# Patient Record
Sex: FEMALE | Race: WHITE | NOT HISPANIC OR LATINO | Employment: OTHER | URBAN - NONMETROPOLITAN AREA
[De-identification: names, ages, dates, MRNs, and addresses within clinical notes are randomized per-mention and may not be internally consistent; named-entity substitution may affect disease eponyms.]

---

## 2017-12-25 ENCOUNTER — APPOINTMENT (OUTPATIENT)
Dept: GENERAL RADIOLOGY | Facility: HOSPITAL | Age: 42
End: 2017-12-25

## 2017-12-25 ENCOUNTER — HOSPITAL ENCOUNTER (EMERGENCY)
Facility: HOSPITAL | Age: 42
Discharge: HOME | End: 2017-12-25
Attending: FAMILY MEDICINE | Admitting: FAMILY MEDICINE

## 2017-12-25 VITALS
DIASTOLIC BLOOD PRESSURE: 93 MMHG | RESPIRATION RATE: 18 BRPM | SYSTOLIC BLOOD PRESSURE: 142 MMHG | WEIGHT: 265 LBS | HEART RATE: 82 BPM | BODY MASS INDEX: 42.59 KG/M2 | HEIGHT: 66 IN | TEMPERATURE: 98 F | OXYGEN SATURATION: 98 %

## 2017-12-25 DIAGNOSIS — S93.401A SPRAIN OF RIGHT ANKLE, UNSPECIFIED LIGAMENT, INITIAL ENCOUNTER: Primary | ICD-10-CM

## 2017-12-25 PROCEDURE — 99283 EMERGENCY DEPT VISIT LOW MDM: CPT

## 2017-12-25 PROCEDURE — 73610 X-RAY EXAM OF ANKLE: CPT

## 2018-04-08 ENCOUNTER — APPOINTMENT (OUTPATIENT)
Dept: GENERAL RADIOLOGY | Facility: HOSPITAL | Age: 43
End: 2018-04-08

## 2018-04-08 ENCOUNTER — HOSPITAL ENCOUNTER (EMERGENCY)
Facility: HOSPITAL | Age: 43
Discharge: HOME | End: 2018-04-08
Attending: FAMILY MEDICINE | Admitting: FAMILY MEDICINE

## 2018-04-08 VITALS
WEIGHT: 265 LBS | SYSTOLIC BLOOD PRESSURE: 127 MMHG | OXYGEN SATURATION: 95 % | BODY MASS INDEX: 42.59 KG/M2 | TEMPERATURE: 97.6 F | HEART RATE: 82 BPM | HEIGHT: 66 IN | DIASTOLIC BLOOD PRESSURE: 90 MMHG | RESPIRATION RATE: 18 BRPM

## 2018-04-08 DIAGNOSIS — R00.2 PALPITATION: Primary | ICD-10-CM

## 2018-04-08 LAB
ALBUMIN SERPL-MCNC: 4.1 G/DL (ref 3.4–4.8)
ALBUMIN/GLOB SERPL: 1.3 G/DL (ref 1.1–1.8)
ALP SERPL-CCNC: 62 U/L (ref 38–126)
ALT SERPL W P-5'-P-CCNC: 141 U/L (ref 9–52)
ANION GAP SERPL CALCULATED.3IONS-SCNC: 13 MMOL/L (ref 5–15)
AST SERPL-CCNC: 86 U/L (ref 14–36)
BASOPHILS # BLD AUTO: 0.01 10*3/MM3 (ref 0–0.2)
BASOPHILS NFR BLD AUTO: 0.1 % (ref 0–2)
BILIRUB SERPL-MCNC: 0.5 MG/DL (ref 0.2–1.3)
BUN BLD-MCNC: 9 MG/DL (ref 7–21)
BUN/CREAT SERPL: 15.3 (ref 7–25)
CALCIUM SPEC-SCNC: 9.5 MG/DL (ref 8.4–10.2)
CHLORIDE SERPL-SCNC: 100 MMOL/L (ref 95–110)
CO2 SERPL-SCNC: 26 MMOL/L (ref 22–31)
CREAT BLD-MCNC: 0.59 MG/DL (ref 0.5–1)
DEPRECATED RDW RBC AUTO: 38.1 FL (ref 36.4–46.3)
EOSINOPHIL # BLD AUTO: 0.12 10*3/MM3 (ref 0–0.7)
EOSINOPHIL NFR BLD AUTO: 1.7 % (ref 0–7)
ERYTHROCYTE [DISTWIDTH] IN BLOOD BY AUTOMATED COUNT: 12.4 % (ref 11.5–14.5)
GFR SERPL CREATININE-BSD FRML MDRD: 111 ML/MIN/1.73 (ref 60–135)
GLOBULIN UR ELPH-MCNC: 3.1 GM/DL (ref 2.3–3.5)
GLUCOSE BLD-MCNC: 152 MG/DL (ref 60–100)
HCT VFR BLD AUTO: 38.9 % (ref 35–45)
HGB BLD-MCNC: 14.2 G/DL (ref 12–15.5)
HOLD SPECIMEN: NORMAL
HOLD SPECIMEN: NORMAL
IMM GRANULOCYTES # BLD: 0.03 10*3/MM3 (ref 0–0.02)
IMM GRANULOCYTES NFR BLD: 0.4 % (ref 0–0.5)
INR PPP: 1.05 (ref 0.8–1.2)
LYMPHOCYTES # BLD AUTO: 2.46 10*3/MM3 (ref 0.6–4.2)
LYMPHOCYTES NFR BLD AUTO: 35.5 % (ref 10–50)
MCH RBC QN AUTO: 30.7 PG (ref 26.5–34)
MCHC RBC AUTO-ENTMCNC: 36.5 G/DL (ref 31.4–36)
MCV RBC AUTO: 84.2 FL (ref 80–98)
MONOCYTES # BLD AUTO: 0.8 10*3/MM3 (ref 0–0.9)
MONOCYTES NFR BLD AUTO: 11.6 % (ref 0–12)
NEUTROPHILS # BLD AUTO: 3.5 10*3/MM3 (ref 2–8.6)
NEUTROPHILS NFR BLD AUTO: 50.7 % (ref 37–80)
NT-PROBNP SERPL-MCNC: 12.4 PG/ML (ref 0–450)
PLATELET # BLD AUTO: 179 10*3/MM3 (ref 150–450)
PMV BLD AUTO: 10 FL (ref 8–12)
POTASSIUM BLD-SCNC: 3.6 MMOL/L (ref 3.5–5.1)
PROT SERPL-MCNC: 7.2 G/DL (ref 6.3–8.6)
PROTHROMBIN TIME: 13.5 SECONDS (ref 11.1–15.3)
RBC # BLD AUTO: 4.62 10*6/MM3 (ref 3.77–5.16)
SODIUM BLD-SCNC: 139 MMOL/L (ref 137–145)
TROPONIN I SERPL-MCNC: <0.012 NG/ML
WBC NRBC COR # BLD: 6.92 10*3/MM3 (ref 3.2–9.8)
WHOLE BLOOD HOLD SPECIMEN: NORMAL
WHOLE BLOOD HOLD SPECIMEN: NORMAL

## 2018-04-08 PROCEDURE — 93005 ELECTROCARDIOGRAM TRACING: CPT | Performed by: FAMILY MEDICINE

## 2018-04-08 PROCEDURE — 93010 ELECTROCARDIOGRAM REPORT: CPT | Performed by: INTERNAL MEDICINE

## 2018-04-08 PROCEDURE — 83880 ASSAY OF NATRIURETIC PEPTIDE: CPT | Performed by: FAMILY MEDICINE

## 2018-04-08 PROCEDURE — 71045 X-RAY EXAM CHEST 1 VIEW: CPT

## 2018-04-08 PROCEDURE — 85025 COMPLETE CBC W/AUTO DIFF WBC: CPT | Performed by: FAMILY MEDICINE

## 2018-04-08 PROCEDURE — 80053 COMPREHEN METABOLIC PANEL: CPT | Performed by: FAMILY MEDICINE

## 2018-04-08 PROCEDURE — 85610 PROTHROMBIN TIME: CPT | Performed by: FAMILY MEDICINE

## 2018-04-08 PROCEDURE — 84484 ASSAY OF TROPONIN QUANT: CPT | Performed by: FAMILY MEDICINE

## 2018-04-08 PROCEDURE — 99283 EMERGENCY DEPT VISIT LOW MDM: CPT

## 2018-04-08 RX ORDER — METHYLPREDNISOLONE 4 MG/1
4 TABLET ORAL DAILY
COMMUNITY
End: 2018-05-29

## 2018-04-08 RX ORDER — MORPHINE SULFATE 15 MG/1
15 TABLET ORAL EVERY 8 HOURS PRN
COMMUNITY
End: 2018-05-29

## 2018-04-08 RX ORDER — TIZANIDINE 4 MG/1
4 TABLET ORAL EVERY 6 HOURS PRN
COMMUNITY
Start: 2017-09-21 | End: 2022-05-13

## 2018-04-08 RX ORDER — OMEPRAZOLE 40 MG/1
40 CAPSULE, DELAYED RELEASE ORAL DAILY
COMMUNITY
Start: 2017-06-22 | End: 2018-05-29

## 2018-04-08 RX ORDER — AMITRIPTYLINE HYDROCHLORIDE 50 MG/1
50 TABLET, FILM COATED ORAL NIGHTLY
COMMUNITY
Start: 2017-06-23 | End: 2018-06-24

## 2018-04-08 RX ORDER — SODIUM CHLORIDE 0.9 % (FLUSH) 0.9 %
10 SYRINGE (ML) INJECTION AS NEEDED
Status: DISCONTINUED | OUTPATIENT
Start: 2018-04-08 | End: 2018-04-08 | Stop reason: HOSPADM

## 2018-04-08 RX ORDER — OXYCODONE AND ACETAMINOPHEN 7.5; 325 MG/1; MG/1
1 TABLET ORAL EVERY 8 HOURS PRN
COMMUNITY
End: 2018-05-29

## 2018-04-08 RX ORDER — MELOXICAM 15 MG/1
15 TABLET ORAL DAILY
COMMUNITY
Start: 2017-03-24 | End: 2021-10-04

## 2018-04-08 RX ORDER — CETIRIZINE HYDROCHLORIDE 10 MG/1
10 TABLET ORAL DAILY
COMMUNITY
Start: 2018-04-02 | End: 2019-04-03

## 2018-04-08 RX ORDER — GABAPENTIN 800 MG/1
800 TABLET ORAL 3 TIMES DAILY
COMMUNITY
End: 2018-08-07 | Stop reason: ALTCHOICE

## 2018-04-08 RX ORDER — OLANZAPINE 10 MG/1
20 TABLET ORAL NIGHTLY
COMMUNITY
Start: 2017-12-26

## 2018-04-08 NOTE — ED PROVIDER NOTES
Subjective     History provided by:  Patient   used: No    Palpitations   Palpitations quality:  Irregular  Onset quality:  Gradual  Duration:  1 day  Timing:  Intermittent  Progression:  Resolved  Chronicity:  New  Context: not anxiety and not caffeine    Relieved by:  Nothing  Worsened by:  Nothing  Associated symptoms: no chest pain, no malaise/fatigue and no shortness of breath    Risk factors: diabetes mellitus        Review of Systems   Constitutional: Negative for malaise/fatigue.   Respiratory: Negative for shortness of breath.    Cardiovascular: Positive for palpitations. Negative for chest pain.   All other systems reviewed and are negative.      Past Medical History:   Diagnosis Date   • Acute atopic conjunctivitis    • Acute bronchitis    • Allergic rhinitis due to pollen    • Breast lump    • Chest pain    • Chronic low back pain    • Contact dermatitis due to drugs and medicine    • Cyst of ovary    • Infestation by Sarcoptes scabiei annalisa hominis    • Low back pain    • Mastodynia    • Nausea and vomiting    • On long term drug therapy    • Pain in wrist    • Rash    • Skin disorder     breast-possible superficial cellulitis   • Spinal stenosis    • Tinea pedis    • Tobacco dependence syndrome    • Upper respiratory infection    • Vitamin D deficiency    • Wheezing        Allergies   Allergen Reactions   • Bactroban [Mupirocin Calcium]    • Neomycin-Bacitracin-Polymyxin [Bacitracin-Neomycin-Polymyxin] Rash   • Other Rash     All antibiotic creams       Past Surgical History:   Procedure Laterality Date   • BREAST SURGERY      excision breast lesion   • CHOLECYSTECTOMY     • INJECTION OF MEDICATION      Kenalog (4)       Family History   Problem Relation Age of Onset   • Coronary artery disease Other        Social History     Social History   • Marital status:      Social History Main Topics   • Smoking status: Former Smoker   • Drug use: Unknown     Other Topics Concern   •  "Not on file     /81 (BP Location: Right arm, Patient Position: Sitting)   Pulse 85   Temp 97.6 °F (36.4 °C) (Oral)   Resp 16   Ht 167.6 cm (66\")   Wt 120 kg (265 lb)   SpO2 96%   BMI 42.77 kg/m²       Objective   Physical Exam   Constitutional: She is oriented to person, place, and time. She appears well-developed and well-nourished.   HENT:   Head: Normocephalic.   Right Ear: External ear normal.   Left Ear: External ear normal.   Nose: Nose normal.   Mouth/Throat: Oropharynx is clear and moist.   Neck: Normal range of motion. Neck supple.   Cardiovascular: Normal rate, regular rhythm, normal heart sounds and intact distal pulses.    Pulmonary/Chest: Effort normal and breath sounds normal.   Abdominal: Soft. Bowel sounds are normal.   Neurological: She is alert and oriented to person, place, and time. She has normal reflexes.   Skin: Skin is warm. Capillary refill takes less than 2 seconds.   Psychiatric: She has a normal mood and affect. Her behavior is normal. Judgment and thought content normal.   Nursing note and vitals reviewed.      Procedures         ED Course  ED Course      Labs Reviewed   COMPREHENSIVE METABOLIC PANEL - Abnormal; Notable for the following:        Result Value    Glucose 152 (*)     ALT (SGPT) 141 (*)     AST (SGOT) 86 (*)     All other components within normal limits   CBC WITH AUTO DIFFERENTIAL - Abnormal; Notable for the following:     MCHC 36.5 (*)     Immature Grans, Absolute 0.03 (*)     All other components within normal limits   TROPONIN (IN-HOUSE) - Normal   BNP (IN-HOUSE) - Normal   PROTIME-INR - Normal    Narrative:     Therapeutic range for most indications is 2.0-3.0 INR,  or 2.5-3.5 for mechanical heart valves.   RAINBOW DRAW    Narrative:     The following orders were created for panel order [place] Draw.  Procedure                               Abnormality         Status                     ---------                               -----------         ------      "                Light Blue Top[[id number]]                                   In process                 Green Top (Gel)[[id number]]                                  In process                 Lavender Top[[id number]]                                     In process                 Gold Top - SST[[id number]]                                   In process                   Please view results for these tests on the individual orders.   TROPONIN (IN-HOUSE)   CBC AND DIFFERENTIAL    Narrative:     The following orders were created for panel order CBC & Differential.  Procedure                               Abnormality         Status                     ---------                               -----------         ------                     CBC Auto Differential[[id number]]        Abnormal            Final result                 Please view results for these tests on the individual orders.   LIGHT BLUE TOP   GREEN TOP   LAVENDER TOP   GOLD TOP - SST       XR Chest 1 View    (Results Pending)     Chest XR was normal.          [facility]    Final diagnoses:   Palpitation            Willian Simmons MD  04/08/18 0124       Willian Simmons MD  04/08/18 0125

## 2018-05-12 ENCOUNTER — HOSPITAL ENCOUNTER (EMERGENCY)
Facility: HOSPITAL | Age: 43
Discharge: HOME | End: 2018-05-12
Attending: EMERGENCY MEDICINE | Admitting: EMERGENCY MEDICINE

## 2018-05-12 VITALS
RESPIRATION RATE: 18 BRPM | SYSTOLIC BLOOD PRESSURE: 131 MMHG | WEIGHT: 269.4 LBS | TEMPERATURE: 98.3 F | DIASTOLIC BLOOD PRESSURE: 63 MMHG | OXYGEN SATURATION: 98 % | HEIGHT: 66 IN | HEART RATE: 68 BPM | BODY MASS INDEX: 43.3 KG/M2

## 2018-05-12 DIAGNOSIS — R74.8 ELEVATED LIVER ENZYMES: ICD-10-CM

## 2018-05-12 DIAGNOSIS — E11.65 TYPE 2 DIABETES MELLITUS WITH HYPERGLYCEMIA, WITHOUT LONG-TERM CURRENT USE OF INSULIN (HCC): Primary | ICD-10-CM

## 2018-05-12 LAB
ALBUMIN SERPL-MCNC: 4.2 G/DL (ref 3.4–4.8)
ALBUMIN/GLOB SERPL: 1.4 G/DL (ref 1.1–1.8)
ALP SERPL-CCNC: 58 U/L (ref 38–126)
ALT SERPL W P-5'-P-CCNC: 125 U/L (ref 9–52)
ANION GAP SERPL CALCULATED.3IONS-SCNC: 12 MMOL/L (ref 5–15)
AST SERPL-CCNC: 102 U/L (ref 14–36)
BACTERIA UR QL AUTO: ABNORMAL /HPF
BASOPHILS # BLD AUTO: 0.02 10*3/MM3 (ref 0–0.2)
BASOPHILS NFR BLD AUTO: 0.5 % (ref 0–2)
BILIRUB SERPL-MCNC: 0.3 MG/DL (ref 0.2–1.3)
BILIRUB UR QL STRIP: NEGATIVE
BUN BLD-MCNC: 12 MG/DL (ref 7–21)
BUN/CREAT SERPL: 22.2 (ref 7–25)
CALCIUM SPEC-SCNC: 9.3 MG/DL (ref 8.4–10.2)
CHLORIDE SERPL-SCNC: 100 MMOL/L (ref 95–110)
CLARITY UR: ABNORMAL
CO2 SERPL-SCNC: 25 MMOL/L (ref 22–31)
COLOR UR: YELLOW
CREAT BLD-MCNC: 0.54 MG/DL (ref 0.5–1)
DEPRECATED RDW RBC AUTO: 38.3 FL (ref 36.4–46.3)
EOSINOPHIL # BLD AUTO: 0.12 10*3/MM3 (ref 0–0.7)
EOSINOPHIL NFR BLD AUTO: 2.9 % (ref 0–7)
ERYTHROCYTE [DISTWIDTH] IN BLOOD BY AUTOMATED COUNT: 12.4 % (ref 11.5–14.5)
GFR SERPL CREATININE-BSD FRML MDRD: 123 ML/MIN/1.73 (ref 58–135)
GLOBULIN UR ELPH-MCNC: 3 GM/DL (ref 2.3–3.5)
GLUCOSE BLD-MCNC: 210 MG/DL (ref 60–100)
GLUCOSE BLDC GLUCOMTR-MCNC: 260 MG/DL (ref 70–130)
GLUCOSE UR STRIP-MCNC: ABNORMAL MG/DL
HCT VFR BLD AUTO: 39.3 % (ref 35–45)
HGB BLD-MCNC: 14 G/DL (ref 12–15.5)
HGB UR QL STRIP.AUTO: NEGATIVE
HOLD SPECIMEN: NORMAL
HYALINE CASTS UR QL AUTO: ABNORMAL /LPF
IMM GRANULOCYTES # BLD: 0.02 10*3/MM3 (ref 0–0.02)
IMM GRANULOCYTES NFR BLD: 0.5 % (ref 0–0.5)
KETONES UR QL STRIP: NEGATIVE
LEUKOCYTE ESTERASE UR QL STRIP.AUTO: ABNORMAL
LYMPHOCYTES # BLD AUTO: 2 10*3/MM3 (ref 0.6–4.2)
LYMPHOCYTES NFR BLD AUTO: 48.1 % (ref 10–50)
MCH RBC QN AUTO: 30.4 PG (ref 26.5–34)
MCHC RBC AUTO-ENTMCNC: 35.6 G/DL (ref 31.4–36)
MCV RBC AUTO: 85.2 FL (ref 80–98)
MONOCYTES # BLD AUTO: 0.47 10*3/MM3 (ref 0–0.9)
MONOCYTES NFR BLD AUTO: 11.3 % (ref 0–12)
NEUTROPHILS # BLD AUTO: 1.53 10*3/MM3 (ref 2–8.6)
NEUTROPHILS NFR BLD AUTO: 36.7 % (ref 37–80)
NITRITE UR QL STRIP: NEGATIVE
PH UR STRIP.AUTO: 6 [PH] (ref 5–9)
PLATELET # BLD AUTO: 161 10*3/MM3 (ref 150–450)
PMV BLD AUTO: 10.1 FL (ref 8–12)
POTASSIUM BLD-SCNC: 4.1 MMOL/L (ref 3.5–5.1)
PROT SERPL-MCNC: 7.2 G/DL (ref 6.3–8.6)
PROT UR QL STRIP: NEGATIVE
RBC # BLD AUTO: 4.61 10*6/MM3 (ref 3.77–5.16)
RBC # UR: ABNORMAL /HPF
REF LAB TEST METHOD: ABNORMAL
SODIUM BLD-SCNC: 137 MMOL/L (ref 137–145)
SP GR UR STRIP: 1.01 (ref 1–1.03)
SQUAMOUS #/AREA URNS HPF: ABNORMAL /HPF
UROBILINOGEN UR QL STRIP: ABNORMAL
WBC NRBC COR # BLD: 4.16 10*3/MM3 (ref 3.2–9.8)
WBC UR QL AUTO: ABNORMAL /HPF
WHOLE BLOOD HOLD SPECIMEN: NORMAL

## 2018-05-12 PROCEDURE — 80053 COMPREHEN METABOLIC PANEL: CPT | Performed by: PHYSICIAN ASSISTANT

## 2018-05-12 PROCEDURE — 87086 URINE CULTURE/COLONY COUNT: CPT | Performed by: EMERGENCY MEDICINE

## 2018-05-12 PROCEDURE — 99283 EMERGENCY DEPT VISIT LOW MDM: CPT

## 2018-05-12 PROCEDURE — 81001 URINALYSIS AUTO W/SCOPE: CPT | Performed by: EMERGENCY MEDICINE

## 2018-05-12 PROCEDURE — 96360 HYDRATION IV INFUSION INIT: CPT

## 2018-05-12 PROCEDURE — 82962 GLUCOSE BLOOD TEST: CPT

## 2018-05-12 PROCEDURE — 85025 COMPLETE CBC W/AUTO DIFF WBC: CPT | Performed by: PHYSICIAN ASSISTANT

## 2018-05-12 RX ORDER — SODIUM CHLORIDE 0.9 % (FLUSH) 0.9 %
10 SYRINGE (ML) INJECTION AS NEEDED
Status: DISCONTINUED | OUTPATIENT
Start: 2018-05-12 | End: 2018-05-12 | Stop reason: HOSPADM

## 2018-05-12 RX ORDER — SODIUM CHLORIDE 9 MG/ML
125 INJECTION, SOLUTION INTRAVENOUS CONTINUOUS
Status: DISCONTINUED | OUTPATIENT
Start: 2018-05-12 | End: 2018-05-12 | Stop reason: HOSPADM

## 2018-05-12 RX ORDER — OXYCODONE AND ACETAMINOPHEN 10; 325 MG/1; MG/1
1 TABLET ORAL EVERY 6 HOURS
COMMUNITY
End: 2021-10-08 | Stop reason: SDUPTHER

## 2018-05-12 RX ADMIN — SODIUM CHLORIDE 125 ML/HR: 9 INJECTION, SOLUTION INTRAVENOUS at 12:19

## 2018-05-12 NOTE — ED TRIAGE NOTES
Patient is on Metformin and Glyburide. She is usually controlled on Metformin but she had a spike in blood sugars so PCP started her on Glyburide and it is still not down.

## 2018-05-13 LAB — BACTERIA SPEC AEROBE CULT: NORMAL

## 2018-05-29 ENCOUNTER — DOCUMENTATION (OUTPATIENT)
Dept: CARDIOLOGY | Facility: CLINIC | Age: 43
End: 2018-05-29

## 2018-05-29 ENCOUNTER — OFFICE VISIT (OUTPATIENT)
Dept: CARDIOLOGY | Facility: CLINIC | Age: 43
End: 2018-05-29

## 2018-05-29 VITALS
SYSTOLIC BLOOD PRESSURE: 130 MMHG | BODY MASS INDEX: 42.59 KG/M2 | HEART RATE: 88 BPM | DIASTOLIC BLOOD PRESSURE: 80 MMHG | HEIGHT: 66 IN | WEIGHT: 265 LBS

## 2018-05-29 DIAGNOSIS — E11.9 TYPE 2 DIABETES MELLITUS WITHOUT COMPLICATION, WITHOUT LONG-TERM CURRENT USE OF INSULIN (HCC): Primary | ICD-10-CM

## 2018-05-29 DIAGNOSIS — R00.0 TACHYCARDIA: ICD-10-CM

## 2018-05-29 DIAGNOSIS — R07.2 PRECORDIAL PAIN: ICD-10-CM

## 2018-05-29 DIAGNOSIS — R00.2 PALPITATION: ICD-10-CM

## 2018-05-29 PROCEDURE — 99204 OFFICE O/P NEW MOD 45 MIN: CPT | Performed by: INTERNAL MEDICINE

## 2018-05-29 NOTE — PROGRESS NOTES
Corrina Serrano  42 y.o. female    05/29/2018  1. Type 2 diabetes mellitus without complication, without long-term current use of insulin    2. Palpitation    3. Tachycardia    4. Precordial pain        History of Present Illness    42 years old patient with the BMI 42 and past medical history of diabetes, chronic back pain, peripheral neuropathy, history of anxiety.  Recently evaluated in ER for palpitation sudden onset and termination going on for last 3-4 month with associated symptom of fluttering shortness breath and chest pain.  The patient have chest pain independent of that described as pressure-like and sharply feeling over the left precordium with radiation sometimes the left shoulder.  EKG sinus rhythm with evidence of left ventricular hypertrophy and no acute ST-T wave changes.  Serum multiple anxiety medications and also on Percocet for management of chronic back pain with history of peripheral neuropathy.  No syncope or near syncopal episode reported.  No orthopnea PND fever cough or chills reported.  Her physical activity limited secondary significant back pain        SUBJECTIVE    Allergies   Allergen Reactions   • Bactroban [Mupirocin Calcium]    • Neomycin-Bacitracin-Polymyxin [Bacitracin-Neomycin-Polymyxin] Rash   • Other Rash     All antibiotic creams         Past Medical History:   Diagnosis Date   • Acute atopic conjunctivitis    • Acute bronchitis    • Allergic rhinitis due to pollen    • Breast lump    • Chest pain    • Chronic low back pain    • Contact dermatitis due to drugs and medicine    • Cyst of ovary    • Infestation by Sarcoptes scabiei annalisa hominis    • Low back pain    • Mastodynia    • Nausea and vomiting    • On long term drug therapy    • Pain in wrist    • Rash    • Skin disorder     breast-possible superficial cellulitis   • Spinal stenosis    • Tinea pedis    • Tobacco dependence syndrome    • Upper respiratory infection    • Vitamin D deficiency    • Wheezing   "        Past Surgical History:   Procedure Laterality Date   • BREAST SURGERY      excision breast lesion   • CHOLECYSTECTOMY     • INJECTION OF MEDICATION      Kenalog (4)         Family History   Problem Relation Age of Onset   • Coronary artery disease Other          Social History     Social History   • Marital status:      Spouse name: N/A   • Number of children: N/A   • Years of education: N/A     Occupational History   • Not on file.     Social History Main Topics   • Smoking status: Former Smoker   • Smokeless tobacco: Not on file   • Alcohol use Not on file   • Drug use: No   • Sexual activity: Defer     Other Topics Concern   • Not on file     Social History Narrative   • No narrative on file         Current Outpatient Prescriptions   Medication Sig Dispense Refill   • amitriptyline (ELAVIL) 50 MG tablet Take 50 mg by mouth Every Night.     • cetirizine (zyrTEC) 10 MG tablet Take 10 mg by mouth Daily.     • gabapentin (NEURONTIN) 800 MG tablet Take 800 mg by mouth 3 (Three) Times a Day.     • GLYBURIDE PO Take 3 mg by mouth 2 (Two) Times a Day.     • meloxicam (MOBIC) 15 MG tablet Take 15 mg by mouth Daily.     • metFORMIN (GLUCOPHAGE) 500 MG tablet Take 500 mg by mouth 2 (Two) Times a Day With Meals.     • OLANZapine (zyPREXA) 5 MG tablet Take 5 mg by mouth Every Night.     • omeprazole (priLOSEC) 40 MG capsule Take 40 mg by mouth Daily.     • oxyCODONE-acetaminophen (PERCOCET)  MG per tablet Take 1 tablet by mouth Every 6 (Six) Hours As Needed for Moderate Pain .     • tiZANidine (ZANAFLEX) 4 MG tablet Take 4 mg by mouth Every 6 (Six) Hours.       No current facility-administered medications for this visit.          OBJECTIVE    /80   Pulse 88   Ht 167.6 cm (65.98\")   Wt 120 kg (265 lb)   BMI 42.79 kg/m²         Review of Systems     Constitutional:  Denies recent weight loss, weight gain, fever or chills, no change in exercise tolerance     HENT:  Denies any hearing loss, " epistaxis, hoarseness, or difficulty speaking.     Eyes: No blurry     Respiratory:  Denies dyspnea with exertion,no cough, wheezing, or hemoptysis.     Cardiovascular: H&P    Gastrointestinal:  Denies change in bowel habits, dyspepsia, ulcer disease, hematochezia, or melena.     Endocrine: Negative for cold intolerance, heat intolerance, polydipsia, polyphagia and polyuria. Denies any history of weight change, heat/cold intolerance, polydipsia, polyuria     Genitourinary: Negative.      Musculoskeletal: Chronic back pain    Skin:  Denies any change in hair or nails, rashes, or skin lesions.     Allergic/Immunologic: Negative.  Negative for environmental allergies, food allergies and immunocompromised state.     Neurological:  Denies any history of recurrent headaches, strokes, TIA, or seizure disorder.     Hematological: Denies any food allergies, seasonal allergies, bleeding disorders, or lymphadenopathy.     Psychiatric/Behavioral: Denies any history of depression, substance abuse, or change in cognitive function.         Physical Exam     Constitutional: Cooperative, alert and oriented, well-developed, well-nourished, in no acute distress.     HENT:   Head: Normocephalic, normal hair patterns, no masses or tenderness.  Ears, Nose, and Throat: No gross abnormalities. No pallor or cyanosis. Dentition good.   Eyes: EOMS intact, PERRL, conjunctivae and lids unremarkable. Fundoscopic exam and visual fields not performed.   Neck: No palpable masses or adenopathy, no thyromegaly, no JVD, carotid pulses are full and equal bilaterally and without  Bruits.     Cardiovascular: Regular rhythm, S1 and S2 normal, no S3 or S4. Apical impulse not displaced. No murmurs, gallops, or rubs detected.     Pulmonary/Chest: Chest: normal symmetry, no tenderness to palpation, normal respiratory excursion, no intercostal retraction, no use of accessory muscles.            Pulmonary: Normal breath sounds. No rales or ronchi.    Abdominal:  Abdomen soft, bowel sounds normoactive, no masses, no hepatosplenomegaly, non-tender, no bruits.     Musculoskeletal: No deformities, clubbing, cyanosis, erythema, or edema observed. There are no spinal abnormalities noted. Normal muscle strength and tone. Pulses full and equal in all extremities, no bruits auscultated.     Neurological: No gross motor or sensory deficits noted, affect appropriate, oriented to time, person, place.     Skin: Warm and dry to the touch, no apparent skin lesions or masses noted.     Psychiatric: She has a normal mood and affect. Her behavior is normal. Judgment and thought content normal.         Procedures      Lab Results   Component Value Date    WBC 4.16 05/12/2018    HGB 14.0 05/12/2018    HCT 39.3 05/12/2018    MCV 85.2 05/12/2018     05/12/2018     Lab Results   Component Value Date    GLUCOSE 210 (H) 05/12/2018    BUN 12 05/12/2018    CREATININE 0.54 05/12/2018    EGFRIFNONA 123 05/12/2018    BCR 22.2 05/12/2018    CO2 25.0 05/12/2018    CALCIUM 9.3 05/12/2018    ALBUMIN 4.20 05/12/2018    LABIL2 1.4 05/12/2018     (H) 05/12/2018     (H) 05/12/2018     No results found for: CHOL  Lab Results   Component Value Date    TRIG 144 03/11/2015     Lab Results   Component Value Date    HDL 50 (L) 03/11/2015     No components found for: LDLCALC  Lab Results   Component Value Date     03/11/2015     No results found for: HDLLDLRATIO  No components found for: CHOLHDL  Lab Results   Component Value Date    HGBA1C 5.2 03/11/2015     No results found for: TSH, L4IWZTZ, S6WTSKP, THYROIDAB        ASSESSMENT AND PLAN  #1 palpitation #2 tachycardia with undefined mechanism #3 chest pain with the risk factor of diabetes #4 chronic back pain and history of anxiety #5   peripheral neuropathy    Clinically, no sign of cardiac decompensation based the clinical history physical finding.  Given the risk factor of for diabetes, exogenous obesity, history of smoking and chest  pain even PAC typical we will further risk stratify with a Lexiscan Cardiolite given the significant history of peripheral neuropathy and chronic back pain.  I will arrange event monitor for 21 day and echocardiographic study.  Risk factor lifestyle modification discussed.  Dash diet explained.  At present, patient being managed with glyburide and metformin for diabetes  Proton inhibitor with history of gastritis, Percocet for chronic back pain and peripheral neuropathy along with gabapentin.  We will see her back in 6 month R depends on patient clinical condition and our outcome of cardiac evaluations.    Corrina was seen today for chest pain and palpitations.    Diagnoses and all orders for this visit:    Type 2 diabetes mellitus without complication, without long-term current use of insulin    Palpitation  -     Cardiac Event Monitor; Future  -     Stress Test With Myocardial Perfusion One Day; Future    Tachycardia  -     Cardiac Event Monitor; Future  -     Stress Test With Myocardial Perfusion One Day; Future  -     Adult Transthoracic Echo Complete W/ Cont if Necessary Per Protocol; Future    Precordial pain  -     Cardiac Event Monitor; Future  -     Stress Test With Myocardial Perfusion One Day; Future  -     Adult Transthoracic Echo Complete W/ Cont if Necessary Per Protocol; Future        Yue Howard MD  5/29/2018  10:08 AM

## 2018-05-31 ENCOUNTER — DOCUMENTATION (OUTPATIENT)
Dept: CARDIOLOGY | Facility: CLINIC | Age: 43
End: 2018-05-31

## 2018-06-11 ENCOUNTER — APPOINTMENT (OUTPATIENT)
Dept: CARDIOLOGY | Facility: HOSPITAL | Age: 43
End: 2018-06-11
Attending: INTERNAL MEDICINE

## 2018-06-11 ENCOUNTER — HOSPITAL ENCOUNTER (OUTPATIENT)
Dept: NUCLEAR MEDICINE | Facility: HOSPITAL | Age: 43
Discharge: HOME | End: 2018-06-11
Attending: INTERNAL MEDICINE

## 2018-06-11 ENCOUNTER — APPOINTMENT (OUTPATIENT)
Dept: NUCLEAR MEDICINE | Facility: HOSPITAL | Age: 43
End: 2018-06-11
Attending: INTERNAL MEDICINE

## 2018-06-11 DIAGNOSIS — R07.2 PRECORDIAL PAIN: ICD-10-CM

## 2018-06-11 DIAGNOSIS — R00.2 PALPITATION: ICD-10-CM

## 2018-06-11 DIAGNOSIS — R00.0 TACHYCARDIA: ICD-10-CM

## 2018-06-11 PROCEDURE — A9500 TC99M SESTAMIBI: HCPCS | Performed by: INTERNAL MEDICINE

## 2018-06-11 PROCEDURE — 0 TECHNETIUM SESTAMIBI: Performed by: INTERNAL MEDICINE

## 2018-06-11 RX ADMIN — TECHNETIUM TC 99M SESTAMIBI 1 DOSE: 1 INJECTION INTRAVENOUS at 08:20

## 2018-06-12 ENCOUNTER — HOSPITAL ENCOUNTER (OUTPATIENT)
Dept: NUCLEAR MEDICINE | Facility: HOSPITAL | Age: 43
Discharge: HOME | End: 2018-06-12
Attending: INTERNAL MEDICINE

## 2018-06-12 ENCOUNTER — HOSPITAL ENCOUNTER (OUTPATIENT)
Dept: CARDIOLOGY | Facility: HOSPITAL | Age: 43
Discharge: HOME | End: 2018-06-12
Attending: INTERNAL MEDICINE

## 2018-06-12 LAB
BH CV STRESS BP STAGE 1: NORMAL
BH CV STRESS COMMENTS STAGE 1: NORMAL
BH CV STRESS DOSE REGADENOSON STAGE 1: 0.4
BH CV STRESS DURATION MIN STAGE 1: 0
BH CV STRESS DURATION SEC STAGE 1: 10
BH CV STRESS HR STAGE 1: 82
BH CV STRESS PROTOCOL 1: NORMAL
BH CV STRESS RECOVERY BP: NORMAL MMHG
BH CV STRESS RECOVERY HR: 88 BPM
BH CV STRESS STAGE 1: 1
LV EF NUC BP: 71 %
MAXIMAL PREDICTED HEART RATE: 175 BPM
PERCENT MAX PREDICTED HR: 44 %
STRESS BASELINE BP: NORMAL MMHG
STRESS BASELINE HR: 77 BPM
STRESS PERCENT HR: 52 %
STRESS POST ESTIMATED WORKLOAD: 1 METS
STRESS POST PEAK BP: NORMAL MMHG
STRESS POST PEAK HR: 77 BPM
STRESS TARGET HR: 149 BPM

## 2018-06-12 PROCEDURE — 93016 CV STRESS TEST SUPVJ ONLY: CPT | Performed by: INTERNAL MEDICINE

## 2018-06-12 PROCEDURE — 93018 CV STRESS TEST I&R ONLY: CPT | Performed by: INTERNAL MEDICINE

## 2018-06-12 PROCEDURE — 25010000002 REGADENOSON 0.4 MG/5ML SOLUTION: Performed by: INTERNAL MEDICINE

## 2018-06-12 PROCEDURE — 78452 HT MUSCLE IMAGE SPECT MULT: CPT | Performed by: INTERNAL MEDICINE

## 2018-06-12 PROCEDURE — 78452 HT MUSCLE IMAGE SPECT MULT: CPT

## 2018-06-12 PROCEDURE — 0 TECHNETIUM SESTAMIBI: Performed by: INTERNAL MEDICINE

## 2018-06-12 PROCEDURE — A9500 TC99M SESTAMIBI: HCPCS | Performed by: INTERNAL MEDICINE

## 2018-06-12 PROCEDURE — 93017 CV STRESS TEST TRACING ONLY: CPT

## 2018-06-12 RX ORDER — 0.9 % SODIUM CHLORIDE 0.9 %
10 VIAL (ML) INJECTION AS NEEDED
Status: DISCONTINUED | OUTPATIENT
Start: 2018-06-12 | End: 2018-06-13 | Stop reason: HOSPADM

## 2018-06-12 RX ADMIN — SODIUM CHLORIDE, PRESERVATIVE FREE 10 ML: 5 INJECTION INTRAVENOUS at 08:20

## 2018-06-12 RX ADMIN — TECHNETIUM TC 99M SESTAMIBI 1 DOSE: 1 INJECTION INTRAVENOUS at 08:20

## 2018-06-12 RX ADMIN — REGADENOSON 0.4 MG: 0.08 INJECTION, SOLUTION INTRAVENOUS at 08:19

## 2018-06-20 ENCOUNTER — DOCUMENTATION (OUTPATIENT)
Dept: CARDIOLOGY | Facility: CLINIC | Age: 43
End: 2018-06-20

## 2018-07-02 ENCOUNTER — PREP FOR SURGERY (OUTPATIENT)
Dept: OTHER | Facility: HOSPITAL | Age: 43
End: 2018-07-02

## 2018-07-02 DIAGNOSIS — I20.8 ANGINA EFFORT: Primary | ICD-10-CM

## 2018-07-02 RX ORDER — SODIUM CHLORIDE 0.9 % (FLUSH) 0.9 %
1-10 SYRINGE (ML) INJECTION AS NEEDED
Status: CANCELLED | OUTPATIENT
Start: 2018-07-03

## 2018-07-02 RX ORDER — SODIUM CHLORIDE 9 MG/ML
80 INJECTION, SOLUTION INTRAVENOUS CONTINUOUS
Status: CANCELLED | OUTPATIENT
Start: 2018-07-03

## 2018-07-03 ENCOUNTER — HOSPITAL ENCOUNTER (OUTPATIENT)
Facility: HOSPITAL | Age: 43
Setting detail: HOSPITAL OUTPATIENT SURGERY
Discharge: HOME | End: 2018-07-03
Attending: INTERNAL MEDICINE | Admitting: INTERNAL MEDICINE

## 2018-07-03 VITALS
DIASTOLIC BLOOD PRESSURE: 63 MMHG | HEIGHT: 66 IN | WEIGHT: 265.65 LBS | TEMPERATURE: 99 F | OXYGEN SATURATION: 96 % | BODY MASS INDEX: 42.69 KG/M2 | RESPIRATION RATE: 20 BRPM | SYSTOLIC BLOOD PRESSURE: 131 MMHG | HEART RATE: 80 BPM

## 2018-07-03 DIAGNOSIS — I20.8 ANGINA EFFORT: ICD-10-CM

## 2018-07-03 LAB
ALBUMIN SERPL-MCNC: 4.2 G/DL (ref 3.4–4.8)
ALBUMIN/GLOB SERPL: 1.5 G/DL (ref 1.1–1.8)
ALP SERPL-CCNC: 68 U/L (ref 38–126)
ALT SERPL W P-5'-P-CCNC: 142 U/L (ref 9–52)
ANION GAP SERPL CALCULATED.3IONS-SCNC: 9 MMOL/L (ref 5–15)
AST SERPL-CCNC: 123 U/L (ref 14–36)
B-HCG UR QL: NEGATIVE
BILIRUB SERPL-MCNC: 0.3 MG/DL (ref 0.2–1.3)
BUN BLD-MCNC: 12 MG/DL (ref 7–21)
BUN/CREAT SERPL: 22.6 (ref 7–25)
CALCIUM SPEC-SCNC: 9 MG/DL (ref 8.4–10.2)
CHLORIDE SERPL-SCNC: 103 MMOL/L (ref 95–110)
CO2 SERPL-SCNC: 25 MMOL/L (ref 22–31)
CREAT BLD-MCNC: 0.53 MG/DL (ref 0.5–1)
DEPRECATED RDW RBC AUTO: 40.2 FL (ref 36.4–46.3)
ERYTHROCYTE [DISTWIDTH] IN BLOOD BY AUTOMATED COUNT: 12.6 % (ref 11.5–14.5)
GFR SERPL CREATININE-BSD FRML MDRD: 125 ML/MIN/1.73 (ref 58–135)
GLOBULIN UR ELPH-MCNC: 2.8 GM/DL (ref 2.3–3.5)
GLUCOSE BLD-MCNC: 173 MG/DL (ref 60–100)
HCT VFR BLD AUTO: 39.3 % (ref 35–45)
HGB BLD-MCNC: 13.7 G/DL (ref 12–15.5)
INR PPP: 1.07 (ref 0.8–1.2)
MCH RBC QN AUTO: 30.2 PG (ref 26.5–34)
MCHC RBC AUTO-ENTMCNC: 34.9 G/DL (ref 31.4–36)
MCV RBC AUTO: 86.8 FL (ref 80–98)
PLATELET # BLD AUTO: 189 10*3/MM3 (ref 150–450)
PMV BLD AUTO: 10 FL (ref 8–12)
POTASSIUM BLD-SCNC: 4 MMOL/L (ref 3.5–5.1)
PROT SERPL-MCNC: 7 G/DL (ref 6.3–8.6)
PROTHROMBIN TIME: 13.7 SECONDS (ref 11.1–15.3)
RBC # BLD AUTO: 4.53 10*6/MM3 (ref 3.77–5.16)
SODIUM BLD-SCNC: 137 MMOL/L (ref 137–145)
WBC NRBC COR # BLD: 3.83 10*3/MM3 (ref 3.2–9.8)

## 2018-07-03 PROCEDURE — 80053 COMPREHEN METABOLIC PANEL: CPT | Performed by: INTERNAL MEDICINE

## 2018-07-03 PROCEDURE — 85610 PROTHROMBIN TIME: CPT | Performed by: INTERNAL MEDICINE

## 2018-07-03 PROCEDURE — 85027 COMPLETE CBC AUTOMATED: CPT | Performed by: INTERNAL MEDICINE

## 2018-07-03 PROCEDURE — 93458 L HRT ARTERY/VENTRICLE ANGIO: CPT | Performed by: INTERNAL MEDICINE

## 2018-07-03 PROCEDURE — 25010000002 MIDAZOLAM PER 1 MG: Performed by: INTERNAL MEDICINE

## 2018-07-03 PROCEDURE — C1760 CLOSURE DEV, VASC: HCPCS | Performed by: INTERNAL MEDICINE

## 2018-07-03 PROCEDURE — 0 IOPAMIDOL PER 1 ML: Performed by: INTERNAL MEDICINE

## 2018-07-03 PROCEDURE — C1769 GUIDE WIRE: HCPCS | Performed by: INTERNAL MEDICINE

## 2018-07-03 PROCEDURE — 81025 URINE PREGNANCY TEST: CPT | Performed by: INTERNAL MEDICINE

## 2018-07-03 PROCEDURE — C1894 INTRO/SHEATH, NON-LASER: HCPCS | Performed by: INTERNAL MEDICINE

## 2018-07-03 PROCEDURE — 25010000002 FENTANYL CITRATE (PF) 100 MCG/2ML SOLUTION: Performed by: INTERNAL MEDICINE

## 2018-07-03 RX ORDER — SODIUM CHLORIDE 9 MG/ML
80 INJECTION, SOLUTION INTRAVENOUS CONTINUOUS
Status: DISCONTINUED | OUTPATIENT
Start: 2018-07-03 | End: 2018-07-03

## 2018-07-03 RX ORDER — ENALAPRILAT 2.5 MG/2ML
1.25 INJECTION INTRAVENOUS ONCE
Status: COMPLETED | OUTPATIENT
Start: 2018-07-03 | End: 2018-07-03

## 2018-07-03 RX ORDER — SODIUM CHLORIDE 0.9 % (FLUSH) 0.9 %
1-10 SYRINGE (ML) INJECTION AS NEEDED
Status: DISCONTINUED | OUTPATIENT
Start: 2018-07-03 | End: 2018-07-03 | Stop reason: HOSPADM

## 2018-07-03 RX ORDER — HEPARIN SODIUM 1000 [USP'U]/ML
INJECTION, SOLUTION INTRAVENOUS; SUBCUTANEOUS AS NEEDED
Status: DISCONTINUED | OUTPATIENT
Start: 2018-07-03 | End: 2018-07-03 | Stop reason: HOSPADM

## 2018-07-03 RX ORDER — LIDOCAINE HYDROCHLORIDE 20 MG/ML
INJECTION, SOLUTION INFILTRATION; PERINEURAL AS NEEDED
Status: DISCONTINUED | OUTPATIENT
Start: 2018-07-03 | End: 2018-07-03 | Stop reason: HOSPADM

## 2018-07-03 RX ORDER — FENTANYL CITRATE 50 UG/ML
INJECTION, SOLUTION INTRAMUSCULAR; INTRAVENOUS AS NEEDED
Status: DISCONTINUED | OUTPATIENT
Start: 2018-07-03 | End: 2018-07-03 | Stop reason: HOSPADM

## 2018-07-03 RX ORDER — SODIUM CHLORIDE 9 MG/ML
100 INJECTION, SOLUTION INTRAVENOUS CONTINUOUS
Status: DISCONTINUED | OUTPATIENT
Start: 2018-07-03 | End: 2018-07-03 | Stop reason: HOSPADM

## 2018-07-03 RX ORDER — MIDAZOLAM HYDROCHLORIDE 1 MG/ML
INJECTION INTRAMUSCULAR; INTRAVENOUS AS NEEDED
Status: DISCONTINUED | OUTPATIENT
Start: 2018-07-03 | End: 2018-07-03 | Stop reason: HOSPADM

## 2018-07-03 RX ADMIN — ENALAPRILAT 1.25 MG: 1.25 INJECTION INTRAVENOUS at 08:43

## 2018-07-03 RX ADMIN — SODIUM CHLORIDE 80 ML/HR: 9 INJECTION, SOLUTION INTRAVENOUS at 06:25

## 2018-07-03 RX ADMIN — CEFAZOLIN 1 G: 1 INJECTION, POWDER, FOR SOLUTION INTRAMUSCULAR; INTRAVENOUS; PARENTERAL at 08:43

## 2018-07-03 NOTE — H&P
[facility] Cardiology  HISTORY AND PHYSICAL  Corrina Serrano  43 y.o. female    Referring physician Dr. Howard    Chief complaint -abnormal stress test      History of present Illness- 43-year-old lady with history of diabetes, hypertension and obesity was having shortness of breath and chest tightness had a abnormal EKG subsequent stress test showed apical ischemia and is being scheduled for elective cardiac catheterization.              Allergies   Allergen Reactions   • Bactroban [Mupirocin Calcium]    • Neomycin-Bacitracin-Polymyxin [Bacitracin-Neomycin-Polymyxin] Rash   • Other Rash     All antibiotic creams         Past Medical History:   Diagnosis Date   • Acute atopic conjunctivitis    • Acute bronchitis    • Allergic rhinitis due to pollen    • Arrhythmia     wearing heart monitor    • Breast lump    • Chest pain    • Chronic low back pain    • Contact dermatitis due to drugs and medicine    • Cyst of ovary    • Infestation by Sarcoptes scabiei annalisa hominis    • Low back pain    • Mastodynia    • Nausea and vomiting    • On long term drug therapy    • Pain in wrist    • Rash    • Skin disorder     breast-possible superficial cellulitis   • Spinal stenosis    • Tinea pedis    • Tobacco dependence syndrome    • Upper respiratory infection    • Vitamin D deficiency    • Wheezing          Past Surgical History:   Procedure Laterality Date   • BREAST SURGERY      excision breast lesion   • CHOLECYSTECTOMY     • INJECTION OF MEDICATION      Kenalog (4)         Family History   Problem Relation Age of Onset   • Coronary artery disease Other          Social History     Social History   • Marital status:      Spouse name: N/A   • Number of children: N/A   • Years of education: N/A     Occupational History   • Not on file.     Social History Main Topics   • Smoking status: Former Smoker   • Smokeless tobacco: Not on file   • Alcohol use Not on file   • Drug use: No   • Sexual  "activity: Defer     Other Topics Concern   • Not on file     Social History Narrative   • No narrative on file         Current Facility-Administered Medications   Medication Dose Route Frequency Provider Last Rate Last Dose   • sodium chloride 0.9 % flush 1-10 mL  1-10 mL Intravenous PRN Arun Cadet MD       • sodium chloride 0.9 % infusion  80 mL/hr Intravenous Continuous Arun Cadet MD 80 mL/hr at 07/03/18 0625 80 mL/hr at 07/03/18 0625         Review of Systems     Constitution: Denies any fatigue, fever or chills    HENT: Denies any headache, hearing impairment    Eyes: Denies any blurring of vision, or photophobia     Cardivascular - As per history of present illness     Respiratory system- shortness of breath     Endocrine:   history of hyperlipidemia, diabetes       Musculoskeletal:  No history of arthritis with musculoskeletal problems    Gastrointestinal: No nausea, vomiting, or melena    Genitourinary: No dysuria or hematuria    Neurological:   No history of seizure disorder, stroke, memory problems    Psychiatric/Behavioral:        No history of depression, bipolar disorder or schizophrenia     Hematological- no history of easy bruising            OBJECTIVE    /67 (BP Location: Left arm, Patient Position: Lying)   Pulse 95   Temp 98.1 °F (36.7 °C) (Temporal Artery )   Resp 18   Ht 167.6 cm (66\")   Wt 121 kg (265 lb 10.5 oz)   LMP 06/21/2018   SpO2 95%   BMI 42.88 kg/m²       Physical Exam     Constitutional: is oriented to person, place, and time.     Skin-warm and dry    Well developed and nourished in no acute distress      Head: Normocephalic and atraumatic.     Eyes: Pupils are equal, round, and reactive to light.     Neck: Neck supple. No bruit in the carotids, no elevation of JVD    Cardiovascular: [place] in the fifth intercostal space, regular rate, and  Rhythm,  S1 greater than S2, no S3 or S4, no gallop       Pulmonary/Chest:   Air  Entry is equal on both sides  No " wheezing or crackles,      Abdominal: Soft.  No hepatosplenomegaly, bowel sounds are present    Musculoskeletal: No kyphoscoliosis    Neurological: is alert and oriented to person, place, and time.    cranial nerve are intact .   No motor or sensory deficit    Extremities-no edema, no radial femoral delay      Psychiatric: He has a normal mood and affect.                  His behavior is normal.        Lab Results (last 24 hours)     Procedure Component Value Units Date/Time    CBC (No Diff) [[id number]]  (Normal) Collected:  07/03/18 0615    Specimen:  Blood Updated:  07/03/18 0639     WBC 3.83 10*3/mm3      RBC 4.53 10*6/mm3      Hemoglobin 13.7 g/dL      Hematocrit 39.3 %      MCV 86.8 fL      MCH 30.2 pg      MCHC 34.9 g/dL      RDW 12.6 %      RDW-SD 40.2 fl      MPV 10.0 fL      Platelets 189 10*3/mm3     Comprehensive Metabolic Panel [[id number]]  (Abnormal) Collected:  07/03/18 0615    Specimen:  Blood Updated:  07/03/18 0651     Glucose 173 (H) mg/dL      BUN 12 mg/dL      Creatinine 0.53 mg/dL      Sodium 137 mmol/L      Potassium 4.0 mmol/L      Chloride 103 mmol/L      CO2 25.0 mmol/L      Calcium 9.0 mg/dL      Total Protein 7.0 g/dL      Albumin 4.20 g/dL      ALT (SGPT) 142 (H) U/L      AST (SGOT) 123 (H) U/L      Alkaline Phosphatase 68 U/L      Total Bilirubin 0.3 mg/dL      eGFR Non African Amer 125 mL/min/1.73      Globulin 2.8 gm/dL      A/G Ratio 1.5 g/dL      BUN/Creatinine Ratio 22.6     Anion Gap 9.0 mmol/L     Protime-INR [[id number]]  (Normal) Collected:  07/03/18 0615    Specimen:  Blood Updated:  07/03/18 0711     Protime 13.7 Seconds      INR 1.07    Narrative:       Therapeutic range for most indications is 2.0-3.0 INR,  or 2.5-3.5 for mechanical heart valves.    Pregnancy, Urine - Urine, Clean Catch [[id number]]  (Normal) Collected:  07/03/18 0626    Specimen:  Urine from Urine, Clean Catch Updated:  07/03/18 0653     HCG, Urine QL Negative                 A/P    Abnormal stress test-with  apical ischemia schedule for cardiac catheterization extreme risk of bleeding, stroke, heart attack and even the patient consented, patient is ASA class II, Mallamaptti level II.  Patient consented for conscious sedation.    Diabetes will continue her home medications      Arun Cadet MD  7/3/2018  7:47 AM    EMR Dragon/Transcription disclaimer:   Some of this note may be an electronic transcription/translation of spoken language to printed text. The electronic translation of spoken language may permit erroneous, or at times, nonsensical words or phrases to be inadvertently transcribed; Although I have reviewed the note for such errors, some may still exist.

## 2018-08-07 ENCOUNTER — OFFICE VISIT (OUTPATIENT)
Dept: CARDIOLOGY | Facility: CLINIC | Age: 43
End: 2018-08-07

## 2018-08-07 VITALS
WEIGHT: 266.7 LBS | DIASTOLIC BLOOD PRESSURE: 60 MMHG | HEIGHT: 66 IN | HEART RATE: 72 BPM | BODY MASS INDEX: 42.86 KG/M2 | SYSTOLIC BLOOD PRESSURE: 90 MMHG

## 2018-08-07 DIAGNOSIS — E11.9 TYPE 2 DIABETES MELLITUS WITHOUT COMPLICATION, WITHOUT LONG-TERM CURRENT USE OF INSULIN (HCC): ICD-10-CM

## 2018-08-07 DIAGNOSIS — R07.2 PRECORDIAL PAIN: ICD-10-CM

## 2018-08-07 DIAGNOSIS — I10 ESSENTIAL HYPERTENSION: ICD-10-CM

## 2018-08-07 DIAGNOSIS — R00.2 PALPITATION: Primary | ICD-10-CM

## 2018-08-07 DIAGNOSIS — R00.0 TACHYCARDIA: ICD-10-CM

## 2018-08-07 PROCEDURE — 99213 OFFICE O/P EST LOW 20 MIN: CPT | Performed by: INTERNAL MEDICINE

## 2018-08-07 RX ORDER — BLOOD-GLUCOSE METER
1 KIT MISCELLANEOUS 4 TIMES DAILY
COMMUNITY
Start: 2018-07-23

## 2018-08-07 RX ORDER — LANCETS 28 GAUGE
1 EACH MISCELLANEOUS 4 TIMES DAILY
COMMUNITY
Start: 2018-07-25

## 2018-08-07 RX ORDER — LOSARTAN POTASSIUM 25 MG/1
25 TABLET ORAL DAILY
Qty: 30 TABLET | Refills: 4 | Status: SHIPPED | OUTPATIENT
Start: 2018-08-07 | End: 2019-01-15

## 2018-08-07 RX ORDER — AMITRIPTYLINE HYDROCHLORIDE 50 MG/1
50 TABLET, FILM COATED ORAL NIGHTLY
COMMUNITY
Start: 2018-05-30 | End: 2019-05-31

## 2018-08-07 RX ORDER — BROMPHENIRAMINE MALEATE, PSEUDOEPHEDRINE HYDROCHLORIDE, AND DEXTROMETHORPHAN HYDROBROMIDE 2; 30; 10 MG/5ML; MG/5ML; MG/5ML
10 SYRUP ORAL 3 TIMES DAILY
COMMUNITY
Start: 2018-08-05 | End: 2019-01-15

## 2018-08-07 RX ORDER — ALBUTEROL SULFATE 90 UG/1
2 AEROSOL, METERED RESPIRATORY (INHALATION) EVERY 4 HOURS
COMMUNITY
Start: 2018-08-05

## 2018-08-07 RX ORDER — LOSARTAN POTASSIUM 50 MG/1
1 TABLET ORAL DAILY
COMMUNITY
Start: 2018-07-30 | End: 2018-08-07

## 2018-08-07 RX ORDER — DOXYCYCLINE 100 MG/1
100 CAPSULE ORAL 2 TIMES DAILY
COMMUNITY
Start: 2018-08-05 | End: 2019-01-15

## 2018-08-07 NOTE — PROGRESS NOTES
Corrina Serrano  43 y.o. female    08/07/2018  1. Palpitation    2. Tachycardia    3. Type 2 diabetes mellitus without complication, without long-term current use of insulin (CMS/[facility])    4. Precordial pain     5.      hypertension    History of Present Illness  I advised Corrina of the risks of continuing to use tobacco, and I provided her with tobacco cessation educationan    42 years old patient with the BMI 42 and past medical history of diabetes, chronic back pain, peripheral neuropathy, history of anxiety.   evaluated in ER for palpitation sudden onset and termination going on for last 3-4 month with associated symptom of fluttering shortness breath and chest pain.  The patient have chest pain independent of that described as pressure-like and sharply feeling over the left precordium with radiation sometimes the left shoulder.  EKG sinus rhythm with evidence of left ventricular hypertrophy and no acute ST-T wave changes.  Serum multiple anxiety medications and also on Percocet for management of chronic back pain with history of peripheral neuropathy.  No syncope or near syncopal episode reported.  No orthopnea PND fever cough or chills reported.  Her physical activity limited secondary significant back pain and underwent cardiac workup with stress test reported abnormality leading to cardiac catheterization.  No CAD noted.  Blood pressure noted in the higher side started on losartan 50 mg daily her blood pressure is 90 was the patient decreased losartan 25 mg and take if the systolic blood pressures more than 120 monitor no significant bradycardia tach arrhythmia noted.  Echocardiographic study good heart function.  MONITOR: 7/10/18  predominant rhythm is sinus with heart rate range from 70 to 110 bpm.  No significant bradycardia or tachyarrhythmia noted.  No significant pause noted.  No isolated premature ventricular or supraventricular ectopic beat noted    Conclusion:CATH 7/3/18     Normal coronary  arteries     Normal LV function     Plan: Look for noncardiac cause of chest pain    ECHO 6/29/18  · Left ventricular wall thickness is consistent with borderline concentric hypertrophy.  · Mild tricuspid valve regurgitation is present.  · Left ventricular systolic function is normal. Estimated EF = 60%.  · Left atrial cavity size is mildly dilated.    2015  Total Cholesterol 0 - 199 mg/dl 181    Comment: CHOL DESIRED: < 200 MG/DL   Triglycerides 20 - 199 mg/dl 144    Comment: TRIG DESIRED: < 200 MG/DL   HDL Cholesterol 60 - 200 mg/dl 50     Comment: HDL AVERAGE RISK: 35 - 60 MG/DL   LDL Cholesterol  0 - 129 mg/dl 102        SUBJECTIVE    Allergies   Allergen Reactions   • Bactroban [Mupirocin Calcium]    • Neomycin-Bacitracin-Polymyxin [Bacitracin-Neomycin-Polymyxin] Rash   • Other Rash     All antibiotic creams         Past Medical History:   Diagnosis Date   • Acute atopic conjunctivitis    • Acute bronchitis    • Allergic rhinitis due to pollen    • Arrhythmia     wearing heart monitor    • Breast lump    • Chest pain    • Chronic low back pain    • Contact dermatitis due to drugs and medicine    • Cyst of ovary    • Infestation by Sarcoptes scabiei annalisa hominis    • Low back pain    • Mastodynia    • Nausea and vomiting    • On long term drug therapy    • Pain in wrist    • Rash    • Skin disorder     breast-possible superficial cellulitis   • Spinal stenosis    • Tinea pedis    • Tobacco dependence syndrome    • Upper respiratory infection    • Vitamin D deficiency    • Wheezing          Past Surgical History:   Procedure Laterality Date   • BREAST SURGERY      excision breast lesion   • CARDIAC CATHETERIZATION N/A 7/3/2018    Procedure: Coronary angiography;  Surgeon: Arnu Cadet MD;  Location: [facility] INVASIVE LOC[facility];  Service: Cardiovascular   • CHOLECYSTECTOMY     • INJECTION OF MEDICATION      Kenalog (4)         Family History   Problem Relation Age of Onset   • Coronary artery disease  Other          Social History     Social History   • Marital status:      Spouse name: N/A   • Number of children: N/A   • Years of education: N/A     Occupational History   • Not on file.     Social History Main Topics   • Smoking status: Current Every Day Smoker     Types: Electronic Cigarette   • Smokeless tobacco: Never Used   • Alcohol use No   • Drug use: No   • Sexual activity: Defer     Other Topics Concern   • Not on file     Social History Narrative   • No narrative on file         Current Outpatient Prescriptions   Medication Sig Dispense Refill   • amitriptyline (ELAVIL) 50 MG tablet Take 50 mg by mouth Every Night.     • brompheniramine-pseudoephedrine-DM 30-2-10 MG/5ML syrup Take 10 mL by mouth 3 (Three) Times a Day.     • cetirizine (zyrTEC) 10 MG tablet Take 10 mg by mouth Daily.     • doxycycline (MONODOX) 100 MG capsule Take 100 mg by mouth 2 (Two) Times a Day.     • FREESTYLE LITE test strip 1 stick by Percutaneous route 4 (Four) Times a Day.     • GLYBURIDE PO Take 6 mg by mouth 2 (Two) Times a Day.     • Lancets (FREESTYLE) lancets 1 each by Other route 4 (Four) Times a Day.     • losartan (COZAAR) 50 MG tablet Take 1 tablet by mouth Daily.     • LYRICA 150 MG capsule Take 150 mg by mouth 3 (Three) Times a Day.     • meloxicam (MOBIC) 15 MG tablet Take 15 mg by mouth Daily.     • metFORMIN (GLUCOPHAGE) 1000 MG tablet Take 1,000 mg by mouth 2 (Two) Times a Day.     • OLANZapine (zyPREXA) 5 MG tablet Take 5 mg by mouth Every Night.     • omeprazole (priLOSEC) 40 MG capsule Take 40 mg by mouth Daily.     • oxyCODONE-acetaminophen (PERCOCET)  MG per tablet Take 1 tablet by mouth Every 6 (Six) Hours.     • tiZANidine (ZANAFLEX) 4 MG tablet Take 4 mg by mouth Every 6 (Six) Hours As Needed.     • VENTOLIN  (90 Base) MCG/ACT inhaler Inhale 2 puffs Every 4 (Four) Hours.       No current facility-administered medications for this visit.          OBJECTIVE    BP 90/60   Pulse 72   Ht  "167.6 cm (66\")   Wt 121 kg (266 lb 11.2 oz)   LMP 08/06/2018 (Exact Date)   BMI 43.05 kg/m²         Review of Systems     Constitutional:  Denies recent weight loss, weight gain, fever or chills, no change in exercise tolerance     HENT:  Denies any hearing loss, epistaxis, hoarseness, or difficulty speaking.     Eyes: No blurring    Respiratory:  Denies dyspnea with exertion,no cough, wheezing, or hemoptysis.     Cardiovascular: Negative for palpations, chest pain, orthopnea, PND, peripheral edema, syncope, or claudication.     Gastrointestinal:  The H&P     Endocrine: Negative for cold intolerance, heat intolerance, polydipsia, polyphagia and polyuria. Denies any history of weight change, heat/cold intolerance, polydipsia, polyuria     Genitourinary: Negative.      Musculoskeletal: Denies any history of arthritic symptoms or back problems     Skin:  Denies any change in hair or nails, rashes, or skin lesions.     Allergic/Immunologic: Negative.  Negative for environmental allergies, food allergies and immunocompromised state.     Neurological:  Denies any history of recurrent headaches, strokes, TIA, or seizure disorder.     Hematological: Denies any food allergies, seasonal allergies, bleeding disorders, or lymphadenopathy.     Psychiatric/Behavioral: Denies any history of depression, substance abuse, or change in cognitive function.         Physical Exam     Constitutional: Cooperative, alert and oriented, well-developed, well-nourished, in no acute distress.     HENT:   Head: Normocephalic, normal hair patterns, no masses or tenderness.  Ears, Nose, and Throat: No gross abnormalities. No pallor or cyanosis. Dentition good.   Eyes: EOMS intact, PERRL, conjunctivae and lids unremarkable. Fundoscopic exam and visual fields not performed.   Neck: No palpable masses or adenopathy, no thyromegaly, no JVD, carotid pulses are full and equal bilaterally and without  Bruits.     Cardiovascular: Regular rhythm, S1 and " S2 normal, no S3 or S4. Apical impulse not displaced. No murmurs, gallops, or rubs detected.     Pulmonary/Chest: Chest: normal symmetry, no tenderness to palpation, normal respiratory excursion, no intercostal retraction, no use of accessory muscles.            Pulmonary: Normal breath sounds. No rales or ronchi.    Abdominal: Abdomen soft, bowel sounds normoactive, no masses, no hepatosplenomegaly, non-tender, no bruits.     Musculoskeletal: No deformities, clubbing, cyanosis, erythema, or edema observed. There are no spinal abnormalities noted. Normal muscle strength and tone. Pulses full and equal in all extremities, no bruits auscultated.     Neurological: No gross motor or sensory deficits noted, affect appropriate, oriented to time, person, place.     Skin: Warm and dry to the touch, no apparent skin lesions or masses noted.     Psychiatric: She has a normal mood and affect. Her behavior is normal. Judgment and thought content normal.         Procedures      Lab Results   Component Value Date    WBC 3.83 07/03/2018    HGB 13.7 07/03/2018    HCT 39.3 07/03/2018    MCV 86.8 07/03/2018     07/03/2018     Lab Results   Component Value Date    GLUCOSE 173 (H) 07/03/2018    BUN 12 07/03/2018    CREATININE 0.53 07/03/2018    EGFRIFNONA 125 07/03/2018    BCR 22.6 07/03/2018    CO2 25.0 07/03/2018    CALCIUM 9.0 07/03/2018    ALBUMIN 4.20 07/03/2018     (H) 07/03/2018     (H) 07/03/2018     No results found for: CHOL  Lab Results   Component Value Date    TRIG 144 03/11/2015     Lab Results   Component Value Date    HDL 50 (L) 03/11/2015     No components found for: LDLCALC  Lab Results   Component Value Date     03/11/2015     No results found for: HDLLDLRATIO  No components found for: CHOLHDL  Lab Results   Component Value Date    HGBA1C 5.2 03/11/2015     No results found for: TSH, T2WFAQG, G1EEQWM, THYROIDAB        ASSESSMENT AND PLAN  #1 palpitation #2 tachycardia with undefined  mechanism #3 chest pain with the risk factor of diabetes #4 chronic back pain and history of anxiety #5   peripheral neuropathy     Clinically, no sign of cardiac decompensation based the clinical history physical finding.   patient had abnormal stress test didn't her cardiac catheterization.  No CAD noted.  Blood pressure was the higher side started losartan 50 mg daily blood pressures 90.  decrease her losartan from 50 to 25 mg and told the patient take when  systolic blood pressure is more than 120.  Event monitor discussed the patient.  No significant bradycardia or tach arrhythmia noted.  Risk factor lifestyle modification discussed.  Continue metformin for management of diabetes Lyrica for management of hypertension and will see her back in 6-8 month R depends on patient clinical condition.  Given the history of diabetes or hypertension and BMI of 43 discussed with the patient regarding eating healthy food, low carbohydrate, low-fat and urban brissa    Corrina was seen today for follow-up.    Diagnoses and all orders for this visit:    Palpitation    Tachycardia    Type 2 diabetes mellitus without complication, without long-term current use of insulin (CMS/[facility])    Precordial pain        Yue Howard MD  8/7/2018  10:01 AM

## 2018-10-25 ENCOUNTER — TRANSCRIBE ORDERS (OUTPATIENT)
Dept: PODIATRY | Facility: CLINIC | Age: 43
End: 2018-10-25

## 2018-10-25 DIAGNOSIS — M79.673 HEEL PAIN, UNSPECIFIED LATERALITY: Primary | ICD-10-CM

## 2018-11-12 ENCOUNTER — OFFICE VISIT (OUTPATIENT)
Dept: PODIATRY | Facility: CLINIC | Age: 43
End: 2018-11-12

## 2018-11-12 VITALS
SYSTOLIC BLOOD PRESSURE: 140 MMHG | HEIGHT: 66 IN | WEIGHT: 276 LBS | BODY MASS INDEX: 44.36 KG/M2 | HEART RATE: 92 BPM | DIASTOLIC BLOOD PRESSURE: 78 MMHG | OXYGEN SATURATION: 98 %

## 2018-11-12 DIAGNOSIS — M79.672 BILATERAL FOOT PAIN: Primary | ICD-10-CM

## 2018-11-12 DIAGNOSIS — M79.671 BILATERAL FOOT PAIN: Primary | ICD-10-CM

## 2018-11-12 DIAGNOSIS — M72.2 PLANTAR FASCIITIS: ICD-10-CM

## 2018-11-12 DIAGNOSIS — E11.9 TYPE 2 DIABETES MELLITUS WITHOUT COMPLICATION, WITHOUT LONG-TERM CURRENT USE OF INSULIN (HCC): ICD-10-CM

## 2018-11-12 PROCEDURE — 99204 OFFICE O/P NEW MOD 45 MIN: CPT | Performed by: PODIATRIST

## 2018-11-12 NOTE — PROGRESS NOTES
Corrina Serrano  1975  43 y.o. female   PCP- Dr. Jean-Baptiste  BS-146 per patient  Pain-5/10  Patient presents today for bilateral foot pain    11/12/2018    Chief Complaint   Patient presents with   • Left Foot - Pain   • Right Foot - Pain       History of Present Illness    Corrina Serrano is a 43 y.o.female who presents to clinic with chief complaint of bilateral foot pain.  Pain is located to the bottom of her heels.  She rates the pain as a 5 out of 10 and describes it as sharp.  Pain is worse with first step in the morning.  Pain has been present for approximately 3 months.  She denies any injuries or trauma.  She has done nothing to treat the pain.  Patient also admits to being a diabetic.  She states that her most recent blood sugar was 146 mg/dL.  She relates to occasional numbness in her feet.  She is currently taking Lyrica.  She is ambulating in diabetic shoes.  She has no other pedal complaints.      Past Medical History:   Diagnosis Date   • Acute atopic conjunctivitis    • Acute bronchitis    • Allergic rhinitis due to pollen    • Arrhythmia     wearing heart monitor    • Breast cyst    • Breast lump    • Chest pain    • Chronic low back pain    • Contact dermatitis due to drugs and medicine    • Cyst of ovary    • Diabetes mellitus (CMS/HCC)    • Infestation by Sarcoptes scabiei annalisa hominis    • Low back pain    • Mastodynia    • Nausea and vomiting    • On long term drug therapy    • Pain in wrist    • Rash    • Skin disorder     breast-possible superficial cellulitis   • Spinal stenosis    • Tinea pedis    • Tobacco dependence syndrome    • Upper respiratory infection    • Vitamin D deficiency    • Wheezing          Past Surgical History:   Procedure Laterality Date   • BREAST BIOPSY     • BREAST CYST ASPIRATION     • BREAST SURGERY      excision breast lesion   • CHOLECYSTECTOMY     • INJECTION OF MEDICATION      Kenalog (4)         Family History   Problem Relation Age of Onset    • Coronary artery disease Other    • Breast cancer Mother    • Breast cancer Maternal Aunt        Allergies   Allergen Reactions   • Bactroban [Mupirocin Calcium]    • Neomycin-Bacitracin-Polymyxin [Bacitracin-Neomycin-Polymyxin] Rash   • Other Rash     All antibiotic creams       Social History     Socioeconomic History   • Marital status:      Spouse name: Not on file   • Number of children: Not on file   • Years of education: Not on file   • Highest education level: Not on file   Social Needs   • Financial resource strain: Not on file   • Food insecurity - worry: Not on file   • Food insecurity - inability: Not on file   • Transportation needs - medical: Not on file   • Transportation needs - non-medical: Not on file   Occupational History   • Not on file   Tobacco Use   • Smoking status: Current Every Day Smoker     Types: Electronic Cigarette   • Smokeless tobacco: Never Used   Substance and Sexual Activity   • Alcohol use: No   • Drug use: No   • Sexual activity: Defer   Other Topics Concern   • Not on file   Social History Narrative   • Not on file         Current Outpatient Medications   Medication Sig Dispense Refill   • amitriptyline (ELAVIL) 50 MG tablet Take 50 mg by mouth Every Night.     • cetirizine (zyrTEC) 10 MG tablet Take 10 mg by mouth Daily.     • FREESTYLE LITE test strip 1 stick by Percutaneous route 4 (Four) Times a Day.     • GLYBURIDE PO Take 6 mg by mouth 2 (Two) Times a Day.     • Lancets (FREESTYLE) lancets 1 each by Other route 4 (Four) Times a Day.     • losartan (COZAAR) 25 MG tablet Take 1 tablet by mouth Daily. 30 tablet 4   • LYRICA 150 MG capsule Take 150 mg by mouth 3 (Three) Times a Day.     • meloxicam (MOBIC) 15 MG tablet Take 15 mg by mouth Daily.     • metFORMIN (GLUCOPHAGE) 1000 MG tablet Take 1,000 mg by mouth 2 (Two) Times a Day.     • OLANZapine (zyPREXA) 5 MG tablet Take 5 mg by mouth Every Night.     • omeprazole (priLOSEC) 40 MG capsule Take 40 mg by mouth  "Daily.     • oxyCODONE-acetaminophen (PERCOCET)  MG per tablet Take 1 tablet by mouth Every 6 (Six) Hours.     • tiZANidine (ZANAFLEX) 4 MG tablet Take 4 mg by mouth Every 6 (Six) Hours As Needed.     • VENTOLIN  (90 Base) MCG/ACT inhaler Inhale 2 puffs Every 4 (Four) Hours.     • brompheniramine-pseudoephedrine-DM 30-2-10 MG/5ML syrup Take 10 mL by mouth 3 (Three) Times a Day.     • doxycycline (MONODOX) 100 MG capsule Take 100 mg by mouth 2 (Two) Times a Day.       No current facility-administered medications for this visit.        Review of Systems   Constitutional: Positive for activity change.   HENT: Negative.    Eyes: Positive for visual disturbance.   Respiratory: Positive for cough, shortness of breath and wheezing.    Cardiovascular: Positive for leg swelling.   Gastrointestinal: Negative.    Endocrine: Positive for cold intolerance.   Musculoskeletal: Positive for arthralgias and back pain.        Foot and joint pain   Skin: Negative.    Allergic/Immunologic: Negative.    Neurological: Positive for numbness. Negative for dizziness.   Hematological: Negative.    Psychiatric/Behavioral: Negative.          OBJECTIVE    /78   Pulse 92   Ht 167.6 cm (66\")   Wt 125 kg (276 lb)   SpO2 98%   BMI 44.55 kg/m²       Physical Exam   Constitutional: She is oriented to person, place, and time. She appears well-developed and well-nourished. No distress.   HENT:   Head: Normocephalic and atraumatic.   Nose: Nose normal.   Eyes: Conjunctivae and EOM are normal. Pupils are equal, round, and reactive to light.   Pulmonary/Chest: Effort normal. No respiratory distress. She has no wheezes.    Corrina had a diabetic foot exam performed today.  Neurological: She is alert and oriented to person, place, and time. She displays normal reflexes.   Skin: Skin is warm and dry. Capillary refill takes less than 2 seconds.   Psychiatric: She has a normal mood and affect. Her behavior is normal.   Vitals " reviewed.      Gait: Normal    Assistive Device: None    Lower Extremity    Cardiovascular:    DP/PT pulses palpable bilateral  CFT brisk  to all digits  Skin temp is warm to warm from proximal tibia to distal digits bilateral  Pedal hair growth diminished.   No erythema or edema noted     Musculoskeletal:  Muscle strength is 5/5 for all muscle groups tested   ROM of the 1st MTP is full without pain or crepitus bilateral  ROM of the MTJ is full without pain or crepitus  bilateral  ROM of the STJ is full without pain or crepitus bilateral   ROM of the ankle joint is full without pain or crepitus  bilateral  Pain on palpation to the medial tubercle of the calcaneus.L>R. negative lateral squeeze test.    Dermatological:   Nails 1-5 bilateral are within normal limits for length    Skin is warm, dry and intact bilateral  Webspaces 1-4 bilateral are clean, dry and intact.   No subcutaneous nodules or masses noted    No open wounds noted     Neurological:   Protective sensation intact   Sensation intact to light touch        Procedures        ASSESSMENT AND PLAN    Corrina was seen today for pain and pain.    Diagnoses and all orders for this visit:    Bilateral foot pain  -     XR Foot 3+ View Bilateral    Plantar fasciitis    Type 2 diabetes mellitus without complication, without long-term current use of insulin (CMS/[facility])      - Comprehensive foot and ankle exam performed  - X-rays taken and reviewed  - continue Mobic  - Patient advised to stretch, ice and to make appropriate shoe gear changes to include wearing athletic type shoes with supportive insoles. Patient was given written instructions on how to correctly perform the stretching of the Achilles tendon/calf stretches, and the heel spur/plantar fasciitis regimen. Limit bare foot walking.    - Rx for custom orthotics  - A diabetic foot screening exam was performed and the patient was educated on the foot complications related to diabetes,  preventative foot care and  what signs and symptoms to watch for.  Instructed to contact our office if any foot problems develop before next visit.    - Patient is in agreement with the current treatment plan and all questions were answered.  - RTC in 4-6 weeks    I spent 45 minutes face-to-face this patient discussing her pedal complaints and treatment options.          This document has been electronically signed by Librado Colby DPM on November 12, 2018 3:01 PM     11/12/2018  3:01 PM

## 2018-11-14 ENCOUNTER — TRANSCRIBE ORDERS (OUTPATIENT)
Dept: PODIATRY | Facility: CLINIC | Age: 43
End: 2018-11-14

## 2018-11-14 DIAGNOSIS — M72.2 PLANTAR FASCIITIS: Primary | ICD-10-CM

## 2018-11-28 ENCOUNTER — HOSPITAL ENCOUNTER (OUTPATIENT)
Dept: PHYSICAL THERAPY | Facility: HOSPITAL | Age: 43
Setting detail: THERAPIES SERIES
Discharge: HOME | End: 2018-11-28

## 2018-11-28 DIAGNOSIS — M72.2 PLANTAR FASCIITIS: Primary | ICD-10-CM

## 2018-11-28 PROCEDURE — 97161 PT EVAL LOW COMPLEX 20 MIN: CPT | Performed by: PHYSICAL THERAPIST

## 2018-11-28 NOTE — THERAPY EVALUATION
Outpatient Physical Therapy Ortho Initial Evaluation  [facility]     Patient Name: Corrina Serrano  : 1975  MRN: [id number]  Today's Date: 2018      Visit Date: 2018    Patient Active Problem List   Diagnosis   • Chest pain   • Palpitation   • Tachycardia   • Type 2 diabetes mellitus without complication, without long-term current use of insulin (CMS/HCC)   • Angina effort (CMS/HCC)   • Essential hypertension        Past Medical History:   Diagnosis Date   • Acute atopic conjunctivitis    • Acute bronchitis    • Allergic rhinitis due to pollen    • Arrhythmia     wearing heart monitor    • Breast cyst    • Breast lump    • Chest pain    • Chronic low back pain    • Contact dermatitis due to drugs and medicine    • Cyst of ovary    • Diabetes mellitus (CMS/HCC)    • Infestation by Sarcoptes scabiei annalisa hominis    • Low back pain    • Mastodynia    • Nausea and vomiting    • On long term drug therapy    • Pain in wrist    • Rash    • Skin disorder     breast-possible superficial cellulitis   • Spinal stenosis    • Tinea pedis    • Tobacco dependence syndrome    • Upper respiratory infection    • Vitamin D deficiency    • Wheezing         Past Surgical History:   Procedure Laterality Date   • BREAST BIOPSY     • BREAST CYST ASPIRATION     • BREAST SURGERY      excision breast lesion   • CHOLECYSTECTOMY     • INJECTION OF MEDICATION      Kenalog (4)     Medications (Admitted on 2018)    amitriptyline (ELAVIL) 50 MG tablet    brompheniramine-pseudoephedrine-DM 30-2-10 MG/5ML syrup    cetirizine (zyrTEC) 10 MG tablet    doxycycline (MONODOX) 100 MG capsule    FREESTYLE LITE test strip    GLYBURIDE PO    Lancets (FREESTYLE) lancets    losartan (COZAAR) 25 MG tablet    LYRICA 150 MG capsule    meloxicam (MOBIC) 15 MG tablet    metFORMIN (GLUCOPHAGE) 1000 MG tablet    OLANZapine (zyPREXA) 5 MG tablet    omeprazole (priLOSEC) 40 MG capsule    oxyCODONE-acetaminophen (PERCOCET)   MG per tablet    tiZANidine (ZANAFLEX) 4 MG tablet    VENTOLIN  (90 Base) MCG/ACT inhaler     ALLERGIES: Bactroban, neomycin-bacitracin-Polymyxin, all antibiotic creams    Visit Dx:     ICD-10-CM ICD-9-CM   1. Plantar fasciitis M72.2 728.71       Patient History     Row Name 11/28/18 1400             History    Chief Complaint  Pain  -DD      Type of Pain  Foot pain  -DD      Date Current Problem(s) Began  -- about 6 month  -DD         Pain     Pain Location  Foot  -DD      Pain at Present  5  -DD      Pain Frequency  Constant/continuous  -DD      Pain Comments  sharp  -DD        User Key  (r) = Recorded By, (t) = Taken By, (c) = Cosigned By    Initials Name Provider Type    DD Thelma Hunter, PT DPT Physical Therapist          PT Ortho     Row Name 11/28/18 1400       Posture/Observations    Posture/Observations Comments  callousing edge of calcaneal fat pads abdulaziz.  -DD       Special Tests/Palpation    Special Tests/Palpation  -- TTP longitudinal arch, and medial calcaneal tubercle  -DD       General ROM    GENERAL ROM COMMENTS  WNL  -DD       MMT (Manual Muscle Testing)    General MMT Comments  5/5  -DD       Sensation    Sensation WNL?  WNL  -DD    Light Touch  No apparent deficits  -DD       Ankle Foot Orthosis Management    Type (Ankle/Foot Orthosis)  bilateral  -DD    Fabrication Comment (Ankle Foot Orthosis)  Pro shocker wide with neutral post deep heel cup  -DD    Functional Design (Ankle Foot Orthosis)  dynamic orthosis  -DD    Therapeutic Indications (Ankle Foot Orthosis)  stabilization and support;rest/inflammation reduction  -DD    Wearing Schedule (Ankle Foot Orthosis)  wear with activity/work  -DD    Orthosis Training (Ankle Foot Orthosis)  patient  -DD    Sensory Assessment (Ankle Foot Orthosis)  intact  -DD    Skin Assessment (Ankle Foot Orthosis)  intact  -DD      User Key  (r) = Recorded By, (t) = Taken By, (c) = Cosigned By    Initials Name Provider Type    DD Thelma Hunter,  PT DPT Physical Therapist                            PT OP Goals     Row Name 11/28/18 1400          PT Short Term Goals    STG Date to Achieve  12/19/18  -DD     STG 1  Patient will be independent in care and wear schedule of orthotic  -DD     STG 2  Patient will understand indications for follow-up and adjustments as needed  -DD     STG 3  Patient have a comfortable fit  -DD     STG 4  Patient and fitted within 3 week timeframe  -DD        Time Calculation    PT Goal Re-Cert Due Date  12/19/18  -DD       User Key  (r) = Recorded By, (t) = Taken By, (c) = Cosigned By    Initials Name Provider Type    Thelma Moncada, PT DPT Physical Therapist          PT Assessment/Plan     Row Name 11/28/18 1426          PT Assessment    Assessment Comments  Pt has biomechanical stress with falling arches and would benefit from custom arch support and motion control of rear foot for improved gait and reduced pain.  -DD     Please refer to paper survey for additional self-reported information  No  -DD     Rehab Potential  Good  -DD     Patient/caregiver participated in establishment of treatment plan and goals  Yes  -DD        PT Plan    PT Frequency  1x/week  -DD     Predicted Duration of Therapy Intervention (Therapy Eval)  fitt orthotic  -DD     Planned CPT's?  PT EVAL LOW COMPLEXITY: 73301;PT ORTHOTIC MGMT/TRAIN EA 15 MIN: 54754  -DD     Physical Therapy Interventions (Optional Details)  orthotic fitting/training  -DD     PT Plan Comments  Pt to be fitted in about 3 weeks.  -DD       User Key  (r) = Recorded By, (t) = Taken By, (c) = Cosigned By    Initials Name Provider Type    Thelma Moncada PT DPT Physical Therapist          Time Calculation:     Therapy Suggested Charges     Code   Minutes Charges    None             Start Time: 1343  Stop Time: 1422  Time Calculation (min): 39 min  Total Timed Code Minutes- PT: 0 minute(s)     Therapy Charges for Today     Code Description Service Date Service  Provider Modifiers Qty    55445905939 HC PT EVAL LOW COMPLEXITY 2 11/28/2018 Thelma Hunter, PT DPT GP 1    53962604343 HC PT-CUSTOM ORTHOTICS-LEVEL 2 11/28/2018 Thelma Hunter, PT DPT  1          Thelma Hunter, PT DPT  11/28/2018

## 2018-12-17 ENCOUNTER — OFFICE VISIT (OUTPATIENT)
Dept: PODIATRY | Facility: CLINIC | Age: 43
End: 2018-12-17

## 2018-12-17 VITALS — WEIGHT: 276 LBS | OXYGEN SATURATION: 98 % | HEIGHT: 66 IN | HEART RATE: 94 BPM | BODY MASS INDEX: 44.36 KG/M2

## 2018-12-17 DIAGNOSIS — M79.671 BILATERAL FOOT PAIN: Primary | ICD-10-CM

## 2018-12-17 DIAGNOSIS — M72.2 PLANTAR FASCIITIS: ICD-10-CM

## 2018-12-17 DIAGNOSIS — M79.672 BILATERAL FOOT PAIN: Primary | ICD-10-CM

## 2018-12-17 PROCEDURE — 99213 OFFICE O/P EST LOW 20 MIN: CPT | Performed by: PODIATRIST

## 2018-12-17 NOTE — PROGRESS NOTES
Corrina Prado Zach  1975  43 y.o. female   MELVIN Jean-Baptiste - 10/09/2018  BS - 160 per pt    Patient presents today for recheck of her bilateral foot pain. L>R    12/17/2018     Chief Complaint   Patient presents with   • Left Foot - Follow-up, Pain   • Right Foot - Follow-up, Pain       History of Present Illness    Patient presents to clinic today for follow-up of her foot pain.  She continues to have heel pain.  She states the left is worse in the right.  She rates her pain as an 8 out of 10 on the left.  She has stopped stretching and has not received her custom orthotics. She continues to take mobic. She has no other pedal complaints.      Past Medical History:   Diagnosis Date   • Acute atopic conjunctivitis    • Acute bronchitis    • Allergic rhinitis due to pollen    • Arrhythmia     wearing heart monitor    • Bilateral foot pain    • Breast cyst    • Breast lump    • Chest pain    • Chronic low back pain    • Contact dermatitis due to drugs and medicine    • Cyst of ovary    • Diabetes mellitus (CMS/HCC)    • Infestation by Sarcoptes scabiei annalisa hominis    • Low back pain    • Mastodynia    • Nausea and vomiting    • On long term drug therapy    • Pain in wrist    • Plantar fasciitis    • Rash    • Skin disorder     breast-possible superficial cellulitis   • Spinal stenosis    • Tinea pedis    • Tobacco dependence syndrome    • Upper respiratory infection    • Vitamin D deficiency    • Wheezing          Past Surgical History:   Procedure Laterality Date   • BREAST BIOPSY     • BREAST CYST ASPIRATION     • BREAST SURGERY      excision breast lesion   • CHOLECYSTECTOMY     • INJECTION OF MEDICATION      Kenalog (4)         Family History   Problem Relation Age of Onset   • Coronary artery disease Other    • Breast cancer Mother    • Breast cancer Maternal Aunt        Allergies   Allergen Reactions   • Bactroban [Mupirocin Calcium]    • Neomycin-Bacitracin-Polymyxin [Bacitracin-Neomycin-Polymyxin] Rash    • Other Rash     All antibiotic creams       Social History     Socioeconomic History   • Marital status:      Spouse name: Not on file   • Number of children: Not on file   • Years of education: Not on file   • Highest education level: Not on file   Social Needs   • Financial resource strain: Not on file   • Food insecurity - worry: Not on file   • Food insecurity - inability: Not on file   • Transportation needs - medical: Not on file   • Transportation needs - non-medical: Not on file   Occupational History   • Not on file   Tobacco Use   • Smoking status: Current Every Day Smoker     Types: Electronic Cigarette   • Smokeless tobacco: Never Used   Substance and Sexual Activity   • Alcohol use: No   • Drug use: No   • Sexual activity: Defer   Other Topics Concern   • Not on file   Social History Narrative   • Not on file         Current Outpatient Medications   Medication Sig Dispense Refill   • amitriptyline (ELAVIL) 50 MG tablet Take 50 mg by mouth Every Night.     • brompheniramine-pseudoephedrine-DM 30-2-10 MG/5ML syrup Take 10 mL by mouth 3 (Three) Times a Day.     • cetirizine (zyrTEC) 10 MG tablet Take 10 mg by mouth Daily.     • doxycycline (MONODOX) 100 MG capsule Take 100 mg by mouth 2 (Two) Times a Day.     • FREESTYLE LITE test strip 1 stick by Percutaneous route 4 (Four) Times a Day.     • GLYBURIDE PO Take 6 mg by mouth 2 (Two) Times a Day.     • Lancets (FREESTYLE) lancets 1 each by Other route 4 (Four) Times a Day.     • losartan (COZAAR) 25 MG tablet Take 1 tablet by mouth Daily. 30 tablet 4   • LYRICA 150 MG capsule Take 150 mg by mouth 3 (Three) Times a Day.     • meloxicam (MOBIC) 15 MG tablet Take 15 mg by mouth Daily.     • metFORMIN (GLUCOPHAGE) 1000 MG tablet Take 1,000 mg by mouth 2 (Two) Times a Day.     • OLANZapine (zyPREXA) 5 MG tablet Take 5 mg by mouth Every Night.     • omeprazole (priLOSEC) 40 MG capsule Take 40 mg by mouth Daily.     • oxyCODONE-acetaminophen (PERCOCET)  " MG per tablet Take 1 tablet by mouth Every 6 (Six) Hours.     • tiZANidine (ZANAFLEX) 4 MG tablet Take 4 mg by mouth Every 6 (Six) Hours As Needed.     • VENTOLIN  (90 Base) MCG/ACT inhaler Inhale 2 puffs Every 4 (Four) Hours.       No current facility-administered medications for this visit.        Review of Systems   Constitutional: Negative.    HENT: Negative.    Respiratory: Negative.    Gastrointestinal: Negative.    Musculoskeletal:        Foot and joint pain   Psychiatric/Behavioral: Negative.          OBJECTIVE    Pulse 94   Ht 167.6 cm (66\")   Wt 125 kg (276 lb)   SpO2 98%   BMI 44.55 kg/m²       Physical Exam   Constitutional: She is oriented to person, place, and time. She appears well-developed and well-nourished. No distress.   HENT:   Head: Normocephalic and atraumatic.   Nose: Nose normal.   Pulmonary/Chest: Effort normal. No respiratory distress. She has no wheezes.   Neurological: She is alert and oriented to person, place, and time. She displays normal reflexes.   Psychiatric: She has a normal mood and affect. Her behavior is normal.   Vitals reviewed.      Gait: Normal    Assistive Device: None    Lower Extremity    Cardiovascular:    DP/PT pulses palpable bilateral  CFT brisk  to all digits  Skin temp is warm to warm from proximal tibia to distal digits bilateral  Pedal hair growth diminished.   No erythema or edema noted     Musculoskeletal:  Muscle strength is 5/5 for all muscle groups tested   ROM of the 1st MTP is full without pain or crepitus bilateral  ROM of the MTJ is full without pain or crepitus  bilateral  ROM of the STJ is full without pain or crepitus bilateral   ROM of the ankle joint is full without pain or crepitus  bilateral  Pain on palpation to the medial tubercle of the calcaneus.L>R. negative lateral squeeze test.    Dermatological:   Nails 1-5 bilateral are within normal limits for length    Skin is warm, dry and intact bilateral  Webspaces 1-4 bilateral " are clean, dry and intact.   No subcutaneous nodules or masses noted    No open wounds noted     Neurological:   Protective sensation intact   Sensation intact to light touch        Procedures        ASSESSMENT AND PLAN    Corrina was seen today for follow-up, pain, follow-up and pain.    Diagnoses and all orders for this visit:    Bilateral foot pain    Plantar fasciitis      - Patient with continued heel pain  - Resume stretching and icing  - Continue anti-inflammatories  - Limit barefoot walking  - Educated patient on break in period for new orthotics.  - Patient is in agreement with the current treatment plan and all questions were answered.  - RTC in 4-6 weeks            This document has been electronically signed by Librado Colby DPM on December 17, 2018 1:33 PM     12/17/2018  1:33 PM

## 2019-01-09 RX ORDER — LOSARTAN POTASSIUM 50 MG/1
TABLET ORAL
Qty: 30 TABLET | Refills: 6 | Status: SHIPPED | OUTPATIENT
Start: 2019-01-09 | End: 2019-02-05

## 2019-02-05 ENCOUNTER — OFFICE VISIT (OUTPATIENT)
Dept: ORTHOPEDIC SURGERY | Facility: CLINIC | Age: 44
End: 2019-02-05

## 2019-02-05 VITALS — HEIGHT: 66 IN | BODY MASS INDEX: 45.29 KG/M2 | WEIGHT: 281.8 LBS

## 2019-02-05 DIAGNOSIS — M25.461 EFFUSION, RIGHT KNEE: Primary | ICD-10-CM

## 2019-02-05 DIAGNOSIS — M25.561 ACUTE PAIN OF RIGHT KNEE: ICD-10-CM

## 2019-02-05 DIAGNOSIS — M25.561 RIGHT KNEE PAIN, UNSPECIFIED CHRONICITY: Primary | ICD-10-CM

## 2019-02-05 PROCEDURE — 99214 OFFICE O/P EST MOD 30 MIN: CPT | Performed by: NURSE PRACTITIONER

## 2019-02-05 NOTE — PROGRESS NOTES
Corrina Serrano is a 43 y.o. female   Primary provider:  Jose Jean-Baptiste MD       Chief Complaint   Patient presents with   • Right Knee - Knee Pain       HISTORY OF PRESENT ILLNESS: patient twisted right knee on 1/31/2019 while getting out of recliner was seen at urgent care on 2/1/2019 had xrays and placed in a brace.   Standing films done today.     Knee Pain    The incident occurred 5 to 7 days ago. The incident occurred at the gym. The injury mechanism was a twisting injury. The pain is present in the right knee. The quality of the pain is described as aching and burning. The pain is at a severity of 6/10. The pain has been intermittent since onset. Associated symptoms comments: Swelling. . She reports no foreign bodies present. Exacerbated by: standing, sitting, driving, walking.  She has tried rest and immobilization for the symptoms.        CONCURRENT MEDICAL HISTORY:    Past Medical History:   Diagnosis Date   • Acute atopic conjunctivitis    • Acute bronchitis    • Allergic rhinitis due to pollen    • Arrhythmia     wearing heart monitor    • Bilateral foot pain    • Breast cyst    • Breast lump    • Chest pain    • Chronic low back pain    • Contact dermatitis due to drugs and medicine    • Cyst of ovary    • Diabetes mellitus (CMS/[facility])    • Infestation by Sarcoptes scabiei annalisa hominis    • Low back pain    • Mastodynia    • Nausea and vomiting    • On long term drug therapy    • Pain in wrist    • Plantar fasciitis    • Rash    • Skin disorder     breast-possible superficial cellulitis   • Spinal stenosis    • Tinea pedis    • Tobacco dependence syndrome    • Upper respiratory infection    • Vitamin D deficiency    • Wheezing        Allergies   Allergen Reactions   • Bactroban [Mupirocin Calcium]    • Neomycin-Bacitracin-Polymyxin [Bacitracin-Neomycin-Polymyxin] Rash   • Other Rash     All antibiotic creams         Current Outpatient Medications:   •  amitriptyline (ELAVIL) 50 MG tablet,  Take 50 mg by mouth Every Night., Disp: , Rfl:   •  cetirizine (zyrTEC) 10 MG tablet, Take 10 mg by mouth Daily., Disp: , Rfl:   •  FREESTYLE LITE test strip, 1 stick by Percutaneous route 4 (Four) Times a Day., Disp: , Rfl:   •  GLYBURIDE PO, Take 6 mg by mouth 2 (Two) Times a Day., Disp: , Rfl:   •  Lancets (FREESTYLE) lancets, 1 each by Other route 4 (Four) Times a Day., Disp: , Rfl:   •  LYRICA 150 MG capsule, Take 150 mg by mouth 3 (Three) Times a Day., Disp: , Rfl:   •  meloxicam (MOBIC) 15 MG tablet, Take 15 mg by mouth Daily., Disp: , Rfl:   •  metFORMIN (GLUCOPHAGE) 1000 MG tablet, Take 1,000 mg by mouth 2 (Two) Times a Day., Disp: , Rfl:   •  OLANZapine (zyPREXA) 5 MG tablet, Take 5 mg by mouth Every Night., Disp: , Rfl:   •  omeprazole (priLOSEC) 40 MG capsule, Take 40 mg by mouth Daily., Disp: , Rfl:   •  oxyCODONE-acetaminophen (PERCOCET)  MG per tablet, Take 1 tablet by mouth Every 6 (Six) Hours., Disp: , Rfl:   •  tiZANidine (ZANAFLEX) 4 MG tablet, Take 4 mg by mouth Every 6 (Six) Hours As Needed., Disp: , Rfl:   •  VENTOLIN  (90 Base) MCG/ACT inhaler, Inhale 2 puffs Every 4 (Four) Hours., Disp: , Rfl:     Past Surgical History:   Procedure Laterality Date   • BREAST BIOPSY     • BREAST CYST ASPIRATION     • BREAST SURGERY      excision breast lesion   • CARDIAC CATHETERIZATION N/A 7/3/2018    Procedure: Coronary angiography;  Surgeon: Arun Cadet MD;  Location: [facility] INVASIVE LOCATION;  Service: Cardiovascular   • CHOLECYSTECTOMY     • INJECTION OF MEDICATION      Kenalog (4)       Family History   Problem Relation Age of Onset   • Coronary artery disease Other    • Breast cancer Mother    • Breast cancer Maternal Aunt        Social History     Socioeconomic History   • Marital status:      Spouse name: Not on file   • Number of children: Not on file   • Years of education: Not on file   • Highest education level: Not on file   Social Needs   • Financial resource  "strain: Not on file   • Food insecurity - worry: Not on file   • Food insecurity - inability: Not on file   • Transportation needs - medical: Not on file   • Transportation needs - non-medical: Not on file   Occupational History   • Not on file   Tobacco Use   • Smoking status: Current Every Day Smoker     Types: Electronic Cigarette   • Smokeless tobacco: Never Used   Substance and Sexual Activity   • Alcohol use: No   • Drug use: No   • Sexual activity: Defer   Other Topics Concern   • Not on file   Social History Narrative   • Not on file        Review of Systems    PHYSICAL EXAMINATION:       Ht 167.6 cm (66\")   Wt 128 kg (281 lb 12.8 oz)   LMP 01/20/2019 (Approximate)   BMI 45.48 kg/m²     Physical Exam   Constitutional: She is oriented to person, place, and time. Vital signs are normal. She appears well-developed and well-nourished. She is cooperative.   HENT:   Head: Normocephalic and atraumatic.   Neck: Trachea normal and phonation normal.   Pulmonary/Chest: Effort normal. No respiratory distress.   Abdominal: Soft. Normal appearance. She exhibits no distension.   Musculoskeletal:        Right knee: She exhibits effusion.   Neurological: She is alert and oriented to person, place, and time. GCS eye subscore is 4. GCS verbal subscore is 5. GCS motor subscore is 6.   Skin: Skin is warm, dry and intact. Capillary refill takes less than 2 seconds.   Psychiatric: She has a normal mood and affect. Her speech is normal and behavior is normal. Judgment and thought content normal. Cognition and memory are normal.   Vitals reviewed.      GAIT:     []  Normal  [x]  Antalgic    Assistive device: []  None  []  Walker     []  Crutches  [x]  Cane     []  Wheelchair  []  Stretcher    Right Knee Exam     Tenderness   The patient is experiencing tenderness in the medial joint line, lateral joint line and pes anserinus.    Range of Motion   Right knee extension: -3.   Flexion: abnormal Right knee flexion: 80.     Tests "   Verónica:  Medial - positive     Other   Erythema: absent  Scars: absent  Sensation: normal  Pulse: present  Swelling: mild  Effusion: effusion present      Left Knee Exam   Left knee exam is normal.    Muscle Strength   The patient has normal left knee strength.                  Xr Knee 1 Or 2 View Right    Result Date: 2/1/2019  Narrative: PROCEDURE: XR KNEE 1 OR 2 VW RIGHT Clinical History: right knee pain and swelling Indication: Same as above. Comparison:  9/29/2010 . Technique:  Three Views of the right knee were done. Findings: There is no evidence of acute  fractures or dislocations involving the bones of the right knee joint. There is no evidence of any significant suprapatellar joint effusion. There is no evidence of any periosteal reactions. The femorotibial and patellofemoral joint spaces are well-maintained. There is no chondrocalcinosis of the menisci or any osteochondral defects. The soft tissues are radiographically unremarkable. There is no visualization of any radiopaque foreign bodies in the evaluated soft tissues.     Impression: Impression:  Radiographically unremarkable right knee joint  Place of interpretation: [id number]. Electronically signed by:  Jose R Akbar MD  2/1/2019 1:44 PM [facility] Workstation: [id number]    Xr Knee Bilateral Ap Standing    Result Date: 2/5/2019  Narrative: Standing ap of both knees reveal mild medial compartmental degenerative changes noted bilaterally without any evidence of fracture or other acute bony abnormality noted.    There are no comparison views on file. 02/05/19 at 10:09 AM by LOGAN Aceves           ASSESSMENT:    Diagnoses and all orders for this visit:    Effusion, right knee  -     Ambulatory Referral to Physical Therapy Evaluate and treat  -     MRI Knee Right Without Contrast; Future    Acute pain of right knee  -     Ambulatory Referral to Physical Therapy Evaluate and treat  -     MRI Knee Right Without Contrast;  Future          PLAN  Medial joint line pain, pain with pivoting/squatting, limited ROM, effusion noted on exam    Recommend MRI of the right knee to r/o meniscus tear.   No Follow-up on file.    Jose Chaudhry, APRN

## 2019-02-06 ENCOUNTER — OFFICE VISIT (OUTPATIENT)
Dept: PODIATRY | Facility: CLINIC | Age: 44
End: 2019-02-06

## 2019-02-06 VITALS — HEIGHT: 66 IN | WEIGHT: 281 LBS | HEART RATE: 96 BPM | BODY MASS INDEX: 45.16 KG/M2 | OXYGEN SATURATION: 97 %

## 2019-02-06 DIAGNOSIS — M72.2 PLANTAR FASCIITIS: Primary | ICD-10-CM

## 2019-02-06 PROCEDURE — 99213 OFFICE O/P EST LOW 20 MIN: CPT | Performed by: PODIATRIST

## 2019-02-06 NOTE — PROGRESS NOTES
Corrina Prado Zahc  1975  43 y.o. female   MELVIN Jean-Baptiste - 1/8/19  BS - 269 per pt    Patient presents today for recheck after obtaining orthotics. She states the pain has improved.     Chief Complaint   Patient presents with   • Left Foot - Follow-up   • Right Foot - Follow-up       History of Present Illness    Patient presents to clinic today for follow-up of her bilateral foot pain.  She has obtained her custom orthotics.  She states they have greatly improved her pain level.  She states they're comfortable.  She continues to stretch and ice.  She denies any new pedal complaints.      Past Medical History:   Diagnosis Date   • Acute atopic conjunctivitis    • Acute bronchitis    • Allergic rhinitis due to pollen    • Arrhythmia     wearing heart monitor    • Bilateral foot pain    • Breast cyst    • Breast lump    • Chest pain    • Chronic low back pain    • Contact dermatitis due to drugs and medicine    • Cyst of ovary    • Diabetes mellitus (CMS/HCC)    • Infestation by Sarcoptes scabiei annalisa hominis    • Low back pain    • Mastodynia    • Nausea and vomiting    • On long term drug therapy    • Pain in wrist    • Plantar fasciitis    • Rash    • Skin disorder     breast-possible superficial cellulitis   • Spinal stenosis    • Tinea pedis    • Tobacco dependence syndrome    • Upper respiratory infection    • Vitamin D deficiency    • Wheezing          Past Surgical History:   Procedure Laterality Date   • BREAST BIOPSY     • BREAST CYST ASPIRATION     • BREAST SURGERY      excision breast lesion   • CARDIAC CATHETERIZATION N/A 7/3/2018    Procedure: Coronary angiography;  Surgeon: Arun Cadet MD;  Location: [facility] INVASIVE LOCATION;  Service: Cardiovascular   • CHOLECYSTECTOMY     • INJECTION OF MEDICATION      Kenalog (4)         Family History   Problem Relation Age of Onset   • Coronary artery disease Other    • Breast cancer Mother    • Breast cancer Maternal Aunt        Allergies    Allergen Reactions   • Bactroban [Mupirocin Calcium]    • Neomycin-Bacitracin-Polymyxin [Bacitracin-Neomycin-Polymyxin] Rash   • Other Rash     All antibiotic creams       Social History     Socioeconomic History   • Marital status:      Spouse name: Not on file   • Number of children: Not on file   • Years of education: Not on file   • Highest education level: Not on file   Social Needs   • Financial resource strain: Not on file   • Food insecurity - worry: Not on file   • Food insecurity - inability: Not on file   • Transportation needs - medical: Not on file   • Transportation needs - non-medical: Not on file   Occupational History   • Not on file   Tobacco Use   • Smoking status: Current Every Day Smoker     Types: Electronic Cigarette   • Smokeless tobacco: Never Used   Substance and Sexual Activity   • Alcohol use: No   • Drug use: No   • Sexual activity: Defer   Other Topics Concern   • Not on file   Social History Narrative   • Not on file         Current Outpatient Medications   Medication Sig Dispense Refill   • amitriptyline (ELAVIL) 50 MG tablet Take 50 mg by mouth Every Night.     • cetirizine (zyrTEC) 10 MG tablet Take 10 mg by mouth Daily.     • FREESTYLE LITE test strip 1 stick by Percutaneous route 4 (Four) Times a Day.     • GLYBURIDE PO Take 6 mg by mouth 2 (Two) Times a Day.     • Lancets (FREESTYLE) lancets 1 each by Other route 4 (Four) Times a Day.     • LYRICA 150 MG capsule Take 150 mg by mouth 3 (Three) Times a Day.     • meloxicam (MOBIC) 15 MG tablet Take 15 mg by mouth Daily.     • metFORMIN (GLUCOPHAGE) 1000 MG tablet Take 1,000 mg by mouth 2 (Two) Times a Day.     • OLANZapine (zyPREXA) 5 MG tablet Take 5 mg by mouth Every Night.     • omeprazole (priLOSEC) 40 MG capsule Take 40 mg by mouth Daily.     • oxyCODONE-acetaminophen (PERCOCET)  MG per tablet Take 1 tablet by mouth Every 6 (Six) Hours.     • tiZANidine (ZANAFLEX) 4 MG tablet Take 4 mg by mouth Every 6 (Six)  "Hours As Needed.     • VENTOLIN  (90 Base) MCG/ACT inhaler Inhale 2 puffs Every 4 (Four) Hours.       No current facility-administered medications for this visit.        Review of Systems   Constitutional: Negative.    HENT: Negative.    Respiratory: Negative.    Gastrointestinal: Negative.    Musculoskeletal:        Foot and joint pain   Skin: Negative.    Psychiatric/Behavioral: Negative.          OBJECTIVE    Pulse 96   Ht 167.6 cm (65.98\")   Wt 127 kg (281 lb)   LMP 01/20/2019 (Approximate)   SpO2 97%   BMI 45.38 kg/m²       Physical Exam   Constitutional: She is oriented to person, place, and time. She appears well-developed and well-nourished. No distress.   HENT:   Head: Normocephalic and atraumatic.   Nose: Nose normal.   Pulmonary/Chest: Effort normal. No respiratory distress. She has no wheezes.   Neurological: She is alert and oriented to person, place, and time. She displays normal reflexes.   Psychiatric: She has a normal mood and affect. Her behavior is normal.   Vitals reviewed.      Gait: Normal    Assistive Device: None    Lower Extremity    Cardiovascular:    DP/PT pulses palpable bilateral  CFT brisk  to all digits  Skin temp is warm to warm from proximal tibia to distal digits bilateral  Pedal hair growth diminished.   No erythema or edema noted     Musculoskeletal:  Muscle strength is 5/5 for all muscle groups tested   ROM of the 1st MTP is full without pain or crepitus bilateral  ROM of the MTJ is full without pain or crepitus  bilateral  ROM of the STJ is full without pain or crepitus bilateral   ROM of the ankle joint is full without pain or crepitus  bilateral  Improved pain on palpation to the medial tubercle of the calcaneus.L>R. negative lateral squeeze test.    Dermatological:   Nails 1-5 bilateral are within normal limits for length    Skin is warm, dry and intact bilateral  Webspaces 1-4 bilateral are clean, dry and intact.     Neurological:   Protective sensation intact "   Sensation intact to light touch        Procedures        ASSESSMENT AND PLAN    Corrina was seen today for follow-up and follow-up.    Diagnoses and all orders for this visit:    Plantar fasciitis      - Patients pain has improved.   - continue stretching, icing and custom orthotics. Limit barefoot walking  - Patient is in agreement with the current treatment plan and all questions were answered.  - RTC as needed             This document has been electronically signed by Librado Colby DPM on February 7, 2019 1:48 PM     2/7/2019  1:48 PM

## 2019-02-12 DIAGNOSIS — M25.461 EFFUSION, RIGHT KNEE: ICD-10-CM

## 2019-02-12 DIAGNOSIS — M25.561 ACUTE PAIN OF RIGHT KNEE: ICD-10-CM

## 2019-02-19 ENCOUNTER — OFFICE VISIT (OUTPATIENT)
Dept: ORTHOPEDIC SURGERY | Facility: CLINIC | Age: 44
End: 2019-02-19

## 2019-02-19 VITALS — BODY MASS INDEX: 45.48 KG/M2 | WEIGHT: 283 LBS | HEIGHT: 66 IN

## 2019-02-19 DIAGNOSIS — E66.01 MORBID OBESITY WITH BMI OF 45.0-49.9, ADULT (HCC): ICD-10-CM

## 2019-02-19 DIAGNOSIS — S83.242A ACUTE MEDIAL MENISCUS TEAR OF LEFT KNEE, INITIAL ENCOUNTER: Primary | ICD-10-CM

## 2019-02-19 DIAGNOSIS — M25.461 EFFUSION, RIGHT KNEE: ICD-10-CM

## 2019-02-19 PROCEDURE — 99214 OFFICE O/P EST MOD 30 MIN: CPT | Performed by: NURSE PRACTITIONER

## 2019-02-19 NOTE — PROGRESS NOTES
"Corrina Serrano is a 43 y.o. female returns for     Chief Complaint   Patient presents with   • Right Knee - Follow-up, Pain   • Results       HISTORY OF PRESENT ILLNESS:   43-year-old  female who is currently unemployed is in the office today for follow-up evaluation of her right knee pain.  She has continued her anti-inflammatories and initiated physical therapy program as directed and is following up today for MRI results.  She reports that her medial joint line pain continues without improvement     CONCURRENT MEDICAL HISTORY:    The following portions of the patient's history were reviewed and updated as appropriate: allergies, current medications, past family history, past medical history, past social history, past surgical history and problem list.     ROS  No fevers or chills.  No chest pain or shortness of air.  No GI or  disturbances.    PHYSICAL EXAMINATION:       Ht 167.4 cm (65.9\")   Wt 128 kg (283 lb)   LMP 01/20/2019 (Approximate)   BMI 45.82 kg/m²     Physical Exam   Constitutional: She is oriented to person, place, and time. Vital signs are normal. She appears well-developed and well-nourished. She is cooperative.   HENT:   Head: Normocephalic and atraumatic.   Neck: Trachea normal and phonation normal.   Pulmonary/Chest: Effort normal. No respiratory distress.   Abdominal: Soft. Normal appearance. She exhibits no distension.   Musculoskeletal:        Right knee: She exhibits effusion.   Neurological: She is alert and oriented to person, place, and time. GCS eye subscore is 4. GCS verbal subscore is 5. GCS motor subscore is 6.   Skin: Skin is warm, dry and intact. Capillary refill takes less than 2 seconds.   Psychiatric: She has a normal mood and affect. Her speech is normal and behavior is normal. Judgment and thought content normal. Cognition and memory are normal.   Vitals reviewed.      GAIT:     []  Normal  []  Antalgic    Assistive device: []  None  []  Walker     []  " Crutches  [x]  Cane     []  Wheelchair  []  Stretcher    Right Knee Exam     Tenderness   The patient is experiencing tenderness in the medial joint line, lateral joint line and pes anserinus.    Range of Motion   Right knee extension: -3.   Flexion: abnormal Right knee flexion: 80.     Tests   Verónica:  Medial - positive     Other   Erythema: absent  Scars: absent  Sensation: normal  Pulse: present  Swelling: mild  Effusion: effusion present      Left Knee Exam   Left knee exam is normal.    Muscle Strength   The patient has normal left knee strength.              Xr Knee 1 Or 2 View Right    Result Date: 2/1/2019  Narrative: PROCEDURE: XR KNEE 1 OR 2 VW RIGHT Clinical History: right knee pain and swelling Indication: Same as above. Comparison:  9/29/2010 . Technique:  Three Views of the right knee were done. Findings: There is no evidence of acute  fractures or dislocations involving the bones of the right knee joint. There is no evidence of any significant suprapatellar joint effusion. There is no evidence of any periosteal reactions. The femorotibial and patellofemoral joint spaces are well-maintained. There is no chondrocalcinosis of the menisci or any osteochondral defects. The soft tissues are radiographically unremarkable. There is no visualization of any radiopaque foreign bodies in the evaluated soft tissues.     Impression: Impression:  Radiographically unremarkable right knee joint  Place of interpretation: [id number]. Electronically signed by:  Jose R Akbar MD  2/1/2019 1:44 PM [facility] Workstation: [id number]    Xr Knee Bilateral Ap Standing    Result Date: 2/5/2019  Narrative: Standing ap of both knees reveal mild medial compartmental degenerative changes noted bilaterally without any evidence of fracture or other acute bony abnormality noted.    There are no comparison views on file. 02/05/19 at 10:09 AM by LOGAN Aceves     MRI knee right without contrast2/12/2019  [facility]  Result Impression       1.  Short peripheral tear of the annulus in the posteromedial aspect of the medial meniscus; this does not extend to an articular surface and may be an incidental finding.    2.  Otherwise negative.    [id number]   Result Narrative   Indication:  Pain anterior-posterior right knee.    MRI, Right Knee without Gadolinium:  No marrow edema or sign of bone injury.  The cruciate and collateral ligaments are intact.  The lateral meniscus and the cartilage in the lateral compartment have a normal appearance.    In the posteromedial aspect of the medial meniscus, there is a small horizontally oriented peripheral tear of the annulus; this does not extend to an articular surface.  The cartilage in the medial compartment is in good condition.    The patella is normally seated and has a satisfactory covering of cartilage.    The tendons have a normal appearance.  No muscle injury.   Other Result Information   Hunter, Rad Results In - 02/12/2019 10:45 AM CST  Indication:  Pain anterior-posterior right knee.    MRI, Right Knee without Gadolinium:  No marrow edema or sign of bone injury.  The cruciate and collateral ligaments are intact.  The lateral meniscus and the cartilage in the lateral compartment have a normal appearance.    In the posteromedial aspect of the medial meniscus, there is a small horizontally oriented peripheral tear of the annulus; this does not extend to an articular surface.  The cartilage in the medial compartment is in good condition.    The patella is normally seated and has a satisfactory covering of cartilage.    The tendons have a normal appearance.  No muscle injury.    IMPRESSION:       1.  Short peripheral tear of the annulus in the posteromedial aspect of the medial meniscus; this does not extend to an articular surface and may be an incidental finding.    2.  Otherwise negative.    [id number]             ASSESSMENT:    Diagnoses and all orders for this  visit:    Acute medial meniscus tear of left knee, initial encounter  -     Ambulatory Referral to Physical Therapy Evaluate and treat    Effusion, right knee  -     Ambulatory Referral to Physical Therapy Evaluate and treat    Morbid obesity with BMI of 45.0-49.9, adult (CMS/[facility])  -     Ambulatory Referral to Physical Therapy Evaluate and treat          PLAN  I reviewed the MRI results with the patient and discussed treatment options for meniscal tears in detail.  Will recommend continued physical therapy, anti-inflammatories, aggressive weight reduction and follow-up with Dr. Adame in 4-6 weeks for recheck.  She was instructed to make sure she carries her MRI disc with her during follow-up.  No Follow-up on file.    Jose Chaudhry, APRN

## 2019-04-04 ENCOUNTER — OFFICE VISIT (OUTPATIENT)
Dept: ORTHOPEDIC SURGERY | Facility: CLINIC | Age: 44
End: 2019-04-04

## 2019-04-04 VITALS — OXYGEN SATURATION: 95 % | WEIGHT: 272 LBS | BODY MASS INDEX: 43.71 KG/M2 | HEIGHT: 66 IN

## 2019-04-04 DIAGNOSIS — M17.11 PRIMARY OSTEOARTHRITIS OF RIGHT KNEE: ICD-10-CM

## 2019-04-04 DIAGNOSIS — E66.01 MORBID OBESITY WITH BMI OF 40.0-44.9, ADULT (HCC): ICD-10-CM

## 2019-04-04 DIAGNOSIS — M25.461 EFFUSION, RIGHT KNEE: Primary | ICD-10-CM

## 2019-04-04 DIAGNOSIS — M25.561 ACUTE PAIN OF RIGHT KNEE: ICD-10-CM

## 2019-04-04 PROCEDURE — 20610 DRAIN/INJ JOINT/BURSA W/O US: CPT | Performed by: ORTHOPAEDIC SURGERY

## 2019-04-04 PROCEDURE — 99213 OFFICE O/P EST LOW 20 MIN: CPT | Performed by: ORTHOPAEDIC SURGERY

## 2019-04-04 RX ORDER — TRIAMCINOLONE ACETONIDE 40 MG/ML
80 INJECTION, SUSPENSION INTRA-ARTICULAR; INTRAMUSCULAR
Status: COMPLETED | OUTPATIENT
Start: 2019-04-04 | End: 2019-04-04

## 2019-04-04 RX ORDER — BUPIVACAINE HYDROCHLORIDE 5 MG/ML
3 INJECTION, SOLUTION EPIDURAL; INTRACAUDAL
Status: COMPLETED | OUTPATIENT
Start: 2019-04-04 | End: 2019-04-04

## 2019-04-04 RX ADMIN — BUPIVACAINE HYDROCHLORIDE 3 ML: 5 INJECTION, SOLUTION EPIDURAL; INTRACAUDAL at 11:05

## 2019-04-04 RX ADMIN — TRIAMCINOLONE ACETONIDE 80 MG: 40 INJECTION, SUSPENSION INTRA-ARTICULAR; INTRAMUSCULAR at 11:05

## 2019-04-04 NOTE — PROGRESS NOTES
Corrina Serrano is a 43 y.o. female   Primary provider:  Jose Jean-Baptiste MD       Chief Complaint   Patient presents with   • Right Knee - Pain   • Establish Care       HISTORY OF PRESENT ILLNESS:Pt. Is here for right knee pain.  MRI done in [place].  Report is on line.  Pt. Forgot to bring CD  Approx. 3 months ago right knee popped.  Re-popped approx. 2 months ago.  Knee feels weird when she is on it.  Pain shoots up and down her leg.  A few weeks ago pt. States right knee felt warm.  Therapy told her to keep eye on it.  Pt. Has gone to therapy for 4 weeks now.    3 days/wk for 4 wks. Per pt.    Ms. Serrano returns for evaluation of right knee pain.    Patient is now 43 years old.  She gives a 4-month history of pain in the right knee.  He denies any previous problems with the knee.  She had what sounds like a minor twisting injury and had a popping sensation.  Symptoms have persisted despite treatment.  Treatment has included physical therapy activity restriction and analgesics.  She complains of a pressure sensation diffusely about the knee.  The pain is primarily over the anterior aspect of the knee with occasional radiation into the thigh and leg.  She also has had occasional popliteal pain.  She has had occasional popping.  She has had occasional feelings of giving of the of the knee when it feels like her knee will hyperextend.  Physical therapy has included formal rehabilitation as as well as limited home exercise consisting primarily of range of motion exercises and leg lifts.    Current medications include Elavil Zyprexa and Metformin among others.  She tells me that she takes Percocet 7.5 mg 3 times a day although her medication list reports 10 mg Percocet. She also has a history of diabetes and chronic back pain and is followed by a pain clinic.She is single and unemployed.    Mr. Courtney has asked that I see her for evaluation and treatment.      History of Present Illness      CONCURRENT MEDICAL HISTORY:    Past Medical History:   Diagnosis Date   • Acute atopic conjunctivitis    • Acute bronchitis    • Allergic rhinitis due to pollen    • Arrhythmia     wearing heart monitor    • Bilateral foot pain    • Breast cyst    • Breast lump    • Chest pain    • Chronic low back pain    • Contact dermatitis due to drugs and medicine    • Cyst of ovary    • Diabetes mellitus (CMS/HCC)    • Infestation by Sarcoptes scabiei annalisa hominis    • Low back pain    • Mastodynia    • Nausea and vomiting    • On long term drug therapy    • Pain in wrist    • Plantar fasciitis    • Rash    • Skin disorder     breast-possible superficial cellulitis   • Spinal stenosis    • Tinea pedis    • Tobacco dependence syndrome    • Upper respiratory infection    • Vitamin D deficiency    • Wheezing        Allergies   Allergen Reactions   • Bactroban [Mupirocin Calcium]    • Neomycin-Bacitracin-Polymyxin [Bacitracin-Neomycin-Polymyxin] Rash   • Other Rash     All antibiotic creams         Current Outpatient Medications:   •  amitriptyline (ELAVIL) 50 MG tablet, Take 50 mg by mouth Every Night., Disp: , Rfl:   •  FREESTYLE LITE test strip, 1 stick by Percutaneous route 4 (Four) Times a Day., Disp: , Rfl:   •  GLYBURIDE PO, Take 6 mg by mouth 2 (Two) Times a Day., Disp: , Rfl:   •  Lancets (FREESTYLE) lancets, 1 each by Other route 4 (Four) Times a Day., Disp: , Rfl:   •  LYRICA 150 MG capsule, Take 150 mg by mouth 3 (Three) Times a Day., Disp: , Rfl:   •  meloxicam (MOBIC) 15 MG tablet, Take 15 mg by mouth Daily., Disp: , Rfl:   •  metFORMIN (GLUCOPHAGE) 1000 MG tablet, Take 1,000 mg by mouth 2 (Two) Times a Day., Disp: , Rfl:   •  OLANZapine (zyPREXA) 5 MG tablet, Take 5 mg by mouth Every Night., Disp: , Rfl:   •  omeprazole (priLOSEC) 40 MG capsule, Take 40 mg by mouth Daily., Disp: , Rfl:   •  oxyCODONE-acetaminophen (PERCOCET)  MG per tablet, Take 1 tablet by mouth Every 6 (Six) Hours., Disp: , Rfl:   •   "tiZANidine (ZANAFLEX) 4 MG tablet, Take 4 mg by mouth Every 6 (Six) Hours As Needed., Disp: , Rfl:   •  VENTOLIN  (90 Base) MCG/ACT inhaler, Inhale 2 puffs Every 4 (Four) Hours., Disp: , Rfl:     Past Surgical History:   Procedure Laterality Date   • BREAST BIOPSY     • BREAST CYST ASPIRATION     • BREAST SURGERY      excision breast lesion   • CARDIAC CATHETERIZATION N/A 7/3/2018    Procedure: Coronary angiography;  Surgeon: Arun Cadet MD;  Location: [facility] INVASIVE LOCATION;  Service: Cardiovascular   • CARDIAC CATHETERIZATION     • CHOLECYSTECTOMY     • INJECTION OF MEDICATION      Kenalog (4)       Family History   Problem Relation Age of Onset   • Coronary artery disease Other    • Breast cancer Mother    • Breast cancer Maternal Aunt    • Diabetes Father        Social History     Socioeconomic History   • Marital status:      Spouse name: Not on file   • Number of children: Not on file   • Years of education: Not on file   • Highest education level: Not on file   Tobacco Use   • Smoking status: Current Every Day Smoker     Types: Electronic Cigarette   • Smokeless tobacco: Never Used   Substance and Sexual Activity   • Alcohol use: No   • Drug use: No   • Sexual activity: Defer        Review of Systems   Cardiovascular: Positive for palpitations.   Musculoskeletal: Positive for joint swelling.        Joint pain   Neurological:        L4-L5 sciatic nerve.  Pt. Goes to pain clinic for this.   All other systems reviewed and are negative.      PHYSICAL EXAMINATION:       Ht 167.4 cm (65.9\")   Wt 123 kg (272 lb)   SpO2 95%   BMI 44.04 kg/m²      Physical Exam she is alert pleasant and in no apparent distress at rest.  She responds appropriately to questions and commands.  Mood and affect appear normal.    GAIT:     []  Normal  [x]  Antalgic    Assistive device: []  None  []  Walker     []  Crutches  [x]  Cane     []  Wheelchair  []  Stretcher    Ortho Exam she walks with a slightly " antalgic gait favoring the right.  There is a tattoo over the anterior aspect of the proximal to mid thigh as well as a tattoo at the ankle.  Dorsalis pedis pulses strong.  Sensory exam is intact to soft touch.  There is mild tenderness diffusely about the knee with no palpable abnormality.  She has a questionable 1+ effusion.  There is no patellofemoral crepitus.  She is quite apprehensive about motion of the knee and will allow motion from about  degrees.  There is no instability on varus and valgus stress.  Because of her pain and and obesity I was unable to perform a meaningful Lachman exam.  Verónica testing produces crepitus and mild diffuse pain.  There is no regional adenopathy.    Radiographs of the right knee done in February of this year were reviewed.  There is mild tricompartmental degenerative change most prominent in the medial compartment.  There is no significant joint space narrowing.  Weightbearing views show questionable slight narrowing of the medial compartment bilaterally.  Exam of the knee done in 2010 shows mild spurring of the medial femoral condyle.    MRI scan done previously is not available for me to review.  Patient said she left the disc at home despite her previous instructions.  The radiology report of this MRI scan dated 12 February of this year is that small horizontally oriented peripheral tear of the annulus which does not extend to the articular surface.  The remainder of the study was felt to be normal.            No results found.        ASSESSMENT: 1 right knee pain probably secondary to early osteoarthritis.  2 morbid obesity.    The abnormality reported from the MRI scan does not correlate well with her symptoms.  At this point I think is unlikely surgical treatment would be of much benefit.    The natural history of the disorder was discussed and treatment options reviewed.  Discussed activity restriction.  Potential benefits of weight reduction were discussed.   Potential benefits of injection therapy were reviewed.  She and her boyfriend were agreeable with this.    The right knee was prepped.  Skin was infiltrated with 1% Xylocaine with epinephrine.  The knee was then injected with a mixture of Marcaine Kenalog and Xylocaine with epinephrine.  There were no complications.    Return here as needed.  Diagnoses and all orders for this visit:    Effusion, right knee  -     Large Joint Arthrocentesis: R knee    Acute pain of right knee  -     Large Joint Arthrocentesis: R knee    Morbid obesity with BMI of 40.0-44.9, adult (CMS/[facility])    Primary osteoarthritis of right knee      Large Joint Arthrocentesis: R knee  Date/Time: 4/4/2019 11:05 AM  Consent given by: patient  Site marked: site marked  Timeout: Immediately prior to procedure a time out was called to verify the correct patient, procedure, equipment, support staff and site/side marked as required   Supporting Documentation  Indications: pain   Procedure Details  Location: knee - R knee  Preparation: Patient was prepped and draped in the usual sterile fashion  Needle size: 22 G  Medications administered: 80 mg triamcinolone acetonide 40 MG/ML; 3 mL bupivacaine (PF) 0.5 % (Lidocaine 1% with EPI,  NDC #5648-4895-69,  Lot # -[id number])  Patient tolerance: patient tolerated the procedure well with no immediate complications            PLAN    Return if symptoms worsen or fail to improve.    Mat Adame MD

## 2019-05-24 ENCOUNTER — OFFICE VISIT (OUTPATIENT)
Dept: CARDIOLOGY | Facility: CLINIC | Age: 44
End: 2019-05-24

## 2019-05-24 VITALS
SYSTOLIC BLOOD PRESSURE: 129 MMHG | BODY MASS INDEX: 42.59 KG/M2 | HEART RATE: 70 BPM | HEIGHT: 66 IN | WEIGHT: 265 LBS | DIASTOLIC BLOOD PRESSURE: 85 MMHG

## 2019-05-24 DIAGNOSIS — E11.9 TYPE 2 DIABETES MELLITUS WITHOUT COMPLICATION, WITHOUT LONG-TERM CURRENT USE OF INSULIN (HCC): ICD-10-CM

## 2019-05-24 DIAGNOSIS — R00.2 PALPITATION: Primary | ICD-10-CM

## 2019-05-24 DIAGNOSIS — R00.0 TACHYCARDIA: ICD-10-CM

## 2019-05-24 DIAGNOSIS — I10 ESSENTIAL HYPERTENSION: ICD-10-CM

## 2019-05-24 PROCEDURE — 99213 OFFICE O/P EST LOW 20 MIN: CPT | Performed by: INTERNAL MEDICINE

## 2019-05-24 RX ORDER — LOSARTAN POTASSIUM 25 MG/1
25 TABLET ORAL DAILY
Qty: 30 TABLET | Refills: 4 | Status: SHIPPED | OUTPATIENT
Start: 2019-05-24 | End: 2019-10-10 | Stop reason: SDUPTHER

## 2019-05-24 NOTE — PROGRESS NOTES
Corrina Serrano  43 y.o. female    05/24/2019  1. Palpitation    2. Tachycardia    3. Type 2 diabetes mellitus without complication, without long-term current use of insulin (CMS/[facility])    4. Essential hypertension        History of Present Illness:    Patient's Body mass index is 42.79 kg/m². BMI is above normal parameters. Recommendations include: exercise counseling, nutrition counseling and referral to primary care     I advised Corrina of the risks of continuing to use tobacco, and I provided her with tobacco cessation education    During this visit, I spent 3 minutes counseling the patient regarding tobacco cessation.  .       40 years old patient with the BMI 42.8 and past medical history of diabetes, chronic back pain, peripheral neuropathy, history of anxiety.  Previously evaluated in ER for palpitation sudden onset and termination going on for last 3-4 month with associated symptom of fluttering shortness breath and chest pain.  History of chest pain  described as pressure-like and sharply feeling over the left precordium with radiation sometimes the left shoulder..  The area is tender upon palpation with element of reproducibility no further stratification needed.  No syncope or near syncopal episode reported.  No orthopnea PND fever cough or chills reported.  Her physical activity limited secondary significant back pain and underwent cardiac workup with stress test reported abnormality leading to cardiac catheterization.  No CAD previously,  losartan 25 mg and take if the systolic blood pressures more than 120 monitor no significant bradycardia tach arrhythmia noted.  Echocardiographic study good heart function.  MONITOR: 7/10/18  predominant rhythm is sinus with heart rate range from 70 to 110 bpm.  No significant bradycardia or tachyarrhythmia noted.  No significant pause noted.  No isolated premature ventricular or supraventricular ectopic beat noted     Conclusion:CATH 7/3/18     Normal coronary  arteries     Normal LV function     Plan: Look for noncardiac cause of chest pain     ECHO 6/29/18  · Left ventricular wall thickness is consistent with borderline concentric hypertrophy.  · Mild tricuspid valve regurgitation is present.  · Left ventricular systolic function is normal. Estimated EF = 60%.  · Left atrial cavity size is mildly dilated.     2015  Total Cholesterol 0 - 199 mg/dl 181    Comment: CHOL DESIRED: < 200 MG/DL   Triglycerides 20 - 199 mg/dl 144    Comment: TRIG DESIRED: < 200 MG/DL   HDL Cholesterol 60 - 200 mg/dl 50     Comment: HDL AVERAGE RISK: 35 - 60 MG/DL   LDL Cholesterol                SUBJECTIVE:    Allergies   Allergen Reactions   • Bactroban [Mupirocin Calcium]    • Neomycin-Bacitracin-Polymyxin [Bacitracin-Neomycin-Polymyxin] Rash   • Other Rash     All antibiotic creams         Past Medical History:   Diagnosis Date   • Acute atopic conjunctivitis    • Acute bronchitis    • Allergic rhinitis due to pollen    • Arrhythmia     wearing heart monitor    • Bilateral foot pain    • Breast cyst    • Breast lump    • Chest pain    • Chronic low back pain    • Contact dermatitis due to drugs and medicine    • Cyst of ovary    • Diabetes mellitus (CMS/HCC)    • Infestation by Sarcoptes scabiei annalisa hominis    • Low back pain    • Mastodynia    • Nausea and vomiting    • On long term drug therapy    • Pain in wrist    • Plantar fasciitis    • Rash    • Skin disorder     breast-possible superficial cellulitis   • Spinal stenosis    • Tinea pedis    • Tobacco dependence syndrome    • Upper respiratory infection    • Vitamin D deficiency    • Wheezing          Past Surgical History:   Procedure Laterality Date   • BREAST BIOPSY     • BREAST CYST ASPIRATION     • BREAST SURGERY      excision breast lesion   • CARDIAC CATHETERIZATION N/A 7/3/2018    Procedure: Coronary angiography;  Surgeon: Arun Cadet MD;  Location: [facility] INVASIVE LOCATION;  Service: Cardiovascular   • CARDIAC  CATHETERIZATION     • CHOLECYSTECTOMY     • INJECTION OF MEDICATION      Kenalog (4)         Family History   Problem Relation Age of Onset   • Coronary artery disease Other    • Breast cancer Mother    • Breast cancer Maternal Aunt    • Diabetes Father          Social History     Socioeconomic History   • Marital status:      Spouse name: Not on file   • Number of children: Not on file   • Years of education: Not on file   • Highest education level: Not on file   Tobacco Use   • Smoking status: Current Every Day Smoker     Types: Electronic Cigarette   • Smokeless tobacco: Never Used   Substance and Sexual Activity   • Alcohol use: No   • Drug use: No   • Sexual activity: Defer         Current Outpatient Medications   Medication Sig Dispense Refill   • ALOGLIPTIN-METFORMIN HCL PO Take 12.5 mg by mouth Daily.     • amitriptyline (ELAVIL) 50 MG tablet Take 50 mg by mouth Every Night.     • FREESTYLE LITE test strip 1 stick by Percutaneous route 4 (Four) Times a Day.     • GLYBURIDE PO Take 6 mg by mouth 2 (Two) Times a Day.     • Lancets (FREESTYLE) lancets 1 each by Other route 4 (Four) Times a Day.     • LYRICA 150 MG capsule Take 150 mg by mouth 3 (Three) Times a Day.     • meloxicam (MOBIC) 15 MG tablet Take 15 mg by mouth Daily.     • metFORMIN (GLUCOPHAGE) 1000 MG tablet Take 1,000 mg by mouth 2 (Two) Times a Day.     • OLANZapine (zyPREXA) 5 MG tablet Take 5 mg by mouth Every Night.     • omeprazole (priLOSEC) 40 MG capsule Take 40 mg by mouth Daily.     • oxyCODONE-acetaminophen (PERCOCET)  MG per tablet Take 1 tablet by mouth Every 6 (Six) Hours.     • tiZANidine (ZANAFLEX) 4 MG tablet Take 4 mg by mouth Every 6 (Six) Hours As Needed.     • triamcinolone (KENALOG) 0.5 % ointment Apply  topically to the appropriate area as directed 3 (Three) Times a Day. 15 g 4   • VENTOLIN  (90 Base) MCG/ACT inhaler Inhale 2 puffs Every 4 (Four) Hours.       No current facility-administered medications  "for this visit.            Review of Systems:     Constitutional:  Denies recent weight loss, weight gain, fever or chills, no change in exercise tolerance.     HENT:  Denies any hearing loss, epistaxis, hoarseness, or difficulty speaking.     Eyes: No blurred    Respiratory:  Denies dyspnea with exertion,no cough, wheezing, or hemoptysis.     Cardiovascular: H&P    Gastrointestinal:  Denies change in bowel habits, dyspepsia, ulcer disease, hematochezia, or melena.     Endocrine: Negative for cold intolerance, heat intolerance, polydipsia, polyphagia and polyuria. Denies any history of weight change, polydipsia, polyuria.     Genitourinary: Negative.      Musculoskeletal: Denies any history of arthritic symptoms or back problems.     Skin:  Denies any change in hair or nails, rashes, or skin lesions.     Allergic/Immunologic: Negative.  Negative for environmental allergies, food allergies and immunocompromised state.     Neurological:  Denies any history of recurrent headaches, strokes, TIA, or seizure disorder.     Hematological: Denies any food allergies, seasonal allergies, bleeding disorders, or lymphadenopathy.     Psychiatric/Behavioral: Denies any history of depression, substance abuse, or change in cognitive function.       OBJECTIVE:    /85   Pulse 70   Ht 167.6 cm (65.98\")   Wt 120 kg (265 lb)   BMI 42.79 kg/m²       Physical Exam:     Constitutional: Cooperative, alert and oriented, well-developed, well-nourished, in no acute distress.     HENT:   Head: Normocephalic, normal hair patterns, no masses or tenderness.  Ears, Nose, and Throat: No gross abnormalities. No pallor or cyanosis. Dentition good.   Eyes: EOMS intact, PERRL, conjunctivae and lids unremarkable. Fundoscopic exam and visual fields not performed.   Neck: No palpable masses or adenopathy, no thyromegaly, no JVD, carotid pulses are full and equal bilaterally and without  Bruits.     Cardiovascular: Regular rhythm, S1 and S2 normal, " no S3 or S4. Apical impulse not displaced. No murmurs, gallops, or rubs detected.     Pulmonary/Chest: Chest: normal symmetry, no tenderness to palpation, normal respiratory excursion, no intercostal retraction, no use of accessory muscles. Pulmonary: Normal breath sounds. No rales or rhonchi.    Abdominal: Abdomen soft, bowel sounds normoactive, no masses, no hepatosplenomegaly, non-tender, no bruits.     Musculoskeletal: No deformities, clubbing, cyanosis, erythema, or edema observed. There are no spinal abnormalities noted. Normal muscle strength and tone. Pulses full and equal in all extremities, no bruits auscultated.     Neurological: No gross motor or sensory deficits noted, affect appropriate, oriented to time, person, place.     Skin: Warm and dry to the touch, no apparent skin lesions or masses noted.     Psychiatric: She has a normal mood and affect. Her behavior is normal. Judgment and thought content normal.         Procedures      Lab Results   Component Value Date    WBC 3.83 07/03/2018    HGB 13.7 07/03/2018    HCT 39.3 07/03/2018    MCV 86.8 07/03/2018     07/03/2018     Lab Results   Component Value Date    GLUCOSE 173 (H) 07/03/2018    BUN 12 07/03/2018    CREATININE 0.53 07/03/2018    EGFRIFNONA 125 07/03/2018    BCR 22.6 07/03/2018    CO2 25.0 07/03/2018    CALCIUM 9.0 07/03/2018    ALBUMIN 4.20 07/03/2018     (H) 07/03/2018     (H) 07/03/2018     No results found for: CHOL  Lab Results   Component Value Date    TRIG 144 03/11/2015     Lab Results   Component Value Date    HDL 50 (L) 03/11/2015     No components found for: LDLCALC  Lab Results   Component Value Date     03/11/2015     No results found for: HDLLDLRATIO  No components found for: CHOLHDL  Lab Results   Component Value Date    HGBA1C 5.2 03/11/2015     No results found for: TSH, Y0EZTCN, Q9XTODK, THYROIDAB        ASSESSMENT AND PLAN:  #1 palpitation #2 tachycardia with undefined mechanism #3 chest pain  with the risk factor of diabetes #4 chronic back pain and history of anxiety #5   peripheral neuropathy    43 years old patient evaluated previously with a stress test leading to cardiac catheterization.  Cath no coronary artery disease was noted.  Patient continue having off-and-on chest pain sharp in quality no further risk stratification needed.  She denied palpitation fluttering.  She is pleased with her clinical outcome.  Patient is on losartan 25 mg for management of hypertension, diabetes being treated with oral hypoglycemic agent.  Given the BMI of 42 and history of hypertension and diabetes significant for low carbohydrate, low-fat, DASH diet and graded exercise discussed with the patient's.    Corrina was seen today for follow-up and palpitations.    Diagnoses and all orders for this visit:    Palpitation    Tachycardia    Type 2 diabetes mellitus without complication, without long-term current use of insulin (CMS/[facility])    Essential hypertension        Yue Howard MD  5/24/2019  9:42 AM

## 2019-07-09 ENCOUNTER — OFFICE VISIT (OUTPATIENT)
Dept: PODIATRY | Facility: CLINIC | Age: 44
End: 2019-07-09

## 2019-07-09 VITALS — BODY MASS INDEX: 44.65 KG/M2 | HEART RATE: 91 BPM | OXYGEN SATURATION: 96 % | WEIGHT: 268 LBS | HEIGHT: 65 IN

## 2019-07-09 DIAGNOSIS — M79.674 CHRONIC TOE PAIN, BILATERAL: ICD-10-CM

## 2019-07-09 DIAGNOSIS — B35.1 ONYCHOMYCOSIS: Primary | ICD-10-CM

## 2019-07-09 DIAGNOSIS — E11.9 TYPE 2 DIABETES MELLITUS WITHOUT COMPLICATION, WITHOUT LONG-TERM CURRENT USE OF INSULIN (HCC): ICD-10-CM

## 2019-07-09 DIAGNOSIS — G89.29 CHRONIC TOE PAIN, BILATERAL: ICD-10-CM

## 2019-07-09 DIAGNOSIS — M79.675 CHRONIC TOE PAIN, BILATERAL: ICD-10-CM

## 2019-07-09 PROCEDURE — 11721 DEBRIDE NAIL 6 OR MORE: CPT | Performed by: PODIATRIST

## 2019-07-09 NOTE — PROGRESS NOTES
Corrina Prado Zach  1975  44 y.o. female   MELVIN Jean-Baptiste - 7/2/2019  BS - 98 per pt    Patient presents today with a request of diabetic foot care.    7/9/2019    Chief Complaint   Patient presents with   • Left Foot - diabetic foot care   • Right Foot - diabetic foot care       History of Present Illness    Patient presents to clinic today with chief complaint of painful long toenails.  Patient describes the pain as achy.  She has hard time trimming them herself due to the thickness.  She denies any other pedal complaints    Past Medical History:   Diagnosis Date   • Acute atopic conjunctivitis    • Acute bronchitis    • Allergic rhinitis due to pollen    • Arrhythmia     wearing heart monitor    • Bilateral foot pain    • Breast cyst    • Breast lump    • Chest pain    • Chronic low back pain    • Contact dermatitis due to drugs and medicine    • Cyst of ovary    • Diabetes mellitus (CMS/HCC)    • Infestation by Sarcoptes scabiei annalisa hominis    • Low back pain    • Mastodynia    • Nausea and vomiting    • On long term drug therapy    • Pain in wrist    • Plantar fasciitis    • Rash    • Skin disorder     breast-possible superficial cellulitis   • Spinal stenosis    • Tinea pedis    • Tobacco dependence syndrome    • Upper respiratory infection    • Vitamin D deficiency    • Wheezing          Past Surgical History:   Procedure Laterality Date   • BREAST BIOPSY     • BREAST CYST ASPIRATION     • BREAST SURGERY      excision breast lesion   • CARDIAC CATHETERIZATION N/A 7/3/2018    Procedure: Coronary angiography;  Surgeon: Arun Cadet MD;  Location: [facility] INVASIVE LOCATION;  Service: Cardiovascular   • CARDIAC CATHETERIZATION     • CHOLECYSTECTOMY     • INJECTION OF MEDICATION      Kenalog (4)         Family History   Problem Relation Age of Onset   • Coronary artery disease Other    • Breast cancer Mother    • Breast cancer Maternal Aunt    • Diabetes Father        Allergies   Allergen  Reactions   • Bactroban [Mupirocin Calcium]    • Neomycin-Bacitracin-Polymyxin [Bacitracin-Neomycin-Polymyxin] Rash   • Other Rash     All antibiotic creams       Social History     Socioeconomic History   • Marital status:      Spouse name: Not on file   • Number of children: Not on file   • Years of education: Not on file   • Highest education level: Not on file   Tobacco Use   • Smoking status: Current Every Day Smoker     Types: Electronic Cigarette   • Smokeless tobacco: Never Used   Substance and Sexual Activity   • Alcohol use: No   • Drug use: No   • Sexual activity: Defer         Current Outpatient Medications   Medication Sig Dispense Refill   • cephalexin (KEFLEX) 500 MG capsule Take one capsule by mouth 3 times a day until gone. 30 capsule 0   • Dulaglutide (TRULICITY) 1.5 MG/0.5ML solution pen-injector Inject  under the skin into the appropriate area as directed.     • fluconazole (DIFLUCAN) 200 MG tablet Take one at the first sign of infection and may repeat in 3 days as needed. 4 tablet 0   • FREESTYLE LITE test strip 1 stick by Percutaneous route 4 (Four) Times a Day.     • GLYBURIDE PO Take 6 mg by mouth 2 (Two) Times a Day.     • Lancets (FREESTYLE) lancets 1 each by Other route 4 (Four) Times a Day.     • losartan (COZAAR) 25 MG tablet Take 1 tablet by mouth Daily. 30 tablet 4   • LYRICA 150 MG capsule Take 150 mg by mouth 3 (Three) Times a Day.     • meloxicam (MOBIC) 15 MG tablet Take 15 mg by mouth Daily.     • metFORMIN (GLUCOPHAGE) 1000 MG tablet Take 1,000 mg by mouth 2 (Two) Times a Day.     • OLANZapine (zyPREXA) 5 MG tablet Take 5 mg by mouth Every Night.     • omeprazole (priLOSEC) 40 MG capsule Take 40 mg by mouth Daily.     • oxyCODONE-acetaminophen (PERCOCET)  MG per tablet Take 1 tablet by mouth Every 6 (Six) Hours.     • tiZANidine (ZANAFLEX) 4 MG tablet Take 4 mg by mouth Every 6 (Six) Hours As Needed.     • triamcinolone (KENALOG) 0.5 % ointment Apply  topically to  "the appropriate area as directed 3 (Three) Times a Day. 15 g 4   • VENTOLIN  (90 Base) MCG/ACT inhaler Inhale 2 puffs Every 4 (Four) Hours.       No current facility-administered medications for this visit.        Review of Systems   Constitutional: Negative.    HENT: Negative.    Respiratory: Negative.    Cardiovascular: Negative.    Gastrointestinal: Negative.    Musculoskeletal:        Toenail discomfort   Skin: Negative.    Psychiatric/Behavioral: Negative.          OBJECTIVE    Pulse 91   Ht 165.1 cm (65\")   Wt 122 kg (268 lb)   SpO2 96%   BMI 44.60 kg/m²       Physical Exam   Constitutional: She is oriented to person, place, and time. She appears well-developed and well-nourished. No distress.   HENT:   Head: Normocephalic and atraumatic.   Eyes: EOM are normal. Pupils are equal, round, and reactive to light.   Pulmonary/Chest: Effort normal. No respiratory distress. She has no wheezes.   Neurological: She is alert and oriented to person, place, and time.   Psychiatric: She has a normal mood and affect. Her behavior is normal.   Vitals reviewed.      Gait: Normal    Assistive Device: None    Lower Extremity    Cardiovascular:    DP/PT pulses palpable bilateral  CFT brisk  to all digits  Skin temp is warm to warm from proximal tibia to distal digits bilateral  Pedal hair growth diminished.   No erythema or edema noted     Musculoskeletal:  Muscle strength is 5/5 for all muscle groups tested   ROM of the 1st MTP is full without pain or crepitus bilateral  ROM of the ankle joint is full without pain or crepitus  bilateral      Dermatological:   Nails 1-5 bilateral are thickened, discolored, elongated with subungual debris.  Pain on palpation to the nail plates.  Skin is warm, dry and intact bilateral  Webspaces 1-4 bilateral are clean, dry and intact.     Neurological:   Protective sensation intact   Sensation intact to light touch        Procedures        ASSESSMENT AND PLAN    Corrina was seen today " for diabetic foot care and diabetic foot care.    Diagnoses and all orders for this visit:    Onychomycosis    Chronic toe pain, bilateral    Type 2 diabetes mellitus without complication, without long-term current use of insulin (CMS/[facility])      - Nails 1-5 bilateral were debrided in length and thickness with nail nipper and electric  to decrease fungal load and risk of infection.   -All her questions were answered  -Return to clinic as needed            This document has been electronically signed by Librado Colby DPM on July 9, 2019 6:17 PM     7/9/2019  6:17 PM

## 2019-10-10 RX ORDER — LOSARTAN POTASSIUM 25 MG/1
25 TABLET ORAL DAILY
Qty: 30 TABLET | Refills: 4 | Status: SHIPPED | OUTPATIENT
Start: 2019-10-10 | End: 2020-03-24 | Stop reason: SDUPTHER

## 2019-12-06 PROBLEM — S93.401A SPRAIN OF RIGHT ANKLE: Status: ACTIVE | Noted: 2018-01-02

## 2019-12-06 PROBLEM — E78.49 OTHER HYPERLIPIDEMIA: Status: ACTIVE | Noted: 2017-01-24

## 2020-03-24 RX ORDER — LOSARTAN POTASSIUM 25 MG/1
25 TABLET ORAL DAILY
Qty: 30 TABLET | Refills: 4 | Status: SHIPPED | OUTPATIENT
Start: 2020-03-24 | End: 2022-05-13 | Stop reason: ALTCHOICE

## 2020-05-26 ENCOUNTER — OFFICE VISIT (OUTPATIENT)
Dept: CARDIOLOGY | Facility: CLINIC | Age: 45
End: 2020-05-26

## 2020-05-26 VITALS
HEART RATE: 77 BPM | WEIGHT: 270 LBS | SYSTOLIC BLOOD PRESSURE: 97 MMHG | BODY MASS INDEX: 44.98 KG/M2 | DIASTOLIC BLOOD PRESSURE: 61 MMHG | HEIGHT: 65 IN

## 2020-05-26 DIAGNOSIS — E11.9 TYPE 2 DIABETES MELLITUS WITHOUT COMPLICATION, WITHOUT LONG-TERM CURRENT USE OF INSULIN (HCC): ICD-10-CM

## 2020-05-26 DIAGNOSIS — I10 ESSENTIAL HYPERTENSION: ICD-10-CM

## 2020-05-26 DIAGNOSIS — R00.2 PALPITATION: ICD-10-CM

## 2020-05-26 DIAGNOSIS — R00.0 TACHYCARDIA: Primary | ICD-10-CM

## 2020-05-26 DIAGNOSIS — E78.49 OTHER HYPERLIPIDEMIA: ICD-10-CM

## 2020-05-26 PROCEDURE — 99442 PR PHYS/QHP TELEPHONE EVALUATION 11-20 MIN: CPT | Performed by: INTERNAL MEDICINE

## 2020-05-26 RX ORDER — CETIRIZINE HYDROCHLORIDE 10 MG/1
10 TABLET ORAL DAILY
COMMUNITY

## 2020-05-26 RX ORDER — ATORVASTATIN CALCIUM 20 MG/1
20 TABLET, FILM COATED ORAL NIGHTLY
COMMUNITY
End: 2022-05-13

## 2020-05-26 RX ORDER — AMITRIPTYLINE HYDROCHLORIDE 100 MG/1
100 TABLET, FILM COATED ORAL NIGHTLY
COMMUNITY
End: 2021-01-22

## 2020-05-26 NOTE — PROGRESS NOTES
Corrina Serrano  44 y.o. female    05/24/2019  1. Tachycardia    2. Palpitation    3. Essential hypertension    4. Other hyperlipidemia    5. Type 2 diabetes mellitus without complication, without long-term current use of insulin (CMS/[facility])        History of Present Illness:  This visit has been rescheduled as a phone visit to comply with patient safety concerns in accordance with CDC recommendations. Total time of discussion was 15 minutes.    You have chosen to receive care through a telephone visit. Do you consent to use a telephone visit for your medical care today? Yes    Patient's Body mass index is 44.93 kg/m². BMI is above normal parameters. Recommendations include: exercise counseling, nutrition counseling and referral to primary care     I advised Corrina of the risks of continuing to use tobacco, and I provided her with tobacco cessation education    During this visit, I spent 3 minutes counseling the patient regarding tobacco cessation.  .       44 years old patient presented for telephone office visit and from clinical description/evaluations over the phone she is doing remarkably well from cardiac point of view.  No palpitations reported patient has a history of chronic back pain no significant change in previous normal cardiac catheterization.  Patient also has a history of palpitation no significant arrhythmia noted on monitor and no further records.  Past medical history of diabetes, chronic back pain, peripheral neuropathy, history of anxiety.    No syncope or near syncopal episode reported.  No orthopnea PND fever cough or chills reported.  Her physical activity limited secondary significant back pain and underwent cardiac workup with stress test reported abnormality leading to cardiac catheterization.  No CAD previously,  losartan 25 mg and take if the systolic blood pressures more than 120 monitor no significant bradycardia tach arrhythmia noted.  Echoc good heart function.    MONITOR:  7/10/18  predominant rhythm is sinus with heart rate range from 70 to 110 bpm.  No significant bradycardia or tachyarrhythmia noted.  No significant pause noted.  No isolated premature ventricular or supraventricular ectopic beat noted     Conclusion:CATH 7/3/18     Normal coronary arteries     Normal LV function     Plan: Look for noncardiac cause of chest pain     ECHO 6/29/18  · Left ventricular wall thickness is consistent with borderline concentric hypertrophy.  · Mild tricuspid valve regurgitation is present.  · Left ventricular systolic function is normal. Estimated EF = 60%.  · Left atrial cavity size is mildly dilated.     2015  Total Cholesterol 0 - 199 mg/dl 181    Comment: CHOL DESIRED: < 200 MG/DL   Triglycerides 20 - 199 mg/dl 144    Comment: TRIG DESIRED: < 200 MG/DL   HDL Cholesterol 60 - 200 mg/dl 50     Comment: HDL AVERAGE RISK: 35 - 60 MG/DL   LDL Cholesterol                SUBJECTIVE:    Allergies   Allergen Reactions   • Bactroban [Mupirocin Calcium]    • Neomycin-Bacitracin-Polymyxin [Bacitracin-Neomycin-Polymyxin] Rash   • Other Rash     All antibiotic creams         Past Medical History:   Diagnosis Date   • Acute atopic conjunctivitis    • Acute bronchitis    • Allergic rhinitis due to pollen    • Arrhythmia     wearing heart monitor    • Bilateral foot pain    • Breast cyst    • Breast lump    • Chest pain    • Chronic low back pain    • Contact dermatitis due to drugs and medicine    • Cyst of ovary    • Diabetes mellitus (CMS/HCC)    • Infestation by Sarcoptes scabiei annalisa hominis    • Low back pain    • Mastodynia    • Nausea and vomiting    • On long term drug therapy    • Pain in wrist    • Plantar fasciitis    • Rash    • Skin disorder     breast-possible superficial cellulitis   • Spinal stenosis    • Tinea pedis    • Tobacco dependence syndrome    • Upper respiratory infection    • Vitamin D deficiency    • Wheezing          Past Surgical History:   Procedure Laterality Date   •  BREAST BIOPSY     • BREAST CYST ASPIRATION     • BREAST SURGERY      excision breast lesion   • CARDIAC CATHETERIZATION N/A 7/3/2018    Procedure: Coronary angiography;  Surgeon: Arun Cadet MD;  Location: [facility] INVASIVE LOCATION;  Service: Cardiovascular   • CARDIAC CATHETERIZATION     • CHOLECYSTECTOMY     • INJECTION OF MEDICATION      Kenalog (4)         Family History   Problem Relation Age of Onset   • Coronary artery disease Other    • Breast cancer Mother    • Breast cancer Maternal Aunt    • Diabetes Father          Social History     Socioeconomic History   • Marital status:      Spouse name: Not on file   • Number of children: Not on file   • Years of education: Not on file   • Highest education level: Not on file   Tobacco Use   • Smoking status: Current Every Day Smoker     Types: Electronic Cigarette   • Smokeless tobacco: Never Used   Substance and Sexual Activity   • Alcohol use: No   • Drug use: No   • Sexual activity: Defer         Current Outpatient Medications   Medication Sig Dispense Refill   • Dulaglutide (TRULICITY) 1.5 MG/0.5ML solution pen-injector Inject  under the skin into the appropriate area as directed.     • FREESTYLE LITE test strip 1 stick by Percutaneous route 4 (Four) Times a Day.     • GLYBURIDE PO Take 6 mg by mouth 2 (Two) Times a Day.     • Lancets (FREESTYLE) lancets 1 each by Other route 4 (Four) Times a Day.     • losartan (COZAAR) 25 MG tablet Take 1 tablet by mouth Daily. 30 tablet 4   • LYRICA 150 MG capsule Take 150 mg by mouth 3 (Three) Times a Day.     • meloxicam (MOBIC) 15 MG tablet Take 15 mg by mouth Daily.     • metFORMIN (GLUCOPHAGE) 1000 MG tablet Take 1,000 mg by mouth 2 (Two) Times a Day.     • OLANZapine (zyPREXA) 5 MG tablet Take 5 mg by mouth Every Night.     • omeprazole (priLOSEC) 40 MG capsule Take 40 mg by mouth Daily.     • oxyCODONE-acetaminophen (PERCOCET)  MG per tablet Take 1 tablet by mouth Every 6 (Six) Hours.     •  "tiZANidine (ZANAFLEX) 4 MG tablet Take 4 mg by mouth Every 6 (Six) Hours As Needed.     • triamcinolone (KENALOG) 0.5 % ointment Apply  topically to the appropriate area as directed 3 (Three) Times a Day. 15 g 4   • VENTOLIN  (90 Base) MCG/ACT inhaler Inhale 2 puffs Every 4 (Four) Hours.       No current facility-administered medications for this visit.            Review of Systems:     Constitutional:  Denies recent weight loss, weight gain, fever or chills, no change in exercise tolerance.     HENT:  Denies any hearing loss, epistaxis, hoarseness, or difficulty speaking.     Eyes: No blurred    Respiratory:  Denies dyspnea with exertion,no cough, wheezing, or hemoptysis.     Cardiovascular: H&P    Gastrointestinal:  Denies change in bowel habits, dyspepsia, ulcer disease, hematochezia, or melena.     Endocrine: Negative for cold intolerance, heat intolerance, polydipsia, polyphagia and polyuria. Denies any history of weight change, polydipsia, polyuria.     Genitourinary: Negative.      Musculoskeletal: Chronic pain syndrome history of chronic back pain    Skin:  Denies any change in hair or nails, rashes, or skin lesions.     Allergic/Immunologic: Negative.  Negative for environmental allergies, food allergies and immunocompromised state.     Neurological:  Denies any history of recurrent headaches, strokes, TIA, or seizure disorder.     Hematological: Denies any food allergies, seasonal allergies, bleeding disorders, or lymphadenopathy.     Psychiatric/Behavioral: Denies any history of depression, substance abuse, or change in cognitive function.       OBJECTIVE:    Ht 165.1 cm (65\")   Wt 122 kg (270 lb)   BMI 44.93 kg/m²       Physical Exam:     No physical exam due to telephone office visit        Procedures      Lab Results   Component Value Date    WBC 3.83 07/03/2018    HGB 13.7 07/03/2018    HCT 39.3 07/03/2018    MCV 86.8 07/03/2018     07/03/2018     Lab Results   Component Value Date    " GLUCOSE 173 (H) 07/03/2018    BUN 12 07/03/2018    CREATININE 0.53 07/03/2018    EGFRIFNONA 125 07/03/2018    BCR 22.6 07/03/2018    CO2 25.0 07/03/2018    CALCIUM 9.0 07/03/2018    ALBUMIN 4.20 07/03/2018     (H) 07/03/2018     (H) 07/03/2018     No results found for: CHOL  Lab Results   Component Value Date    TRIG 144 03/11/2015     Lab Results   Component Value Date    HDL 50 (L) 03/11/2015     No components found for: LDLCALC  Lab Results   Component Value Date     03/11/2015     No results found for: HDLLDLRATIO  No components found for: CHOLHDL  Lab Results   Component Value Date    HGBA1C 5.2 03/11/2015     No results found for: TSH, Y7RBPZC, K9SJTYQ, THYROIDAB        ASSESSMENT AND PLAN:  #1 palpitation #2 tachycardia with undefined mechanism #3 chest pain with the risk factor of diabetes #4 chronic back pain and history of anxiety #5   peripheral neuropathy    44 years old patient presented for routine follow-up for telephone office visit.  And from clinical evaluation of the phone no symptoms just of cardiac decompensation is noted.  Appreciated.  She is doing well from cardiac point of view no further palpitation fluttering reported she does have history of chronic back pain described as sharp shooting in quality with previous normal cardiac catheterization .  She is pleased with her clinical outcome.  Patient is on losartan 25 mg for management of hypertension, diabetes being treated with oral hypoglycemic agent.  Given the BMI of 42 and history of hypertension and diabetes significant for low carbohydrate, low-fat, DASH diet and graded exercise discussed with the patient's.    Corrina was seen today for palpitations.    Diagnoses and all orders for this visit:    Tachycardia    Palpitation    Essential hypertension    Other hyperlipidemia    Type 2 diabetes mellitus without complication, without long-term current use of insulin (CMS/[facility])        Yue Howard MD  5/26/2020  10:42

## 2020-08-27 ENCOUNTER — OFFICE VISIT (OUTPATIENT)
Dept: ORTHOPEDIC SURGERY | Facility: CLINIC | Age: 45
End: 2020-08-27

## 2020-08-27 VITALS
TEMPERATURE: 98.6 F | WEIGHT: 269 LBS | OXYGEN SATURATION: 97 % | HEIGHT: 65 IN | RESPIRATION RATE: 18 BRPM | DIASTOLIC BLOOD PRESSURE: 73 MMHG | BODY MASS INDEX: 44.82 KG/M2 | HEART RATE: 94 BPM | SYSTOLIC BLOOD PRESSURE: 119 MMHG

## 2020-08-27 DIAGNOSIS — M25.561 ACUTE PAIN OF RIGHT KNEE: Primary | ICD-10-CM

## 2020-08-27 PROCEDURE — 99214 OFFICE O/P EST MOD 30 MIN: CPT | Performed by: ORTHOPAEDIC SURGERY

## 2020-08-27 NOTE — PROGRESS NOTES
Corrina Jensen is a 45 y.o. female   Primary provider:  Jose Jean-Baptiste MD       Chief Complaint   Patient presents with   • Right Knee - Pain, Initial Evaluation     [facility]  HISTORY OF PRESENT ILLNESS:right knee pain.  Date of injury 08/22/2020.  Pain scale today 6/10    Ms. Jensen returns for evaluation of right knee pain.    Ms. Jensen had a good response to the injection of her right knee over a year ago.  5 days ago she had a minor twisting injury to the right knee and has had worsening of her pain.  The exact mechanism of injury is uncertain.  [facility] and [facility].  Pain is diffuse in nature worse with standing and walking.    Past medical history is unchanged from previous notes.    Pain   This is a new problem. The current episode started in the past 7 days (08/22/2020). The problem occurs constantly (moderate). The problem has been unchanged. Associated symptoms include numbness. Associated symptoms comments: Stabbing,aching,burning,clicking/popping/snapping right knee. The symptoms are aggravated by walking and standing (sitting). She has tried rest for the symptoms. The treatment provided no relief.        CONCURRENT MEDICAL HISTORY:    Past Medical History:   Diagnosis Date   • Acute atopic conjunctivitis    • Acute bronchitis    • Allergic rhinitis due to pollen    • Arrhythmia     wearing heart monitor    • Bilateral foot pain    • Breast cyst    • Breast lump    • Chest pain    • Chronic low back pain    • Contact dermatitis due to drugs and medicine    • Cyst of ovary    • Diabetes mellitus (CMS/HCC)    • Infestation by Sarcoptes scabiei annalisa hominis    • Low back pain    • Mastodynia    • Nausea and vomiting    • On long term drug therapy    • Pain in wrist    • Plantar fasciitis    • Rash    • Skin disorder     breast-possible superficial cellulitis   • Spinal stenosis    • Tinea pedis    • Tobacco dependence syndrome    • Upper respiratory  infection    • Vitamin D deficiency    • Wheezing        Allergies   Allergen Reactions   • Bactroban [Mupirocin Calcium]    • Neomycin-Bacitracin-Polymyxin [Bacitracin-Neomycin-Polymyxin] Rash   • Other Rash     All antibiotic creams         Current Outpatient Medications:   •  amitriptyline (ELAVIL) 100 MG tablet, Take 100 mg by mouth Every Night., Disp: , Rfl:   •  atorvastatin (LIPITOR) 20 MG tablet, Take 20 mg by mouth Daily., Disp: , Rfl:   •  cetirizine (zyrTEC) 10 MG tablet, Take 10 mg by mouth Daily., Disp: , Rfl:   •  Dulaglutide (TRULICITY) 1.5 MG/0.5ML solution pen-injector, Inject  under the skin into the appropriate area as directed., Disp: , Rfl:   •  FREESTYLE LITE test strip, 1 stick by Percutaneous route 4 (Four) Times a Day., Disp: , Rfl:   •  GLYBURIDE PO, Take 6 mg by mouth 2 (Two) Times a Day., Disp: , Rfl:   •  Lancets (FREESTYLE) lancets, 1 each by Other route 4 (Four) Times a Day., Disp: , Rfl:   •  losartan (COZAAR) 25 MG tablet, Take 1 tablet by mouth Daily., Disp: 30 tablet, Rfl: 4  •  LYRICA 150 MG capsule, Take 150 mg by mouth 3 (Three) Times a Day., Disp: , Rfl:   •  meloxicam (MOBIC) 15 MG tablet, Take 15 mg by mouth Daily., Disp: , Rfl:   •  metFORMIN (GLUCOPHAGE) 1000 MG tablet, Take 1,000 mg by mouth 2 (Two) Times a Day., Disp: , Rfl:   •  OLANZapine (zyPREXA) 5 MG tablet, Take 5 mg by mouth Every Night., Disp: , Rfl:   •  omeprazole (priLOSEC) 40 MG capsule, Take 40 mg by mouth Daily., Disp: , Rfl:   •  oxyCODONE-acetaminophen (PERCOCET)  MG per tablet, Take 1 tablet by mouth Every 6 (Six) Hours., Disp: , Rfl:   •  tiZANidine (ZANAFLEX) 4 MG tablet, Take 4 mg by mouth Every 6 (Six) Hours As Needed., Disp: , Rfl:   •  triamcinolone (KENALOG) 0.5 % ointment, Apply  topically to the appropriate area as directed 3 (Three) Times a Day., Disp: 15 g, Rfl: 4  •  VENTOLIN  (90 Base) MCG/ACT inhaler, Inhale 2 puffs Every 4 (Four) Hours., Disp: , Rfl:     Past Surgical History:  "  Procedure Laterality Date   • BREAST BIOPSY     • BREAST CYST ASPIRATION     • BREAST SURGERY      excision breast lesion   • CARDIAC CATHETERIZATION N/A 7/3/2018    Procedure: Coronary angiography;  Surgeon: Arun Cadet MD;  Location: [facility] INVASIVE LOCATION;  Service: Cardiovascular   • CARDIAC CATHETERIZATION     • CHOLECYSTECTOMY     • INJECTION OF MEDICATION      Kenalog (4)       Family History   Problem Relation Age of Onset   • Coronary artery disease Other    • Breast cancer Mother    • Breast cancer Maternal Aunt    • Diabetes Father        Social History     Socioeconomic History   • Marital status:      Spouse name: Not on file   • Number of children: Not on file   • Years of education: Not on file   • Highest education level: Not on file   Tobacco Use   • Smoking status: Current Every Day Smoker     Types: Electronic Cigarette   • Smokeless tobacco: Never Used   Substance and Sexual Activity   • Alcohol use: No   • Drug use: No   • Sexual activity: Defer        Review of Systems   Musculoskeletal:        Right knee pain   Neurological: Positive for numbness.   Psychiatric/Behavioral: The patient is nervous/anxious.      Review of systems is positive as noted above.  PHYSICAL EXAMINATION:       Vitals:    08/27/20 1327 08/27/20 1329 08/27/20 1330   BP:   119/73   BP Location:   Right arm   Patient Position:   Sitting   Cuff Size:   Adult   Pulse:  94    Resp:  18    Temp:  98.6 °F (37 °C)    TempSrc:  Temporal    SpO2:  97%    Weight: 122 kg (269 lb)     Height: 165.1 cm (65\")     PainSc:   6     PainLoc: Knee         Physical Exam she is alert pleasant and in no apparent distress at rest.    GAIT:     []  Normal  [x]  Antalgic    Assistive device: [x]  None  []  Walker     []  Crutches  []  Cane     []  Wheelchair  []  Stretcher    Ortho Exam she walks with a slightly antalgic gait favoring the right.  There is a trace effusion of the right knee.  She is somewhat apprehensive " about exam of the knee but will allow motion from about 5 to 100 degrees.  There is no instability on varus and valgus stress.  There is no ecchymosis or swelling about the knee.    Radiographs of the knee done recently were reviewed and show minimal narrowing of the medial joint space.      [facility]   Right knee  Impression:  1. Degenerative changes throughout the knee joint  2.  No acute fractures           ASSESSMENT: Right knee pain probably secondary to a flare of osteoarthritis.    Treatment options were reviewed.  I think the prognosis is good for spontaneous resolution of her symptoms.  She was instructed in symptomatic care.    She may advance activities as tolerated.    Return here as needed.    Diagnoses and all orders for this visit:    Acute pain of right knee          PLAN    Return if symptoms worsen or fail to improve.    Mat Adame MD

## 2020-11-17 ENCOUNTER — OFFICE VISIT (OUTPATIENT)
Dept: ORTHOPEDIC SURGERY | Facility: CLINIC | Age: 45
End: 2020-11-17

## 2020-11-17 VITALS
WEIGHT: 271 LBS | SYSTOLIC BLOOD PRESSURE: 124 MMHG | BODY MASS INDEX: 45.15 KG/M2 | TEMPERATURE: 97.8 F | HEART RATE: 79 BPM | HEIGHT: 65 IN | OXYGEN SATURATION: 98 % | DIASTOLIC BLOOD PRESSURE: 69 MMHG

## 2020-11-17 DIAGNOSIS — M25.562 LEFT KNEE PAIN, UNSPECIFIED CHRONICITY: Primary | ICD-10-CM

## 2020-11-17 PROCEDURE — 99214 OFFICE O/P EST MOD 30 MIN: CPT | Performed by: ORTHOPAEDIC SURGERY

## 2020-11-17 PROCEDURE — 20610 DRAIN/INJ JOINT/BURSA W/O US: CPT | Performed by: ORTHOPAEDIC SURGERY

## 2020-11-17 RX ORDER — BUPIVACAINE HYDROCHLORIDE 5 MG/ML
3 INJECTION, SOLUTION PERINEURAL
Status: COMPLETED | OUTPATIENT
Start: 2020-11-17 | End: 2020-11-17

## 2020-11-17 RX ORDER — LIDOCAINE HYDROCHLORIDE AND EPINEPHRINE 10; 10 MG/ML; UG/ML
2 INJECTION, SOLUTION INFILTRATION; PERINEURAL
Status: COMPLETED | OUTPATIENT
Start: 2020-11-17 | End: 2020-11-17

## 2020-11-17 RX ORDER — TRIAMCINOLONE ACETONIDE 40 MG/ML
80 INJECTION, SUSPENSION INTRA-ARTICULAR; INTRAMUSCULAR
Status: COMPLETED | OUTPATIENT
Start: 2020-11-17 | End: 2020-11-17

## 2020-11-17 RX ADMIN — BUPIVACAINE HYDROCHLORIDE 3 ML: 5 INJECTION, SOLUTION PERINEURAL at 14:26

## 2020-11-17 RX ADMIN — TRIAMCINOLONE ACETONIDE 80 MG: 40 INJECTION, SUSPENSION INTRA-ARTICULAR; INTRAMUSCULAR at 14:26

## 2020-11-17 RX ADMIN — LIDOCAINE HYDROCHLORIDE AND EPINEPHRINE 2 ML: 10; 10 INJECTION, SOLUTION INFILTRATION; PERINEURAL at 14:26

## 2020-11-17 NOTE — PROGRESS NOTES
Corrina Jensen is a 45 y.o. female   Primary provider:  Jose Jean-Baptiste MD       Chief Complaint   Patient presents with   • Left Knee - Pain   • Establish Care       HISTORY OF PRESENT ILLNESS: Patient being seen for left knee pain. Reports no known injury. X-rays done at .     This is the first office visit for evaluation of left knee pain.    Ms. Jensen is now 45 years old.  She said she was walking on level ground last week when her left kneecap went out of place.  She said she put it back in place and has had pain in the knee since then.  Her pain is fairly diffuse.  She has had swelling and popping.  Treatment has included activity restriction and ice.    Past medical history is unchanged from previous notes.  She takes 40 mg of Percocet daily for back pain.    Dr. Jean-Baptiste has asked that I see her for evaluation and treatment.      Pain  This is a new problem. The current episode started 1 to 4 weeks ago. The problem occurs constantly. Associated symptoms include joint swelling. Associated symptoms comments: Stabbing, aching, burning, clicking, swelling. The symptoms are aggravated by standing and walking (sitting).        CONCURRENT MEDICAL HISTORY:    Past Medical History:   Diagnosis Date   • Acute atopic conjunctivitis    • Acute bronchitis    • Allergic rhinitis due to pollen    • Arrhythmia     wearing heart monitor    • Bilateral foot pain    • Breast cyst    • Breast lump    • Chest pain    • Chronic low back pain    • Contact dermatitis due to drugs and medicine    • Cyst of ovary    • Diabetes mellitus (CMS/HCC)    • Infestation by Sarcoptes scabiei annalisa hominis    • Low back pain    • Mastodynia    • Nausea and vomiting    • On long term drug therapy    • Pain in wrist    • Plantar fasciitis    • Rash    • Skin disorder     breast-possible superficial cellulitis   • Spinal stenosis    • Tinea pedis    • Tobacco dependence syndrome    • Upper respiratory infection    • Vitamin D  deficiency    • Wheezing        Allergies   Allergen Reactions   • Bactroban [Mupirocin Calcium]    • Neomycin-Bacitracin-Polymyxin [Bacitracin-Neomycin-Polymyxin] Rash   • Other Rash     All antibiotic creams         Current Outpatient Medications:   •  amitriptyline (ELAVIL) 100 MG tablet, Take 100 mg by mouth Every Night., Disp: , Rfl:   •  atorvastatin (LIPITOR) 20 MG tablet, Take 20 mg by mouth Daily., Disp: , Rfl:   •  cetirizine (zyrTEC) 10 MG tablet, Take 10 mg by mouth Daily., Disp: , Rfl:   •  Dulaglutide (TRULICITY) 1.5 MG/0.5ML solution pen-injector, Inject  under the skin into the appropriate area as directed., Disp: , Rfl:   •  Elastic Bandages & Supports (Knee Brace/Hinged Bars 3XL) misc, 1 Device Daily., Disp: 1 each, Rfl: 0  •  FREESTYLE LITE test strip, 1 stick by Percutaneous route 4 (Four) Times a Day., Disp: , Rfl:   •  GLYBURIDE PO, Take 6 mg by mouth 2 (Two) Times a Day., Disp: , Rfl:   •  Lancets (FREESTYLE) lancets, 1 each by Other route 4 (Four) Times a Day., Disp: , Rfl:   •  losartan (COZAAR) 25 MG tablet, Take 1 tablet by mouth Daily., Disp: 30 tablet, Rfl: 4  •  LYRICA 150 MG capsule, Take 150 mg by mouth 3 (Three) Times a Day., Disp: , Rfl:   •  meloxicam (MOBIC) 15 MG tablet, Take 15 mg by mouth Daily., Disp: , Rfl:   •  metFORMIN (GLUCOPHAGE) 1000 MG tablet, Take 1,000 mg by mouth 2 (Two) Times a Day., Disp: , Rfl:   •  OLANZapine (zyPREXA) 5 MG tablet, Take 5 mg by mouth Every Night., Disp: , Rfl:   •  omeprazole (priLOSEC) 40 MG capsule, Take 40 mg by mouth Daily., Disp: , Rfl:   •  oxyCODONE-acetaminophen (PERCOCET)  MG per tablet, Take 1 tablet by mouth Every 6 (Six) Hours., Disp: , Rfl:   •  tiZANidine (ZANAFLEX) 4 MG tablet, Take 4 mg by mouth Every 6 (Six) Hours As Needed., Disp: , Rfl:   •  triamcinolone (KENALOG) 0.5 % ointment, Apply  topically to the appropriate area as directed 3 (Three) Times a Day., Disp: 15 g, Rfl: 4  •  VENTOLIN  (90 Base) MCG/ACT  "inhaler, Inhale 2 puffs Every 4 (Four) Hours., Disp: , Rfl:   No current facility-administered medications for this visit.     Past Surgical History:   Procedure Laterality Date   • BREAST BIOPSY     • BREAST CYST ASPIRATION     • BREAST SURGERY      excision breast lesion   • CARDIAC CATHETERIZATION N/A 7/3/2018    Procedure: Coronary angiography;  Surgeon: Arun Cadet MD;  Location: [facility] INVASIVE LOCATION;  Service: Cardiovascular   • CARDIAC CATHETERIZATION     • CHOLECYSTECTOMY     • INJECTION OF MEDICATION      Kenalog (4)       Family History   Problem Relation Age of Onset   • Coronary artery disease Other    • Breast cancer Mother    • Breast cancer Maternal Aunt    • Diabetes Father        Social History     Socioeconomic History   • Marital status:      Spouse name: Not on file   • Number of children: Not on file   • Years of education: Not on file   • Highest education level: Not on file   Tobacco Use   • Smoking status: Current Every Day Smoker     Types: Electronic Cigarette   • Smokeless tobacco: Never Used   Substance and Sexual Activity   • Alcohol use: No   • Drug use: No   • Sexual activity: Defer        Review of Systems   Constitutional: Negative.    HENT: Negative.    Eyes: Negative.    Respiratory: Negative.    Cardiovascular: Negative.    Gastrointestinal: Negative.    Endocrine: Positive for polydipsia.   Genitourinary: Negative.    Musculoskeletal: Positive for joint swelling.        Stiffness   Skin: Negative.    Allergic/Immunologic: Negative.    Neurological: Negative.    Hematological: Negative.    Psychiatric/Behavioral: The patient is nervous/anxious.    Review of systems is positive as noted above.  PHYSICAL EXAMINATION:       Vitals:    11/17/20 1355   BP: 124/69   BP Location: Right arm   Patient Position: Sitting   Pulse: 79   Temp: 97.8 °F (36.6 °C)   SpO2: 98%   Weight: 123 kg (271 lb)   Height: 165.1 cm (65\")   PainSc: 10-Worst pain ever       Physical " Exam she is alert and in apparent mild discomfort at rest.    GAIT:     []  Normal  [x]  Antalgic    Assistive device: []  None  []  Walker     []  Crutches  []  Cane     [x]  Wheelchair  []  Stretcher    Ortho Exam she walks with a moderately antalgic gait favoring the left.  She is quite apprehensive about exam of the limb.  There is no warmth erythema about the knee.  The thigh and calf are soft.  There is no patellofemoral crepitus.  In the supine position she will allow motion of the knee from about 15 to 60 degrees.  However in the sitting position she allows flexion of the knee to about 80 degrees with little apparent pain.  She has no significant effusion although this is difficult to evaluate due to her pain and apprehension.  Dorsalis pedis pulse is strong.  There is a tattoo over the lateral aspect of the leg.  The sensory exam is intact to soft touch.  Sciatic tension signs are absent.    MRI scan of the knee done in September of this year is reported as showing patellar chondromalacia.    Radiographs of the left knee done earlier today show mild spurring of the medial femoral condyle with no significant joint space narrowing.        Xr Knee 3 View Left    Result Date: 11/17/2020  Narrative: EXAM DESCRIPTION:  XR KNEE 3 VW LEFT CLINICAL HISTORY: 45 years  Female  knee pain and swelling, M25.562 Pain in left knee M25.462 Effusion, left knee COMPARISON: 2/5//2019 TECHNIQUE: Three views-AP, oblique, and lateral radiographs of the left knee FINDINGS: There are mild osteoarthritic changes involving the medial compartment of the femorotibial joint. No erosive changes are seen. There is no evidence of a joint effusion. There is no evidence of an acute fracture. The alignment of the left knee is satisfactory.     Impression: 1. Mild osteoarthritic changes involving the medial compartment of the femorotibial joint. 2. No acute bony abnormalities. Electronically signed by:  Neida Wheeler MD  11/17/2020 12:24 PM  [facility] Workstation: [id number]              ASSESSMENT: Left knee pain.  Her pain and apprehension precludes a very vigorous exam.  However her symptoms appear to be consistent with patellar chondromalacia.    She was reassured I see no surgical indications at this point.  She was instructed in symptomatic care.    Potential benefits of injection therapy were discussed.  She wished to proceed with this.    The left knee was prepped.  Skin was infiltrated with 1% Xylocaine with epinephrine.  The knee was then injected with a mixture of Marcaine and Kenalog and Xylocaine with epinephrine.  There were no complications.  After the injection she reported improvement in pain with weightbearing.    Return here in 1 month if her symptoms have not improved.    Diagnoses and all orders for this visit:    Left knee pain, unspecified chronicity  -     Large Joint Arthrocentesis: L knee  -     lidocaine-EPINEPHrine (XYLOCAINE W/EPI) 1 %-1:834128 injection 2 mL  -     bupivacaine (MARCAINE) 0.5 % injection 3 mL  -     triamcinolone acetonide (KENALOG-40) injection 80 mg          PLAN    Large Joint Arthrocentesis: L knee  Date/Time: 11/17/2020 2:26 PM  Consent given by: patient  Site marked: site marked  Timeout: Immediately prior to procedure a time out was called to verify the correct patient, procedure, equipment, support staff and site/side marked as required   Supporting Documentation  Indications: pain   Procedure Details  Location: knee - L knee  Preparation: Patient was prepped and draped in the usual sterile fashion  Needle size: 22 G  Approach: anteromedial  Medications administered: 2 mL lidocaine-EPINEPHrine 1 %-1:410092; 3 mL bupivacaine 0.5 %; 80 mg triamcinolone acetonide 40 MG/ML  Patient tolerance: patient tolerated the procedure well with no immediate complications          Patient's Body mass index is 45.1 kg/m². BMI is above normal parameters. Recommendations include: exercise counseling, nutrition counseling and  referral to primary care.      No follow-ups on file.    Mat Adame MD

## 2020-12-22 ENCOUNTER — OFFICE VISIT (OUTPATIENT)
Dept: ORTHOPEDIC SURGERY | Facility: CLINIC | Age: 45
End: 2020-12-22

## 2020-12-22 VITALS — HEIGHT: 65 IN | HEART RATE: 83 BPM | OXYGEN SATURATION: 98 % | WEIGHT: 274 LBS | BODY MASS INDEX: 45.65 KG/M2

## 2020-12-22 DIAGNOSIS — M25.461 EFFUSION, RIGHT KNEE: Primary | ICD-10-CM

## 2020-12-22 DIAGNOSIS — M25.562 LEFT KNEE PAIN, UNSPECIFIED CHRONICITY: ICD-10-CM

## 2020-12-22 DIAGNOSIS — M22.2X2 PATELLOFEMORAL PAIN SYNDROME OF LEFT KNEE: ICD-10-CM

## 2020-12-22 PROCEDURE — 99213 OFFICE O/P EST LOW 20 MIN: CPT | Performed by: ORTHOPAEDIC SURGERY

## 2020-12-22 NOTE — PROGRESS NOTES
"Corrina Jensen is a 45 y.o. female returns for     Chief Complaint   Patient presents with   • Left Knee - Follow-up, Pain       HISTORY OF PRESENT ILLNESS: Ms. Jensen had a good response initially to her knee injection.  However she fell again about 2 weeks ago with worsening of her pain.       CONCURRENT MEDICAL HISTORY:    The following portions of the patient's history were reviewed and updated as appropriate: allergies, current medications, past family history, past medical history, past social history, past surgical history and problem list.         PHYSICAL EXAMINATION:       Pulse 83   Ht 165.1 cm (65\")   Wt 124 kg (274 lb)   SpO2 98%   BMI 45.60 kg/m²     Physical Exam   she is in no apparent distress at rest.      GAIT:     []  Normal  [x]  Antalgic    Assistive device: []  None  []  Walker     []  Crutches  [x]  Cane     []  Wheelchair  []  Stretcher    Ortho Exam she walks with a slightly antalgic gait favoring the left using a single cane.  There is mild patellofemoral crepitus and a 1+ effusion of the knee but no warmth erythema.    The MRI scan I mentioned in my previous dictation was performed on the right knee and not the left knee which is currently symptomatic.      No results found.          ASSESSMENT: Left knee pain probably secondary to chondromalacia of the patella.    She was instructed in use of a soft support for the knee.  Potential benefits of rehabilitation were reviewed.  She was given a physical therapy referral and also encouraged in her home exercise.    Return here in 6 weeks if her symptoms have not improved.  If her symptoms continue advanced imaging of the knee may be necessary.    Diagnoses and all orders for this visit:    Effusion, right knee    Patellofemoral pain syndrome of left knee  -     Ambulatory Referral to Physical Therapy Evaluate and treat    Left knee pain, unspecified chronicity  -     Ambulatory Referral to Physical Therapy Evaluate and " treat          PLAN        Patient's Body mass index is 45.6 kg/m². BMI is above normal parameters. Recommendations include: referral to primary care.  Return in about 6 weeks (around 2/2/2021).    Mat Adame MD

## 2021-01-15 ENCOUNTER — TRANSCRIBE ORDERS (OUTPATIENT)
Dept: GENERAL RADIOLOGY | Facility: HOSPITAL | Age: 46
End: 2021-01-15

## 2021-01-15 ENCOUNTER — CONSULT (OUTPATIENT)
Dept: SURGERY | Facility: CLINIC | Age: 46
End: 2021-01-15

## 2021-01-15 VITALS
HEART RATE: 87 BPM | DIASTOLIC BLOOD PRESSURE: 72 MMHG | HEIGHT: 65 IN | SYSTOLIC BLOOD PRESSURE: 114 MMHG | TEMPERATURE: 98.7 F | BODY MASS INDEX: 46.82 KG/M2 | WEIGHT: 281 LBS

## 2021-01-15 DIAGNOSIS — R92.8 ABNORMAL MAMMOGRAM OF LEFT BREAST: Primary | ICD-10-CM

## 2021-01-15 DIAGNOSIS — Z01.818 PRE-OP TESTING: Primary | ICD-10-CM

## 2021-01-15 PROCEDURE — 99204 OFFICE O/P NEW MOD 45 MIN: CPT | Performed by: SURGERY

## 2021-01-15 RX ORDER — PEN NEEDLE, DIABETIC 31 GX5/16"
NEEDLE, DISPOSABLE MISCELLANEOUS
COMMUNITY
Start: 2020-12-08

## 2021-01-15 RX ORDER — GLYBURIDE 6 MG/1
6 TABLET ORAL
COMMUNITY
Start: 2020-12-03 | End: 2021-01-19

## 2021-01-15 RX ORDER — LIDOCAINE HYDROCHLORIDE AND EPINEPHRINE 10; 10 MG/ML; UG/ML
20 INJECTION, SOLUTION INFILTRATION; PERINEURAL ONCE
Status: CANCELLED | OUTPATIENT
Start: 2021-01-15 | End: 2021-01-15

## 2021-01-15 RX ORDER — AMITRIPTYLINE HYDROCHLORIDE 50 MG/1
50 TABLET, FILM COATED ORAL NIGHTLY
COMMUNITY
Start: 2020-12-02 | End: 2022-05-13

## 2021-01-15 NOTE — PROGRESS NOTES
CHIEF COMPLAINT:    Abnormal imaging of left breast    HISTORY OF PRESENT ILLNESS:    Corrina Jensen is a 45 y.o. female who is referred today for abnormal imaging of the left breast.  This was initially noted on screening mammography which prompted diagnostic imaging including mammogram and ultrasound.  Ultrasound showed an area of architectural distortion which appeared to correspond to her mammographic abnormality.  She has never had abnormal imaging previously.  She did have a benign lesion removed from her breast about 20 years ago.    She is accompanied by her mother today.  They both have significant concerned about this as there is a strong family history of breast cancer including in her mother, her aunt, and an uncle.    She notes no changes in her breast including no palpable masses, no skin changes, no nipple discharge.    Menarche was at age 11, she continues to have irregular periods currently.  She is  with 1 stillbirth.  She has never been on hormones.    Past Medical History:   Diagnosis Date   • Acute atopic conjunctivitis    • Acute bronchitis    • Allergic rhinitis due to pollen    • Arrhythmia     wearing heart monitor    • Bilateral foot pain    • Breast cyst    • Breast lump    • Chest pain    • Chronic low back pain    • Contact dermatitis due to drugs and medicine    • Cyst of ovary    • Diabetes mellitus (CMS/HCC)    • Infestation by Sarcoptes scabiei annalisa hominis    • Low back pain    • Mastodynia    • Nausea and vomiting    • On long term drug therapy    • Pain in wrist    • Plantar fasciitis    • Rash    • Skin disorder     breast-possible superficial cellulitis   • Spinal stenosis    • Tinea pedis    • Tobacco dependence syndrome    • Upper respiratory infection    • Vitamin D deficiency    • Wheezing        Past Surgical History:   Procedure Laterality Date   • BREAST BIOPSY     • BREAST CYST ASPIRATION     • BREAST SURGERY      excision breast lesion   • CARDIAC  CATHETERIZATION N/A 7/3/2018    Procedure: Coronary angiography;  Surgeon: Arun Cadet MD;  Location: [facility] CATH INVASIVE LOCATION;  Service: Cardiovascular   • CARDIAC CATHETERIZATION     • CHOLECYSTECTOMY     • INJECTION OF MEDICATION      Kenalog (4)       Prior to Admission medications    Medication Sig Start Date End Date Taking? Authorizing Provider   amitriptyline (ELAVIL) 100 MG tablet Take 100 mg by mouth Every Night.   Yes Caitie Clark MD   atorvastatin (LIPITOR) 20 MG tablet Take 20 mg by mouth Daily.   Yes Caitie Clark MD   B-D ULTRAFINE III SHORT PEN 31G X 8 MM misc  12/8/20  Yes Caitie Clark MD   cetirizine (zyrTEC) 10 MG tablet Take 10 mg by mouth Daily.   Yes Caitie Clark MD   Dulaglutide (TRULICITY) 1.5 MG/0.5ML solution pen-injector Inject  under the skin into the appropriate area as directed.   Yes Emergency, Nurse Epic, RN   Elastic Bandages & Supports (Knee Brace/Hinged Bars 3XL) misc 1 Device Daily. 11/17/20  Yes Nery Toscano APRN   FREESTYLE LITE test strip 1 stick by Percutaneous route 4 (Four) Times a Day. 7/23/18  Yes Caitie Clark MD   glyburide micronized (GLYNASE) 6 MG tablet  12/3/20  Yes Caitie Clark MD   GLYBURIDE PO Take 6 mg by mouth 2 (Two) Times a Day.   Yes Caitie Clark MD   Lancets (FREESTYLE) lancets 1 each by Other route 4 (Four) Times a Day. 7/25/18  Yes Caitie Clark MD   losartan (COZAAR) 25 MG tablet Take 1 tablet by mouth Daily. 3/24/20  Yes Yue Howard MD   LYRICA 150 MG capsule Take 150 mg by mouth 3 (Three) Times a Day. 7/12/18  Yes Caitie Clark MD   meloxicam (MOBIC) 15 MG tablet Take 15 mg by mouth Daily. 3/24/17  Yes Caitie Clark MD   metFORMIN (GLUCOPHAGE) 1000 MG tablet Take 1,000 mg by mouth 2 (Two) Times a Day. 7/25/18  Yes Caitie Clark MD   OLANZapine (zyPREXA) 5 MG tablet Take 5 mg by mouth Every Night. 12/26/17  Yes Caitie Clark MD    omeprazole (priLOSEC) 40 MG capsule Take 40 mg by mouth Daily. 5/25/15  Yes ProviderCaitie MD   oxyCODONE-acetaminophen (PERCOCET)  MG per tablet Take 1 tablet by mouth Every 6 (Six) Hours.   Yes Caitie Clark MD   tiZANidine (ZANAFLEX) 4 MG tablet Take 4 mg by mouth Every 6 (Six) Hours As Needed. 9/21/17  Yes Caitie Clark MD   triamcinolone (KENALOG) 0.5 % ointment Apply  topically to the appropriate area as directed 3 (Three) Times a Day. 5/14/19  Yes MartínezMelba roberts APRN   VENTOLIN  (90 Base) MCG/ACT inhaler Inhale 2 puffs Every 4 (Four) Hours. 8/5/18  Yes Caitie Clark MD   amitriptyline (ELAVIL) 50 MG tablet  12/2/20   ProviderCaitie MD       Allergies   Allergen Reactions   • Bactroban [Mupirocin Calcium]    • Neomycin-Bacitracin-Polymyxin [Bacitracin-Neomycin-Polymyxin] Rash   • Other Rash     All antibiotic creams       Family History   Problem Relation Age of Onset   • Coronary artery disease Other    • Breast cancer Mother    • Breast cancer Maternal Aunt    • Diabetes Father        Social History     Socioeconomic History   • Marital status:      Spouse name: Not on file   • Number of children: Not on file   • Years of education: Not on file   • Highest education level: Not on file   Tobacco Use   • Smoking status: Current Every Day Smoker     Types: Electronic Cigarette   • Smokeless tobacco: Never Used   Substance and Sexual Activity   • Alcohol use: No   • Drug use: No   • Sexual activity: Defer       Review of Systems   Constitutional: Negative for appetite change, chills, fever and unexpected weight change.   HENT: Negative for hearing loss, nosebleeds and trouble swallowing.    Eyes: Negative for visual disturbance.   Respiratory: Negative for apnea, cough, choking, chest tightness, shortness of breath, wheezing and stridor.    Cardiovascular: Negative for chest pain, palpitations and leg swelling.   Gastrointestinal: Positive for  "nausea. Negative for abdominal distention, abdominal pain, blood in stool, constipation, diarrhea and vomiting.   Endocrine: Negative for cold intolerance, heat intolerance, polydipsia, polyphagia and polyuria.   Genitourinary: Negative for difficulty urinating, dysuria, frequency, hematuria and urgency.   Musculoskeletal: Positive for arthralgias, back pain and myalgias. Negative for neck pain.   Skin: Negative for color change, pallor and rash.   Allergic/Immunologic: Negative for immunocompromised state.   Neurological: Negative for dizziness, seizures, syncope, light-headedness, numbness and headaches.   Hematological: Negative for adenopathy.   Psychiatric/Behavioral: Negative for suicidal ideas. The patient is not nervous/anxious.        Objective     /72   Pulse 87   Temp 98.7 °F (37.1 °C) (Oral)   Ht 165.1 cm (65\")   Wt 127 kg (281 lb)   BMI 46.76 kg/m²     Physical Exam  Constitutional:       General: She is not in acute distress.     Appearance: Normal appearance. She is obese. She is not ill-appearing, toxic-appearing or diaphoretic.   HENT:      Head: Normocephalic and atraumatic.   Eyes:      General: No scleral icterus.        Right eye: No discharge.         Left eye: No discharge.      Extraocular Movements: Extraocular movements intact.      Conjunctiva/sclera: Conjunctivae normal.   Cardiovascular:      Rate and Rhythm: Normal rate.   Pulmonary:      Effort: Pulmonary effort is normal. No respiratory distress.   Chest:      Breasts:         Right: Normal.         Left: Normal.   Lymphadenopathy:      Upper Body:      Right upper body: No supraclavicular or axillary adenopathy.      Left upper body: No supraclavicular or axillary adenopathy.   Skin:     General: Skin is warm.   Neurological:      General: No focal deficit present.      Mental Status: She is alert and oriented to person, place, and time.   Psychiatric:         Mood and Affect: Mood normal.         Behavior: Behavior " normal.         Thought Content: Thought content normal.         Judgment: Judgment normal.         DIAGNOSTIC DATA:    Mammogram and ultrasound images were reviewed and discussed with patient and her mother.  There does appear to be an area of architectural distortion seen best on mammogram.  Biopsy was recommended by the radiologist.    ASSESSMENT:    Abnormal imaging of left breast    PLAN:    In my initial discussions with the patient and her mother they are very concerned given the family history of breast cancer.  There willing to undergo biopsy.  I initially discussed with them ultrasound biopsy however after they left the office upon further review of the imaging and reports it appears the patient will need a stereotactic biopsy.  She will be called to reschedule for this.          This document has been electronically signed by Mat Garay MD on January 15, 2021 09:39 CST

## 2021-01-15 NOTE — H&P (VIEW-ONLY)
CHIEF COMPLAINT:    Abnormal imaging of left breast    HISTORY OF PRESENT ILLNESS:    Corrina Jensen is a 45 y.o. female who is referred today for abnormal imaging of the left breast.  This was initially noted on screening mammography which prompted diagnostic imaging including mammogram and ultrasound.  Ultrasound showed an area of architectural distortion which appeared to correspond to her mammographic abnormality.  She has never had abnormal imaging previously.  She did have a benign lesion removed from her breast about 20 years ago.    She is accompanied by her mother today.  They both have significant concerned about this as there is a strong family history of breast cancer including in her mother, her aunt, and an uncle.    She notes no changes in her breast including no palpable masses, no skin changes, no nipple discharge.    Menarche was at age 11, she continues to have irregular periods currently.  She is  with 1 stillbirth.  She has never been on hormones.    Past Medical History:   Diagnosis Date   • Acute atopic conjunctivitis    • Acute bronchitis    • Allergic rhinitis due to pollen    • Arrhythmia     wearing heart monitor    • Bilateral foot pain    • Breast cyst    • Breast lump    • Chest pain    • Chronic low back pain    • Contact dermatitis due to drugs and medicine    • Cyst of ovary    • Diabetes mellitus (CMS/HCC)    • Infestation by Sarcoptes scabiei annalisa hominis    • Low back pain    • Mastodynia    • Nausea and vomiting    • On long term drug therapy    • Pain in wrist    • Plantar fasciitis    • Rash    • Skin disorder     breast-possible superficial cellulitis   • Spinal stenosis    • Tinea pedis    • Tobacco dependence syndrome    • Upper respiratory infection    • Vitamin D deficiency    • Wheezing        Past Surgical History:   Procedure Laterality Date   • BREAST BIOPSY     • BREAST CYST ASPIRATION     • BREAST SURGERY      excision breast lesion   • CARDIAC  CATHETERIZATION N/A 7/3/2018    Procedure: Coronary angiography;  Surgeon: Arun Cadet MD;  Location: [facility] CATH INVASIVE LOCATION;  Service: Cardiovascular   • CARDIAC CATHETERIZATION     • CHOLECYSTECTOMY     • INJECTION OF MEDICATION      Kenalog (4)       Prior to Admission medications    Medication Sig Start Date End Date Taking? Authorizing Provider   amitriptyline (ELAVIL) 100 MG tablet Take 100 mg by mouth Every Night.   Yes Caitie Clark MD   atorvastatin (LIPITOR) 20 MG tablet Take 20 mg by mouth Daily.   Yes Caitie Clark MD   B-D ULTRAFINE III SHORT PEN 31G X 8 MM misc  12/8/20  Yes Caitie Clark MD   cetirizine (zyrTEC) 10 MG tablet Take 10 mg by mouth Daily.   Yes Caitie Clark MD   Dulaglutide (TRULICITY) 1.5 MG/0.5ML solution pen-injector Inject  under the skin into the appropriate area as directed.   Yes Emergency, Nurse Epic, RN   Elastic Bandages & Supports (Knee Brace/Hinged Bars 3XL) misc 1 Device Daily. 11/17/20  Yes Nery Toscano APRN   FREESTYLE LITE test strip 1 stick by Percutaneous route 4 (Four) Times a Day. 7/23/18  Yes Caitie Clark MD   glyburide micronized (GLYNASE) 6 MG tablet  12/3/20  Yes Caitie Clark MD   GLYBURIDE PO Take 6 mg by mouth 2 (Two) Times a Day.   Yes Caitie Clark MD   Lancets (FREESTYLE) lancets 1 each by Other route 4 (Four) Times a Day. 7/25/18  Yes Caitie Clark MD   losartan (COZAAR) 25 MG tablet Take 1 tablet by mouth Daily. 3/24/20  Yes Yue Howard MD   LYRICA 150 MG capsule Take 150 mg by mouth 3 (Three) Times a Day. 7/12/18  Yes Caitie Clark MD   meloxicam (MOBIC) 15 MG tablet Take 15 mg by mouth Daily. 3/24/17  Yes Caitie Clark MD   metFORMIN (GLUCOPHAGE) 1000 MG tablet Take 1,000 mg by mouth 2 (Two) Times a Day. 7/25/18  Yes Caitie Clark MD   OLANZapine (zyPREXA) 5 MG tablet Take 5 mg by mouth Every Night. 12/26/17  Yes Caitie Clark MD      omeprazole (priLOSEC) 40 MG capsule Take 40 mg by mouth Daily. 5/25/15  Yes ProviderCaitie MD   oxyCODONE-acetaminophen (PERCOCET)  MG per tablet Take 1 tablet by mouth Every 6 (Six) Hours.   Yes Caitie Clark MD   tiZANidine (ZANAFLEX) 4 MG tablet Take 4 mg by mouth Every 6 (Six) Hours As Needed. 9/21/17  Yes Caitie Clark MD   triamcinolone (KENALOG) 0.5 % ointment Apply  topically to the appropriate area as directed 3 (Three) Times a Day. 5/14/19  Yes MartínezMelba roberts APRN   VENTOLIN  (90 Base) MCG/ACT inhaler Inhale 2 puffs Every 4 (Four) Hours. 8/5/18  Yes Caitie Clark MD   amitriptyline (ELAVIL) 50 MG tablet  12/2/20   ProviderCaitie MD       Allergies   Allergen Reactions   • Bactroban [Mupirocin Calcium]    • Neomycin-Bacitracin-Polymyxin [Bacitracin-Neomycin-Polymyxin] Rash   • Other Rash     All antibiotic creams       Family History   Problem Relation Age of Onset   • Coronary artery disease Other    • Breast cancer Mother    • Breast cancer Maternal Aunt    • Diabetes Father        Social History     Socioeconomic History   • Marital status:      Spouse name: Not on file   • Number of children: Not on file   • Years of education: Not on file   • Highest education level: Not on file   Tobacco Use   • Smoking status: Current Every Day Smoker     Types: Electronic Cigarette   • Smokeless tobacco: Never Used   Substance and Sexual Activity   • Alcohol use: No   • Drug use: No   • Sexual activity: Defer       Review of Systems   Constitutional: Negative for appetite change, chills, fever and unexpected weight change.   HENT: Negative for hearing loss, nosebleeds and trouble swallowing.    Eyes: Negative for visual disturbance.   Respiratory: Negative for apnea, cough, choking, chest tightness, shortness of breath, wheezing and stridor.    Cardiovascular: Negative for chest pain, palpitations and leg swelling.   Gastrointestinal: Positive for  "nausea. Negative for abdominal distention, abdominal pain, blood in stool, constipation, diarrhea and vomiting.   Endocrine: Negative for cold intolerance, heat intolerance, polydipsia, polyphagia and polyuria.   Genitourinary: Negative for difficulty urinating, dysuria, frequency, hematuria and urgency.   Musculoskeletal: Positive for arthralgias, back pain and myalgias. Negative for neck pain.   Skin: Negative for color change, pallor and rash.   Allergic/Immunologic: Negative for immunocompromised state.   Neurological: Negative for dizziness, seizures, syncope, light-headedness, numbness and headaches.   Hematological: Negative for adenopathy.   Psychiatric/Behavioral: Negative for suicidal ideas. The patient is not nervous/anxious.        Objective     /72   Pulse 87   Temp 98.7 °F (37.1 °C) (Oral)   Ht 165.1 cm (65\")   Wt 127 kg (281 lb)   BMI 46.76 kg/m²     Physical Exam  Constitutional:       General: She is not in acute distress.     Appearance: Normal appearance. She is obese. She is not ill-appearing, toxic-appearing or diaphoretic.   HENT:      Head: Normocephalic and atraumatic.   Eyes:      General: No scleral icterus.        Right eye: No discharge.         Left eye: No discharge.      Extraocular Movements: Extraocular movements intact.      Conjunctiva/sclera: Conjunctivae normal.   Cardiovascular:      Rate and Rhythm: Normal rate.   Pulmonary:      Effort: Pulmonary effort is normal. No respiratory distress.   Chest:      Breasts:         Right: Normal.         Left: Normal.   Lymphadenopathy:      Upper Body:      Right upper body: No supraclavicular or axillary adenopathy.      Left upper body: No supraclavicular or axillary adenopathy.   Skin:     General: Skin is warm.   Neurological:      General: No focal deficit present.      Mental Status: She is alert and oriented to person, place, and time.   Psychiatric:         Mood and Affect: Mood normal.         Behavior: Behavior " normal.         Thought Content: Thought content normal.         Judgment: Judgment normal.         DIAGNOSTIC DATA:    Mammogram and ultrasound images were reviewed and discussed with patient and her mother.  There does appear to be an area of architectural distortion seen best on mammogram.  Biopsy was recommended by the radiologist.    ASSESSMENT:    Abnormal imaging of left breast    PLAN:    In my initial discussions with the patient and her mother they are very concerned given the family history of breast cancer.  There willing to undergo biopsy.  I initially discussed with them ultrasound biopsy however after they left the office upon further review of the imaging and reports it appears the patient will need a stereotactic biopsy.  She will be called to reschedule for this.          This document has been electronically signed by Mat Garay MD on January 15, 2021 09:39 CST

## 2021-01-16 ENCOUNTER — LAB (OUTPATIENT)
Dept: LAB | Facility: HOSPITAL | Age: 46
End: 2021-01-16

## 2021-01-16 DIAGNOSIS — Z01.818 PRE-OP TESTING: ICD-10-CM

## 2021-01-16 PROCEDURE — C9803 HOPD COVID-19 SPEC COLLECT: HCPCS

## 2021-01-16 PROCEDURE — U0004 COV-19 TEST NON-CDC HGH THRU: HCPCS

## 2021-01-17 LAB — SARS-COV-2 ORF1AB RESP QL NAA+PROBE: NOT DETECTED

## 2021-01-19 ENCOUNTER — APPOINTMENT (OUTPATIENT)
Dept: ULTRASOUND IMAGING | Facility: HOSPITAL | Age: 46
End: 2021-01-19

## 2021-01-19 ENCOUNTER — HOSPITAL ENCOUNTER (OUTPATIENT)
Dept: MAMMOGRAPHY | Facility: HOSPITAL | Age: 46
Discharge: HOME | End: 2021-01-19

## 2021-01-19 VITALS
SYSTOLIC BLOOD PRESSURE: 118 MMHG | TEMPERATURE: 97 F | OXYGEN SATURATION: 94 % | RESPIRATION RATE: 18 BRPM | HEIGHT: 66 IN | DIASTOLIC BLOOD PRESSURE: 72 MMHG | WEIGHT: 277.56 LBS | BODY MASS INDEX: 44.61 KG/M2 | HEART RATE: 84 BPM

## 2021-01-19 DIAGNOSIS — R92.8 ABNORMAL MAMMOGRAM OF LEFT BREAST: ICD-10-CM

## 2021-01-19 NOTE — INTERVAL H&P NOTE
H&P updated. The patient was examined and the following changes are noted:  risks and benefits of stereotactic left breast biopsy with clip placement have been discussed            This document has been electronically signed by Mat Garay MD on January 19, 2021 11:55 CST

## 2021-01-21 ENCOUNTER — PREP FOR SURGERY (OUTPATIENT)
Dept: OTHER | Facility: HOSPITAL | Age: 46
End: 2021-01-21

## 2021-01-21 DIAGNOSIS — R92.8 ABNORMAL MAMMOGRAM OF LEFT BREAST: Primary | ICD-10-CM

## 2021-01-21 RX ORDER — SODIUM CHLORIDE 9 MG/ML
100 INJECTION, SOLUTION INTRAVENOUS CONTINUOUS
Status: CANCELLED | OUTPATIENT
Start: 2021-01-27

## 2021-01-21 RX ORDER — SODIUM CHLORIDE 0.9 % (FLUSH) 0.9 %
10 SYRINGE (ML) INJECTION AS NEEDED
Status: CANCELLED | OUTPATIENT
Start: 2021-01-27

## 2021-01-21 RX ORDER — BUPIVACAINE HCL/0.9 % NACL/PF 0.1 %
2 PLASTIC BAG, INJECTION (ML) EPIDURAL ONCE
Status: CANCELLED | OUTPATIENT
Start: 2021-01-27 | End: 2021-01-21

## 2021-01-21 RX ORDER — SODIUM CHLORIDE 0.9 % (FLUSH) 0.9 %
3 SYRINGE (ML) INJECTION EVERY 12 HOURS SCHEDULED
Status: CANCELLED | OUTPATIENT
Start: 2021-01-27

## 2021-01-22 ENCOUNTER — APPOINTMENT (OUTPATIENT)
Dept: PREADMISSION TESTING | Facility: HOSPITAL | Age: 46
End: 2021-01-22

## 2021-01-22 VITALS
HEIGHT: 66 IN | RESPIRATION RATE: 16 BRPM | WEIGHT: 282 LBS | BODY MASS INDEX: 45.32 KG/M2 | HEART RATE: 94 BPM | OXYGEN SATURATION: 96 %

## 2021-01-22 LAB
ANION GAP SERPL CALCULATED.3IONS-SCNC: 9 MMOL/L (ref 5–15)
BUN SERPL-MCNC: 6 MG/DL (ref 6–20)
BUN/CREAT SERPL: 6.9 (ref 7–25)
CALCIUM SPEC-SCNC: 9.4 MG/DL (ref 8.6–10.5)
CHLORIDE SERPL-SCNC: 102 MMOL/L (ref 98–107)
CO2 SERPL-SCNC: 28 MMOL/L (ref 22–29)
CREAT SERPL-MCNC: 0.87 MG/DL (ref 0.57–1)
GFR SERPL CREATININE-BSD FRML MDRD: 70 ML/MIN/1.73
GLUCOSE SERPL-MCNC: 111 MG/DL (ref 65–99)
POTASSIUM SERPL-SCNC: 4 MMOL/L (ref 3.5–5.2)
SODIUM SERPL-SCNC: 139 MMOL/L (ref 136–145)

## 2021-01-22 PROCEDURE — 93005 ELECTROCARDIOGRAM TRACING: CPT

## 2021-01-22 PROCEDURE — 36415 COLL VENOUS BLD VENIPUNCTURE: CPT

## 2021-01-22 PROCEDURE — 80048 BASIC METABOLIC PNL TOTAL CA: CPT

## 2021-01-22 PROCEDURE — 93010 ELECTROCARDIOGRAM REPORT: CPT | Performed by: INTERNAL MEDICINE

## 2021-01-22 RX ORDER — TRIAMCINOLONE ACETONIDE 5 MG/G
CREAM TOPICAL 3 TIMES DAILY PRN
COMMUNITY
End: 2021-10-04

## 2021-01-22 RX ORDER — INSULIN GLARGINE 100 [IU]/ML
10 INJECTION, SOLUTION SUBCUTANEOUS NIGHTLY
COMMUNITY
End: 2021-10-04

## 2021-01-22 NOTE — DISCHARGE INSTRUCTIONS
[facility]  Pre-op Information and Guidelines    You will be called after 2 p.m. the day before your surgery (Friday for Monday surgery) and notified of your time for arrival and approximate surgery time.  If you have not received a call by 4P.M., please contact Same Day Surgery at [phone] of if outside [facility] call [phone].    Please Follow these Important Safety Guidelines:    • The morning of your procedure, take only the medications listed below with   A sip of water:_____________________________________________       ______________________________________________    • DO NOT eat or drink anything after 12:00 midnight the night before surgery  Specific instructions concerning drinking clear liquids will be discussed during  the pre-surgery instruction call the day before your surgery.    • If you take a blood thinner (ex. Plavix, Coumadin, aspirin), ask your doctor when to stop it before surgery  STOP DATE: _________________    • Only 2 visitors are allowed in patient rooms at a time  Your visitors will be asked to wait in the lobby until the admission process is complete with the exception of a parent with a child and patients in need of special assistance.    • YOU CANNOT DRIVE YOURSELF HOME  You must be accompanied by someone who will be responsible for driving you home after surgery and for your care at home.    • DO NOT chew gum, use breath mints, hard candy, or smoke the day of surgery  • DO NOT drink alcohol for at least 24 hours before your surgery  • DO NOT wear any jewelry and remove all body piercing before coming to the hospital  • DO NOT wear make-up to the hospital  • If you are having surgery on an extremity (arm/leg/foot) remove nail polish/artificial nails on the surgical side  • Clothing, glasses, contacts, dentures, and hairpieces must be removed before surgery  • Bathe the night before or the morning of your surgery and do not use powders/lotions on  skin.

## 2021-01-23 LAB
QT INTERVAL: 348 MS
QTC INTERVAL: 437 MS

## 2021-01-24 ENCOUNTER — LAB (OUTPATIENT)
Dept: LAB | Facility: HOSPITAL | Age: 46
End: 2021-01-24

## 2021-01-24 DIAGNOSIS — Z01.818 PREOP TESTING: Primary | ICD-10-CM

## 2021-01-24 PROCEDURE — U0004 COV-19 TEST NON-CDC HGH THRU: HCPCS

## 2021-01-24 PROCEDURE — C9803 HOPD COVID-19 SPEC COLLECT: HCPCS

## 2021-01-25 LAB — SARS-COV-2 ORF1AB RESP QL NAA+PROBE: NOT DETECTED

## 2021-01-27 ENCOUNTER — HOSPITAL ENCOUNTER (OUTPATIENT)
Facility: HOSPITAL | Age: 46
Setting detail: HOSPITAL OUTPATIENT SURGERY
Discharge: HOME | End: 2021-01-27
Attending: SURGERY | Admitting: SURGERY

## 2021-01-27 ENCOUNTER — ANESTHESIA EVENT (OUTPATIENT)
Dept: PERIOP | Facility: HOSPITAL | Age: 46
End: 2021-01-27

## 2021-01-27 ENCOUNTER — ANESTHESIA (OUTPATIENT)
Dept: PERIOP | Facility: HOSPITAL | Age: 46
End: 2021-01-27

## 2021-01-27 VITALS
SYSTOLIC BLOOD PRESSURE: 114 MMHG | HEIGHT: 66 IN | BODY MASS INDEX: 44.93 KG/M2 | DIASTOLIC BLOOD PRESSURE: 60 MMHG | HEART RATE: 89 BPM | WEIGHT: 279.54 LBS | OXYGEN SATURATION: 96 % | RESPIRATION RATE: 18 BRPM | TEMPERATURE: 97.4 F

## 2021-01-27 DIAGNOSIS — R92.8 ABNORMAL MAMMOGRAM: ICD-10-CM

## 2021-01-27 DIAGNOSIS — R92.8 ABNORMAL MAMMOGRAM OF LEFT BREAST: ICD-10-CM

## 2021-01-27 LAB
B-HCG UR QL: NEGATIVE
GLUCOSE BLDC GLUCOMTR-MCNC: 129 MG/DL (ref 70–130)
GLUCOSE BLDC GLUCOMTR-MCNC: 67 MG/DL (ref 70–130)
GLUCOSE BLDC GLUCOMTR-MCNC: 96 MG/DL (ref 70–130)

## 2021-01-27 PROCEDURE — 19301 PARTIAL MASTECTOMY: CPT | Performed by: SURGERY

## 2021-01-27 PROCEDURE — 25010000002 MIDAZOLAM PER 1 MG: Performed by: NURSE ANESTHETIST, CERTIFIED REGISTERED

## 2021-01-27 PROCEDURE — 25010000003 LIDOCAINE 1 % SOLUTION: Performed by: NURSE ANESTHETIST, CERTIFIED REGISTERED

## 2021-01-27 PROCEDURE — 19301 PARTIAL MASTECTOMY: CPT | Performed by: SPECIALIST/TECHNOLOGIST, OTHER

## 2021-01-27 PROCEDURE — 25010000002 FENTANYL CITRATE (PF) 100 MCG/2ML SOLUTION: Performed by: NURSE ANESTHETIST, CERTIFIED REGISTERED

## 2021-01-27 PROCEDURE — 25010000002 CEFAZOLIN PER 500 MG: Performed by: SURGERY

## 2021-01-27 PROCEDURE — 81025 URINE PREGNANCY TEST: CPT | Performed by: ANESTHESIOLOGY

## 2021-01-27 PROCEDURE — 82962 GLUCOSE BLOOD TEST: CPT

## 2021-01-27 PROCEDURE — 25010000002 ONDANSETRON PER 1 MG: Performed by: NURSE ANESTHETIST, CERTIFIED REGISTERED

## 2021-01-27 PROCEDURE — 25010000002 PROPOFOL 10 MG/ML EMULSION: Performed by: NURSE ANESTHETIST, CERTIFIED REGISTERED

## 2021-01-27 DEVICE — LIGACLIP MCA MULTIPLE CLIP APPLIERS, 20 SMALL CLIPS
Type: IMPLANTABLE DEVICE | Site: BREAST | Status: FUNCTIONAL
Brand: LIGACLIP

## 2021-01-27 RX ORDER — ONDANSETRON 2 MG/ML
INJECTION INTRAMUSCULAR; INTRAVENOUS AS NEEDED
Status: DISCONTINUED | OUTPATIENT
Start: 2021-01-27 | End: 2021-01-27 | Stop reason: SURG

## 2021-01-27 RX ORDER — SODIUM CHLORIDE 0.9 % (FLUSH) 0.9 %
3 SYRINGE (ML) INJECTION EVERY 12 HOURS SCHEDULED
Status: DISCONTINUED | OUTPATIENT
Start: 2021-01-27 | End: 2021-01-27 | Stop reason: HOSPADM

## 2021-01-27 RX ORDER — MIDAZOLAM HYDROCHLORIDE 1 MG/ML
INJECTION INTRAMUSCULAR; INTRAVENOUS AS NEEDED
Status: DISCONTINUED | OUTPATIENT
Start: 2021-01-27 | End: 2021-01-27 | Stop reason: SURG

## 2021-01-27 RX ORDER — FENTANYL CITRATE 50 UG/ML
INJECTION, SOLUTION INTRAMUSCULAR; INTRAVENOUS AS NEEDED
Status: DISCONTINUED | OUTPATIENT
Start: 2021-01-27 | End: 2021-01-27 | Stop reason: SURG

## 2021-01-27 RX ORDER — BUPIVACAINE HCL/0.9 % NACL/PF 0.1 %
2 PLASTIC BAG, INJECTION (ML) EPIDURAL ONCE
Status: COMPLETED | OUTPATIENT
Start: 2021-01-27 | End: 2021-01-27

## 2021-01-27 RX ORDER — DEXTROSE MONOHYDRATE 25 G/50ML
25 INJECTION, SOLUTION INTRAVENOUS
Status: DISCONTINUED | OUTPATIENT
Start: 2021-01-27 | End: 2021-01-27 | Stop reason: HOSPADM

## 2021-01-27 RX ORDER — SODIUM CHLORIDE 9 MG/ML
100 INJECTION, SOLUTION INTRAVENOUS CONTINUOUS
Status: DISCONTINUED | OUTPATIENT
Start: 2021-01-27 | End: 2021-01-27 | Stop reason: HOSPADM

## 2021-01-27 RX ORDER — PROPOFOL 10 MG/ML
VIAL (ML) INTRAVENOUS AS NEEDED
Status: DISCONTINUED | OUTPATIENT
Start: 2021-01-27 | End: 2021-01-27 | Stop reason: SURG

## 2021-01-27 RX ORDER — LIDOCAINE HYDROCHLORIDE 10 MG/ML
INJECTION, SOLUTION INFILTRATION; PERINEURAL AS NEEDED
Status: DISCONTINUED | OUTPATIENT
Start: 2021-01-27 | End: 2021-01-27 | Stop reason: SURG

## 2021-01-27 RX ORDER — ONDANSETRON 2 MG/ML
4 INJECTION INTRAMUSCULAR; INTRAVENOUS ONCE AS NEEDED
Status: CANCELLED | OUTPATIENT
Start: 2021-01-27

## 2021-01-27 RX ORDER — SODIUM CHLORIDE 0.9 % (FLUSH) 0.9 %
10 SYRINGE (ML) INJECTION AS NEEDED
Status: DISCONTINUED | OUTPATIENT
Start: 2021-01-27 | End: 2021-01-27 | Stop reason: HOSPADM

## 2021-01-27 RX ADMIN — FENTANYL CITRATE 25 MCG: 50 INJECTION, SOLUTION INTRAMUSCULAR; INTRAVENOUS at 09:05

## 2021-01-27 RX ADMIN — LIDOCAINE HYDROCHLORIDE 50 MG: 10 INJECTION, SOLUTION INFILTRATION; PERINEURAL at 08:49

## 2021-01-27 RX ADMIN — ONDANSETRON HYDROCHLORIDE 4 MG: 2 INJECTION INTRAMUSCULAR; INTRAVENOUS at 08:59

## 2021-01-27 RX ADMIN — MIDAZOLAM HYDROCHLORIDE 2 MG: 2 INJECTION, SOLUTION INTRAMUSCULAR; INTRAVENOUS at 08:44

## 2021-01-27 RX ADMIN — SODIUM CHLORIDE 100 ML/HR: 900 INJECTION, SOLUTION INTRAVENOUS at 08:09

## 2021-01-27 RX ADMIN — PROPOFOL 200 MG: 10 INJECTION, EMULSION INTRAVENOUS at 08:49

## 2021-01-27 RX ADMIN — Medication 2 G: at 08:53

## 2021-01-27 NOTE — ANESTHESIA POSTPROCEDURE EVALUATION
Patient: Corrina Jensen    Procedure Summary     Date: 01/27/21 Room / Location: [facility] OR 03 /  [facility] OR    Anesthesia Start: 0844 Anesthesia Stop: 1006    Procedure: LEFT BREAST LUMPECTOMY WITH NEEDLE LOCALIZATION Needle localization to be done in the [facility] by radiology first   @07:00 a.m. (Left Breast) Diagnosis:       Abnormal mammogram of left breast      (Abnormal mammogram of left breast [R92.8])    Surgeon: Mat Garay MD Provider: Roz Hackett DO    Anesthesia Type: general ASA Status: 3          Anesthesia Type: general    Vitals  No vitals data found for the desired time range.          Post Anesthesia Care and Evaluation    Patient location during evaluation: PACU  Level of consciousness: sleepy but conscious and responsive to painful stimuli  Pain score: 0  Pain management: adequate  Airway patency: patent  Anesthetic complications: No anesthetic complications  PONV Status: none  Cardiovascular status: acceptable and hemodynamically stable  Respiratory status: acceptable, spontaneous ventilation and nasal cannula  Hydration status: acceptable

## 2021-01-27 NOTE — ANESTHESIA PREPROCEDURE EVALUATION
Anesthesia Evaluation     Patient summary reviewed and Nursing notes reviewed   NPO Solid Status: > 8 hours  NPO Liquid Status: > 8 hours           Airway   Mallampati: II  TM distance: >3 FB  Neck ROM: full  Small opening and Possible difficult intubation  Dental    (+) edentulous    Pulmonary     breath sounds clear to auscultation  (+) a smoker Current, asthma,sleep apnea, decreased breath sounds,   (-) COPD, recent URI, rhonchi, wheezes, no home oxygen  Cardiovascular   Exercise tolerance: poor (<4 METS)    ECG reviewed  Rhythm: regular  Rate: normal    (+) hypertension well controlled less than 2 medications, valvular problems/murmurs TI, hyperlipidemia,   (-) pacemaker, past MI, CAD, dysrhythmias, angina, murmur, cardiac stents, CABG, DVT      Neuro/Psych  (+) psychiatric history Depression,     (-) seizures, TIA, CVA  GI/Hepatic/Renal/Endo    (+) obesity, morbid obesity, GERD well controlled,  diabetes mellitus type 2 well controlled,     Musculoskeletal     (+) back pain,   Abdominal   (+) obese,    Substance History      OB/GYN negative ob/gyn ROS   (-)  Pregnant        Other   arthritis,      ROS/Med Hx Other: BMI 45    TTE 6/29/18:  · Left ventricular wall thickness is consistent with borderline concentric hypertrophy.  · Mild tricuspid valve regurgitation is present.  · Left ventricular systolic function is normal. Estimated EF = 60%.  · Left atrial cavity size is mildly dilated.    Left Heart Cath 7/3/2018:  Normal coronary arteries  Normal LV function  Plan: Look for noncardiac cause of chest pain    BHcG negative    Pt says GERD is controlled on prilosec. Takes every morning. Very rare for her to get heartburn per pt    Pt smoked e-cigs    Hx of asthma- uses inhaler 1-2x a month. Never been hospitalized for asthma.     Last NecH8Y=5.2    Hx of VINOD- noncompliant with CPAP      Phys Exam Other: Small opening- but eduntulous                Anesthesia Plan    ASA 3     general     intravenous induction      Anesthetic plan, all risks, benefits, and alternatives have been provided, discussed and informed consent has been obtained with: patient.    Plan discussed with CRNA.

## 2021-01-27 NOTE — ANESTHESIA PROCEDURE NOTES
Airway  Urgency: elective    Date/Time: 1/27/2021 8:51 AM    General Information and Staff    Patient location during procedure: OR  CRNA: Ermias Keita CRNA    Indications and Patient Condition  Indications for airway management: airway protection    Preoxygenated: yes  Mask difficulty assessment: 0 - not attempted    Final Airway Details  Final airway type: supraglottic airway      Successful airway: I-gel  Size 4    Number of attempts at approach: 1  Assessment: lips, teeth, and gum same as pre-op and atraumatic intubation

## 2021-02-01 LAB
LAB AP CASE REPORT: NORMAL
PATH REPORT.FINAL DX SPEC: NORMAL

## 2021-02-04 ENCOUNTER — NURSE NAVIGATOR (OUTPATIENT)
Dept: ONCOLOGY | Facility: CLINIC | Age: 46
End: 2021-02-04

## 2021-02-04 ENCOUNTER — OFFICE VISIT (OUTPATIENT)
Dept: SURGERY | Facility: CLINIC | Age: 46
End: 2021-02-04

## 2021-02-04 VITALS
BODY MASS INDEX: 45.55 KG/M2 | HEIGHT: 66 IN | HEART RATE: 89 BPM | SYSTOLIC BLOOD PRESSURE: 140 MMHG | WEIGHT: 283.4 LBS | TEMPERATURE: 97.9 F | DIASTOLIC BLOOD PRESSURE: 82 MMHG

## 2021-02-04 DIAGNOSIS — Z09 FOLLOW UP: Primary | ICD-10-CM

## 2021-02-04 DIAGNOSIS — Z17.0 MALIGNANT NEOPLASM OF LEFT BREAST IN FEMALE, ESTROGEN RECEPTOR POSITIVE, UNSPECIFIED SITE OF BREAST (HCC): ICD-10-CM

## 2021-02-04 DIAGNOSIS — C50.912 MALIGNANT NEOPLASM OF LEFT BREAST IN FEMALE, ESTROGEN RECEPTOR POSITIVE, UNSPECIFIED SITE OF BREAST (HCC): ICD-10-CM

## 2021-02-04 PROCEDURE — 99024 POSTOP FOLLOW-UP VISIT: CPT | Performed by: SURGERY

## 2021-02-04 NOTE — PROGRESS NOTES
CHIEF COMPLAINT:    Chief Complaint   Patient presents with   • Post-op Follow-up     P.O. Left breast lumpectomy with NL 1-27-21       HISTORY OF PRESENT ILLNESS:    Corrina Jensen is a 45 y.o. female who underwent wire-guided breast excision for a prior abnormal mammogram.  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  Margins were not able to be adequately assessed due to the nature of the specimen.  The finding of cancer was discussed with the patient and her mother today.    EXAM:  Vitals:    02/04/21 1006   BP: 140/82   Pulse: 89   Temp: 97.9 °F (36.6 °C)         Healing left breast incision with skin irritation from adhesive glue    ASSESSMENT:    Status post wire-guided left breast excisional biopsy showing cancer    PLAN:    I discussed the pathology with the patient and her mother today.  We discussed options for management moving forward including lumpectomy with radiation and sentinel node biopsy versus mastectomy with sentinel node biopsy.  She will discuss this further with her family and return next week to discuss planning for surgery.          This document has been electronically signed by Mat Garay MD on February 4, 2021 13:21 CST

## 2021-02-04 NOTE — PROGRESS NOTES
ONCOLOGY PATIENT NAVIGATION    DIAGNOSIS: Breast Cancer  REFERRAL SOURCE: Dr. Garay/Luma Garcia, RN, OCN  ENCOUNTER TYPE: In-Person    NOTE: Spoke to pt. today concerning newly diagnosed breast cancer. Pt informed of diagnosis by Dr. Garay with plan of care discussed to include surgical options of Mastectomy w/SLN biopsy vs Lumpectomy w/SLN biopsy followed by Radiation Therapy. Referral received for patient navigation support in cancer care in [facility]’s absence.     EDUCATION PROVIDED: Informed pt of my role as Oncology Patient Navigator in cancer care and my support to pt. and family. Pt. was given breast cancer education handbook which provides a written resource for support and education surrounding diagnosis and treatment planning. Spent approximately 30 mins in face to face contact with pt to discuss diagnosis, surgical options, radiation therapy, treatment planning and patient’s questions surrounding plan of care. Discussion included significant family history of breast cancer in mother, aunt and uncle; pt. expressed interest in genetic counseling/testing if eligible and covered by insurance.  Pt validated family support to include mother who was present at visit today, spouse and children. No barriers to care plan or logistical concerns were expressed by patient.   Educated pt on team navigation approach offered at [facility] to include Oncology Patient Navigator currently in place, oncology social worker, financial navigator, and dietician. Explained to patient that Luma Tow would be serving as her primary Navigator and that I would be happy to assist her in any way in [facility].    NEEDS ASSESSMENT:     Barriers to care:  1. Information Needs/Questions/Understanding  2. Coordinating Appts/Tests/Procedures on Patient Behalf  3. Difficulty Coping Related to Diagnosis    Action Plan to Address Barriers:  1. Educate patient about Diagnosis and Treatment Planning  2. Assist Patient  with Formulating Questions to Ask Healthcare Team  3. Provided Written Education and Information  4. Provided Verbal Education  5. Schedule Appts/Tests/Procedures on Patient Behalf with Healthcare Team  6. Provide Emotional Support as needed  7. Navigator Available by Phone for Supportive Care Needs  8. Serve as Liaison between Patient and Healthcare Team    NAVIGATION OUTCOMES ACHIEVED:  1. Improved Patient Satisfaction  2. Improved Healthcare Efficiency  3. Reduced Care Fragmentation  4. Improved Timeliness to Care  5. Distress Triage    PLAN OF CARE: Provided pt. with Luma Jose’s contact information as well as my own and encouraged pt to call Luma or myself with concerns or questions. Encouraged pt to utilize breast cancer handbook for education, assistance in treatment decisions and documentation as needed. Pt. will return for follow-up appt. with Dr. Garay on 2/11/21 at which time pt.’s surgical decision will be discussed and scheduled. Ms Garcia will be reaching out to pt. for supportive care and education needs. Pt stated understanding of all discussed and denied further questions.

## 2021-02-05 ENCOUNTER — TELEPHONE (OUTPATIENT)
Dept: SURGERY | Facility: CLINIC | Age: 46
End: 2021-02-05

## 2021-02-05 NOTE — TELEPHONE ENCOUNTER
LAST NIGHT SHE NOTICED WHITE BLISTERS AROUND THE BREAST WOUND AND YELLOW DRAINING BLISTERS IN THE CENTER OF THE WOUND.      PLEASE ADVISE.       PT PH  [phone]

## 2021-02-09 ENCOUNTER — TELEPHONE (OUTPATIENT)
Dept: ORTHOPEDIC SURGERY | Facility: CLINIC | Age: 46
End: 2021-02-09

## 2021-02-09 NOTE — TELEPHONE ENCOUNTER
DR.YELTON VALDEZ   Pt CALLED TO LET YOU KNOW SHE HAS NOT HAD BUT TWO THERAPY SESSIONS SHE HAS RECENTLY FOUND  A LUMP IN HER BREAST WHICH HAS BEEN REMOVED THEY ARE CONTINUEING TO DO TESTING TO SEE WHAT HER NEXT STEP IS WHICH HAS PUT HER KNEE ON HOLD FOR THE TIME BEING   THE Pt STATES ONCE SHE HAS RECOVERED SHE WILL CALL BACK AND CONTINUE CARE FOR HER KNEE

## 2021-02-12 ENCOUNTER — OFFICE VISIT (OUTPATIENT)
Dept: SURGERY | Facility: CLINIC | Age: 46
End: 2021-02-12

## 2021-02-12 VITALS
SYSTOLIC BLOOD PRESSURE: 130 MMHG | TEMPERATURE: 97.5 F | HEART RATE: 88 BPM | WEIGHT: 281.2 LBS | BODY MASS INDEX: 45.19 KG/M2 | DIASTOLIC BLOOD PRESSURE: 84 MMHG | HEIGHT: 66 IN

## 2021-02-12 DIAGNOSIS — Z17.0 MALIGNANT NEOPLASM OF LEFT BREAST IN FEMALE, ESTROGEN RECEPTOR POSITIVE, UNSPECIFIED SITE OF BREAST (HCC): Primary | ICD-10-CM

## 2021-02-12 DIAGNOSIS — C50.912 MALIGNANT NEOPLASM OF LEFT BREAST IN FEMALE, ESTROGEN RECEPTOR POSITIVE, UNSPECIFIED SITE OF BREAST (HCC): Primary | ICD-10-CM

## 2021-02-12 DIAGNOSIS — C50.912 MALIGNANT NEOPLASM OF LEFT FEMALE BREAST, UNSPECIFIED ESTROGEN RECEPTOR STATUS, UNSPECIFIED SITE OF BREAST (HCC): Primary | ICD-10-CM

## 2021-02-12 PROCEDURE — 99024 POSTOP FOLLOW-UP VISIT: CPT | Performed by: SURGERY

## 2021-02-12 RX ORDER — SODIUM CHLORIDE 9 MG/ML
100 INJECTION, SOLUTION INTRAVENOUS CONTINUOUS
Status: CANCELLED | OUTPATIENT
Start: 2021-02-22

## 2021-02-12 RX ORDER — SODIUM CHLORIDE 0.9 % (FLUSH) 0.9 %
3 SYRINGE (ML) INJECTION EVERY 12 HOURS SCHEDULED
Status: CANCELLED | OUTPATIENT
Start: 2021-02-22

## 2021-02-12 RX ORDER — SODIUM CHLORIDE 0.9 % (FLUSH) 0.9 %
10 SYRINGE (ML) INJECTION AS NEEDED
Status: CANCELLED | OUTPATIENT
Start: 2021-02-22

## 2021-02-12 RX ORDER — CEFAZOLIN SODIUM IN 0.9 % NACL 3 G/100 ML
3 INTRAVENOUS SOLUTION, PIGGYBACK (ML) INTRAVENOUS ONCE
Status: CANCELLED | OUTPATIENT
Start: 2021-02-22 | End: 2021-02-12

## 2021-02-12 NOTE — PROGRESS NOTES
CHIEF COMPLAINT:    Chief Complaint   Patient presents with   • Follow-up     Jose- Discuss Surgery       HISTORY OF PRESENT ILLNESS:    Corrina Jensen is a 45 y.o. female who underwent prior excisional breast biopsy showing evidence of malignancy as well as DCIS.  This was previously discussed with her.  She is now discussed this with her family and would like to discuss planning surgery.  She has no complaints today.    EXAM:  Vitals:    02/12/21 0929   BP: 130/84   Pulse: 88   Temp: 97.5 °F (36.4 °C)         No acute distress    ASSESSMENT:    Left breast cancer    PLAN:    The patient would like to pursue lumpectomy with sentinel node biopsy with postoperative radiation.  I believe this is reasonable for her.  The risks and benefits of left breast lumpectomy with sentinel node biopsy have been discussed and she is scheduled for surgery on 2/22/2021.          This document has been electronically signed by Mat Garay MD on February 12, 2021 11:19 CST

## 2021-02-19 ENCOUNTER — PRE-ADMISSION TESTING (OUTPATIENT)
Dept: PREADMISSION TESTING | Facility: HOSPITAL | Age: 46
End: 2021-02-19

## 2021-02-19 ENCOUNTER — LAB (OUTPATIENT)
Dept: LAB | Facility: HOSPITAL | Age: 46
End: 2021-02-19

## 2021-02-19 VITALS
HEIGHT: 67 IN | RESPIRATION RATE: 18 BRPM | HEART RATE: 92 BPM | SYSTOLIC BLOOD PRESSURE: 138 MMHG | WEIGHT: 288 LBS | BODY MASS INDEX: 45.2 KG/M2 | DIASTOLIC BLOOD PRESSURE: 82 MMHG | OXYGEN SATURATION: 98 %

## 2021-02-19 DIAGNOSIS — Z01.818 PREOP TESTING: Primary | ICD-10-CM

## 2021-02-19 LAB — SARS-COV-2 ORF1AB RESP QL NAA+PROBE: NOT DETECTED

## 2021-02-19 PROCEDURE — U0004 COV-19 TEST NON-CDC HGH THRU: HCPCS

## 2021-02-19 PROCEDURE — C9803 HOPD COVID-19 SPEC COLLECT: HCPCS

## 2021-02-19 NOTE — DISCHARGE INSTRUCTIONS
[facility]  Pre-op Information and Guidelines    You will be called after 2 p.m. the day before your surgery (Friday for Monday surgery) and notified of your time for arrival and approximate surgery time.  If you have not received a call by 4P.M., please contact Same Day Surgery at [phone] of if outside [facility] call [phone].    Please Follow these Important Safety Guidelines:    • The morning of your procedure, take only the medications listed below with   A sip of water:_____________________________________________       ______________________________________________    • DO NOT eat or drink anything after 12:00 midnight the night before surgery  Specific instructions concerning drinking clear liquids will be discussed during  the pre-surgery instruction call the day before your surgery.    • If you take a blood thinner (ex. Plavix, Coumadin, aspirin), ask your doctor when to stop it before surgery  STOP DATE: _________________    • Only 2 visitors are allowed in patient rooms at a time  Your visitors will be asked to wait in the lobby until the admission process is complete with the exception of a parent with a child and patients in need of special assistance.    • YOU CANNOT DRIVE YOURSELF HOME  You must be accompanied by someone who will be responsible for driving you home after surgery and for your care at home.    • DO NOT chew gum, use breath mints, hard candy, or smoke the day of surgery  • DO NOT drink alcohol for at least 24 hours before your surgery  • DO NOT wear any jewelry and remove all body piercing before coming to the hospital  • DO NOT wear make-up to the hospital  • If you are having surgery on an extremity (arm/leg/foot) remove nail polish/artificial nails on the surgical side  • Clothing, glasses, contacts, dentures, and hairpieces must be removed before surgery  • Bathe the night before or the morning of your surgery and do not use powders/lotions on  skin.

## 2021-02-19 NOTE — PAT
Patient states she cannot use chlorhexidine scrub related to it breaking out her skin. Informed her to shower with her regular soap before surgery if she is unable to use chlorhexidine scrub.

## 2021-02-21 ENCOUNTER — ANESTHESIA EVENT (OUTPATIENT)
Dept: PERIOP | Facility: HOSPITAL | Age: 46
End: 2021-02-21

## 2021-02-22 ENCOUNTER — ANESTHESIA (OUTPATIENT)
Dept: PERIOP | Facility: HOSPITAL | Age: 46
End: 2021-02-22

## 2021-02-22 ENCOUNTER — HOSPITAL ENCOUNTER (OUTPATIENT)
Dept: NUCLEAR MEDICINE | Facility: HOSPITAL | Age: 46
Discharge: HOME | End: 2021-02-22

## 2021-02-22 ENCOUNTER — HOSPITAL ENCOUNTER (OUTPATIENT)
Facility: HOSPITAL | Age: 46
Setting detail: HOSPITAL OUTPATIENT SURGERY
Discharge: HOME | End: 2021-02-22
Attending: SURGERY | Admitting: SURGERY

## 2021-02-22 VITALS
DIASTOLIC BLOOD PRESSURE: 77 MMHG | TEMPERATURE: 97 F | HEIGHT: 66 IN | OXYGEN SATURATION: 97 % | SYSTOLIC BLOOD PRESSURE: 139 MMHG | WEIGHT: 283.29 LBS | RESPIRATION RATE: 18 BRPM | BODY MASS INDEX: 45.53 KG/M2 | HEART RATE: 105 BPM

## 2021-02-22 DIAGNOSIS — C50.912 MALIGNANT NEOPLASM OF LEFT BREAST IN FEMALE, ESTROGEN RECEPTOR POSITIVE, UNSPECIFIED SITE OF BREAST (HCC): ICD-10-CM

## 2021-02-22 DIAGNOSIS — C50.912 MALIGNANT NEOPLASM OF LEFT FEMALE BREAST, UNSPECIFIED ESTROGEN RECEPTOR STATUS, UNSPECIFIED SITE OF BREAST (HCC): ICD-10-CM

## 2021-02-22 DIAGNOSIS — Z17.0 MALIGNANT NEOPLASM OF LEFT BREAST IN FEMALE, ESTROGEN RECEPTOR POSITIVE, UNSPECIFIED SITE OF BREAST (HCC): ICD-10-CM

## 2021-02-22 LAB
B-HCG UR QL: NEGATIVE
GLUCOSE BLDC GLUCOMTR-MCNC: 148 MG/DL (ref 70–130)
GLUCOSE BLDC GLUCOMTR-MCNC: 152 MG/DL (ref 70–130)

## 2021-02-22 PROCEDURE — 81025 URINE PREGNANCY TEST: CPT | Performed by: ANESTHESIOLOGY

## 2021-02-22 PROCEDURE — 0 TECHNETIUM FILTERED SULFUR COLLOID: Performed by: SURGERY

## 2021-02-22 PROCEDURE — A9541 TC99M SULFUR COLLOID: HCPCS | Performed by: SURGERY

## 2021-02-22 PROCEDURE — 25010000002 ONDANSETRON PER 1 MG: Performed by: NURSE ANESTHETIST, CERTIFIED REGISTERED

## 2021-02-22 PROCEDURE — 25010000002 DEXAMETHASONE PER 1 MG: Performed by: NURSE ANESTHETIST, CERTIFIED REGISTERED

## 2021-02-22 PROCEDURE — 19301 PARTIAL MASTECTOMY: CPT | Performed by: SURGERY

## 2021-02-22 PROCEDURE — S0260 H&P FOR SURGERY: HCPCS | Performed by: SURGERY

## 2021-02-22 PROCEDURE — 19301 PARTIAL MASTECTOMY: CPT | Performed by: SPECIALIST/TECHNOLOGIST, OTHER

## 2021-02-22 PROCEDURE — 38525 BIOPSY/REMOVAL LYMPH NODES: CPT | Performed by: SURGERY

## 2021-02-22 PROCEDURE — 25010000002 PROPOFOL 10 MG/ML EMULSION: Performed by: NURSE ANESTHETIST, CERTIFIED REGISTERED

## 2021-02-22 PROCEDURE — 38900 IO MAP OF SENT LYMPH NODE: CPT | Performed by: SURGERY

## 2021-02-22 PROCEDURE — 38525 BIOPSY/REMOVAL LYMPH NODES: CPT | Performed by: SPECIALIST/TECHNOLOGIST, OTHER

## 2021-02-22 PROCEDURE — 38792 RA TRACER ID OF SENTINL NODE: CPT

## 2021-02-22 PROCEDURE — 11441 EXC FACE-MM B9+MARG 0.6-1 CM: CPT | Performed by: SURGERY

## 2021-02-22 PROCEDURE — 82962 GLUCOSE BLOOD TEST: CPT

## 2021-02-22 PROCEDURE — 25010000002 HYDROMORPHONE PER 4 MG: Performed by: NURSE ANESTHETIST, CERTIFIED REGISTERED

## 2021-02-22 PROCEDURE — 25010000002 MIDAZOLAM PER 1 MG: Performed by: NURSE ANESTHETIST, CERTIFIED REGISTERED

## 2021-02-22 PROCEDURE — 25010000002 FENTANYL CITRATE (PF) 100 MCG/2ML SOLUTION: Performed by: NURSE ANESTHETIST, CERTIFIED REGISTERED

## 2021-02-22 PROCEDURE — 94640 AIRWAY INHALATION TREATMENT: CPT

## 2021-02-22 DEVICE — LIGACLIP MCA MULTIPLE CLIP APPLIERS, 20 SMALL CLIPS
Type: IMPLANTABLE DEVICE | Site: BREAST | Status: FUNCTIONAL
Brand: LIGACLIP

## 2021-02-22 RX ORDER — SODIUM CHLORIDE 0.9 % (FLUSH) 0.9 %
3 SYRINGE (ML) INJECTION EVERY 12 HOURS SCHEDULED
Status: DISCONTINUED | OUTPATIENT
Start: 2021-02-22 | End: 2021-02-22 | Stop reason: HOSPADM

## 2021-02-22 RX ORDER — ONDANSETRON 2 MG/ML
4 INJECTION INTRAMUSCULAR; INTRAVENOUS ONCE AS NEEDED
Status: DISCONTINUED | OUTPATIENT
Start: 2021-02-22 | End: 2021-02-22 | Stop reason: SDUPTHER

## 2021-02-22 RX ORDER — ONDANSETRON 2 MG/ML
INJECTION INTRAMUSCULAR; INTRAVENOUS AS NEEDED
Status: DISCONTINUED | OUTPATIENT
Start: 2021-02-22 | End: 2021-02-22 | Stop reason: SURG

## 2021-02-22 RX ORDER — DEXAMETHASONE SODIUM PHOSPHATE 4 MG/ML
INJECTION, SOLUTION INTRA-ARTICULAR; INTRALESIONAL; INTRAMUSCULAR; INTRAVENOUS; SOFT TISSUE AS NEEDED
Status: DISCONTINUED | OUTPATIENT
Start: 2021-02-22 | End: 2021-02-22 | Stop reason: SURG

## 2021-02-22 RX ORDER — ONDANSETRON 2 MG/ML
4 INJECTION INTRAMUSCULAR; INTRAVENOUS ONCE AS NEEDED
Status: DISCONTINUED | OUTPATIENT
Start: 2021-02-22 | End: 2021-02-22 | Stop reason: HOSPADM

## 2021-02-22 RX ORDER — ALBUTEROL SULFATE 2.5 MG/3ML
2.5 SOLUTION RESPIRATORY (INHALATION) ONCE AS NEEDED
Status: COMPLETED | OUTPATIENT
Start: 2021-02-22 | End: 2021-02-22

## 2021-02-22 RX ORDER — PROPOFOL 10 MG/ML
VIAL (ML) INTRAVENOUS AS NEEDED
Status: DISCONTINUED | OUTPATIENT
Start: 2021-02-22 | End: 2021-02-22 | Stop reason: SURG

## 2021-02-22 RX ORDER — SODIUM CHLORIDE 9 MG/ML
100 INJECTION, SOLUTION INTRAVENOUS CONTINUOUS
Status: DISCONTINUED | OUTPATIENT
Start: 2021-02-22 | End: 2021-02-22 | Stop reason: HOSPADM

## 2021-02-22 RX ORDER — MEPERIDINE HYDROCHLORIDE 25 MG/ML
12.5 INJECTION INTRAMUSCULAR; INTRAVENOUS; SUBCUTANEOUS
Status: DISCONTINUED | OUTPATIENT
Start: 2021-02-22 | End: 2021-02-22 | Stop reason: HOSPADM

## 2021-02-22 RX ORDER — FENTANYL CITRATE 50 UG/ML
INJECTION, SOLUTION INTRAMUSCULAR; INTRAVENOUS AS NEEDED
Status: DISCONTINUED | OUTPATIENT
Start: 2021-02-22 | End: 2021-02-22 | Stop reason: SURG

## 2021-02-22 RX ORDER — MIDAZOLAM HYDROCHLORIDE 1 MG/ML
INJECTION INTRAMUSCULAR; INTRAVENOUS AS NEEDED
Status: DISCONTINUED | OUTPATIENT
Start: 2021-02-22 | End: 2021-02-22 | Stop reason: SURG

## 2021-02-22 RX ORDER — ROCURONIUM BROMIDE 10 MG/ML
INJECTION, SOLUTION INTRAVENOUS AS NEEDED
Status: DISCONTINUED | OUTPATIENT
Start: 2021-02-22 | End: 2021-02-22 | Stop reason: SURG

## 2021-02-22 RX ORDER — LIDOCAINE HYDROCHLORIDE 20 MG/ML
INJECTION, SOLUTION INFILTRATION; PERINEURAL AS NEEDED
Status: DISCONTINUED | OUTPATIENT
Start: 2021-02-22 | End: 2021-02-22 | Stop reason: SURG

## 2021-02-22 RX ORDER — BUPIVACAINE HYDROCHLORIDE AND EPINEPHRINE 5; 5 MG/ML; UG/ML
INJECTION, SOLUTION EPIDURAL; INTRACAUDAL; PERINEURAL AS NEEDED
Status: DISCONTINUED | OUTPATIENT
Start: 2021-02-22 | End: 2021-02-22 | Stop reason: HOSPADM

## 2021-02-22 RX ORDER — SODIUM CHLORIDE 0.9 % (FLUSH) 0.9 %
10 SYRINGE (ML) INJECTION AS NEEDED
Status: DISCONTINUED | OUTPATIENT
Start: 2021-02-22 | End: 2021-02-22 | Stop reason: HOSPADM

## 2021-02-22 RX ORDER — CEFAZOLIN SODIUM IN 0.9 % NACL 3 G/100 ML
3 INTRAVENOUS SOLUTION, PIGGYBACK (ML) INTRAVENOUS ONCE
Status: COMPLETED | OUTPATIENT
Start: 2021-02-22 | End: 2021-02-22

## 2021-02-22 RX ORDER — HYDROMORPHONE HCL 110MG/55ML
PATIENT CONTROLLED ANALGESIA SYRINGE INTRAVENOUS AS NEEDED
Status: DISCONTINUED | OUTPATIENT
Start: 2021-02-22 | End: 2021-02-22 | Stop reason: SURG

## 2021-02-22 RX ORDER — OXYCODONE HYDROCHLORIDE 5 MG/1
5 TABLET ORAL EVERY 4 HOURS PRN
Qty: 24 TABLET | Refills: 0 | Status: SHIPPED | OUTPATIENT
Start: 2021-02-22 | End: 2021-08-18

## 2021-02-22 RX ADMIN — MIDAZOLAM HYDROCHLORIDE 2 MG: 2 INJECTION, SOLUTION INTRAMUSCULAR; INTRAVENOUS at 08:29

## 2021-02-22 RX ADMIN — CEFAZOLIN 3 G: 1 INJECTION, POWDER, FOR SOLUTION INTRAMUSCULAR; INTRAVENOUS; PARENTERAL at 08:40

## 2021-02-22 RX ADMIN — FENTANYL CITRATE 100 MCG: 50 INJECTION INTRAMUSCULAR; INTRAVENOUS at 08:36

## 2021-02-22 RX ADMIN — LIDOCAINE HYDROCHLORIDE 60 MG: 20 INJECTION, SOLUTION INFILTRATION; PERINEURAL at 08:36

## 2021-02-22 RX ADMIN — ALBUTEROL SULFATE 2.5 MG: 2.5 SOLUTION RESPIRATORY (INHALATION) at 11:56

## 2021-02-22 RX ADMIN — ROCURONIUM BROMIDE 5 MG: 50 INJECTION INTRAVENOUS at 08:36

## 2021-02-22 RX ADMIN — HYDROMORPHONE HYDROCHLORIDE 0.5 MG: 2 INJECTION, SOLUTION INTRAMUSCULAR; INTRAVENOUS; SUBCUTANEOUS at 09:34

## 2021-02-22 RX ADMIN — DEXAMETHASONE SODIUM PHOSPHATE 4 MG: 4 INJECTION, SOLUTION INTRAMUSCULAR; INTRAVENOUS at 08:53

## 2021-02-22 RX ADMIN — PROPOFOL 80 MG: 10 INJECTION, EMULSION INTRAVENOUS at 09:02

## 2021-02-22 RX ADMIN — TECHNETIUM TC 99M SULFUR COLLOID 1 DOSE: KIT at 07:15

## 2021-02-22 RX ADMIN — SODIUM CHLORIDE 100 ML/HR: 9 INJECTION, SOLUTION INTRAVENOUS at 07:03

## 2021-02-22 RX ADMIN — ONDANSETRON 4 MG: 2 INJECTION INTRAMUSCULAR; INTRAVENOUS at 10:14

## 2021-02-22 RX ADMIN — HYDROMORPHONE HYDROCHLORIDE 0.5 MG: 2 INJECTION, SOLUTION INTRAMUSCULAR; INTRAVENOUS; SUBCUTANEOUS at 09:28

## 2021-02-22 RX ADMIN — PROPOFOL 120 MG: 10 INJECTION, EMULSION INTRAVENOUS at 08:36

## 2021-02-22 NOTE — ANESTHESIA PROCEDURE NOTES
Airway  Urgency: elective    Date/Time: 2/22/2021 8:38 AM  Airway not difficult    General Information and Staff    Patient location during procedure: OR  CRNA: Devi Martinez CRNA    Indications and Patient Condition  Indications for airway management: airway protection    Preoxygenated: yes  Mask difficulty assessment: 0 - not attempted    Final Airway Details  Final airway type: endotracheal airway      Successful airway: ETT    Successful intubation technique: direct laryngoscopy  Blade: Sanaz  Blade size: 3  ETT size (mm): 7.0  Cormack-Lehane Classification: grade I - full view of glottis  Placement verified by: chest auscultation and capnometry   Measured from: gums  ETT/EBT to gums (cm): 21  Number of attempts at approach: 1  Assessment: lips, teeth, and gum same as pre-op and atraumatic intubation

## 2021-02-22 NOTE — ANESTHESIA PREPROCEDURE EVALUATION
Anesthesia Evaluation     Patient summary reviewed and Nursing notes reviewed   no history of anesthetic complications:  NPO Solid Status: > 8 hours  NPO Liquid Status: > 2 hours           Airway   Mallampati: II  TM distance: >3 FB  Neck ROM: full  Small opening and Possible difficult intubation  Dental    (+) edentulous    Pulmonary     breath sounds clear to auscultation  (+) a smoker (E cig) Current Smoked day of surgery, asthma,sleep apnea, decreased breath sounds,   (-) COPD, recent URI, rhonchi, wheezes, no home oxygen    ROS comment: snores  Cardiovascular   Exercise tolerance: poor (<4 METS)    ECG reviewed  Rhythm: regular  Rate: normal    (+) hypertension well controlled less than 2 medications, valvular problems/murmurs TI, hyperlipidemia,   (-) pacemaker, past MI, CAD, dysrhythmias, angina, murmur, cardiac stents, CABG, DVT    ROS comment: Normal sinus rhythm  Minimal voltage criteria for LVH, may be normal variant  Borderline ECG  When compared with ECG of 08-APR-2018  00:06:58  No significant change was found  [facility]    Neuro/Psych  (+) numbness (diabetic neuropathy in feet), psychiatric history Depression,     (-) seizures, TIA, CVA, headaches  GI/Hepatic/Renal/Endo    (+) obesity, morbid obesity, GERD well controlled,  diabetes mellitus (glu 148) type 2 well controlled using insulin,   (-) hepatitis, liver disease, no renal disease    Musculoskeletal     (+) back pain, chronic pain,   Abdominal   (+) obese,    Substance History   (-) alcohol use, drug use     OB/GYN negative ob/gyn ROS   (-)  Pregnant        Other   arthritis,    history of cancer (breast)    ROS/Med Hx Other: BMI 45    TTE 6/29/18:  · Left ventricular wall thickness is consistent with borderline concentric hypertrophy.  · Mild tricuspid valve regurgitation is present.  · Left ventricular systolic function is normal. Estimated EF = 60%.  · Left atrial cavity size is mildly dilated.    Left Heart Cath  7/3/2018:  Normal coronary arteries  Normal LV function  Plan: Look for noncardiac cause of chest pain    BHcG negative    Pt says GERD is controlled on prilosec. Takes every morning. Very rare for her to get heartburn per pt    Pt smoked e-cigs    Hx of asthma- uses inhaler 1-2x a month. Never been hospitalized for asthma.     Last BbzH2N=9.2    Hx of VINOD- noncompliant with CPAP      Phys Exam Other: Small opening- but eduntulous                  Anesthesia Plan    ASA 3     general     intravenous induction     Anesthetic plan, all risks, benefits, and alternatives have been provided, discussed and informed consent has been obtained with: patient.    Plan discussed with CRNA.

## 2021-03-01 ENCOUNTER — OFFICE VISIT (OUTPATIENT)
Dept: SURGERY | Facility: CLINIC | Age: 46
End: 2021-03-01

## 2021-03-01 VITALS
DIASTOLIC BLOOD PRESSURE: 84 MMHG | SYSTOLIC BLOOD PRESSURE: 102 MMHG | HEART RATE: 88 BPM | BODY MASS INDEX: 45.8 KG/M2 | TEMPERATURE: 98.4 F | HEIGHT: 66 IN | WEIGHT: 285 LBS

## 2021-03-01 DIAGNOSIS — Z09 FOLLOW UP: Primary | ICD-10-CM

## 2021-03-01 PROCEDURE — 99024 POSTOP FOLLOW-UP VISIT: CPT | Performed by: SURGERY

## 2021-03-01 RX ORDER — GLYBURIDE 6 MG/1
TABLET ORAL
COMMUNITY
Start: 2021-02-28 | End: 2022-05-13

## 2021-03-01 NOTE — PROGRESS NOTES
CHIEF COMPLAINT:    Chief Complaint   Patient presents with   • Post-op     Left breast lumpectomy with sentinel node biopsy and removal of cyst left cheek on 2/22/21.       HISTORY OF PRESENT ILLNESS:    Corrina Jensen is a 45 y.o. female who underwent recent reexcision of left breast excisional biopsy site as well as sentinel node biopsy for left breast cancer.  She also had a small cyst removed from her left cheek.  She returns today for follow-up.  Her pathology is still pending.  She notes only mild incisional pain.    EXAM:  Vitals:    03/01/21 0959   BP: 102/84   Pulse: 88   Temp: 98.4 °F (36.9 °C)         Healing incisions on left breast and axilla, left cheek site well-healed, sutures removed.    ASSESSMENT:    Left breast cancer status post left breast lumpectomy and sentinel node biopsy    PLAN:    She will return later in the week at which time we should have her pathology back so that we can discuss this.          This document has been electronically signed by Mat Garay MD on March 1, 2021 12:46 CST

## 2021-03-03 LAB
LAB AP CASE REPORT: NORMAL
PATH REPORT.FINAL DX SPEC: NORMAL

## 2021-03-04 ENCOUNTER — OFFICE VISIT (OUTPATIENT)
Dept: SURGERY | Facility: CLINIC | Age: 46
End: 2021-03-04

## 2021-03-04 ENCOUNTER — NURSE NAVIGATOR (OUTPATIENT)
Dept: ONCOLOGY | Facility: CLINIC | Age: 46
End: 2021-03-04

## 2021-03-04 VITALS
BODY MASS INDEX: 45.83 KG/M2 | HEART RATE: 72 BPM | WEIGHT: 285.2 LBS | SYSTOLIC BLOOD PRESSURE: 128 MMHG | TEMPERATURE: 97.9 F | HEIGHT: 66 IN | DIASTOLIC BLOOD PRESSURE: 82 MMHG

## 2021-03-04 DIAGNOSIS — Z17.0 MALIGNANT NEOPLASM OF LEFT BREAST IN FEMALE, ESTROGEN RECEPTOR POSITIVE, UNSPECIFIED SITE OF BREAST (HCC): ICD-10-CM

## 2021-03-04 DIAGNOSIS — C50.912 MALIGNANT NEOPLASM OF LEFT BREAST IN FEMALE, ESTROGEN RECEPTOR POSITIVE, UNSPECIFIED SITE OF BREAST (HCC): ICD-10-CM

## 2021-03-04 DIAGNOSIS — Z09 FOLLOW UP: Primary | ICD-10-CM

## 2021-03-04 PROCEDURE — 99024 POSTOP FOLLOW-UP VISIT: CPT | Performed by: SURGERY

## 2021-03-04 NOTE — PROGRESS NOTES
CHIEF COMPLAINT:    Chief Complaint   Patient presents with   • Follow-up     Jose- Path results       HISTORY OF PRESENT ILLNESS:    Corrina Jensen is a 45 y.o. female who underwent recent left breast lumpectomy with axillary lymph node biopsy.  At her last visit her pathology had not yet returned.  Her pathology now has returned.  That showed no residual cancer in the breast tissue that was removed.  3 benign lymph nodes from the axilla.  These benign results were shared with the patient and her mother today.    EXAM:  Vitals:    03/04/21 0928   BP: 128/82   Pulse: 72   Temp: 97.9 °F (36.6 °C)         Healing incisions on left breast and left axilla with bruising    ASSESSMENT:    Status post left breast lumpectomy and sentinel node biopsy for cancer    PLAN:    We will send oncology for further care.  See me back in 1 month.          This document has been electronically signed by Mat Garay MD on March 4, 2021 09:41 CST

## 2021-03-09 ENCOUNTER — CONSULT (OUTPATIENT)
Dept: ONCOLOGY | Facility: CLINIC | Age: 46
End: 2021-03-09

## 2021-03-09 ENCOUNTER — LAB (OUTPATIENT)
Dept: ONCOLOGY | Facility: HOSPITAL | Age: 46
End: 2021-03-09

## 2021-03-09 VITALS
HEART RATE: 80 BPM | SYSTOLIC BLOOD PRESSURE: 122 MMHG | HEIGHT: 66 IN | WEIGHT: 282.9 LBS | DIASTOLIC BLOOD PRESSURE: 53 MMHG | BODY MASS INDEX: 45.46 KG/M2 | RESPIRATION RATE: 18 BRPM | TEMPERATURE: 96.6 F

## 2021-03-09 DIAGNOSIS — C50.919 MALIGNANT NEOPLASM OF FEMALE BREAST, UNSPECIFIED ESTROGEN RECEPTOR STATUS, UNSPECIFIED LATERALITY, UNSPECIFIED SITE OF BREAST (HCC): Primary | ICD-10-CM

## 2021-03-09 PROCEDURE — 99204 OFFICE O/P NEW MOD 45 MIN: CPT | Performed by: INTERNAL MEDICINE

## 2021-03-09 PROCEDURE — G0463 HOSPITAL OUTPT CLINIC VISIT: HCPCS | Performed by: INTERNAL MEDICINE

## 2021-03-09 NOTE — PROGRESS NOTES
REASON FOR CONSULTATION: Breast cancer  Provide an opinion on any further workup or treatment                             REQUESTING PHYSICIAN:  Mat Garay MD    RECORDS OBTAINED:  Records of the patients history including those obtained from the referring provider were reviewed and summarized in detail.      History of Present Illness     This is a pleasant 45-year-old female who was seen in consultation at the request of Dr. Garay for evaluation of newly diagnosed breast cancer.  Patient unfortunately is a poor historian most of the history was obtained by reviewing her medical records.  Patient has past medical history of obesity, hypertension, hyperlipidemia, type 2 diabetes mellitus, bipolar disease, osteoarthritis, gastroesophageal reflux disease.  She had a screening mammogram performed on January 7, 2021.This showed left breast upper outer quadrant asymmetric density with architectural distortion which is 9 cm from nipple.  Subsequently patient had diagnostic mammogram left performed on January 13, 2021 which showed BI-RADS 4 abnormality left upper outer quadrant suspicious for malignancy.  Patient subsequently underwent left breast lumpectomy and sentinel lymph node biopsy.  This showed invasive ductal carcinoma tumor measuring 4 x 3 mm with extension to the margins.  Tumor was 95% estrogen receptor positive, 95% progesterone receptor positive and HER-2 immunohistochemistry negative.  Patient subsequently underwent reexcision and sentinel lymph node biopsy which came back negative for any malignancy.  She has recovered well from surgery and is here today to discuss further management of her left breast cancer.      Past Medical History:   Diagnosis Date   • Acute atopic conjunctivitis    • Acute bronchitis    • Allergic rhinitis due to pollen    • Arrhythmia     wearing heart monitor    • Arthritis    • Asthma    • Bilateral foot pain    • Breast cyst    • Breast lump    • Chest pain     • Chronic low back pain    • Contact dermatitis due to drugs and medicine    • Cyst of ovary    • Diabetes mellitus (CMS/HCC)    • GERD (gastroesophageal reflux disease)    • Hyperlipidemia    • Hypertension    • Infestation by Sarcoptes scabiei annalisa hominis    • Low back pain    • Mastodynia    • Nausea and vomiting    • On long term drug therapy    • Pain in wrist    • Plantar fasciitis    • Rash    • Skin disorder     breast-possible superficial cellulitis   • Spinal stenosis    • Tinea pedis    • Tobacco dependence syndrome    • Upper respiratory infection    • Vitamin D deficiency    • Wheezing         Past Surgical History:   Procedure Laterality Date   • BREAST BIOPSY     • BREAST CYST ASPIRATION     • BREAST LUMPECTOMY Left 1/27/2021    Procedure: LEFT BREAST LUMPECTOMY WITH NEEDLE LOCALIZATION Needle localization to be done [facility] by radiology first   @07:00 a.m.;  Surgeon: Mat Garay MD;  Location: [facility] OR;  Service: General;  Laterality: Left;   • BREAST LUMPECTOMY WITH SENTINEL NODE BIOPSY AND AXILLARY NODE DISSECTION Left 2/22/2021    Procedure: LEFT BREAST LUMPECTOMY WITH SENTINEL NODE BIOPSY. REMOVAL OF CYST FROM LEFT CHEEK                              (INJECTION @07: 00 A.M);  Surgeon: Mat Garay MD;  Location: [facility] OR;  Service: General;  Laterality: Left;   • BREAST SURGERY      excision breast lesion   • CARDIAC CATHETERIZATION N/A 7/3/2018    Procedure: Coronary angiography;  Surgeon: Arun Cadet MD;  Location: [facility] CATH INVASIVE LOCATION;  Service: Cardiovascular   • CARDIAC CATHETERIZATION     • CHOLECYSTECTOMY     • INJECTION OF MEDICATION      Kenalog (4)        Current Outpatient Medications on File Prior to Visit   Medication Sig Dispense Refill   • amitriptyline (ELAVIL) 50 MG tablet Take 50 mg by mouth Every Night.     • atorvastatin (LIPITOR) 20 MG tablet Take 20 mg by mouth Every Night.     • B-D ULTRAFINE III SHORT PEN 31G X 8  MM misc      • cetirizine (zyrTEC) 10 MG tablet Take 10 mg by mouth Daily.     • Dulaglutide (TRULICITY) 1.5 MG/0.5ML solution pen-injector Inject 1.5 mg under the skin into the appropriate area as directed 1 (One) Time Per Week.     • FREESTYLE LITE test strip 1 stick by Percutaneous route 4 (Four) Times a Day.     • glyburide micronized (GLYNASE) 6 MG tablet      • GLYBURIDE PO Take 6 mg by mouth Daily.     • Insulin Glargine (BASAGLAR KWIKPEN) 100 UNIT/ML injection pen Inject 10 Units under the skin into the appropriate area as directed Every Night.     • Lancets (FREESTYLE) lancets 1 each by Other route 4 (Four) Times a Day.     • losartan (COZAAR) 25 MG tablet Take 1 tablet by mouth Daily. 30 tablet 4   • LYRICA 150 MG capsule Take 150 mg by mouth 3 (Three) Times a Day.     • meloxicam (MOBIC) 15 MG tablet Take 15 mg by mouth Daily.     • metFORMIN (GLUCOPHAGE) 1000 MG tablet Take 1,000 mg by mouth 2 (Two) Times a Day.     • OLANZapine (zyPREXA) 10 MG tablet Take 10 mg by mouth Every Night.     • omeprazole (priLOSEC) 40 MG capsule Take 40 mg by mouth Daily.     • oxyCODONE (ROXICODONE) 5 MG immediate release tablet Take 1 tablet by mouth Every 4 (Four) Hours As Needed for Severe Pain . Can be taken in addition to your regular pain medication if needed. 24 tablet 0   • oxyCODONE-acetaminophen (PERCOCET)  MG per tablet Take 1 tablet by mouth Every 6 (Six) Hours.     • tiZANidine (ZANAFLEX) 4 MG tablet Take 4 mg by mouth Every 6 (Six) Hours As Needed.     • triamcinolone (KENALOG) 0.5 % cream Apply  topically to the appropriate area as directed 3 (Three) Times a Day As Needed.     • VENTOLIN  (90 Base) MCG/ACT inhaler Inhale 2 puffs Every 4 (Four) Hours.       No current facility-administered medications on file prior to visit.        ALLERGIES:    Allergies   Allergen Reactions   • Strawberry Cough   • Bactroban [Mupirocin Calcium] Rash   • Neomycin-Bacitracin-Polymyxin  [Bacitracin-Neomycin-Polymyxin] Rash   • Other Rash     All antibiotic creams        Social History     Socioeconomic History   • Marital status:      Spouse name: Not on file   • Number of children: Not on file   • Years of education: Not on file   • Highest education level: Not on file   Tobacco Use   • Smoking status: Current Every Day Smoker     Types: Electronic Cigarette   • Smokeless tobacco: Never Used   Vaping Use   • Vaping Use: Every day   • Substances: Nicotine   • Devices: Refillable tank   Substance and Sexual Activity   • Alcohol use: No   • Drug use: No   • Sexual activity: Defer        Family History   Problem Relation Age of Onset   • Coronary artery disease Other    • Breast cancer Mother    • Breast cancer Maternal Aunt    • Diabetes Father             Objective     Vitals:    03/09/21 0923   BP: 122/53   Pulse: 80   Resp: 18   Temp: 96.6 °F (35.9 °C)         Physical Exam      GENERAL: Alert, awake, oriented.  Well dressed.  Not in apparent distress. Vitals as above.   HEAD: Normocephalic, atraumatic.   EYES: PERRL, EOMI. vision is grossly intact.  NECK: Supple, no adenopathy or thyromegaly.   THROAT: Normal oral cavity and pharynx. No inflammation, swelling, exudate, or lesions.  CARDIAC: Normal S1 and S2. No S3, S4 or murmurs. Rhythm is regular.  Extremities are warm and well perfused.   LUNGS: Clear to auscultation and percussion without rales, rhonchi, wheezing or diminished breath sounds.  ABDOMEN: Positive bowel sounds. Soft, nondistended, nontender. No guarding or rebound. No masses.  BACK:  No bony tenderness.   EXTREMITIES: No significant deformity or joint abnormality.  Peripheral pulses intact. No varicosities.  SKIN: No rash or bruising.  NEUROLOGICAL: Grossly non-focal exam. No focal weakness. Gait: Normal.   PSYCH: Mood and affect normal. No hallucination or suicidal thoughts.   LYMPHATIC: No cervical, supraclavicular or axillary adenopathy.       RECENT LABS:Independently  reviewed and summarized  Hematology WBC   Date Value Ref Range Status   07/03/2018 3.83 3.20 - 9.80 10*3/mm3 Final     RBC   Date Value Ref Range Status   07/03/2018 4.53 3.77 - 5.16 10*6/mm3 Final     Hemoglobin   Date Value Ref Range Status   07/03/2018 13.7 12.0 - 15.5 g/dL Final     Hematocrit   Date Value Ref Range Status   07/03/2018 39.3 35.0 - 45.0 % Final     Platelets   Date Value Ref Range Status   07/03/2018 189 150 - 450 10*3/mm3 Final        Imaging (independently reviewed and summarized):     Bilateral screening mammogram from January 7, 2021 reviewed.  This showed left breast upper outer quadrant asymmetric density with architectural distortion which is 9 cm from nipple.  Subsequently patient had diagnostic mammogram left performed on January 13, 2021 which showed BI-RADS 4 abnormality left upper outer quadrant suspicious for malignancy.    Pathology (Result reviewed):       Left breast lumpectomy (1/27/21)  Invasive ductal carcinoma  Tumor extends to inked margin  Tumor size 4 x 3 mm  ER positive, CA positive, HER2 negative.   Pathological stage: pT1a, PN X    Left breast lumpectomy (2/22/21)  Focal atypical ductal hyperplasia  Margins are negative  3 sentinel lymph nodes negative for malignancy.    I have reviewed old records and summarized them in HPI as well as assessment and plan section of this note.     Corrina Jensen reports a pain score of 5.  Given her pain assessment as noted, treatment options were discussed and the following options were decided upon as a follow-up plan to address the patient's pain: referral to Primary Care for assistance in pain treatment guidance.    Patient screened positive for depression based on a PHQ-9 score of 3 on 3/9/2021. Follow-up recommendations include: Elevated PHQ score reflective of acute illness, not depression and Suicide Risk Assessment performed.    Diagnosis:   (1) Left breast cancer   Stage IA (pT1a, pN0(sn),cM0)   ER 95% CA 95% HER IHC  negative     (2) Family history of breast cancer     Both are new diagnosis problems for me    Assessment/Plan     This is a very pleasant 45-year-old female who was seen in consultation at the request of Dr. Garay for evaluation of  Stage I ER positive, NE positive, HER-2 negative breast cancer.  She underwent lumpectomy and is here today to discuss her adjuvant therapy options.     Chemotherapy treatment decision-making for women with ER-positive, HER2-negative breast cancers is more complicated, owing to the variation in prognosis among women with ER-positive, HER2-negative tumors, the effectiveness of adjuvant endocrine therapy at reducing recurrence, and the variable sensitivity of ER-positive tumors to chemotherapy treatments. For such patients, the decision to administer chemotherapy is based on an assessment of the composite risk of recurrence and likely benefit of treatment based upon patient age, tumor lymph node status, size, grade, lymphovascular invasion, or the results of a gene expression profile such as the Recurrence Score (RS). Among the gene expression profiles, the 21-gene RS is the most well-validated, providing a prognostic signature for outcome with endocrine therapy alone. In addition, the RS is the only assay shown to predict the benefit from chemotherapy for women with ER-positive breast cancer and either limited or no isaac involvement. Patients with ER-positive cancers that are node negative derive substantial benefit from chemotherapy if the 21-gene RS is high (typically, >24). By contrast, if the score is low (<18), there is no marginal benefit to adding chemotherapy to endocrine treatment. For this particular patient, I recommend no adjuvant chemotherapy given small size of tumor (<5mm)     Several meta-analyses have demonstrated that endocrine therapy consistently improves survival outcomes for women with non-metastatic, hormone receptor-positive breast cancer and has a generally  favorable toxicity profile. Therefore, there is wide consensus that these patients should receive adjuvant endocrine therapy.Given her post menopausal status, I would recommend arimidex (Provided dexa scan does not show severe osteoporosis).     Duration of adjuvant hormonal therapy is a matter of debate. Although women with hormone receptor-positive breast cancer are treated for a minimum of five years with adjuvant endocrine therapy, some data suggest that longer durations of endocrine therapy improve DFS, if not OS. Extended adjuvant endocrine therapy beyond five years is an option for all patients with a prior history of invasive, ER-positive breast cancer, but some patients may be more likely to derive benefit than others. I personally tend to recommend longer duration of hormonal therapy to only those patients who have large primary tumor and/or those with lymph node involvement. For this particular patient at this moment, I would recommend 5 years of adjuvant therapy.     Given patient has multiple family members with history of breast cancer I recommended referral to genetic counseling to consider genetic testing.    Recommendations:   - Recommend adjuvant Rt. Referral to radiation oncology arranged.   - Recommend DEXA scan to evaluate for BMD.   - Recommend referral to genetic counseling given family history of breast cancer.     We will plan to see her back after completion of RT to discuss adjuvant hormonal therapy.

## 2021-03-10 NOTE — PROGRESS NOTES
ONCOLOGY PATIENT NAVIGATION     DIAGNOSIS: Breast Cancer  REFERRAL SOURCE: Dr. Garay  ENCOUNTER TYPE: In-Person     NOTE: Spoke to pt. today concerning newly diagnosed breast cancer in post op. Pt rtc today for evaluation by Jarrod and care coordination. Referral to medical and radiation oncology. Will assist with referrals and provide support to pt.     EDUCATION PROVIDED: Reinforced my role as Oncology Nurse Navigator in cancer care and my support to pt. and family. Pt was given breast cancer education handbook which provides a written resource for support and education surrounding diagnosis and treatment planning. Spent approximately 30 mins in face to face contact with pt to discuss diagnosis, surgical options, radiation therapy, treatment planning and patient’s questions surrounding ongoing plan of care. Discussion included significant family history of breast cancer in mother, aunt and uncle; pt. expressed interest in genetic counseling/testing if eligible and covered by insurance.  Pt validated family support to include mother who was present at visit today, spouse and children. No barriers to care plan or logistical concerns were expressed by patient. Educated pt on team navigation approach offered at [facility] to include Oncology Patient Navigator currently in place, oncology social worker, financial navigator, and dietician.      NEEDS ASSESSMENT:     Barriers to care:  1. Information Needs/Questions/Understanding  2. Coordinating Appts/Tests/Procedures on Patient Behalf     Action Plan to Address Barriers:  1. Educate patient about Diagnosis and Treatment Planning  2. Assist Patient with Formulating Questions to Ask Healthcare Team  3. Provided Written Education and Information  4. Provided Verbal Education  5. Schedule Appts/Tests/Procedures on Patient Behalf with Healthcare Team  6. Provide Emotional Support as needed  7. Navigator Available by Phone for Supportive Care Needs  8. Serve as  Liaison between Patient and Healthcare Team     NAVIGATION OUTCOMES ACHIEVED:  1. Improved Patient Satisfaction  2. Improved Healthcare Efficiency  3. Reduced Care Fragmentation  4. Improved Timeliness to Care     PLAN OF CARE: Informed pt a team member will call pt with appt info for upcoming oncology consults. Encouraged pt to utilize breast cancer handbook for education, assistance in treatment decisions and documentation as needed. Will f/u with pt throughout cancer course for support, education, and care coordination. Pt stated understanding of all discussed and denied further questions.

## 2021-03-11 ENCOUNTER — TELEPHONE (OUTPATIENT)
Dept: ONCOLOGY | Facility: CLINIC | Age: 46
End: 2021-03-11

## 2021-03-11 NOTE — TELEPHONE ENCOUNTER
Oncology SW contacted pt by phone at her home.  LCSW was unable to meet face to face with pt on the day of her initial medical oncology consult.  Pt. Completed distress screening and scored a #6 on scale and marking indicators of emotional stress.  SW offered feedback as to role and support of Oncology SW.  45 year old female from [facility] recently diagnosed with breast cancer.  She states overall she is coping well, she shared significant history of cancer in the family and loss due to cancer. Most specifically  Her mother and her aunt with breast cancer.  Pt is planned to meet radiation oncology on Monday and plans made for SW to meet her face to face that day. She sated no immediate needs and voiced understanding and willingness to call should need arise.

## 2021-03-15 ENCOUNTER — HOSPITAL ENCOUNTER (OUTPATIENT)
Dept: RADIATION ONCOLOGY | Facility: HOSPITAL | Age: 46
Setting detail: RADIATION/ONCOLOGY SERIES
End: 2021-03-15

## 2021-03-15 ENCOUNTER — CONSULT (OUTPATIENT)
Dept: RADIATION ONCOLOGY | Facility: HOSPITAL | Age: 46
End: 2021-03-15

## 2021-03-15 ENCOUNTER — DOCUMENTATION (OUTPATIENT)
Dept: ONCOLOGY | Facility: CLINIC | Age: 46
End: 2021-03-15

## 2021-03-15 VITALS — DIASTOLIC BLOOD PRESSURE: 57 MMHG | SYSTOLIC BLOOD PRESSURE: 116 MMHG | HEART RATE: 88 BPM | RESPIRATION RATE: 18 BRPM

## 2021-03-15 DIAGNOSIS — Z13.88 RADIATION EXPOSURE SCREEN: ICD-10-CM

## 2021-03-15 DIAGNOSIS — Z17.0 MALIGNANT NEOPLASM OF UPPER-OUTER QUADRANT OF LEFT BREAST IN FEMALE, ESTROGEN RECEPTOR POSITIVE (HCC): Primary | ICD-10-CM

## 2021-03-15 DIAGNOSIS — C50.412 MALIGNANT NEOPLASM OF UPPER-OUTER QUADRANT OF LEFT BREAST IN FEMALE, ESTROGEN RECEPTOR POSITIVE (HCC): Primary | ICD-10-CM

## 2021-03-15 PROCEDURE — G0463 HOSPITAL OUTPT CLINIC VISIT: HCPCS | Performed by: RADIOLOGY

## 2021-03-15 PROCEDURE — 99203 OFFICE O/P NEW LOW 30 MIN: CPT | Performed by: RADIOLOGY

## 2021-03-15 NOTE — PATIENT INSTRUCTIONS
Review patient education folder information:    • [facility] Radiation Therapy for Cancer booklet  • Radiation Simulation   • Radiation Skin Care

## 2021-03-15 NOTE — PROGRESS NOTES
New Patient Visit      Patient: Corrina Jensen   YOB: 1975   Medical Record Number: [id number]   Date of Visit: March 15, 2021   Primary Diagnosis: LEFT BREAST, WD-IDC, 0.4 X 0.3CM, 1.5MM FROM DCIS UNOREINTED MARGIN,  + UNORIWENTED INV MARGIN, -CLSI/PNI, ER/MO+, H2N-, RE-EXCISION - RESIDUAL, 0/2SLN+, SPEC SIZE 10.9 X 9.2 X 5CM, NO CHEMO RECOMMENDED, 46DDD, RECEPTOR THERAPY TO FOLLOW   STAGE: [id number]  ICD-10: C50.412, Z17.0                                         History of Present Illness: This is a pleasant 45-year-old female who was seen in consultation at the request of Dr. MCKNIGHT for evaluation of newly diagnosed breast cancer.      Patient unfortunately is a poor historian most of the history was obtained by reviewing her medical records.      SCREENING BL MAMMOGRAM performed on 2021 showed left breast upper outer quadrant asymmetric density with architectural distortion which is 9 cm from nipple.      LEFT DX MAMMO performed on 2021 which showed BI-RADS 4 abnormality left upper outer quadrant suspicious for malignancy.      LEFT Breast Lumpectomy BX Oon 2021noted IDC tumor measuring 4 x 3 mm with extension to the margins, ER/MO+ & H2N -.       REEXCISION and SLN BX SHOWED - RE-EXCISION AND 0/2 SLN+    NO CHEMO BUT RECEPTOR THERAPY TO FOLLOW.    Breast profile notes menarche at age 11 with first pregnancy at age 13, G3, , no breast-feeding, birth control pill usage x3 months, LMP 3/1/2021,-HRT, family history of paternal aunt 10 and maternal aunt mother and maternal uncle with breast cancer.  Denies prostate ovarian or colon carcinoma.  Family denies [employer] descent.     This is a new problem to me, and one that is potentially life-threatening.  (Old medical records were requested, reviewed, and summarized in the HPI)    Review of Systems       All other systems reviewed and are AS NOTED BELOW. PT GOES TO PAIN MGT D/T LBP. ON CHRONIC PAIN MEDS D/T TO  SAME.    Past Medical History:   Diagnosis Date   • Acute atopic conjunctivitis    • Acute bronchitis    • Allergic rhinitis due to pollen    • Arrhythmia     wearing heart monitor    • Arthritis    • Asthma    • Bilateral foot pain    • Breast cancer (CMS/HCC)    • Breast cyst    • Breast lump    • Chest pain    • Chronic low back pain    • Contact dermatitis due to drugs and medicine    • Cyst of ovary    • Diabetes mellitus (CMS/HCC)    • GERD (gastroesophageal reflux disease)    • Hyperlipidemia    • Hypertension    • Infestation by Sarcoptes scabiei annalisa hominis    • Low back pain    • Mastodynia    • Nausea and vomiting    • On long term drug therapy    • Pain in wrist    • Plantar fasciitis    • Rash    • Skin disorder     breast-possible superficial cellulitis   • Spinal stenosis    • Tinea pedis    • Tobacco dependence syndrome    • Upper respiratory infection    • Vitamin D deficiency    • Wheezing         Past Surgical History:   Procedure Laterality Date   • BREAST BIOPSY     • BREAST CYST ASPIRATION     • BREAST LUMPECTOMY Left 1/27/2021    Procedure: LEFT BREAST LUMPECTOMY WITH NEEDLE LOCALIZATION Needle localization to be done [facility] by radiology first   @07:00 a.m.;  Surgeon: Mat Garay MD;  Location: [facility];  Service: General;  Laterality: Left;   • BREAST LUMPECTOMY WITH SENTINEL NODE BIOPSY AND AXILLARY NODE DISSECTION Left 2/22/2021    Procedure: LEFT BREAST LUMPECTOMY WITH SENTINEL NODE BIOPSY. REMOVAL OF CYST FROM LEFT CHEEK                              (INJECTION @07: 00 A.M);  Surgeon: Mat Garay MD;  Location: [facility] OR;  Service: General;  Laterality: Left;   • BREAST SURGERY      excision breast lesion   • CARDIAC CATHETERIZATION N/A 7/3/2018    Procedure: Coronary angiography;  Surgeon: Arun Cadet MD;  Location: [facility] CATH INVASIVE LOCATION;  Service: Cardiovascular   • CARDIAC CATHETERIZATION     • CHOLECYSTECTOMY     • INJECTION OF  MEDICATION      Kenalog (4)      Family History   Problem Relation Age of Onset   • Coronary artery disease Other    • Breast cancer Mother    • Breast cancer Maternal Aunt    • Diabetes Father         Social History     Socioeconomic History   • Marital status:      Spouse name: Not on file   • Number of children: Not on file   • Years of education: Not on file   • Highest education level: Not on file   Tobacco Use   • Smoking status: Current Every Day Smoker     Types: Electronic Cigarette   • Smokeless tobacco: Never Used   Vaping Use   • Vaping Use: Every day   • Substances: Nicotine   • Devices: RefColtoble tank   Substance and Sexual Activity   • Alcohol use: No   • Drug use: No   • Sexual activity: Defer     Patient is smoker since age 11.  She has been vaping for the last 10 years.  She denies ethanol product use or illegal drug use.    Allergies:  Strawberry, Bactroban [mupirocin calcium], Neomycin-bacitracin-polymyxin [bacitracin-neomycin-polymyxin], and Other   Prior to Admission medications    Medication Sig Start Date End Date Taking? Authorizing Provider   amitriptyline (ELAVIL) 50 MG tablet Take 50 mg by mouth Every Night. 12/2/20   Caitie Clark MD   atorvastatin (LIPITOR) 20 MG tablet Take 20 mg by mouth Every Night.    Caitie Clark MD   B-MELVIN ULTRAFINE III SHORT PEN 31G X 8 MM misc  12/8/20   Caitie Clark MD   cetirizine (zyrTEC) 10 MG tablet Take 10 mg by mouth Daily.    Caitie Clark MD   Dulaglutide (TRULICITY) 1.5 MG/0.5ML solution pen-injector Inject 1.5 mg under the skin into the appropriate area as directed 1 (One) Time Per Week.    Emergency, Nurse Epic, RN   FREESTYLE LITE test strip 1 stick by Percutaneous route 4 (Four) Times a Day. 7/23/18   Caitie Clark MD   glyburide micronized (GLYNASE) 6 MG tablet  2/28/21   Caitie Clark MD   GLYBURIDE PO Take 6 mg by mouth Daily.    Caitie Clark MD   Insulin Glargine (BASAGLAR  KWIKPEN) 100 UNIT/ML injection pen Inject 10 Units under the skin into the appropriate area as directed Every Night.    Caitie Clark MD   Lancets (FREESTYLE) lancets 1 each by Other route 4 (Four) Times a Day. 7/25/18   Caitie Clark MD   losartan (COZAAR) 25 MG tablet Take 1 tablet by mouth Daily. 3/24/20   Yue Howard MD   LYRICA 150 MG capsule Take 150 mg by mouth 3 (Three) Times a Day. 7/12/18   Caitie Clark MD   meloxicam (MOBIC) 15 MG tablet Take 15 mg by mouth Daily. 3/24/17   Caitie Clark MD   metFORMIN (GLUCOPHAGE) 1000 MG tablet Take 1,000 mg by mouth 2 (Two) Times a Day. 7/25/18   Caitie Clark MD   OLANZapine (zyPREXA) 10 MG tablet Take 10 mg by mouth Every Night. 12/26/17   Caitie Clark MD   omeprazole (priLOSEC) 40 MG capsule Take 40 mg by mouth Daily. 5/25/15   Caitie Clark MD   oxyCODONE (ROXICODONE) 5 MG immediate release tablet Take 1 tablet by mouth Every 4 (Four) Hours As Needed for Severe Pain . Can be taken in addition to your regular pain medication if needed. 2/22/21   Mat Garay MD   oxyCODONE-acetaminophen (PERCOCET)  MG per tablet Take 1 tablet by mouth Every 6 (Six) Hours.    Caitie Clark MD   tiZANidine (ZANAFLEX) 4 MG tablet Take 4 mg by mouth Every 6 (Six) Hours As Needed. 9/21/17   Caitie Clark MD   triamcinolone (KENALOG) 0.5 % cream Apply  topically to the appropriate area as directed 3 (Three) Times a Day As Needed.    Caitie Clark MD   VENTOLIN  (90 Base) MCG/ACT inhaler Inhale 2 puffs Every 4 (Four) Hours. 8/5/18   Caitie Clark MD      Pain:(on a scale of 0-10)    There were no vitals filed for this visit.     Corrina Jensen reports a pain score of .  Given her pain assessment as noted, treatment options were discussed and the following options were decided upon as a follow-up plan to address the patient's pain: continuation of current treatment  plan for pain.       Quality of Life:   KPS: 70 - Difficulty Walking, Needs Assistance   ECOG: (2) Ambulatory and capable of self care, unable to carry out work activity, up and about > 50% or waking hours      Physical Examination:  Vitals:    Vitals:    03/15/21 1110   BP: 116/57   Pulse: 88   Resp: 18       Height:    Weight:   There is no height or weight on file to calculate BMI.    Physical Exam  Constitutional: The patient is a well-developed, well-nourished OBESE 45yoc female in no acute distress.  Alert and oriented ×3. PT DOES NOT READ.  ON PHONE DURING CONSULT. HE IS WC BOUND AND DID NOT WANT TO GO OUT IN THE RAIN  HEENT: Atraumatic. Normocephalic. No abnormalities noted.  Lymphatics: No cervical, supraclavicular, or axillary, or inguinal lymphadenopathy is palpated.  CV: Regular rate and rhythm.  No murmurs, rubs, or gallops are appreciated.  Respiratory: Lungs clear to auscultation.  Breath sounds equal bilaterally.  Breasts: Surgical scars in the LEFT breast(Lateral left breast 6 cm) and LEFT axilla (3CM) are well healed, and there are no suspicious lesions or nodules palpated EXCEPT SURG HEMATOMA IN OP SITE. The RIGHT breast is within normal limits, with no suspicious lesions or nodules palpated.  GI: Abdomen soft, nontender, nondistended, with no hepatosplenomegaly or masses palpated.  Extremities: No clubbing, cyanosis, or edema.    Radiographs : XR Knee 3 View Left    Result Date: 11/17/2020  1. Mild osteoarthritic changes involving the medial compartment of the femorotibial joint. 2. No acute bony abnormalities. Electronically signed by:  Neida Wheeler MD  11/17/2020 12:24 PM CST Workstation: [id number]    Mammo Diagnostic Digital Tomosynthesis Left With CAD    Result Date: 1/13/2021  Left breast upper outer quadrant asymmetric density with associated architectural distortion which would be suspicious for possible malignancy. Recommend biopsy to further evaluate. BI-RADS category 4:  Suspicious abnormality Recommendation: Stereotactic/surgical  biopsy. Electronically signed by:  Van Sotomayor MD  1/13/2021 3:35 PM CST Workstation: [id number]    Mammo Breast Specimen    Result Date: 1/27/2021  Please see above findings. Electronically signed by:  Van Sotomayor MD  1/27/2021 9:48 AM CST Workstation: [id number]    Mammo Breast Placement Device Initial Without Biopsy    Result Date: 1/27/2021  Successful left breast hookwire placement in region of asymmetric density and architectural distortion. Electronically signed by:  Van Sotomayor MD  1/27/2021 8:38 AM CST Workstation: [id number]    US Breast Left Limited    Result Date: 1/13/2021  Left breast upper outer quadrant asymmetric density with associated architectural distortion which would be suspicious for possible malignancy. Recommend biopsy to further evaluate. BI-RADS category 4: Suspicious abnormality Recommendation: Stereotactic/surgical  biopsy. Electronically signed by:  Van Sotomayor MD  1/13/2021 3:35 PM CST Workstation: [id number]    Mammo Screening Digital Tomosynthesis Bilateral With CAD    Result Date: 1/8/2021  1.Left breast upper outer quadrant asymmetric density with associated architectural distortion which is 9 cm from the nipple. Recommend spot compression imaging, true lateral projection left breast and possibly ultrasound to further evaluate.  2.Left breast upper outer quadrant calcifications which should be further characterized with spot mag imaging and true lateral projection left breast. BI-RADS category 0: Additional imaging required. Recommendation: Call back for additional views Electronically signed by:  Van Sotomayor MD  1/8/2021 10:36 AM CST Workstation: [id number]    Pathology:   Lab Results   Component Value Date    CASEREPORT  02/22/2021     Surgical Pathology Report                         Case: [id number]                                  Authorizing Provider:  Mat Garay,   Collected:            02/22/2021 09:40 AM                                 MD                                                                           Ordering Location:     [facility]             Received:            02/22/2021 10:10 AM                                 [facility]                                                              Pathologist:           Lexi Lobato DO                                                        Specimens:   1) - Breast, Left, LEFT BREAST LUMPECTOMY                                                           2) - [place] Lymph Node, LEFT AXILLA #1                                                            3) - [place] Lymph Node, LEFT AXILLA #2                                                            4) - Cheek, Left, cyst of left cheek                                                       CASEREPORT  01/27/2021     Surgical Pathology Report                         Case: [id number]                                  Authorizing Provider:  Mat Garay,   Collected:           01/27/2021 09:30 AM                                 MD                                                                           Ordering Location:     [facility]             Received:            01/27/2021 10:07 AM                                 [facility]                                                              Pathologist:           Jw Guzman MD                                                             Specimen:    Breast, Left, left breast lesion                                                             Labs:   Lab Results   Component Value Date    GLUCOSE 111 (H) 01/22/2021    BUN 6 01/22/2021    CREATININE 0.87 01/22/2021    EGFRIFNONA 70 01/22/2021    BCR 6.9 (L) 01/22/2021    K 4.0 01/22/2021    CO2 28.0 01/22/2021    CALCIUM 9.4 01/22/2021    ALBUMIN 4.20 07/03/2018     (H) 07/03/2018     (H) 07/03/2018      WBC   Date Value Ref Range Status   07/03/2018 3.83  3.20 - 9.80 10*3/mm3 Final     Hemoglobin   Date Value Ref Range Status   07/03/2018 13.7 12.0 - 15.5 g/dL Final     Hematocrit   Date Value Ref Range Status   07/03/2018 39.3 35.0 - 45.0 % Final     Platelets   Date Value Ref Range Status   07/03/2018 189 150 - 450 10*3/mm3 Final     ASSESSMENT: STAGE: [id number]    ICD-10: C50.412, Z17.0 LEFT BREAST, WD-IDC    0.4 X 0.3CM     1.5MM FROM DCIS UNOREINTED MARGIN,  + UNORIWENTED INV MARGIN     -CLSI/PNI     ER/[place]+, H2N-    RE-EXCISION on 2/22/2021 noted - RESIDUAL, 0/2SLN+     SPEC SIZE 10.9 X 9.2 X 5CM      NO CHEMO RECOMMENDED    46DDD    RECEPTOR THERAPY TO FOLLOW       PLAN:   Discussed options with patient and .    All patient and  questions were answered and side effects discussed.    Recommend adjuvant XRT to a total dose of 5040 cGy in 28 fractions.    Due to being well differentiated and negative surgical margins would not recommend boost.    Urine pregnancy test prior to CT simulation    CT simulation on 3/22/2021@ 0830HRS.      Electronically signed by LINDSAY Cerna DO/DOMINGUEZ/[place] 3/15/2021  11:12 CDT
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
## 2021-03-17 ENCOUNTER — TELEPHONE (OUTPATIENT)
Dept: NUTRITION | Facility: HOSPITAL | Age: 46
End: 2021-03-17

## 2021-03-18 PROCEDURE — 77263 THER RADIOLOGY TX PLNG CPLX: CPT | Performed by: RADIOLOGY

## 2021-03-22 ENCOUNTER — APPOINTMENT (OUTPATIENT)
Dept: RADIATION ONCOLOGY | Facility: HOSPITAL | Age: 46
End: 2021-03-22

## 2021-03-22 ENCOUNTER — LAB (OUTPATIENT)
Dept: ONCOLOGY | Facility: HOSPITAL | Age: 46
End: 2021-03-22

## 2021-03-22 DIAGNOSIS — Z17.0 MALIGNANT NEOPLASM OF UPPER-OUTER QUADRANT OF LEFT BREAST IN FEMALE, ESTROGEN RECEPTOR POSITIVE (HCC): ICD-10-CM

## 2021-03-22 DIAGNOSIS — C50.412 MALIGNANT NEOPLASM OF UPPER-OUTER QUADRANT OF LEFT BREAST IN FEMALE, ESTROGEN RECEPTOR POSITIVE (HCC): ICD-10-CM

## 2021-03-22 DIAGNOSIS — Z13.88 RADIATION EXPOSURE SCREEN: ICD-10-CM

## 2021-03-22 LAB — HCG SERPL QL: NEGATIVE

## 2021-03-22 PROCEDURE — 77334 RADIATION TREATMENT AID(S): CPT | Performed by: RADIOLOGY

## 2021-03-22 PROCEDURE — 77290 THER RAD SIMULAJ FIELD CPLX: CPT | Performed by: RADIOLOGY

## 2021-03-22 PROCEDURE — 84703 CHORIONIC GONADOTROPIN ASSAY: CPT

## 2021-03-22 PROCEDURE — 36415 COLL VENOUS BLD VENIPUNCTURE: CPT | Performed by: NURSE PRACTITIONER

## 2021-03-25 PROCEDURE — 77334 RADIATION TREATMENT AID(S): CPT | Performed by: RADIOLOGY

## 2021-03-25 PROCEDURE — 77295 3-D RADIOTHERAPY PLAN: CPT | Performed by: RADIOLOGY

## 2021-03-25 PROCEDURE — 77300 RADIATION THERAPY DOSE PLAN: CPT | Performed by: RADIOLOGY

## 2021-04-01 ENCOUNTER — OFFICE VISIT (OUTPATIENT)
Dept: SURGERY | Facility: CLINIC | Age: 46
End: 2021-04-01

## 2021-04-01 ENCOUNTER — HOSPITAL ENCOUNTER (OUTPATIENT)
Dept: RADIATION ONCOLOGY | Facility: HOSPITAL | Age: 46
Setting detail: RADIATION/ONCOLOGY SERIES
End: 2021-04-01

## 2021-04-01 VITALS
HEIGHT: 66 IN | WEIGHT: 284.2 LBS | HEART RATE: 85 BPM | SYSTOLIC BLOOD PRESSURE: 122 MMHG | TEMPERATURE: 97.2 F | BODY MASS INDEX: 45.67 KG/M2 | DIASTOLIC BLOOD PRESSURE: 74 MMHG

## 2021-04-01 DIAGNOSIS — Z09 FOLLOW UP: Primary | ICD-10-CM

## 2021-04-01 PROCEDURE — 77280 THER RAD SIMULAJ FIELD SMPL: CPT | Performed by: RADIOLOGY

## 2021-04-01 PROCEDURE — 77427 RADIATION TX MANAGEMENT X5: CPT | Performed by: RADIOLOGY

## 2021-04-01 PROCEDURE — G6002 STEREOSCOPIC X-RAY GUIDANCE: HCPCS | Performed by: RADIOLOGY

## 2021-04-01 PROCEDURE — 77412 RADIATION TX DELIVERY LVL 3: CPT | Performed by: RADIOLOGY

## 2021-04-01 PROCEDURE — 99024 POSTOP FOLLOW-UP VISIT: CPT | Performed by: SURGERY

## 2021-04-01 RX ORDER — DULAGLUTIDE 1.5 MG/.5ML
INJECTION, SOLUTION SUBCUTANEOUS WEEKLY
COMMUNITY
End: 2021-10-04

## 2021-04-01 NOTE — PROGRESS NOTES
CHIEF COMPLAINT:    Chief Complaint   Patient presents with   • Follow-up     Left breast lumpectomy with [place] node biopsy, removal of cyst left cheek 2-22-21       HISTORY OF PRESENT ILLNESS:    Corrina Jensen is a 45 y.o. female who underwent prior left breast lumpectomy with sentinel node biopsy for breast cancer.  Her sentinel nodes were negative.  She is going to start radiation therapy today.  She is planning to take Arimidex for hormonal therapy.  Overall she is doing well.    EXAM:  Vitals:    04/01/21 0913   BP: 122/74   Pulse: 85   Temp: 97.2 °F (36.2 °C)         Well-healed lumpectomy site    ASSESSMENT:    Status post left breast lumpectomy with sentinel node biopsy    PLAN:    Overall doing well.  Follow-up with us in 6 months.          This document has been electronically signed by Mat Garay MD on April 1, 2021 09:33 CDT

## 2021-04-02 ENCOUNTER — OFFICE VISIT (OUTPATIENT)
Dept: ONCOLOGY | Facility: CLINIC | Age: 46
End: 2021-04-02

## 2021-04-02 ENCOUNTER — DOCUMENTATION (OUTPATIENT)
Dept: GENETICS | Facility: HOSPITAL | Age: 46
End: 2021-04-02

## 2021-04-02 ENCOUNTER — APPOINTMENT (OUTPATIENT)
Dept: ONCOLOGY | Facility: HOSPITAL | Age: 46
End: 2021-04-02

## 2021-04-02 ENCOUNTER — HOSPITAL ENCOUNTER (OUTPATIENT)
Dept: RADIATION ONCOLOGY | Facility: HOSPITAL | Age: 46
Discharge: HOME | End: 2021-04-02

## 2021-04-02 VITALS — HEIGHT: 66 IN | BODY MASS INDEX: 45.89 KG/M2

## 2021-04-02 DIAGNOSIS — Z17.0 MALIGNANT NEOPLASM OF UPPER-OUTER QUADRANT OF LEFT BREAST IN FEMALE, ESTROGEN RECEPTOR POSITIVE (HCC): Primary | ICD-10-CM

## 2021-04-02 DIAGNOSIS — C50.412 MALIGNANT NEOPLASM OF UPPER-OUTER QUADRANT OF LEFT BREAST IN FEMALE, ESTROGEN RECEPTOR POSITIVE (HCC): Primary | ICD-10-CM

## 2021-04-02 PROCEDURE — G6002 STEREOSCOPIC X-RAY GUIDANCE: HCPCS | Performed by: RADIOLOGY

## 2021-04-02 PROCEDURE — 77412 RADIATION TX DELIVERY LVL 3: CPT | Performed by: RADIOLOGY

## 2021-04-02 PROCEDURE — 77417 THER RADIOLOGY PORT IMAGE(S): CPT | Performed by: RADIOLOGY

## 2021-04-02 NOTE — PROGRESS NOTES
Corrina Jensen, a 45-year-old female, was provided genetic counseling via telemedicine in [place] due to a personal history of breast cancer.  Ms. Jensen was recently diagnosed with an ER/AR positive breast cancer of the left breast at age 45. She had a lumpectomy and has recently started radiation treatment. Ms. Jensen retains her uterus and ovaries. She was interested in discussing her risk for a hereditary cancer syndrome.  Ms. Jensen was interested in pursuing a multi-gene panel to evaluate her risk of cancer, therefore the CancerNext panel was ordered through [employer] which analyzes BRCA1/2 and 34 additional genes associated with an increased cancer risk. Results are expected within 2-3 weeks.     PERTINENT FAMILY HISTORY: (See attached pedigree)   Mother:  Breast cancer, 40s  Mat. Uncle:  Male breast cancer, 46  Mat. Aunt:  Breast cancer  Mat. Grandmother: Urinary tract cancer, 70s  Pat. Aunt:  Breast cancer    We do not have medical records regarding any of these diagnoses.      RISK ASSESSMENT:  Ms. Jensen’s personal and family history of cancer led to concern for a hereditary cancer syndrome. We discussed BRCA1/2 testing as well as the option of pursuing a panel that would test for other genes known to impact cancer risk in addition to BRCA1/2.   Ms. Jensen clearly meets NCCN guidelines criteria for BRCA1/2 testing based on her personal history of breast cancer diagnosed before age 50 and family history of breast cancer in at least 3 close relatives, including a male breast cancer. These risk assessments are based on the family history information provided at the time of the appointment, and could change in the future should new information be obtained.    GENETIC COUNSELING: (30 minutes) We reviewed the family history information in detail. Cases of cancer follow three general patterns: sporadic, familial, and hereditary.  While most cancer is sporadic, some cases appear to occur in family clusters.   These cases are said to be familial and account for 10-20% of cancer cases.  Familial cases may be due to a combination of shared genes and environmental factors among family members.  In even fewer families, the cancer is said to be inherited, and the genes responsible for the cancer are known.      Family histories typical of hereditary cancer syndromes usually include multiple first- and second-degree relatives diagnosed with cancer types that define a syndrome.  These cases tend to be diagnosed at younger-than-expected ages and can be bilateral or multifocal.  The cancer in these families follows an autosomal dominant inheritance pattern, which indicates the likely presence of a mutation in a cancer susceptibility gene.  Children and siblings of an individual believed to carry this mutation have a 50% chance of inheriting that mutation, thereby inheriting the increased risk to develop cancer.  These mutations can be passed down from the maternal or the paternal lineage.    Hereditary breast cancer accounts for 5-10% of all cases of breast cancer.  A significant proportion of hereditary breast and ovarian cancer can be attributed to mutations in the BRCA1 and BRCA2 genes.  Mutations in these genes confer an increased risk for breast cancer, ovarian cancer, male breast cancer, prostate cancer, and pancreatic cancer. Women with a BRCA1 or BRCA2 mutation who have already been diagnosed with breast cancer have a 40-60% lifetime risk of a second breast cancer. Women with a BRCA1 or BRCA2 mutation have up to a 44% risk of ovarian cancer.      There are other genes that are known to be associated with an increased risk for cancer.  Some of these genes have well defined cancer risks and established management guidelines.  Other genes that can be tested for have been more recently described, and there may be less data regarding the risks and therefore may not have established management guidelines. We discussed these  limitations at length.  Based on Ms. Jensen’s desire to get as much information as possible regarding her personal risks and potential risks for her family, she opted to pursue testing through a panel that would look at several other genes known to increase the risk for cancer.    GENETIC TESTING:  The risks, benefits and limitations of genetic testing and implications for clinical management following testing were reviewed.  DNA test results can influence decisions regarding screening, prevention and surgical management.  Genetic testing can have significant psychological implications for both individuals and families.  Also discussed was the possibility of employment and insurance discrimination based on genetic test results and the laws in place to prevent this (GRAY).    We discussed panel testing, which would involve testing for BRCA1/2 as well as 34 additional genes that are associated with increased cancer risk. The benefits and limitations of genetic testing were discussed and Ms. Jensen decided to pursue testing via the panel. The implications of a positive or negative test result were discussed. We discussed the possibility that, in some cases, genetic test results may be informative or may be ambiguous due to the identification of a genetic variant. These variants may or may not be associated with an increased cancer risk.  With multigene panel testing, it is not uncommon for a variant of uncertain significance (VUS) to be identified.  If a VUS is identified, testing family members is typically not recommended and screening recommendations are made based on the family history.  The laboratories that perform genetic testing work to reclassify the VUS and send out an amended report if and when a VUS is reclassified.  The majority of variant findings are ultimately reclassified to a negative result.  Given her personal and family history, a negative test result would not eliminate all cancer risk to her  relatives, although the risk would not be as high as it would with positive genetic testing.      PLAN: Genetic testing via the CancerNext panel through [employer] was ordered. Results are expected in 2-3 weeks. We will contact Ms. Jensen with her results once they are received.      Hedy Godfrey MS, [facility], [facility]  Licensed Certified Genetic Counselor

## 2021-04-02 NOTE — PROGRESS NOTES
4/2/2021      Indication: patient here for genetic counseling via telemedicine by Hedy Godfrey MS,[facility] [place] history of [place] cancer, Genetic counselor reviewed family history and obtained three generation pedigree during visit. See visit documentation for complete details.    Plan: genetic testing was sent for BRCA1/2 via [facility] [employer]; results expected in three weeks. Genetic counselor will notify patient of results.

## 2021-04-05 ENCOUNTER — HOSPITAL ENCOUNTER (OUTPATIENT)
Dept: RADIATION ONCOLOGY | Facility: HOSPITAL | Age: 46
Discharge: HOME | End: 2021-04-05

## 2021-04-05 PROCEDURE — G6002 STEREOSCOPIC X-RAY GUIDANCE: HCPCS | Performed by: RADIOLOGY

## 2021-04-05 PROCEDURE — 77412 RADIATION TX DELIVERY LVL 3: CPT | Performed by: RADIOLOGY

## 2021-04-06 ENCOUNTER — HOSPITAL ENCOUNTER (OUTPATIENT)
Dept: RADIATION ONCOLOGY | Facility: HOSPITAL | Age: 46
Discharge: HOME | End: 2021-04-06

## 2021-04-06 PROCEDURE — G6002 STEREOSCOPIC X-RAY GUIDANCE: HCPCS | Performed by: RADIOLOGY

## 2021-04-06 PROCEDURE — 77412 RADIATION TX DELIVERY LVL 3: CPT | Performed by: RADIOLOGY

## 2021-04-07 ENCOUNTER — RADIATION ONCOLOGY WEEKLY ASSESSMENT (OUTPATIENT)
Dept: RADIATION ONCOLOGY | Facility: HOSPITAL | Age: 46
End: 2021-04-07

## 2021-04-07 ENCOUNTER — HOSPITAL ENCOUNTER (OUTPATIENT)
Dept: RADIATION ONCOLOGY | Facility: HOSPITAL | Age: 46
Discharge: HOME | End: 2021-04-07

## 2021-04-07 VITALS
TEMPERATURE: 97.4 F | BODY MASS INDEX: 45.33 KG/M2 | DIASTOLIC BLOOD PRESSURE: 54 MMHG | WEIGHT: 280.7 LBS | SYSTOLIC BLOOD PRESSURE: 190 MMHG | RESPIRATION RATE: 18 BRPM | HEART RATE: 85 BPM

## 2021-04-07 DIAGNOSIS — C50.412 MALIGNANT NEOPLASM OF UPPER-OUTER QUADRANT OF LEFT BREAST IN FEMALE, ESTROGEN RECEPTOR POSITIVE (HCC): Primary | ICD-10-CM

## 2021-04-07 DIAGNOSIS — Z17.0 MALIGNANT NEOPLASM OF UPPER-OUTER QUADRANT OF LEFT BREAST IN FEMALE, ESTROGEN RECEPTOR POSITIVE (HCC): Primary | ICD-10-CM

## 2021-04-07 PROCEDURE — 77412 RADIATION TX DELIVERY LVL 3: CPT | Performed by: RADIOLOGY

## 2021-04-07 PROCEDURE — 77336 RADIATION PHYSICS CONSULT: CPT | Performed by: RADIOLOGY

## 2021-04-07 PROCEDURE — G6002 STEREOSCOPIC X-RAY GUIDANCE: HCPCS | Performed by: RADIOLOGY

## 2021-04-07 NOTE — PROGRESS NOTES
On Treatment Visit       Patient: Corrina Jensen   YOB: 1975   Medical Record Number: [id number]     Date of Visit  April 7, 2021   Primary Diagnosis: Cancer Staging  Malignant neoplasm of left breast in female, estrogen receptor positive (CMS/HCC)  Staging form: Breast, AJCC 8th Edition  - Pathologic stage from 3/9/2021: Stage IA (pT1a, pN0(sn), cM0, G1, ER+, VT+, HER2-) - Signed by Katarina Patel MD. Invasive ductal carcinoma.     was seen today for an on treatment visit.  She has received 900 cGy of a 5040 cGy regimen to the breast.  Patient is tolerating radiotherapy well.  She has no side effects to report from treatment.    Today on exam, vital signs are as below.  Patient was alerted to her elevated blood pressure.  Patient states that she is on antihypertensive medication.  Physical examination (limited to area of interest) does not reveal any evidence of radiation erythema in the treated breast.  Surgical scars remain well-healed.         Vitals:    04/07/21 1345   BP: (!) 190/54   Pulse: 85   Resp: 18   Temp: 97.4 °F (36.3 °C)           Plan: We plan to continue radiation therapy as prescribed.        Electronically signed by Con Carreon MD 4/7/2021  13:54 CDT

## 2021-04-08 ENCOUNTER — HOSPITAL ENCOUNTER (OUTPATIENT)
Dept: RADIATION ONCOLOGY | Facility: HOSPITAL | Age: 46
Discharge: HOME | End: 2021-04-08

## 2021-04-08 PROCEDURE — G6002 STEREOSCOPIC X-RAY GUIDANCE: HCPCS | Performed by: RADIOLOGY

## 2021-04-08 PROCEDURE — 77412 RADIATION TX DELIVERY LVL 3: CPT | Performed by: RADIOLOGY

## 2021-04-08 PROCEDURE — 77427 RADIATION TX MANAGEMENT X5: CPT | Performed by: RADIOLOGY

## 2021-04-09 ENCOUNTER — DOCUMENTATION (OUTPATIENT)
Dept: NUTRITION | Facility: HOSPITAL | Age: 46
End: 2021-04-09

## 2021-04-09 ENCOUNTER — HOSPITAL ENCOUNTER (OUTPATIENT)
Dept: RADIATION ONCOLOGY | Facility: HOSPITAL | Age: 46
Discharge: HOME | End: 2021-04-09

## 2021-04-09 PROCEDURE — 77412 RADIATION TX DELIVERY LVL 3: CPT | Performed by: RADIOLOGY

## 2021-04-09 PROCEDURE — 77417 THER RADIOLOGY PORT IMAGE(S): CPT | Performed by: RADIOLOGY

## 2021-04-09 PROCEDURE — G6002 STEREOSCOPIC X-RAY GUIDANCE: HCPCS | Performed by: RADIOLOGY

## 2021-04-09 NOTE — PROGRESS NOTES
"Adult Outpatient Nutrition  Assessment    Patient Name:  Corrina Jensen  YOB: 1975  MRN: [id number]    Assessment Date: Entry from 4/8/21    Comments: Face-to-face introduction from past phone conversation. Wt 280.9 lb.   Pt states glucose \"over 300\" last week--never that high before. Pt states  consumed the wrong food--too much sugar. Discussed importance of controlling glucose to prevent long term complications to heart/kidneys/circulation. Urged pt to continue 3 meals daily. Requested that pt decrease sugar intake. Have not obtained education material with pictures to help explain consis carb diet (pt cannot read). Will do so before pt finishes rad. Goal is glucose/weight control.                        Electronically signed by:  Kristina Finch RD  04/09/21 10:20 CDT   "

## 2021-04-12 ENCOUNTER — HOSPITAL ENCOUNTER (OUTPATIENT)
Dept: RADIATION ONCOLOGY | Facility: HOSPITAL | Age: 46
Discharge: HOME | End: 2021-04-12

## 2021-04-12 PROCEDURE — G6002 STEREOSCOPIC X-RAY GUIDANCE: HCPCS | Performed by: RADIOLOGY

## 2021-04-12 PROCEDURE — 77412 RADIATION TX DELIVERY LVL 3: CPT | Performed by: RADIOLOGY

## 2021-04-13 ENCOUNTER — HOSPITAL ENCOUNTER (OUTPATIENT)
Dept: RADIATION ONCOLOGY | Facility: HOSPITAL | Age: 46
Discharge: HOME | End: 2021-04-13

## 2021-04-13 PROCEDURE — 77412 RADIATION TX DELIVERY LVL 3: CPT | Performed by: RADIOLOGY

## 2021-04-13 PROCEDURE — G6002 STEREOSCOPIC X-RAY GUIDANCE: HCPCS | Performed by: RADIOLOGY

## 2021-04-14 ENCOUNTER — HOSPITAL ENCOUNTER (OUTPATIENT)
Dept: RADIATION ONCOLOGY | Facility: HOSPITAL | Age: 46
Discharge: HOME | End: 2021-04-14

## 2021-04-14 ENCOUNTER — RADIATION ONCOLOGY WEEKLY ASSESSMENT (OUTPATIENT)
Dept: RADIATION ONCOLOGY | Facility: HOSPITAL | Age: 46
End: 2021-04-14

## 2021-04-14 ENCOUNTER — DOCUMENTATION (OUTPATIENT)
Dept: GENETICS | Facility: HOSPITAL | Age: 46
End: 2021-04-14

## 2021-04-14 VITALS
RESPIRATION RATE: 18 BRPM | TEMPERATURE: 97.7 F | SYSTOLIC BLOOD PRESSURE: 147 MMHG | HEART RATE: 80 BPM | DIASTOLIC BLOOD PRESSURE: 71 MMHG

## 2021-04-14 DIAGNOSIS — C50.412 MALIGNANT NEOPLASM OF UPPER-OUTER QUADRANT OF LEFT BREAST IN FEMALE, ESTROGEN RECEPTOR POSITIVE (HCC): Primary | ICD-10-CM

## 2021-04-14 DIAGNOSIS — Z17.0 MALIGNANT NEOPLASM OF UPPER-OUTER QUADRANT OF LEFT BREAST IN FEMALE, ESTROGEN RECEPTOR POSITIVE (HCC): Primary | ICD-10-CM

## 2021-04-14 PROCEDURE — 77412 RADIATION TX DELIVERY LVL 3: CPT | Performed by: RADIOLOGY

## 2021-04-14 PROCEDURE — G6002 STEREOSCOPIC X-RAY GUIDANCE: HCPCS | Performed by: RADIOLOGY

## 2021-04-14 PROCEDURE — 77336 RADIATION PHYSICS CONSULT: CPT | Performed by: RADIOLOGY

## 2021-04-14 NOTE — PROGRESS NOTES
On Treatment Visit       Patient: Corrina Jensen   YOB: 1975   Medical Record Number: [id number]     Date of Visit  April 14, 2021   Primary Diagnosis: Cancer Staging  Malignant neoplasm of left breast in female, estrogen receptor positive (CMS/HCC)  Staging form: Breast, AJCC 8th Edition  - Pathologic stage from 3/9/2021: Stage IA (pT1a, pN0(sn), cM0, G1, ER+, NH+, HER2-) - Signed by Katarina Patel MD on 3/9/2021  Ductal carcinoma of the left breast.     was seen today for an on treatment visit.  To date the patient has received 1800 cGy of a 5040 cGy breast regimen.  The patient is tolerating therapy well.  She has no treatment related side effects.     Today on exam, vital signs are as below.  No radiation erythema is noted over the treated breast.  Surgical scars remain well-healed.  No left arm swelling is noted.  No left axillary or periclavicular adenopathy is detected.           Vitals:    04/14/21 1338   BP: 147/71   Pulse: 80   Resp: 18   Temp: 97.7 °F (36.5 °C)           Plan: We plan to continue radiation therapy as prescribed.  The patient has 18 treatment fractions pending.        Electronically signed by Con Carreon MD 4/14/2021  13:48 CDT

## 2021-04-14 NOTE — PROGRESS NOTES
Corrina Jensen, a 45-year-old female, was provided genetic counseling via telemedicine in [place] due to a personal history of breast cancer.  Ms. Jensen was recently diagnosed with an ER/IN positive breast cancer of the left breast at age 45. She had a lumpectomy and has recently started radiation treatment. Ms. Jensen retains her uterus and ovaries. She was interested in discussing her risk for a hereditary cancer syndrome.  Ms. Jensen was interested in pursuing a multi-gene panel to evaluate her risk of cancer, therefore the CancerNext panel was ordered through [facility] [facility] which analyzes BRCA1/2 and 34 additional genes associated with an increased cancer risk. The genes on this panel include APC, BINDU, AXIN2, BARD1, BMPR1A, BRCA1, BRCA2, BRIP1, CDH1, CDK4, CDKN2A, CHEK2, DICER1, EPCAM, GREM1, HOXB13, MLH1, MSH2, MSH3, MSH6, MUTYH, NBN, NF1, NTHL1, PALB2, PMS2, POLD1, POLE, PTEN, RAD51C, RAD51D, RECQL, SMAD4, SMARCA4, STK11, and TP53. Genetic testing was negative for pathogenic mutations in BRCA1/2 and 34 additional genes on the CancerNext panel.  These normal results were discussed with Ms. Jensen by telephone on 4/14/2021.       PERTINENT FAMILY HISTORY: (See attached pedigree)   Mother:  Breast cancer, 40s  Mat. Uncle:  Male breast cancer, 46 (reported to have had negative genetic testing)  Mat. Aunt:  Breast cancer  Mat. Grandmother: Urinary tract cancer, 70s  Pat. Aunt:  Breast cancer    We do not have medical records regarding any of these diagnoses.      RISK ASSESSMENT:  Ms. Jensen’s personal and family history of cancer led to concern for a hereditary cancer syndrome. We discussed BRCA1/2 testing as well as the option of pursuing a panel that would test for other genes known to impact cancer risk in addition to BRCA1/2.   Ms. Jensen clearly meets NCCN guidelines criteria for BRCA1/2 testing based on her personal history of breast cancer diagnosed before age 50 and family history of breast cancer in at least  3 close relatives, including a male breast cancer. These risk assessments are based on the family history information provided at the time of the appointment, and could change in the future should new information be obtained.    GENETIC COUNSELING: We reviewed the family history information in detail. Cases of cancer follow three general patterns: sporadic, familial, and hereditary.  While most cancer is sporadic, some cases appear to occur in family clusters.  These cases are said to be familial and account for 10-20% of cancer cases.  Familial cases may be due to a combination of shared genes and environmental factors among family members.  In even fewer families, the cancer is said to be inherited, and the genes responsible for the cancer are known.      Family histories typical of hereditary cancer syndromes usually include multiple first- and second-degree relatives diagnosed with cancer types that define a syndrome.  These cases tend to be diagnosed at younger-than-expected ages and can be bilateral or multifocal.  The cancer in these families follows an autosomal dominant inheritance pattern, which indicates the likely presence of a mutation in a cancer susceptibility gene.  Children and siblings of an individual believed to carry this mutation have a 50% chance of inheriting that mutation, thereby inheriting the increased risk to develop cancer.  These mutations can be passed down from the maternal or the paternal lineage.    Hereditary breast cancer accounts for 5-10% of all cases of breast cancer.  A significant proportion of hereditary breast and ovarian cancer can be attributed to mutations in the BRCA1 and BRCA2 genes.  Mutations in these genes confer an increased risk for breast cancer, ovarian cancer, male breast cancer, prostate cancer, and pancreatic cancer. Women with a BRCA1 or BRCA2 mutation who have already been diagnosed with breast cancer have a 40-60% lifetime risk of a second breast cancer.  Women with a BRCA1 or BRCA2 mutation have up to a 44% risk of ovarian cancer.      There are other genes that are known to be associated with an increased risk for cancer.  Some of these genes have well defined cancer risks and established management guidelines.  Other genes that can be tested for have been more recently described, and there may be less data regarding the risks and therefore may not have established management guidelines. We discussed these limitations at length.  Based on Ms. Jensen’s desire to get as much information as possible regarding her personal risks and potential risks for her family, she opted to pursue testing through a panel that would look at several other genes known to increase the risk for cancer.    GENETIC TESTING:  The risks, benefits and limitations of genetic testing and implications for clinical management following testing were reviewed.  DNA test results can influence decisions regarding screening, prevention and surgical management.  Genetic testing can have significant psychological implications for both individuals and families.  Also discussed was the possibility of employment and insurance discrimination based on genetic test results and the laws in place to prevent this (GRAY).    We discussed panel testing, which would involve testing for BRCA1/2 as well as 34 additional genes that are associated with increased cancer risk. The benefits and limitations of genetic testing were discussed and Ms. Jensen decided to pursue testing via the panel. The implications of a positive or negative test result were discussed. We discussed the possibility that, in some cases, genetic test results may be informative or may be ambiguous due to the identification of a genetic variant. These variants may or may not be associated with an increased cancer risk.  With multigene panel testing, it is not uncommon for a variant of uncertain significance (VUS) to be identified.  If a VUS is  identified, testing family members is typically not recommended and screening recommendations are made based on the family history.  The laboratories that perform genetic testing work to reclassify the VUS and send out an amended report if and when a VUS is reclassified.  The majority of variant findings are ultimately reclassified to a negative result.  Given her personal and family history, a negative test result would not eliminate all cancer risk to her relatives, although the risk would not be as high as it would with positive genetic testing.      TEST RESULTS:  Genetic testing was negative for known pathogenic mutations by sequencing, rearrangement testing, and RNA analysis for the genes on the CancerNext panel (see attached results). This negative result greatly lowers but does not eliminate the risk of a hereditary cancer syndrome for Ms. Jensen. This assessment is based on the information provided at time of consultation.    CANCER SCREENING: Ms. Jensen’s surveillance and management should be determined by her oncology team. Despite the negative genetic test results, Ms. Jensen’s female relatives may have a somewhat increased lifetime risk for breast cancer based on family history. Female relatives could have a risk assessment performed using a family history-based model, such as the Tyrer-Cuzick model, to determine their individual risks.    Given the increased risk, options available to individuals with a high lifetime risk for breast cancer were briefly discussed.   Surveillance for individuals with a high lifetime risk of breast cancer (>20%, versus the average risk of 12%), based on NCCN guidelines, would consist of semi-annual clinical breast exams and monthly self-breast exams starting by age 18 and annual mammography starting 10 years younger than the earliest diagnosis in a close relative, or starting by age 40, whichever is earliest.  According to an American Cancer Society expert panel, annual  breast MRI should be offered to women whose lifetime risk of breast cancer is 20-25 percent or more, also starting by age 40 or earlier if indicated by family history.      PLAN: Genetic counseling remains available to Ms. Jensen and her family. She is welcome to contact us with any questions or concerns at [phone].      Hedy Godfrey MS, [facility] [place]  Licensed Certified Genetic Counselor       Cc: Corrina Patel MD

## 2021-04-15 ENCOUNTER — HOSPITAL ENCOUNTER (OUTPATIENT)
Dept: RADIATION ONCOLOGY | Facility: HOSPITAL | Age: 46
Discharge: HOME | End: 2021-04-15

## 2021-04-15 PROCEDURE — 77412 RADIATION TX DELIVERY LVL 3: CPT | Performed by: RADIOLOGY

## 2021-04-15 PROCEDURE — G6002 STEREOSCOPIC X-RAY GUIDANCE: HCPCS | Performed by: RADIOLOGY

## 2021-04-15 PROCEDURE — 77427 RADIATION TX MANAGEMENT X5: CPT | Performed by: RADIOLOGY

## 2021-04-16 ENCOUNTER — HOSPITAL ENCOUNTER (OUTPATIENT)
Dept: RADIATION ONCOLOGY | Facility: HOSPITAL | Age: 46
Discharge: HOME | End: 2021-04-16

## 2021-04-16 ENCOUNTER — DOCUMENTATION (OUTPATIENT)
Dept: NUTRITION | Facility: HOSPITAL | Age: 46
End: 2021-04-16

## 2021-04-16 PROCEDURE — G6002 STEREOSCOPIC X-RAY GUIDANCE: HCPCS | Performed by: RADIOLOGY

## 2021-04-16 PROCEDURE — 77412 RADIATION TX DELIVERY LVL 3: CPT | Performed by: RADIOLOGY

## 2021-04-16 PROCEDURE — 77417 THER RADIOLOGY PORT IMAGE(S): CPT | Performed by: RADIOLOGY

## 2021-04-16 NOTE — PROGRESS NOTES
Adult Outpatient Nutrition  Assessment    Patient Name:  Corrina Jensen  YOB: 1975  MRN: [id number]    Assessment Date:  4/16/2021    Comments: Follow-up visit to check nutrition. Wt 280.9 lb--up/down (overall trend up). Rad to chest due to breast cancer. Diabetic. States blood sugar in low 100's at home lately. Discussed consis carb diet urging 3 carb choices per meal. Written suggestions with pictures used as guide (provided for use at home). Discussed importance of taking meal time insulin with meal containing carbohydrate. Reinforced importance of monitoring glucose at home, and informing PCP of results as needed.                          Electronically signed by:  Kristina Finch RD  04/16/21 07:57 CDT

## 2021-04-19 ENCOUNTER — HOSPITAL ENCOUNTER (OUTPATIENT)
Dept: RADIATION ONCOLOGY | Facility: HOSPITAL | Age: 46
Discharge: HOME | End: 2021-04-19

## 2021-04-19 PROCEDURE — G6002 STEREOSCOPIC X-RAY GUIDANCE: HCPCS | Performed by: RADIOLOGY

## 2021-04-19 PROCEDURE — 77412 RADIATION TX DELIVERY LVL 3: CPT | Performed by: RADIOLOGY

## 2021-04-20 ENCOUNTER — HOSPITAL ENCOUNTER (OUTPATIENT)
Dept: RADIATION ONCOLOGY | Facility: HOSPITAL | Age: 46
Discharge: HOME | End: 2021-04-20

## 2021-04-20 PROCEDURE — G6002 STEREOSCOPIC X-RAY GUIDANCE: HCPCS | Performed by: RADIOLOGY

## 2021-04-20 PROCEDURE — 77412 RADIATION TX DELIVERY LVL 3: CPT | Performed by: RADIOLOGY

## 2021-04-21 ENCOUNTER — RADIATION ONCOLOGY WEEKLY ASSESSMENT (OUTPATIENT)
Dept: RADIATION ONCOLOGY | Facility: HOSPITAL | Age: 46
End: 2021-04-21

## 2021-04-21 VITALS
SYSTOLIC BLOOD PRESSURE: 128 MMHG | HEART RATE: 76 BPM | BODY MASS INDEX: 45.75 KG/M2 | TEMPERATURE: 97.2 F | WEIGHT: 283.3 LBS | RESPIRATION RATE: 18 BRPM | DIASTOLIC BLOOD PRESSURE: 72 MMHG

## 2021-04-21 DIAGNOSIS — Z17.0 MALIGNANT NEOPLASM OF UPPER-OUTER QUADRANT OF LEFT BREAST IN FEMALE, ESTROGEN RECEPTOR POSITIVE (HCC): Primary | ICD-10-CM

## 2021-04-21 DIAGNOSIS — C50.412 MALIGNANT NEOPLASM OF UPPER-OUTER QUADRANT OF LEFT BREAST IN FEMALE, ESTROGEN RECEPTOR POSITIVE (HCC): Primary | ICD-10-CM

## 2021-04-21 PROCEDURE — 77412 RADIATION TX DELIVERY LVL 3: CPT | Performed by: RADIOLOGY

## 2021-04-21 PROCEDURE — FACE2FACE: Performed by: RADIOLOGY

## 2021-04-21 PROCEDURE — G6002 STEREOSCOPIC X-RAY GUIDANCE: HCPCS | Performed by: RADIOLOGY

## 2021-04-21 PROCEDURE — 77336 RADIATION PHYSICS CONSULT: CPT | Performed by: RADIOLOGY

## 2021-04-21 NOTE — PROGRESS NOTES
On Treatment Visit       Patient: Corrina Jensen   YOB: 1975   Medical Record Number: [id number]     Date of Visit  April 21, 2021   Primary Diagnosis: Cancer Staging  Malignant neoplasm of left breast in female, estrogen receptor positive (CMS/HCC)  Staging form: Breast, AJCC 8th Edition  - Pathologic stage from 3/9/2021: Stage IA (pT1a, pN0(sn), cM0, G1, ER+, LA+, HER2-) - Signed by Katarina Patel MD on 3/9/2021     was seen today for an on treatment visit.  The patient is receiving postoperative left breast radiotherapy.  To date she has received 2700 cGy of a 5040 cGy regimen.  The patient is tolerating radiotherapy well.  She has no treatment related side effects to report.  Patient states that her appetite and energy levels are good.    Today on exam, vital signs are as below.  Skin over the treated breast is unremarkable with minimal, if any, erythema.  Surgical scars remain healed.  Skin in the inframammary fold and axillary regions is intact.  No arm swelling.        Vitals:    04/21/21 1331   BP: 128/72   Pulse: 76   Resp: 18   Temp: 97.2 °F (36.2 °C)     Plan: We plan to continue radiation therapy as prescribed.  The patient has 13 treatment fractions pending.        Electronically signed by Con Carreon MD 4/21/2021  13:43 CDT

## 2021-04-22 ENCOUNTER — HOSPITAL ENCOUNTER (OUTPATIENT)
Dept: RADIATION ONCOLOGY | Facility: HOSPITAL | Age: 46
Discharge: HOME | End: 2021-04-22

## 2021-04-22 PROCEDURE — 77427 RADIATION TX MANAGEMENT X5: CPT | Performed by: RADIOLOGY

## 2021-04-22 PROCEDURE — 77412 RADIATION TX DELIVERY LVL 3: CPT | Performed by: RADIOLOGY

## 2021-04-22 PROCEDURE — G6002 STEREOSCOPIC X-RAY GUIDANCE: HCPCS | Performed by: RADIOLOGY

## 2021-04-23 ENCOUNTER — HOSPITAL ENCOUNTER (OUTPATIENT)
Dept: RADIATION ONCOLOGY | Facility: HOSPITAL | Age: 46
Discharge: HOME | End: 2021-04-23

## 2021-04-23 PROCEDURE — G6002 STEREOSCOPIC X-RAY GUIDANCE: HCPCS | Performed by: RADIOLOGY

## 2021-04-23 PROCEDURE — 77417 THER RADIOLOGY PORT IMAGE(S): CPT | Performed by: RADIOLOGY

## 2021-04-23 PROCEDURE — 77412 RADIATION TX DELIVERY LVL 3: CPT | Performed by: RADIOLOGY

## 2021-04-26 ENCOUNTER — HOSPITAL ENCOUNTER (OUTPATIENT)
Dept: RADIATION ONCOLOGY | Facility: HOSPITAL | Age: 46
Discharge: HOME | End: 2021-04-26

## 2021-04-26 PROCEDURE — 77412 RADIATION TX DELIVERY LVL 3: CPT | Performed by: RADIOLOGY

## 2021-04-26 PROCEDURE — G6002 STEREOSCOPIC X-RAY GUIDANCE: HCPCS | Performed by: RADIOLOGY

## 2021-04-27 ENCOUNTER — HOSPITAL ENCOUNTER (OUTPATIENT)
Dept: RADIATION ONCOLOGY | Facility: HOSPITAL | Age: 46
Discharge: HOME | End: 2021-04-27

## 2021-04-27 PROCEDURE — 77412 RADIATION TX DELIVERY LVL 3: CPT | Performed by: RADIOLOGY

## 2021-04-27 PROCEDURE — G6002 STEREOSCOPIC X-RAY GUIDANCE: HCPCS | Performed by: RADIOLOGY

## 2021-04-28 ENCOUNTER — HOSPITAL ENCOUNTER (OUTPATIENT)
Dept: RADIATION ONCOLOGY | Facility: HOSPITAL | Age: 46
Discharge: HOME | End: 2021-04-28

## 2021-04-28 ENCOUNTER — RADIATION ONCOLOGY WEEKLY ASSESSMENT (OUTPATIENT)
Dept: RADIATION ONCOLOGY | Facility: HOSPITAL | Age: 46
End: 2021-04-28

## 2021-04-28 VITALS
HEART RATE: 92 BPM | TEMPERATURE: 97.2 F | BODY MASS INDEX: 45.84 KG/M2 | DIASTOLIC BLOOD PRESSURE: 72 MMHG | SYSTOLIC BLOOD PRESSURE: 125 MMHG | WEIGHT: 283.9 LBS | RESPIRATION RATE: 18 BRPM

## 2021-04-28 DIAGNOSIS — C50.412 MALIGNANT NEOPLASM OF UPPER-OUTER QUADRANT OF LEFT BREAST IN FEMALE, ESTROGEN RECEPTOR POSITIVE (HCC): Primary | ICD-10-CM

## 2021-04-28 DIAGNOSIS — Z17.0 MALIGNANT NEOPLASM OF UPPER-OUTER QUADRANT OF LEFT BREAST IN FEMALE, ESTROGEN RECEPTOR POSITIVE (HCC): Primary | ICD-10-CM

## 2021-04-28 PROCEDURE — 77336 RADIATION PHYSICS CONSULT: CPT | Performed by: RADIOLOGY

## 2021-04-28 PROCEDURE — FACE2FACE: Performed by: RADIOLOGY

## 2021-04-28 PROCEDURE — G6002 STEREOSCOPIC X-RAY GUIDANCE: HCPCS | Performed by: RADIOLOGY

## 2021-04-28 PROCEDURE — 77412 RADIATION TX DELIVERY LVL 3: CPT | Performed by: RADIOLOGY

## 2021-04-28 NOTE — PROGRESS NOTES
On Treatment Visit       Patient: Corrina Jensen   YOB: 1975   Medical Record Number: [id number]     Date of Visit  April 28, 2021   Primary Diagnosis: Cancer Staging  Malignant neoplasm of left breast in female, estrogen receptor positive (CMS/HCC)  Staging form: Breast, AJCC 8th Edition  - Pathologic stage from 3/9/2021: Stage IA (pT1a, pN0(sn), cM0, G1, ER+, AK+, HER2-) - Signed by Katarina Patel MD on 3/9/2021    Infiltrating ductal carcinoma     was seen today for an on treatment visit.  To date the patient has received 3600 cGy of a 5040 cGy regimen to the left breast.  The patient is tolerating radiotherapy well.  She does report some tenderness in the inframammary fold region which she attributes to wearing her brassiere.  Patient states that she has stopped wearing procedures.  Patient denies breast pain and pulmonary symptoms.    Today on exam, mild erythema is noted over the left breast.  There is a small area of skin breakdown without bleeding or signs of infection in the inframammary fold region.  Patient has developed a pimple-like lesion on the medial aspect of the right breast.  No palpable cervical or periclavicular adenopathy.      Vital signs are within normal limits.    Plan: We plan to continue radiation therapy as prescribed.  Patient has 8 treatment fractions pending.  She has a pending appointment with the medical oncology service to review hormonal therapy options.        Electronically signed by Con Carreon MD 4/28/2021  13:43 CDT

## 2021-04-29 ENCOUNTER — HOSPITAL ENCOUNTER (OUTPATIENT)
Dept: RADIATION ONCOLOGY | Facility: HOSPITAL | Age: 46
Discharge: HOME | End: 2021-04-29

## 2021-04-29 PROCEDURE — 77412 RADIATION TX DELIVERY LVL 3: CPT | Performed by: RADIOLOGY

## 2021-04-29 PROCEDURE — G6002 STEREOSCOPIC X-RAY GUIDANCE: HCPCS | Performed by: RADIOLOGY

## 2021-04-29 PROCEDURE — 77427 RADIATION TX MANAGEMENT X5: CPT | Performed by: RADIOLOGY

## 2021-04-30 ENCOUNTER — HOSPITAL ENCOUNTER (OUTPATIENT)
Dept: RADIATION ONCOLOGY | Facility: HOSPITAL | Age: 46
Discharge: HOME | End: 2021-04-30

## 2021-04-30 PROCEDURE — G6002 STEREOSCOPIC X-RAY GUIDANCE: HCPCS | Performed by: RADIOLOGY

## 2021-04-30 PROCEDURE — 77412 RADIATION TX DELIVERY LVL 3: CPT | Performed by: RADIOLOGY

## 2021-04-30 PROCEDURE — 77417 THER RADIOLOGY PORT IMAGE(S): CPT | Performed by: RADIOLOGY

## 2021-05-03 ENCOUNTER — HOSPITAL ENCOUNTER (OUTPATIENT)
Dept: RADIATION ONCOLOGY | Facility: HOSPITAL | Age: 46
Setting detail: RADIATION/ONCOLOGY SERIES
End: 2021-05-03

## 2021-05-03 ENCOUNTER — HOSPITAL ENCOUNTER (OUTPATIENT)
Dept: RADIATION ONCOLOGY | Facility: HOSPITAL | Age: 46
Discharge: HOME | End: 2021-05-03

## 2021-05-03 PROCEDURE — G6002 STEREOSCOPIC X-RAY GUIDANCE: HCPCS | Performed by: RADIOLOGY

## 2021-05-03 PROCEDURE — 77412 RADIATION TX DELIVERY LVL 3: CPT | Performed by: RADIOLOGY

## 2021-05-04 ENCOUNTER — EDUCATION (OUTPATIENT)
Dept: RADIATION ONCOLOGY | Facility: HOSPITAL | Age: 46
End: 2021-05-04

## 2021-05-04 ENCOUNTER — HOSPITAL ENCOUNTER (OUTPATIENT)
Dept: RADIATION ONCOLOGY | Facility: HOSPITAL | Age: 46
Discharge: HOME | End: 2021-05-04

## 2021-05-04 PROCEDURE — G6002 STEREOSCOPIC X-RAY GUIDANCE: HCPCS | Performed by: RADIOLOGY

## 2021-05-04 PROCEDURE — 77412 RADIATION TX DELIVERY LVL 3: CPT | Performed by: RADIOLOGY

## 2021-05-05 ENCOUNTER — RADIATION ONCOLOGY WEEKLY ASSESSMENT (OUTPATIENT)
Dept: RADIATION ONCOLOGY | Facility: HOSPITAL | Age: 46
End: 2021-05-05

## 2021-05-05 ENCOUNTER — HOSPITAL ENCOUNTER (OUTPATIENT)
Dept: RADIATION ONCOLOGY | Facility: HOSPITAL | Age: 46
Discharge: HOME | End: 2021-05-05

## 2021-05-05 VITALS
DIASTOLIC BLOOD PRESSURE: 75 MMHG | HEART RATE: 88 BPM | WEIGHT: 280.1 LBS | BODY MASS INDEX: 45.23 KG/M2 | TEMPERATURE: 97.1 F | SYSTOLIC BLOOD PRESSURE: 136 MMHG

## 2021-05-05 DIAGNOSIS — C50.412 MALIGNANT NEOPLASM OF UPPER-OUTER QUADRANT OF LEFT BREAST IN FEMALE, ESTROGEN RECEPTOR POSITIVE (HCC): Primary | ICD-10-CM

## 2021-05-05 DIAGNOSIS — Z17.0 MALIGNANT NEOPLASM OF UPPER-OUTER QUADRANT OF LEFT BREAST IN FEMALE, ESTROGEN RECEPTOR POSITIVE (HCC): Primary | ICD-10-CM

## 2021-05-05 PROCEDURE — 77412 RADIATION TX DELIVERY LVL 3: CPT | Performed by: RADIOLOGY

## 2021-05-05 PROCEDURE — G6002 STEREOSCOPIC X-RAY GUIDANCE: HCPCS | Performed by: RADIOLOGY

## 2021-05-05 PROCEDURE — FACE2FACE: Performed by: RADIOLOGY

## 2021-05-05 PROCEDURE — 77336 RADIATION PHYSICS CONSULT: CPT | Performed by: RADIOLOGY

## 2021-05-05 NOTE — PROGRESS NOTES
On Treatment Visit       Patient: Corrina Jensen   YOB: 1975   Medical Record Number: [id number]     Date of Visit  May 5, 2021   Primary Diagnosis: Cancer Staging  Malignant neoplasm of left breast in female, estrogen receptor positive (CMS/HCC)  Staging form: Breast, AJCC 8th Edition  - Pathologic stage from 3/9/2021: Stage IA (pT1a, pN0(sn), cM0, G1, ER+, SC+, HER2-) - Signed by Katarina Patel MD on 3/9/2021  Ductal carcinoma of left breast, status post breast conservation surgery     was seen today for an on treatment visit.  To date she has received 4500 cGy of a 5040 cGy regimen.  Radiotherapy has been well-tolerated.  The patient has the expected acute side effects of fatigue and mild skin discomfort in the inframammary and axillary fold regions.  Patient denies arm swelling, chest wall discomfort, and pulmonary symptoms.    Today on exam, vital signs are as below.  Mild erythema is noted over the left breast.  Some superficial skin breakdown without evidence of moist desquamation, bleeding or active infection is noted in the inframammary fold region.  No axillary or periclavicular lymph nodes detected.        Vitals:    05/05/21 1338   BP: 136/75   Pulse: 88   Temp: 97.1 °F (36.2 °C)       Plan: We plan to continue radiation therapy as prescribed.  The patient has 3 treatment fractions pending.  She received detailed skin care instructions from the radiation oncology nurse.      Electronically signed by Con Carreon MD 5/5/2021  13:43 CDT

## 2021-05-06 ENCOUNTER — HOSPITAL ENCOUNTER (OUTPATIENT)
Dept: RADIATION ONCOLOGY | Facility: HOSPITAL | Age: 46
Discharge: HOME | End: 2021-05-06

## 2021-05-06 PROCEDURE — 77412 RADIATION TX DELIVERY LVL 3: CPT | Performed by: RADIOLOGY

## 2021-05-06 PROCEDURE — G6002 STEREOSCOPIC X-RAY GUIDANCE: HCPCS | Performed by: RADIOLOGY

## 2021-05-06 PROCEDURE — 77427 RADIATION TX MANAGEMENT X5: CPT | Performed by: RADIOLOGY

## 2021-05-07 ENCOUNTER — HOSPITAL ENCOUNTER (OUTPATIENT)
Dept: RADIATION ONCOLOGY | Facility: HOSPITAL | Age: 46
Discharge: HOME | End: 2021-05-07

## 2021-05-07 PROCEDURE — 77417 THER RADIOLOGY PORT IMAGE(S): CPT | Performed by: RADIOLOGY

## 2021-05-07 PROCEDURE — 77412 RADIATION TX DELIVERY LVL 3: CPT | Performed by: RADIOLOGY

## 2021-05-07 PROCEDURE — G6002 STEREOSCOPIC X-RAY GUIDANCE: HCPCS | Performed by: RADIOLOGY

## 2021-05-10 ENCOUNTER — HOSPITAL ENCOUNTER (OUTPATIENT)
Dept: RADIATION ONCOLOGY | Facility: HOSPITAL | Age: 46
Discharge: HOME | End: 2021-05-10

## 2021-05-10 ENCOUNTER — OFFICE VISIT (OUTPATIENT)
Dept: RADIATION ONCOLOGY | Facility: HOSPITAL | Age: 46
End: 2021-05-10

## 2021-05-10 VITALS
WEIGHT: 278.7 LBS | RESPIRATION RATE: 18 BRPM | BODY MASS INDEX: 45.01 KG/M2 | DIASTOLIC BLOOD PRESSURE: 57 MMHG | TEMPERATURE: 97.7 F | SYSTOLIC BLOOD PRESSURE: 114 MMHG | HEART RATE: 79 BPM

## 2021-05-10 DIAGNOSIS — C50.412 MALIGNANT NEOPLASM OF UPPER-OUTER QUADRANT OF LEFT BREAST IN FEMALE, ESTROGEN RECEPTOR POSITIVE (HCC): Primary | ICD-10-CM

## 2021-05-10 DIAGNOSIS — Z17.0 MALIGNANT NEOPLASM OF UPPER-OUTER QUADRANT OF LEFT BREAST IN FEMALE, ESTROGEN RECEPTOR POSITIVE (HCC): Primary | ICD-10-CM

## 2021-05-10 PROCEDURE — 77412 RADIATION TX DELIVERY LVL 3: CPT | Performed by: RADIOLOGY

## 2021-05-10 PROCEDURE — G6002 STEREOSCOPIC X-RAY GUIDANCE: HCPCS | Performed by: RADIOLOGY

## 2021-05-10 PROCEDURE — 77336 RADIATION PHYSICS CONSULT: CPT | Performed by: RADIOLOGY

## 2021-05-10 PROCEDURE — 99024 POSTOP FOLLOW-UP VISIT: CPT | Performed by: RADIOLOGY

## 2021-05-10 NOTE — PROGRESS NOTES
Radiation Completion Note       Patient: Corrina Jensen   YOB: 1975   Medical Record Number: [id number]   Date of Completion: 05/10/2021      Diagnosis: Cancer Staging  Malignant neoplasm of left breast in female, estrogen receptor positive (CMS/HCC)  Staging form: Breast, AJCC 8th Edition  - Pathologic stage from 3/9/2021: Stage IA (pT1a, pN0(sn), cM0, G1, ER+, TN+, HER2-) - Signed by Katarina Patel MD.  Ductal carcinoma of left breast    History Summary:  Mrs.Susan Jensen is a 45-year-old white female who was noted to have anomalies in the upper outer quadrant of the left breast on recent mammograms.  The patient underwent breast conservation surgery on 01/27/2021.  Examination of resected tissue revealed infiltrating ductal carcinoma measuring 4 x 3 mm.  Tumor extended to the margins of resection.  The patient underwent reexcision of the left breast tumor bed and left sentinel sentinel lymph node biopsies on 02/22/2021.  No residual malignancy was noted within the breast specimen.  Sampled sentinel lymph nodes were negative for metastases.  The patient was evaluated by .  Postoperative breast radiotherapy was recommended.  The patient will review endocrine therapy options with the medical oncology service after completion of radiotherapy.    Treatment Course:   Mrs. Jensen received 5040 cGy to the left breast.  Treatments were given via a tangential beam arrangement with a combination of 6/18 MV x-rays.  Radiotherapy was administered from 04/01/2021 through 05/10/2021.  Tumor bed boost was not deemed necessary due to the well-differentiated nature of the malignancy and negative surgical margins.  Treatment fields were shaped by the use of the multileaf collimator device which also reduced the radiation dose to the underlying  lung and cardiac tissues encompassed in the treated region.    Treatment Tolerance:  Our course of postoperative left breast radiotherapy which was  administered in 28 treatment fractions over 39 elapsed days was well-tolerated.  Mrs. Jensen had the expected acute side effects of fatigue and brisk skin erythema.  Areas of skin breakdown with moist desquamation developed in the axillary and inframammary fold regions during the final weeks of radiotherapy. This cutaneous reaction was treated with Silvadene ointment.     Follow-up Plan:   Mrs. Jensen received detailed skin care instructions from the radiation oncology nurse.  The patient was instructed to continue use of Silvadene ointment until skin healing is complete.  The patient will be seen in follow-up by our service for a skin check in 1 month.  Mrs. Jensen has follow-up appointments with Dalton Guy and [facility] on 05/17/2021 and on 09/30/2021, respectively.  I anticipate that the patient will receive endocrine therapy (probably Arimidex).  I explained to Mrs. Jensen that her post treatment progress will be assessed by clinical breast examinations and serial imaging studies.  The patient was instructed to perform breast and regional lymph node self evaluations on a regular basis.  She should seek immediate medical attention with Dr. Garay should she note any anomalies.  The patient was encouraged to contact me at any time should she have any radiotherapy related problems, questions or special concerns.    Thank you again for allowing our participation in Mrs. Jensen' care.        Electronically signed by Con Carreon MD 5/10/2021 13:53 CDT

## 2021-05-17 ENCOUNTER — LAB (OUTPATIENT)
Dept: ONCOLOGY | Facility: HOSPITAL | Age: 46
End: 2021-05-17

## 2021-05-17 ENCOUNTER — OFFICE VISIT (OUTPATIENT)
Dept: ONCOLOGY | Facility: CLINIC | Age: 46
End: 2021-05-17

## 2021-05-17 VITALS
TEMPERATURE: 97.5 F | WEIGHT: 281.6 LBS | RESPIRATION RATE: 20 BRPM | SYSTOLIC BLOOD PRESSURE: 105 MMHG | BODY MASS INDEX: 45.26 KG/M2 | HEIGHT: 66 IN | HEART RATE: 80 BPM | DIASTOLIC BLOOD PRESSURE: 61 MMHG

## 2021-05-17 DIAGNOSIS — R79.89 ELEVATED LFTS: ICD-10-CM

## 2021-05-17 DIAGNOSIS — D70.9 NEUTROPENIA, UNSPECIFIED TYPE (HCC): ICD-10-CM

## 2021-05-17 DIAGNOSIS — C50.412 MALIGNANT NEOPLASM OF UPPER-OUTER QUADRANT OF LEFT BREAST IN FEMALE, ESTROGEN RECEPTOR POSITIVE (HCC): ICD-10-CM

## 2021-05-17 DIAGNOSIS — Z17.0 MALIGNANT NEOPLASM OF UPPER-OUTER QUADRANT OF LEFT BREAST IN FEMALE, ESTROGEN RECEPTOR POSITIVE (HCC): ICD-10-CM

## 2021-05-17 DIAGNOSIS — D64.9 ANEMIA, UNSPECIFIED TYPE: ICD-10-CM

## 2021-05-17 DIAGNOSIS — Z17.0 MALIGNANT NEOPLASM OF UPPER-OUTER QUADRANT OF LEFT BREAST IN FEMALE, ESTROGEN RECEPTOR POSITIVE (HCC): Primary | ICD-10-CM

## 2021-05-17 DIAGNOSIS — C50.412 MALIGNANT NEOPLASM OF UPPER-OUTER QUADRANT OF LEFT BREAST IN FEMALE, ESTROGEN RECEPTOR POSITIVE (HCC): Primary | ICD-10-CM

## 2021-05-17 DIAGNOSIS — D69.6 THROMBOCYTOPENIA (HCC): ICD-10-CM

## 2021-05-17 LAB
ALBUMIN SERPL-MCNC: 4.2 G/DL (ref 3.5–5.2)
ALBUMIN/GLOB SERPL: 1.6 G/DL
ALP SERPL-CCNC: 114 U/L (ref 39–117)
ALT SERPL W P-5'-P-CCNC: 40 U/L (ref 1–33)
ANION GAP SERPL CALCULATED.3IONS-SCNC: 5 MMOL/L (ref 5–15)
AST SERPL-CCNC: 67 U/L (ref 1–32)
BASOPHILS # BLD AUTO: 0.02 10*3/MM3 (ref 0–0.2)
BASOPHILS NFR BLD AUTO: 0.7 % (ref 0–1.5)
BILIRUB SERPL-MCNC: 0.4 MG/DL (ref 0–1.2)
BUN SERPL-MCNC: 7 MG/DL (ref 6–20)
BUN/CREAT SERPL: 8.6 (ref 7–25)
CALCIUM SPEC-SCNC: 8.8 MG/DL (ref 8.6–10.5)
CHLORIDE SERPL-SCNC: 99 MMOL/L (ref 98–107)
CO2 SERPL-SCNC: 26 MMOL/L (ref 22–29)
CREAT SERPL-MCNC: 0.81 MG/DL (ref 0.57–1)
DEPRECATED RDW RBC AUTO: 43.1 FL (ref 37–54)
EOSINOPHIL # BLD AUTO: 0.07 10*3/MM3 (ref 0–0.4)
EOSINOPHIL NFR BLD AUTO: 2.3 % (ref 0.3–6.2)
ERYTHROCYTE [DISTWIDTH] IN BLOOD BY AUTOMATED COUNT: 14.8 % (ref 12.3–15.4)
ESTRADIOL SERPL HS-MCNC: 43.4 PG/ML
FERRITIN SERPL-MCNC: 20.63 NG/ML (ref 13–150)
FOLATE SERPL-MCNC: 6.47 NG/ML (ref 4.78–24.2)
FSH SERPL-ACNC: 8.31 MIU/ML
GFR SERPL CREATININE-BSD FRML MDRD: 76 ML/MIN/1.73
GLOBULIN UR ELPH-MCNC: 2.6 GM/DL
GLUCOSE SERPL-MCNC: 299 MG/DL (ref 65–99)
HCT VFR BLD AUTO: 32.6 % (ref 34–46.6)
HGB BLD-MCNC: 10.6 G/DL (ref 12–15.9)
HOLD SPECIMEN: NORMAL
IMM GRANULOCYTES # BLD AUTO: 0.01 10*3/MM3 (ref 0–0.05)
IMM GRANULOCYTES NFR BLD AUTO: 0.3 % (ref 0–0.5)
IRON 24H UR-MRATE: 43 MCG/DL (ref 37–145)
IRON SATN MFR SERPL: 12 % (ref 20–50)
LH SERPL-ACNC: 11.6 MIU/ML
LYMPHOCYTES # BLD AUTO: 1.02 10*3/MM3 (ref 0.7–3.1)
LYMPHOCYTES NFR BLD AUTO: 33.3 % (ref 19.6–45.3)
MCH RBC QN AUTO: 26.4 PG (ref 26.6–33)
MCHC RBC AUTO-ENTMCNC: 32.5 G/DL (ref 31.5–35.7)
MCV RBC AUTO: 81.3 FL (ref 79–97)
MONOCYTES # BLD AUTO: 0.42 10*3/MM3 (ref 0.1–0.9)
MONOCYTES NFR BLD AUTO: 13.7 % (ref 5–12)
NEUTROPHILS NFR BLD AUTO: 1.52 10*3/MM3 (ref 1.7–7)
NEUTROPHILS NFR BLD AUTO: 49.7 % (ref 42.7–76)
NRBC BLD AUTO-RTO: 0 /100 WBC (ref 0–0.2)
PLATELET # BLD AUTO: 122 10*3/MM3 (ref 140–450)
PMV BLD AUTO: 10.2 FL (ref 6–12)
POTASSIUM SERPL-SCNC: 4 MMOL/L (ref 3.5–5.2)
PROT SERPL-MCNC: 6.8 G/DL (ref 6–8.5)
RBC # BLD AUTO: 4.01 10*6/MM3 (ref 3.77–5.28)
SODIUM SERPL-SCNC: 130 MMOL/L (ref 136–145)
TIBC SERPL-MCNC: 368 MCG/DL (ref 298–536)
TRANSFERRIN SERPL-MCNC: 247 MG/DL (ref 200–360)
VIT B12 BLD-MCNC: 340 PG/ML (ref 211–946)
WBC # BLD AUTO: 3.06 10*3/MM3 (ref 3.4–10.8)

## 2021-05-17 PROCEDURE — 99214 OFFICE O/P EST MOD 30 MIN: CPT | Performed by: INTERNAL MEDICINE

## 2021-05-17 PROCEDURE — 82728 ASSAY OF FERRITIN: CPT | Performed by: INTERNAL MEDICINE

## 2021-05-17 PROCEDURE — G0463 HOSPITAL OUTPT CLINIC VISIT: HCPCS | Performed by: INTERNAL MEDICINE

## 2021-05-17 PROCEDURE — 83540 ASSAY OF IRON: CPT | Performed by: INTERNAL MEDICINE

## 2021-05-17 PROCEDURE — 84466 ASSAY OF TRANSFERRIN: CPT | Performed by: INTERNAL MEDICINE

## 2021-05-17 PROCEDURE — 82670 ASSAY OF TOTAL ESTRADIOL: CPT | Performed by: INTERNAL MEDICINE

## 2021-05-17 PROCEDURE — 80053 COMPREHEN METABOLIC PANEL: CPT | Performed by: INTERNAL MEDICINE

## 2021-05-17 PROCEDURE — 1125F AMNT PAIN NOTED PAIN PRSNT: CPT | Performed by: INTERNAL MEDICINE

## 2021-05-17 PROCEDURE — 83001 ASSAY OF GONADOTROPIN (FSH): CPT | Performed by: INTERNAL MEDICINE

## 2021-05-17 PROCEDURE — 85025 COMPLETE CBC W/AUTO DIFF WBC: CPT | Performed by: INTERNAL MEDICINE

## 2021-05-17 PROCEDURE — 82746 ASSAY OF FOLIC ACID SERUM: CPT | Performed by: INTERNAL MEDICINE

## 2021-05-17 PROCEDURE — 83002 ASSAY OF GONADOTROPIN (LH): CPT | Performed by: INTERNAL MEDICINE

## 2021-05-17 PROCEDURE — 82607 VITAMIN B-12: CPT | Performed by: INTERNAL MEDICINE

## 2021-05-17 NOTE — PROGRESS NOTES
DATE OF VISIT: 5/18/2021      REASON FOR VISIT: Invasive ductal carcinoma of left breast, pancytopenia, elevated liver function test      HISTORY OF PRESENT ILLNESS:   45-year-old female with medical problem consisting of obesity, diabetes mellitus, fatty liver with elevated liver function test, chronic back pain, neuropathy affecting bilateral lower extremity, hypertension was initially seen in consultation on March 9, 2021 by Dr. Brooks Patel for evaluation of invasive ductal carcinoma of left breast.  Due to small size of tumor chemotherapy was not recommended and patient underwent adjuvant radiation therapy to left breast that was completed on May 10, 2021.  Patient was seen here at clinic in absence of Dr. Brooks Patel to to discuss endocrine therapy option.  Patient complains of chronic back pain.  States her last menstrual bleeding was 2 months ago.  States her menstrual cycle has been progressively getting less frequent over the last 1 year.  Denies any new lymph node enlargement.  Admits to history of knowledge of fatty liver with elevated liver function test in the past as well.  Denies any major weight loss.          Past Medical History, Past Surgical History, Social History, Family History have been reviewed and are without significant changes except as mentioned.    Review of Systems   Constitutional: Positive for fatigue.   Respiratory: Positive for shortness of breath.    Musculoskeletal: Positive for back pain.   Skin:        Positive for skin changes on the left breast due to recent radiation treatment   Neurological: Positive for numbness (Diabetic neuropathy affecting bilateral lower extremity).   Hematological: Negative for adenopathy.      A comprehensive 14 point review of systems was performed and was negative except as mentioned.    Medications:  The current medication list was reviewed in the EMR    ALLERGIES:    Allergies   Allergen Reactions   • Latex Itching and Other (See  "Comments)     Redness of skin   • Strawberry Cough   • Wound Dressing Adhesive Other (See Comments)     Steri Strips cause blistering of skin   • Bactroban [Mupirocin Calcium] Rash   • Neomycin-Bacitracin-Polymyxin [Bacitracin-Neomycin-Polymyxin] Rash   • Other Rash     All antibiotic creams       Objective      Vitals:    05/17/21 1040   BP: 105/61   Pulse: 80   Resp: 20   Temp: 97.5 °F (36.4 °C)   TempSrc: Temporal   Weight: 128 kg (281 lb 9.6 oz)   Height: 167.6 cm (65.98\")   PainSc:   7   PainLoc: Back     Current Status 5/17/2021   ECOG score 0       Physical Exam  Constitutional:       Appearance: She is obese.   Cardiovascular:      Rate and Rhythm: Normal rate and regular rhythm.   Pulmonary:      Breath sounds: Normal breath sounds.   Musculoskeletal:      Comments: Decreased range of motion   Neurological:      Mental Status: She is alert and oriented to person, place, and time.           RECENT LABS:  Glucose   Date Value Ref Range Status   05/17/2021 299 (H) 65 - 99 mg/dL Final     Sodium   Date Value Ref Range Status   05/17/2021 130 (L) 136 - 145 mmol/L Final     Potassium   Date Value Ref Range Status   05/17/2021 4.0 3.5 - 5.2 mmol/L Final     CO2   Date Value Ref Range Status   05/17/2021 26.0 22.0 - 29.0 mmol/L Final     Chloride   Date Value Ref Range Status   05/17/2021 99 98 - 107 mmol/L Final     Anion Gap   Date Value Ref Range Status   05/17/2021 5.0 5.0 - 15.0 mmol/L Final     Creatinine   Date Value Ref Range Status   05/17/2021 0.81 0.57 - 1.00 mg/dL Final     BUN   Date Value Ref Range Status   05/17/2021 7 6 - 20 mg/dL Final     BUN/Creatinine Ratio   Date Value Ref Range Status   05/17/2021 8.6 7.0 - 25.0 Final     Calcium   Date Value Ref Range Status   05/17/2021 8.8 8.6 - 10.5 mg/dL Final     eGFR Non  Amer   Date Value Ref Range Status   05/17/2021 76 >60 mL/min/1.73 Final     Alkaline Phosphatase   Date Value Ref Range Status   05/17/2021 114 39 - 117 U/L Final     Total " Protein   Date Value Ref Range Status   05/17/2021 6.8 6.0 - 8.5 g/dL Final     ALT (SGPT)   Date Value Ref Range Status   05/17/2021 40 (H) 1 - 33 U/L Final     AST (SGOT)   Date Value Ref Range Status   05/17/2021 67 (H) 1 - 32 U/L Final     Total Bilirubin   Date Value Ref Range Status   05/17/2021 0.4 0.0 - 1.2 mg/dL Final     Albumin   Date Value Ref Range Status   05/17/2021 4.20 3.50 - 5.20 g/dL Final     Globulin   Date Value Ref Range Status   05/17/2021 2.6 gm/dL Final     Lab Results   Component Value Date    WBC 3.06 (L) 05/17/2021    HGB 10.6 (L) 05/17/2021    HCT 32.6 (L) 05/17/2021    MCV 81.3 05/17/2021     (L) 05/17/2021     Lab Results   Component Value Date    NEUTROABS 1.52 (L) 05/17/2021    IRON 43 05/17/2021    TIBC 368 05/17/2021    LABIRON 12 (L) 05/17/2021    FERRITIN 20.63 05/17/2021    VDVTATGL33 340 05/17/2021    FOLATE 6.47 05/17/2021     No results found for: , LABCA2, AFPTM, HCGQUANT, , CHROMGRNA, [id number], CEA, REFLABREPO      PATHOLOGY:  * Cannot find OR log *         RADIOLOGY DATA :  No radiology results for the last 7 days        Assessment/Plan     1.  Invasive ductal carcinoma of left breast, [id number], ER positive, FL positive, HER-2/adriana negative  -Patient was initially seen in consultation on March 9, 2021 by Dr. Brooks Patel and recommended adjuvant radiation therapy without any chemotherapy due to small size of tumor.  -Patient completed radiation treatment on May 10, 2021  -As per consultation note recommendation was to start Arimidex if patient has normal bone health.  -After obtaining history from patient, patient states she was having menstrual bleeding until 2 months ago that makes her perimenopausal rather than postmenopausal according to the history.  -Option of endocrine therapy consisting of tamoxifen versus Lupron plus Arimidex were discussed with patient, her niece and her  over the phone.  -Since patient already has existing fatty liver  with elevated liver function test, tamoxifen may not be a good option for her.  -CMP done earlier today again shows elevated AST and ALT.  -Recommend getting an ultrasound of abdomen to look at liver and spleen size and architecture due to her chronic fatty liver to make sure patient is not progressing to cirrhosis.  -We will do blood work today consisting of FSH, LH and estradiol to evaluate her menstrual status.  -We will also get baseline bone density testing next week.  -We will ask patient to return to clinic in 2 weeks with above-mentioned work-up for further recommendation.  -Patient will be provided information to read about endocrine therapy with Arimidex and tamoxifen today along with Lupron.    2.  Anemia, thrombocytopenia, neutropenia:  -Could be secondary to recent radiation to the chest wall and breast area plus or minus nutritional deficiency plus or minus splenomegaly from fatty liver  -Anemia work-up done today shows component of iron deficiency with iron saturation of 12% and ferritin of 20.  We will start patient on ferrous sulfate 1 tablet daily with stool softener.  B12 and folate level is also borderline, will start patient on B12 and folic acid p.o. daily.  Prescription for ferrous sulfate, Colace, B12 and folic acid has been sent to her pharmacy today.  -We will also get an ultrasound of abdomen to look at liver and spleen size and architecture.    3.  Diabetes mellitus with neuropathy    4.  Fatty liver with elevated liver function test                         PHQ-9 Total Score: 2   -Patient is not homicidal or suicidal.  No acute intervention required.    Corrina Jensen reports a pain score of 7.  Given her pain assessment as noted, treatment options were discussed and the following options were decided upon as a follow-up plan to address the patient's pain: continuation of current treatment plan for pain.         Kamaljit Lacey MD  5/18/2021  07:05 CDT        Part of this note may be  an electronic transcription/translation of spoken language to printed text using the Dragon Dictation System.          CC:

## 2021-05-17 NOTE — PATIENT INSTRUCTIONS
Anastrozole tablets  What is this medicine?  ANASTROZOLE (an AS troe zole) is used to treat breast cancer in women who have gone through menopause. Some types of breast cancer depend on estrogen to grow, and this medicine can stop tumor growth by blocking estrogen production.  This medicine may be used for other purposes; ask your health care provider or pharmacist if you have questions.  COMMON BRAND NAME(S): Arimidex  What should I tell my health care provider before I take this medicine?  They need to know if you have any of these conditions:  · bone problems  · heart disease  · high cholesterol  · an unusual or allergic reaction to anastrozole, other medicines, foods, dyes, or preservatives  · pregnant or trying to get pregnant  · breast-feeding  How should I use this medicine?  Take this medicine by mouth with a glass of water. Follow the directions on the prescription label. You can take it with or without food. If it upsets your stomach, take it with food. Take your medicine at regular intervals. Do not take it more often than directed. Do not stop taking except on your doctor's advice.  Talk to your pediatrician regarding the use of this medicine in children. Special care may be needed.  Overdosage: If you think you have taken too much of this medicine contact a poison control center or emergency room at once.  NOTE: This medicine is only for you. Do not share this medicine with others.  What if I miss a dose?  If you miss a dose, take it as soon as you can. If it is almost time for your next dose, take only that dose. Do not take double or extra doses.  What may interact with this medicine?  This medicine may interact with the following medications:  · female hormones, like estrogens or progestins and birth control pills, patches, rings, or injections  · tamoxifen  This list may not describe all possible interactions. Give your health care provider a list of all the medicines, herbs, non-prescription drugs,  or dietary supplements you use. Also tell them if you smoke, drink alcohol, or use illegal drugs. Some items may interact with your medicine.  What should I watch for while using this medicine?  Visit your doctor or health care professional for regular checks on your progress. Let your doctor or health care professional know about any unusual vaginal bleeding.  Do not become pregnant while taking this medicine or for at least 3 weeks after stopping it. Women should inform their doctor if they wish to become pregnant or think they might be pregnant. There is a potential for serious side effects to an unborn child. Talk to your health care professional or pharmacist for more information. Do not breast-feed an infant while taking this medicine or for 2 weeks after stopping it.  This medicine may interfere with the ability to have a child. Talk with your doctor or health care professional if you are concerned about your fertility.  Using this medicine for a long time may increase your risk of low bone mass. Talk to your doctor about bone health.  You should make sure that you get enough calcium and vitamin D while you are taking this medicine. Discuss the foods you eat and the vitamins you take with your health care professional.  What side effects may I notice from receiving this medicine?  Side effects that you should report to your doctor or health care professional as soon as possible:  · allergic reactions like skin rash, itching or hives, swelling of the face, lips, or tongue  · signs and symptoms of a blood clot such as breathing problems; changes in vision; chest pain; sudden headache; pain, swelling, warmth in the leg; trouble speaking; sudden numbness or weakness of the face, arm, or leg  · signs and symptoms of infection like fever or chills; cough; sore throat; pain or trouble passing urine  Side effects that usually do not require medical attention (report to your doctor or health care professional if they  continue or are bothersome):  · bone pain  · dizziness  · hair loss  · headache  · hot flashes  · joint pain  · muscle pain  · signs of decreased red blood cells - unusually weak or tired, feeling faint or lightheaded, falls  · vaginal discharge, itching, or odor in women  This list may not describe all possible side effects. Call your doctor for medical advice about side effects. You may report side effects to FDA at 5-728-XPV-1181.  Where should I keep my medicine?  Keep out of the reach of children.  Store at room temperature between 20 and 25 degrees C (68 and 77 degrees F). Throw away any unused medicine after the expiration date.  NOTE: This sheet is a summary. It may not cover all possible information. If you have questions about this medicine, talk to your doctor, pharmacist, or health care provider.  © 2021 [employer] (2018-12-31 14:56:51)  Tamoxifen oral tablet  What is this medicine?  TAMOXIFEN (ta MOX i fen) blocks the effects of estrogen. It is commonly used to treat breast cancer. It is also used to decrease the chance of breast cancer coming back in women who have received treatment for the disease. It may also help prevent breast cancer in women who have a high risk of developing breast cancer.  This medicine may be used for other purposes; ask your health care provider or pharmacist if you have questions.  COMMON BRAND NAME(S): Nolvadex  What should I tell my health care provider before I take this medicine?  They need to know if you have any of these conditions:  · blood clots  · blood disease  · cataracts or impaired eyesight  · endometriosis  · high calcium levels  · high cholesterol  · irregular menstrual cycles  · liver disease  · stroke  · uterine fibroids  · an unusual reaction to tamoxifen, other medicines, foods, dyes, or preservatives  · pregnant or trying to get pregnant  · breast-feeding  How should I use this medicine?  Take this medicine by mouth with a glass of water.  Follow the directions on the prescription label. You can take it with or without food. Take your medicine at regular intervals. Do not take your medicine more often than directed. Do not stop taking except on your doctor's advice.  A special MedGuide will be given to you by the pharmacist with each prescription and refill. Be sure to read this information carefully each time.  Talk to your pediatrician regarding the use of this medicine in children. While this drug may be prescribed for selected conditions, precautions do apply.  Overdosage: If you think you have taken too much of this medicine contact a poison control center or emergency room at once.  NOTE: This medicine is only for you. Do not share this medicine with others.  What if I miss a dose?  If you miss a dose, take it as soon as you can. If it is almost time for your next dose, take only that dose. Do not take double or extra doses.  What may interact with this medicine?  Do not take this medicine with any of the following medications:  · cisapride  · dronedarone  · pimozide  · thioridazine  This medicine may also interact with the following medications:  · anastrozole  · certain medicines for seizures like carbamazepine, phenobarbital, phenytoin  · letrozole  · other medicines that prolong the QT interval (abnormal heart rhythm)  · paroxetine  · rifampin  · warfarin  This list may not describe all possible interactions. Give your health care provider a list of all the medicines, herbs, non-prescription drugs, or dietary supplements you use. Also tell them if you smoke, drink alcohol, or use illegal drugs. Some items may interact with your medicine.  What should I watch for while using this medicine?  Visit your doctor or health care professional for regular checks on your progress. You will need regular pelvic exams, breast exams, and mammograms. If you are taking this medicine to reduce your risk of getting breast cancer, you should know that this  medicine does not prevent all types of breast cancer. If breast cancer or other problems occur, there is no guarantee that it will be found at an early stage.  Do not become pregnant while taking this medicine or for 2 months after stopping it. Women should inform their doctor if they wish to become pregnant or think they might be pregnant. There is a potential for serious side effects to an unborn child. Talk to your health care professional or pharmacist for more information. Do not breast-feed an infant while taking this medicine or for 3 months after stopping it.  This medicine may interfere with the ability to have a child. Talk with your doctor or health care professional if you are concerned about your fertility.  What side effects may I notice from receiving this medicine?  Side effects that you should report to your doctor or health care professional as soon as possible:  · allergic reactions like skin rash, itching or hives, swelling of the face, lips, or tongue  · changes in vision  · changes in your menstrual cycle  · difficulty walking or talking  · new breast lumps  · numbness  · pelvic pain or pressure  · redness, blistering, peeling or loosening of the skin, including inside the mouth  · signs and symptoms of a dangerous change in heartbeat or heart rhythm like chest pain, dizziness, fast or irregular heartbeat, palpitations, feeling faint or lightheaded, falls, breathing problems  · sudden chest pain  · swelling, pain or tenderness in your calf or leg  · unusual bruising or bleeding  · vaginal discharge that is bloody, brown, or rust  · weakness  · yellowing of the whites of the eyes or skin  Side effects that usually do not require medical attention (report to your doctor or health care professional if they continue or are bothersome):  · fatigue  · hair loss, although uncommon and is usually mild  · headache  · hot flashes  · impotence (in men)  · nausea, vomiting (mild)  · vaginal discharge  (white or clear)  This list may not describe all possible side effects. Call your doctor for medical advice about side effects. You may report side effects to FDA at 3-613-FYK-6795.  Where should I keep my medicine?  Keep out of the reach of children.  Store at room temperature between 20 and 25 degrees C (68 and 77 degrees F). Protect from light. Keep container tightly closed. Throw away any unused medicine after the expiration date.  NOTE: This sheet is a summary. It may not cover all possible information. If you have questions about this medicine, talk to your doctor, pharmacist, or health care provider.  © 2021 [employer] (2020-11-18 17:03:50)  Leuprolide injection  What is this medicine?  LEUPROLIDE (loo PROE lide) is a man-made hormone. It is used to treat the symptoms of prostate cancer. This medicine may also be used to treat children with early onset of puberty. It may be used for other hormonal conditions.  This medicine may be used for other purposes; ask your health care provider or pharmacist if you have questions.  COMMON BRAND NAME(S): Jocelyn  What should I tell my health care provider before I take this medicine?  They need to know if you have any of these conditions:  · diabetes  · heart disease or previous heart attack  · high blood pressure  · high cholesterol  · pain or difficulty passing urine  · spinal cord metastasis  · stroke  · tobacco smoker  · an unusual or allergic reaction to leuprolide, benzyl alcohol, other medicines, foods, dyes, or preservatives  · pregnant or trying to get pregnant  · breast-feeding  How should I use this medicine?  This medicine is for injection under the skin or into a muscle. You will be taught how to prepare and give this medicine. Use exactly as directed. Take your medicine at regular intervals. Do not take your medicine more often than directed.  It is important that you put your used needles and syringes in a special sharps container. Do not put  them in a trash can. If you do not have a sharps container, call your pharmacist or healthcare provider to get one.  A special MedGuide will be given to you by the pharmacist with each prescription and refill. Be sure to read this information carefully each time.  Talk to your pediatrician regarding the use of this medicine in children. While this medicine may be prescribed for children as young as 8 years for selected conditions, precautions do apply.  Overdosage: If you think you have taken too much of this medicine contact a poison control center or emergency room at once.  NOTE: This medicine is only for you. Do not share this medicine with others.  What if I miss a dose?  If you miss a dose, take it as soon as you can. If it is almost time for your next dose, take only that dose. Do not take double or extra doses.  What may interact with this medicine?  Do not take this medicine with any of the following medications:  · chasteberry  · cisapride  · dronedarone  · pimozide  · thioridazine  This medicine may also interact with the following medications:  · herbal or dietary supplements, like black cohosh or DHEA  · female hormones, like estrogens or progestins and birth control pills, patches, rings, or injections  · male hormones, like testosterone  · other medicines that prolong the QT interval (abnormal heart rhythm)  This list may not describe all possible interactions. Give your health care provider a list of all the medicines, herbs, non-prescription drugs, or dietary supplements you use. Also tell them if you smoke, drink alcohol, or use illegal drugs. Some items may interact with your medicine.  What should I watch for while using this medicine?  Visit your doctor or health care professional for regular checks on your progress. During the first week, your symptoms may get worse, but then will improve as you continue your treatment. You may get hot flashes, increased bone pain, increased difficulty passing  urine, or an aggravation of nerve symptoms. Discuss these effects with your doctor or health care professional, some of them may improve with continued use of this medicine.  Female patients may experience a menstrual cycle or spotting during the first 2 months of therapy with this medicine. If this continues, contact your doctor or health care professional.  This medicine may increase blood sugar. Ask your healthcare provider if changes in diet or medicines are needed if you have diabetes.  What side effects may I notice from receiving this medicine?  Side effects that you should report to your doctor or health care professional as soon as possible:  · allergic reactions like skin rash, itching or hives, swelling of the face, lips, or tongue  · breathing problems  · chest pain  · depression or memory disorders  · pain in your legs or groin  · pain at site where injected  · severe headache  · signs and symptoms of high blood sugar such as being more thirsty or hungry or having to urinate more than normal. You may also feel very tired or have blurry vision  · swelling of the feet and legs  · visual changes  · vomiting  Side effects that usually do not require medical attention (report to your doctor or health care professional if they continue or are bothersome):  · breast swelling or tenderness  · decrease in sex drive or performance  · diarrhea  · hot flashes  · loss of appetite  · muscle, joint, or bone pains  · nausea  · redness or irritation at site where injected  · skin problems or acne  This list may not describe all possible side effects. Call your doctor for medical advice about side effects. You may report side effects to FDA at 0-405-FDA-2793.  Where should I keep my medicine?  Keep out of the reach of children.  Store below 25 degrees C (77 degrees F). Do not freeze. Protect from light. Do not use if it is not clear or if there are particles present. Throw away any unused medicine after the expiration  date.  NOTE: This sheet is a summary. It may not cover all possible information. If you have questions about this medicine, talk to your doctor, pharmacist, or health care provider.  © 2021 [employer] (2020-11-18 10:57:41)  Goserelin injection  What is this medicine?  GOSERELIN (GOE se rel in) is similar to a hormone found in the body. It lowers the amount of sex hormones that the body makes. Men will have lower testosterone levels and women will have lower estrogen levels while taking this medicine. In men, this medicine is used to treat prostate cancer; the injection is either given once per month or once every 12 weeks. A once per month injection (only) is used to treat women with endometriosis, dysfunctional uterine bleeding, or advanced breast cancer.  This medicine may be used for other purposes; ask your health care provider or pharmacist if you have questions.  COMMON BRAND NAME(S): Zoladex  What should I tell my health care provider before I take this medicine?  They need to know if you have any of these conditions:  · bone problems  · diabetes  · heart disease  · history of irregular heartbeat  · an unusual or allergic reaction to goserelin, other medicines, foods, dyes, or preservatives  · pregnant or trying to get pregnant  · breast-feeding  How should I use this medicine?  This medicine is for injection under the skin. It is given by a health care professional in a hospital or clinic setting.  Talk to your pediatrician regarding the use of this medicine in children. Special care may be needed.  Overdosage: If you think you have taken too much of this medicine contact a poison control center or emergency room at once.  NOTE: This medicine is only for you. Do not share this medicine with others.  What if I miss a dose?  It is important not to miss your dose. Call your doctor or health care professional if you are unable to keep an appointment.  What may interact with this medicine?  Do not  take this medicine with any of the following medications:  · cisapride  · dronedarone  · pimozide  · thioridazine  This medicine may also interact with the following medications:  · other medicines that prolong the QT interval (an abnormal heart rhythm)  This list may not describe all possible interactions. Give your health care provider a list of all the medicines, herbs, non-prescription drugs, or dietary supplements you use. Also tell them if you smoke, drink alcohol, or use illegal drugs. Some items may interact with your medicine.  What should I watch for while using this medicine?  Visit your doctor or health care provider for regular checks on your progress. Your symptoms may appear to get worse during the first weeks of this therapy. Tell your doctor or healthcare provider if your symptoms do not start to get better or if they get worse after this time.  Your bones may get weaker if you take this medicine for a long time. If you smoke or frequently drink alcohol you may increase your risk of bone loss. A family history of osteoporosis, chronic use of drugs for seizures (convulsions), or corticosteroids can also increase your risk of bone loss. Talk to your doctor about how to keep your bones strong.  This medicine should stop regular monthly menstruation in women. Tell your doctor if you continue to menstruate.  Women should not become pregnant while taking this medicine or for 12 weeks after stopping this medicine. Women should inform their doctor if they wish to become pregnant or think they might be pregnant. There is a potential for serious side effects to an unborn child. Talk to your health care professional or pharmacist for more information. Do not breast-feed an infant while taking this medicine.  Men should inform their doctors if they wish to father a child. This medicine may lower sperm counts. Talk to your health care professional or pharmacist for more information.  This medicine may increase  blood sugar. Ask your healthcare provider if changes in diet or medicines are needed if you have diabetes.  What side effects may I notice from receiving this medicine?  Side effects that you should report to your doctor or health care professional as soon as possible:  · allergic reactions like skin rash, itching or hives, swelling of the face, lips, or tongue  · bone pain  · breathing problems  · changes in vision  · chest pain  · feeling faint or lightheaded, falls  · fever, chills  · pain, swelling, warmth in the leg  · pain, tingling, numbness in the hands or feet  · signs and symptoms of high blood sugar such as being more thirsty or hungry or having to urinate more than normal. You may also feel very tired or have blurry vision  · signs and symptoms of low blood pressure like dizziness; feeling faint or lightheaded, falls; unusually weak or tired  · stomach pain  · swelling of the ankles, feet, hands  · trouble passing urine or change in the amount of urine  · unusually high or low blood pressure  · unusually weak or tired  Side effects that usually do not require medical attention (report to your doctor or health care professional if they continue or are bothersome):  · change in sex drive or performance  · changes in breast size in both males and females  · changes in emotions or moods  · headache  · hot flashes  · irritation at site where injected  · loss of appetite  · skin problems like acne, dry skin  · vaginal dryness  This list may not describe all possible side effects. Call your doctor for medical advice about side effects. You may report side effects to FDA at 6-432-FDA-2517.  Where should I keep my medicine?  This drug is given in a hospital or clinic and will not be stored at home.  NOTE: This sheet is a summary. It may not cover all possible information. If you have questions about this medicine, talk to your doctor, pharmacist, or health care provider.  © 2021 [employer] (2020-04-06  14:05:56)

## 2021-05-18 RX ORDER — LANOLIN ALCOHOL/MO/W.PET/CERES
1000 CREAM (GRAM) TOPICAL DAILY
Qty: 90 TABLET | Refills: 1 | Status: SHIPPED | OUTPATIENT
Start: 2021-05-18

## 2021-05-18 RX ORDER — FERROUS SULFATE 325(65) MG
325 TABLET ORAL
Qty: 30 TABLET | Refills: 3 | Status: ON HOLD | OUTPATIENT
Start: 2021-05-18 | End: 2021-08-25

## 2021-05-18 RX ORDER — DOCUSATE SODIUM 100 MG/1
100 CAPSULE, LIQUID FILLED ORAL 2 TIMES DAILY PRN
Qty: 60 CAPSULE | Refills: 2 | Status: SHIPPED | OUTPATIENT
Start: 2021-05-18 | End: 2022-05-13

## 2021-05-18 RX ORDER — FOLIC ACID 1 MG/1
1 TABLET ORAL DAILY
Qty: 90 TABLET | Refills: 1 | Status: ON HOLD | OUTPATIENT
Start: 2021-05-18 | End: 2021-08-25

## 2021-05-19 ENCOUNTER — TELEPHONE (OUTPATIENT)
Dept: ONCOLOGY | Facility: HOSPITAL | Age: 46
End: 2021-05-19

## 2021-05-19 NOTE — TELEPHONE ENCOUNTER
Called and spoke with pt regarding lab results and taking medications as ordered by Dr. Lacey, v/u obtained.

## 2021-05-19 NOTE — TELEPHONE ENCOUNTER
----- Message from Kamaljit Lacey MD sent at 5/18/2021  7:07 AM CDT -----  Please let patient know, her white blood cell count was little lower than normal at 3.06, hemoglobin was 10.2, platelet count was lower than normal 822,000.  Her low blood count could be secondary to recent radiation treatment that she had.  Her iron level, B12 and folic acid level is coming borderline low in blood.  I have sent prescription for ferrous sulfate, Colace, B12 and folic acid to her pharmacy.  Her liver enzymes were again high today, I have ordered ultrasound of abdomen to look at liver and spleen size prior to next clinic visit.  Her glucose was elevated at 299, she needs to control her diabetes better.  Thank you

## 2021-05-21 ENCOUNTER — HOSPITAL ENCOUNTER (OUTPATIENT)
Dept: ULTRASOUND IMAGING | Facility: HOSPITAL | Age: 46
Discharge: HOME | End: 2021-05-21
Admitting: INTERNAL MEDICINE

## 2021-05-21 DIAGNOSIS — D70.9 NEUTROPENIA, UNSPECIFIED TYPE (HCC): ICD-10-CM

## 2021-05-21 DIAGNOSIS — D69.6 THROMBOCYTOPENIA (HCC): ICD-10-CM

## 2021-05-21 DIAGNOSIS — R79.89 ELEVATED LFTS: ICD-10-CM

## 2021-05-21 PROCEDURE — 76700 US EXAM ABDOM COMPLETE: CPT

## 2021-06-02 ENCOUNTER — OFFICE VISIT (OUTPATIENT)
Dept: ONCOLOGY | Facility: CLINIC | Age: 46
End: 2021-06-02

## 2021-06-02 VITALS
HEART RATE: 81 BPM | DIASTOLIC BLOOD PRESSURE: 57 MMHG | TEMPERATURE: 97.9 F | WEIGHT: 278.1 LBS | HEIGHT: 66 IN | RESPIRATION RATE: 20 BRPM | BODY MASS INDEX: 44.69 KG/M2 | SYSTOLIC BLOOD PRESSURE: 98 MMHG

## 2021-06-02 DIAGNOSIS — D61.818 PANCYTOPENIA (HCC): ICD-10-CM

## 2021-06-02 DIAGNOSIS — C50.412 MALIGNANT NEOPLASM OF UPPER-OUTER QUADRANT OF LEFT BREAST IN FEMALE, ESTROGEN RECEPTOR POSITIVE (HCC): Primary | Chronic | ICD-10-CM

## 2021-06-02 DIAGNOSIS — Z17.0 MALIGNANT NEOPLASM OF UPPER-OUTER QUADRANT OF LEFT BREAST IN FEMALE, ESTROGEN RECEPTOR POSITIVE (HCC): Primary | Chronic | ICD-10-CM

## 2021-06-02 PROBLEM — C50.912 MALIGNANT NEOPLASM OF LEFT BREAST IN FEMALE, ESTROGEN RECEPTOR POSITIVE (HCC): Chronic | Status: ACTIVE | Noted: 2021-02-12

## 2021-06-02 PROCEDURE — 1125F AMNT PAIN NOTED PAIN PRSNT: CPT | Performed by: INTERNAL MEDICINE

## 2021-06-02 PROCEDURE — 1123F ACP DISCUSS/DSCN MKR DOCD: CPT | Performed by: INTERNAL MEDICINE

## 2021-06-02 PROCEDURE — G0463 HOSPITAL OUTPT CLINIC VISIT: HCPCS | Performed by: INTERNAL MEDICINE

## 2021-06-02 PROCEDURE — 99214 OFFICE O/P EST MOD 30 MIN: CPT | Performed by: INTERNAL MEDICINE

## 2021-06-02 RX ORDER — ANASTROZOLE 1 MG/1
1 TABLET ORAL DAILY
Qty: 30 TABLET | Refills: 2 | Status: ON HOLD | OUTPATIENT
Start: 2021-06-02 | End: 2021-08-25

## 2021-06-02 NOTE — PROGRESS NOTES
DATE OF VISIT: 6/2/2021      REASON FOR VISIT: Invasive ductal carcinoma of left breast, pancytopenia, elevated liver function test      HISTORY OF PRESENT ILLNESS:   45-year-old female with medical problem consisting of obesity, diabetes mellitus, fatty liver with elevated liver function test, chronic back pain, neuropathy affecting bilateral lower extremity, hypertension was initially seen in consultation on March 9, 2021 by Dr. Logan Bellamy for evaluation of invasive ductal carcinoma of left breast.  Patient was seen here at [facility] on May 17, 2021 in absence of Dr. Brooks Patel and had a work-up done with blood work as well as bone density.  Patient is here to discuss the results as well as further recommendation regarding endocrine therapy.  Denies any new lymph node enlargement.  Denies any bleeding.        Past Medical History, Past Surgical History, Social History, Family History have been reviewed and are without significant changes except as mentioned.    Review of Systems   Constitutional: Positive for fatigue.   Respiratory: Positive for shortness of breath.    Musculoskeletal: Positive for back pain.   Neurological: Positive for numbness (Positive for diabetic neuropathy affecting bilateral lower extremity).   Hematological: Negative for adenopathy.      A comprehensive 14 point review of systems was performed and was negative except as mentioned.    Medications:  The current medication list was reviewed in the EMR    ALLERGIES:    Allergies   Allergen Reactions   • Latex Itching and Other (See Comments)     Redness of skin   • Strawberry Cough   • Wound Dressing Adhesive Other (See Comments)     Steri Strips cause blistering of skin   • Bactroban [Mupirocin Calcium] Rash   • Neomycin-Bacitracin-Polymyxin [Bacitracin-Neomycin-Polymyxin] Rash   • Other Rash     All antibiotic creams       Objective      Vitals:    06/02/21 1000   BP: 98/57   Pulse: 81   Resp: 20   Temp: 97.9 °F (36.6  "°C)   TempSrc: Temporal   Weight: 126 kg (278 lb 1.6 oz)   Height: 167.6 cm (65.98\")   PainSc:   8   PainLoc: Generalized     Current Status 6/2/2021   ECOG score 0       Physical Exam  Constitutional:       Appearance: She is obese.   Cardiovascular:      Rate and Rhythm: Normal rate and regular rhythm.   Pulmonary:      Breath sounds: Normal breath sounds.   Neurological:      Mental Status: She is alert and oriented to person, place, and time.           RECENT LABS:  Glucose   Date Value Ref Range Status   05/17/2021 299 (H) 65 - 99 mg/dL Final     Sodium   Date Value Ref Range Status   05/17/2021 130 (L) 136 - 145 mmol/L Final     Potassium   Date Value Ref Range Status   05/17/2021 4.0 3.5 - 5.2 mmol/L Final     CO2   Date Value Ref Range Status   05/17/2021 26.0 22.0 - 29.0 mmol/L Final     Chloride   Date Value Ref Range Status   05/17/2021 99 98 - 107 mmol/L Final     Anion Gap   Date Value Ref Range Status   05/17/2021 5.0 5.0 - 15.0 mmol/L Final     Creatinine   Date Value Ref Range Status   05/17/2021 0.81 0.57 - 1.00 mg/dL Final     BUN   Date Value Ref Range Status   05/17/2021 7 6 - 20 mg/dL Final     BUN/Creatinine Ratio   Date Value Ref Range Status   05/17/2021 8.6 7.0 - 25.0 Final     Calcium   Date Value Ref Range Status   05/17/2021 8.8 8.6 - 10.5 mg/dL Final     eGFR Non  Amer   Date Value Ref Range Status   05/17/2021 76 >60 mL/min/1.73 Final     Alkaline Phosphatase   Date Value Ref Range Status   05/17/2021 114 39 - 117 U/L Final     Total Protein   Date Value Ref Range Status   05/17/2021 6.8 6.0 - 8.5 g/dL Final     ALT (SGPT)   Date Value Ref Range Status   05/17/2021 40 (H) 1 - 33 U/L Final     AST (SGOT)   Date Value Ref Range Status   05/17/2021 67 (H) 1 - 32 U/L Final     Total Bilirubin   Date Value Ref Range Status   05/17/2021 0.4 0.0 - 1.2 mg/dL Final     Albumin   Date Value Ref Range Status   05/17/2021 4.20 3.50 - 5.20 g/dL Final     Globulin   Date Value Ref Range Status "   05/17/2021 2.6 gm/dL Final     Lab Results   Component Value Date    WBC 3.06 (L) 05/17/2021    HGB 10.6 (L) 05/17/2021    HCT 32.6 (L) 05/17/2021    MCV 81.3 05/17/2021     (L) 05/17/2021     Lab Results   Component Value Date    NEUTROABS 1.52 (L) 05/17/2021    IRON 43 05/17/2021    TIBC 368 05/17/2021    LABIRON 12 (L) 05/17/2021    FERRITIN 20.63 05/17/2021    XFQPGCVH62 340 05/17/2021    FOLATE 6.47 05/17/2021     No results found for: , LABCA2, AFPTM, HCGQUANT, , CHROMGRNA, [id number], CEA, REFLABREPO      Luteinizing Hormone      Specimen Information: Blood        Component   Ref Range & Units 2 wk ago   LH   mIU/mL 11.60    Resulting Agency [facility]      Narrative  Performed by: [facility]  LH Reference Ranges:     Adult Males:  1.7-8.6 mIU/ml     Adult Females:   Folicular Phase  2.4-12.6 mIU/ml   Ovulation Phase  14.0-95.6 mIU/ml   Lutal Phase      1.0-11.4 mIU/ml   Postmenopausal   7.7-58.5 mIU/ml     Results may be falsely decreased if patient taking Biotin.       Specimen Collected: 05/17/21 11:18 Last Resulted: 05/17/21 20:15                 Follicle Stimulating Hormone    Specimen Information: Blood        Component   Ref Range & Units 2 wk ago   FSH   mIU/mL 8.31    Resulting Agency [facility]      Narrative  Performed by: [facility]  FSH Reference Ranges:     Adult Males:  1.5-12.4 mIU/mL     Adult Females:   Folicular Phase  3.5-12.5 mIU/ml   Ovulation Phase  4.7-21.5 mIU/ml   Lutal Phase      1.7-7.7 mIU/ml   Postmenopausal   25.8-134.8 mIU/ml     Results may be falsely decreased if patient taking Biotin.       Specimen Collected: 05/17/21 11:18 Last Resulted: 05/17/21 20:15                   PATHOLOGY:  * Cannot find OR log *         RADIOLOGY DATA :  Ultrasound of abdomen done on May 21, 2021 showed:    IMPRESSION:  Impression:  Mild splenomegaly with the spleen measuring 15.8 cm in length .  Mild hepatomegaly with the liver measuring 17.5 cm in length.  There is also fatty  metamorphosis of the liver.  There is a minimally complex cyst in the left kidney measuring  1.6 x 1.6 x 1.8 cm and can be followed up in three months  interval           DEXA scan done on May 18, 2021 showed:    FINDINGS:  The exam is performed using the HoloPlur[employer] horizon C unit.  Images  are of satisfactory quality.     Total Lumbar spine:   1.057     gm/cm2     T-Score 0.1         Total Left Hip:            0.908     gm/cm2     T-Score 0.6         IMPRESSION:  This patient has bone mineral density parameters in  both the lumbar spine and the left hip in the normal range i.e.,  no evidence of osteoporosis in comparison with normal controls.        No radiology results for the last 7 days        Assessment/Plan     1.  Invasive ductal carcinoma of left breast, HU2SSR1, ER positive, DE positive, HER-2/adriana negative  -Patient is still perimenopausal with last menstrual bleeding happening in April 2021  -Work-up done with FSH and LH on May 17, 2021 is consistent with premenopausal range.  -Ideally tamoxifen is a treatment of choice in this situation, however patient does have a severe fatty liver with splenomegaly on recent ultrasound and has persistent elevated liver function test.  Would not recommend starting tamoxifen because of this reason  -Recommend starting patient on Lupron 3.75 mg every 28 days.  Since there is shortage of this dose of Lupron, recommend getting Lupron 7.5 mg every 56 days.  -Recommend starting Arimidex along with Lupron.  -Patient was provided information to read about Arimidex and Lupron at last clinic visit she does not have any question or concern  -Prescription for Arimidex has been sent to her pharmacy.  Patient was instructed to start taking Arimidex after starting Lupron injection next week.  -Recommend follow-up with Dr. Brooks Patel in 2 months for further recommendation.    2.  Pancytopenia:  -Secondary to fatty liver with splenomegaly  -Patient has been started on ferrous sulfate  1 tablet p.o. daily along with B12 and folic acid daily  -Recommend repeating anemia work-up around September 2021    3.  Complex kidney cyst right side:  -Recommend repeating renal ultrasound around September 2021    4.  Diabetes mellitus with neuropathy    5.  Fatty liver with splenomegaly.  -Again patient was counseled about importance of controlling blood glucose adequately as well as weight loss.    6. Advance Care Planning: For now patient remains full code and is able to make decisions.  Patient has health care surrogate mentioned on chart.                    PHQ-9 Total Score:       Corrina Jensen reports a pain score of 8.  Given her pain assessment as noted, treatment options were discussed and the following options were decided upon as a follow-up plan to address the patient's pain: continuation of current treatment plan for pain.         Kamaljit Lacey MD  6/2/2021  18:04 CDT        Part of this note may be an electronic transcription/translation of spoken language to printed text using the Dragon Dictation System.          CC:

## 2021-06-09 ENCOUNTER — INFUSION (OUTPATIENT)
Dept: ONCOLOGY | Facility: HOSPITAL | Age: 46
End: 2021-06-09

## 2021-06-09 VITALS
SYSTOLIC BLOOD PRESSURE: 143 MMHG | RESPIRATION RATE: 20 BRPM | DIASTOLIC BLOOD PRESSURE: 73 MMHG | HEART RATE: 102 BPM | TEMPERATURE: 97.5 F

## 2021-06-09 DIAGNOSIS — Z17.0 MALIGNANT NEOPLASM OF UPPER-OUTER QUADRANT OF LEFT BREAST IN FEMALE, ESTROGEN RECEPTOR POSITIVE (HCC): Primary | ICD-10-CM

## 2021-06-09 DIAGNOSIS — C50.412 MALIGNANT NEOPLASM OF UPPER-OUTER QUADRANT OF LEFT BREAST IN FEMALE, ESTROGEN RECEPTOR POSITIVE (HCC): Primary | ICD-10-CM

## 2021-06-09 PROCEDURE — 96402 CHEMO HORMON ANTINEOPL SQ/IM: CPT | Performed by: NURSE PRACTITIONER

## 2021-06-09 PROCEDURE — 25010000002 LEUPROLIDE ACETATE PER 7.5 MG: Performed by: INTERNAL MEDICINE

## 2021-06-09 RX ADMIN — LEUPROLIDE ACETATE 7.5 MG: KIT at 15:12

## 2021-06-17 ENCOUNTER — OFFICE VISIT (OUTPATIENT)
Dept: RADIATION ONCOLOGY | Facility: HOSPITAL | Age: 46
End: 2021-06-17

## 2021-06-17 ENCOUNTER — HOSPITAL ENCOUNTER (OUTPATIENT)
Dept: RADIATION ONCOLOGY | Facility: HOSPITAL | Age: 46
Setting detail: RADIATION/ONCOLOGY SERIES
End: 2021-06-17

## 2021-06-17 VITALS
HEART RATE: 90 BPM | SYSTOLIC BLOOD PRESSURE: 106 MMHG | WEIGHT: 276.2 LBS | BODY MASS INDEX: 44.6 KG/M2 | RESPIRATION RATE: 20 BRPM | TEMPERATURE: 98.1 F | DIASTOLIC BLOOD PRESSURE: 52 MMHG

## 2021-06-17 DIAGNOSIS — Z17.0 MALIGNANT NEOPLASM OF UPPER-OUTER QUADRANT OF LEFT BREAST IN FEMALE, ESTROGEN RECEPTOR POSITIVE (HCC): Primary | ICD-10-CM

## 2021-06-17 DIAGNOSIS — C50.412 MALIGNANT NEOPLASM OF UPPER-OUTER QUADRANT OF LEFT BREAST IN FEMALE, ESTROGEN RECEPTOR POSITIVE (HCC): Primary | ICD-10-CM

## 2021-06-17 PROCEDURE — 99213 OFFICE O/P EST LOW 20 MIN: CPT | Performed by: RADIOLOGY

## 2021-06-17 NOTE — PROGRESS NOTES
Established Patient Visit      Patient: Corrina Jensen   YOB: 1975   Medical Record Number: [id number]   Date of Visit: June 17, 2021   Primary Diagnosis: Cancer Staging  Stage IA (pT1a, pN0(sn), cM0, G1, ER+, RI+, HER2-.  Ductal carcinoma of the left breast, status post breast conservation surgery.                                             History of Present Illness:  The patient is a 46-year-old white female with ductal carcinoma of the left breast treated by breast conservation surgery and negative sentinel lymph node biopsies.  The patient completed a course of postoperative breast radiotherapy on 05/10/2021.  Please see the end of treatment note of that same date for radiotherapy details.  The patient returns today for follow-up evaluation/skin check.    Mrs. Jensen had developed areas of skin breakdown with moist desquamation in the axillary and inframammary fold regions during the final weeks of radiotherapy.  The patient states that the cutaneous reaction has completely resolved with Silvadene ointment.  The patient notes occasional fleeting left breast pain.  Mrs. Jensen denies detection of new breast masses and regional adenopathy, chest wall discomfort, arm swelling, and pulmonary symptoms.  The patient was recently seen by medical oncologist.  Patient is taking Lupron and Arimidex.    (Old medical records were requested, reviewed, and summarized in the HPI)    Review of Systems       All other systems reviewed and are negative.    Past Medical History:   Diagnosis Date   • Acute atopic conjunctivitis    • Acute bronchitis    • Allergic rhinitis due to pollen    • Arrhythmia     wearing heart monitor    • Arthritis    • Asthma    • Bilateral foot pain    • Breast cancer (CMS/HCC)    • Breast cyst    • Breast lump    • Chest pain    • Chronic low back pain    • Contact dermatitis due to drugs and medicine    • Cyst of ovary    • Diabetes mellitus (CMS/HCC)    • GERD (gastroesophageal  reflux disease)    • Hyperlipidemia    • Hypertension    • Infestation by Sarcoptes scabiei annalisa hominis    • Low back pain    • Mastodynia    • Nausea and vomiting    • On long term drug therapy    • Pain in wrist    • Plantar fasciitis    • Rash    • Skin disorder     breast-possible superficial cellulitis   • Spinal stenosis    • Tinea pedis    • Tobacco dependence syndrome    • Upper respiratory infection    • Vitamin D deficiency    • Wheezing         Past Surgical History:   Procedure Laterality Date   • BREAST BIOPSY     • BREAST CYST ASPIRATION     • BREAST LUMPECTOMY Left 1/27/2021    Procedure: LEFT BREAST LUMPECTOMY WITH NEEDLE LOCALIZATION Needle localization to be done [facility] by radiology first   @07:00 a.m.;  Surgeon: Mat Garay MD;  Location: [facility] OR;  Service: General;  Laterality: Left;   • BREAST LUMPECTOMY WITH SENTINEL NODE BIOPSY AND AXILLARY NODE DISSECTION Left 2/22/2021    Procedure: LEFT BREAST LUMPECTOMY WITH SENTINEL NODE BIOPSY. REMOVAL OF CYST FROM LEFT CHEEK                              (INJECTION @07: 00 A.M);  Surgeon: Mat Garay MD;  Location: [facility] OR;  Service: General;  Laterality: Left;   • BREAST SURGERY      excision breast lesion   • CARDIAC CATHETERIZATION N/A 7/3/2018    Procedure: Coronary angiography;  Surgeon: Arun Cadet MD;  Location: [facility] CATH INVASIVE LOCATION;  Service: Cardiovascular   • CARDIAC CATHETERIZATION     • CHOLECYSTECTOMY     • INJECTION OF MEDICATION      Kenalog (4)      Family History   Problem Relation Age of Onset   • Coronary artery disease Other    • Breast cancer Mother    • Breast cancer Maternal Aunt    • Diabetes Father         Social History     Socioeconomic History   • Marital status:      Spouse name: Not on file   • Number of children: Not on file   • Years of education: Not on file   • Highest education level: Not on file   Tobacco Use   • Smoking status: Current Every Day  Smoker     Types: Electronic Cigarette   • Smokeless tobacco: Never Used   Vaping Use   • Vaping Use: Every day   • Substances: Nicotine   • Devices: Refillable tank   Substance and Sexual Activity   • Alcohol use: No   • Drug use: No   • Sexual activity: Defer     The patient uses electronic cigarettes.  She is a non drinker    Allergies:  Latex, Strawberry, Wound dressing adhesive, Bactroban [mupirocin calcium], Neomycin-bacitracin-polymyxin [bacitracin-neomycin-polymyxin], and Other   Prior to Admission medications    Medication Sig Start Date End Date Taking? Authorizing Provider   amitriptyline (ELAVIL) 50 MG tablet Take 50 mg by mouth Every Night. 12/2/20  Yes Caitie Clark MD   anastrozole (ARIMIDEX) 1 MG tablet Take 1 tablet by mouth Daily. 6/2/21  Yes Kamaljit Lacey MD   atorvastatin (LIPITOR) 20 MG tablet Take 20 mg by mouth Every Night.   Yes Caitie Clark MD B-MELVIN ULTRAFINE III SHORT PEN 31G X 8 MM misc  12/8/20  Yes Caitie Clark MD   cetirizine (zyrTEC) 10 MG tablet Take 10 mg by mouth Daily.   Yes Caitie Clark MD   docusate sodium (COLACE) 100 MG capsule Take 1 capsule by mouth 2 (Two) Times a Day As Needed for Constipation. 5/18/21  Yes Kamaljit Lacey MD   Dulaglutide (TRULICITY) 1.5 MG/0.5ML solution pen-injector Inject 1.5 mg under the skin into the appropriate area as directed 1 (One) Time Per Week.   Yes Emergency, Nurse Angeles, RN   Dulaglutide (Trulicity) 1.5 MG/0.5ML solution pen-injector 1 (One) Time Per Week.   Yes Caitie Clark MD   ferrous sulfate 325 (65 FE) MG tablet Take 1 tablet by mouth Daily With Breakfast. 5/18/21  Yes Kamaljit Lacey MD   folic acid (FOLVITE) 1 MG tablet Take 1 tablet by mouth Daily. 5/18/21  Yes Kamaljit Lacey MD   FREESTYLE LITE test strip 1 stick by Percutaneous route 4 (Four) Times a Day. 7/23/18  Yes Caitie Clark MD   glyburide micronized (GLYNASE) 6 MG tablet  2/28/21  Yes Caitie Clark MD   GLYBURIDE PO  Take 6 mg by mouth Daily.   Yes Caitie Clark MD   Insulin Glargine (BASAGLAR KWIKPEN) 100 UNIT/ML injection pen Inject 10 Units under the skin into the appropriate area as directed Every Night.   Yes Caitie Clark MD   Lancets (FREESTYLE) lancets 1 each by Other route 4 (Four) Times a Day. 7/25/18  Yes Caitie Clark MD   losartan (COZAAR) 25 MG tablet Take 1 tablet by mouth Daily. 3/24/20  Yes Yue Howard MD   LYRICA 150 MG capsule Take 150 mg by mouth 3 (Three) Times a Day. 7/12/18  Yes Caitie Clark MD   meloxicam (MOBIC) 15 MG tablet Take 15 mg by mouth Daily. 3/24/17  Yes Caitie Clark MD   metFORMIN (GLUCOPHAGE) 1000 MG tablet Take 1,000 mg by mouth 2 (Two) Times a Day. 7/25/18  Yes Caitie Clark MD   OLANZapine (zyPREXA) 10 MG tablet Take 10 mg by mouth Every Night. 12/26/17  Yes Caitie Clark MD   omeprazole (priLOSEC) 40 MG capsule Take 40 mg by mouth Daily. 5/25/15  Yes Caitie Clark MD   oxyCODONE-acetaminophen (PERCOCET)  MG per tablet Take 1 tablet by mouth Every 6 (Six) Hours.   Yes Caitie Clark MD   tiZANidine (ZANAFLEX) 4 MG tablet Take 4 mg by mouth Every 6 (Six) Hours As Needed. 9/21/17  Yes Caitie Clark MD   triamcinolone (KENALOG) 0.5 % cream Apply  topically to the appropriate area as directed 3 (Three) Times a Day As Needed.   Yes Caitie Clark MD   VENTOLIN  (90 Base) MCG/ACT inhaler Inhale 2 puffs Every 4 (Four) Hours. 8/5/18  Yes Caitie Clark MD   vitamin B-12 (CYANOCOBALAMIN) 1000 MCG tablet Take 1 tablet by mouth Daily. 5/18/21  Yes Kamaljit Lacey MD   oxyCODONE (ROXICODONE) 5 MG immediate release tablet Take 1 tablet by mouth Every 4 (Four) Hours As Needed for Severe Pain . Can be taken in addition to your regular pain medication if needed. 2/22/21   Mat Garay MD      Pain:(on a scale of 0-10)    Pain Score    06/17/21 1738   PainSc: 0-No pain        Corrina  Eve Jensen reports a pain score of 0.      Quality of Life:   ECO  KPS: 100    Advanced Care Plan:   Not discussed during this visit    Physical Examination (limited to areas of interest):  Vitals:  /52, HR 78 and RR 18    Height:    Weight: Weight: 125 kg (276 lb 3.2 oz) Body mass index is 44.6 kg/m².    Physical Exam  Constitutional: The patient is a well-developed, well-nourished middle-aged white female in no acute distress.  Alert and oriented ×3.  HEENT: Atraumatic. Normocephalic. No abnormalities noted.  Lymphatics: No cervical, supraclavicular, or axillary lymphadenopathy is palpated.  CV: Regular rate and rhythm.  No murmurs, rubs, or gallops are appreciated.  Respiratory: Lungs clear to auscultation.  Breath sounds equal bilaterally.  Breasts: Surgical scars in the left breast and axilla are well healed, and there are no suspicious lesions or nodules palpated.  Complete healing of the skin in the axillary and inframammary fold regions is noted.  The right breast was not examined during this visit.    Extremities: No clubbing, cyanosis, or edema.  Neurologic: Cranial nerves II through XII are grossly intact, with no focal neurological deficits noted on exam.  Psychiatric: Alert and oriented x3. Normal affect, with no anxiety or depression noted.    Labs:   Lab Results   Component Value Date    GLUCOSE 299 (H) 2021    BUN 7 2021    CREATININE 0.81 2021    EGFRIFNONA 76 2021    BCR 8.6 2021    K 4.0 2021    CO2 26.0 2021    CALCIUM 8.8 2021    ALBUMIN 4.20 2021    AST 67 (H) 2021    ALT 40 (H) 2021      WBC   Date Value Ref Range Status   2021 3.06 (L) 3.40 - 10.80 10*3/mm3 Final     Hemoglobin   Date Value Ref Range Status   2021 10.6 (L) 12.0 - 15.9 g/dL Final     Hematocrit   Date Value Ref Range Status   2021 32.6 (L) 34.0 - 46.6 % Final     Platelets   Date Value Ref Range Status   2021 122 (L) 140 -  450 10*3/mm3 Final    No results found for: PSA No results found for: CEA     Assessment:  Clinically stable    Recommendations:  Patient will continue with endocrine therapy as recommended by the medical oncology service.  I advised the patient that her post treatment progress will be assessed by clinical breast exams and imaging studies.  The patient should have bilateral diagnostic mammograms in January 2022.  The patient states that she performs breast and regional lymph node examinations on a regular basis.  Patient will return for a recheck by our service in 6 months.    Sincerely,      Electronically signed by Con Carreon MD 6/17/2021  13:58 CDT

## 2021-07-03 ENCOUNTER — HOSPITAL ENCOUNTER (EMERGENCY)
Facility: HOSPITAL | Age: 46
Discharge: HOME | End: 2021-07-03
Attending: EMERGENCY MEDICINE | Admitting: EMERGENCY MEDICINE

## 2021-07-03 VITALS
HEIGHT: 66 IN | TEMPERATURE: 97.5 F | SYSTOLIC BLOOD PRESSURE: 161 MMHG | DIASTOLIC BLOOD PRESSURE: 80 MMHG | RESPIRATION RATE: 18 BRPM | BODY MASS INDEX: 44.6 KG/M2 | OXYGEN SATURATION: 95 % | HEART RATE: 80 BPM | WEIGHT: 277.5 LBS

## 2021-07-03 DIAGNOSIS — I10 HYPERTENSION, UNSPECIFIED TYPE: Primary | ICD-10-CM

## 2021-07-03 LAB
ALBUMIN SERPL-MCNC: 4.2 G/DL (ref 3.5–5.2)
ALBUMIN/GLOB SERPL: 1.3 G/DL
ALP SERPL-CCNC: 139 U/L (ref 39–117)
ALT SERPL W P-5'-P-CCNC: 58 U/L (ref 1–33)
ANION GAP SERPL CALCULATED.3IONS-SCNC: 9 MMOL/L (ref 5–15)
AST SERPL-CCNC: 72 U/L (ref 1–32)
BASOPHILS # BLD AUTO: 0.02 10*3/MM3 (ref 0–0.2)
BASOPHILS NFR BLD AUTO: 0.5 % (ref 0–1.5)
BILIRUB SERPL-MCNC: 0.6 MG/DL (ref 0–1.2)
BUN SERPL-MCNC: 8 MG/DL (ref 6–20)
BUN/CREAT SERPL: 10.5 (ref 7–25)
CALCIUM SPEC-SCNC: 9.5 MG/DL (ref 8.6–10.5)
CHLORIDE SERPL-SCNC: 102 MMOL/L (ref 98–107)
CO2 SERPL-SCNC: 28 MMOL/L (ref 22–29)
CREAT SERPL-MCNC: 0.76 MG/DL (ref 0.57–1)
DEPRECATED RDW RBC AUTO: 48.7 FL (ref 37–54)
EOSINOPHIL # BLD AUTO: 0.07 10*3/MM3 (ref 0–0.4)
EOSINOPHIL NFR BLD AUTO: 1.9 % (ref 0.3–6.2)
ERYTHROCYTE [DISTWIDTH] IN BLOOD BY AUTOMATED COUNT: 16 % (ref 12.3–15.4)
GFR SERPL CREATININE-BSD FRML MDRD: 81 ML/MIN/1.73
GLOBULIN UR ELPH-MCNC: 3.3 GM/DL
GLUCOSE SERPL-MCNC: 208 MG/DL (ref 65–99)
HCG SERPL QL: NEGATIVE
HCT VFR BLD AUTO: 38.7 % (ref 34–46.6)
HGB BLD-MCNC: 12.9 G/DL (ref 12–15.9)
HOLD SPECIMEN: NORMAL
HOLD SPECIMEN: NORMAL
IMM GRANULOCYTES # BLD AUTO: 0 10*3/MM3 (ref 0–0.05)
IMM GRANULOCYTES NFR BLD AUTO: 0 % (ref 0–0.5)
LYMPHOCYTES # BLD AUTO: 1.38 10*3/MM3 (ref 0.7–3.1)
LYMPHOCYTES NFR BLD AUTO: 37.8 % (ref 19.6–45.3)
MCH RBC QN AUTO: 28 PG (ref 26.6–33)
MCHC RBC AUTO-ENTMCNC: 33.3 G/DL (ref 31.5–35.7)
MCV RBC AUTO: 83.9 FL (ref 79–97)
MONOCYTES # BLD AUTO: 0.5 10*3/MM3 (ref 0.1–0.9)
MONOCYTES NFR BLD AUTO: 13.7 % (ref 5–12)
NEUTROPHILS NFR BLD AUTO: 1.68 10*3/MM3 (ref 1.7–7)
NEUTROPHILS NFR BLD AUTO: 46.1 % (ref 42.7–76)
NRBC BLD AUTO-RTO: 0 /100 WBC (ref 0–0.2)
PLATELET # BLD AUTO: 138 10*3/MM3 (ref 140–450)
PMV BLD AUTO: 10.8 FL (ref 6–12)
POTASSIUM SERPL-SCNC: 3.8 MMOL/L (ref 3.5–5.2)
PROT SERPL-MCNC: 7.5 G/DL (ref 6–8.5)
RBC # BLD AUTO: 4.61 10*6/MM3 (ref 3.77–5.28)
SODIUM SERPL-SCNC: 139 MMOL/L (ref 136–145)
TROPONIN T SERPL-MCNC: <0.01 NG/ML (ref 0–0.03)
WBC # BLD AUTO: 3.65 10*3/MM3 (ref 3.4–10.8)
WHOLE BLOOD HOLD SPECIMEN: NORMAL

## 2021-07-03 PROCEDURE — 93010 ELECTROCARDIOGRAM REPORT: CPT | Performed by: INTERNAL MEDICINE

## 2021-07-03 PROCEDURE — 84484 ASSAY OF TROPONIN QUANT: CPT | Performed by: EMERGENCY MEDICINE

## 2021-07-03 PROCEDURE — 80053 COMPREHEN METABOLIC PANEL: CPT | Performed by: EMERGENCY MEDICINE

## 2021-07-03 PROCEDURE — 93005 ELECTROCARDIOGRAM TRACING: CPT | Performed by: EMERGENCY MEDICINE

## 2021-07-03 PROCEDURE — 99283 EMERGENCY DEPT VISIT LOW MDM: CPT

## 2021-07-03 PROCEDURE — 85025 COMPLETE CBC W/AUTO DIFF WBC: CPT | Performed by: EMERGENCY MEDICINE

## 2021-07-03 PROCEDURE — 99284 EMERGENCY DEPT VISIT MOD MDM: CPT

## 2021-07-03 PROCEDURE — 84703 CHORIONIC GONADOTROPIN ASSAY: CPT | Performed by: EMERGENCY MEDICINE

## 2021-07-03 RX ORDER — SODIUM CHLORIDE 0.9 % (FLUSH) 0.9 %
10 SYRINGE (ML) INJECTION AS NEEDED
Status: DISCONTINUED | OUTPATIENT
Start: 2021-07-03 | End: 2021-07-03 | Stop reason: HOSPADM

## 2021-07-03 NOTE — ED PROVIDER NOTES
Subjective   46-year-old white female with a history of multiple medical problems presents to the emergency department with chief complaint of hypertension.  Patient relates her blood pressure was 180/110 at 11 PM.  Patient relates she takes her blood pressure medicine every morning.  Patient relates she had a headache earlier but it is gone now.  She relates it was not a severe headache.  She denies chest pain, shortness of breath, nausea, vomiting, or abdominal pain.  Patient relates she feels fine now.          Review of Systems   Constitutional: Negative for chills, diaphoresis and fever.   Eyes: Negative for visual disturbance.   Respiratory: Negative for cough and shortness of breath.    Cardiovascular: Negative for chest pain and leg swelling.   Gastrointestinal: Negative for abdominal pain, nausea and vomiting.   Genitourinary: Negative for dysuria.   Musculoskeletal: Negative for back pain, gait problem, neck pain and neck stiffness.   Neurological: Positive for headaches. Negative for dizziness, seizures, syncope, speech difficulty, weakness and numbness.   All other systems reviewed and are negative.      Past Medical History:   Diagnosis Date   • Acute atopic conjunctivitis    • Acute bronchitis    • Allergic rhinitis due to pollen    • Arrhythmia     wearing heart monitor    • Arthritis    • Asthma    • Bilateral foot pain    • Breast cancer (CMS/HCC)    • Breast cyst    • Breast lump    • Chest pain    • Chronic low back pain    • Contact dermatitis due to drugs and medicine    • Cyst of ovary    • Diabetes mellitus (CMS/HCC)    • GERD (gastroesophageal reflux disease)    • Hyperlipidemia    • Hypertension    • Infestation by Sarcoptes scabiei annalisa hominis    • Low back pain    • Mastodynia    • Nausea and vomiting    • On long term drug therapy    • Pain in wrist    • Plantar fasciitis    • Rash    • Skin disorder     breast-possible superficial cellulitis   • Spinal stenosis    • Tinea pedis    •  Tobacco dependence syndrome    • Upper respiratory infection    • Vitamin D deficiency    • Wheezing        Allergies   Allergen Reactions   • Latex Itching and Other (See Comments)     Redness of skin   • Strawberry Cough   • Wound Dressing Adhesive Other (See Comments)     Steri Strips cause blistering of skin   • Bactroban [Mupirocin Calcium] Rash   • Neomycin-Bacitracin-Polymyxin [Bacitracin-Neomycin-Polymyxin] Rash   • Other Rash     All antibiotic creams       Past Surgical History:   Procedure Laterality Date   • BREAST BIOPSY     • BREAST CYST ASPIRATION     • BREAST LUMPECTOMY Left 1/27/2021    Procedure: LEFT BREAST LUMPECTOMY WITH NEEDLE LOCALIZATION Needle localization [facility] be done [facility] by radiology first   @07:00 a.m.;  Surgeon: Mat Garay MD;  Location: [facility];  Service: General;  Laterality: Left;   • BREAST LUMPECTOMY WITH SENTINEL NODE BIOPSY AND AXILLARY NODE DISSECTION Left 2/22/2021    Procedure: LEFT BREAST LUMPECTOMY WITH SENTINEL NODE BIOPSY. REMOVAL OF CYST FROM LEFT CHEEK                              (INJECTION @07: 00 A.M);  Surgeon: Mat Garay MD;  Location: [facility] OR;  Service: General;  Laterality: Left;   • BREAST SURGERY      excision breast lesion   • CARDIAC CATHETERIZATION N/A 7/3/2018    Procedure: Coronary angiography;  Surgeon: Arun Cadet MD;  Location: [facility] CATH INVASIVE LOCATION;  Service: Cardiovascular   • CARDIAC CATHETERIZATION     • CHOLECYSTECTOMY     • INJECTION OF MEDICATION      Kenalog (4)       Family History   Problem Relation Age of Onset   • Coronary artery disease Other    • Breast cancer Mother    • Breast cancer Maternal Aunt    • Diabetes Father        Social History     Socioeconomic History   • Marital status:      Spouse name: Not on file   • Number of children: Not on file   • Years of education: Not on file   • Highest education level: Not on file   Tobacco Use   • Smoking status: Current  Every Day Smoker     Types: Electronic Cigarette   • Smokeless tobacco: Never Used   Vaping Use   • Vaping Use: Every day   • Substances: Nicotine   • Devices: Refillable tank   Substance and Sexual Activity   • Alcohol use: No   • Drug use: No   • Sexual activity: Defer           Objective   Physical Exam  Vitals and nursing note reviewed.   Constitutional:       General: She is not in acute distress.     Appearance: She is not toxic-appearing or diaphoretic.   HENT:      Head: Normocephalic and atraumatic.      Right Ear: External ear normal.      Left Ear: External ear normal.      Nose: Nose normal.      Mouth/Throat:      Mouth: Mucous membranes are moist.      Pharynx: Oropharynx is clear.   Eyes:      Extraocular Movements: Extraocular movements intact.      Conjunctiva/sclera: Conjunctivae normal.      Pupils: Pupils are equal, round, and reactive to light.   Cardiovascular:      Rate and Rhythm: Normal rate and regular rhythm.      Pulses: Normal pulses.      Heart sounds: Normal heart sounds.   Pulmonary:      Effort: Pulmonary effort is normal.      Breath sounds: Normal breath sounds.   Abdominal:      General: Bowel sounds are normal. There is no distension.      Palpations: Abdomen is soft.      Tenderness: There is no abdominal tenderness.   Musculoskeletal:      Cervical back: Normal range of motion and neck supple.      Right lower leg: No edema.      Left lower leg: No edema.   Skin:     General: Skin is warm and dry.   Neurological:      General: No focal deficit present.      Mental Status: She is alert and oriented to person, place, and time.   Psychiatric:         Mood and Affect: Mood normal.         Behavior: Behavior normal.         ECG 12 Lead      Date/Time: 7/3/2021 2:06 AM  Performed by: hCris Roldan MD  Authorized by: Chris Roldan MD   Interpreted by physician  Rhythm: sinus rhythm  Rate: normal  BPM: 81  QRS axis: normal  Conduction: conduction normal  ST Segments: ST segments  normal  T Waves: T waves normal  Other findings: LVH  Clinical impression: abnormal ECG                 ED Course  ED Course as of Jul 03 0236   Sat Jul 03, 2021 0234 Patient is alert and resting comfortably. I reviewed the results of the emergency department evaluation with the patient.  I recommended primary care follow-up.  I advised the patient to return to the emergency department if their symptoms change or worsen.     [DR]      ED Course User Index  [DR] Chris Roldan MD           Labs Reviewed   COMPREHENSIVE METABOLIC PANEL - Abnormal; Notable for the following components:       Result Value    Glucose 208 (*)     ALT (SGPT) 58 (*)     AST (SGOT) 72 (*)     Alkaline Phosphatase 139 (*)     All other components within normal limits    Narrative:     GFR Normal >60  Chronic Kidney Disease <60  Kidney Failure <15     CBC WITH AUTO DIFFERENTIAL - Abnormal; Notable for the following components:    RDW 16.0 (*)     Platelets 138 (*)     Monocyte % 13.7 (*)     Neutrophils, Absolute 1.68 (*)     All other components within normal limits   TROPONIN (IN-HOUSE) - Normal    Narrative:     Troponin T Reference Range:  <= 0.03 ng/mL-   Negative for AMI  >0.03 ng/mL-     Abnormal for myocardial necrosis.  Clinicians would have to utilize clinical acumen, EKG, Troponin and serial changes to determine if it is an Acute Myocardial Infarction or myocardial injury due to an underlying chronic condition.       Results may be falsely decreased if patient taking Biotin.     HCG, SERUM, QUALITATIVE - Normal   RAINBOW DRAW    Narrative:     The following orders were created for panel order [place] Draw.  Procedure                               Abnormality         Status                     ---------                               -----------         ------                     Green Top (Gel)[[id number]]                                  Final result               Lavender Top[[id number]]                                     Final  result               [facility][[id number]]                                   Final result                 Please view results for these tests on the individual orders.   GREEN TOP   LAVENDER TOP   [facility]   CBC AND DIFFERENTIAL    Narrative:     The following orders were created for panel order CBC & Differential.  Procedure                               Abnormality         Status                     ---------                               -----------         ------                     CBC Auto Differential[[id number]]        Abnormal            Final result                 Please view results for these tests on the individual orders.     No results found.                                MDM    Final diagnoses:   Hypertension, unspecified type       ED Disposition  ED Disposition     ED Disposition Condition Comment    Discharge Stable           Jose Jean-Baptiste MD  [address] [place] [id number]  [phone]    Schedule an appointment as soon as possible for a visit in 3 days           Medication List      No changes were made to your prescriptions during this visit.          Chris Roldan MD  07/03/21 0236

## 2021-07-04 LAB
QT INTERVAL: 390 MS
QTC INTERVAL: 453 MS

## 2021-08-04 ENCOUNTER — LAB (OUTPATIENT)
Dept: ONCOLOGY | Facility: HOSPITAL | Age: 46
End: 2021-08-04

## 2021-08-04 ENCOUNTER — INFUSION (OUTPATIENT)
Dept: ONCOLOGY | Facility: HOSPITAL | Age: 46
End: 2021-08-04

## 2021-08-04 ENCOUNTER — OFFICE VISIT (OUTPATIENT)
Dept: ONCOLOGY | Facility: CLINIC | Age: 46
End: 2021-08-04

## 2021-08-04 VITALS
WEIGHT: 273 LBS | TEMPERATURE: 97.9 F | DIASTOLIC BLOOD PRESSURE: 82 MMHG | HEART RATE: 78 BPM | SYSTOLIC BLOOD PRESSURE: 187 MMHG | BODY MASS INDEX: 44.06 KG/M2 | RESPIRATION RATE: 18 BRPM | OXYGEN SATURATION: 97 %

## 2021-08-04 DIAGNOSIS — D61.818 PANCYTOPENIA (HCC): Chronic | ICD-10-CM

## 2021-08-04 DIAGNOSIS — Z17.0 MALIGNANT NEOPLASM OF UPPER-OUTER QUADRANT OF LEFT BREAST IN FEMALE, ESTROGEN RECEPTOR POSITIVE (HCC): ICD-10-CM

## 2021-08-04 DIAGNOSIS — C50.412 MALIGNANT NEOPLASM OF UPPER-OUTER QUADRANT OF LEFT BREAST IN FEMALE, ESTROGEN RECEPTOR POSITIVE (HCC): Primary | ICD-10-CM

## 2021-08-04 DIAGNOSIS — I10 ESSENTIAL HYPERTENSION: Chronic | ICD-10-CM

## 2021-08-04 DIAGNOSIS — Z17.0 MALIGNANT NEOPLASM OF UPPER-OUTER QUADRANT OF LEFT BREAST IN FEMALE, ESTROGEN RECEPTOR POSITIVE (HCC): Primary | Chronic | ICD-10-CM

## 2021-08-04 DIAGNOSIS — C50.412 MALIGNANT NEOPLASM OF UPPER-OUTER QUADRANT OF LEFT BREAST IN FEMALE, ESTROGEN RECEPTOR POSITIVE (HCC): ICD-10-CM

## 2021-08-04 DIAGNOSIS — Z17.0 MALIGNANT NEOPLASM OF UPPER-OUTER QUADRANT OF LEFT BREAST IN FEMALE, ESTROGEN RECEPTOR POSITIVE (HCC): Primary | ICD-10-CM

## 2021-08-04 DIAGNOSIS — C50.412 MALIGNANT NEOPLASM OF UPPER-OUTER QUADRANT OF LEFT BREAST IN FEMALE, ESTROGEN RECEPTOR POSITIVE (HCC): Primary | Chronic | ICD-10-CM

## 2021-08-04 LAB
ALBUMIN SERPL-MCNC: 3.9 G/DL (ref 3.5–5.2)
ALBUMIN/GLOB SERPL: 1.3 G/DL
ALP SERPL-CCNC: 144 U/L (ref 39–117)
ALT SERPL W P-5'-P-CCNC: 68 U/L (ref 1–33)
ANION GAP SERPL CALCULATED.3IONS-SCNC: 10 MMOL/L (ref 5–15)
AST SERPL-CCNC: 83 U/L (ref 1–32)
BASOPHILS # BLD AUTO: 0.02 10*3/MM3 (ref 0–0.2)
BASOPHILS NFR BLD AUTO: 0.7 % (ref 0–1.5)
BILIRUB SERPL-MCNC: 0.4 MG/DL (ref 0–1.2)
BUN SERPL-MCNC: 8 MG/DL (ref 6–20)
BUN/CREAT SERPL: 10.3 (ref 7–25)
CALCIUM SPEC-SCNC: 9.6 MG/DL (ref 8.6–10.5)
CHLORIDE SERPL-SCNC: 100 MMOL/L (ref 98–107)
CO2 SERPL-SCNC: 27 MMOL/L (ref 22–29)
CREAT SERPL-MCNC: 0.78 MG/DL (ref 0.57–1)
DEPRECATED RDW RBC AUTO: 44 FL (ref 37–54)
EOSINOPHIL # BLD AUTO: 0.08 10*3/MM3 (ref 0–0.4)
EOSINOPHIL NFR BLD AUTO: 2.8 % (ref 0.3–6.2)
ERYTHROCYTE [DISTWIDTH] IN BLOOD BY AUTOMATED COUNT: 14.5 % (ref 12.3–15.4)
ESTRADIOL SERPL HS-MCNC: 23.1 PG/ML
GFR SERPL CREATININE-BSD FRML MDRD: 79 ML/MIN/1.73
GLOBULIN UR ELPH-MCNC: 3 GM/DL
GLUCOSE SERPL-MCNC: 311 MG/DL (ref 65–99)
HCT VFR BLD AUTO: 34.7 % (ref 34–46.6)
HGB BLD-MCNC: 12.3 G/DL (ref 12–15.9)
HOLD SPECIMEN: NORMAL
IMM GRANULOCYTES # BLD AUTO: 0 10*3/MM3 (ref 0–0.05)
IMM GRANULOCYTES NFR BLD AUTO: 0 % (ref 0–0.5)
LYMPHOCYTES # BLD AUTO: 1.2 10*3/MM3 (ref 0.7–3.1)
LYMPHOCYTES NFR BLD AUTO: 41.5 % (ref 19.6–45.3)
MCH RBC QN AUTO: 29.9 PG (ref 26.6–33)
MCHC RBC AUTO-ENTMCNC: 35.4 G/DL (ref 31.5–35.7)
MCV RBC AUTO: 84.4 FL (ref 79–97)
MONOCYTES # BLD AUTO: 0.35 10*3/MM3 (ref 0.1–0.9)
MONOCYTES NFR BLD AUTO: 12.1 % (ref 5–12)
NEUTROPHILS NFR BLD AUTO: 1.24 10*3/MM3 (ref 1.7–7)
NEUTROPHILS NFR BLD AUTO: 42.9 % (ref 42.7–76)
NRBC BLD AUTO-RTO: 0 /100 WBC (ref 0–0.2)
PLATELET # BLD AUTO: 127 10*3/MM3 (ref 140–450)
PMV BLD AUTO: 10.1 FL (ref 6–12)
POTASSIUM SERPL-SCNC: 4.2 MMOL/L (ref 3.5–5.2)
PROT SERPL-MCNC: 6.9 G/DL (ref 6–8.5)
RBC # BLD AUTO: 4.11 10*6/MM3 (ref 3.77–5.28)
SODIUM SERPL-SCNC: 137 MMOL/L (ref 136–145)
WBC # BLD AUTO: 2.89 10*3/MM3 (ref 3.4–10.8)

## 2021-08-04 PROCEDURE — 36415 COLL VENOUS BLD VENIPUNCTURE: CPT | Performed by: INTERNAL MEDICINE

## 2021-08-04 PROCEDURE — 80053 COMPREHEN METABOLIC PANEL: CPT

## 2021-08-04 PROCEDURE — 85025 COMPLETE CBC W/AUTO DIFF WBC: CPT

## 2021-08-04 PROCEDURE — 82670 ASSAY OF TOTAL ESTRADIOL: CPT

## 2021-08-04 PROCEDURE — 99214 OFFICE O/P EST MOD 30 MIN: CPT | Performed by: INTERNAL MEDICINE

## 2021-08-04 PROCEDURE — 96402 CHEMO HORMON ANTINEOPL SQ/IM: CPT | Performed by: INTERNAL MEDICINE

## 2021-08-04 PROCEDURE — 25010000002 LEUPROLIDE ACETATE PER 7.5 MG: Performed by: INTERNAL MEDICINE

## 2021-08-04 RX ADMIN — LEUPROLIDE ACETATE 7.5 MG: KIT at 15:20

## 2021-08-04 NOTE — PROGRESS NOTES
Subjective     Corrina Jensen was seen in follow-up for breast cancer.  She is currently on adjuvant hormonal treatment with Lupron and Arimidex.    Tolerating treatment well.  Hot flashes are present but manageable.  No new aches or pains.  Blood pressure is elevated in the clinic.  She reports compliance with her antihypertensive medication.    Past Medical History, Past Surgical History, Social History, Family History have been reviewed and are without significant changes except as mentioned.    .    Medications:  The current medication list was reviewed in the EMR    ALLERGIES:    Allergies   Allergen Reactions   • Latex Itching and Other (See Comments)     Redness of skin   • Strawberry Cough   • Wound Dressing Adhesive Other (See Comments)     Steri Strips cause blistering of skin   • Bactroban [Mupirocin Calcium] Rash   • Neomycin-Bacitracin-Polymyxin [Bacitracin-Neomycin-Polymyxin] Rash   • Other Rash     All antibiotic creams       Objective      Vitals:    08/04/21 1447   BP: (!) 187/82   Pulse: 78   Resp: 18   Temp: 97.9 °F (36.6 °C)   SpO2: 97%   Weight: 124 kg (273 lb)   PainSc:   6     Current Status 6/2/2021   ECOG score 0       Physical Exam  Vitals and nursing note reviewed.   Constitutional:       Appearance: She is obese.   Cardiovascular:      Rate and Rhythm: Normal rate and regular rhythm.   Pulmonary:      Effort: Pulmonary effort is normal.      Breath sounds: Normal breath sounds.   Abdominal:      General: There is no distension.      Palpations: Abdomen is soft.      Tenderness: There is no abdominal tenderness.   Neurological:      Mental Status: She is alert.   Psychiatric:         Mood and Affect: Mood normal.         Behavior: Behavior normal.         Thought Content: Thought content normal.               RECENT LABS:Independently reviewed and summarized  Hematology WBC   Date Value Ref Range Status   08/04/2021 2.89 (L) 3.40 - 10.80 10*3/mm3 Final     RBC   Date Value Ref Range  Status   08/04/2021 4.11 3.77 - 5.28 10*6/mm3 Final     Hemoglobin   Date Value Ref Range Status   08/04/2021 12.3 12.0 - 15.9 g/dL Final     Hematocrit   Date Value Ref Range Status   08/04/2021 34.7 34.0 - 46.6 % Final     Platelets   Date Value Ref Range Status   08/04/2021 127 (L) 140 - 450 10*3/mm3 Final            Lab Results   Component Value Date    GLUCOSE 311 (H) 08/04/2021    BUN 8 08/04/2021    CREATININE 0.78 08/04/2021    EGFRIFNONA 79 08/04/2021    BCR 10.3 08/04/2021    K 4.2 08/04/2021    CO2 27.0 08/04/2021    CALCIUM 9.6 08/04/2021    ALBUMIN 3.90 08/04/2021    AST 83 (H) 08/04/2021    ALT 68 (H) 08/04/2021         Corrina Jensen reports a pain score of 6.  Given her pain assessment as noted, treatment options were discussed and the following options were decided upon as a follow-up plan to address the patient's pain: referral to Primary Care for assistance in pain treatment guidance.    Patient screened positive for depression based on a PHQ-9 score of 0 on 8/4/2021. Follow-up recommendations include: Suicide Risk Assessment performed.      Diagnosis:   (1) Left breast cancer   Stage IA (pT1a, pN0(sn),cM0)   ER 95% MD 95% HER IHC negative     Oncology/Hematology History   Malignant neoplasm of left breast in female, estrogen receptor positive (CMS/HCC)   2/12/2021 Initial Diagnosis    Malignant neoplasm of left breast in female, estrogen receptor positive (CMS/HCC)     3/9/2021 Cancer Staged    Staging form: Breast, AJCC 8th Edition  - Pathologic stage from 3/9/2021: Stage IA (pT1a, pN0(sn), cM0, G1, ER+, MD+, HER2-) - Signed by Katarina Patel MD on 3/9/2021     4/1/2021 - 5/10/2021 Radiation    Radiation OncologyTreatment Course:  Corrina Jensen received 5040 cGy in 28 fractions to LT BREAST via External Beam Radiation - EBRT.     6/9/2021 -  Chemotherapy    OP SUPPORTIVE Leuprolide 7.5 mg Q28D           (2) Pancytopenia   (3) Hypertension       Assessment/Plan     (1) Left  breast cancer     Treated with lumpectomy and radiation.  Currently on adjuvant hormonal treatment with ovarian suppression -Lupron every month and Arimidex.  She is tolerating treatment well without any major side effects.  Lupron injection was signed today.    I will check estradiol level in 3 months to make sure she is adequately suppressed.  She will continue Arimidex.    Continue surveillance for breast cancer with adjuvant hormonal treatment.    (2) Pancytopenia     Suspect this is secondary to fatty liver disease and splenomegaly.  Continue iron, B12 and folic acid supplements.  I will recheck CBC in 3 months.    (3) Hypertension     Chronic issue with exacerbation    Patient blood pressure is elevated in the clinic. She reports compliance with antihypertensive medications.  Instructed to check blood pressure at home and report to us or her primary care provider if persistently elevated blood pressure at home.  Discussed long-term hazards of uncontrolled high blood pressure which could include stroke, heart disease and kidney disease.  Educated and counseled.    I will check CMP in 3 months.        8/4/2021      CC:

## 2021-08-17 ENCOUNTER — HOSPITAL ENCOUNTER (INPATIENT)
Facility: HOSPITAL | Age: 46
LOS: 22 days | Discharge: NURSING, REHAB OR LONG-TERM CARE | End: 2021-09-09
Attending: EMERGENCY MEDICINE | Admitting: INTERNAL MEDICINE

## 2021-08-17 DIAGNOSIS — R13.12 OROPHARYNGEAL DYSPHAGIA: ICD-10-CM

## 2021-08-17 DIAGNOSIS — N17.9 SEPSIS WITH ACUTE RENAL FAILURE AND SEPTIC SHOCK, DUE TO UNSPECIFIED ORGANISM, UNSPECIFIED ACUTE RENAL FAILURE TYPE (HCC): Primary | ICD-10-CM

## 2021-08-17 DIAGNOSIS — Z74.09 IMPAIRED PHYSICAL MOBILITY: ICD-10-CM

## 2021-08-17 DIAGNOSIS — Z78.9 IMPAIRED MOBILITY AND ADLS: ICD-10-CM

## 2021-08-17 DIAGNOSIS — A41.9 SEPSIS WITH ACUTE RENAL FAILURE AND SEPTIC SHOCK, DUE TO UNSPECIFIED ORGANISM, UNSPECIFIED ACUTE RENAL FAILURE TYPE (HCC): Primary | ICD-10-CM

## 2021-08-17 DIAGNOSIS — R65.21 SEPSIS WITH ACUTE RENAL FAILURE AND SEPTIC SHOCK, DUE TO UNSPECIFIED ORGANISM, UNSPECIFIED ACUTE RENAL FAILURE TYPE (HCC): Primary | ICD-10-CM

## 2021-08-17 DIAGNOSIS — Z74.09 IMPAIRED MOBILITY AND ADLS: ICD-10-CM

## 2021-08-17 DIAGNOSIS — U07.1 LAB TEST POSITIVE FOR DETECTION OF COVID-19 VIRUS: ICD-10-CM

## 2021-08-17 PROCEDURE — 99285 EMERGENCY DEPT VISIT HI MDM: CPT

## 2021-08-18 ENCOUNTER — APPOINTMENT (OUTPATIENT)
Dept: ULTRASOUND IMAGING | Facility: HOSPITAL | Age: 46
End: 2021-08-18

## 2021-08-18 ENCOUNTER — APPOINTMENT (OUTPATIENT)
Dept: GENERAL RADIOLOGY | Facility: HOSPITAL | Age: 46
End: 2021-08-18

## 2021-08-18 PROBLEM — N17.9 SEPSIS WITH ACUTE RENAL FAILURE AND SEPTIC SHOCK (HCC): Status: ACTIVE | Noted: 2021-08-18

## 2021-08-18 PROBLEM — J12.82 PNEUMONIA DUE TO COVID-19 VIRUS: Status: ACTIVE | Noted: 2021-08-18

## 2021-08-18 PROBLEM — N17.9 ACUTE KIDNEY INJURY: Status: ACTIVE | Noted: 2021-08-18

## 2021-08-18 PROBLEM — A41.9 SEPSIS, UNSPECIFIED ORGANISM: Status: ACTIVE | Noted: 2021-08-18

## 2021-08-18 PROBLEM — R65.21 SEPSIS WITH ACUTE RENAL FAILURE AND SEPTIC SHOCK: Status: ACTIVE | Noted: 2021-08-18

## 2021-08-18 PROBLEM — U07.1 PNEUMONIA DUE TO COVID-19 VIRUS: Status: ACTIVE | Noted: 2021-08-18

## 2021-08-18 PROBLEM — A41.9 SEPSIS WITH ACUTE RENAL FAILURE AND SEPTIC SHOCK: Status: ACTIVE | Noted: 2021-08-18

## 2021-08-18 PROBLEM — J18.9 ATYPICAL PNEUMONIA: Status: ACTIVE | Noted: 2021-08-18

## 2021-08-18 LAB
ALBUMIN SERPL-MCNC: 4 G/DL (ref 3.5–5.2)
ALBUMIN/GLOB SERPL: 1.3 G/DL
ALP SERPL-CCNC: 172 U/L (ref 39–117)
ALT SERPL W P-5'-P-CCNC: 112 U/L (ref 1–33)
AMORPH URATE CRY URNS QL MICRO: ABNORMAL /HPF
ANION GAP SERPL CALCULATED.3IONS-SCNC: 14 MMOL/L (ref 5–15)
ANION GAP SERPL CALCULATED.3IONS-SCNC: 16 MMOL/L (ref 5–15)
AST SERPL-CCNC: 193 U/L (ref 1–32)
BACTERIA UR QL AUTO: ABNORMAL /HPF
BASOPHILS # BLD AUTO: 0.01 10*3/MM3 (ref 0–0.2)
BASOPHILS NFR BLD AUTO: 0.2 % (ref 0–1.5)
BILIRUB SERPL-MCNC: 0.7 MG/DL (ref 0–1.2)
BILIRUB UR QL STRIP: NEGATIVE
BUN SERPL-MCNC: 13 MG/DL (ref 6–20)
BUN SERPL-MCNC: 15 MG/DL (ref 6–20)
BUN/CREAT SERPL: 11.9 (ref 7–25)
BUN/CREAT SERPL: 7.5 (ref 7–25)
CALCIUM SPEC-SCNC: 8.2 MG/DL (ref 8.6–10.5)
CALCIUM SPEC-SCNC: 8.2 MG/DL (ref 8.6–10.5)
CHLORIDE SERPL-SCNC: 94 MMOL/L (ref 98–107)
CHLORIDE SERPL-SCNC: 98 MMOL/L (ref 98–107)
CLARITY UR: ABNORMAL
CO2 SERPL-SCNC: 20 MMOL/L (ref 22–29)
CO2 SERPL-SCNC: 24 MMOL/L (ref 22–29)
COLOR UR: YELLOW
CREAT SERPL-MCNC: 1.09 MG/DL (ref 0.57–1)
CREAT SERPL-MCNC: 2 MG/DL (ref 0.57–1)
CRP SERPL-MCNC: 1.93 MG/DL (ref 0–0.5)
CYTOLOGIST CVX/VAG CYTO: NORMAL
D-LACTATE SERPL-SCNC: 2.4 MMOL/L (ref 0.5–2)
D-LACTATE SERPL-SCNC: 3 MMOL/L (ref 0.5–2)
DEPRECATED RDW RBC AUTO: 44.2 FL (ref 37–54)
EOSINOPHIL # BLD AUTO: 0 10*3/MM3 (ref 0–0.4)
EOSINOPHIL NFR BLD AUTO: 0 % (ref 0.3–6.2)
ERYTHROCYTE [DISTWIDTH] IN BLOOD BY AUTOMATED COUNT: 14.1 % (ref 12.3–15.4)
FERRITIN SERPL-MCNC: 393.7 NG/ML (ref 13–150)
FLUAV RNA RESP QL NAA+PROBE: NOT DETECTED
FLUBV RNA RESP QL NAA+PROBE: NOT DETECTED
GFR SERPL CREATININE-BSD FRML MDRD: 27 ML/MIN/1.73
GFR SERPL CREATININE-BSD FRML MDRD: 54 ML/MIN/1.73
GLOBULIN UR ELPH-MCNC: 3 GM/DL
GLUCOSE BLDC GLUCOMTR-MCNC: 178 MG/DL (ref 70–130)
GLUCOSE BLDC GLUCOMTR-MCNC: 211 MG/DL (ref 70–130)
GLUCOSE SERPL-MCNC: 190 MG/DL (ref 65–99)
GLUCOSE SERPL-MCNC: 213 MG/DL (ref 65–99)
GLUCOSE UR STRIP-MCNC: NEGATIVE MG/DL
HCG SERPL QL: NEGATIVE
HCT VFR BLD AUTO: 33.5 % (ref 34–46.6)
HGB BLD-MCNC: 11.5 G/DL (ref 12–15.9)
HGB UR QL STRIP.AUTO: ABNORMAL
HOLD SPECIMEN: NORMAL
HYALINE CASTS UR QL AUTO: ABNORMAL /LPF
IMM GRANULOCYTES # BLD AUTO: 0.02 10*3/MM3 (ref 0–0.05)
IMM GRANULOCYTES NFR BLD AUTO: 0.4 % (ref 0–0.5)
KETONES UR QL STRIP: NEGATIVE
LDH SERPL-CCNC: 308 U/L (ref 135–214)
LEUKOCYTE ESTERASE UR QL STRIP.AUTO: ABNORMAL
LYMPHOCYTES # BLD AUTO: 1.05 10*3/MM3 (ref 0.7–3.1)
LYMPHOCYTES NFR BLD AUTO: 22.6 % (ref 19.6–45.3)
MAGNESIUM SERPL-MCNC: 1.1 MG/DL (ref 1.6–2.6)
MCH RBC QN AUTO: 29.4 PG (ref 26.6–33)
MCHC RBC AUTO-ENTMCNC: 34.3 G/DL (ref 31.5–35.7)
MCV RBC AUTO: 85.7 FL (ref 79–97)
MONOCYTES # BLD AUTO: 0.5 10*3/MM3 (ref 0.1–0.9)
MONOCYTES NFR BLD AUTO: 10.8 % (ref 5–12)
NEUTROPHILS NFR BLD AUTO: 3.07 10*3/MM3 (ref 1.7–7)
NEUTROPHILS NFR BLD AUTO: 66 % (ref 42.7–76)
NITRITE UR QL STRIP: NEGATIVE
NRBC BLD AUTO-RTO: 0 /100 WBC (ref 0–0.2)
NT-PROBNP SERPL-MCNC: 552 PG/ML (ref 0–450)
PATH INTERP BLD-IMP: NORMAL
PH UR STRIP.AUTO: 6 [PH] (ref 5–9)
PLATELET # BLD AUTO: 84 10*3/MM3 (ref 140–450)
PMV BLD AUTO: 10.7 FL (ref 6–12)
POTASSIUM SERPL-SCNC: 3.3 MMOL/L (ref 3.5–5.2)
POTASSIUM SERPL-SCNC: 3.8 MMOL/L (ref 3.5–5.2)
PROCALCITONIN SERPL-MCNC: 0.69 NG/ML (ref 0–0.25)
PROT SERPL-MCNC: 7 G/DL (ref 6–8.5)
PROT UR QL STRIP: ABNORMAL
RBC # BLD AUTO: 3.91 10*6/MM3 (ref 3.77–5.28)
RBC # UR: ABNORMAL /HPF
REF LAB TEST METHOD: ABNORMAL
SARS-COV-2 RNA RESP QL NAA+PROBE: DETECTED
SODIUM SERPL-SCNC: 132 MMOL/L (ref 136–145)
SODIUM SERPL-SCNC: 134 MMOL/L (ref 136–145)
SP GR UR STRIP: 1.01 (ref 1–1.03)
SQUAMOUS #/AREA URNS HPF: ABNORMAL /HPF
TROPONIN T SERPL-MCNC: <0.01 NG/ML (ref 0–0.03)
UROBILINOGEN UR QL STRIP: ABNORMAL
WBC # BLD AUTO: 4.65 10*3/MM3 (ref 3.4–10.8)
WBC UR QL AUTO: ABNORMAL /HPF
WHOLE BLOOD HOLD SPECIMEN: NORMAL
WHOLE BLOOD HOLD SPECIMEN: NORMAL

## 2021-08-18 PROCEDURE — 84145 PROCALCITONIN (PCT): CPT | Performed by: INTERNAL MEDICINE

## 2021-08-18 PROCEDURE — 94760 N-INVAS EAR/PLS OXIMETRY 1: CPT

## 2021-08-18 PROCEDURE — 93005 ELECTROCARDIOGRAM TRACING: CPT

## 2021-08-18 PROCEDURE — 71045 X-RAY EXAM CHEST 1 VIEW: CPT

## 2021-08-18 PROCEDURE — 83735 ASSAY OF MAGNESIUM: CPT | Performed by: INTERNAL MEDICINE

## 2021-08-18 PROCEDURE — 82962 GLUCOSE BLOOD TEST: CPT

## 2021-08-18 PROCEDURE — 25010000002 VANCOMYCIN 5 G RECONSTITUTED SOLUTION: Performed by: STUDENT IN AN ORGANIZED HEALTH CARE EDUCATION/TRAINING PROGRAM

## 2021-08-18 PROCEDURE — 63710000001 INSULIN DETEMIR PER 5 UNITS: Performed by: INTERNAL MEDICINE

## 2021-08-18 PROCEDURE — 93005 ELECTROCARDIOGRAM TRACING: CPT | Performed by: EMERGENCY MEDICINE

## 2021-08-18 PROCEDURE — 84703 CHORIONIC GONADOTROPIN ASSAY: CPT | Performed by: EMERGENCY MEDICINE

## 2021-08-18 PROCEDURE — 25010000002 MAGNESIUM SULFATE IN D5W 1G/100ML (PREMIX) 1-5 GM/100ML-% SOLUTION: Performed by: FAMILY MEDICINE

## 2021-08-18 PROCEDURE — 87040 BLOOD CULTURE FOR BACTERIA: CPT | Performed by: INTERNAL MEDICINE

## 2021-08-18 PROCEDURE — 82728 ASSAY OF FERRITIN: CPT | Performed by: FAMILY MEDICINE

## 2021-08-18 PROCEDURE — 80053 COMPREHEN METABOLIC PANEL: CPT | Performed by: EMERGENCY MEDICINE

## 2021-08-18 PROCEDURE — 83880 ASSAY OF NATRIURETIC PEPTIDE: CPT | Performed by: EMERGENCY MEDICINE

## 2021-08-18 PROCEDURE — 83615 LACTATE (LD) (LDH) ENZYME: CPT | Performed by: FAMILY MEDICINE

## 2021-08-18 PROCEDURE — 81001 URINALYSIS AUTO W/SCOPE: CPT | Performed by: INTERNAL MEDICINE

## 2021-08-18 PROCEDURE — 25010000002 HEPARIN (PORCINE) PER 1000 UNITS: Performed by: INTERNAL MEDICINE

## 2021-08-18 PROCEDURE — 25010000002 PIPERACILLIN SOD-TAZOBACTAM PER 1 G

## 2021-08-18 PROCEDURE — 93010 ELECTROCARDIOGRAM REPORT: CPT | Performed by: INTERNAL MEDICINE

## 2021-08-18 PROCEDURE — 83605 ASSAY OF LACTIC ACID: CPT | Performed by: EMERGENCY MEDICINE

## 2021-08-18 PROCEDURE — 84484 ASSAY OF TROPONIN QUANT: CPT | Performed by: EMERGENCY MEDICINE

## 2021-08-18 PROCEDURE — 94799 UNLISTED PULMONARY SVC/PX: CPT

## 2021-08-18 PROCEDURE — 85060 BLOOD SMEAR INTERPRETATION: CPT | Performed by: FAMILY MEDICINE

## 2021-08-18 PROCEDURE — 87636 SARSCOV2 & INF A&B AMP PRB: CPT | Performed by: EMERGENCY MEDICINE

## 2021-08-18 PROCEDURE — 85025 COMPLETE CBC W/AUTO DIFF WBC: CPT | Performed by: EMERGENCY MEDICINE

## 2021-08-18 PROCEDURE — 25010000002 CEFTRIAXONE PER 250 MG: Performed by: INTERNAL MEDICINE

## 2021-08-18 PROCEDURE — 25010000002 AZITHROMYCIN PER 500 MG: Performed by: INTERNAL MEDICINE

## 2021-08-18 PROCEDURE — 76775 US EXAM ABDO BACK WALL LIM: CPT

## 2021-08-18 PROCEDURE — 36415 COLL VENOUS BLD VENIPUNCTURE: CPT | Performed by: EMERGENCY MEDICINE

## 2021-08-18 PROCEDURE — 86140 C-REACTIVE PROTEIN: CPT | Performed by: FAMILY MEDICINE

## 2021-08-18 PROCEDURE — 87040 BLOOD CULTURE FOR BACTERIA: CPT

## 2021-08-18 RX ORDER — ATORVASTATIN CALCIUM 20 MG/1
20 TABLET, FILM COATED ORAL NIGHTLY
Status: DISCONTINUED | OUTPATIENT
Start: 2021-08-18 | End: 2021-08-22

## 2021-08-18 RX ORDER — AMITRIPTYLINE HYDROCHLORIDE 50 MG/1
50 TABLET, FILM COATED ORAL NIGHTLY
Status: DISCONTINUED | OUTPATIENT
Start: 2021-08-18 | End: 2021-08-22

## 2021-08-18 RX ORDER — FERROUS SULFATE TAB EC 324 MG (65 MG FE EQUIVALENT) 324 (65 FE) MG
324 TABLET DELAYED RESPONSE ORAL
Status: DISCONTINUED | OUTPATIENT
Start: 2021-08-18 | End: 2021-08-22

## 2021-08-18 RX ORDER — SODIUM CHLORIDE 0.9 % (FLUSH) 0.9 %
10 SYRINGE (ML) INJECTION AS NEEDED
Status: DISCONTINUED | OUTPATIENT
Start: 2021-08-18 | End: 2021-08-31 | Stop reason: SDUPTHER

## 2021-08-18 RX ORDER — SODIUM CHLORIDE 9 MG/ML
INJECTION, SOLUTION INTRAVENOUS
Status: DISCONTINUED
Start: 2021-08-18 | End: 2021-09-09 | Stop reason: HOSPADM

## 2021-08-18 RX ORDER — ANASTROZOLE 1 MG/1
1 TABLET ORAL DAILY
Status: DISCONTINUED | OUTPATIENT
Start: 2021-08-18 | End: 2021-08-22

## 2021-08-18 RX ORDER — NICOTINE POLACRILEX 4 MG
15 LOZENGE BUCCAL
Status: DISCONTINUED | OUTPATIENT
Start: 2021-08-18 | End: 2021-08-24

## 2021-08-18 RX ORDER — SODIUM CHLORIDE 0.9 % (FLUSH) 0.9 %
10 SYRINGE (ML) INJECTION EVERY 12 HOURS SCHEDULED
Status: DISCONTINUED | OUTPATIENT
Start: 2021-08-18 | End: 2021-08-31 | Stop reason: SDUPTHER

## 2021-08-18 RX ORDER — OLANZAPINE 10 MG/1
10 TABLET ORAL NIGHTLY
Status: DISCONTINUED | OUTPATIENT
Start: 2021-08-18 | End: 2021-09-09 | Stop reason: HOSPADM

## 2021-08-18 RX ORDER — ALBUTEROL SULFATE 90 UG/1
2 AEROSOL, METERED RESPIRATORY (INHALATION) EVERY 4 HOURS
Status: DISCONTINUED | OUTPATIENT
Start: 2021-08-18 | End: 2021-09-09 | Stop reason: HOSPADM

## 2021-08-18 RX ORDER — CETIRIZINE HYDROCHLORIDE 5 MG/1
5 TABLET ORAL DAILY
Status: DISCONTINUED | OUTPATIENT
Start: 2021-08-18 | End: 2021-09-09 | Stop reason: HOSPADM

## 2021-08-18 RX ORDER — OXYCODONE HYDROCHLORIDE 5 MG/1
5 TABLET ORAL EVERY 4 HOURS PRN
Status: DISCONTINUED | OUTPATIENT
Start: 2021-08-18 | End: 2021-09-09 | Stop reason: HOSPADM

## 2021-08-18 RX ORDER — ONDANSETRON 2 MG/ML
4 INJECTION INTRAMUSCULAR; INTRAVENOUS EVERY 6 HOURS PRN
Status: DISCONTINUED | OUTPATIENT
Start: 2021-08-18 | End: 2021-09-09 | Stop reason: HOSPADM

## 2021-08-18 RX ORDER — PREGABALIN 75 MG/1
150 CAPSULE ORAL EVERY 12 HOURS SCHEDULED
Status: DISCONTINUED | OUTPATIENT
Start: 2021-08-18 | End: 2021-08-22

## 2021-08-18 RX ORDER — NALOXONE HCL 0.4 MG/ML
0.4 VIAL (ML) INJECTION
Status: DISCONTINUED | OUTPATIENT
Start: 2021-08-18 | End: 2021-09-09 | Stop reason: HOSPADM

## 2021-08-18 RX ORDER — DEXTROSE MONOHYDRATE 25 G/50ML
25 INJECTION, SOLUTION INTRAVENOUS
Status: DISCONTINUED | OUTPATIENT
Start: 2021-08-18 | End: 2021-08-24

## 2021-08-18 RX ORDER — MAGNESIUM SULFATE 1 G/100ML
1 INJECTION INTRAVENOUS ONCE
Status: COMPLETED | OUTPATIENT
Start: 2021-08-18 | End: 2021-08-18

## 2021-08-18 RX ORDER — GLIPIZIDE 10 MG/1
10 TABLET ORAL
Status: DISCONTINUED | OUTPATIENT
Start: 2021-08-18 | End: 2021-08-18

## 2021-08-18 RX ORDER — LANOLIN ALCOHOL/MO/W.PET/CERES
1000 CREAM (GRAM) TOPICAL DAILY
Status: DISCONTINUED | OUTPATIENT
Start: 2021-08-18 | End: 2021-09-09 | Stop reason: HOSPADM

## 2021-08-18 RX ORDER — ACETAMINOPHEN 325 MG/1
650 TABLET ORAL EVERY 4 HOURS PRN
Status: DISCONTINUED | OUTPATIENT
Start: 2021-08-18 | End: 2021-09-09 | Stop reason: HOSPADM

## 2021-08-18 RX ORDER — HEPARIN SODIUM 5000 [USP'U]/ML
5000 INJECTION, SOLUTION INTRAVENOUS; SUBCUTANEOUS EVERY 8 HOURS SCHEDULED
Status: DISCONTINUED | OUTPATIENT
Start: 2021-08-18 | End: 2021-08-25

## 2021-08-18 RX ORDER — DOCUSATE SODIUM 100 MG/1
100 CAPSULE, LIQUID FILLED ORAL 2 TIMES DAILY PRN
Status: DISCONTINUED | OUTPATIENT
Start: 2021-08-18 | End: 2021-09-09 | Stop reason: HOSPADM

## 2021-08-18 RX ORDER — TIZANIDINE 4 MG/1
4 TABLET ORAL EVERY 6 HOURS PRN
Status: DISCONTINUED | OUTPATIENT
Start: 2021-08-18 | End: 2021-09-09 | Stop reason: HOSPADM

## 2021-08-18 RX ORDER — SODIUM CHLORIDE 9 MG/ML
75 INJECTION, SOLUTION INTRAVENOUS CONTINUOUS
Status: DISCONTINUED | OUTPATIENT
Start: 2021-08-18 | End: 2021-08-18

## 2021-08-18 RX ORDER — FOLIC ACID 1 MG/1
1 TABLET ORAL DAILY
Status: DISCONTINUED | OUTPATIENT
Start: 2021-08-18 | End: 2021-09-09 | Stop reason: HOSPADM

## 2021-08-18 RX ORDER — PANTOPRAZOLE SODIUM 40 MG/1
40 TABLET, DELAYED RELEASE ORAL EVERY MORNING
Status: DISCONTINUED | OUTPATIENT
Start: 2021-08-18 | End: 2021-08-22

## 2021-08-18 RX ADMIN — SODIUM CHLORIDE, PRESERVATIVE FREE 10 ML: 5 INJECTION INTRAVENOUS at 21:34

## 2021-08-18 RX ADMIN — ACETAMINOPHEN 650 MG: 325 TABLET, FILM COATED ORAL at 10:19

## 2021-08-18 RX ADMIN — SODIUM CHLORIDE, PRESERVATIVE FREE 10 ML: 5 INJECTION INTRAVENOUS at 10:22

## 2021-08-18 RX ADMIN — Medication 10 ML: at 00:03

## 2021-08-18 RX ADMIN — CEFTRIAXONE SODIUM 2 G: 2 INJECTION, POWDER, FOR SOLUTION INTRAMUSCULAR; INTRAVENOUS at 12:14

## 2021-08-18 RX ADMIN — CYANOCOBALAMIN TAB 1000 MCG 1000 MCG: 1000 TAB at 10:20

## 2021-08-18 RX ADMIN — INSULIN DETEMIR 10 UNITS: 100 INJECTION, SOLUTION SUBCUTANEOUS at 21:33

## 2021-08-18 RX ADMIN — HEPARIN SODIUM 5000 UNITS: 5000 INJECTION INTRAVENOUS; SUBCUTANEOUS at 21:34

## 2021-08-18 RX ADMIN — FERROUS SULFATE TAB EC 324 MG (65 MG FE EQUIVALENT) 324 MG: 324 (65 FE) TABLET DELAYED RESPONSE at 10:21

## 2021-08-18 RX ADMIN — ACETAMINOPHEN 650 MG: 325 TABLET, FILM COATED ORAL at 20:29

## 2021-08-18 RX ADMIN — VANCOMYCIN HYDROCHLORIDE 1750 MG: 5 INJECTION, POWDER, LYOPHILIZED, FOR SOLUTION INTRAVENOUS at 01:51

## 2021-08-18 RX ADMIN — FOLIC ACID 1 MG: 1 TABLET ORAL at 10:20

## 2021-08-18 RX ADMIN — OLANZAPINE 10 MG: 10 TABLET, FILM COATED ORAL at 21:33

## 2021-08-18 RX ADMIN — SODIUM CHLORIDE 75 ML/HR: 9 INJECTION, SOLUTION INTRAVENOUS at 12:14

## 2021-08-18 RX ADMIN — MAGNESIUM SULFATE HEPTAHYDRATE 1 G: 1 INJECTION, SOLUTION INTRAVENOUS at 13:42

## 2021-08-18 RX ADMIN — AMITRIPTYLINE HYDROCHLORIDE 50 MG: 50 TABLET, FILM COATED ORAL at 21:33

## 2021-08-18 RX ADMIN — PREGABALIN 150 MG: 75 CAPSULE ORAL at 21:33

## 2021-08-18 RX ADMIN — CETIRIZINE HYDROCHLORIDE 5 MG: 5 TABLET ORAL at 10:19

## 2021-08-18 RX ADMIN — PANTOPRAZOLE SODIUM 40 MG: 40 TABLET, DELAYED RELEASE ORAL at 10:21

## 2021-08-18 RX ADMIN — AZITHROMYCIN MONOHYDRATE 500 MG: 500 INJECTION, POWDER, LYOPHILIZED, FOR SOLUTION INTRAVENOUS at 10:18

## 2021-08-18 RX ADMIN — ATORVASTATIN CALCIUM 20 MG: 20 TABLET, FILM COATED ORAL at 21:33

## 2021-08-18 RX ADMIN — PREGABALIN 150 MG: 75 CAPSULE ORAL at 10:20

## 2021-08-18 RX ADMIN — ALBUTEROL SULFATE 2 PUFF: 90 AEROSOL, METERED RESPIRATORY (INHALATION) at 11:27

## 2021-08-18 RX ADMIN — HEPARIN SODIUM 5000 UNITS: 5000 INJECTION INTRAVENOUS; SUBCUTANEOUS at 10:21

## 2021-08-19 LAB
ANION GAP SERPL CALCULATED.3IONS-SCNC: 15 MMOL/L (ref 5–15)
BACTERIA UR QL AUTO: ABNORMAL /HPF
BASOPHILS # BLD AUTO: 0.01 10*3/MM3 (ref 0–0.2)
BASOPHILS NFR BLD AUTO: 0.4 % (ref 0–1.5)
BILIRUB UR QL STRIP: NEGATIVE
BUN SERPL-MCNC: 6 MG/DL (ref 6–20)
BUN/CREAT SERPL: 8 (ref 7–25)
CALCIUM SPEC-SCNC: 8 MG/DL (ref 8.6–10.5)
CHLORIDE SERPL-SCNC: 98 MMOL/L (ref 98–107)
CLARITY UR: CLEAR
CO2 SERPL-SCNC: 23 MMOL/L (ref 22–29)
COLOR UR: YELLOW
CREAT SERPL-MCNC: 0.75 MG/DL (ref 0.57–1)
CRP SERPL-MCNC: 1.96 MG/DL (ref 0–0.5)
D-LACTATE SERPL-SCNC: 0.8 MMOL/L (ref 0.5–2)
DEPRECATED RDW RBC AUTO: 42.1 FL (ref 37–54)
EOSINOPHIL # BLD AUTO: 0 10*3/MM3 (ref 0–0.4)
EOSINOPHIL NFR BLD AUTO: 0 % (ref 0.3–6.2)
ERYTHROCYTE [DISTWIDTH] IN BLOOD BY AUTOMATED COUNT: 13.8 % (ref 12.3–15.4)
FERRITIN SERPL-MCNC: 395.1 NG/ML (ref 13–150)
GFR SERPL CREATININE-BSD FRML MDRD: 82 ML/MIN/1.73
GLUCOSE BLDC GLUCOMTR-MCNC: 174 MG/DL (ref 70–130)
GLUCOSE BLDC GLUCOMTR-MCNC: 227 MG/DL (ref 70–130)
GLUCOSE BLDC GLUCOMTR-MCNC: 250 MG/DL (ref 70–130)
GLUCOSE BLDC GLUCOMTR-MCNC: 275 MG/DL (ref 70–130)
GLUCOSE SERPL-MCNC: 182 MG/DL (ref 65–99)
GLUCOSE UR STRIP-MCNC: ABNORMAL MG/DL
HCT VFR BLD AUTO: 31.4 % (ref 34–46.6)
HGB BLD-MCNC: 11 G/DL (ref 12–15.9)
HGB UR QL STRIP.AUTO: ABNORMAL
HYALINE CASTS UR QL AUTO: ABNORMAL /LPF
IMM GRANULOCYTES # BLD AUTO: 0.01 10*3/MM3 (ref 0–0.05)
IMM GRANULOCYTES NFR BLD AUTO: 0.4 % (ref 0–0.5)
KETONES UR QL STRIP: NEGATIVE
LDH SERPL-CCNC: 316 U/L (ref 135–214)
LEUKOCYTE ESTERASE UR QL STRIP.AUTO: NEGATIVE
LYMPHOCYTES # BLD AUTO: 0.74 10*3/MM3 (ref 0.7–3.1)
LYMPHOCYTES NFR BLD AUTO: 26.3 % (ref 19.6–45.3)
MAGNESIUM SERPL-MCNC: 1.2 MG/DL (ref 1.6–2.6)
MCH RBC QN AUTO: 29.3 PG (ref 26.6–33)
MCHC RBC AUTO-ENTMCNC: 35 G/DL (ref 31.5–35.7)
MCV RBC AUTO: 83.7 FL (ref 79–97)
MONOCYTES # BLD AUTO: 0.35 10*3/MM3 (ref 0.1–0.9)
MONOCYTES NFR BLD AUTO: 12.5 % (ref 5–12)
NEUTROPHILS NFR BLD AUTO: 1.7 10*3/MM3 (ref 1.7–7)
NEUTROPHILS NFR BLD AUTO: 60.4 % (ref 42.7–76)
NITRITE UR QL STRIP: NEGATIVE
NRBC BLD AUTO-RTO: 0 /100 WBC (ref 0–0.2)
PH UR STRIP.AUTO: 6 [PH] (ref 5–9)
PLATELET # BLD AUTO: 60 10*3/MM3 (ref 140–450)
PMV BLD AUTO: 10.2 FL (ref 6–12)
POTASSIUM SERPL-SCNC: 2.8 MMOL/L (ref 3.5–5.2)
POTASSIUM SERPL-SCNC: 3.5 MMOL/L (ref 3.5–5.2)
PROT UR QL STRIP: NEGATIVE
QT INTERVAL: 372 MS
QTC INTERVAL: 432 MS
RBC # BLD AUTO: 3.75 10*6/MM3 (ref 3.77–5.28)
RBC # UR: ABNORMAL /HPF
REF LAB TEST METHOD: ABNORMAL
SODIUM SERPL-SCNC: 136 MMOL/L (ref 136–145)
SP GR UR STRIP: 1.01 (ref 1–1.03)
SQUAMOUS #/AREA URNS HPF: ABNORMAL /HPF
UROBILINOGEN UR QL STRIP: ABNORMAL
WBC # BLD AUTO: 2.81 10*3/MM3 (ref 3.4–10.8)
WBC UR QL AUTO: ABNORMAL /HPF

## 2021-08-19 PROCEDURE — 82962 GLUCOSE BLOOD TEST: CPT

## 2021-08-19 PROCEDURE — 85025 COMPLETE CBC W/AUTO DIFF WBC: CPT | Performed by: INTERNAL MEDICINE

## 2021-08-19 PROCEDURE — 94799 UNLISTED PULMONARY SVC/PX: CPT

## 2021-08-19 PROCEDURE — 81001 URINALYSIS AUTO W/SCOPE: CPT | Performed by: INTERNAL MEDICINE

## 2021-08-19 PROCEDURE — 25010000002 HEPARIN (PORCINE) PER 1000 UNITS: Performed by: INTERNAL MEDICINE

## 2021-08-19 PROCEDURE — 63710000001 INSULIN DETEMIR PER 5 UNITS: Performed by: INTERNAL MEDICINE

## 2021-08-19 PROCEDURE — 86140 C-REACTIVE PROTEIN: CPT | Performed by: FAMILY MEDICINE

## 2021-08-19 PROCEDURE — 84132 ASSAY OF SERUM POTASSIUM: CPT | Performed by: INTERNAL MEDICINE

## 2021-08-19 PROCEDURE — 25010000002 MAGNESIUM SULFATE 2 GM/50ML SOLUTION: Performed by: INTERNAL MEDICINE

## 2021-08-19 PROCEDURE — 82728 ASSAY OF FERRITIN: CPT | Performed by: FAMILY MEDICINE

## 2021-08-19 PROCEDURE — 87040 BLOOD CULTURE FOR BACTERIA: CPT | Performed by: INTERNAL MEDICINE

## 2021-08-19 PROCEDURE — 83605 ASSAY OF LACTIC ACID: CPT | Performed by: FAMILY MEDICINE

## 2021-08-19 PROCEDURE — 25010000002 CEFTRIAXONE PER 250 MG: Performed by: INTERNAL MEDICINE

## 2021-08-19 PROCEDURE — 80048 BASIC METABOLIC PNL TOTAL CA: CPT | Performed by: INTERNAL MEDICINE

## 2021-08-19 PROCEDURE — 83735 ASSAY OF MAGNESIUM: CPT | Performed by: FAMILY MEDICINE

## 2021-08-19 PROCEDURE — 63710000001 INSULIN ASPART PER 5 UNITS: Performed by: INTERNAL MEDICINE

## 2021-08-19 PROCEDURE — 83615 LACTATE (LD) (LDH) ENZYME: CPT | Performed by: FAMILY MEDICINE

## 2021-08-19 RX ORDER — MAGNESIUM SULFATE HEPTAHYDRATE 40 MG/ML
2 INJECTION, SOLUTION INTRAVENOUS AS NEEDED
Status: DISCONTINUED | OUTPATIENT
Start: 2021-08-19 | End: 2021-09-09 | Stop reason: HOSPADM

## 2021-08-19 RX ORDER — POTASSIUM CHLORIDE 7.45 MG/ML
10 INJECTION INTRAVENOUS
Status: DISCONTINUED | OUTPATIENT
Start: 2021-08-19 | End: 2021-09-09

## 2021-08-19 RX ORDER — POTASSIUM CHLORIDE 1.5 G/1.77G
40 POWDER, FOR SOLUTION ORAL AS NEEDED
Status: DISCONTINUED | OUTPATIENT
Start: 2021-08-19 | End: 2021-09-09

## 2021-08-19 RX ORDER — MAGNESIUM SULFATE HEPTAHYDRATE 40 MG/ML
4 INJECTION, SOLUTION INTRAVENOUS AS NEEDED
Status: DISCONTINUED | OUTPATIENT
Start: 2021-08-19 | End: 2021-09-09 | Stop reason: HOSPADM

## 2021-08-19 RX ORDER — POTASSIUM CHLORIDE 750 MG/1
40 CAPSULE, EXTENDED RELEASE ORAL AS NEEDED
Status: DISCONTINUED | OUTPATIENT
Start: 2021-08-19 | End: 2021-09-09

## 2021-08-19 RX ORDER — SODIUM CHLORIDE 9 MG/ML
75 INJECTION, SOLUTION INTRAVENOUS CONTINUOUS
Status: DISCONTINUED | OUTPATIENT
Start: 2021-08-19 | End: 2021-08-21

## 2021-08-19 RX ORDER — MAGNESIUM SULFATE HEPTAHYDRATE 40 MG/ML
4 INJECTION, SOLUTION INTRAVENOUS ONCE
Status: DISCONTINUED | OUTPATIENT
Start: 2021-08-19 | End: 2021-09-09 | Stop reason: HOSPADM

## 2021-08-19 RX ADMIN — PREGABALIN 150 MG: 75 CAPSULE ORAL at 09:46

## 2021-08-19 RX ADMIN — MAGNESIUM SULFATE HEPTAHYDRATE 2 G: 40 INJECTION, SOLUTION INTRAVENOUS at 15:27

## 2021-08-19 RX ADMIN — INSULIN DETEMIR 10 UNITS: 100 INJECTION, SOLUTION SUBCUTANEOUS at 21:15

## 2021-08-19 RX ADMIN — CETIRIZINE HYDROCHLORIDE 5 MG: 5 TABLET ORAL at 09:47

## 2021-08-19 RX ADMIN — FERROUS SULFATE TAB EC 324 MG (65 MG FE EQUIVALENT) 324 MG: 324 (65 FE) TABLET DELAYED RESPONSE at 09:47

## 2021-08-19 RX ADMIN — OLANZAPINE 10 MG: 10 TABLET, FILM COATED ORAL at 20:52

## 2021-08-19 RX ADMIN — PANTOPRAZOLE SODIUM 40 MG: 40 TABLET, DELAYED RELEASE ORAL at 06:10

## 2021-08-19 RX ADMIN — ACETAMINOPHEN 650 MG: 325 TABLET, FILM COATED ORAL at 12:40

## 2021-08-19 RX ADMIN — SODIUM CHLORIDE 75 ML/HR: 9 INJECTION, SOLUTION INTRAVENOUS at 14:22

## 2021-08-19 RX ADMIN — HEPARIN SODIUM 5000 UNITS: 5000 INJECTION INTRAVENOUS; SUBCUTANEOUS at 06:10

## 2021-08-19 RX ADMIN — ANASTROZOLE 1 MG: 1 TABLET ORAL at 20:52

## 2021-08-19 RX ADMIN — MAGNESIUM SULFATE HEPTAHYDRATE 2 G: 40 INJECTION, SOLUTION INTRAVENOUS at 10:46

## 2021-08-19 RX ADMIN — OXYCODONE 5 MG: 5 TABLET ORAL at 20:53

## 2021-08-19 RX ADMIN — ATORVASTATIN CALCIUM 20 MG: 20 TABLET, FILM COATED ORAL at 20:52

## 2021-08-19 RX ADMIN — MAGNESIUM SULFATE HEPTAHYDRATE 2 G: 40 INJECTION, SOLUTION INTRAVENOUS at 12:28

## 2021-08-19 RX ADMIN — AMITRIPTYLINE HYDROCHLORIDE 50 MG: 50 TABLET, FILM COATED ORAL at 20:53

## 2021-08-19 RX ADMIN — SODIUM CHLORIDE, PRESERVATIVE FREE 10 ML: 5 INJECTION INTRAVENOUS at 20:52

## 2021-08-19 RX ADMIN — CYANOCOBALAMIN TAB 1000 MCG 1000 MCG: 1000 TAB at 09:47

## 2021-08-19 RX ADMIN — POTASSIUM CHLORIDE 40 MEQ: 750 CAPSULE, EXTENDED RELEASE ORAL at 06:48

## 2021-08-19 RX ADMIN — ALBUTEROL SULFATE 2 PUFF: 90 AEROSOL, METERED RESPIRATORY (INHALATION) at 03:04

## 2021-08-19 RX ADMIN — POTASSIUM CHLORIDE 40 MEQ: 750 CAPSULE, EXTENDED RELEASE ORAL at 12:30

## 2021-08-19 RX ADMIN — CEFTRIAXONE SODIUM 2 G: 2 INJECTION, POWDER, FOR SOLUTION INTRAMUSCULAR; INTRAVENOUS at 09:47

## 2021-08-19 RX ADMIN — ACETAMINOPHEN 650 MG: 325 TABLET, FILM COATED ORAL at 03:03

## 2021-08-19 RX ADMIN — ALBUTEROL SULFATE 2 PUFF: 90 AEROSOL, METERED RESPIRATORY (INHALATION) at 15:20

## 2021-08-19 RX ADMIN — ACETAMINOPHEN 650 MG: 325 TABLET, FILM COATED ORAL at 09:46

## 2021-08-19 RX ADMIN — INSULIN ASPART 6 UNITS: 100 INJECTION, SOLUTION INTRAVENOUS; SUBCUTANEOUS at 12:30

## 2021-08-19 RX ADMIN — HEPARIN SODIUM 5000 UNITS: 5000 INJECTION INTRAVENOUS; SUBCUTANEOUS at 21:00

## 2021-08-19 RX ADMIN — INSULIN ASPART 6 UNITS: 100 INJECTION, SOLUTION INTRAVENOUS; SUBCUTANEOUS at 17:06

## 2021-08-19 RX ADMIN — INSULIN ASPART 2 UNITS: 100 INJECTION, SOLUTION INTRAVENOUS; SUBCUTANEOUS at 09:50

## 2021-08-19 RX ADMIN — PREGABALIN 150 MG: 75 CAPSULE ORAL at 20:52

## 2021-08-19 RX ADMIN — POTASSIUM CHLORIDE 40 MEQ: 750 CAPSULE, EXTENDED RELEASE ORAL at 17:03

## 2021-08-19 RX ADMIN — FOLIC ACID 1 MG: 1 TABLET ORAL at 09:47

## 2021-08-19 RX ADMIN — ALBUTEROL SULFATE 2 PUFF: 90 AEROSOL, METERED RESPIRATORY (INHALATION) at 11:51

## 2021-08-19 RX ADMIN — ACETAMINOPHEN 650 MG: 325 TABLET, FILM COATED ORAL at 20:59

## 2021-08-20 LAB
ANION GAP SERPL CALCULATED.3IONS-SCNC: 10 MMOL/L (ref 5–15)
BASOPHILS # BLD AUTO: 0 10*3/MM3 (ref 0–0.2)
BASOPHILS NFR BLD AUTO: 0 % (ref 0–1.5)
BUN SERPL-MCNC: 3 MG/DL (ref 6–20)
BUN/CREAT SERPL: 4.7 (ref 7–25)
CALCIUM SPEC-SCNC: 7.4 MG/DL (ref 8.6–10.5)
CHLORIDE SERPL-SCNC: 97 MMOL/L (ref 98–107)
CO2 SERPL-SCNC: 25 MMOL/L (ref 22–29)
CREAT SERPL-MCNC: 0.64 MG/DL (ref 0.57–1)
DEPRECATED RDW RBC AUTO: 42.9 FL (ref 37–54)
EOSINOPHIL # BLD AUTO: 0 10*3/MM3 (ref 0–0.4)
EOSINOPHIL NFR BLD AUTO: 0 % (ref 0.3–6.2)
ERYTHROCYTE [DISTWIDTH] IN BLOOD BY AUTOMATED COUNT: 13.9 % (ref 12.3–15.4)
GFR SERPL CREATININE-BSD FRML MDRD: 99 ML/MIN/1.73
GLUCOSE BLDC GLUCOMTR-MCNC: 177 MG/DL (ref 70–130)
GLUCOSE BLDC GLUCOMTR-MCNC: 183 MG/DL (ref 70–130)
GLUCOSE BLDC GLUCOMTR-MCNC: 199 MG/DL (ref 70–130)
GLUCOSE BLDC GLUCOMTR-MCNC: 274 MG/DL (ref 70–130)
GLUCOSE SERPL-MCNC: 183 MG/DL (ref 65–99)
HCT VFR BLD AUTO: 31.3 % (ref 34–46.6)
HGB BLD-MCNC: 10.9 G/DL (ref 12–15.9)
IMM GRANULOCYTES # BLD AUTO: 0.01 10*3/MM3 (ref 0–0.05)
IMM GRANULOCYTES NFR BLD AUTO: 0.4 % (ref 0–0.5)
LYMPHOCYTES # BLD AUTO: 0.87 10*3/MM3 (ref 0.7–3.1)
LYMPHOCYTES NFR BLD AUTO: 37 % (ref 19.6–45.3)
MAGNESIUM SERPL-MCNC: 1.4 MG/DL (ref 1.6–2.6)
MCH RBC QN AUTO: 29.2 PG (ref 26.6–33)
MCHC RBC AUTO-ENTMCNC: 34.8 G/DL (ref 31.5–35.7)
MCV RBC AUTO: 83.9 FL (ref 79–97)
MONOCYTES # BLD AUTO: 0.19 10*3/MM3 (ref 0.1–0.9)
MONOCYTES NFR BLD AUTO: 8.1 % (ref 5–12)
MRSA DNA SPEC QL NAA+PROBE: NEGATIVE
NEUTROPHILS NFR BLD AUTO: 1.28 10*3/MM3 (ref 1.7–7)
NEUTROPHILS NFR BLD AUTO: 54.5 % (ref 42.7–76)
NRBC BLD AUTO-RTO: 0 /100 WBC (ref 0–0.2)
PLATELET # BLD AUTO: 59 10*3/MM3 (ref 140–450)
PMV BLD AUTO: 10.3 FL (ref 6–12)
POTASSIUM SERPL-SCNC: 3.3 MMOL/L (ref 3.5–5.2)
POTASSIUM SERPL-SCNC: 3.8 MMOL/L (ref 3.5–5.2)
RBC # BLD AUTO: 3.73 10*6/MM3 (ref 3.77–5.28)
SODIUM SERPL-SCNC: 132 MMOL/L (ref 136–145)
WBC # BLD AUTO: 2.35 10*3/MM3 (ref 3.4–10.8)

## 2021-08-20 PROCEDURE — 36415 COLL VENOUS BLD VENIPUNCTURE: CPT | Performed by: INTERNAL MEDICINE

## 2021-08-20 PROCEDURE — 87641 MR-STAPH DNA AMP PROBE: CPT | Performed by: INTERNAL MEDICINE

## 2021-08-20 PROCEDURE — 82962 GLUCOSE BLOOD TEST: CPT

## 2021-08-20 PROCEDURE — 63710000001 INSULIN DETEMIR PER 5 UNITS: Performed by: INTERNAL MEDICINE

## 2021-08-20 PROCEDURE — 25010000002 VANCOMYCIN 5 G RECONSTITUTED SOLUTION: Performed by: INTERNAL MEDICINE

## 2021-08-20 PROCEDURE — 83735 ASSAY OF MAGNESIUM: CPT | Performed by: INTERNAL MEDICINE

## 2021-08-20 PROCEDURE — 85025 COMPLETE CBC W/AUTO DIFF WBC: CPT | Performed by: INTERNAL MEDICINE

## 2021-08-20 PROCEDURE — 25010000002 CEFTRIAXONE PER 250 MG: Performed by: INTERNAL MEDICINE

## 2021-08-20 PROCEDURE — 84132 ASSAY OF SERUM POTASSIUM: CPT | Performed by: INTERNAL MEDICINE

## 2021-08-20 PROCEDURE — 63710000001 INSULIN ASPART PER 5 UNITS: Performed by: INTERNAL MEDICINE

## 2021-08-20 PROCEDURE — 80048 BASIC METABOLIC PNL TOTAL CA: CPT | Performed by: INTERNAL MEDICINE

## 2021-08-20 PROCEDURE — 25010000002 HEPARIN (PORCINE) PER 1000 UNITS: Performed by: INTERNAL MEDICINE

## 2021-08-20 PROCEDURE — 25010000002 MAGNESIUM SULFATE 2 GM/50ML SOLUTION: Performed by: INTERNAL MEDICINE

## 2021-08-20 PROCEDURE — 94799 UNLISTED PULMONARY SVC/PX: CPT

## 2021-08-20 RX ORDER — IBUPROFEN 400 MG/1
400 TABLET ORAL EVERY 6 HOURS PRN
Status: COMPLETED | OUTPATIENT
Start: 2021-08-20 | End: 2021-08-21

## 2021-08-20 RX ADMIN — INSULIN ASPART 2 UNITS: 100 INJECTION, SOLUTION INTRAVENOUS; SUBCUTANEOUS at 08:38

## 2021-08-20 RX ADMIN — IBUPROFEN 400 MG: 400 TABLET ORAL at 21:04

## 2021-08-20 RX ADMIN — HEPARIN SODIUM 5000 UNITS: 5000 INJECTION INTRAVENOUS; SUBCUTANEOUS at 06:31

## 2021-08-20 RX ADMIN — ATORVASTATIN CALCIUM 20 MG: 20 TABLET, FILM COATED ORAL at 20:59

## 2021-08-20 RX ADMIN — INSULIN ASPART 2 UNITS: 100 INJECTION, SOLUTION INTRAVENOUS; SUBCUTANEOUS at 11:27

## 2021-08-20 RX ADMIN — ACETAMINOPHEN 650 MG: 325 TABLET, FILM COATED ORAL at 22:48

## 2021-08-20 RX ADMIN — VANCOMYCIN HYDROCHLORIDE 2500 MG: 5 INJECTION, POWDER, LYOPHILIZED, FOR SOLUTION INTRAVENOUS at 12:20

## 2021-08-20 RX ADMIN — OXYCODONE 5 MG: 5 TABLET ORAL at 04:03

## 2021-08-20 RX ADMIN — FOLIC ACID 1 MG: 1 TABLET ORAL at 08:38

## 2021-08-20 RX ADMIN — FERROUS SULFATE TAB EC 324 MG (65 MG FE EQUIVALENT) 324 MG: 324 (65 FE) TABLET DELAYED RESPONSE at 08:38

## 2021-08-20 RX ADMIN — PREGABALIN 150 MG: 75 CAPSULE ORAL at 08:38

## 2021-08-20 RX ADMIN — AMITRIPTYLINE HYDROCHLORIDE 50 MG: 50 TABLET, FILM COATED ORAL at 21:00

## 2021-08-20 RX ADMIN — PREGABALIN 150 MG: 75 CAPSULE ORAL at 21:00

## 2021-08-20 RX ADMIN — POTASSIUM CHLORIDE 40 MEQ: 750 CAPSULE, EXTENDED RELEASE ORAL at 15:19

## 2021-08-20 RX ADMIN — MAGNESIUM SULFATE HEPTAHYDRATE 2 G: 40 INJECTION, SOLUTION INTRAVENOUS at 15:15

## 2021-08-20 RX ADMIN — SODIUM CHLORIDE 75 ML/HR: 9 INJECTION, SOLUTION INTRAVENOUS at 04:03

## 2021-08-20 RX ADMIN — MAGNESIUM SULFATE HEPTAHYDRATE 2 G: 40 INJECTION, SOLUTION INTRAVENOUS at 19:05

## 2021-08-20 RX ADMIN — INSULIN ASPART 2 UNITS: 100 INJECTION, SOLUTION INTRAVENOUS; SUBCUTANEOUS at 17:13

## 2021-08-20 RX ADMIN — ALBUTEROL SULFATE 2 PUFF: 90 AEROSOL, METERED RESPIRATORY (INHALATION) at 15:04

## 2021-08-20 RX ADMIN — ACETAMINOPHEN 650 MG: 325 TABLET, FILM COATED ORAL at 08:38

## 2021-08-20 RX ADMIN — POTASSIUM CHLORIDE 40 MEQ: 750 CAPSULE, EXTENDED RELEASE ORAL at 04:03

## 2021-08-20 RX ADMIN — MAGNESIUM SULFATE HEPTAHYDRATE 2 G: 40 INJECTION, SOLUTION INTRAVENOUS at 17:13

## 2021-08-20 RX ADMIN — IBUPROFEN 400 MG: 400 TABLET ORAL at 11:27

## 2021-08-20 RX ADMIN — ALBUTEROL SULFATE 2 PUFF: 90 AEROSOL, METERED RESPIRATORY (INHALATION) at 11:14

## 2021-08-20 RX ADMIN — POTASSIUM CHLORIDE 40 MEQ: 750 CAPSULE, EXTENDED RELEASE ORAL at 11:27

## 2021-08-20 RX ADMIN — PANTOPRAZOLE SODIUM 40 MG: 40 TABLET, DELAYED RELEASE ORAL at 06:31

## 2021-08-20 RX ADMIN — ANASTROZOLE 1 MG: 1 TABLET ORAL at 21:00

## 2021-08-20 RX ADMIN — OLANZAPINE 10 MG: 10 TABLET, FILM COATED ORAL at 20:59

## 2021-08-20 RX ADMIN — CEFTRIAXONE SODIUM 2 G: 2 INJECTION, POWDER, FOR SOLUTION INTRAMUSCULAR; INTRAVENOUS at 08:49

## 2021-08-20 RX ADMIN — ACETAMINOPHEN 650 MG: 325 TABLET, FILM COATED ORAL at 04:03

## 2021-08-20 RX ADMIN — CETIRIZINE HYDROCHLORIDE 5 MG: 5 TABLET ORAL at 08:38

## 2021-08-20 RX ADMIN — INSULIN DETEMIR 10 UNITS: 100 INJECTION, SOLUTION SUBCUTANEOUS at 22:44

## 2021-08-20 RX ADMIN — CYANOCOBALAMIN TAB 1000 MCG 1000 MCG: 1000 TAB at 08:38

## 2021-08-20 RX ADMIN — ACETAMINOPHEN 650 MG: 325 TABLET, FILM COATED ORAL at 17:21

## 2021-08-20 RX ADMIN — SODIUM CHLORIDE 75 ML/HR: 9 INJECTION, SOLUTION INTRAVENOUS at 17:13

## 2021-08-20 RX ADMIN — SODIUM CHLORIDE, PRESERVATIVE FREE 10 ML: 5 INJECTION INTRAVENOUS at 08:39

## 2021-08-21 ENCOUNTER — APPOINTMENT (OUTPATIENT)
Dept: GENERAL RADIOLOGY | Facility: HOSPITAL | Age: 46
End: 2021-08-21

## 2021-08-21 LAB
ANION GAP SERPL CALCULATED.3IONS-SCNC: 11 MMOL/L (ref 5–15)
BUN SERPL-MCNC: 4 MG/DL (ref 6–20)
BUN/CREAT SERPL: 6.3 (ref 7–25)
CALCIUM SPEC-SCNC: 7.8 MG/DL (ref 8.6–10.5)
CHLORIDE SERPL-SCNC: 99 MMOL/L (ref 98–107)
CO2 SERPL-SCNC: 25 MMOL/L (ref 22–29)
CREAT SERPL-MCNC: 0.63 MG/DL (ref 0.57–1)
DEPRECATED RDW RBC AUTO: 43.7 FL (ref 37–54)
ERYTHROCYTE [DISTWIDTH] IN BLOOD BY AUTOMATED COUNT: 14.1 % (ref 12.3–15.4)
GFR SERPL CREATININE-BSD FRML MDRD: 101 ML/MIN/1.73
GLUCOSE BLDC GLUCOMTR-MCNC: 182 MG/DL (ref 70–130)
GLUCOSE BLDC GLUCOMTR-MCNC: 185 MG/DL (ref 70–130)
GLUCOSE BLDC GLUCOMTR-MCNC: 190 MG/DL (ref 70–130)
GLUCOSE BLDC GLUCOMTR-MCNC: 216 MG/DL (ref 70–130)
GLUCOSE SERPL-MCNC: 192 MG/DL (ref 65–99)
HCT VFR BLD AUTO: 35 % (ref 34–46.6)
HGB BLD-MCNC: 12.3 G/DL (ref 12–15.9)
LYMPHOCYTES # BLD MANUAL: 0.74 10*3/MM3 (ref 0.7–3.1)
LYMPHOCYTES NFR BLD MANUAL: 35 % (ref 19.6–45.3)
LYMPHOCYTES NFR BLD MANUAL: 6 % (ref 5–12)
MAGNESIUM SERPL-MCNC: 1.5 MG/DL (ref 1.6–2.6)
MCH RBC QN AUTO: 29.7 PG (ref 26.6–33)
MCHC RBC AUTO-ENTMCNC: 35.1 G/DL (ref 31.5–35.7)
MCV RBC AUTO: 84.5 FL (ref 79–97)
MONOCYTES # BLD AUTO: 0.13 10*3/MM3 (ref 0.1–0.9)
NEUTROPHILS # BLD AUTO: 1.25 10*3/MM3 (ref 1.7–7)
NEUTROPHILS NFR BLD MANUAL: 54 % (ref 42.7–76)
NEUTS BAND NFR BLD MANUAL: 5 % (ref 0–5)
PLATELET # BLD AUTO: 57 10*3/MM3 (ref 140–450)
PMV BLD AUTO: 11.1 FL (ref 6–12)
POTASSIUM SERPL-SCNC: 3.6 MMOL/L (ref 3.5–5.2)
POTASSIUM SERPL-SCNC: 3.6 MMOL/L (ref 3.5–5.2)
RBC # BLD AUTO: 4.14 10*6/MM3 (ref 3.77–5.28)
RBC MORPH BLD: NORMAL
SMALL PLATELETS BLD QL SMEAR: ABNORMAL
SODIUM SERPL-SCNC: 135 MMOL/L (ref 136–145)
VANCOMYCIN PEAK SERPL-MCNC: 15.6 MCG/ML (ref 20–40)
VANCOMYCIN TROUGH SERPL-MCNC: 14.3 MCG/ML (ref 5–20)
WBC # BLD AUTO: 2.12 10*3/MM3 (ref 3.4–10.8)
WBC MORPH BLD: NORMAL

## 2021-08-21 PROCEDURE — 85025 COMPLETE CBC W/AUTO DIFF WBC: CPT | Performed by: INTERNAL MEDICINE

## 2021-08-21 PROCEDURE — 94799 UNLISTED PULMONARY SVC/PX: CPT

## 2021-08-21 PROCEDURE — 71045 X-RAY EXAM CHEST 1 VIEW: CPT

## 2021-08-21 PROCEDURE — 25010000002 PIPERACILLIN SOD-TAZOBACTAM PER 1 G: Performed by: INTERNAL MEDICINE

## 2021-08-21 PROCEDURE — 85007 BL SMEAR W/DIFF WBC COUNT: CPT | Performed by: INTERNAL MEDICINE

## 2021-08-21 PROCEDURE — 25010000002 CEFTRIAXONE PER 250 MG: Performed by: INTERNAL MEDICINE

## 2021-08-21 PROCEDURE — 25010000002 DEXAMETHASONE PER 1 MG: Performed by: INTERNAL MEDICINE

## 2021-08-21 PROCEDURE — 82962 GLUCOSE BLOOD TEST: CPT

## 2021-08-21 PROCEDURE — 25010000003 MAGNESIUM SULFATE 4 GM/100ML SOLUTION: Performed by: INTERNAL MEDICINE

## 2021-08-21 PROCEDURE — 63710000001 INSULIN ASPART PER 5 UNITS: Performed by: INTERNAL MEDICINE

## 2021-08-21 PROCEDURE — 83735 ASSAY OF MAGNESIUM: CPT | Performed by: FAMILY MEDICINE

## 2021-08-21 PROCEDURE — 36415 COLL VENOUS BLD VENIPUNCTURE: CPT | Performed by: INTERNAL MEDICINE

## 2021-08-21 PROCEDURE — 80048 BASIC METABOLIC PNL TOTAL CA: CPT | Performed by: INTERNAL MEDICINE

## 2021-08-21 PROCEDURE — 80202 ASSAY OF VANCOMYCIN: CPT | Performed by: INTERNAL MEDICINE

## 2021-08-21 PROCEDURE — 25010000002 VANCOMYCIN 5 G RECONSTITUTED SOLUTION: Performed by: INTERNAL MEDICINE

## 2021-08-21 PROCEDURE — 63710000001 INSULIN DETEMIR PER 5 UNITS: Performed by: INTERNAL MEDICINE

## 2021-08-21 PROCEDURE — 25010000002 FUROSEMIDE PER 20 MG: Performed by: INTERNAL MEDICINE

## 2021-08-21 PROCEDURE — 84132 ASSAY OF SERUM POTASSIUM: CPT | Performed by: INTERNAL MEDICINE

## 2021-08-21 RX ORDER — BENZONATATE 100 MG/1
200 CAPSULE ORAL EVERY 4 HOURS PRN
Status: DISCONTINUED | OUTPATIENT
Start: 2021-08-21 | End: 2021-09-09 | Stop reason: HOSPADM

## 2021-08-21 RX ORDER — MAGNESIUM SULFATE HEPTAHYDRATE 40 MG/ML
4 INJECTION, SOLUTION INTRAVENOUS ONCE
Status: COMPLETED | OUTPATIENT
Start: 2021-08-21 | End: 2021-08-21

## 2021-08-21 RX ORDER — MONTELUKAST SODIUM 10 MG/1
10 TABLET ORAL NIGHTLY
Status: DISCONTINUED | OUTPATIENT
Start: 2021-08-21 | End: 2021-09-09 | Stop reason: HOSPADM

## 2021-08-21 RX ORDER — METOPROLOL TARTRATE 5 MG/5ML
5 INJECTION INTRAVENOUS ONCE
Status: COMPLETED | OUTPATIENT
Start: 2021-08-21 | End: 2021-08-21

## 2021-08-21 RX ORDER — FUROSEMIDE 10 MG/ML
20 INJECTION INTRAMUSCULAR; INTRAVENOUS EVERY 12 HOURS
Status: COMPLETED | OUTPATIENT
Start: 2021-08-21 | End: 2021-08-25

## 2021-08-21 RX ORDER — BUDESONIDE AND FORMOTEROL FUMARATE DIHYDRATE 160; 4.5 UG/1; UG/1
2 AEROSOL RESPIRATORY (INHALATION)
Status: DISCONTINUED | OUTPATIENT
Start: 2021-08-21 | End: 2021-09-09 | Stop reason: HOSPADM

## 2021-08-21 RX ORDER — DEXAMETHASONE SODIUM PHOSPHATE 4 MG/ML
6 INJECTION, SOLUTION INTRA-ARTICULAR; INTRALESIONAL; INTRAMUSCULAR; INTRAVENOUS; SOFT TISSUE 2 TIMES DAILY
Status: DISCONTINUED | OUTPATIENT
Start: 2021-08-21 | End: 2021-08-22

## 2021-08-21 RX ADMIN — SODIUM CHLORIDE 75 ML/HR: 9 INJECTION, SOLUTION INTRAVENOUS at 04:29

## 2021-08-21 RX ADMIN — IBUPROFEN 400 MG: 400 TABLET ORAL at 09:09

## 2021-08-21 RX ADMIN — ALBUTEROL SULFATE 2 PUFF: 90 AEROSOL, METERED RESPIRATORY (INHALATION) at 11:11

## 2021-08-21 RX ADMIN — METOPROLOL TARTRATE 25 MG: 25 TABLET, FILM COATED ORAL at 09:09

## 2021-08-21 RX ADMIN — VANCOMYCIN HYDROCHLORIDE 1750 MG: 5 INJECTION, POWDER, LYOPHILIZED, FOR SOLUTION INTRAVENOUS at 15:59

## 2021-08-21 RX ADMIN — PREGABALIN 150 MG: 75 CAPSULE ORAL at 21:06

## 2021-08-21 RX ADMIN — SODIUM CHLORIDE, PRESERVATIVE FREE 10 ML: 5 INJECTION INTRAVENOUS at 23:08

## 2021-08-21 RX ADMIN — METOPROLOL TARTRATE 25 MG: 25 TABLET, FILM COATED ORAL at 21:22

## 2021-08-21 RX ADMIN — SODIUM CHLORIDE 75 ML/HR: 9 INJECTION, SOLUTION INTRAVENOUS at 15:58

## 2021-08-21 RX ADMIN — POTASSIUM CHLORIDE 40 MEQ: 750 CAPSULE, EXTENDED RELEASE ORAL at 23:07

## 2021-08-21 RX ADMIN — VANCOMYCIN HYDROCHLORIDE 1750 MG: 5 INJECTION, POWDER, LYOPHILIZED, FOR SOLUTION INTRAVENOUS at 00:47

## 2021-08-21 RX ADMIN — METOPROLOL TARTRATE 5 MG: 5 INJECTION INTRAVENOUS at 09:06

## 2021-08-21 RX ADMIN — DEXAMETHASONE SODIUM PHOSPHATE 6 MG: 4 INJECTION, SOLUTION INTRAMUSCULAR; INTRAVENOUS at 21:06

## 2021-08-21 RX ADMIN — PREGABALIN 150 MG: 75 CAPSULE ORAL at 09:09

## 2021-08-21 RX ADMIN — ACETAMINOPHEN 650 MG: 325 TABLET, FILM COATED ORAL at 15:57

## 2021-08-21 RX ADMIN — INSULIN ASPART 2 UNITS: 100 INJECTION, SOLUTION INTRAVENOUS; SUBCUTANEOUS at 16:25

## 2021-08-21 RX ADMIN — BUDESONIDE AND FORMOTEROL FUMARATE DIHYDRATE 2 PUFF: 160; 4.5 AEROSOL RESPIRATORY (INHALATION) at 23:08

## 2021-08-21 RX ADMIN — FUROSEMIDE 20 MG: 10 INJECTION, SOLUTION INTRAMUSCULAR; INTRAVENOUS at 21:18

## 2021-08-21 RX ADMIN — CEFTRIAXONE SODIUM 2 G: 2 INJECTION, POWDER, FOR SOLUTION INTRAMUSCULAR; INTRAVENOUS at 09:59

## 2021-08-21 RX ADMIN — FERROUS SULFATE TAB EC 324 MG (65 MG FE EQUIVALENT) 324 MG: 324 (65 FE) TABLET DELAYED RESPONSE at 09:09

## 2021-08-21 RX ADMIN — ACETAMINOPHEN 650 MG: 325 TABLET, FILM COATED ORAL at 21:06

## 2021-08-21 RX ADMIN — OLANZAPINE 10 MG: 10 TABLET, FILM COATED ORAL at 21:06

## 2021-08-21 RX ADMIN — ACETAMINOPHEN 650 MG: 325 TABLET, FILM COATED ORAL at 10:55

## 2021-08-21 RX ADMIN — BENZONATATE 200 MG: 100 CAPSULE ORAL at 15:58

## 2021-08-21 RX ADMIN — ACETAMINOPHEN 650 MG: 325 TABLET, FILM COATED ORAL at 04:29

## 2021-08-21 RX ADMIN — ALBUTEROL SULFATE 2 PUFF: 90 AEROSOL, METERED RESPIRATORY (INHALATION) at 16:04

## 2021-08-21 RX ADMIN — INSULIN ASPART 2 UNITS: 100 INJECTION, SOLUTION INTRAVENOUS; SUBCUTANEOUS at 09:09

## 2021-08-21 RX ADMIN — MAGNESIUM SULFATE 4 G: 4 INJECTION INTRAVENOUS at 10:48

## 2021-08-21 RX ADMIN — TIZANIDINE 4 MG: 4 TABLET ORAL at 21:16

## 2021-08-21 RX ADMIN — ATORVASTATIN CALCIUM 20 MG: 20 TABLET, FILM COATED ORAL at 23:07

## 2021-08-21 RX ADMIN — BENZONATATE 200 MG: 100 CAPSULE ORAL at 10:55

## 2021-08-21 RX ADMIN — PIPERACILLIN SODIUM AND TAZOBACTAM SODIUM 3.38 G: 3; .375 INJECTION, POWDER, LYOPHILIZED, FOR SOLUTION INTRAVENOUS at 21:07

## 2021-08-21 RX ADMIN — INSULIN DETEMIR 15 UNITS: 100 INJECTION, SOLUTION SUBCUTANEOUS at 21:07

## 2021-08-21 RX ADMIN — ALBUTEROL SULFATE 2 PUFF: 90 AEROSOL, METERED RESPIRATORY (INHALATION) at 21:14

## 2021-08-21 RX ADMIN — MONTELUKAST 10 MG: 10 TABLET, FILM COATED ORAL at 21:39

## 2021-08-21 RX ADMIN — CETIRIZINE HYDROCHLORIDE 5 MG: 5 TABLET ORAL at 09:09

## 2021-08-21 RX ADMIN — CYANOCOBALAMIN TAB 1000 MCG 1000 MCG: 1000 TAB at 09:09

## 2021-08-21 RX ADMIN — ANASTROZOLE 1 MG: 1 TABLET ORAL at 21:06

## 2021-08-21 RX ADMIN — FOLIC ACID 1 MG: 1 TABLET ORAL at 09:09

## 2021-08-21 RX ADMIN — IBUPROFEN 400 MG: 400 TABLET ORAL at 14:08

## 2021-08-21 RX ADMIN — AMITRIPTYLINE HYDROCHLORIDE 50 MG: 50 TABLET, FILM COATED ORAL at 21:39

## 2021-08-21 RX ADMIN — PANTOPRAZOLE SODIUM 40 MG: 40 TABLET, DELAYED RELEASE ORAL at 06:11

## 2021-08-22 ENCOUNTER — APPOINTMENT (OUTPATIENT)
Dept: CT IMAGING | Facility: HOSPITAL | Age: 46
End: 2021-08-22

## 2021-08-22 ENCOUNTER — APPOINTMENT (OUTPATIENT)
Dept: INTERVENTIONAL RADIOLOGY/VASCULAR | Facility: HOSPITAL | Age: 46
End: 2021-08-22

## 2021-08-22 ENCOUNTER — APPOINTMENT (OUTPATIENT)
Dept: ULTRASOUND IMAGING | Facility: HOSPITAL | Age: 46
End: 2021-08-22

## 2021-08-22 ENCOUNTER — APPOINTMENT (OUTPATIENT)
Dept: GENERAL RADIOLOGY | Facility: HOSPITAL | Age: 46
End: 2021-08-22

## 2021-08-22 LAB
ANION GAP SERPL CALCULATED.3IONS-SCNC: 14 MMOL/L (ref 5–15)
ARTERIAL PATENCY WRIST A: ABNORMAL
ARTERIAL PATENCY WRIST A: ABNORMAL
ATMOSPHERIC PRESS: 745 MMHG
ATMOSPHERIC PRESS: 747 MMHG
BASE EXCESS BLDA CALC-SCNC: -1.5 MMOL/L (ref 0–2)
BASE EXCESS BLDA CALC-SCNC: -2.3 MMOL/L (ref 0–2)
BASOPHILS # BLD AUTO: 0 10*3/MM3 (ref 0–0.2)
BASOPHILS NFR BLD AUTO: 0 % (ref 0–1.5)
BDY SITE: ABNORMAL
BDY SITE: ABNORMAL
BUN SERPL-MCNC: 11 MG/DL (ref 6–20)
BUN/CREAT SERPL: 10.4 (ref 7–25)
CALCIUM SPEC-SCNC: 7.6 MG/DL (ref 8.6–10.5)
CHLORIDE SERPL-SCNC: 98 MMOL/L (ref 98–107)
CO2 SERPL-SCNC: 20 MMOL/L (ref 22–29)
CREAT SERPL-MCNC: 1.06 MG/DL (ref 0.57–1)
DEPRECATED RDW RBC AUTO: 47.3 FL (ref 37–54)
EOSINOPHIL # BLD AUTO: 0 10*3/MM3 (ref 0–0.4)
EOSINOPHIL NFR BLD AUTO: 0 % (ref 0.3–6.2)
ERYTHROCYTE [DISTWIDTH] IN BLOOD BY AUTOMATED COUNT: 14.6 % (ref 12.3–15.4)
GAS FLOW AIRWAY: 15 LPM
GFR SERPL CREATININE-BSD FRML MDRD: 55 ML/MIN/1.73
GLUCOSE BLDC GLUCOMTR-MCNC: 250 MG/DL (ref 70–130)
GLUCOSE BLDC GLUCOMTR-MCNC: 265 MG/DL (ref 70–130)
GLUCOSE BLDC GLUCOMTR-MCNC: 277 MG/DL (ref 70–130)
GLUCOSE BLDC GLUCOMTR-MCNC: 278 MG/DL (ref 70–130)
GLUCOSE BLDC GLUCOMTR-MCNC: 283 MG/DL (ref 70–130)
GLUCOSE SERPL-MCNC: 259 MG/DL (ref 65–99)
HCO3 BLDA-SCNC: 22.9 MMOL/L (ref 20–26)
HCO3 BLDA-SCNC: 24.7 MMOL/L (ref 20–26)
HCT VFR BLD AUTO: 31.3 % (ref 34–46.6)
HGB BLD-MCNC: 10.7 G/DL (ref 12–15.9)
IMM GRANULOCYTES # BLD AUTO: 0.04 10*3/MM3 (ref 0–0.05)
IMM GRANULOCYTES NFR BLD AUTO: 0.9 % (ref 0–0.5)
INHALED O2 CONCENTRATION: 100 %
LYMPHOCYTES # BLD AUTO: 0.49 10*3/MM3 (ref 0.7–3.1)
LYMPHOCYTES NFR BLD AUTO: 11.4 % (ref 19.6–45.3)
Lab: ABNORMAL
Lab: ABNORMAL
MAGNESIUM SERPL-MCNC: 1.3 MG/DL (ref 1.6–2.6)
MCH RBC QN AUTO: 29.9 PG (ref 26.6–33)
MCHC RBC AUTO-ENTMCNC: 34.2 G/DL (ref 31.5–35.7)
MCV RBC AUTO: 87.4 FL (ref 79–97)
MODALITY: ABNORMAL
MODALITY: ABNORMAL
MONOCYTES # BLD AUTO: 0.16 10*3/MM3 (ref 0.1–0.9)
MONOCYTES NFR BLD AUTO: 3.7 % (ref 5–12)
NEUTROPHILS NFR BLD AUTO: 3.6 10*3/MM3 (ref 1.7–7)
NEUTROPHILS NFR BLD AUTO: 84 % (ref 42.7–76)
NRBC BLD AUTO-RTO: 0 /100 WBC (ref 0–0.2)
PCO2 BLDA: 39.8 MM HG (ref 35–45)
PCO2 BLDA: 48.1 MM HG (ref 35–45)
PEEP RESPIRATORY: 14 CM[H2O]
PH BLDA: 7.32 PH UNITS (ref 7.35–7.45)
PH BLDA: 7.37 PH UNITS (ref 7.35–7.45)
PLATELET # BLD AUTO: 66 10*3/MM3 (ref 140–450)
PMV BLD AUTO: 11.4 FL (ref 6–12)
PO2 BLDA: 54.7 MM HG (ref 83–108)
PO2 BLDA: 75.5 MM HG (ref 83–108)
POTASSIUM SERPL-SCNC: 4.8 MMOL/L (ref 3.5–5.2)
RBC # BLD AUTO: 3.58 10*6/MM3 (ref 3.77–5.28)
SAO2 % BLDCOA: 87.3 % (ref 94–99)
SAO2 % BLDCOA: 94.3 % (ref 94–99)
SET MECH RESP RATE: 26
SODIUM SERPL-SCNC: 132 MMOL/L (ref 136–145)
VENTILATOR MODE: ABNORMAL
VENTILATOR MODE: AC
VT ON VENT VENT: 450 ML
WBC # BLD AUTO: 4.29 10*3/MM3 (ref 3.4–10.8)

## 2021-08-22 PROCEDURE — 82803 BLOOD GASES ANY COMBINATION: CPT

## 2021-08-22 PROCEDURE — 82962 GLUCOSE BLOOD TEST: CPT

## 2021-08-22 PROCEDURE — 36410 VNPNXR 3YR/> PHY/QHP DX/THER: CPT

## 2021-08-22 PROCEDURE — 63710000001 INSULIN ASPART PER 5 UNITS: Performed by: INTERNAL MEDICINE

## 2021-08-22 PROCEDURE — 36415 COLL VENOUS BLD VENIPUNCTURE: CPT | Performed by: INTERNAL MEDICINE

## 2021-08-22 PROCEDURE — 25010000002 MIDAZOLAM PER 1 MG

## 2021-08-22 PROCEDURE — 0BH18EZ INSERTION OF ENDOTRACHEAL AIRWAY INTO TRACHEA, VIA NATURAL OR ARTIFICIAL OPENING ENDOSCOPIC: ICD-10-PCS | Performed by: INTERNAL MEDICINE

## 2021-08-22 PROCEDURE — 94002 VENT MGMT INPAT INIT DAY: CPT

## 2021-08-22 PROCEDURE — 87070 CULTURE OTHR SPECIMN AEROBIC: CPT | Performed by: INTERNAL MEDICINE

## 2021-08-22 PROCEDURE — 25010000002 PROPOFOL 10 MG/ML EMULSION: Performed by: INTERNAL MEDICINE

## 2021-08-22 PROCEDURE — 85025 COMPLETE CBC W/AUTO DIFF WBC: CPT | Performed by: INTERNAL MEDICINE

## 2021-08-22 PROCEDURE — 80048 BASIC METABOLIC PNL TOTAL CA: CPT | Performed by: INTERNAL MEDICINE

## 2021-08-22 PROCEDURE — 25010000002 MIDAZOLAM 50 MG/10ML SOLUTION: Performed by: INTERNAL MEDICINE

## 2021-08-22 PROCEDURE — 36600 WITHDRAWAL OF ARTERIAL BLOOD: CPT

## 2021-08-22 PROCEDURE — 87205 SMEAR GRAM STAIN: CPT | Performed by: INTERNAL MEDICINE

## 2021-08-22 PROCEDURE — 25010000002 DEXAMETHASONE SODIUM PHOSPHATE 10 MG/ML SOLUTION: Performed by: INTERNAL MEDICINE

## 2021-08-22 PROCEDURE — C1751 CATH, INF, PER/CENT/MIDLINE: HCPCS

## 2021-08-22 PROCEDURE — 25010000002 FENTANYL PER 0.1 MG: Performed by: INTERNAL MEDICINE

## 2021-08-22 PROCEDURE — 25010000002 MAGNESIUM SULFATE 2 GM/50ML SOLUTION: Performed by: INTERNAL MEDICINE

## 2021-08-22 PROCEDURE — 71045 X-RAY EXAM CHEST 1 VIEW: CPT

## 2021-08-22 PROCEDURE — 76937 US GUIDE VASCULAR ACCESS: CPT

## 2021-08-22 PROCEDURE — 94760 N-INVAS EAR/PLS OXIMETRY 1: CPT

## 2021-08-22 PROCEDURE — 25010000002 HEPARIN (PORCINE) PER 1000 UNITS: Performed by: INTERNAL MEDICINE

## 2021-08-22 PROCEDURE — 25010000002 FUROSEMIDE PER 20 MG: Performed by: INTERNAL MEDICINE

## 2021-08-22 PROCEDURE — 70450 CT HEAD/BRAIN W/O DYE: CPT

## 2021-08-22 PROCEDURE — 25010000002 SUCCINYLCHOLINE PER 20 MG: Performed by: INTERNAL MEDICINE

## 2021-08-22 PROCEDURE — 25010000002 LORAZEPAM PER 2 MG: Performed by: INTERNAL MEDICINE

## 2021-08-22 PROCEDURE — 25010000002 PROPOFOL 10 MG/ML EMULSION

## 2021-08-22 PROCEDURE — 5A1955Z RESPIRATORY VENTILATION, GREATER THAN 96 CONSECUTIVE HOURS: ICD-10-PCS | Performed by: INTERNAL MEDICINE

## 2021-08-22 PROCEDURE — 94799 UNLISTED PULMONARY SVC/PX: CPT

## 2021-08-22 PROCEDURE — 83735 ASSAY OF MAGNESIUM: CPT | Performed by: INTERNAL MEDICINE

## 2021-08-22 PROCEDURE — 63710000001 INSULIN DETEMIR PER 5 UNITS: Performed by: INTERNAL MEDICINE

## 2021-08-22 PROCEDURE — 25010000002 PIPERACILLIN SOD-TAZOBACTAM PER 1 G: Performed by: INTERNAL MEDICINE

## 2021-08-22 RX ORDER — PANTOPRAZOLE SODIUM 40 MG/10ML
40 INJECTION, POWDER, LYOPHILIZED, FOR SOLUTION INTRAVENOUS
Status: DISCONTINUED | OUTPATIENT
Start: 2021-08-22 | End: 2021-09-09 | Stop reason: HOSPADM

## 2021-08-22 RX ORDER — MIDAZOLAM HYDROCHLORIDE 1 MG/ML
2 INJECTION INTRAMUSCULAR; INTRAVENOUS ONCE
Status: COMPLETED | OUTPATIENT
Start: 2021-08-22 | End: 2021-08-22

## 2021-08-22 RX ORDER — MIDAZOLAM HYDROCHLORIDE 1 MG/ML
INJECTION INTRAMUSCULAR; INTRAVENOUS
Status: COMPLETED
Start: 2021-08-22 | End: 2021-08-22

## 2021-08-22 RX ORDER — PROPOFOL 10 MG/ML
VIAL (ML) INTRAVENOUS
Status: COMPLETED
Start: 2021-08-22 | End: 2021-08-22

## 2021-08-22 RX ORDER — NOREPINEPHRINE BIT/0.9 % NACL 8 MG/250ML
.02-.3 INFUSION BOTTLE (ML) INTRAVENOUS
Status: DISCONTINUED | OUTPATIENT
Start: 2021-08-22 | End: 2021-09-05

## 2021-08-22 RX ORDER — DEXAMETHASONE SODIUM PHOSPHATE 10 MG/ML
10 INJECTION, SOLUTION INTRAMUSCULAR; INTRAVENOUS 2 TIMES DAILY
Status: DISCONTINUED | OUTPATIENT
Start: 2021-08-22 | End: 2021-09-03

## 2021-08-22 RX ORDER — SUCCINYLCHOLINE CHLORIDE 20 MG/ML
200 INJECTION INTRAMUSCULAR; INTRAVENOUS ONCE
Status: COMPLETED | OUTPATIENT
Start: 2021-08-22 | End: 2021-08-22

## 2021-08-22 RX ORDER — LORAZEPAM 2 MG/ML
1 INJECTION INTRAMUSCULAR ONCE
Status: COMPLETED | OUTPATIENT
Start: 2021-08-22 | End: 2021-08-22

## 2021-08-22 RX ORDER — ETOMIDATE 2 MG/ML
20 INJECTION INTRAVENOUS ONCE
Status: COMPLETED | OUTPATIENT
Start: 2021-08-22 | End: 2021-08-22

## 2021-08-22 RX ORDER — GUAIFENESIN AND CODEINE PHOSPHATE 100; 10 MG/5ML; MG/5ML
10 SOLUTION ORAL EVERY 4 HOURS PRN
Status: DISCONTINUED | OUTPATIENT
Start: 2021-08-22 | End: 2021-09-09 | Stop reason: HOSPADM

## 2021-08-22 RX ADMIN — MAGNESIUM SULFATE HEPTAHYDRATE 2 G: 40 INJECTION, SOLUTION INTRAVENOUS at 17:46

## 2021-08-22 RX ADMIN — FOLIC ACID 1 MG: 1 TABLET ORAL at 08:49

## 2021-08-22 RX ADMIN — PROPOFOL 45 MCG/KG/MIN: 10 INJECTION, EMULSION INTRAVENOUS at 22:52

## 2021-08-22 RX ADMIN — INSULIN ASPART 6 UNITS: 100 INJECTION, SOLUTION INTRAVENOUS; SUBCUTANEOUS at 16:59

## 2021-08-22 RX ADMIN — MIDAZOLAM HYDROCHLORIDE 2 MG: 1 INJECTION INTRAMUSCULAR; INTRAVENOUS at 06:02

## 2021-08-22 RX ADMIN — BUDESONIDE AND FORMOTEROL FUMARATE DIHYDRATE 2 PUFF: 160; 4.5 AEROSOL RESPIRATORY (INHALATION) at 19:35

## 2021-08-22 RX ADMIN — ETOMIDATE 20 MG: 2 INJECTION, SOLUTION INTRAVENOUS at 06:57

## 2021-08-22 RX ADMIN — PROPOFOL 50 MCG/KG/MIN: 10 INJECTION, EMULSION INTRAVENOUS at 06:49

## 2021-08-22 RX ADMIN — LORAZEPAM 1 MG: 2 INJECTION INTRAMUSCULAR; INTRAVENOUS at 05:15

## 2021-08-22 RX ADMIN — INSULIN ASPART 6 UNITS: 100 INJECTION, SOLUTION INTRAVENOUS; SUBCUTANEOUS at 09:25

## 2021-08-22 RX ADMIN — PROPOFOL 50 MCG/KG/MIN: 10 INJECTION, EMULSION INTRAVENOUS at 08:15

## 2021-08-22 RX ADMIN — ALBUTEROL SULFATE 2 PUFF: 90 AEROSOL, METERED RESPIRATORY (INHALATION) at 15:26

## 2021-08-22 RX ADMIN — PIPERACILLIN SODIUM AND TAZOBACTAM SODIUM 3.38 G: 3; .375 INJECTION, POWDER, LYOPHILIZED, FOR SOLUTION INTRAVENOUS at 11:04

## 2021-08-22 RX ADMIN — MONTELUKAST 10 MG: 10 TABLET, FILM COATED ORAL at 20:06

## 2021-08-22 RX ADMIN — MIDAZOLAM 10 MG/HR: 5 INJECTION, SOLUTION INTRAMUSCULAR; INTRAVENOUS at 09:12

## 2021-08-22 RX ADMIN — PROPOFOL 50 MCG/KG/MIN: 10 INJECTION, EMULSION INTRAVENOUS at 13:42

## 2021-08-22 RX ADMIN — MIDAZOLAM HYDROCHLORIDE 2 MG: 2 INJECTION, SOLUTION INTRAMUSCULAR; INTRAVENOUS at 06:02

## 2021-08-22 RX ADMIN — NOREPINEPHRINE BITARTRATE 0.02 MCG/KG/MIN: 1 INJECTION, SOLUTION, CONCENTRATE INTRAVENOUS at 06:48

## 2021-08-22 RX ADMIN — METOPROLOL TARTRATE 25 MG: 25 TABLET, FILM COATED ORAL at 20:06

## 2021-08-22 RX ADMIN — MAGNESIUM SULFATE HEPTAHYDRATE 2 G: 40 INJECTION, SOLUTION INTRAVENOUS at 13:46

## 2021-08-22 RX ADMIN — OLANZAPINE 10 MG: 10 TABLET, FILM COATED ORAL at 20:06

## 2021-08-22 RX ADMIN — SODIUM CHLORIDE 500 ML: 9 INJECTION, SOLUTION INTRAVENOUS at 00:10

## 2021-08-22 RX ADMIN — ALBUTEROL SULFATE 2 PUFF: 90 AEROSOL, METERED RESPIRATORY (INHALATION) at 22:24

## 2021-08-22 RX ADMIN — BENZONATATE 200 MG: 100 CAPSULE ORAL at 01:25

## 2021-08-22 RX ADMIN — FUROSEMIDE 20 MG: 10 INJECTION, SOLUTION INTRAMUSCULAR; INTRAVENOUS at 08:50

## 2021-08-22 RX ADMIN — FUROSEMIDE 20 MG: 10 INJECTION, SOLUTION INTRAMUSCULAR; INTRAVENOUS at 20:05

## 2021-08-22 RX ADMIN — POTASSIUM CHLORIDE 40 MEQ: 750 CAPSULE, EXTENDED RELEASE ORAL at 02:54

## 2021-08-22 RX ADMIN — GUAIFENESIN 200 MG: 200 SOLUTION ORAL at 02:54

## 2021-08-22 RX ADMIN — ALBUTEROL SULFATE 2 PUFF: 90 AEROSOL, METERED RESPIRATORY (INHALATION) at 02:20

## 2021-08-22 RX ADMIN — MIDAZOLAM 10 MG/HR: 5 INJECTION, SOLUTION INTRAMUSCULAR; INTRAVENOUS at 15:54

## 2021-08-22 RX ADMIN — DEXAMETHASONE SODIUM PHOSPHATE 10 MG: 10 INJECTION INTRAMUSCULAR; INTRAVENOUS at 20:09

## 2021-08-22 RX ADMIN — FENTANYL CITRATE: 50 INJECTION, SOLUTION INTRAMUSCULAR; INTRAVENOUS at 07:56

## 2021-08-22 RX ADMIN — PROPOFOL 50 MCG/KG/MIN: 10 INJECTION, EMULSION INTRAVENOUS at 06:50

## 2021-08-22 RX ADMIN — HEPARIN SODIUM 5000 UNITS: 5000 INJECTION INTRAVENOUS; SUBCUTANEOUS at 14:24

## 2021-08-22 RX ADMIN — CETIRIZINE HYDROCHLORIDE 5 MG: 5 TABLET ORAL at 08:49

## 2021-08-22 RX ADMIN — ACETAMINOPHEN 650 MG: 325 TABLET, FILM COATED ORAL at 12:55

## 2021-08-22 RX ADMIN — PROPOFOL 50 MCG/KG/MIN: 10 INJECTION, EMULSION INTRAVENOUS at 10:43

## 2021-08-22 RX ADMIN — FENTANYL CITRATE: 50 INJECTION, SOLUTION INTRAMUSCULAR; INTRAVENOUS at 19:12

## 2021-08-22 RX ADMIN — INSULIN DETEMIR 15 UNITS: 100 INJECTION, SOLUTION SUBCUTANEOUS at 21:30

## 2021-08-22 RX ADMIN — MIDAZOLAM 9 MG/HR: 5 INJECTION, SOLUTION INTRAMUSCULAR; INTRAVENOUS at 21:26

## 2021-08-22 RX ADMIN — SUCCINYLCHOLINE CHLORIDE 200 MG: 20 INJECTION, SOLUTION INTRAMUSCULAR; INTRAVENOUS at 06:57

## 2021-08-22 RX ADMIN — DEXAMETHASONE SODIUM PHOSPHATE 10 MG: 10 INJECTION INTRAMUSCULAR; INTRAVENOUS at 08:53

## 2021-08-22 RX ADMIN — PANTOPRAZOLE SODIUM 40 MG: 40 INJECTION, POWDER, FOR SOLUTION INTRAVENOUS at 09:12

## 2021-08-22 RX ADMIN — PROPOFOL 50 MCG/KG/MIN: 10 INJECTION, EMULSION INTRAVENOUS at 17:10

## 2021-08-22 RX ADMIN — INSULIN ASPART 6 UNITS: 100 INJECTION, SOLUTION INTRAVENOUS; SUBCUTANEOUS at 13:03

## 2021-08-22 RX ADMIN — PIPERACILLIN SODIUM AND TAZOBACTAM SODIUM 3.38 G: 3; .375 INJECTION, POWDER, LYOPHILIZED, FOR SOLUTION INTRAVENOUS at 18:28

## 2021-08-22 RX ADMIN — MIDAZOLAM 10 MG/HR: 5 INJECTION, SOLUTION INTRAMUSCULAR; INTRAVENOUS at 06:47

## 2021-08-22 RX ADMIN — SODIUM CHLORIDE, PRESERVATIVE FREE 10 ML: 5 INJECTION INTRAVENOUS at 20:06

## 2021-08-22 RX ADMIN — MAGNESIUM SULFATE HEPTAHYDRATE 2 G: 40 INJECTION, SOLUTION INTRAVENOUS at 15:46

## 2021-08-22 RX ADMIN — INSULIN DETEMIR 15 UNITS: 100 INJECTION, SOLUTION SUBCUTANEOUS at 10:05

## 2021-08-22 RX ADMIN — ALBUTEROL SULFATE 2 PUFF: 90 AEROSOL, METERED RESPIRATORY (INHALATION) at 19:35

## 2021-08-22 RX ADMIN — PROPOFOL 50 MCG/KG/MIN: 10 INJECTION, EMULSION INTRAVENOUS at 19:43

## 2021-08-22 RX ADMIN — PIPERACILLIN SODIUM AND TAZOBACTAM SODIUM 3.38 G: 3; .375 INJECTION, POWDER, LYOPHILIZED, FOR SOLUTION INTRAVENOUS at 04:14

## 2021-08-22 RX ADMIN — HEPARIN SODIUM 5000 UNITS: 5000 INJECTION INTRAVENOUS; SUBCUTANEOUS at 22:25

## 2021-08-22 RX ADMIN — CYANOCOBALAMIN TAB 1000 MCG 1000 MCG: 1000 TAB at 08:50

## 2021-08-22 RX ADMIN — GUAIFENESIN AND CODEINE PHOSPHATE 10 ML: 100; 10 SOLUTION ORAL at 03:57

## 2021-08-23 ENCOUNTER — APPOINTMENT (OUTPATIENT)
Dept: GENERAL RADIOLOGY | Facility: HOSPITAL | Age: 46
End: 2021-08-23

## 2021-08-23 LAB
ANION GAP SERPL CALCULATED.3IONS-SCNC: 14 MMOL/L (ref 5–15)
BACTERIA SPEC AEROBE CULT: NORMAL
BACTERIA SPEC AEROBE CULT: NORMAL
BASOPHILS # BLD AUTO: 0.01 10*3/MM3 (ref 0–0.2)
BASOPHILS NFR BLD AUTO: 0.3 % (ref 0–1.5)
BUN SERPL-MCNC: 21 MG/DL (ref 6–20)
BUN/CREAT SERPL: 19.6 (ref 7–25)
CALCIUM SPEC-SCNC: 8 MG/DL (ref 8.6–10.5)
CHLORIDE SERPL-SCNC: 100 MMOL/L (ref 98–107)
CO2 SERPL-SCNC: 21 MMOL/L (ref 22–29)
CREAT SERPL-MCNC: 1.07 MG/DL (ref 0.57–1)
DEPRECATED RDW RBC AUTO: 46.2 FL (ref 37–54)
EOSINOPHIL # BLD AUTO: 0 10*3/MM3 (ref 0–0.4)
EOSINOPHIL NFR BLD AUTO: 0 % (ref 0.3–6.2)
ERYTHROCYTE [DISTWIDTH] IN BLOOD BY AUTOMATED COUNT: 14.6 % (ref 12.3–15.4)
GFR SERPL CREATININE-BSD FRML MDRD: 55 ML/MIN/1.73
GLUCOSE BLDC GLUCOMTR-MCNC: 300 MG/DL (ref 70–130)
GLUCOSE BLDC GLUCOMTR-MCNC: 308 MG/DL (ref 70–130)
GLUCOSE BLDC GLUCOMTR-MCNC: 318 MG/DL (ref 70–130)
GLUCOSE BLDC GLUCOMTR-MCNC: 330 MG/DL (ref 70–130)
GLUCOSE SERPL-MCNC: 312 MG/DL (ref 65–99)
HCT VFR BLD AUTO: 29.2 % (ref 34–46.6)
HGB BLD-MCNC: 10 G/DL (ref 12–15.9)
IMM GRANULOCYTES # BLD AUTO: 0.06 10*3/MM3 (ref 0–0.05)
IMM GRANULOCYTES NFR BLD AUTO: 1.6 % (ref 0–0.5)
LYMPHOCYTES # BLD AUTO: 0.63 10*3/MM3 (ref 0.7–3.1)
LYMPHOCYTES NFR BLD AUTO: 16.6 % (ref 19.6–45.3)
MAGNESIUM SERPL-MCNC: 2.8 MG/DL (ref 1.6–2.6)
MCH RBC QN AUTO: 29.5 PG (ref 26.6–33)
MCHC RBC AUTO-ENTMCNC: 34.2 G/DL (ref 31.5–35.7)
MCV RBC AUTO: 86.1 FL (ref 79–97)
MONOCYTES # BLD AUTO: 0.24 10*3/MM3 (ref 0.1–0.9)
MONOCYTES NFR BLD AUTO: 6.3 % (ref 5–12)
NEUTROPHILS NFR BLD AUTO: 2.85 10*3/MM3 (ref 1.7–7)
NEUTROPHILS NFR BLD AUTO: 75.2 % (ref 42.7–76)
NRBC BLD AUTO-RTO: 0 /100 WBC (ref 0–0.2)
PLATELET # BLD AUTO: 71 10*3/MM3 (ref 140–450)
PMV BLD AUTO: 12.1 FL (ref 6–12)
POTASSIUM SERPL-SCNC: 4.5 MMOL/L (ref 3.5–5.2)
RBC # BLD AUTO: 3.39 10*6/MM3 (ref 3.77–5.28)
SODIUM SERPL-SCNC: 135 MMOL/L (ref 136–145)
WBC # BLD AUTO: 3.79 10*3/MM3 (ref 3.4–10.8)

## 2021-08-23 PROCEDURE — 25010000002 FUROSEMIDE PER 20 MG: Performed by: INTERNAL MEDICINE

## 2021-08-23 PROCEDURE — 25010000002 PROPOFOL 10 MG/ML EMULSION: Performed by: INTERNAL MEDICINE

## 2021-08-23 PROCEDURE — 82962 GLUCOSE BLOOD TEST: CPT

## 2021-08-23 PROCEDURE — 25010000002 FENTANYL PER 0.1 MG: Performed by: INTERNAL MEDICINE

## 2021-08-23 PROCEDURE — 83735 ASSAY OF MAGNESIUM: CPT | Performed by: INTERNAL MEDICINE

## 2021-08-23 PROCEDURE — 94003 VENT MGMT INPAT SUBQ DAY: CPT

## 2021-08-23 PROCEDURE — 25010000002 DEXAMETHASONE SODIUM PHOSPHATE 10 MG/ML SOLUTION: Performed by: INTERNAL MEDICINE

## 2021-08-23 PROCEDURE — 94799 UNLISTED PULMONARY SVC/PX: CPT

## 2021-08-23 PROCEDURE — 25010000002 HEPARIN (PORCINE) PER 1000 UNITS: Performed by: INTERNAL MEDICINE

## 2021-08-23 PROCEDURE — 80048 BASIC METABOLIC PNL TOTAL CA: CPT | Performed by: INTERNAL MEDICINE

## 2021-08-23 PROCEDURE — 63710000001 INSULIN DETEMIR PER 5 UNITS: Performed by: INTERNAL MEDICINE

## 2021-08-23 PROCEDURE — 25010000002 MIDAZOLAM 50 MG/10ML SOLUTION: Performed by: INTERNAL MEDICINE

## 2021-08-23 PROCEDURE — 71045 X-RAY EXAM CHEST 1 VIEW: CPT

## 2021-08-23 PROCEDURE — 85025 COMPLETE CBC W/AUTO DIFF WBC: CPT | Performed by: INTERNAL MEDICINE

## 2021-08-23 PROCEDURE — 63710000001 INSULIN ASPART PER 5 UNITS: Performed by: INTERNAL MEDICINE

## 2021-08-23 PROCEDURE — 25010000002 PIPERACILLIN SOD-TAZOBACTAM PER 1 G: Performed by: INTERNAL MEDICINE

## 2021-08-23 RX ORDER — SODIUM CHLORIDE 0.9 % (FLUSH) 0.9 %
10 SYRINGE (ML) INJECTION EVERY 12 HOURS SCHEDULED
Status: DISCONTINUED | OUTPATIENT
Start: 2021-08-23 | End: 2021-09-02

## 2021-08-23 RX ORDER — SODIUM CHLORIDE 0.9 % (FLUSH) 0.9 %
10 SYRINGE (ML) INJECTION AS NEEDED
Status: DISCONTINUED | OUTPATIENT
Start: 2021-08-23 | End: 2021-09-09 | Stop reason: HOSPADM

## 2021-08-23 RX ORDER — SODIUM CHLORIDE 0.9 % (FLUSH) 0.9 %
10 SYRINGE (ML) INJECTION EVERY 12 HOURS SCHEDULED
Status: DISCONTINUED | OUTPATIENT
Start: 2021-08-23 | End: 2021-08-31 | Stop reason: SDUPTHER

## 2021-08-23 RX ADMIN — PROPOFOL 40 MCG/KG/MIN: 10 INJECTION, EMULSION INTRAVENOUS at 13:14

## 2021-08-23 RX ADMIN — PROPOFOL 50 MCG/KG/MIN: 10 INJECTION, EMULSION INTRAVENOUS at 17:52

## 2021-08-23 RX ADMIN — MIDAZOLAM 10 MG/HR: 5 INJECTION, SOLUTION INTRAMUSCULAR; INTRAVENOUS at 17:03

## 2021-08-23 RX ADMIN — METOPROLOL TARTRATE 25 MG: 25 TABLET, FILM COATED ORAL at 20:14

## 2021-08-23 RX ADMIN — MIDAZOLAM 10 MG/HR: 5 INJECTION, SOLUTION INTRAMUSCULAR; INTRAVENOUS at 10:43

## 2021-08-23 RX ADMIN — PROPOFOL 50 MCG/KG/MIN: 10 INJECTION, EMULSION INTRAVENOUS at 07:01

## 2021-08-23 RX ADMIN — BUDESONIDE AND FORMOTEROL FUMARATE DIHYDRATE 2 PUFF: 160; 4.5 AEROSOL RESPIRATORY (INHALATION) at 20:11

## 2021-08-23 RX ADMIN — ALBUTEROL SULFATE 2 PUFF: 90 AEROSOL, METERED RESPIRATORY (INHALATION) at 11:07

## 2021-08-23 RX ADMIN — INSULIN DETEMIR 20 UNITS: 100 INJECTION, SOLUTION SUBCUTANEOUS at 21:30

## 2021-08-23 RX ADMIN — HEPARIN SODIUM 5000 UNITS: 5000 INJECTION INTRAVENOUS; SUBCUTANEOUS at 22:08

## 2021-08-23 RX ADMIN — CETIRIZINE HYDROCHLORIDE 5 MG: 5 TABLET ORAL at 08:10

## 2021-08-23 RX ADMIN — OLANZAPINE 10 MG: 10 TABLET, FILM COATED ORAL at 20:14

## 2021-08-23 RX ADMIN — HEPARIN SODIUM 5000 UNITS: 5000 INJECTION INTRAVENOUS; SUBCUTANEOUS at 13:42

## 2021-08-23 RX ADMIN — MIDAZOLAM 10 MG/HR: 5 INJECTION, SOLUTION INTRAMUSCULAR; INTRAVENOUS at 23:06

## 2021-08-23 RX ADMIN — ALBUTEROL SULFATE 2 PUFF: 90 AEROSOL, METERED RESPIRATORY (INHALATION) at 22:46

## 2021-08-23 RX ADMIN — ALBUTEROL SULFATE 2 PUFF: 90 AEROSOL, METERED RESPIRATORY (INHALATION) at 03:44

## 2021-08-23 RX ADMIN — MONTELUKAST 10 MG: 10 TABLET, FILM COATED ORAL at 20:14

## 2021-08-23 RX ADMIN — FUROSEMIDE 20 MG: 10 INJECTION, SOLUTION INTRAMUSCULAR; INTRAVENOUS at 06:07

## 2021-08-23 RX ADMIN — PROPOFOL 50 MCG/KG/MIN: 10 INJECTION, EMULSION INTRAVENOUS at 23:42

## 2021-08-23 RX ADMIN — FENTANYL CITRATE: 50 INJECTION, SOLUTION INTRAMUSCULAR; INTRAVENOUS at 07:34

## 2021-08-23 RX ADMIN — MIDAZOLAM 10 MG/HR: 5 INJECTION, SOLUTION INTRAMUSCULAR; INTRAVENOUS at 04:13

## 2021-08-23 RX ADMIN — PIPERACILLIN SODIUM AND TAZOBACTAM SODIUM 3.38 G: 3; .375 INJECTION, POWDER, LYOPHILIZED, FOR SOLUTION INTRAVENOUS at 01:59

## 2021-08-23 RX ADMIN — FUROSEMIDE 20 MG: 10 INJECTION, SOLUTION INTRAMUSCULAR; INTRAVENOUS at 18:08

## 2021-08-23 RX ADMIN — PROPOFOL 50 MCG/KG/MIN: 10 INJECTION, EMULSION INTRAVENOUS at 05:46

## 2021-08-23 RX ADMIN — SODIUM CHLORIDE, PRESERVATIVE FREE 10 ML: 5 INJECTION INTRAVENOUS at 20:14

## 2021-08-23 RX ADMIN — PROPOFOL 50 MCG/KG/MIN: 10 INJECTION, EMULSION INTRAVENOUS at 16:35

## 2021-08-23 RX ADMIN — INSULIN ASPART 7 UNITS: 100 INJECTION, SOLUTION INTRAVENOUS; SUBCUTANEOUS at 12:16

## 2021-08-23 RX ADMIN — ALBUTEROL SULFATE 2 PUFF: 90 AEROSOL, METERED RESPIRATORY (INHALATION) at 20:11

## 2021-08-23 RX ADMIN — PIPERACILLIN SODIUM AND TAZOBACTAM SODIUM 3.38 G: 3; .375 INJECTION, POWDER, LYOPHILIZED, FOR SOLUTION INTRAVENOUS at 11:50

## 2021-08-23 RX ADMIN — METOPROLOL TARTRATE 25 MG: 25 TABLET, FILM COATED ORAL at 08:10

## 2021-08-23 RX ADMIN — PROPOFOL 50 MCG/KG/MIN: 10 INJECTION, EMULSION INTRAVENOUS at 20:51

## 2021-08-23 RX ADMIN — ALBUTEROL SULFATE 2 PUFF: 90 AEROSOL, METERED RESPIRATORY (INHALATION) at 07:44

## 2021-08-23 RX ADMIN — BUDESONIDE AND FORMOTEROL FUMARATE DIHYDRATE 2 PUFF: 160; 4.5 AEROSOL RESPIRATORY (INHALATION) at 07:44

## 2021-08-23 RX ADMIN — ALBUTEROL SULFATE 2 PUFF: 90 AEROSOL, METERED RESPIRATORY (INHALATION) at 14:08

## 2021-08-23 RX ADMIN — FOLIC ACID 1 MG: 1 TABLET ORAL at 08:10

## 2021-08-23 RX ADMIN — DEXAMETHASONE SODIUM PHOSPHATE 10 MG: 10 INJECTION INTRAMUSCULAR; INTRAVENOUS at 20:14

## 2021-08-23 RX ADMIN — PANTOPRAZOLE SODIUM 40 MG: 40 INJECTION, POWDER, FOR SOLUTION INTRAVENOUS at 06:07

## 2021-08-23 RX ADMIN — SODIUM CHLORIDE, PRESERVATIVE FREE 10 ML: 5 INJECTION INTRAVENOUS at 08:11

## 2021-08-23 RX ADMIN — INSULIN ASPART 7 UNITS: 100 INJECTION, SOLUTION INTRAVENOUS; SUBCUTANEOUS at 06:34

## 2021-08-23 RX ADMIN — HEPARIN SODIUM 5000 UNITS: 5000 INJECTION INTRAVENOUS; SUBCUTANEOUS at 06:07

## 2021-08-23 RX ADMIN — PROPOFOL 50 MCG/KG/MIN: 10 INJECTION, EMULSION INTRAVENOUS at 10:00

## 2021-08-23 RX ADMIN — INSULIN ASPART 7 UNITS: 100 INJECTION, SOLUTION INTRAVENOUS; SUBCUTANEOUS at 16:35

## 2021-08-23 RX ADMIN — PIPERACILLIN SODIUM AND TAZOBACTAM SODIUM 3.38 G: 3; .375 INJECTION, POWDER, LYOPHILIZED, FOR SOLUTION INTRAVENOUS at 17:06

## 2021-08-23 RX ADMIN — DEXAMETHASONE SODIUM PHOSPHATE 10 MG: 10 INJECTION INTRAMUSCULAR; INTRAVENOUS at 08:10

## 2021-08-23 RX ADMIN — CYANOCOBALAMIN TAB 1000 MCG 1000 MCG: 1000 TAB at 08:10

## 2021-08-23 RX ADMIN — INSULIN DETEMIR 20 UNITS: 100 INJECTION, SOLUTION SUBCUTANEOUS at 08:10

## 2021-08-23 RX ADMIN — PROPOFOL 45 MCG/KG/MIN: 10 INJECTION, EMULSION INTRAVENOUS at 01:56

## 2021-08-24 LAB
ANION GAP SERPL CALCULATED.3IONS-SCNC: 10 MMOL/L (ref 5–15)
ANION GAP SERPL CALCULATED.3IONS-SCNC: 11 MMOL/L (ref 5–15)
ANION GAP SERPL CALCULATED.3IONS-SCNC: 9 MMOL/L (ref 5–15)
BACTERIA SPEC AEROBE CULT: NORMAL
BACTERIA SPEC AEROBE CULT: NORMAL
BACTERIA SPEC RESP CULT: NORMAL
BASOPHILS # BLD AUTO: 0.01 10*3/MM3 (ref 0–0.2)
BASOPHILS NFR BLD AUTO: 0.2 % (ref 0–1.5)
BUN SERPL-MCNC: 26 MG/DL (ref 6–20)
BUN SERPL-MCNC: 31 MG/DL (ref 6–20)
BUN SERPL-MCNC: 35 MG/DL (ref 6–20)
BUN/CREAT SERPL: 25.5 (ref 7–25)
BUN/CREAT SERPL: 30.1 (ref 7–25)
BUN/CREAT SERPL: 33.7 (ref 7–25)
CALCIUM SPEC-SCNC: 8.4 MG/DL (ref 8.6–10.5)
CALCIUM SPEC-SCNC: 8.6 MG/DL (ref 8.6–10.5)
CALCIUM SPEC-SCNC: 8.9 MG/DL (ref 8.6–10.5)
CHLORIDE SERPL-SCNC: 103 MMOL/L (ref 98–107)
CHLORIDE SERPL-SCNC: 105 MMOL/L (ref 98–107)
CHLORIDE SERPL-SCNC: 106 MMOL/L (ref 98–107)
CO2 SERPL-SCNC: 24 MMOL/L (ref 22–29)
CO2 SERPL-SCNC: 24 MMOL/L (ref 22–29)
CO2 SERPL-SCNC: 26 MMOL/L (ref 22–29)
CREAT SERPL-MCNC: 1.02 MG/DL (ref 0.57–1)
CREAT SERPL-MCNC: 1.03 MG/DL (ref 0.57–1)
CREAT SERPL-MCNC: 1.04 MG/DL (ref 0.57–1)
DEPRECATED RDW RBC AUTO: 47.8 FL (ref 37–54)
EOSINOPHIL # BLD AUTO: 0 10*3/MM3 (ref 0–0.4)
EOSINOPHIL NFR BLD AUTO: 0 % (ref 0.3–6.2)
ERYTHROCYTE [DISTWIDTH] IN BLOOD BY AUTOMATED COUNT: 14.8 % (ref 12.3–15.4)
GFR SERPL CREATININE-BSD FRML MDRD: 57 ML/MIN/1.73
GFR SERPL CREATININE-BSD FRML MDRD: 57 ML/MIN/1.73
GFR SERPL CREATININE-BSD FRML MDRD: 58 ML/MIN/1.73
GLUCOSE BLDC GLUCOMTR-MCNC: 344 MG/DL (ref 70–130)
GLUCOSE BLDC GLUCOMTR-MCNC: 348 MG/DL (ref 70–130)
GLUCOSE BLDC GLUCOMTR-MCNC: 382 MG/DL (ref 70–130)
GLUCOSE BLDC GLUCOMTR-MCNC: 386 MG/DL (ref 70–130)
GLUCOSE BLDC GLUCOMTR-MCNC: 390 MG/DL (ref 70–130)
GLUCOSE BLDC GLUCOMTR-MCNC: 394 MG/DL (ref 70–130)
GLUCOSE SERPL-MCNC: 387 MG/DL (ref 65–99)
GLUCOSE SERPL-MCNC: 402 MG/DL (ref 65–99)
GLUCOSE SERPL-MCNC: 447 MG/DL (ref 65–99)
GRAM STN SPEC: NORMAL
HCT VFR BLD AUTO: 29.3 % (ref 34–46.6)
HGB BLD-MCNC: 10.1 G/DL (ref 12–15.9)
IMM GRANULOCYTES # BLD AUTO: 0.2 10*3/MM3 (ref 0–0.05)
IMM GRANULOCYTES NFR BLD AUTO: 4.1 % (ref 0–0.5)
LYMPHOCYTES # BLD AUTO: 0.5 10*3/MM3 (ref 0.7–3.1)
LYMPHOCYTES NFR BLD AUTO: 10.2 % (ref 19.6–45.3)
MAGNESIUM SERPL-MCNC: 2.2 MG/DL (ref 1.6–2.6)
MCH RBC QN AUTO: 30.3 PG (ref 26.6–33)
MCHC RBC AUTO-ENTMCNC: 34.5 G/DL (ref 31.5–35.7)
MCV RBC AUTO: 88 FL (ref 79–97)
MONOCYTES # BLD AUTO: 0.46 10*3/MM3 (ref 0.1–0.9)
MONOCYTES NFR BLD AUTO: 9.4 % (ref 5–12)
NEUTROPHILS NFR BLD AUTO: 3.71 10*3/MM3 (ref 1.7–7)
NEUTROPHILS NFR BLD AUTO: 76.1 % (ref 42.7–76)
NRBC BLD AUTO-RTO: 1.4 /100 WBC (ref 0–0.2)
PLATELET # BLD AUTO: 102 10*3/MM3 (ref 140–450)
PMV BLD AUTO: 12.2 FL (ref 6–12)
POTASSIUM SERPL-SCNC: 4.4 MMOL/L (ref 3.5–5.2)
POTASSIUM SERPL-SCNC: 4.7 MMOL/L (ref 3.5–5.2)
POTASSIUM SERPL-SCNC: 4.9 MMOL/L (ref 3.5–5.2)
RBC # BLD AUTO: 3.33 10*6/MM3 (ref 3.77–5.28)
SODIUM SERPL-SCNC: 138 MMOL/L (ref 136–145)
SODIUM SERPL-SCNC: 140 MMOL/L (ref 136–145)
SODIUM SERPL-SCNC: 140 MMOL/L (ref 136–145)
WBC # BLD AUTO: 4.88 10*3/MM3 (ref 3.4–10.8)

## 2021-08-24 PROCEDURE — 25010000002 MIDAZOLAM 50 MG/10ML SOLUTION: Performed by: INTERNAL MEDICINE

## 2021-08-24 PROCEDURE — 36415 COLL VENOUS BLD VENIPUNCTURE: CPT | Performed by: INTERNAL MEDICINE

## 2021-08-24 PROCEDURE — 25010000002 PIPERACILLIN SOD-TAZOBACTAM PER 1 G: Performed by: INTERNAL MEDICINE

## 2021-08-24 PROCEDURE — 94799 UNLISTED PULMONARY SVC/PX: CPT

## 2021-08-24 PROCEDURE — 80048 BASIC METABOLIC PNL TOTAL CA: CPT | Performed by: INTERNAL MEDICINE

## 2021-08-24 PROCEDURE — 25010000003 INSULIN REGULAR HUMAN PER 5 UNITS: Performed by: INTERNAL MEDICINE

## 2021-08-24 PROCEDURE — 25010000002 PROPOFOL 10 MG/ML EMULSION: Performed by: INTERNAL MEDICINE

## 2021-08-24 PROCEDURE — 82962 GLUCOSE BLOOD TEST: CPT

## 2021-08-24 PROCEDURE — 94003 VENT MGMT INPAT SUBQ DAY: CPT

## 2021-08-24 PROCEDURE — 85025 COMPLETE CBC W/AUTO DIFF WBC: CPT | Performed by: INTERNAL MEDICINE

## 2021-08-24 PROCEDURE — 25010000002 HEPARIN (PORCINE) PER 1000 UNITS: Performed by: INTERNAL MEDICINE

## 2021-08-24 PROCEDURE — 63710000001 INSULIN ASPART PER 5 UNITS: Performed by: INTERNAL MEDICINE

## 2021-08-24 PROCEDURE — 25010000002 FENTANYL PER 0.1 MG: Performed by: INTERNAL MEDICINE

## 2021-08-24 PROCEDURE — 83735 ASSAY OF MAGNESIUM: CPT | Performed by: INTERNAL MEDICINE

## 2021-08-24 PROCEDURE — 25010000002 FUROSEMIDE PER 20 MG: Performed by: INTERNAL MEDICINE

## 2021-08-24 PROCEDURE — 94760 N-INVAS EAR/PLS OXIMETRY 1: CPT

## 2021-08-24 PROCEDURE — 63710000001 INSULIN DETEMIR PER 5 UNITS: Performed by: INTERNAL MEDICINE

## 2021-08-24 PROCEDURE — 25010000002 DEXAMETHASONE SODIUM PHOSPHATE 10 MG/ML SOLUTION: Performed by: INTERNAL MEDICINE

## 2021-08-24 RX ORDER — SODIUM CHLORIDE 0.9 % (FLUSH) 0.9 %
30 SYRINGE (ML) INJECTION ONCE AS NEEDED
Status: DISCONTINUED | OUTPATIENT
Start: 2021-08-24 | End: 2021-09-06

## 2021-08-24 RX ORDER — SODIUM CHLORIDE 9 MG/ML
30 INJECTION, SOLUTION INTRAVENOUS CONTINUOUS PRN
Status: DISCONTINUED | OUTPATIENT
Start: 2021-08-24 | End: 2021-09-06

## 2021-08-24 RX ORDER — DEXTROSE MONOHYDRATE 25 G/50ML
25-50 INJECTION, SOLUTION INTRAVENOUS
Status: DISCONTINUED | OUTPATIENT
Start: 2021-08-24 | End: 2021-09-09 | Stop reason: HOSPADM

## 2021-08-24 RX ADMIN — INSULIN DETEMIR 30 UNITS: 100 INJECTION, SOLUTION SUBCUTANEOUS at 08:38

## 2021-08-24 RX ADMIN — HEPARIN SODIUM 5000 UNITS: 5000 INJECTION INTRAVENOUS; SUBCUTANEOUS at 06:09

## 2021-08-24 RX ADMIN — INSULIN ASPART 8 UNITS: 100 INJECTION, SOLUTION INTRAVENOUS; SUBCUTANEOUS at 07:11

## 2021-08-24 RX ADMIN — PIPERACILLIN SODIUM AND TAZOBACTAM SODIUM 3.38 G: 3; .375 INJECTION, POWDER, LYOPHILIZED, FOR SOLUTION INTRAVENOUS at 18:04

## 2021-08-24 RX ADMIN — SODIUM CHLORIDE 8.8 UNITS/HR: 9 INJECTION, SOLUTION INTRAVENOUS at 12:55

## 2021-08-24 RX ADMIN — PIPERACILLIN SODIUM AND TAZOBACTAM SODIUM 3.38 G: 3; .375 INJECTION, POWDER, LYOPHILIZED, FOR SOLUTION INTRAVENOUS at 08:17

## 2021-08-24 RX ADMIN — PROPOFOL 50 MCG/KG/MIN: 10 INJECTION, EMULSION INTRAVENOUS at 09:49

## 2021-08-24 RX ADMIN — MIDAZOLAM 10 MG/HR: 5 INJECTION, SOLUTION INTRAMUSCULAR; INTRAVENOUS at 11:32

## 2021-08-24 RX ADMIN — MONTELUKAST 10 MG: 10 TABLET, FILM COATED ORAL at 20:00

## 2021-08-24 RX ADMIN — FOLIC ACID 1 MG: 1 TABLET ORAL at 08:17

## 2021-08-24 RX ADMIN — HEPARIN SODIUM 5000 UNITS: 5000 INJECTION INTRAVENOUS; SUBCUTANEOUS at 14:45

## 2021-08-24 RX ADMIN — BUDESONIDE AND FORMOTEROL FUMARATE DIHYDRATE 2 PUFF: 160; 4.5 AEROSOL RESPIRATORY (INHALATION) at 07:39

## 2021-08-24 RX ADMIN — METOPROLOL TARTRATE 25 MG: 25 TABLET, FILM COATED ORAL at 20:01

## 2021-08-24 RX ADMIN — ALBUTEROL SULFATE 2 PUFF: 90 AEROSOL, METERED RESPIRATORY (INHALATION) at 19:45

## 2021-08-24 RX ADMIN — DEXAMETHASONE SODIUM PHOSPHATE 10 MG: 10 INJECTION INTRAMUSCULAR; INTRAVENOUS at 20:00

## 2021-08-24 RX ADMIN — CETIRIZINE HYDROCHLORIDE 5 MG: 5 TABLET ORAL at 08:17

## 2021-08-24 RX ADMIN — PIPERACILLIN SODIUM AND TAZOBACTAM SODIUM 3.38 G: 3; .375 INJECTION, POWDER, LYOPHILIZED, FOR SOLUTION INTRAVENOUS at 01:11

## 2021-08-24 RX ADMIN — PROPOFOL 50 MCG/KG/MIN: 10 INJECTION, EMULSION INTRAVENOUS at 06:10

## 2021-08-24 RX ADMIN — OLANZAPINE 10 MG: 10 TABLET, FILM COATED ORAL at 20:00

## 2021-08-24 RX ADMIN — INSULIN ASPART 9 UNITS: 100 INJECTION, SOLUTION INTRAVENOUS; SUBCUTANEOUS at 11:16

## 2021-08-24 RX ADMIN — PROPOFOL 50 MCG/KG/MIN: 10 INJECTION, EMULSION INTRAVENOUS at 22:39

## 2021-08-24 RX ADMIN — HEPARIN SODIUM 5000 UNITS: 5000 INJECTION INTRAVENOUS; SUBCUTANEOUS at 21:07

## 2021-08-24 RX ADMIN — PROPOFOL 50 MCG/KG/MIN: 10 INJECTION, EMULSION INTRAVENOUS at 02:37

## 2021-08-24 RX ADMIN — FUROSEMIDE 20 MG: 10 INJECTION, SOLUTION INTRAMUSCULAR; INTRAVENOUS at 18:05

## 2021-08-24 RX ADMIN — MIDAZOLAM 10 MG/HR: 5 INJECTION, SOLUTION INTRAMUSCULAR; INTRAVENOUS at 05:16

## 2021-08-24 RX ADMIN — PROPOFOL 50 MCG/KG/MIN: 10 INJECTION, EMULSION INTRAVENOUS at 19:42

## 2021-08-24 RX ADMIN — METOPROLOL TARTRATE 25 MG: 25 TABLET, FILM COATED ORAL at 08:16

## 2021-08-24 RX ADMIN — ALBUTEROL SULFATE 2 PUFF: 90 AEROSOL, METERED RESPIRATORY (INHALATION) at 04:05

## 2021-08-24 RX ADMIN — BUDESONIDE AND FORMOTEROL FUMARATE DIHYDRATE 2 PUFF: 160; 4.5 AEROSOL RESPIRATORY (INHALATION) at 19:45

## 2021-08-24 RX ADMIN — SODIUM CHLORIDE 10 UNITS/HR: 9 INJECTION, SOLUTION INTRAVENOUS at 20:17

## 2021-08-24 RX ADMIN — ALBUTEROL SULFATE 2 PUFF: 90 AEROSOL, METERED RESPIRATORY (INHALATION) at 16:11

## 2021-08-24 RX ADMIN — CYANOCOBALAMIN TAB 1000 MCG 1000 MCG: 1000 TAB at 08:20

## 2021-08-24 RX ADMIN — PANTOPRAZOLE SODIUM 40 MG: 40 INJECTION, POWDER, FOR SOLUTION INTRAVENOUS at 06:09

## 2021-08-24 RX ADMIN — FUROSEMIDE 20 MG: 10 INJECTION, SOLUTION INTRAMUSCULAR; INTRAVENOUS at 06:10

## 2021-08-24 RX ADMIN — PROPOFOL 50 MCG/KG/MIN: 10 INJECTION, EMULSION INTRAVENOUS at 15:31

## 2021-08-24 RX ADMIN — MIDAZOLAM 10 MG/HR: 5 INJECTION, SOLUTION INTRAMUSCULAR; INTRAVENOUS at 18:06

## 2021-08-24 RX ADMIN — ALBUTEROL SULFATE 2 PUFF: 90 AEROSOL, METERED RESPIRATORY (INHALATION) at 07:38

## 2021-08-24 RX ADMIN — PROPOFOL 50 MCG/KG/MIN: 10 INJECTION, EMULSION INTRAVENOUS at 06:58

## 2021-08-24 RX ADMIN — ALBUTEROL SULFATE 2 PUFF: 90 AEROSOL, METERED RESPIRATORY (INHALATION) at 10:56

## 2021-08-24 RX ADMIN — PROPOFOL 50 MCG/KG/MIN: 10 INJECTION, EMULSION INTRAVENOUS at 18:04

## 2021-08-24 RX ADMIN — DEXAMETHASONE SODIUM PHOSPHATE 10 MG: 10 INJECTION INTRAMUSCULAR; INTRAVENOUS at 08:17

## 2021-08-24 RX ADMIN — FENTANYL CITRATE: 50 INJECTION, SOLUTION INTRAMUSCULAR; INTRAVENOUS at 09:19

## 2021-08-24 RX ADMIN — FENTANYL CITRATE: 50 INJECTION, SOLUTION INTRAMUSCULAR; INTRAVENOUS at 21:20

## 2021-08-24 RX ADMIN — PROPOFOL 50 MCG/KG/MIN: 10 INJECTION, EMULSION INTRAVENOUS at 12:44

## 2021-08-24 NOTE — TELEPHONE ENCOUNTER
Rx Refill Note  Requested Prescriptions     Pending Prescriptions Disp Refills   • folic acid (FOLVITE) 1 MG tablet [Pharmacy Med Name: FOLIC ACID 1MG TABLETS] 90 tablet 1     Sig: TAKE 1 TABLET BY MOUTH DAILY   • FeroSul 325 (65 Fe) MG tablet [Pharmacy Med Name: FERROUS SULFATE 325MG (5GR) TABS] 30 tablet 3     Sig: TAKE ONE TABLET BY MOUTH ONE A DAY WITH BREAKFAST   • anastrozole (ARIMIDEX) 1 MG tablet [Pharmacy Med Name: ANASTROZOLE 1MG TABLETS]  2     Sig: TAKE 1 TABLET BY MOUTH DAILY      Last office visit with prescribing clinician: 6/2/2021      Next office visit with prescribing clinician: Visit date not found            Connie Urias RN  08/24/21, 08:17 CDT

## 2021-08-25 ENCOUNTER — APPOINTMENT (OUTPATIENT)
Dept: INTERVENTIONAL RADIOLOGY/VASCULAR | Facility: HOSPITAL | Age: 46
End: 2021-08-25

## 2021-08-25 ENCOUNTER — APPOINTMENT (OUTPATIENT)
Dept: GENERAL RADIOLOGY | Facility: HOSPITAL | Age: 46
End: 2021-08-25

## 2021-08-25 ENCOUNTER — APPOINTMENT (OUTPATIENT)
Dept: ULTRASOUND IMAGING | Facility: HOSPITAL | Age: 46
End: 2021-08-25

## 2021-08-25 LAB
ALBUMIN SERPL-MCNC: 3.1 G/DL (ref 3.5–5.2)
ALP SERPL-CCNC: 189 U/L (ref 39–117)
ALT SERPL W P-5'-P-CCNC: 87 U/L (ref 1–33)
ANION GAP SERPL CALCULATED.3IONS-SCNC: 10 MMOL/L (ref 5–15)
ANISOCYTOSIS BLD QL: ABNORMAL
AST SERPL-CCNC: 117 U/L (ref 1–32)
BASOPHILS # BLD AUTO: 0.02 10*3/MM3 (ref 0–0.2)
BASOPHILS NFR BLD AUTO: 0.4 % (ref 0–1.5)
BILIRUB CONJ SERPL-MCNC: 0.3 MG/DL (ref 0–0.3)
BILIRUB INDIRECT SERPL-MCNC: 0.1 MG/DL
BILIRUB SERPL-MCNC: 0.4 MG/DL (ref 0–1.2)
BUN SERPL-MCNC: 37 MG/DL (ref 6–20)
BUN/CREAT SERPL: 39.8 (ref 7–25)
CALCIUM SPEC-SCNC: 8.9 MG/DL (ref 8.6–10.5)
CHLORIDE SERPL-SCNC: 108 MMOL/L (ref 98–107)
CO2 SERPL-SCNC: 24 MMOL/L (ref 22–29)
CREAT SERPL-MCNC: 0.93 MG/DL (ref 0.57–1)
CRP SERPL-MCNC: 2.52 MG/DL (ref 0–0.5)
D-DIMER, QUANTITATIVE (MAD,POW, STR): 3436 NG/ML (FEU) (ref 0–470)
DACRYOCYTES BLD QL SMEAR: ABNORMAL
DEPRECATED RDW RBC AUTO: 48.5 FL (ref 37–54)
EOSINOPHIL # BLD AUTO: 0 10*3/MM3 (ref 0–0.4)
EOSINOPHIL NFR BLD AUTO: 0 % (ref 0.3–6.2)
ERYTHROCYTE [DISTWIDTH] IN BLOOD BY AUTOMATED COUNT: 14.9 % (ref 12.3–15.4)
FERRITIN SERPL-MCNC: 751.9 NG/ML (ref 13–150)
GFR SERPL CREATININE-BSD FRML MDRD: 64 ML/MIN/1.73
GLUCOSE BLDC GLUCOMTR-MCNC: 138 MG/DL (ref 70–130)
GLUCOSE BLDC GLUCOMTR-MCNC: 142 MG/DL (ref 70–130)
GLUCOSE BLDC GLUCOMTR-MCNC: 147 MG/DL (ref 70–130)
GLUCOSE BLDC GLUCOMTR-MCNC: 148 MG/DL (ref 70–130)
GLUCOSE BLDC GLUCOMTR-MCNC: 155 MG/DL (ref 70–130)
GLUCOSE BLDC GLUCOMTR-MCNC: 158 MG/DL (ref 70–130)
GLUCOSE BLDC GLUCOMTR-MCNC: 159 MG/DL (ref 70–130)
GLUCOSE BLDC GLUCOMTR-MCNC: 160 MG/DL (ref 70–130)
GLUCOSE BLDC GLUCOMTR-MCNC: 168 MG/DL (ref 70–130)
GLUCOSE BLDC GLUCOMTR-MCNC: 193 MG/DL (ref 70–130)
GLUCOSE BLDC GLUCOMTR-MCNC: 199 MG/DL (ref 70–130)
GLUCOSE BLDC GLUCOMTR-MCNC: 209 MG/DL (ref 70–130)
GLUCOSE BLDC GLUCOMTR-MCNC: 235 MG/DL (ref 70–130)
GLUCOSE BLDC GLUCOMTR-MCNC: 235 MG/DL (ref 70–130)
GLUCOSE BLDC GLUCOMTR-MCNC: 236 MG/DL (ref 70–130)
GLUCOSE BLDC GLUCOMTR-MCNC: 240 MG/DL (ref 70–130)
GLUCOSE BLDC GLUCOMTR-MCNC: 260 MG/DL (ref 70–130)
GLUCOSE BLDC GLUCOMTR-MCNC: 279 MG/DL (ref 70–130)
GLUCOSE BLDC GLUCOMTR-MCNC: 290 MG/DL (ref 70–130)
GLUCOSE BLDC GLUCOMTR-MCNC: 317 MG/DL (ref 70–130)
GLUCOSE BLDC GLUCOMTR-MCNC: 328 MG/DL (ref 70–130)
GLUCOSE BLDC GLUCOMTR-MCNC: 333 MG/DL (ref 70–130)
GLUCOSE BLDC GLUCOMTR-MCNC: 391 MG/DL (ref 70–130)
GLUCOSE BLDC GLUCOMTR-MCNC: 401 MG/DL (ref 70–130)
GLUCOSE BLDC GLUCOMTR-MCNC: 403 MG/DL (ref 70–130)
GLUCOSE BLDC GLUCOMTR-MCNC: 414 MG/DL (ref 70–130)
GLUCOSE BLDC GLUCOMTR-MCNC: 415 MG/DL (ref 70–130)
GLUCOSE SERPL-MCNC: 270 MG/DL (ref 65–99)
HCT VFR BLD AUTO: 28.4 % (ref 34–46.6)
HGB BLD-MCNC: 9.5 G/DL (ref 12–15.9)
IMM GRANULOCYTES # BLD AUTO: 0.46 10*3/MM3 (ref 0–0.05)
IMM GRANULOCYTES NFR BLD AUTO: 8.7 % (ref 0–0.5)
L PNEUMO1 AG UR QL IA: NEGATIVE
LDH SERPL-CCNC: 717 U/L (ref 135–214)
LYMPHOCYTES # BLD AUTO: 0.62 10*3/MM3 (ref 0.7–3.1)
LYMPHOCYTES # BLD MANUAL: 0.74 10*3/MM3 (ref 0.7–3.1)
LYMPHOCYTES NFR BLD AUTO: 11.8 % (ref 19.6–45.3)
LYMPHOCYTES NFR BLD MANUAL: 10 % (ref 5–12)
LYMPHOCYTES NFR BLD MANUAL: 14 % (ref 19.6–45.3)
MCH RBC QN AUTO: 29.8 PG (ref 26.6–33)
MCHC RBC AUTO-ENTMCNC: 33.5 G/DL (ref 31.5–35.7)
MCV RBC AUTO: 89 FL (ref 79–97)
METAMYELOCYTES NFR BLD MANUAL: 1 % (ref 0–0)
MONOCYTES # BLD AUTO: 0.53 10*3/MM3 (ref 0.1–0.9)
MONOCYTES # BLD AUTO: 0.61 10*3/MM3 (ref 0.1–0.9)
MONOCYTES NFR BLD AUTO: 11.6 % (ref 5–12)
NEUTROPHILS # BLD AUTO: 3.95 10*3/MM3 (ref 1.7–7)
NEUTROPHILS NFR BLD AUTO: 3.56 10*3/MM3 (ref 1.7–7)
NEUTROPHILS NFR BLD AUTO: 67.5 % (ref 42.7–76)
NEUTROPHILS NFR BLD MANUAL: 66 % (ref 42.7–76)
NEUTS BAND NFR BLD MANUAL: 9 % (ref 0–5)
NRBC BLD AUTO-RTO: 3.6 /100 WBC (ref 0–0.2)
NRBC SPEC MANUAL: 10 /100 WBC (ref 0–0.2)
OVALOCYTES BLD QL SMEAR: ABNORMAL
PLATELET # BLD AUTO: 129 10*3/MM3 (ref 140–450)
PMV BLD AUTO: 11.1 FL (ref 6–12)
POLYCHROMASIA BLD QL SMEAR: ABNORMAL
POTASSIUM SERPL-SCNC: 4.3 MMOL/L (ref 3.5–5.2)
PROCALCITONIN SERPL-MCNC: 1.78 NG/ML (ref 0–0.25)
PROT SERPL-MCNC: 6.4 G/DL (ref 6–8.5)
RBC # BLD AUTO: 3.19 10*6/MM3 (ref 3.77–5.28)
S PNEUM AG SPEC QL LA: NEGATIVE
SMALL PLATELETS BLD QL SMEAR: ABNORMAL
SODIUM SERPL-SCNC: 142 MMOL/L (ref 136–145)
SODIUM UR-SCNC: <20 MMOL/L
WBC # BLD AUTO: 5.27 10*3/MM3 (ref 3.4–10.8)
WBC MORPH BLD: NORMAL

## 2021-08-25 PROCEDURE — 80076 HEPATIC FUNCTION PANEL: CPT | Performed by: FAMILY MEDICINE

## 2021-08-25 PROCEDURE — 94799 UNLISTED PULMONARY SVC/PX: CPT

## 2021-08-25 PROCEDURE — 25010000002 PROPOFOL 10 MG/ML EMULSION: Performed by: FAMILY MEDICINE

## 2021-08-25 PROCEDURE — 25010000002 FENTANYL PER 0.1 MG: Performed by: INTERNAL MEDICINE

## 2021-08-25 PROCEDURE — 85025 COMPLETE CBC W/AUTO DIFF WBC: CPT | Performed by: INTERNAL MEDICINE

## 2021-08-25 PROCEDURE — 84300 ASSAY OF URINE SODIUM: CPT | Performed by: FAMILY MEDICINE

## 2021-08-25 PROCEDURE — 86140 C-REACTIVE PROTEIN: CPT | Performed by: FAMILY MEDICINE

## 2021-08-25 PROCEDURE — XW033E5 INTRODUCTION OF REMDESIVIR ANTI-INFECTIVE INTO PERIPHERAL VEIN, PERCUTANEOUS APPROACH, NEW TECHNOLOGY GROUP 5: ICD-10-PCS | Performed by: FAMILY MEDICINE

## 2021-08-25 PROCEDURE — 87899 AGENT NOS ASSAY W/OPTIC: CPT | Performed by: FAMILY MEDICINE

## 2021-08-25 PROCEDURE — 25010000002 CEFEPIME PER 500 MG: Performed by: FAMILY MEDICINE

## 2021-08-25 PROCEDURE — 85007 BL SMEAR W/DIFF WBC COUNT: CPT | Performed by: INTERNAL MEDICINE

## 2021-08-25 PROCEDURE — C1751 CATH, INF, PER/CENT/MIDLINE: HCPCS

## 2021-08-25 PROCEDURE — 02H633Z INSERTION OF INFUSION DEVICE INTO RIGHT ATRIUM, PERCUTANEOUS APPROACH: ICD-10-PCS | Performed by: FAMILY MEDICINE

## 2021-08-25 PROCEDURE — 25010000002 PIPERACILLIN SOD-TAZOBACTAM PER 1 G: Performed by: INTERNAL MEDICINE

## 2021-08-25 PROCEDURE — 25010000002 FUROSEMIDE PER 20 MG: Performed by: INTERNAL MEDICINE

## 2021-08-25 PROCEDURE — 80048 BASIC METABOLIC PNL TOTAL CA: CPT | Performed by: INTERNAL MEDICINE

## 2021-08-25 PROCEDURE — 82728 ASSAY OF FERRITIN: CPT | Performed by: FAMILY MEDICINE

## 2021-08-25 PROCEDURE — 25010000002 ENOXAPARIN PER 10 MG: Performed by: FAMILY MEDICINE

## 2021-08-25 PROCEDURE — 82962 GLUCOSE BLOOD TEST: CPT

## 2021-08-25 PROCEDURE — 83615 LACTATE (LD) (LDH) ENZYME: CPT | Performed by: FAMILY MEDICINE

## 2021-08-25 PROCEDURE — 25010000002 MIDAZOLAM 50 MG/10ML SOLUTION: Performed by: INTERNAL MEDICINE

## 2021-08-25 PROCEDURE — 25010000002 HEPARIN (PORCINE) PER 1000 UNITS: Performed by: INTERNAL MEDICINE

## 2021-08-25 PROCEDURE — 84145 PROCALCITONIN (PCT): CPT | Performed by: FAMILY MEDICINE

## 2021-08-25 PROCEDURE — 25010000003 INSULIN REGULAR HUMAN PER 5 UNITS: Performed by: INTERNAL MEDICINE

## 2021-08-25 PROCEDURE — 25010000002 PROPOFOL 10 MG/ML EMULSION: Performed by: INTERNAL MEDICINE

## 2021-08-25 PROCEDURE — 82570 ASSAY OF URINE CREATININE: CPT | Performed by: FAMILY MEDICINE

## 2021-08-25 PROCEDURE — 25010000002 DEXAMETHASONE SODIUM PHOSPHATE 10 MG/ML SOLUTION: Performed by: INTERNAL MEDICINE

## 2021-08-25 PROCEDURE — 94003 VENT MGMT INPAT SUBQ DAY: CPT

## 2021-08-25 PROCEDURE — 85379 FIBRIN DEGRADATION QUANT: CPT | Performed by: FAMILY MEDICINE

## 2021-08-25 RX ORDER — FERROUS SULFATE 325(65) MG
TABLET ORAL
Qty: 30 TABLET | Refills: 3 | Status: SHIPPED | OUTPATIENT
Start: 2021-08-25 | End: 2022-05-31

## 2021-08-25 RX ORDER — CHLORHEXIDINE GLUCONATE 0.12 MG/ML
15 RINSE ORAL EVERY 12 HOURS SCHEDULED
Status: DISCONTINUED | OUTPATIENT
Start: 2021-08-25 | End: 2021-09-09 | Stop reason: HOSPADM

## 2021-08-25 RX ORDER — FOLIC ACID 1 MG/1
1 TABLET ORAL DAILY
Qty: 90 TABLET | Refills: 1 | Status: SHIPPED | OUTPATIENT
Start: 2021-08-25

## 2021-08-25 RX ORDER — ANASTROZOLE 1 MG/1
1 TABLET ORAL DAILY
Qty: 90 TABLET | Refills: 0 | Status: SHIPPED | OUTPATIENT
Start: 2021-08-25 | End: 2021-11-22

## 2021-08-25 RX ADMIN — PROPOFOL 100 MCG/KG/MIN: 10 INJECTION, EMULSION INTRAVENOUS at 17:19

## 2021-08-25 RX ADMIN — METOPROLOL TARTRATE 25 MG: 25 TABLET, FILM COATED ORAL at 08:02

## 2021-08-25 RX ADMIN — PROPOFOL 85 MCG/KG/MIN: 10 INJECTION, EMULSION INTRAVENOUS at 22:02

## 2021-08-25 RX ADMIN — MIDAZOLAM 10 MG/HR: 5 INJECTION, SOLUTION INTRAMUSCULAR; INTRAVENOUS at 00:21

## 2021-08-25 RX ADMIN — REMDESIVIR 200 MG: 100 INJECTION, POWDER, LYOPHILIZED, FOR SOLUTION INTRAVENOUS at 16:17

## 2021-08-25 RX ADMIN — PROPOFOL 50 MCG/KG/MIN: 10 INJECTION, EMULSION INTRAVENOUS at 03:08

## 2021-08-25 RX ADMIN — CHLORHEXIDINE GLUCONATE 15 ML: 1.2 RINSE ORAL at 20:22

## 2021-08-25 RX ADMIN — PROPOFOL 90 MCG/KG/MIN: 10 INJECTION, EMULSION INTRAVENOUS at 18:52

## 2021-08-25 RX ADMIN — SODIUM CHLORIDE, PRESERVATIVE FREE 10 ML: 5 INJECTION INTRAVENOUS at 20:23

## 2021-08-25 RX ADMIN — BUDESONIDE AND FORMOTEROL FUMARATE DIHYDRATE 2 PUFF: 160; 4.5 AEROSOL RESPIRATORY (INHALATION) at 22:05

## 2021-08-25 RX ADMIN — DEXAMETHASONE SODIUM PHOSPHATE 10 MG: 10 INJECTION INTRAMUSCULAR; INTRAVENOUS at 20:22

## 2021-08-25 RX ADMIN — CEFEPIME HYDROCHLORIDE 2 G: 2 INJECTION, POWDER, FOR SOLUTION INTRAVENOUS at 21:07

## 2021-08-25 RX ADMIN — FENTANYL CITRATE: 50 INJECTION, SOLUTION INTRAMUSCULAR; INTRAVENOUS at 08:45

## 2021-08-25 RX ADMIN — MONTELUKAST 10 MG: 10 TABLET, FILM COATED ORAL at 20:22

## 2021-08-25 RX ADMIN — BUDESONIDE AND FORMOTEROL FUMARATE DIHYDRATE 2 PUFF: 160; 4.5 AEROSOL RESPIRATORY (INHALATION) at 07:46

## 2021-08-25 RX ADMIN — FUROSEMIDE 20 MG: 10 INJECTION, SOLUTION INTRAMUSCULAR; INTRAVENOUS at 07:50

## 2021-08-25 RX ADMIN — FOLIC ACID 1 MG: 1 TABLET ORAL at 08:02

## 2021-08-25 RX ADMIN — PROPOFOL 50 MCG/KG/MIN: 10 INJECTION, EMULSION INTRAVENOUS at 02:52

## 2021-08-25 RX ADMIN — MIDAZOLAM 10 MG/HR: 5 INJECTION, SOLUTION INTRAMUSCULAR; INTRAVENOUS at 18:41

## 2021-08-25 RX ADMIN — OLANZAPINE 10 MG: 10 TABLET, FILM COATED ORAL at 20:22

## 2021-08-25 RX ADMIN — MIDAZOLAM 10 MG/HR: 5 INJECTION, SOLUTION INTRAMUSCULAR; INTRAVENOUS at 12:40

## 2021-08-25 RX ADMIN — PROPOFOL 50 MCG/KG/MIN: 10 INJECTION, EMULSION INTRAVENOUS at 08:54

## 2021-08-25 RX ADMIN — ALBUTEROL SULFATE 2 PUFF: 90 AEROSOL, METERED RESPIRATORY (INHALATION) at 22:05

## 2021-08-25 RX ADMIN — MIDAZOLAM 1 MG/HR: 5 INJECTION, SOLUTION INTRAMUSCULAR; INTRAVENOUS at 06:30

## 2021-08-25 RX ADMIN — HEPARIN SODIUM 5000 UNITS: 5000 INJECTION INTRAVENOUS; SUBCUTANEOUS at 13:34

## 2021-08-25 RX ADMIN — PIPERACILLIN SODIUM AND TAZOBACTAM SODIUM 3.38 G: 3; .375 INJECTION, POWDER, LYOPHILIZED, FOR SOLUTION INTRAVENOUS at 02:52

## 2021-08-25 RX ADMIN — SODIUM CHLORIDE 11.9 UNITS/HR: 9 INJECTION, SOLUTION INTRAVENOUS at 05:16

## 2021-08-25 RX ADMIN — ALBUTEROL SULFATE 2 PUFF: 90 AEROSOL, METERED RESPIRATORY (INHALATION) at 11:18

## 2021-08-25 RX ADMIN — PANTOPRAZOLE SODIUM 40 MG: 40 INJECTION, POWDER, FOR SOLUTION INTRAVENOUS at 05:50

## 2021-08-25 RX ADMIN — PROPOFOL 50 MCG/KG/MIN: 10 INJECTION, EMULSION INTRAVENOUS at 05:51

## 2021-08-25 RX ADMIN — HEPARIN SODIUM 5000 UNITS: 5000 INJECTION INTRAVENOUS; SUBCUTANEOUS at 05:50

## 2021-08-25 RX ADMIN — PROPOFOL 90 MCG/KG/MIN: 10 INJECTION, EMULSION INTRAVENOUS at 20:38

## 2021-08-25 RX ADMIN — ACETAMINOPHEN 650 MG: 325 TABLET, FILM COATED ORAL at 20:22

## 2021-08-25 RX ADMIN — PROPOFOL 90 MCG/KG/MIN: 10 INJECTION, EMULSION INTRAVENOUS at 18:41

## 2021-08-25 RX ADMIN — CYANOCOBALAMIN TAB 1000 MCG 1000 MCG: 1000 TAB at 08:02

## 2021-08-25 RX ADMIN — CETIRIZINE HYDROCHLORIDE 5 MG: 5 TABLET ORAL at 08:02

## 2021-08-25 RX ADMIN — CEFEPIME HYDROCHLORIDE 2 G: 2 INJECTION, POWDER, FOR SOLUTION INTRAVENOUS at 13:31

## 2021-08-25 RX ADMIN — FENTANYL CITRATE: 50 INJECTION, SOLUTION INTRAMUSCULAR; INTRAVENOUS at 20:39

## 2021-08-25 RX ADMIN — ALBUTEROL SULFATE 2 PUFF: 90 AEROSOL, METERED RESPIRATORY (INHALATION) at 07:46

## 2021-08-25 RX ADMIN — METOPROLOL TARTRATE 25 MG: 25 TABLET, FILM COATED ORAL at 20:22

## 2021-08-25 RX ADMIN — ENOXAPARIN SODIUM 120 MG: 120 INJECTION SUBCUTANEOUS at 21:07

## 2021-08-25 RX ADMIN — CHLORHEXIDINE GLUCONATE 15 ML: 1.2 RINSE ORAL at 13:35

## 2021-08-25 RX ADMIN — PIPERACILLIN SODIUM AND TAZOBACTAM SODIUM 3.38 G: 3; .375 INJECTION, POWDER, LYOPHILIZED, FOR SOLUTION INTRAVENOUS at 08:33

## 2021-08-25 RX ADMIN — PROPOFOL 100 MCG/KG/MIN: 10 INJECTION, EMULSION INTRAVENOUS at 14:12

## 2021-08-25 RX ADMIN — SODIUM CHLORIDE 14 UNITS/HR: 9 INJECTION, SOLUTION INTRAVENOUS at 11:58

## 2021-08-25 RX ADMIN — PROPOFOL 50 MCG/KG/MIN: 10 INJECTION, EMULSION INTRAVENOUS at 11:42

## 2021-08-25 RX ADMIN — DEXAMETHASONE SODIUM PHOSPHATE 10 MG: 10 INJECTION INTRAMUSCULAR; INTRAVENOUS at 08:02

## 2021-08-25 RX ADMIN — SODIUM CHLORIDE 13.5 UNITS/HR: 9 INJECTION, SOLUTION INTRAVENOUS at 22:04

## 2021-08-25 RX ADMIN — ALBUTEROL SULFATE 2 PUFF: 90 AEROSOL, METERED RESPIRATORY (INHALATION) at 16:57

## 2021-08-25 NOTE — TELEPHONE ENCOUNTER
Rx Refill Note  Requested Prescriptions     Pending Prescriptions Disp Refills   • folic acid (FOLVITE) 1 MG tablet [Pharmacy Med Name: FOLIC ACID 1MG TABLETS] 90 tablet 1     Sig: TAKE 1 TABLET BY MOUTH DAILY   • FeroSul 325 (65 Fe) MG tablet [Pharmacy Med Name: FERROUS SULFATE 325MG (5GR) TABS] 30 tablet 3     Sig: TAKE ONE TABLET BY MOUTH ONE A DAY WITH BREAKFAST   • anastrozole (ARIMIDEX) 1 MG tablet [Pharmacy Med Name: ANASTROZOLE 1MG TABLETS]  2     Sig: TAKE 1 TABLET BY MOUTH DAILY      Last office visit with prescribing clinician: 6/2/2021      Next office visit with prescribing clinician: Visit date not found            Connie Urias RN  08/25/21, 08:58 CDT

## 2021-08-26 PROBLEM — E43 SEVERE MALNUTRITION: Status: ACTIVE | Noted: 2021-08-26

## 2021-08-26 LAB
ALBUMIN SERPL-MCNC: 2.9 G/DL (ref 3.5–5.2)
ALBUMIN/GLOB SERPL: 0.9 G/DL
ALP SERPL-CCNC: 184 U/L (ref 39–117)
ALT SERPL W P-5'-P-CCNC: 115 U/L (ref 1–33)
ANION GAP SERPL CALCULATED.3IONS-SCNC: 10 MMOL/L (ref 5–15)
ARTERIAL PATENCY WRIST A: POSITIVE
AST SERPL-CCNC: 165 U/L (ref 1–32)
ATMOSPHERIC PRESS: 748 MMHG
BASE EXCESS BLDA CALC-SCNC: 4.9 MMOL/L (ref 0–2)
BASOPHILS # BLD AUTO: 0.03 10*3/MM3 (ref 0–0.2)
BASOPHILS NFR BLD AUTO: 0.4 % (ref 0–1.5)
BDY SITE: ABNORMAL
BILIRUB SERPL-MCNC: 0.6 MG/DL (ref 0–1.2)
BUN SERPL-MCNC: 35 MG/DL (ref 6–20)
BUN/CREAT SERPL: 49.3 (ref 7–25)
CALCIUM SPEC-SCNC: 8.9 MG/DL (ref 8.6–10.5)
CHLORIDE SERPL-SCNC: 113 MMOL/L (ref 98–107)
CO2 SERPL-SCNC: 25 MMOL/L (ref 22–29)
CREAT SERPL-MCNC: 0.71 MG/DL (ref 0.57–1)
CREAT UR-MCNC: 66.1 MG/DL
CRP SERPL-MCNC: 1.83 MG/DL (ref 0–0.5)
D-DIMER, QUANTITATIVE (MAD,POW, STR): >4000 NG/ML (FEU) (ref 0–470)
DEPRECATED RDW RBC AUTO: 49.4 FL (ref 37–54)
EOSINOPHIL # BLD AUTO: 0.01 10*3/MM3 (ref 0–0.4)
EOSINOPHIL NFR BLD AUTO: 0.1 % (ref 0.3–6.2)
ERYTHROCYTE [DISTWIDTH] IN BLOOD BY AUTOMATED COUNT: 14.9 % (ref 12.3–15.4)
GAS FLOW AIRWAY: 24 LPM
GFR SERPL CREATININE-BSD FRML MDRD: 88 ML/MIN/1.73
GLOBULIN UR ELPH-MCNC: 3.3 GM/DL
GLUCOSE BLDC GLUCOMTR-MCNC: 114 MG/DL (ref 70–130)
GLUCOSE BLDC GLUCOMTR-MCNC: 117 MG/DL (ref 70–130)
GLUCOSE BLDC GLUCOMTR-MCNC: 120 MG/DL (ref 70–130)
GLUCOSE BLDC GLUCOMTR-MCNC: 124 MG/DL (ref 70–130)
GLUCOSE BLDC GLUCOMTR-MCNC: 124 MG/DL (ref 70–130)
GLUCOSE BLDC GLUCOMTR-MCNC: 125 MG/DL (ref 70–130)
GLUCOSE BLDC GLUCOMTR-MCNC: 125 MG/DL (ref 70–130)
GLUCOSE BLDC GLUCOMTR-MCNC: 128 MG/DL (ref 70–130)
GLUCOSE BLDC GLUCOMTR-MCNC: 128 MG/DL (ref 70–130)
GLUCOSE BLDC GLUCOMTR-MCNC: 134 MG/DL (ref 70–130)
GLUCOSE BLDC GLUCOMTR-MCNC: 135 MG/DL (ref 70–130)
GLUCOSE BLDC GLUCOMTR-MCNC: 135 MG/DL (ref 70–130)
GLUCOSE BLDC GLUCOMTR-MCNC: 137 MG/DL (ref 70–130)
GLUCOSE BLDC GLUCOMTR-MCNC: 139 MG/DL (ref 70–130)
GLUCOSE BLDC GLUCOMTR-MCNC: 141 MG/DL (ref 70–130)
GLUCOSE BLDC GLUCOMTR-MCNC: 98 MG/DL (ref 70–130)
GLUCOSE SERPL-MCNC: 136 MG/DL (ref 65–99)
HAV IGM SERPL QL IA: NORMAL
HBV CORE IGM SERPL QL IA: NORMAL
HBV SURFACE AG SERPL QL IA: NORMAL
HCO3 BLDA-SCNC: 31.2 MMOL/L (ref 20–26)
HCT VFR BLD AUTO: 29.7 % (ref 34–46.6)
HCV AB SER DONR QL: NORMAL
HGB BLD-MCNC: 9.6 G/DL (ref 12–15.9)
HOLD SPECIMEN: NORMAL
IMM GRANULOCYTES # BLD AUTO: 0.52 10*3/MM3 (ref 0–0.05)
IMM GRANULOCYTES NFR BLD AUTO: 7.5 % (ref 0–0.5)
INHALED O2 CONCENTRATION: 50 %
INR PPP: 1.16 (ref 0.8–1.2)
LDH SERPL-CCNC: 705 U/L (ref 135–214)
LYMPHOCYTES # BLD AUTO: 0.81 10*3/MM3 (ref 0.7–3.1)
LYMPHOCYTES NFR BLD AUTO: 11.7 % (ref 19.6–45.3)
Lab: ABNORMAL
MCH RBC QN AUTO: 29.2 PG (ref 26.6–33)
MCHC RBC AUTO-ENTMCNC: 32.3 G/DL (ref 31.5–35.7)
MCV RBC AUTO: 90.3 FL (ref 79–97)
MODALITY: ABNORMAL
MONOCYTES # BLD AUTO: 0.71 10*3/MM3 (ref 0.1–0.9)
MONOCYTES NFR BLD AUTO: 10.2 % (ref 5–12)
NEUTROPHILS NFR BLD AUTO: 4.87 10*3/MM3 (ref 1.7–7)
NEUTROPHILS NFR BLD AUTO: 70.1 % (ref 42.7–76)
NRBC BLD AUTO-RTO: 3.3 /100 WBC (ref 0–0.2)
PCO2 BLDA: 54.3 MM HG (ref 35–45)
PEEP RESPIRATORY: 10 CM[H2O]
PH BLDA: 7.37 PH UNITS (ref 7.35–7.45)
PLATELET # BLD AUTO: 161 10*3/MM3 (ref 140–450)
PMV BLD AUTO: 10.8 FL (ref 6–12)
PO2 BLDA: 72.7 MM HG (ref 83–108)
POTASSIUM SERPL-SCNC: 4.6 MMOL/L (ref 3.5–5.2)
PROT SERPL-MCNC: 6.2 G/DL (ref 6–8.5)
PROTHROMBIN TIME: 14.7 SECONDS (ref 11.1–15.3)
RBC # BLD AUTO: 3.29 10*6/MM3 (ref 3.77–5.28)
SAO2 % BLDCOA: 94.7 % (ref 94–99)
SODIUM SERPL-SCNC: 148 MMOL/L (ref 136–145)
VENTILATOR MODE: AC
VT ON VENT VENT: 500 ML
WBC # BLD AUTO: 6.95 10*3/MM3 (ref 3.4–10.8)

## 2021-08-26 PROCEDURE — 87070 CULTURE OTHR SPECIMN AEROBIC: CPT | Performed by: FAMILY MEDICINE

## 2021-08-26 PROCEDURE — 82962 GLUCOSE BLOOD TEST: CPT

## 2021-08-26 PROCEDURE — 25010000003 INSULIN REGULAR HUMAN PER 5 UNITS: Performed by: INTERNAL MEDICINE

## 2021-08-26 PROCEDURE — 80074 ACUTE HEPATITIS PANEL: CPT | Performed by: FAMILY MEDICINE

## 2021-08-26 PROCEDURE — 94799 UNLISTED PULMONARY SVC/PX: CPT

## 2021-08-26 PROCEDURE — 25010000002 FENTANYL PER 0.1 MG: Performed by: INTERNAL MEDICINE

## 2021-08-26 PROCEDURE — 82803 BLOOD GASES ANY COMBINATION: CPT

## 2021-08-26 PROCEDURE — 85610 PROTHROMBIN TIME: CPT | Performed by: FAMILY MEDICINE

## 2021-08-26 PROCEDURE — 25010000002 MIDAZOLAM 50 MG/10ML SOLUTION: Performed by: INTERNAL MEDICINE

## 2021-08-26 PROCEDURE — 25010000002 CEFEPIME PER 500 MG: Performed by: FAMILY MEDICINE

## 2021-08-26 PROCEDURE — 85379 FIBRIN DEGRADATION QUANT: CPT | Performed by: FAMILY MEDICINE

## 2021-08-26 PROCEDURE — 85025 COMPLETE CBC W/AUTO DIFF WBC: CPT | Performed by: INTERNAL MEDICINE

## 2021-08-26 PROCEDURE — 25010000002 DEXAMETHASONE SODIUM PHOSPHATE 10 MG/ML SOLUTION: Performed by: INTERNAL MEDICINE

## 2021-08-26 PROCEDURE — 36600 WITHDRAWAL OF ARTERIAL BLOOD: CPT

## 2021-08-26 PROCEDURE — 25010000002 PROPOFOL 10 MG/ML EMULSION: Performed by: FAMILY MEDICINE

## 2021-08-26 PROCEDURE — 94003 VENT MGMT INPAT SUBQ DAY: CPT

## 2021-08-26 PROCEDURE — 80053 COMPREHEN METABOLIC PANEL: CPT | Performed by: FAMILY MEDICINE

## 2021-08-26 PROCEDURE — 86140 C-REACTIVE PROTEIN: CPT | Performed by: FAMILY MEDICINE

## 2021-08-26 PROCEDURE — 87205 SMEAR GRAM STAIN: CPT | Performed by: FAMILY MEDICINE

## 2021-08-26 PROCEDURE — 25010000002 ENOXAPARIN PER 10 MG: Performed by: FAMILY MEDICINE

## 2021-08-26 PROCEDURE — 83615 LACTATE (LD) (LDH) ENZYME: CPT | Performed by: FAMILY MEDICINE

## 2021-08-26 RX ADMIN — FOLIC ACID 1 MG: 1 TABLET ORAL at 08:04

## 2021-08-26 RX ADMIN — CHLORHEXIDINE GLUCONATE 15 ML: 1.2 RINSE ORAL at 20:08

## 2021-08-26 RX ADMIN — PROPOFOL 65 MCG/KG/MIN: 10 INJECTION, EMULSION INTRAVENOUS at 13:33

## 2021-08-26 RX ADMIN — FENTANYL CITRATE: 50 INJECTION, SOLUTION INTRAMUSCULAR; INTRAVENOUS at 08:36

## 2021-08-26 RX ADMIN — ALBUTEROL SULFATE 2 PUFF: 90 AEROSOL, METERED RESPIRATORY (INHALATION) at 08:06

## 2021-08-26 RX ADMIN — FENTANYL CITRATE: 50 INJECTION, SOLUTION INTRAMUSCULAR; INTRAVENOUS at 20:18

## 2021-08-26 RX ADMIN — METOPROLOL TARTRATE 25 MG: 25 TABLET, FILM COATED ORAL at 20:08

## 2021-08-26 RX ADMIN — PROPOFOL 65 MCG/KG/MIN: 10 INJECTION, EMULSION INTRAVENOUS at 16:14

## 2021-08-26 RX ADMIN — PROPOFOL 75 MCG/KG/MIN: 10 INJECTION, EMULSION INTRAVENOUS at 02:53

## 2021-08-26 RX ADMIN — REMDESIVIR 100 MG: 100 INJECTION, POWDER, LYOPHILIZED, FOR SOLUTION INTRAVENOUS at 21:36

## 2021-08-26 RX ADMIN — OLANZAPINE 10 MG: 10 TABLET, FILM COATED ORAL at 20:08

## 2021-08-26 RX ADMIN — CYANOCOBALAMIN TAB 1000 MCG 1000 MCG: 1000 TAB at 08:04

## 2021-08-26 RX ADMIN — MIDAZOLAM 10 MG/HR: 5 INJECTION, SOLUTION INTRAMUSCULAR; INTRAVENOUS at 22:37

## 2021-08-26 RX ADMIN — PROPOFOL 65 MCG/KG/MIN: 10 INJECTION, EMULSION INTRAVENOUS at 18:24

## 2021-08-26 RX ADMIN — MIDAZOLAM 10 MG/HR: 5 INJECTION, SOLUTION INTRAMUSCULAR; INTRAVENOUS at 08:02

## 2021-08-26 RX ADMIN — OXYCODONE 5 MG: 5 TABLET ORAL at 22:54

## 2021-08-26 RX ADMIN — MIDAZOLAM 10 MG/HR: 5 INJECTION, SOLUTION INTRAMUSCULAR; INTRAVENOUS at 00:51

## 2021-08-26 RX ADMIN — PROPOFOL 70 MCG/KG/MIN: 10 INJECTION, EMULSION INTRAVENOUS at 04:51

## 2021-08-26 RX ADMIN — CEFEPIME HYDROCHLORIDE 2 G: 2 INJECTION, POWDER, FOR SOLUTION INTRAVENOUS at 22:36

## 2021-08-26 RX ADMIN — PROPOFOL 85 MCG/KG/MIN: 10 INJECTION, EMULSION INTRAVENOUS at 00:00

## 2021-08-26 RX ADMIN — GUAIFENESIN 200 MG: 200 SOLUTION ORAL at 20:08

## 2021-08-26 RX ADMIN — PROPOFOL 65 MCG/KG/MIN: 10 INJECTION, EMULSION INTRAVENOUS at 06:52

## 2021-08-26 RX ADMIN — PROPOFOL 80 MCG/KG/MIN: 10 INJECTION, EMULSION INTRAVENOUS at 00:55

## 2021-08-26 RX ADMIN — PROPOFOL 65 MCG/KG/MIN: 10 INJECTION, EMULSION INTRAVENOUS at 11:50

## 2021-08-26 RX ADMIN — ENOXAPARIN SODIUM 120 MG: 120 INJECTION SUBCUTANEOUS at 20:08

## 2021-08-26 RX ADMIN — PROPOFOL 60 MCG/KG/MIN: 10 INJECTION, EMULSION INTRAVENOUS at 22:53

## 2021-08-26 RX ADMIN — CEFEPIME HYDROCHLORIDE 2 G: 2 INJECTION, POWDER, FOR SOLUTION INTRAVENOUS at 05:00

## 2021-08-26 RX ADMIN — ALBUTEROL SULFATE 2 PUFF: 90 AEROSOL, METERED RESPIRATORY (INHALATION) at 19:55

## 2021-08-26 RX ADMIN — DEXAMETHASONE SODIUM PHOSPHATE 10 MG: 10 INJECTION INTRAMUSCULAR; INTRAVENOUS at 20:08

## 2021-08-26 RX ADMIN — ALBUTEROL SULFATE 2 PUFF: 90 AEROSOL, METERED RESPIRATORY (INHALATION) at 16:27

## 2021-08-26 RX ADMIN — CEFEPIME HYDROCHLORIDE 2 G: 2 INJECTION, POWDER, FOR SOLUTION INTRAVENOUS at 14:31

## 2021-08-26 RX ADMIN — METOPROLOL TARTRATE 25 MG: 25 TABLET, FILM COATED ORAL at 08:04

## 2021-08-26 RX ADMIN — CHLORHEXIDINE GLUCONATE 15 ML: 1.2 RINSE ORAL at 08:04

## 2021-08-26 RX ADMIN — PROPOFOL 65 MCG/KG/MIN: 10 INJECTION, EMULSION INTRAVENOUS at 20:09

## 2021-08-26 RX ADMIN — ACETAMINOPHEN 650 MG: 325 TABLET, FILM COATED ORAL at 20:07

## 2021-08-26 RX ADMIN — BUDESONIDE AND FORMOTEROL FUMARATE DIHYDRATE 2 PUFF: 160; 4.5 AEROSOL RESPIRATORY (INHALATION) at 08:06

## 2021-08-26 RX ADMIN — CETIRIZINE HYDROCHLORIDE 5 MG: 5 TABLET ORAL at 08:04

## 2021-08-26 RX ADMIN — SODIUM CHLORIDE 11.9 UNITS/HR: 9 INJECTION, SOLUTION INTRAVENOUS at 06:14

## 2021-08-26 RX ADMIN — BUDESONIDE AND FORMOTEROL FUMARATE DIHYDRATE 2 PUFF: 160; 4.5 AEROSOL RESPIRATORY (INHALATION) at 19:55

## 2021-08-26 RX ADMIN — MIDAZOLAM 10 MG/HR: 5 INJECTION, SOLUTION INTRAMUSCULAR; INTRAVENOUS at 16:43

## 2021-08-26 RX ADMIN — PROPOFOL 65 MCG/KG/MIN: 10 INJECTION, EMULSION INTRAVENOUS at 09:12

## 2021-08-26 RX ADMIN — DEXAMETHASONE SODIUM PHOSPHATE 10 MG: 10 INJECTION INTRAMUSCULAR; INTRAVENOUS at 08:05

## 2021-08-26 RX ADMIN — ALBUTEROL SULFATE 2 PUFF: 90 AEROSOL, METERED RESPIRATORY (INHALATION) at 10:40

## 2021-08-26 RX ADMIN — PANTOPRAZOLE SODIUM 40 MG: 40 INJECTION, POWDER, FOR SOLUTION INTRAVENOUS at 05:00

## 2021-08-26 RX ADMIN — ENOXAPARIN SODIUM 120 MG: 120 INJECTION SUBCUTANEOUS at 08:05

## 2021-08-26 RX ADMIN — MONTELUKAST 10 MG: 10 TABLET, FILM COATED ORAL at 20:08

## 2021-08-27 ENCOUNTER — APPOINTMENT (OUTPATIENT)
Dept: GENERAL RADIOLOGY | Facility: HOSPITAL | Age: 46
End: 2021-08-27

## 2021-08-27 LAB
ALBUMIN SERPL-MCNC: 2.7 G/DL (ref 3.5–5.2)
ALBUMIN/GLOB SERPL: 0.8 G/DL
ALP SERPL-CCNC: 168 U/L (ref 39–117)
ALT SERPL W P-5'-P-CCNC: 96 U/L (ref 1–33)
ANION GAP SERPL CALCULATED.3IONS-SCNC: 9 MMOL/L (ref 5–15)
AST SERPL-CCNC: 113 U/L (ref 1–32)
BASOPHILS # BLD AUTO: 0.03 10*3/MM3 (ref 0–0.2)
BASOPHILS NFR BLD AUTO: 0.4 % (ref 0–1.5)
BILIRUB SERPL-MCNC: 0.6 MG/DL (ref 0–1.2)
BUN SERPL-MCNC: 37 MG/DL (ref 6–20)
BUN/CREAT SERPL: 55.2 (ref 7–25)
CALCIUM SPEC-SCNC: 8.7 MG/DL (ref 8.6–10.5)
CHLORIDE SERPL-SCNC: 114 MMOL/L (ref 98–107)
CO2 SERPL-SCNC: 25 MMOL/L (ref 22–29)
CREAT SERPL-MCNC: 0.67 MG/DL (ref 0.57–1)
CRP SERPL-MCNC: 1.03 MG/DL (ref 0–0.5)
DEPRECATED RDW RBC AUTO: 46.9 FL (ref 37–54)
EOSINOPHIL # BLD AUTO: 0.01 10*3/MM3 (ref 0–0.4)
EOSINOPHIL NFR BLD AUTO: 0.1 % (ref 0.3–6.2)
ERYTHROCYTE [DISTWIDTH] IN BLOOD BY AUTOMATED COUNT: 14.7 % (ref 12.3–15.4)
GFR SERPL CREATININE-BSD FRML MDRD: 94 ML/MIN/1.73
GLOBULIN UR ELPH-MCNC: 3.2 GM/DL
GLUCOSE BLDC GLUCOMTR-MCNC: 124 MG/DL (ref 70–130)
GLUCOSE BLDC GLUCOMTR-MCNC: 125 MG/DL (ref 70–130)
GLUCOSE BLDC GLUCOMTR-MCNC: 125 MG/DL (ref 70–130)
GLUCOSE BLDC GLUCOMTR-MCNC: 130 MG/DL (ref 70–130)
GLUCOSE BLDC GLUCOMTR-MCNC: 132 MG/DL (ref 70–130)
GLUCOSE SERPL-MCNC: 134 MG/DL (ref 65–99)
HCT VFR BLD AUTO: 30.4 % (ref 34–46.6)
HGB BLD-MCNC: 10.1 G/DL (ref 12–15.9)
IMM GRANULOCYTES # BLD AUTO: 0.84 10*3/MM3 (ref 0–0.05)
IMM GRANULOCYTES NFR BLD AUTO: 10 % (ref 0–0.5)
LYMPHOCYTES # BLD AUTO: 0.72 10*3/MM3 (ref 0.7–3.1)
LYMPHOCYTES NFR BLD AUTO: 8.5 % (ref 19.6–45.3)
MCH RBC QN AUTO: 29.7 PG (ref 26.6–33)
MCHC RBC AUTO-ENTMCNC: 33.2 G/DL (ref 31.5–35.7)
MCV RBC AUTO: 89.4 FL (ref 79–97)
MONOCYTES # BLD AUTO: 0.72 10*3/MM3 (ref 0.1–0.9)
MONOCYTES NFR BLD AUTO: 8.5 % (ref 5–12)
NEUTROPHILS NFR BLD AUTO: 6.12 10*3/MM3 (ref 1.7–7)
NEUTROPHILS NFR BLD AUTO: 72.5 % (ref 42.7–76)
NRBC BLD AUTO-RTO: 2.4 /100 WBC (ref 0–0.2)
PLATELET # BLD AUTO: 179 10*3/MM3 (ref 140–450)
PMV BLD AUTO: 10.8 FL (ref 6–12)
POTASSIUM SERPL-SCNC: 4.7 MMOL/L (ref 3.5–5.2)
PROT SERPL-MCNC: 5.9 G/DL (ref 6–8.5)
RBC # BLD AUTO: 3.4 10*6/MM3 (ref 3.77–5.28)
SODIUM SERPL-SCNC: 148 MMOL/L (ref 136–145)
WBC # BLD AUTO: 8.44 10*3/MM3 (ref 3.4–10.8)

## 2021-08-27 PROCEDURE — 86140 C-REACTIVE PROTEIN: CPT | Performed by: FAMILY MEDICINE

## 2021-08-27 PROCEDURE — 25010000002 CEFEPIME PER 500 MG: Performed by: FAMILY MEDICINE

## 2021-08-27 PROCEDURE — 25010000002 PROPOFOL 10 MG/ML EMULSION: Performed by: FAMILY MEDICINE

## 2021-08-27 PROCEDURE — 63710000001 INSULIN DETEMIR PER 5 UNITS: Performed by: FAMILY MEDICINE

## 2021-08-27 PROCEDURE — 25010000002 MIDAZOLAM 50 MG/10ML SOLUTION: Performed by: INTERNAL MEDICINE

## 2021-08-27 PROCEDURE — 25010000003 INSULIN REGULAR HUMAN PER 5 UNITS: Performed by: INTERNAL MEDICINE

## 2021-08-27 PROCEDURE — 25010000002 FENTANYL PER 0.1 MG: Performed by: INTERNAL MEDICINE

## 2021-08-27 PROCEDURE — 71045 X-RAY EXAM CHEST 1 VIEW: CPT

## 2021-08-27 PROCEDURE — 94799 UNLISTED PULMONARY SVC/PX: CPT

## 2021-08-27 PROCEDURE — 94003 VENT MGMT INPAT SUBQ DAY: CPT

## 2021-08-27 PROCEDURE — 25010000002 DEXAMETHASONE SODIUM PHOSPHATE 10 MG/ML SOLUTION: Performed by: INTERNAL MEDICINE

## 2021-08-27 PROCEDURE — 25010000002 ENOXAPARIN PER 10 MG: Performed by: FAMILY MEDICINE

## 2021-08-27 PROCEDURE — 80053 COMPREHEN METABOLIC PANEL: CPT | Performed by: FAMILY MEDICINE

## 2021-08-27 PROCEDURE — 82962 GLUCOSE BLOOD TEST: CPT

## 2021-08-27 PROCEDURE — 74018 RADEX ABDOMEN 1 VIEW: CPT

## 2021-08-27 PROCEDURE — 85025 COMPLETE CBC W/AUTO DIFF WBC: CPT | Performed by: INTERNAL MEDICINE

## 2021-08-27 RX ORDER — DEXTROSE MONOHYDRATE 25 G/50ML
25 INJECTION, SOLUTION INTRAVENOUS
Status: DISCONTINUED | OUTPATIENT
Start: 2021-08-27 | End: 2021-09-09 | Stop reason: HOSPADM

## 2021-08-27 RX ORDER — NICOTINE POLACRILEX 4 MG
15 LOZENGE BUCCAL
Status: DISCONTINUED | OUTPATIENT
Start: 2021-08-27 | End: 2021-09-09 | Stop reason: HOSPADM

## 2021-08-27 RX ADMIN — ENOXAPARIN SODIUM 120 MG: 120 INJECTION SUBCUTANEOUS at 09:11

## 2021-08-27 RX ADMIN — PANTOPRAZOLE SODIUM 40 MG: 40 INJECTION, POWDER, FOR SOLUTION INTRAVENOUS at 06:14

## 2021-08-27 RX ADMIN — PROPOFOL 50 MCG/KG/MIN: 10 INJECTION, EMULSION INTRAVENOUS at 11:52

## 2021-08-27 RX ADMIN — CETIRIZINE HYDROCHLORIDE 5 MG: 5 TABLET ORAL at 09:11

## 2021-08-27 RX ADMIN — MIDAZOLAM 10 MG/HR: 5 INJECTION, SOLUTION INTRAMUSCULAR; INTRAVENOUS at 09:37

## 2021-08-27 RX ADMIN — PROPOFOL 55 MCG/KG/MIN: 10 INJECTION, EMULSION INTRAVENOUS at 01:30

## 2021-08-27 RX ADMIN — GUAIFENESIN 200 MG: 200 SOLUTION ORAL at 01:30

## 2021-08-27 RX ADMIN — PROPOFOL 50 MCG/KG/MIN: 10 INJECTION, EMULSION INTRAVENOUS at 17:37

## 2021-08-27 RX ADMIN — CEFEPIME HYDROCHLORIDE 2 G: 2 INJECTION, POWDER, FOR SOLUTION INTRAVENOUS at 21:24

## 2021-08-27 RX ADMIN — PROPOFOL 60 MCG/KG/MIN: 10 INJECTION, EMULSION INTRAVENOUS at 05:50

## 2021-08-27 RX ADMIN — MIDAZOLAM 10 MG/HR: 5 INJECTION, SOLUTION INTRAMUSCULAR; INTRAVENOUS at 03:09

## 2021-08-27 RX ADMIN — ENOXAPARIN SODIUM 120 MG: 120 INJECTION SUBCUTANEOUS at 21:24

## 2021-08-27 RX ADMIN — ALBUTEROL SULFATE 2 PUFF: 90 AEROSOL, METERED RESPIRATORY (INHALATION) at 10:49

## 2021-08-27 RX ADMIN — FOLIC ACID 1 MG: 1 TABLET ORAL at 09:11

## 2021-08-27 RX ADMIN — DEXAMETHASONE SODIUM PHOSPHATE 10 MG: 10 INJECTION INTRAMUSCULAR; INTRAVENOUS at 09:34

## 2021-08-27 RX ADMIN — CEFEPIME HYDROCHLORIDE 2 G: 2 INJECTION, POWDER, FOR SOLUTION INTRAVENOUS at 06:14

## 2021-08-27 RX ADMIN — CHLORHEXIDINE GLUCONATE 15 ML: 1.2 RINSE ORAL at 09:12

## 2021-08-27 RX ADMIN — PROPOFOL 50 MCG/KG/MIN: 10 INJECTION, EMULSION INTRAVENOUS at 09:10

## 2021-08-27 RX ADMIN — ACETAMINOPHEN 650 MG: 325 TABLET, FILM COATED ORAL at 02:06

## 2021-08-27 RX ADMIN — BUDESONIDE AND FORMOTEROL FUMARATE DIHYDRATE 2 PUFF: 160; 4.5 AEROSOL RESPIRATORY (INHALATION) at 08:01

## 2021-08-27 RX ADMIN — CYANOCOBALAMIN TAB 1000 MCG 1000 MCG: 1000 TAB at 09:11

## 2021-08-27 RX ADMIN — SODIUM CHLORIDE, PRESERVATIVE FREE 10 ML: 5 INJECTION INTRAVENOUS at 09:16

## 2021-08-27 RX ADMIN — ALBUTEROL SULFATE 2 PUFF: 90 AEROSOL, METERED RESPIRATORY (INHALATION) at 19:30

## 2021-08-27 RX ADMIN — FENTANYL CITRATE: 50 INJECTION, SOLUTION INTRAMUSCULAR; INTRAVENOUS at 21:35

## 2021-08-27 RX ADMIN — CHLORHEXIDINE GLUCONATE 15 ML: 1.2 RINSE ORAL at 21:21

## 2021-08-27 RX ADMIN — PROPOFOL 50 MCG/KG/MIN: 10 INJECTION, EMULSION INTRAVENOUS at 20:08

## 2021-08-27 RX ADMIN — BUDESONIDE AND FORMOTEROL FUMARATE DIHYDRATE 2 PUFF: 160; 4.5 AEROSOL RESPIRATORY (INHALATION) at 22:10

## 2021-08-27 RX ADMIN — INSULIN DETEMIR 15 UNITS: 100 INJECTION, SOLUTION SUBCUTANEOUS at 21:28

## 2021-08-27 RX ADMIN — ALBUTEROL SULFATE 2 PUFF: 90 AEROSOL, METERED RESPIRATORY (INHALATION) at 01:05

## 2021-08-27 RX ADMIN — ALBUTEROL SULFATE 2 PUFF: 90 AEROSOL, METERED RESPIRATORY (INHALATION) at 08:01

## 2021-08-27 RX ADMIN — PROPOFOL 50 MCG/KG/MIN: 10 INJECTION, EMULSION INTRAVENOUS at 22:33

## 2021-08-27 RX ADMIN — PROPOFOL 50 MCG/KG/MIN: 10 INJECTION, EMULSION INTRAVENOUS at 14:38

## 2021-08-27 RX ADMIN — SODIUM CHLORIDE 11.9 UNITS/HR: 9 INJECTION, SOLUTION INTRAVENOUS at 06:48

## 2021-08-27 RX ADMIN — OLANZAPINE 10 MG: 10 TABLET, FILM COATED ORAL at 21:21

## 2021-08-27 RX ADMIN — DEXAMETHASONE SODIUM PHOSPHATE 10 MG: 10 INJECTION INTRAMUSCULAR; INTRAVENOUS at 21:43

## 2021-08-27 RX ADMIN — PROPOFOL 55 MCG/KG/MIN: 10 INJECTION, EMULSION INTRAVENOUS at 03:57

## 2021-08-27 RX ADMIN — ALBUTEROL SULFATE 2 PUFF: 90 AEROSOL, METERED RESPIRATORY (INHALATION) at 16:56

## 2021-08-27 RX ADMIN — FENTANYL CITRATE: 50 INJECTION, SOLUTION INTRAMUSCULAR; INTRAVENOUS at 07:54

## 2021-08-27 RX ADMIN — MIDAZOLAM 10 MG/HR: 5 INJECTION, SOLUTION INTRAMUSCULAR; INTRAVENOUS at 22:32

## 2021-08-27 RX ADMIN — SODIUM CHLORIDE 30 ML/HR: 900 INJECTION, SOLUTION INTRAVENOUS at 03:57

## 2021-08-27 RX ADMIN — METOPROLOL TARTRATE 25 MG: 25 TABLET, FILM COATED ORAL at 09:11

## 2021-08-27 RX ADMIN — MONTELUKAST 10 MG: 10 TABLET, FILM COATED ORAL at 21:22

## 2021-08-27 RX ADMIN — METOPROLOL TARTRATE 25 MG: 25 TABLET, FILM COATED ORAL at 21:21

## 2021-08-27 RX ADMIN — MIDAZOLAM 10 MG/HR: 5 INJECTION, SOLUTION INTRAMUSCULAR; INTRAVENOUS at 15:16

## 2021-08-27 RX ADMIN — SODIUM CHLORIDE, PRESERVATIVE FREE 10 ML: 5 INJECTION INTRAVENOUS at 09:15

## 2021-08-27 RX ADMIN — REMDESIVIR 100 MG: 100 INJECTION, POWDER, LYOPHILIZED, FOR SOLUTION INTRAVENOUS at 21:39

## 2021-08-27 RX ADMIN — CEFEPIME HYDROCHLORIDE 2 G: 2 INJECTION, POWDER, FOR SOLUTION INTRAVENOUS at 15:15

## 2021-08-27 RX ADMIN — ALBUTEROL SULFATE 2 PUFF: 90 AEROSOL, METERED RESPIRATORY (INHALATION) at 22:10

## 2021-08-28 ENCOUNTER — APPOINTMENT (OUTPATIENT)
Dept: ULTRASOUND IMAGING | Facility: HOSPITAL | Age: 46
End: 2021-08-28

## 2021-08-28 LAB
ALBUMIN SERPL-MCNC: 2.8 G/DL (ref 3.5–5.2)
ALBUMIN/GLOB SERPL: 0.8 G/DL
ALP SERPL-CCNC: 161 U/L (ref 39–117)
ALT SERPL W P-5'-P-CCNC: 76 U/L (ref 1–33)
ANION GAP SERPL CALCULATED.3IONS-SCNC: 10 MMOL/L (ref 5–15)
AST SERPL-CCNC: 77 U/L (ref 1–32)
BACTERIA SPEC RESP CULT: ABNORMAL
BACTERIA SPEC RESP CULT: ABNORMAL
BASOPHILS # BLD AUTO: 0.03 10*3/MM3 (ref 0–0.2)
BASOPHILS NFR BLD AUTO: 0.3 % (ref 0–1.5)
BILIRUB SERPL-MCNC: 0.7 MG/DL (ref 0–1.2)
BUN SERPL-MCNC: 33 MG/DL (ref 6–20)
BUN/CREAT SERPL: 50.8 (ref 7–25)
CALCIUM SPEC-SCNC: 8.7 MG/DL (ref 8.6–10.5)
CHLORIDE SERPL-SCNC: 111 MMOL/L (ref 98–107)
CO2 SERPL-SCNC: 27 MMOL/L (ref 22–29)
CREAT SERPL-MCNC: 0.65 MG/DL (ref 0.57–1)
CRP SERPL-MCNC: 1.07 MG/DL (ref 0–0.5)
D-DIMER, QUANTITATIVE (MAD,POW, STR): >4000 NG/ML (FEU) (ref 0–470)
DEPRECATED RDW RBC AUTO: 50.2 FL (ref 37–54)
EOSINOPHIL # BLD AUTO: 0.01 10*3/MM3 (ref 0–0.4)
EOSINOPHIL NFR BLD AUTO: 0.1 % (ref 0.3–6.2)
ERYTHROCYTE [DISTWIDTH] IN BLOOD BY AUTOMATED COUNT: 15 % (ref 12.3–15.4)
GFR SERPL CREATININE-BSD FRML MDRD: 97 ML/MIN/1.73
GLOBULIN UR ELPH-MCNC: 3.5 GM/DL
GLUCOSE BLDC GLUCOMTR-MCNC: 131 MG/DL (ref 70–130)
GLUCOSE BLDC GLUCOMTR-MCNC: 141 MG/DL (ref 70–130)
GLUCOSE BLDC GLUCOMTR-MCNC: 200 MG/DL (ref 70–130)
GLUCOSE BLDC GLUCOMTR-MCNC: 288 MG/DL (ref 70–130)
GLUCOSE SERPL-MCNC: 260 MG/DL (ref 65–99)
GRAM STN SPEC: ABNORMAL
HCT VFR BLD AUTO: 32.4 % (ref 34–46.6)
HGB BLD-MCNC: 10.5 G/DL (ref 12–15.9)
IMM GRANULOCYTES # BLD AUTO: 1.17 10*3/MM3 (ref 0–0.05)
IMM GRANULOCYTES NFR BLD AUTO: 12.9 % (ref 0–0.5)
INR PPP: 1.29 (ref 0.8–1.2)
LDH SERPL-CCNC: 906 U/L (ref 135–214)
LYMPHOCYTES # BLD AUTO: 0.51 10*3/MM3 (ref 0.7–3.1)
LYMPHOCYTES NFR BLD AUTO: 5.6 % (ref 19.6–45.3)
MCH RBC QN AUTO: 30.1 PG (ref 26.6–33)
MCHC RBC AUTO-ENTMCNC: 32.4 G/DL (ref 31.5–35.7)
MCV RBC AUTO: 92.8 FL (ref 79–97)
MONOCYTES # BLD AUTO: 0.54 10*3/MM3 (ref 0.1–0.9)
MONOCYTES NFR BLD AUTO: 6 % (ref 5–12)
NEUTROPHILS NFR BLD AUTO: 6.81 10*3/MM3 (ref 1.7–7)
NEUTROPHILS NFR BLD AUTO: 75.1 % (ref 42.7–76)
NRBC BLD AUTO-RTO: 1 /100 WBC (ref 0–0.2)
PLATELET # BLD AUTO: 182 10*3/MM3 (ref 140–450)
PMV BLD AUTO: 10.1 FL (ref 6–12)
POTASSIUM SERPL-SCNC: 5 MMOL/L (ref 3.5–5.2)
PROT SERPL-MCNC: 6.3 G/DL (ref 6–8.5)
PROTHROMBIN TIME: 15.9 SECONDS (ref 11.1–15.3)
RBC # BLD AUTO: 3.49 10*6/MM3 (ref 3.77–5.28)
SODIUM SERPL-SCNC: 148 MMOL/L (ref 136–145)
WBC # BLD AUTO: 9.07 10*3/MM3 (ref 3.4–10.8)

## 2021-08-28 PROCEDURE — 25010000002 DEXAMETHASONE SODIUM PHOSPHATE 10 MG/ML SOLUTION: Performed by: INTERNAL MEDICINE

## 2021-08-28 PROCEDURE — 63710000001 INSULIN ASPART PER 5 UNITS: Performed by: FAMILY MEDICINE

## 2021-08-28 PROCEDURE — 83615 LACTATE (LD) (LDH) ENZYME: CPT | Performed by: FAMILY MEDICINE

## 2021-08-28 PROCEDURE — 94799 UNLISTED PULMONARY SVC/PX: CPT

## 2021-08-28 PROCEDURE — 76705 ECHO EXAM OF ABDOMEN: CPT

## 2021-08-28 PROCEDURE — 82962 GLUCOSE BLOOD TEST: CPT

## 2021-08-28 PROCEDURE — 25010000002 FENTANYL PER 0.1 MG: Performed by: FAMILY MEDICINE

## 2021-08-28 PROCEDURE — 63710000001 INSULIN DETEMIR PER 5 UNITS: Performed by: FAMILY MEDICINE

## 2021-08-28 PROCEDURE — 25010000002 MIDAZOLAM 50 MG/10ML SOLUTION: Performed by: INTERNAL MEDICINE

## 2021-08-28 PROCEDURE — 80053 COMPREHEN METABOLIC PANEL: CPT | Performed by: FAMILY MEDICINE

## 2021-08-28 PROCEDURE — 25010000002 CEFEPIME PER 500 MG: Performed by: FAMILY MEDICINE

## 2021-08-28 PROCEDURE — 85379 FIBRIN DEGRADATION QUANT: CPT | Performed by: FAMILY MEDICINE

## 2021-08-28 PROCEDURE — 86140 C-REACTIVE PROTEIN: CPT | Performed by: FAMILY MEDICINE

## 2021-08-28 PROCEDURE — 85610 PROTHROMBIN TIME: CPT | Performed by: FAMILY MEDICINE

## 2021-08-28 PROCEDURE — 94003 VENT MGMT INPAT SUBQ DAY: CPT

## 2021-08-28 PROCEDURE — 85025 COMPLETE CBC W/AUTO DIFF WBC: CPT | Performed by: INTERNAL MEDICINE

## 2021-08-28 PROCEDURE — 25010000002 PROPOFOL 10 MG/ML EMULSION: Performed by: FAMILY MEDICINE

## 2021-08-28 PROCEDURE — 25010000002 ENOXAPARIN PER 10 MG: Performed by: FAMILY MEDICINE

## 2021-08-28 RX ORDER — ACETAMINOPHEN 160 MG/5ML
650 SOLUTION ORAL EVERY 6 HOURS PRN
Status: DISCONTINUED | OUTPATIENT
Start: 2021-08-28 | End: 2021-09-09 | Stop reason: HOSPADM

## 2021-08-28 RX ADMIN — CEFEPIME HYDROCHLORIDE 2 G: 2 INJECTION, POWDER, FOR SOLUTION INTRAVENOUS at 21:00

## 2021-08-28 RX ADMIN — PANTOPRAZOLE SODIUM 40 MG: 40 INJECTION, POWDER, FOR SOLUTION INTRAVENOUS at 06:12

## 2021-08-28 RX ADMIN — SODIUM CHLORIDE, PRESERVATIVE FREE 10 ML: 5 INJECTION INTRAVENOUS at 20:47

## 2021-08-28 RX ADMIN — MIDAZOLAM 10 MG/HR: 5 INJECTION, SOLUTION INTRAMUSCULAR; INTRAVENOUS at 11:48

## 2021-08-28 RX ADMIN — SODIUM CHLORIDE 30 ML/HR: 900 INJECTION, SOLUTION INTRAVENOUS at 23:22

## 2021-08-28 RX ADMIN — SODIUM CHLORIDE, PRESERVATIVE FREE 10 ML: 5 INJECTION INTRAVENOUS at 08:29

## 2021-08-28 RX ADMIN — CEFEPIME HYDROCHLORIDE 2 G: 2 INJECTION, POWDER, FOR SOLUTION INTRAVENOUS at 06:12

## 2021-08-28 RX ADMIN — INSULIN ASPART 6 UNITS: 100 INJECTION, SOLUTION INTRAVENOUS; SUBCUTANEOUS at 18:23

## 2021-08-28 RX ADMIN — PROPOFOL 50 MCG/KG/MIN: 10 INJECTION, EMULSION INTRAVENOUS at 09:21

## 2021-08-28 RX ADMIN — MIDAZOLAM 10 MG/HR: 5 INJECTION, SOLUTION INTRAMUSCULAR; INTRAVENOUS at 18:05

## 2021-08-28 RX ADMIN — OLANZAPINE 10 MG: 10 TABLET, FILM COATED ORAL at 20:47

## 2021-08-28 RX ADMIN — DEXAMETHASONE SODIUM PHOSPHATE 10 MG: 10 INJECTION INTRAMUSCULAR; INTRAVENOUS at 20:49

## 2021-08-28 RX ADMIN — PROPOFOL 50 MCG/KG/MIN: 10 INJECTION, EMULSION INTRAVENOUS at 04:15

## 2021-08-28 RX ADMIN — PROPOFOL 50 MCG/KG/MIN: 10 INJECTION, EMULSION INTRAVENOUS at 02:10

## 2021-08-28 RX ADMIN — ACETAMINOPHEN ORAL SOLUTION 650 MG: 650 SOLUTION ORAL at 16:18

## 2021-08-28 RX ADMIN — BUDESONIDE AND FORMOTEROL FUMARATE DIHYDRATE 2 PUFF: 160; 4.5 AEROSOL RESPIRATORY (INHALATION) at 21:45

## 2021-08-28 RX ADMIN — FOLIC ACID 1 MG: 1 TABLET ORAL at 08:27

## 2021-08-28 RX ADMIN — MIDAZOLAM 10 MG/HR: 5 INJECTION, SOLUTION INTRAMUSCULAR; INTRAVENOUS at 04:57

## 2021-08-28 RX ADMIN — ENOXAPARIN SODIUM 120 MG: 120 INJECTION SUBCUTANEOUS at 20:49

## 2021-08-28 RX ADMIN — PROPOFOL 50 MCG/KG/MIN: 10 INJECTION, EMULSION INTRAVENOUS at 20:12

## 2021-08-28 RX ADMIN — INSULIN ASPART 6 UNITS: 100 INJECTION, SOLUTION INTRAVENOUS; SUBCUTANEOUS at 14:07

## 2021-08-28 RX ADMIN — PROPOFOL 50 MCG/KG/MIN: 10 INJECTION, EMULSION INTRAVENOUS at 17:49

## 2021-08-28 RX ADMIN — ALBUTEROL SULFATE 2 PUFF: 90 AEROSOL, METERED RESPIRATORY (INHALATION) at 16:45

## 2021-08-28 RX ADMIN — CYANOCOBALAMIN TAB 1000 MCG 1000 MCG: 1000 TAB at 08:27

## 2021-08-28 RX ADMIN — CETIRIZINE HYDROCHLORIDE 5 MG: 5 TABLET ORAL at 08:27

## 2021-08-28 RX ADMIN — FENTANYL CITRATE: 50 INJECTION, SOLUTION INTRAMUSCULAR; INTRAVENOUS at 07:48

## 2021-08-28 RX ADMIN — INSULIN DETEMIR 15 UNITS: 100 INJECTION, SOLUTION SUBCUTANEOUS at 20:50

## 2021-08-28 RX ADMIN — CEFEPIME HYDROCHLORIDE 2 G: 2 INJECTION, POWDER, FOR SOLUTION INTRAVENOUS at 15:30

## 2021-08-28 RX ADMIN — PROPOFOL 50 MCG/KG/MIN: 10 INJECTION, EMULSION INTRAVENOUS at 12:25

## 2021-08-28 RX ADMIN — PROPOFOL 50 MCG/KG/MIN: 10 INJECTION, EMULSION INTRAVENOUS at 16:06

## 2021-08-28 RX ADMIN — FENTANYL CITRATE: 50 INJECTION, SOLUTION INTRAMUSCULAR; INTRAVENOUS at 20:52

## 2021-08-28 RX ADMIN — ENOXAPARIN SODIUM 120 MG: 120 INJECTION SUBCUTANEOUS at 08:27

## 2021-08-28 RX ADMIN — ALBUTEROL SULFATE 2 PUFF: 90 AEROSOL, METERED RESPIRATORY (INHALATION) at 21:45

## 2021-08-28 RX ADMIN — CHLORHEXIDINE GLUCONATE 15 ML: 1.2 RINSE ORAL at 08:27

## 2021-08-28 RX ADMIN — MONTELUKAST 10 MG: 10 TABLET, FILM COATED ORAL at 20:47

## 2021-08-28 RX ADMIN — DEXAMETHASONE SODIUM PHOSPHATE 10 MG: 10 INJECTION INTRAMUSCULAR; INTRAVENOUS at 08:27

## 2021-08-28 RX ADMIN — ALBUTEROL SULFATE 2 PUFF: 90 AEROSOL, METERED RESPIRATORY (INHALATION) at 08:44

## 2021-08-28 RX ADMIN — SODIUM CHLORIDE, PRESERVATIVE FREE 10 ML: 5 INJECTION INTRAVENOUS at 08:28

## 2021-08-28 RX ADMIN — CHLORHEXIDINE GLUCONATE 15 ML: 1.2 RINSE ORAL at 20:47

## 2021-08-28 RX ADMIN — METOPROLOL TARTRATE 25 MG: 25 TABLET, FILM COATED ORAL at 08:27

## 2021-08-28 RX ADMIN — ALBUTEROL SULFATE 2 PUFF: 90 AEROSOL, METERED RESPIRATORY (INHALATION) at 11:42

## 2021-08-28 RX ADMIN — BUDESONIDE AND FORMOTEROL FUMARATE DIHYDRATE 2 PUFF: 160; 4.5 AEROSOL RESPIRATORY (INHALATION) at 08:44

## 2021-08-28 RX ADMIN — MIDAZOLAM 10 MG/HR: 5 INJECTION, SOLUTION INTRAMUSCULAR; INTRAVENOUS at 23:36

## 2021-08-28 RX ADMIN — PROPOFOL 50 MCG/KG/MIN: 10 INJECTION, EMULSION INTRAVENOUS at 23:21

## 2021-08-28 RX ADMIN — PROPOFOL 50 MCG/KG/MIN: 10 INJECTION, EMULSION INTRAVENOUS at 06:12

## 2021-08-28 RX ADMIN — INSULIN ASPART 6 UNITS: 100 INJECTION, SOLUTION INTRAVENOUS; SUBCUTANEOUS at 09:34

## 2021-08-28 RX ADMIN — REMDESIVIR 100 MG: 100 INJECTION, POWDER, LYOPHILIZED, FOR SOLUTION INTRAVENOUS at 20:47

## 2021-08-28 RX ADMIN — ACETAMINOPHEN ORAL SOLUTION 650 MG: 650 SOLUTION ORAL at 22:20

## 2021-08-28 RX ADMIN — FENTANYL CITRATE: 50 INJECTION, SOLUTION INTRAMUSCULAR; INTRAVENOUS at 09:20

## 2021-08-28 RX ADMIN — METOPROLOL TARTRATE 25 MG: 25 TABLET, FILM COATED ORAL at 20:47

## 2021-08-28 RX ADMIN — ALBUTEROL SULFATE 2 PUFF: 90 AEROSOL, METERED RESPIRATORY (INHALATION) at 18:53

## 2021-08-29 ENCOUNTER — APPOINTMENT (OUTPATIENT)
Dept: GENERAL RADIOLOGY | Facility: HOSPITAL | Age: 46
End: 2021-08-29

## 2021-08-29 LAB
ALBUMIN SERPL-MCNC: 2.6 G/DL (ref 3.5–5.2)
ALBUMIN/GLOB SERPL: 0.8 G/DL
ALP SERPL-CCNC: 147 U/L (ref 39–117)
ALT SERPL W P-5'-P-CCNC: 52 U/L (ref 1–33)
ANION GAP SERPL CALCULATED.3IONS-SCNC: 8 MMOL/L (ref 5–15)
ANISOCYTOSIS BLD QL: ABNORMAL
AST SERPL-CCNC: 49 U/L (ref 1–32)
BACTERIA UR QL AUTO: ABNORMAL /HPF
BILIRUB SERPL-MCNC: 0.6 MG/DL (ref 0–1.2)
BILIRUB UR QL STRIP: NEGATIVE
BUN SERPL-MCNC: 31 MG/DL (ref 6–20)
BUN/CREAT SERPL: 55.4 (ref 7–25)
CALCIUM SPEC-SCNC: 8.4 MG/DL (ref 8.6–10.5)
CHLORIDE SERPL-SCNC: 112 MMOL/L (ref 98–107)
CLARITY UR: ABNORMAL
CO2 SERPL-SCNC: 29 MMOL/L (ref 22–29)
COLOR UR: YELLOW
CREAT SERPL-MCNC: 0.56 MG/DL (ref 0.57–1)
CRP SERPL-MCNC: 1.13 MG/DL (ref 0–0.5)
DEPRECATED RDW RBC AUTO: 48.4 FL (ref 37–54)
ERYTHROCYTE [DISTWIDTH] IN BLOOD BY AUTOMATED COUNT: 14.7 % (ref 12.3–15.4)
GFR SERPL CREATININE-BSD FRML MDRD: 116 ML/MIN/1.73
GLOBULIN UR ELPH-MCNC: 3.3 GM/DL
GLUCOSE BLDC GLUCOMTR-MCNC: 235 MG/DL (ref 70–130)
GLUCOSE BLDC GLUCOMTR-MCNC: 271 MG/DL (ref 70–130)
GLUCOSE BLDC GLUCOMTR-MCNC: 275 MG/DL (ref 70–130)
GLUCOSE BLDC GLUCOMTR-MCNC: 276 MG/DL (ref 70–130)
GLUCOSE BLDC GLUCOMTR-MCNC: 291 MG/DL (ref 70–130)
GLUCOSE SERPL-MCNC: 285 MG/DL (ref 65–99)
GLUCOSE UR STRIP-MCNC: ABNORMAL MG/DL
HCT VFR BLD AUTO: 31.1 % (ref 34–46.6)
HGB BLD-MCNC: 10 G/DL (ref 12–15.9)
HGB UR QL STRIP.AUTO: ABNORMAL
HYALINE CASTS UR QL AUTO: ABNORMAL /LPF
INR PPP: 1.26 (ref 0.8–1.2)
KETONES UR QL STRIP: ABNORMAL
LEUKOCYTE ESTERASE UR QL STRIP.AUTO: ABNORMAL
LYMPHOCYTES # BLD MANUAL: 0.38 10*3/MM3 (ref 0.7–3.1)
LYMPHOCYTES NFR BLD MANUAL: 3 % (ref 5–12)
LYMPHOCYTES NFR BLD MANUAL: 5 % (ref 19.6–45.3)
MCH RBC QN AUTO: 30.1 PG (ref 26.6–33)
MCHC RBC AUTO-ENTMCNC: 32.2 G/DL (ref 31.5–35.7)
MCV RBC AUTO: 93.7 FL (ref 79–97)
METAMYELOCYTES NFR BLD MANUAL: 1 % (ref 0–0)
MONOCYTES # BLD AUTO: 0.23 10*3/MM3 (ref 0.1–0.9)
NEUTROPHILS # BLD AUTO: 6.96 10*3/MM3 (ref 1.7–7)
NEUTROPHILS NFR BLD MANUAL: 73 % (ref 42.7–76)
NEUTS BAND NFR BLD MANUAL: 18 % (ref 0–5)
NITRITE UR QL STRIP: NEGATIVE
PH UR STRIP.AUTO: 6 [PH] (ref 5–9)
PLATELET # BLD AUTO: 177 10*3/MM3 (ref 140–450)
PMV BLD AUTO: 10.2 FL (ref 6–12)
POTASSIUM SERPL-SCNC: 4.9 MMOL/L (ref 3.5–5.2)
PROT SERPL-MCNC: 5.9 G/DL (ref 6–8.5)
PROT UR QL STRIP: ABNORMAL
PROTHROMBIN TIME: 15.6 SECONDS (ref 11.1–15.3)
RBC # BLD AUTO: 3.32 10*6/MM3 (ref 3.77–5.28)
RBC # UR: ABNORMAL /HPF
REF LAB TEST METHOD: ABNORMAL
SMALL PLATELETS BLD QL SMEAR: ADEQUATE
SODIUM SERPL-SCNC: 149 MMOL/L (ref 136–145)
SP GR UR STRIP: 1.03 (ref 1–1.03)
SQUAMOUS #/AREA URNS HPF: ABNORMAL /HPF
UROBILINOGEN UR QL STRIP: ABNORMAL
WBC # BLD AUTO: 7.65 10*3/MM3 (ref 3.4–10.8)
WBC MORPH BLD: NORMAL
WBC UR QL AUTO: ABNORMAL /HPF
YEAST URNS QL MICRO: ABNORMAL /HPF

## 2021-08-29 PROCEDURE — 63710000001 INSULIN ASPART PER 5 UNITS: Performed by: FAMILY MEDICINE

## 2021-08-29 PROCEDURE — 25010000002 CEFEPIME PER 500 MG: Performed by: FAMILY MEDICINE

## 2021-08-29 PROCEDURE — 85007 BL SMEAR W/DIFF WBC COUNT: CPT | Performed by: INTERNAL MEDICINE

## 2021-08-29 PROCEDURE — 63710000001 INSULIN DETEMIR PER 5 UNITS: Performed by: FAMILY MEDICINE

## 2021-08-29 PROCEDURE — 81001 URINALYSIS AUTO W/SCOPE: CPT | Performed by: FAMILY MEDICINE

## 2021-08-29 PROCEDURE — 71045 X-RAY EXAM CHEST 1 VIEW: CPT

## 2021-08-29 PROCEDURE — 94799 UNLISTED PULMONARY SVC/PX: CPT

## 2021-08-29 PROCEDURE — 86140 C-REACTIVE PROTEIN: CPT | Performed by: FAMILY MEDICINE

## 2021-08-29 PROCEDURE — 25010000002 MIDAZOLAM 50 MG/10ML SOLUTION: Performed by: INTERNAL MEDICINE

## 2021-08-29 PROCEDURE — 25010000002 ENOXAPARIN PER 10 MG: Performed by: FAMILY MEDICINE

## 2021-08-29 PROCEDURE — 25010000002 DEXAMETHASONE SODIUM PHOSPHATE 10 MG/ML SOLUTION: Performed by: INTERNAL MEDICINE

## 2021-08-29 PROCEDURE — 94760 N-INVAS EAR/PLS OXIMETRY 1: CPT

## 2021-08-29 PROCEDURE — 85025 COMPLETE CBC W/AUTO DIFF WBC: CPT | Performed by: INTERNAL MEDICINE

## 2021-08-29 PROCEDURE — 82962 GLUCOSE BLOOD TEST: CPT

## 2021-08-29 PROCEDURE — 25010000002 FENTANYL PER 0.1 MG: Performed by: FAMILY MEDICINE

## 2021-08-29 PROCEDURE — 85610 PROTHROMBIN TIME: CPT | Performed by: FAMILY MEDICINE

## 2021-08-29 PROCEDURE — 25010000002 PROPOFOL 10 MG/ML EMULSION: Performed by: FAMILY MEDICINE

## 2021-08-29 PROCEDURE — 87086 URINE CULTURE/COLONY COUNT: CPT | Performed by: FAMILY MEDICINE

## 2021-08-29 PROCEDURE — 94003 VENT MGMT INPAT SUBQ DAY: CPT

## 2021-08-29 PROCEDURE — 80053 COMPREHEN METABOLIC PANEL: CPT | Performed by: FAMILY MEDICINE

## 2021-08-29 RX ADMIN — CHLORHEXIDINE GLUCONATE 15 ML: 1.2 RINSE ORAL at 20:33

## 2021-08-29 RX ADMIN — CYANOCOBALAMIN TAB 1000 MCG 1000 MCG: 1000 TAB at 09:18

## 2021-08-29 RX ADMIN — MIDAZOLAM 10 MG/HR: 5 INJECTION, SOLUTION INTRAMUSCULAR; INTRAVENOUS at 08:41

## 2021-08-29 RX ADMIN — ALBUTEROL SULFATE 2 PUFF: 90 AEROSOL, METERED RESPIRATORY (INHALATION) at 01:25

## 2021-08-29 RX ADMIN — PROPOFOL 50 MCG/KG/MIN: 10 INJECTION, EMULSION INTRAVENOUS at 15:57

## 2021-08-29 RX ADMIN — FENTANYL CITRATE: 50 INJECTION, SOLUTION INTRAMUSCULAR; INTRAVENOUS at 08:41

## 2021-08-29 RX ADMIN — DEXAMETHASONE SODIUM PHOSPHATE 10 MG: 10 INJECTION INTRAMUSCULAR; INTRAVENOUS at 09:18

## 2021-08-29 RX ADMIN — METOPROLOL TARTRATE 25 MG: 25 TABLET, FILM COATED ORAL at 20:33

## 2021-08-29 RX ADMIN — ALBUTEROL SULFATE 2 PUFF: 90 AEROSOL, METERED RESPIRATORY (INHALATION) at 19:09

## 2021-08-29 RX ADMIN — SODIUM CHLORIDE, PRESERVATIVE FREE 10 ML: 5 INJECTION INTRAVENOUS at 09:24

## 2021-08-29 RX ADMIN — PROPOFOL 50 MCG/KG/MIN: 10 INJECTION, EMULSION INTRAVENOUS at 07:11

## 2021-08-29 RX ADMIN — ENOXAPARIN SODIUM 130 MG: 150 INJECTION SUBCUTANEOUS at 20:44

## 2021-08-29 RX ADMIN — CETIRIZINE HYDROCHLORIDE 5 MG: 5 TABLET ORAL at 09:18

## 2021-08-29 RX ADMIN — BUDESONIDE AND FORMOTEROL FUMARATE DIHYDRATE 2 PUFF: 160; 4.5 AEROSOL RESPIRATORY (INHALATION) at 22:21

## 2021-08-29 RX ADMIN — OLANZAPINE 10 MG: 10 TABLET, FILM COATED ORAL at 20:33

## 2021-08-29 RX ADMIN — PROPOFOL 50 MCG/KG/MIN: 10 INJECTION, EMULSION INTRAVENOUS at 18:10

## 2021-08-29 RX ADMIN — BUDESONIDE AND FORMOTEROL FUMARATE DIHYDRATE 2 PUFF: 160; 4.5 AEROSOL RESPIRATORY (INHALATION) at 07:48

## 2021-08-29 RX ADMIN — PROPOFOL 50 MCG/KG/MIN: 10 INJECTION, EMULSION INTRAVENOUS at 03:37

## 2021-08-29 RX ADMIN — INSULIN ASPART 6 UNITS: 100 INJECTION, SOLUTION INTRAVENOUS; SUBCUTANEOUS at 17:40

## 2021-08-29 RX ADMIN — PROPOFOL 50 MCG/KG/MIN: 10 INJECTION, EMULSION INTRAVENOUS at 03:02

## 2021-08-29 RX ADMIN — SODIUM CHLORIDE, PRESERVATIVE FREE 10 ML: 5 INJECTION INTRAVENOUS at 20:40

## 2021-08-29 RX ADMIN — INSULIN ASPART 6 UNITS: 100 INJECTION, SOLUTION INTRAVENOUS; SUBCUTANEOUS at 11:01

## 2021-08-29 RX ADMIN — PROPOFOL 50 MCG/KG/MIN: 10 INJECTION, EMULSION INTRAVENOUS at 20:40

## 2021-08-29 RX ADMIN — MONTELUKAST 10 MG: 10 TABLET, FILM COATED ORAL at 20:33

## 2021-08-29 RX ADMIN — INSULIN DETEMIR 20 UNITS: 100 INJECTION, SOLUTION SUBCUTANEOUS at 21:51

## 2021-08-29 RX ADMIN — PROPOFOL 50 MCG/KG/MIN: 10 INJECTION, EMULSION INTRAVENOUS at 10:37

## 2021-08-29 RX ADMIN — METOPROLOL TARTRATE 25 MG: 25 TABLET, FILM COATED ORAL at 09:18

## 2021-08-29 RX ADMIN — MIDAZOLAM 10 MG/HR: 5 INJECTION, SOLUTION INTRAMUSCULAR; INTRAVENOUS at 03:29

## 2021-08-29 RX ADMIN — PANTOPRAZOLE SODIUM 40 MG: 40 INJECTION, POWDER, FOR SOLUTION INTRAVENOUS at 05:40

## 2021-08-29 RX ADMIN — ALBUTEROL SULFATE 2 PUFF: 90 AEROSOL, METERED RESPIRATORY (INHALATION) at 10:12

## 2021-08-29 RX ADMIN — CHLORHEXIDINE GLUCONATE 15 ML: 1.2 RINSE ORAL at 09:18

## 2021-08-29 RX ADMIN — FOLIC ACID 1 MG: 1 TABLET ORAL at 09:18

## 2021-08-29 RX ADMIN — PROPOFOL 50 MCG/KG/MIN: 10 INJECTION, EMULSION INTRAVENOUS at 12:30

## 2021-08-29 RX ADMIN — REMDESIVIR 100 MG: 100 INJECTION, POWDER, LYOPHILIZED, FOR SOLUTION INTRAVENOUS at 20:40

## 2021-08-29 RX ADMIN — ALBUTEROL SULFATE 2 PUFF: 90 AEROSOL, METERED RESPIRATORY (INHALATION) at 22:21

## 2021-08-29 RX ADMIN — MIDAZOLAM 10 MG/HR: 5 INJECTION, SOLUTION INTRAMUSCULAR; INTRAVENOUS at 23:00

## 2021-08-29 RX ADMIN — CEFEPIME HYDROCHLORIDE 2 G: 2 INJECTION, POWDER, FOR SOLUTION INTRAVENOUS at 05:40

## 2021-08-29 RX ADMIN — ALBUTEROL SULFATE 2 PUFF: 90 AEROSOL, METERED RESPIRATORY (INHALATION) at 07:48

## 2021-08-29 RX ADMIN — ENOXAPARIN SODIUM 130 MG: 150 INJECTION SUBCUTANEOUS at 09:23

## 2021-08-29 RX ADMIN — MIDAZOLAM 10 MG/HR: 5 INJECTION, SOLUTION INTRAMUSCULAR; INTRAVENOUS at 16:49

## 2021-08-29 RX ADMIN — PROPOFOL 50 MCG/KG/MIN: 10 INJECTION, EMULSION INTRAVENOUS at 23:50

## 2021-08-29 RX ADMIN — DEXAMETHASONE SODIUM PHOSPHATE 10 MG: 10 INJECTION INTRAMUSCULAR; INTRAVENOUS at 20:33

## 2021-08-29 RX ADMIN — CEFEPIME HYDROCHLORIDE 2 G: 2 INJECTION, POWDER, FOR SOLUTION INTRAVENOUS at 14:22

## 2021-08-29 RX ADMIN — ALBUTEROL SULFATE 2 PUFF: 90 AEROSOL, METERED RESPIRATORY (INHALATION) at 14:03

## 2021-08-30 LAB
ARTERIAL PATENCY WRIST A: ABNORMAL
ATMOSPHERIC PRESS: 747 MMHG
BACTERIA SPEC AEROBE CULT: ABNORMAL
BASE EXCESS BLDA CALC-SCNC: 9.9 MMOL/L (ref 0–2)
BDY SITE: ABNORMAL
D-DIMER, QUANTITATIVE (MAD,POW, STR): 3638 NG/ML (FEU) (ref 0–470)
GLUCOSE BLDC GLUCOMTR-MCNC: 109 MG/DL (ref 70–130)
GLUCOSE BLDC GLUCOMTR-MCNC: 127 MG/DL (ref 70–130)
GLUCOSE BLDC GLUCOMTR-MCNC: 272 MG/DL (ref 70–130)
GLUCOSE BLDC GLUCOMTR-MCNC: 286 MG/DL (ref 70–130)
GLUCOSE BLDC GLUCOMTR-MCNC: 311 MG/DL (ref 70–130)
HCO3 BLDA-SCNC: 35.4 MMOL/L (ref 20–26)
INHALED O2 CONCENTRATION: 50 %
INR PPP: 1.14 (ref 0.8–1.2)
LDH SERPL-CCNC: 689 U/L (ref 135–214)
Lab: ABNORMAL
MODALITY: ABNORMAL
PCO2 BLDA: 52.5 MM HG (ref 35–45)
PEEP RESPIRATORY: 8 CM[H2O]
PH BLDA: 7.44 PH UNITS (ref 7.35–7.45)
PO2 BLDA: 68.3 MM HG (ref 83–108)
PROCALCITONIN SERPL-MCNC: 0.43 NG/ML (ref 0–0.25)
PROTHROMBIN TIME: 14.5 SECONDS (ref 11.1–15.3)
SAO2 % BLDCOA: 93.1 % (ref 94–99)
VENTILATOR MODE: AC
VT ON VENT VENT: 588 ML

## 2021-08-30 PROCEDURE — 94799 UNLISTED PULMONARY SVC/PX: CPT

## 2021-08-30 PROCEDURE — 25010000002 DEXAMETHASONE SODIUM PHOSPHATE 10 MG/ML SOLUTION: Performed by: INTERNAL MEDICINE

## 2021-08-30 PROCEDURE — 63710000001 INSULIN ASPART PER 5 UNITS: Performed by: FAMILY MEDICINE

## 2021-08-30 PROCEDURE — 25010000002 PROPOFOL 10 MG/ML EMULSION: Performed by: FAMILY MEDICINE

## 2021-08-30 PROCEDURE — 94003 VENT MGMT INPAT SUBQ DAY: CPT

## 2021-08-30 PROCEDURE — 85379 FIBRIN DEGRADATION QUANT: CPT | Performed by: FAMILY MEDICINE

## 2021-08-30 PROCEDURE — 25010000002 FLUCONAZOLE PER 200 MG: Performed by: FAMILY MEDICINE

## 2021-08-30 PROCEDURE — 84145 PROCALCITONIN (PCT): CPT | Performed by: FAMILY MEDICINE

## 2021-08-30 PROCEDURE — 82962 GLUCOSE BLOOD TEST: CPT

## 2021-08-30 PROCEDURE — 25010000002 ENOXAPARIN PER 10 MG: Performed by: FAMILY MEDICINE

## 2021-08-30 PROCEDURE — 25010000002 CEFTRIAXONE PER 250 MG: Performed by: FAMILY MEDICINE

## 2021-08-30 PROCEDURE — 87040 BLOOD CULTURE FOR BACTERIA: CPT | Performed by: FAMILY MEDICINE

## 2021-08-30 PROCEDURE — 82803 BLOOD GASES ANY COMBINATION: CPT

## 2021-08-30 PROCEDURE — 36600 WITHDRAWAL OF ARTERIAL BLOOD: CPT

## 2021-08-30 PROCEDURE — 25010000002 FUROSEMIDE PER 20 MG: Performed by: FAMILY MEDICINE

## 2021-08-30 PROCEDURE — 25010000002 MIDAZOLAM 50 MG/10ML SOLUTION: Performed by: INTERNAL MEDICINE

## 2021-08-30 PROCEDURE — 63710000001 INSULIN DETEMIR PER 5 UNITS: Performed by: FAMILY MEDICINE

## 2021-08-30 PROCEDURE — 83615 LACTATE (LD) (LDH) ENZYME: CPT | Performed by: FAMILY MEDICINE

## 2021-08-30 PROCEDURE — 85610 PROTHROMBIN TIME: CPT | Performed by: FAMILY MEDICINE

## 2021-08-30 PROCEDURE — 25010000002 FENTANYL PER 0.1 MG: Performed by: FAMILY MEDICINE

## 2021-08-30 RX ORDER — LOSARTAN POTASSIUM 25 MG/1
25 TABLET ORAL
Status: DISCONTINUED | OUTPATIENT
Start: 2021-08-30 | End: 2021-09-01

## 2021-08-30 RX ORDER — FLUCONAZOLE 2 MG/ML
400 INJECTION, SOLUTION INTRAVENOUS DAILY
Status: COMPLETED | OUTPATIENT
Start: 2021-08-30 | End: 2021-09-05

## 2021-08-30 RX ORDER — FUROSEMIDE 10 MG/ML
20 INJECTION INTRAMUSCULAR; INTRAVENOUS ONCE
Status: COMPLETED | OUTPATIENT
Start: 2021-08-30 | End: 2021-08-30

## 2021-08-30 RX ORDER — LABETALOL HYDROCHLORIDE 5 MG/ML
10 INJECTION, SOLUTION INTRAVENOUS EVERY 4 HOURS PRN
Status: DISCONTINUED | OUTPATIENT
Start: 2021-08-30 | End: 2021-09-09 | Stop reason: HOSPADM

## 2021-08-30 RX ADMIN — MIDAZOLAM 8 MG/HR: 5 INJECTION, SOLUTION INTRAMUSCULAR; INTRAVENOUS at 20:25

## 2021-08-30 RX ADMIN — DEXAMETHASONE SODIUM PHOSPHATE 10 MG: 10 INJECTION INTRAMUSCULAR; INTRAVENOUS at 21:37

## 2021-08-30 RX ADMIN — METOPROLOL TARTRATE 25 MG: 25 TABLET, FILM COATED ORAL at 21:17

## 2021-08-30 RX ADMIN — ALBUTEROL SULFATE 2 PUFF: 90 AEROSOL, METERED RESPIRATORY (INHALATION) at 08:16

## 2021-08-30 RX ADMIN — MONTELUKAST 10 MG: 10 TABLET, FILM COATED ORAL at 21:17

## 2021-08-30 RX ADMIN — MIDAZOLAM 10 MG/HR: 5 INJECTION, SOLUTION INTRAMUSCULAR; INTRAVENOUS at 05:45

## 2021-08-30 RX ADMIN — SODIUM CHLORIDE, PRESERVATIVE FREE 10 ML: 5 INJECTION INTRAVENOUS at 22:45

## 2021-08-30 RX ADMIN — CEFTRIAXONE SODIUM 1 G: 1 INJECTION, POWDER, FOR SOLUTION INTRAMUSCULAR; INTRAVENOUS at 12:24

## 2021-08-30 RX ADMIN — ALBUTEROL SULFATE 2 PUFF: 90 AEROSOL, METERED RESPIRATORY (INHALATION) at 01:12

## 2021-08-30 RX ADMIN — BUDESONIDE AND FORMOTEROL FUMARATE DIHYDRATE 2 PUFF: 160; 4.5 AEROSOL RESPIRATORY (INHALATION) at 19:52

## 2021-08-30 RX ADMIN — MIDAZOLAM 9 MG/HR: 5 INJECTION, SOLUTION INTRAMUSCULAR; INTRAVENOUS at 09:22

## 2021-08-30 RX ADMIN — ALBUTEROL SULFATE 2 PUFF: 90 AEROSOL, METERED RESPIRATORY (INHALATION) at 16:56

## 2021-08-30 RX ADMIN — PROPOFOL 50 MCG/KG/MIN: 10 INJECTION, EMULSION INTRAVENOUS at 17:33

## 2021-08-30 RX ADMIN — PROPOFOL 50 MCG/KG/MIN: 10 INJECTION, EMULSION INTRAVENOUS at 21:06

## 2021-08-30 RX ADMIN — PROPOFOL 50 MCG/KG/MIN: 10 INJECTION, EMULSION INTRAVENOUS at 07:18

## 2021-08-30 RX ADMIN — INSULIN ASPART 6 UNITS: 100 INJECTION, SOLUTION INTRAVENOUS; SUBCUTANEOUS at 12:08

## 2021-08-30 RX ADMIN — PROPOFOL 50 MCG/KG/MIN: 10 INJECTION, EMULSION INTRAVENOUS at 04:22

## 2021-08-30 RX ADMIN — ACETAMINOPHEN ORAL SOLUTION 649.6 MG: 650 SOLUTION ORAL at 22:02

## 2021-08-30 RX ADMIN — CYANOCOBALAMIN TAB 1000 MCG 1000 MCG: 1000 TAB at 08:08

## 2021-08-30 RX ADMIN — INSULIN DETEMIR 15 UNITS: 100 INJECTION, SOLUTION SUBCUTANEOUS at 21:18

## 2021-08-30 RX ADMIN — DEXAMETHASONE SODIUM PHOSPHATE 10 MG: 10 INJECTION INTRAMUSCULAR; INTRAVENOUS at 08:08

## 2021-08-30 RX ADMIN — FENTANYL CITRATE: 50 INJECTION, SOLUTION INTRAMUSCULAR; INTRAVENOUS at 17:10

## 2021-08-30 RX ADMIN — FUROSEMIDE 20 MG: 20 INJECTION, SOLUTION INTRAMUSCULAR; INTRAVENOUS at 12:24

## 2021-08-30 RX ADMIN — OLANZAPINE 10 MG: 10 TABLET, FILM COATED ORAL at 21:17

## 2021-08-30 RX ADMIN — CHLORHEXIDINE GLUCONATE 15 ML: 1.2 RINSE ORAL at 21:17

## 2021-08-30 RX ADMIN — SODIUM CHLORIDE, PRESERVATIVE FREE 10 ML: 5 INJECTION INTRAVENOUS at 08:09

## 2021-08-30 RX ADMIN — BUDESONIDE AND FORMOTEROL FUMARATE DIHYDRATE 2 PUFF: 160; 4.5 AEROSOL RESPIRATORY (INHALATION) at 08:17

## 2021-08-30 RX ADMIN — METOPROLOL TARTRATE 25 MG: 25 TABLET, FILM COATED ORAL at 08:08

## 2021-08-30 RX ADMIN — CHLORHEXIDINE GLUCONATE 15 ML: 1.2 RINSE ORAL at 08:08

## 2021-08-30 RX ADMIN — LOSARTAN POTASSIUM 25 MG: 25 TABLET, FILM COATED ORAL at 12:24

## 2021-08-30 RX ADMIN — INSULIN ASPART 6 UNITS: 100 INJECTION, SOLUTION INTRAVENOUS; SUBCUTANEOUS at 08:00

## 2021-08-30 RX ADMIN — ALBUTEROL SULFATE 2 PUFF: 90 AEROSOL, METERED RESPIRATORY (INHALATION) at 19:52

## 2021-08-30 RX ADMIN — PROPOFOL 50 MCG/KG/MIN: 10 INJECTION, EMULSION INTRAVENOUS at 10:10

## 2021-08-30 RX ADMIN — PANTOPRAZOLE SODIUM 40 MG: 40 INJECTION, POWDER, FOR SOLUTION INTRAVENOUS at 05:39

## 2021-08-30 RX ADMIN — CETIRIZINE HYDROCHLORIDE 5 MG: 5 TABLET ORAL at 08:08

## 2021-08-30 RX ADMIN — ENOXAPARIN SODIUM 130 MG: 150 INJECTION SUBCUTANEOUS at 21:16

## 2021-08-30 RX ADMIN — INSULIN ASPART 7 UNITS: 100 INJECTION, SOLUTION INTRAVENOUS; SUBCUTANEOUS at 18:52

## 2021-08-30 RX ADMIN — PROPOFOL 50 MCG/KG/MIN: 10 INJECTION, EMULSION INTRAVENOUS at 02:18

## 2021-08-30 RX ADMIN — PROPOFOL 50 MCG/KG/MIN: 10 INJECTION, EMULSION INTRAVENOUS at 12:25

## 2021-08-30 RX ADMIN — ENOXAPARIN SODIUM 130 MG: 150 INJECTION SUBCUTANEOUS at 09:55

## 2021-08-30 RX ADMIN — FOLIC ACID 1 MG: 1 TABLET ORAL at 08:08

## 2021-08-30 RX ADMIN — FLUCONAZOLE IN SODIUM CHLORIDE 400 MG: 2 INJECTION, SOLUTION INTRAVENOUS at 12:24

## 2021-08-30 RX ADMIN — FENTANYL CITRATE: 50 INJECTION, SOLUTION INTRAMUSCULAR; INTRAVENOUS at 08:49

## 2021-08-31 LAB
ALBUMIN SERPL-MCNC: 2.7 G/DL (ref 3.5–5.2)
ALBUMIN/GLOB SERPL: 0.7 G/DL
ALP SERPL-CCNC: 127 U/L (ref 39–117)
ALT SERPL W P-5'-P-CCNC: 43 U/L (ref 1–33)
ANION GAP SERPL CALCULATED.3IONS-SCNC: 10 MMOL/L (ref 5–15)
ARTERIAL PATENCY WRIST A: ABNORMAL
AST SERPL-CCNC: 36 U/L (ref 1–32)
ATMOSPHERIC PRESS: 741 MMHG
BASE EXCESS BLDA CALC-SCNC: 8.5 MMOL/L (ref 0–2)
BASOPHILS # BLD AUTO: 0.01 10*3/MM3 (ref 0–0.2)
BASOPHILS NFR BLD AUTO: 0.2 % (ref 0–1.5)
BDY SITE: ABNORMAL
BILIRUB SERPL-MCNC: 0.7 MG/DL (ref 0–1.2)
BUN SERPL-MCNC: 34 MG/DL (ref 6–20)
BUN/CREAT SERPL: 56.7 (ref 7–25)
CALCIUM SPEC-SCNC: 8.9 MG/DL (ref 8.6–10.5)
CHLORIDE SERPL-SCNC: 110 MMOL/L (ref 98–107)
CO2 SERPL-SCNC: 29 MMOL/L (ref 22–29)
CREAT SERPL-MCNC: 0.6 MG/DL (ref 0.57–1)
CRP SERPL-MCNC: 2.45 MG/DL (ref 0–0.5)
DEPRECATED RDW RBC AUTO: 47.8 FL (ref 37–54)
EOSINOPHIL # BLD AUTO: 0.01 10*3/MM3 (ref 0–0.4)
EOSINOPHIL NFR BLD AUTO: 0.2 % (ref 0.3–6.2)
ERYTHROCYTE [DISTWIDTH] IN BLOOD BY AUTOMATED COUNT: 14.5 % (ref 12.3–15.4)
GFR SERPL CREATININE-BSD FRML MDRD: 107 ML/MIN/1.73
GLOBULIN UR ELPH-MCNC: 3.7 GM/DL
GLUCOSE BLDC GLUCOMTR-MCNC: 237 MG/DL (ref 70–130)
GLUCOSE BLDC GLUCOMTR-MCNC: 269 MG/DL (ref 70–130)
GLUCOSE BLDC GLUCOMTR-MCNC: 327 MG/DL (ref 70–130)
GLUCOSE BLDC GLUCOMTR-MCNC: 328 MG/DL (ref 70–130)
GLUCOSE SERPL-MCNC: 326 MG/DL (ref 65–99)
HCO3 BLDA-SCNC: 34.3 MMOL/L (ref 20–26)
HCT VFR BLD AUTO: 29.9 % (ref 34–46.6)
HGB BLD-MCNC: 9.5 G/DL (ref 12–15.9)
IMM GRANULOCYTES # BLD AUTO: 0.33 10*3/MM3 (ref 0–0.05)
IMM GRANULOCYTES NFR BLD AUTO: 5.9 % (ref 0–0.5)
INHALED O2 CONCENTRATION: 50 %
LYMPHOCYTES # BLD AUTO: 0.39 10*3/MM3 (ref 0.7–3.1)
LYMPHOCYTES NFR BLD AUTO: 7 % (ref 19.6–45.3)
Lab: ABNORMAL
MCH RBC QN AUTO: 29.6 PG (ref 26.6–33)
MCHC RBC AUTO-ENTMCNC: 31.8 G/DL (ref 31.5–35.7)
MCV RBC AUTO: 93.1 FL (ref 79–97)
MODALITY: ABNORMAL
MONOCYTES # BLD AUTO: 0.2 10*3/MM3 (ref 0.1–0.9)
MONOCYTES NFR BLD AUTO: 3.6 % (ref 5–12)
NEUTROPHILS NFR BLD AUTO: 4.63 10*3/MM3 (ref 1.7–7)
NEUTROPHILS NFR BLD AUTO: 83.1 % (ref 42.7–76)
NRBC BLD AUTO-RTO: 0.5 /100 WBC (ref 0–0.2)
PCO2 BLDA: 53.4 MM HG (ref 35–45)
PEEP RESPIRATORY: 8 CM[H2O]
PH BLDA: 7.42 PH UNITS (ref 7.35–7.45)
PLATELET # BLD AUTO: 161 10*3/MM3 (ref 140–450)
PMV BLD AUTO: 11.1 FL (ref 6–12)
PO2 BLDA: 70.7 MM HG (ref 83–108)
POTASSIUM SERPL-SCNC: 4.8 MMOL/L (ref 3.5–5.2)
PROT SERPL-MCNC: 6.4 G/DL (ref 6–8.5)
RBC # BLD AUTO: 3.21 10*6/MM3 (ref 3.77–5.28)
SAO2 % BLDCOA: 93 % (ref 94–99)
SET MECH RESP RATE: 24
SODIUM SERPL-SCNC: 149 MMOL/L (ref 136–145)
VENTILATOR MODE: AC
VT ON VENT VENT: 500 ML
WBC # BLD AUTO: 5.57 10*3/MM3 (ref 3.4–10.8)

## 2021-08-31 PROCEDURE — 25010000002 FENTANYL PER 0.1 MG: Performed by: FAMILY MEDICINE

## 2021-08-31 PROCEDURE — 94799 UNLISTED PULMONARY SVC/PX: CPT

## 2021-08-31 PROCEDURE — 25010000002 PROPOFOL 10 MG/ML EMULSION: Performed by: FAMILY MEDICINE

## 2021-08-31 PROCEDURE — 25010000002 FLUCONAZOLE PER 200 MG: Performed by: FAMILY MEDICINE

## 2021-08-31 PROCEDURE — 25010000002 ENOXAPARIN PER 10 MG: Performed by: FAMILY MEDICINE

## 2021-08-31 PROCEDURE — 82962 GLUCOSE BLOOD TEST: CPT

## 2021-08-31 PROCEDURE — 63710000001 INSULIN DETEMIR PER 5 UNITS: Performed by: FAMILY MEDICINE

## 2021-08-31 PROCEDURE — 80053 COMPREHEN METABOLIC PANEL: CPT | Performed by: FAMILY MEDICINE

## 2021-08-31 PROCEDURE — 25010000002 DEXAMETHASONE SODIUM PHOSPHATE 10 MG/ML SOLUTION: Performed by: INTERNAL MEDICINE

## 2021-08-31 PROCEDURE — 94760 N-INVAS EAR/PLS OXIMETRY 1: CPT

## 2021-08-31 PROCEDURE — 25010000002 FUROSEMIDE PER 20 MG: Performed by: FAMILY MEDICINE

## 2021-08-31 PROCEDURE — 63710000001 INSULIN ASPART PER 5 UNITS: Performed by: FAMILY MEDICINE

## 2021-08-31 PROCEDURE — 25010000002 CEFTRIAXONE PER 250 MG: Performed by: FAMILY MEDICINE

## 2021-08-31 PROCEDURE — 94003 VENT MGMT INPAT SUBQ DAY: CPT

## 2021-08-31 PROCEDURE — 82803 BLOOD GASES ANY COMBINATION: CPT

## 2021-08-31 PROCEDURE — 86140 C-REACTIVE PROTEIN: CPT | Performed by: FAMILY MEDICINE

## 2021-08-31 PROCEDURE — 25010000002 MIDAZOLAM 50 MG/10ML SOLUTION: Performed by: INTERNAL MEDICINE

## 2021-08-31 PROCEDURE — 85025 COMPLETE CBC W/AUTO DIFF WBC: CPT | Performed by: FAMILY MEDICINE

## 2021-08-31 RX ORDER — FUROSEMIDE 10 MG/ML
20 INJECTION INTRAMUSCULAR; INTRAVENOUS ONCE
Status: COMPLETED | OUTPATIENT
Start: 2021-08-31 | End: 2021-08-31

## 2021-08-31 RX ADMIN — PROPOFOL 50 MCG/KG/MIN: 10 INJECTION, EMULSION INTRAVENOUS at 05:45

## 2021-08-31 RX ADMIN — MONTELUKAST 10 MG: 10 TABLET, FILM COATED ORAL at 21:00

## 2021-08-31 RX ADMIN — ENOXAPARIN SODIUM 130 MG: 150 INJECTION SUBCUTANEOUS at 09:08

## 2021-08-31 RX ADMIN — INSULIN DETEMIR 20 UNITS: 100 INJECTION, SOLUTION SUBCUTANEOUS at 21:04

## 2021-08-31 RX ADMIN — PROPOFOL 50 MCG/KG/MIN: 10 INJECTION, EMULSION INTRAVENOUS at 15:02

## 2021-08-31 RX ADMIN — FENTANYL CITRATE: 50 INJECTION, SOLUTION INTRAMUSCULAR; INTRAVENOUS at 12:14

## 2021-08-31 RX ADMIN — INSULIN ASPART 8 UNITS: 100 INJECTION, SOLUTION INTRAVENOUS; SUBCUTANEOUS at 16:55

## 2021-08-31 RX ADMIN — PROPOFOL 50 MCG/KG/MIN: 10 INJECTION, EMULSION INTRAVENOUS at 19:09

## 2021-08-31 RX ADMIN — METOPROLOL TARTRATE 25 MG: 25 TABLET, FILM COATED ORAL at 21:02

## 2021-08-31 RX ADMIN — INSULIN ASPART 7 UNITS: 100 INJECTION, SOLUTION INTRAVENOUS; SUBCUTANEOUS at 05:52

## 2021-08-31 RX ADMIN — MIDAZOLAM 8 MG/HR: 5 INJECTION, SOLUTION INTRAMUSCULAR; INTRAVENOUS at 03:46

## 2021-08-31 RX ADMIN — ALBUTEROL SULFATE 2 PUFF: 90 AEROSOL, METERED RESPIRATORY (INHALATION) at 10:48

## 2021-08-31 RX ADMIN — OLANZAPINE 10 MG: 10 TABLET, FILM COATED ORAL at 21:01

## 2021-08-31 RX ADMIN — FLUCONAZOLE IN SODIUM CHLORIDE 400 MG: 2 INJECTION, SOLUTION INTRAVENOUS at 09:19

## 2021-08-31 RX ADMIN — ACETAMINOPHEN ORAL SOLUTION 649.6 MG: 650 SOLUTION ORAL at 04:46

## 2021-08-31 RX ADMIN — ALBUTEROL SULFATE 2 PUFF: 90 AEROSOL, METERED RESPIRATORY (INHALATION) at 22:39

## 2021-08-31 RX ADMIN — PROPOFOL 50 MCG/KG/MIN: 10 INJECTION, EMULSION INTRAVENOUS at 00:56

## 2021-08-31 RX ADMIN — ALBUTEROL SULFATE 2 PUFF: 90 AEROSOL, METERED RESPIRATORY (INHALATION) at 04:24

## 2021-08-31 RX ADMIN — ACETAMINOPHEN ORAL SOLUTION 649.6 MG: 650 SOLUTION ORAL at 13:58

## 2021-08-31 RX ADMIN — CYANOCOBALAMIN TAB 1000 MCG 1000 MCG: 1000 TAB at 09:02

## 2021-08-31 RX ADMIN — BUDESONIDE AND FORMOTEROL FUMARATE DIHYDRATE 2 PUFF: 160; 4.5 AEROSOL RESPIRATORY (INHALATION) at 19:34

## 2021-08-31 RX ADMIN — PROPOFOL 50 MCG/KG/MIN: 10 INJECTION, EMULSION INTRAVENOUS at 09:09

## 2021-08-31 RX ADMIN — PANTOPRAZOLE SODIUM 40 MG: 40 INJECTION, POWDER, FOR SOLUTION INTRAVENOUS at 05:45

## 2021-08-31 RX ADMIN — INSULIN DETEMIR 15 UNITS: 100 INJECTION, SOLUTION SUBCUTANEOUS at 09:20

## 2021-08-31 RX ADMIN — INSULIN ASPART 10 UNITS: 100 INJECTION, SOLUTION INTRAVENOUS; SUBCUTANEOUS at 12:01

## 2021-08-31 RX ADMIN — CHLORHEXIDINE GLUCONATE 15 ML: 1.2 RINSE ORAL at 21:00

## 2021-08-31 RX ADMIN — BUDESONIDE AND FORMOTEROL FUMARATE DIHYDRATE 2 PUFF: 160; 4.5 AEROSOL RESPIRATORY (INHALATION) at 07:33

## 2021-08-31 RX ADMIN — ALBUTEROL SULFATE 2 PUFF: 90 AEROSOL, METERED RESPIRATORY (INHALATION) at 16:36

## 2021-08-31 RX ADMIN — ALBUTEROL SULFATE 2 PUFF: 90 AEROSOL, METERED RESPIRATORY (INHALATION) at 07:33

## 2021-08-31 RX ADMIN — DEXAMETHASONE SODIUM PHOSPHATE 10 MG: 10 INJECTION INTRAMUSCULAR; INTRAVENOUS at 21:02

## 2021-08-31 RX ADMIN — ENOXAPARIN SODIUM 130 MG: 150 INJECTION SUBCUTANEOUS at 21:03

## 2021-08-31 RX ADMIN — PROPOFOL 50 MCG/KG/MIN: 10 INJECTION, EMULSION INTRAVENOUS at 02:34

## 2021-08-31 RX ADMIN — ALBUTEROL SULFATE 2 PUFF: 90 AEROSOL, METERED RESPIRATORY (INHALATION) at 01:19

## 2021-08-31 RX ADMIN — LOSARTAN POTASSIUM 25 MG: 25 TABLET, FILM COATED ORAL at 09:02

## 2021-08-31 RX ADMIN — ALBUTEROL SULFATE 2 PUFF: 90 AEROSOL, METERED RESPIRATORY (INHALATION) at 19:34

## 2021-08-31 RX ADMIN — METOPROLOL TARTRATE 25 MG: 25 TABLET, FILM COATED ORAL at 09:02

## 2021-08-31 RX ADMIN — SODIUM CHLORIDE, PRESERVATIVE FREE 10 ML: 5 INJECTION INTRAVENOUS at 09:21

## 2021-08-31 RX ADMIN — CEFTRIAXONE SODIUM 1 G: 1 INJECTION, POWDER, FOR SOLUTION INTRAMUSCULAR; INTRAVENOUS at 12:26

## 2021-08-31 RX ADMIN — PROPOFOL 30 MCG/KG/MIN: 10 INJECTION, EMULSION INTRAVENOUS at 21:30

## 2021-08-31 RX ADMIN — FUROSEMIDE 20 MG: 20 INJECTION, SOLUTION INTRAMUSCULAR; INTRAVENOUS at 12:02

## 2021-08-31 RX ADMIN — SODIUM CHLORIDE, PRESERVATIVE FREE 10 ML: 5 INJECTION INTRAVENOUS at 21:05

## 2021-08-31 RX ADMIN — MIDAZOLAM 8 MG/HR: 5 INJECTION, SOLUTION INTRAMUSCULAR; INTRAVENOUS at 20:09

## 2021-08-31 RX ADMIN — DEXAMETHASONE SODIUM PHOSPHATE 10 MG: 10 INJECTION INTRAMUSCULAR; INTRAVENOUS at 09:02

## 2021-08-31 RX ADMIN — CETIRIZINE HYDROCHLORIDE 5 MG: 5 TABLET ORAL at 09:02

## 2021-08-31 RX ADMIN — ACETAMINOPHEN ORAL SOLUTION 649.6 MG: 650 SOLUTION ORAL at 21:10

## 2021-08-31 RX ADMIN — PROPOFOL 50 MCG/KG/MIN: 10 INJECTION, EMULSION INTRAVENOUS at 12:03

## 2021-08-31 RX ADMIN — FOLIC ACID 1 MG: 1 TABLET ORAL at 09:02

## 2021-08-31 RX ADMIN — FENTANYL CITRATE: 50 INJECTION, SOLUTION INTRAMUSCULAR; INTRAVENOUS at 00:58

## 2021-08-31 RX ADMIN — MIDAZOLAM 8 MG/HR: 5 INJECTION, SOLUTION INTRAMUSCULAR; INTRAVENOUS at 12:27

## 2021-08-31 RX ADMIN — CHLORHEXIDINE GLUCONATE 15 ML: 1.2 RINSE ORAL at 09:01

## 2021-09-01 ENCOUNTER — APPOINTMENT (OUTPATIENT)
Dept: ONCOLOGY | Facility: HOSPITAL | Age: 46
End: 2021-09-01

## 2021-09-01 LAB
ALBUMIN SERPL-MCNC: 2.7 G/DL (ref 3.5–5.2)
ALBUMIN/GLOB SERPL: 0.7 G/DL
ALP SERPL-CCNC: 119 U/L (ref 39–117)
ALT SERPL W P-5'-P-CCNC: 45 U/L (ref 1–33)
ANION GAP SERPL CALCULATED.3IONS-SCNC: 9 MMOL/L (ref 5–15)
ARTERIAL PATENCY WRIST A: ABNORMAL
AST SERPL-CCNC: 36 U/L (ref 1–32)
ATMOSPHERIC PRESS: 741 MMHG
BASE EXCESS BLDA CALC-SCNC: 7.7 MMOL/L (ref 0–2)
BASOPHILS # BLD AUTO: 0.01 10*3/MM3 (ref 0–0.2)
BASOPHILS NFR BLD AUTO: 0.2 % (ref 0–1.5)
BDY SITE: ABNORMAL
BILIRUB SERPL-MCNC: 0.6 MG/DL (ref 0–1.2)
BUN SERPL-MCNC: 35 MG/DL (ref 6–20)
BUN/CREAT SERPL: 66 (ref 7–25)
CALCIUM SPEC-SCNC: 9.2 MG/DL (ref 8.6–10.5)
CHLORIDE SERPL-SCNC: 110 MMOL/L (ref 98–107)
CO2 SERPL-SCNC: 29 MMOL/L (ref 22–29)
CREAT SERPL-MCNC: 0.53 MG/DL (ref 0.57–1)
CRP SERPL-MCNC: 1.43 MG/DL (ref 0–0.5)
D-DIMER, QUANTITATIVE (MAD,POW, STR): 1923 NG/ML (FEU) (ref 0–470)
DEPRECATED RDW RBC AUTO: 49 FL (ref 37–54)
EOSINOPHIL # BLD AUTO: 0.02 10*3/MM3 (ref 0–0.4)
EOSINOPHIL NFR BLD AUTO: 0.3 % (ref 0.3–6.2)
ERYTHROCYTE [DISTWIDTH] IN BLOOD BY AUTOMATED COUNT: 14.6 % (ref 12.3–15.4)
GFR SERPL CREATININE-BSD FRML MDRD: 123 ML/MIN/1.73
GLOBULIN UR ELPH-MCNC: 3.7 GM/DL
GLUCOSE BLDC GLUCOMTR-MCNC: 224 MG/DL (ref 70–130)
GLUCOSE BLDC GLUCOMTR-MCNC: 292 MG/DL (ref 70–130)
GLUCOSE BLDC GLUCOMTR-MCNC: 299 MG/DL (ref 70–130)
GLUCOSE SERPL-MCNC: 310 MG/DL (ref 65–99)
HCO3 BLDA-SCNC: 33.8 MMOL/L (ref 20–26)
HCT VFR BLD AUTO: 30.7 % (ref 34–46.6)
HGB BLD-MCNC: 9.7 G/DL (ref 12–15.9)
IMM GRANULOCYTES # BLD AUTO: 0.25 10*3/MM3 (ref 0–0.05)
IMM GRANULOCYTES NFR BLD AUTO: 4 % (ref 0–0.5)
INHALED O2 CONCENTRATION: 50 %
INR PPP: 1.23 (ref 0.8–1.2)
LDH SERPL-CCNC: 528 U/L (ref 135–214)
LYMPHOCYTES # BLD AUTO: 0.4 10*3/MM3 (ref 0.7–3.1)
LYMPHOCYTES NFR BLD AUTO: 6.5 % (ref 19.6–45.3)
Lab: ABNORMAL
MCH RBC QN AUTO: 30 PG (ref 26.6–33)
MCHC RBC AUTO-ENTMCNC: 31.6 G/DL (ref 31.5–35.7)
MCV RBC AUTO: 95 FL (ref 79–97)
MODALITY: ABNORMAL
MONOCYTES # BLD AUTO: 0.32 10*3/MM3 (ref 0.1–0.9)
MONOCYTES NFR BLD AUTO: 5.2 % (ref 5–12)
NEUTROPHILS NFR BLD AUTO: 5.18 10*3/MM3 (ref 1.7–7)
NEUTROPHILS NFR BLD AUTO: 83.8 % (ref 42.7–76)
NRBC BLD AUTO-RTO: 0.5 /100 WBC (ref 0–0.2)
PCO2 BLDA: 53.7 MM HG (ref 35–45)
PEEP RESPIRATORY: 8 CM[H2O]
PH BLDA: 7.41 PH UNITS (ref 7.35–7.45)
PLATELET # BLD AUTO: 152 10*3/MM3 (ref 140–450)
PMV BLD AUTO: 10.9 FL (ref 6–12)
PO2 BLDA: 82.6 MM HG (ref 83–108)
POTASSIUM SERPL-SCNC: 4.7 MMOL/L (ref 3.5–5.2)
PROCALCITONIN SERPL-MCNC: 0.32 NG/ML (ref 0–0.25)
PROT SERPL-MCNC: 6.4 G/DL (ref 6–8.5)
PROTHROMBIN TIME: 15.3 SECONDS (ref 11.1–15.3)
RBC # BLD AUTO: 3.23 10*6/MM3 (ref 3.77–5.28)
SAO2 % BLDCOA: 95.8 % (ref 94–99)
SET MECH RESP RATE: 24
SODIUM SERPL-SCNC: 148 MMOL/L (ref 136–145)
VENTILATOR MODE: AC
VT ON VENT VENT: 500 ML
WBC # BLD AUTO: 6.18 10*3/MM3 (ref 3.4–10.8)

## 2021-09-01 PROCEDURE — 63710000001 INSULIN ASPART PER 5 UNITS: Performed by: FAMILY MEDICINE

## 2021-09-01 PROCEDURE — 25010000002 DEXAMETHASONE SODIUM PHOSPHATE 10 MG/ML SOLUTION: Performed by: INTERNAL MEDICINE

## 2021-09-01 PROCEDURE — 85610 PROTHROMBIN TIME: CPT | Performed by: FAMILY MEDICINE

## 2021-09-01 PROCEDURE — 82962 GLUCOSE BLOOD TEST: CPT

## 2021-09-01 PROCEDURE — 25010000002 FUROSEMIDE PER 20 MG: Performed by: FAMILY MEDICINE

## 2021-09-01 PROCEDURE — 63710000001 INSULIN DETEMIR PER 5 UNITS: Performed by: FAMILY MEDICINE

## 2021-09-01 PROCEDURE — 85025 COMPLETE CBC W/AUTO DIFF WBC: CPT | Performed by: FAMILY MEDICINE

## 2021-09-01 PROCEDURE — 25010000002 PROPOFOL 10 MG/ML EMULSION: Performed by: FAMILY MEDICINE

## 2021-09-01 PROCEDURE — 85379 FIBRIN DEGRADATION QUANT: CPT | Performed by: FAMILY MEDICINE

## 2021-09-01 PROCEDURE — 36600 WITHDRAWAL OF ARTERIAL BLOOD: CPT

## 2021-09-01 PROCEDURE — 25010000002 CEFTRIAXONE PER 250 MG: Performed by: FAMILY MEDICINE

## 2021-09-01 PROCEDURE — 94799 UNLISTED PULMONARY SVC/PX: CPT

## 2021-09-01 PROCEDURE — 94003 VENT MGMT INPAT SUBQ DAY: CPT

## 2021-09-01 PROCEDURE — 82803 BLOOD GASES ANY COMBINATION: CPT

## 2021-09-01 PROCEDURE — 25010000002 ENOXAPARIN PER 10 MG: Performed by: FAMILY MEDICINE

## 2021-09-01 PROCEDURE — 80053 COMPREHEN METABOLIC PANEL: CPT | Performed by: FAMILY MEDICINE

## 2021-09-01 PROCEDURE — 83615 LACTATE (LD) (LDH) ENZYME: CPT | Performed by: FAMILY MEDICINE

## 2021-09-01 PROCEDURE — 25010000002 FLUCONAZOLE PER 200 MG: Performed by: FAMILY MEDICINE

## 2021-09-01 PROCEDURE — 86140 C-REACTIVE PROTEIN: CPT | Performed by: FAMILY MEDICINE

## 2021-09-01 PROCEDURE — 25010000002 FENTANYL PER 0.1 MG: Performed by: FAMILY MEDICINE

## 2021-09-01 PROCEDURE — 84145 PROCALCITONIN (PCT): CPT | Performed by: FAMILY MEDICINE

## 2021-09-01 RX ORDER — LOSARTAN POTASSIUM 50 MG/1
50 TABLET ORAL
Status: DISCONTINUED | OUTPATIENT
Start: 2021-09-01 | End: 2021-09-05

## 2021-09-01 RX ORDER — FUROSEMIDE 10 MG/ML
20 INJECTION INTRAMUSCULAR; INTRAVENOUS DAILY
Status: DISCONTINUED | OUTPATIENT
Start: 2021-09-01 | End: 2021-09-08

## 2021-09-01 RX ADMIN — METOPROLOL TARTRATE 25 MG: 25 TABLET, FILM COATED ORAL at 20:43

## 2021-09-01 RX ADMIN — INSULIN DETEMIR 25 UNITS: 100 INJECTION, SOLUTION SUBCUTANEOUS at 20:45

## 2021-09-01 RX ADMIN — PANTOPRAZOLE SODIUM 40 MG: 40 INJECTION, POWDER, FOR SOLUTION INTRAVENOUS at 05:17

## 2021-09-01 RX ADMIN — OLANZAPINE 10 MG: 10 TABLET, FILM COATED ORAL at 20:43

## 2021-09-01 RX ADMIN — FLUCONAZOLE IN SODIUM CHLORIDE 400 MG: 2 INJECTION, SOLUTION INTRAVENOUS at 09:36

## 2021-09-01 RX ADMIN — INSULIN ASPART 8 UNITS: 100 INJECTION, SOLUTION INTRAVENOUS; SUBCUTANEOUS at 11:29

## 2021-09-01 RX ADMIN — ALBUTEROL SULFATE 2 PUFF: 90 AEROSOL, METERED RESPIRATORY (INHALATION) at 04:23

## 2021-09-01 RX ADMIN — CHLORHEXIDINE GLUCONATE 15 ML: 1.2 RINSE ORAL at 09:36

## 2021-09-01 RX ADMIN — METOPROLOL TARTRATE 25 MG: 25 TABLET, FILM COATED ORAL at 09:35

## 2021-09-01 RX ADMIN — FENTANYL CITRATE: 50 INJECTION, SOLUTION INTRAMUSCULAR; INTRAVENOUS at 12:19

## 2021-09-01 RX ADMIN — CHLORHEXIDINE GLUCONATE 15 ML: 1.2 RINSE ORAL at 20:43

## 2021-09-01 RX ADMIN — CETIRIZINE HYDROCHLORIDE 5 MG: 5 TABLET ORAL at 09:33

## 2021-09-01 RX ADMIN — FENTANYL CITRATE: 50 INJECTION, SOLUTION INTRAMUSCULAR; INTRAVENOUS at 00:02

## 2021-09-01 RX ADMIN — FUROSEMIDE 20 MG: 10 INJECTION, SOLUTION INTRAMUSCULAR; INTRAVENOUS at 10:10

## 2021-09-01 RX ADMIN — LOSARTAN POTASSIUM 50 MG: 50 TABLET, FILM COATED ORAL at 09:35

## 2021-09-01 RX ADMIN — SODIUM CHLORIDE, PRESERVATIVE FREE 10 ML: 5 INJECTION INTRAVENOUS at 20:44

## 2021-09-01 RX ADMIN — ALBUTEROL SULFATE 2 PUFF: 90 AEROSOL, METERED RESPIRATORY (INHALATION) at 07:50

## 2021-09-01 RX ADMIN — DEXAMETHASONE SODIUM PHOSPHATE 10 MG: 10 INJECTION INTRAMUSCULAR; INTRAVENOUS at 09:33

## 2021-09-01 RX ADMIN — INSULIN ASPART 8 UNITS: 100 INJECTION, SOLUTION INTRAVENOUS; SUBCUTANEOUS at 16:52

## 2021-09-01 RX ADMIN — ALBUTEROL SULFATE 2 PUFF: 90 AEROSOL, METERED RESPIRATORY (INHALATION) at 19:46

## 2021-09-01 RX ADMIN — DEXAMETHASONE SODIUM PHOSPHATE 10 MG: 10 INJECTION INTRAMUSCULAR; INTRAVENOUS at 20:44

## 2021-09-01 RX ADMIN — SODIUM CHLORIDE, PRESERVATIVE FREE 10 ML: 5 INJECTION INTRAVENOUS at 10:11

## 2021-09-01 RX ADMIN — BUDESONIDE AND FORMOTEROL FUMARATE DIHYDRATE 2 PUFF: 160; 4.5 AEROSOL RESPIRATORY (INHALATION) at 07:50

## 2021-09-01 RX ADMIN — CYANOCOBALAMIN TAB 1000 MCG 1000 MCG: 1000 TAB at 09:33

## 2021-09-01 RX ADMIN — CEFTRIAXONE SODIUM 1 G: 1 INJECTION, POWDER, FOR SOLUTION INTRAMUSCULAR; INTRAVENOUS at 12:28

## 2021-09-01 RX ADMIN — LABETALOL HYDROCHLORIDE 10 MG: 5 INJECTION, SOLUTION INTRAVENOUS at 23:13

## 2021-09-01 RX ADMIN — ALBUTEROL SULFATE 2 PUFF: 90 AEROSOL, METERED RESPIRATORY (INHALATION) at 22:39

## 2021-09-01 RX ADMIN — FOLIC ACID 1 MG: 1 TABLET ORAL at 09:33

## 2021-09-01 RX ADMIN — PROPOFOL 20 MCG/KG/MIN: 10 INJECTION, EMULSION INTRAVENOUS at 02:21

## 2021-09-01 RX ADMIN — ALBUTEROL SULFATE 2 PUFF: 90 AEROSOL, METERED RESPIRATORY (INHALATION) at 16:37

## 2021-09-01 RX ADMIN — INSULIN DETEMIR 25 UNITS: 100 INJECTION, SOLUTION SUBCUTANEOUS at 10:10

## 2021-09-01 RX ADMIN — SODIUM CHLORIDE 30 ML/HR: 900 INJECTION, SOLUTION INTRAVENOUS at 11:29

## 2021-09-01 RX ADMIN — BUDESONIDE AND FORMOTEROL FUMARATE DIHYDRATE 2 PUFF: 160; 4.5 AEROSOL RESPIRATORY (INHALATION) at 19:46

## 2021-09-01 RX ADMIN — INSULIN ASPART 8 UNITS: 100 INJECTION, SOLUTION INTRAVENOUS; SUBCUTANEOUS at 06:57

## 2021-09-01 RX ADMIN — ENOXAPARIN SODIUM 130 MG: 150 INJECTION SUBCUTANEOUS at 09:35

## 2021-09-01 RX ADMIN — PROPOFOL 10 MCG/KG/MIN: 10 INJECTION, EMULSION INTRAVENOUS at 09:20

## 2021-09-01 RX ADMIN — MONTELUKAST 10 MG: 10 TABLET, FILM COATED ORAL at 20:43

## 2021-09-01 RX ADMIN — PROPOFOL 10 MCG/KG/MIN: 10 INJECTION, EMULSION INTRAVENOUS at 17:31

## 2021-09-01 RX ADMIN — ENOXAPARIN SODIUM 130 MG: 150 INJECTION SUBCUTANEOUS at 20:43

## 2021-09-01 NOTE — PLAN OF CARE
Problem: Device-Related Complication Risk (Mechanical Ventilation, Invasive)  Goal: Optimal Device Function  Outcome: Ongoing, Progressing     Problem: Inability to Wean (Mechanical Ventilation, Invasive)  Goal: Mechanical Ventilation Liberation  Outcome: Ongoing, Progressing     Problem: Skin and Tissue Injury (Mechanical Ventilation, Invasive)  Goal: Absence of Device-Related Skin and Tissue Injury  Outcome: Ongoing, Progressing     Problem: Ventilator-Induced Lung Injury (Mechanical Ventilation, Invasive)  Goal: Absence of Ventilator-Induced Lung Injury  Outcome: Ongoing, Progressing   Goal Outcome Evaluation:      Pt tolerating current vent settings well. This RT was able to wean oxygen and PEEP today. Will continue to wean and monitor.

## 2021-09-01 NOTE — PAYOR COMM NOTE
"    Lissett Chanel, JARAD [facility]  [phone]     Phone  [phone]      Fax  Cont stay review      Corrina Jensen (46 y.o. Female)     Date of Birth Social Security Number Address Home Phone MRN    1975  [address] [phone] [id number]    Sabianism Marital Status          Evangelical        Admission Date Admission Type Admitting Provider Attending Provider Department, Room/Bed    8/17/21 Emergency Bertha Kraus MD Echendu, Anthony W, MD [facility] CRITICAL CARE STEPDOWN, 08/A    Discharge Date Discharge Disposition Discharge Destination                       Attending Provider: Elder Daly MD    Allergies: Latex, Strawberry, Wound Dressing Adhesive, Bactroban [Mupirocin Calcium], Neomycin-bacitracin-polymyxin [Bacitracin-neomycin-polymyxin], Other    Isolation: Enh Drop/Con   Infection: COVID (confirmed) (08/18/21)   Code Status: CPR    Ht: 167.6 cm (66\")   Wt: 117 kg (258 lb 6.1 oz)    Admission Cmt: None   Principal Problem: Sepsis, unspecified organism (CMS/[facility]) [A41.9]                 Active Insurance as of 8/17/2021     Primary Coverage     Payor Plan Insurance Group Employer/Plan Group    [place] [id number]     Payor Plan Address Payor Plan Phone Number Payor Plan Fax Number Effective Dates    [employer]ICAL [address] [phone]  4/1/2020 - None Entered    [facility] [id number]       Subscriber Name Subscriber Birth Date Member ID       CORRINA JENSEN 1975 [id number]                 Emergency Contacts      (Rel.) Home Phone Work Phone Mobile Phone    [place] Jensen [phone]    Dayanna Olea (Relative) -- -- [phone]            Vital Signs (last day)     Date/Time   Temp   Temp src   Pulse   Resp   BP   Patient Position   SpO2    09/01/21 0600   --   --   75   --   174/75   --   94    " 09/01/21 0500   --   --   71   --   166/70   --   93 09/01/21 0424   --   --   72   --   --   --   93 09/01/21 0400   --   --   70   --   163/71   --   93    09/01/21 0300   --   --   67   --   154/67   --   92 09/01/21 0200   --   --   69   --   150/65   --   92 09/01/21 0119   --   --   63   --   --   --   94    09/01/21 0100   --   --   61   --   146/63   --   94    09/01/21 0000   98.5 (36.9)   Axillary   60   24   149/65   Lying   93    08/31/21 2300   --   --   63   --   141/63   --   93 08/31/21 2239   --   --   57   --   --   --   93 08/31/21 2200   --   --   59   --   123/58   --   92 08/31/21 2100   --   --   76   --   137/64   --   93 08/31/21 2000   99.9 (37.7)   Axillary   79   24   134/63   Lying   93    08/31/21 1934   --   --   70   --   --   --   93 08/31/21 1700   --   --   74   --   142/63   --   93 08/31/21 1637   --   --   67   24   --   --   95 08/31/21 1600   100 (37.8)   Axillary   71   24   147/65   Lying   95    08/31/21 1500   --   --   74   --   148/66   --   95 08/31/21 1400   --   --   68   --   134/63   --   93 08/31/21 1346   (!) 101.2 (38.4)   Axillary   --   --   --   --   --    08/31/21 1343   --   --   69   24   --   --   93 08/31/21 1300   --   --   69   --   142/62   --   93 08/31/21 1200   (!) 100.9 (38.3)   Axillary   65   24   136/63   Lying   93    08/31/21 1048   --   --   63   24   --   --   92 08/31/21 0900   --   --   76   --   134/61   --   92    08/31/21 0800   100.3 (37.9)   Axillary   80   26   139/64   Lying   91    08/31/21 0733   --   --   83   26   --   --   91 08/31/21 0600   --   --   80   --   146/66   --   91 08/31/21 0500   --   --   81   --   140/64   --   90 08/31/21 0424   --   --   74   --   --   --   90 08/31/21 0400   (!) 101 (38.3)   Axillary   74   22   158/67   Lying   90 08/31/21 0300   --   --   73   --   160/69   --   91 08/31/21 0200   --   --   72   --   159/67   --   91     08/31/21 0119   --   --   72   --   --   --   92    08/31/21 0100   --   --   75   --   171/73   --   92    08/31/21 0000   99.8 (37.7)   Oral   70   22   165/72   Lying   92              Oxygen Therapy (last day)     Date/Time   SpO2   Device (Oxygen Therapy)   Flow (L/min)   Oxygen Concentration (%)   ETCO2 (mmHg)    09/01/21 0600   94   --   --   --   --    09/01/21 0500   93   --   --   --   --    09/01/21 0424   93   ventilator   --   50   --    09/01/21 0400   93   --   --   --   --    09/01/21 0300   92   --   --   --   --    09/01/21 0200   92   --   --   --   --    09/01/21 0119   94   ventilator   --   50   --    09/01/21 0100   94   --   --   --   --    09/01/21 0000   93   ventilator   --   50   --    08/31/21 2300   93   --   --   --   --    08/31/21 2239   93   ventilator   --   50   --    08/31/21 2200   92   --   --   --   --    08/31/21 2100   93   --   --   --   --    08/31/21 2000   93   ventilator   --   50   --    08/31/21 1934   93   ventilator   --   50   --    08/31/21 1700   93   --   --   --   --    08/31/21 1637   95   ventilator   --   50   --    08/31/21 1600   95   ventilator   --   --   --    08/31/21 1500   95   --   --   --   --    08/31/21 1400   93   --   --   --   --    08/31/21 1343   93   ventilator   --   55   --    08/31/21 1300   93   --   --   --   --    08/31/21 1200   93   --   --   --   --    08/31/21 1048   92   ventilator   --   55   --    08/31/21 0900   92   --   --   --   --    08/31/21 0800   91   --   --   --   --    08/31/21 0733   91   ventilator   --   55   --    08/31/21 0600   91   --   --   --   --    08/31/21 0500   90   --   --   --   --    08/31/21 0424   90   ventilator   --   55   --    08/31/21 0400   90   ventilator   --   55   --    08/31/21 0300   91   --   --   --   --    08/31/21 0200   91   --   --   --   --    08/31/21 0119   92   ventilator   --   55   --    08/31/21 0100   92   --   --   --   --    08/31/21 0000   92   ventilator   --   55   --                 Current Facility-Administered Medications   Medication Dose Route Frequency Provider Last Rate Last Admin   • acetaminophen (TYLENOL) 160 MG/5ML solution 650 mg  650 mg Oral Q6H PRN Nabil Wick MD   649.6 mg at 08/31/21 2110   • acetaminophen (TYLENOL) tablet 650 mg  650 mg Oral Q4H PRN Bertha Kraus MD   650 mg at 08/27/21 0206   • albuterol sulfate HFA (PROVENTIL HFA;VENTOLIN HFA;PROAIR HFA) inhaler 2 puff  2 puff Inhalation Q4H Bertha Kraus MD   2 puff at 09/01/21 0423   • benzonatate (TESSALON) capsule 200 mg  200 mg Oral Q4H PRN Elder Daly MD   200 mg at 08/22/21 0125   • budesonide-formoterol (SYMBICORT) 160-4.5 MCG/ACT inhaler 2 puff  2 puff Inhalation BID - RT Elder Daly MD   2 puff at 08/31/21 1934   • cefTRIAXone (ROCEPHIN) 1 g/100 mL 0.9% NS (MBP)  1 g Intravenous Q24H Logan Stein MD   1 g at 08/31/21 1226   • cetirizine (zyrTEC) tablet 5 mg  5 mg Oral Daily Bertha Kraus MD   5 mg at 08/31/21 0902   • chlorhexidine (PERIDEX) 0.12 % solution 15 mL  15 mL Mouth/Throat Q12H Nabil Wick MD   15 mL at 08/31/21 2100   • dexamethasone sodium phosphate injection 10 mg  10 mg Intravenous BID Elder Daly MD   10 mg at 08/31/21 2102   • dextrose (D50W) 25 g/ 50mL Intravenous Solution 25 g  25 g Intravenous Q15 Min PRN Nabil Wick MD       • dextrose (D50W) 25 g/ 50mL Intravenous Solution 25-50 mL  25-50 mL Intravenous Q30 Min PRN Elder Daly MD       • dextrose (GLUTOSE) oral gel 15 g  15 g Oral Q15 Min PRN Nabil Wick MD       • docusate sodium (COLACE) capsule 100 mg  100 mg Oral BID PRN Bertha Kraus MD       • enoxaparin (LOVENOX) injection 130 mg  1 mg/kg Subcutaneous Q12H Nabil Wick MD   130 mg at 08/31/21 2103   • fentaNYL (SUBLIMAZE) PCA 20 mcg/mL 30 mL syringe   Intravenous Continuous Logan Stein MD   New Bag at 09/01/21 0002   • fluconazole (DIFLUCAN) IVPB 400 mg  400 mg Intravenous  Daily Logan Stein  mL/hr at 08/31/21 0919 400 mg at 08/31/21 0919   • folic acid (FOLVITE) tablet 1 mg  1 mg Oral Daily Bertha Kraus MD   1 mg at 08/31/21 0902   • glucagon (human recombinant) (GLUCAGEN DIAGNOSTIC) injection 1 mg  1 mg Subcutaneous Q15 Min PRN Nabil Wick MD       • guaiFENesin (ROBITUSSIN) 100 MG/5ML oral solution 200 mg  200 mg Oral Q4H PRN Pradeep Villarreal, DO   200 mg at 08/27/21 0130   • guaiFENesin-codeine (GUAIFENESIN AC) 100-10 MG/5ML liquid 10 mL  10 mL Oral Q4H PRN Pradeep Villarreal, DO   10 mL at 08/22/21 0357   • insulin aspart (novoLOG) injection 0-14 Units  0-14 Units Subcutaneous TID AC Logan Stein MD   8 Units at 09/01/21 0657   • insulin detemir (LEVEMIR) injection 20 Units  20 Units Subcutaneous Q12H Logan Stein MD   20 Units at 08/31/21 2104   • labetalol (NORMODYNE,TRANDATE) injection 10 mg  10 mg Intravenous Q4H PRN Lgoan Stein MD       • losartan (COZAAR) tablet 25 mg  25 mg Oral Q24H Logan Stein MD   25 mg at 08/31/21 0902   • Magnesium Sulfate 2 gram Bolus, followed by 8 gram infusion (total Mg dose 10 grams)- Mg less than or equal to 1mg/dL  2 g Intravenous PRN Bertha Kraus MD        Or   • Magnesium Sulfate 2 gram / 50mL Infusion (GIVE X 3 BAGS TO EQUAL 6GM TOTAL DOSE) - Mg 1.1 - 1.5 mg/dl  2 g Intravenous PRN Bertha Kraus MD 25 mL/hr at 08/22/21 1746 2 g at 08/22/21 1746    Or   • Magnesium Sulfate 4 gram infusion- Mg 1.6-1.9 mg/dL  4 g Intravenous PRN Bertha Kraus MD       • magnesium sulfate 4g/100mL (PREMIX) infusion  4 g Intravenous Once Elder Daly MD       • metoprolol tartrate (LOPRESSOR) tablet 25 mg  25 mg Oral Q12H Elder Daly MD   25 mg at 08/31/21 2102   • midazolam (VERSED) 50mg in 100 mL NS infusion  1-10 mg/hr Intravenous Titrated Pradeep Villarreal DO 2 mL/hr at 09/01/21 0339 1 mg/hr at 09/01/21 0339   • montelukast (SINGULAIR) tablet 10 mg  10 mg Oral Nightly Addy  Elder ALVARADO MD   10 mg at 08/31/21 2100   • naloxone (NARCAN) injection 0.4 mg  0.4 mg Intravenous Q5 Min PRN Bertha Kraus MD       • norepinephrine (LEVOPHED) 8 mg in 250 mL NS infusion (premix)  0.02-0.3 mcg/kg/min Intravenous Titrated Pradeep Vilalrreal DO   Held at 08/22/21 0700   • OLANZapine (zyPREXA) tablet 10 mg  10 mg Oral Nightly Bertha Kraus MD   10 mg at 08/31/21 2101   • ondansetron (ZOFRAN) injection 4 mg  4 mg Intravenous Q6H PRN Bertha Kraus MD       • oxyCODONE (ROXICODONE) immediate release tablet 5 mg  5 mg Oral Q4H PRN Bertha Kraus MD   5 mg at 08/26/21 2254   • pantoprazole (PROTONIX) injection 40 mg  40 mg Intravenous Q AM Elder Daly MD   40 mg at 09/01/21 0517   • potassium chloride (MICRO-K) CR capsule 40 mEq  40 mEq Oral PRN Bertha Kraus MD   40 mEq at 08/22/21 0254    Or   • potassium chloride (KLOR-CON) packet 40 mEq  40 mEq Oral PRN Bertha Kraus MD        Or   • potassium chloride 10 mEq in 100 mL IVPB  10 mEq Intravenous Q1H PRN Bertha Kraus MD       • propofol (DIPRIVAN) infusion 10 mg/mL 100 mL  5-100 mcg/kg/min Intravenous Titrated Nabil Wick MD 7.26 mL/hr at 09/01/21 0300 10 mcg/kg/min at 09/01/21 0300   • sodium chloride 0.9 % flush 10 mL  10 mL Intravenous Q12H Bertha Kraus MD   10 mL at 08/31/21 2105   • sodium chloride 0.9 % flush 10 mL  10 mL Intravenous PRN Bertha Kraus MD       • sodium chloride 0.9 % flush 30 mL  30 mL Intravenous Once PRN Elder Daly MD       • sodium chloride 0.9 % infusion  30 mL/hr Intravenous Continuous PRN Elder Daly MD 30 mL/hr at 08/28/21 2322 30 mL/hr at 08/28/21 2322   • sodium chloride 0.9% - IBW for BMI > 30 bolus 1,779 mL  30 mL/kg (Ideal) Intravenous Once Bertha Kraus MD   Stopped at 08/18/21 0521   • tiZANidine (ZANAFLEX) tablet 4 mg  4 mg Oral Q6H PRN Bertha Kraus MD   4 mg at 08/21/21 2116   • vitamin B-12 (CYANOCOBALAMIN) tablet  1,000 mcg  1,000 mcg Oral Daily Bertha Kraus MD   1,000 mcg at 08/31/21 0902     Ventilator/Non-Invasive Ventilation Settings (From admission, onward)     Start     Ordered    08/22/21 1454  Ventilator - AC/VC+; (26); 100; 92%; Other (see comments); 14; 450  Continuous     Question Answer Comment   Vent Mode AC/VC+    Breath rate  26   FiO2 100    FiO2 titrate for Sp02% =/> 92%    PEEP Other (see comments)    PEEP: 14    Tidal Volume 450        08/22/21 1455                   Physician Progress Notes (last 48 hours) (Notes from 08/30/21 0735 through 09/01/21 0735)      Logan Stein MD at 08/31/21 1024              [facility] Medicine Services  INPATIENT PROGRESS NOTE    Length of Stay: 13  Date of Admission: 8/17/2021  Primary Care Physician: Jose Jean-Baptiste MD    Subjective   Chief Complaint: Respiratory failure  HPI: Remains intubated and sedated.  Glucose has been elevated as well.  No other acute concerns reported.  Blood pressures are improved since resuming home losartan.    Review of Systems   Unable to perform ROS: Intubated      All pertinent negatives and positives are as above. All other systems have been reviewed and are negative unless otherwise stated.     Objective    Temp:  [99.8 °F (37.7 °C)-101.5 °F (38.6 °C)] 100.3 °F (37.9 °C)  Heart Rate:  [69-92] 76  Resp:  [14-26] 26  BP: (134-198)/(61-86) 134/61  FiO2 (%):  [50 %-55 %] 55 %    Physical Exam  Constitutional:       Comments: Intubated and sedated   HENT:      Head: Normocephalic and atraumatic.      Right Ear: External ear normal.      Left Ear: External ear normal.      Nose: Nose normal.      Mouth/Throat:      Mouth: Mucous membranes are moist.      Comments: ET tube in place.  Eyes:      Conjunctiva/sclera: Conjunctivae normal.   Cardiovascular:      Rate and Rhythm: Normal rate and regular rhythm.      Pulses: Normal pulses.      Heart sounds: Normal heart sounds.   Pulmonary:       Comments: Diminished bilaterally  Abdominal:      General: Bowel sounds are normal.      Palpations: Abdomen is soft.      Tenderness: There is no abdominal tenderness.   Musculoskeletal:         General: Swelling present.      Cervical back: Neck supple.   Skin:     General: Skin is warm and dry.      Capillary Refill: Capillary refill takes less than 2 seconds.   Neurological:      General: No focal deficit present.      Mental Status: She is oriented to person, place, and time. Mental status is at baseline.      Coordination: Coordination normal.   Psychiatric:         Mood and Affect: Mood normal.         Behavior: Behavior normal.         Results Review:  I have reviewed the labs, radiology results, and diagnostic studies.    Laboratory Data:   Results from last 7 days   Lab Units 08/31/21  0331 08/29/21  0558 08/28/21  0309   SODIUM mmol/L 149* 149* 148*   POTASSIUM mmol/L 4.8 4.9 5.0   CHLORIDE mmol/L 110* 112* 111*   CO2 mmol/L 29.0 29.0 27.0   BUN mg/dL 34* 31* 33*   CREATININE mg/dL 0.60 0.56* 0.65   GLUCOSE mg/dL 326* 285* 260*   CALCIUM mg/dL 8.9 8.4* 8.7   BILIRUBIN mg/dL 0.7 0.6 0.7   ALK PHOS U/L 127* 147* 161*   ALT (SGPT) U/L 43* 52* 76*   AST (SGOT) U/L 36* 49* 77*   ANION GAP mmol/L 10.0 8.0 10.0     Estimated Creatinine Clearance: 151.3 mL/min (by C-G formula based on SCr of 0.6 mg/dL).          Results from last 7 days   Lab Units 08/31/21  0331 08/29/21  0558 08/28/21  0309 08/27/21  0344 08/26/21  0342   WBC 10*3/mm3 5.57 7.65 9.07 8.44 6.95   HEMOGLOBIN g/dL 9.5* 10.0* 10.5* 10.1* 9.6*   HEMATOCRIT % 29.9* 31.1* 32.4* 30.4* 29.7*   PLATELETS 10*3/mm3 161 177 182 179 161     Results from last 7 days   Lab Units 08/30/21  1454 08/29/21  0558 08/28/21  0309 08/26/21  0342   INR  1.14 1.26* 1.29* 1.16       Culture Data:   No results found for: BLOODCX  Urine Culture   Date Value Ref Range Status   08/29/2021 Yeast isolated (A)  Final     No results found for: RESPCX  No results found for:  WOUNDCX  No results found for: STOOLCX  No components found for: BODYFLD    Radiology Data:   Imaging Results (Last 24 Hours)     ** No results found for the last 24 hours. **          I have reviewed the patient's current medications.     Assessment/Plan     Principal Problem:    Sepsis, unspecified organism (CMS/[facility])  Active Problems:    Type 2 diabetes mellitus without complication, without long-term current use of insulin (CMS/[facility])    Pneumonia due to COVID-19 virus    Atypical pneumonia    Sepsis with acute renal failure and septic shock (CMS/[facility])    Acute kidney injury (CMS/[facility])    Severe malnutrition (CMS/[facility])  Transaminitis  Pyuria    Plan:  -Continue mechanical ventilation current sedation orders, today we will make ventilator day 9 as she was initially intubated on 22 August.  -Continue Decadron  -Continue Covid monitoring labs  -She has completed a course of cefepime for suspected underlying pneumonia  -Continue tube feeds for nutrition  -Continue home losartan and metoprolol for blood pressure control.  Labetalol as needed is also ordered.  -She does still have a little bit of swelling so we will give her another dose of Lasix today.  -Continue Rocephin and Diflucan  -Levemir increased to 20 units twice a day and sliding scale coverage is been increased as well.  Continue glucose checks.  -DVT prophylaxis with treatment dose Lovenox  -CODE STATUS: Full    31 minutes of critical care provided. This time excludes other billable procedures. Time does include preparation of documents, medical consultations, review of old records, and direct bedside care.    I confirmed that the patient's Advance Care Plan is present, code status is documented, or surrogate decision maker is listed in the patient's medical record.    Discharge Planning: In process.    Logan Stein MD      Electronically signed by Logan Stein MD at 08/31/21 1025     Logan Stein MD at 08/30/21 1205              [facility]  [facility] Medicine Services  INPATIENT PROGRESS NOTE    Length of Stay: 12  Date of Admission: 8/17/2021  Primary Care Physician: Jose Jean-Baptiste MD    Subjective   Chief Complaint: Respiratory failure  HPI: Patient remains intubated and sedated and per report has had difficulties with elevated blood pressure readings.  There is also been noticing edema.  No other acute concerns reported.    Review of Systems   Unable to perform ROS: Intubated      All pertinent negatives and positives are as above. All other systems have been reviewed and are negative unless otherwise stated.     Objective    Temp:  [99.7 °F (37.6 °C)-100.1 °F (37.8 °C)] 100 °F (37.8 °C)  Heart Rate:  [74-90] 82  Resp:  [24-31] 31  BP: (140-181)/(62-79) 181/79  FiO2 (%):  [50 %] 50 %    Physical Exam  Constitutional:       Comments: Intubated and sedated   HENT:      Head: Normocephalic and atraumatic.      Right Ear: External ear normal.      Left Ear: External ear normal.      Nose: Nose normal.      Mouth/Throat:      Mouth: Mucous membranes are moist.      Comments: ET tube in place.  Eyes:      Conjunctiva/sclera: Conjunctivae normal.   Cardiovascular:      Rate and Rhythm: Normal rate and regular rhythm.      Pulses: Normal pulses.      Heart sounds: Normal heart sounds.   Pulmonary:      Comments: Diminished bilaterally  Abdominal:      General: Bowel sounds are normal.      Palpations: Abdomen is soft.      Tenderness: There is no abdominal tenderness.   Musculoskeletal:         General: Swelling present.      Cervical back: Neck supple.   Skin:     General: Skin is warm and dry.      Capillary Refill: Capillary refill takes less than 2 seconds.   Neurological:      General: No focal deficit present.      Mental Status: She is oriented to person, place, and time. Mental status is at baseline.      Coordination: Coordination normal.   Psychiatric:         Mood and Affect: Mood normal.         Behavior: Behavior  normal.         Results Review:  I have reviewed the labs, radiology results, and diagnostic studies.    Laboratory Data:   Results from last 7 days   Lab Units 08/29/21  0558 08/28/21  0309 08/27/21  0343   SODIUM mmol/L 149* 148* 148*   POTASSIUM mmol/L 4.9 5.0 4.7   CHLORIDE mmol/L 112* 111* 114*   CO2 mmol/L 29.0 27.0 25.0   BUN mg/dL 31* 33* 37*   CREATININE mg/dL 0.56* 0.65 0.67   GLUCOSE mg/dL 285* 260* 134*   CALCIUM mg/dL 8.4* 8.7 8.7   BILIRUBIN mg/dL 0.6 0.7 0.6   ALK PHOS U/L 147* 161* 168*   ALT (SGPT) U/L 52* 76* 96*   AST (SGOT) U/L 49* 77* 113*   ANION GAP mmol/L 8.0 10.0 9.0     Estimated Creatinine Clearance: 166 mL/min (A) (by C-G formula based on SCr of 0.56 mg/dL (L)).  Results from last 7 days   Lab Units 08/24/21  0355   MAGNESIUM mg/dL 2.2         Results from last 7 days   Lab Units 08/29/21  0558 08/28/21  0309 08/27/21  0344 08/26/21 0342 08/25/21  0349   WBC 10*3/mm3 7.65 9.07 8.44 6.95 5.27   HEMOGLOBIN g/dL 10.0* 10.5* 10.1* 9.6* 9.5*   HEMATOCRIT % 31.1* 32.4* 30.4* 29.7* 28.4*   PLATELETS 10*3/mm3 177 182 179 161 129*     Results from last 7 days   Lab Units 08/29/21  0558 08/28/21  0309 08/26/21  0342   INR  1.26* 1.29* 1.16       Culture Data:   No results found for: BLOODCX  No results found for: URINECX  No results found for: RESPCX  No results found for: WOUNDCX  No results found for: STOOLCX  No components found for: BODYFLD    Radiology Data:   Imaging Results (Last 24 Hours)     Procedure Component Value Units Date/Time    XR Chest 1 View [[id number]] Collected: 08/29/21 1709     Updated: 08/30/21 0000    Narrative:      EXAM:    XR Chest, 1 View    EXAM DATE/TIME:    August 29, 2021 at 1714 hours    CLINICAL HISTORY:    The patient is 46 years old and is Female; intubated, fever,  A41.9 Sepsis, unspecified organism R65.21 Severe sepsis with  septic shock N17.9 Acute kidney failure, unspecified U07.1  COVID-19    TECHNIQUE:    Frontal view of the chest.    COMPARISON:    Chest  radiograph August 27, 2021    FINDINGS:    LUNGS:  The lungs are hyperinflated. Diffuse pulmonary  opacities are again noted and not significantly changed.     PLEURAL SPACE:  Unremarkable.  No pneumothorax.    HEART:  Unremarkable.  No cardiomegaly.    MEDIASTINUM:  Unremarkable.    BONES/JOINTS:  The bones and soft tissues are stable.    TUBES, LINES AND DEVICES:  Right upper extremity PICC tip,  endotracheal tube, and duotube are unchanged in position from  prior exam.    UPPER ABDOMEN:  Unremarkable as visualized.      Impression:      1.  Overall, stable appearance of the diffuse pulmonary  opacities.  2.  Support lines and tubes in appropriate position.    Electronically signed by:  Alta Stack MD  8/29/2021 11:59 PM  CDT Workstation: [id number]          I have reviewed the patient's current medications.     Assessment/Plan     Principal Problem:    Sepsis, unspecified organism (CMS/HCC)  Active Problems:    Type 2 diabetes mellitus without complication, without long-term current use of insulin (CMS/HCC)    Pneumonia due to COVID-19 virus    Atypical pneumonia    Sepsis with acute renal failure and septic shock (CMS/HCC)    Acute kidney injury (CMS/HCC)    Severe malnutrition (CMS/HCC)  Transaminitis  Pyuria    Plan:  -Continue mechanical ventilation current sedation orders  -Continue Decadron  -Continue Covid monitoring labs  -Continue IV fluids  -She has completed a course of cefepime for suspected underlying pneumonia  -Continue tube feeds for nutrition  -Restart her home losartan along with metoprolol she is on for blood pressure control.  Labetalol as needed is also been ordered  -Also given her swelling will give her a test dose of Lasix today and monitor for response  -A.m. ABG ordered  -Urinalysis is concerning for some signs of infection as well as yeast noted in the urine  -We will start her on some Rocephin as well as Diflucan for this  -Glucose is noted to be increasing so we will increase her  Levemir to 15 units twice daily and continue sliding scale along with glucose checks  -DVT prophylaxis with treatment dose Lovenox  -CODE STATUS: Full    31 minutes of critical care provided. This time excludes other billable procedures. Time does include preparation of documents, medical consultations, review of old records, and direct bedside care.    I confirmed that the patient's Advance Care Plan is present, code status is documented, or surrogate decision maker is listed in the patient's medical record.    Discharge Planning: In process.    Logan Stein MD      Electronically signed by Logan Stein MD at 08/30/21 1209       Medical Student Notes (last 24 hours) (Notes from 08/31/21 0735 through 09/01/21 0735)    No notes of this type exist for this encounter.         Consult Notes (last 24 hours) (Notes from 08/31/21 0735 through 09/01/21 0735)    No notes of this type exist for this encounter.

## 2021-09-01 NOTE — PROGRESS NOTES
[facility] Medicine Services  INPATIENT PROGRESS NOTE    Length of Stay: 14  Date of Admission: 8/17/2021  Primary Care Physician: Jose Jean-Baptiste MD    Subjective   Chief Complaint: Respiratory failure  HPI: Right intubated and sedated, no new concerns.  Currently stable on the vent.  Blood pressure still little high.    Review of Systems   Unable to perform ROS: Intubated      All pertinent negatives and positives are as above. All other systems have been reviewed and are negative unless otherwise stated.     Objective    Temp:  [98.5 °F (36.9 °C)-101.2 °F (38.4 °C)] 99 °F (37.2 °C)  Heart Rate:  [57-85] 67  Resp:  [24-26] 26  BP: (123-180)/(58-77) 166/70  FiO2 (%):  [50 %-55 %] 50 %    Physical Exam  Constitutional:       Comments: Intubated and sedated   HENT:      Head: Normocephalic and atraumatic.      Right Ear: External ear normal.      Left Ear: External ear normal.      Nose: Nose normal.      Mouth/Throat:      Mouth: Mucous membranes are moist.      Comments: ET tube in place.  Eyes:      Conjunctiva/sclera: Conjunctivae normal.   Cardiovascular:      Rate and Rhythm: Normal rate and regular rhythm.      Pulses: Normal pulses.      Heart sounds: Normal heart sounds.   Pulmonary:      Comments: Diminished bilaterally  Abdominal:      General: Bowel sounds are normal.      Palpations: Abdomen is soft.      Tenderness: There is no abdominal tenderness.   Musculoskeletal:         General: Swelling present.      Cervical back: Neck supple.   Skin:     General: Skin is warm and dry.      Capillary Refill: Capillary refill takes less than 2 seconds.   Neurological:      General: No focal deficit present.      Mental Status: She is oriented to person, place, and time. Mental status is at baseline.      Coordination: Coordination normal.   Psychiatric:         Mood and Affect: Mood normal.         Behavior: Behavior normal.         Results Review:  I have reviewed  the labs, radiology results, and diagnostic studies.    Laboratory Data:   Results from last 7 days   Lab Units 09/01/21  0245 08/31/21  0331 08/29/21  0558   SODIUM mmol/L 148* 149* 149*   POTASSIUM mmol/L 4.7 4.8 4.9   CHLORIDE mmol/L 110* 110* 112*   CO2 mmol/L 29.0 29.0 29.0   BUN mg/dL 35* 34* 31*   CREATININE mg/dL 0.53* 0.60 0.56*   GLUCOSE mg/dL 310* 326* 285*   CALCIUM mg/dL 9.2 8.9 8.4*   BILIRUBIN mg/dL 0.6 0.7 0.6   ALK PHOS U/L 119* 127* 147*   ALT (SGPT) U/L 45* 43* 52*   AST (SGOT) U/L 36* 36* 49*   ANION GAP mmol/L 9.0 10.0 8.0     Estimated Creatinine Clearance: 168.9 mL/min (A) (by C-G formula based on SCr of 0.53 mg/dL (L)).          Results from last 7 days   Lab Units 09/01/21  0245 08/31/21  0331 08/29/21  0558 08/28/21  0309 08/27/21  0344   WBC 10*3/mm3 6.18 5.57 7.65 9.07 8.44   HEMOGLOBIN g/dL 9.7* 9.5* 10.0* 10.5* 10.1*   HEMATOCRIT % 30.7* 29.9* 31.1* 32.4* 30.4*   PLATELETS 10*3/mm3 152 161 177 182 179     Results from last 7 days   Lab Units 09/01/21  0245 08/30/21  1454 08/29/21  0558 08/28/21  0309 08/26/21  0342   INR  1.23* 1.14 1.26* 1.29* 1.16       Culture Data:   Blood Culture   Date Value Ref Range Status   08/30/2021 No growth at 24 hours  Preliminary   08/30/2021 No growth at 24 hours  Preliminary     Urine Culture   Date Value Ref Range Status   08/29/2021 Yeast isolated (A)  Final     No results found for: RESPCX  No results found for: WOUNDCX  No results found for: STOOLCX  No components found for: BODYFLD    Radiology Data:   Imaging Results (Last 24 Hours)     ** No results found for the last 24 hours. **          I have reviewed the patient's current medications.     Assessment/Plan     Principal Problem:    Sepsis, unspecified organism (CMS/[facility])  Active Problems:    Type 2 diabetes mellitus without complication, without long-term current use of insulin (CMS/[facility])    Pneumonia due to COVID-19 virus    Atypical pneumonia    Sepsis with acute renal failure and septic shock  (CMS/[facility])    Acute kidney injury (CMS/[facility])    Severe malnutrition (CMS/[facility])  Transaminitis  Pyuria    Plan:  -Continue mechanical ventilation current sedation orders, today we will make ventilator day 10 as she was initially intubated on 22 August.  We decreased her FiO2 today which she seems to have tolerated.  Plan for repeat ABG in the morning.  -Continue Decadron  -Continue Covid monitoring labs  -She has completed a course of cefepime for suspected underlying pneumonia  -Continue tube feeds for nutrition  -Continue home losartan and metoprolol for blood pressure control.  Labetalol as needed is also ordered.  -Schedule Lasix 20 mg daily for now.  -Continue Rocephin and Diflucan  -Levemir increased to 25 units twice a day and will continue sliding scale coverage and glucose checks  -DVT prophylaxis with treatment dose Lovenox  -CODE STATUS: Full    31 minutes of critical care provided. This time excludes other billable procedures. Time does include preparation of documents, medical consultations, review of old records, and direct bedside care.    I confirmed that the patient's Advance Care Plan is present, code status is documented, or surrogate decision maker is listed in the patient's medical record.    Discharge Planning: In process.    Logan Stein MD

## 2021-09-02 LAB
ALBUMIN SERPL-MCNC: 2.9 G/DL (ref 3.5–5.2)
ALBUMIN/GLOB SERPL: 0.9 G/DL
ALP SERPL-CCNC: 118 U/L (ref 39–117)
ALT SERPL W P-5'-P-CCNC: 54 U/L (ref 1–33)
ANION GAP SERPL CALCULATED.3IONS-SCNC: 10 MMOL/L (ref 5–15)
ARTERIAL PATENCY WRIST A: POSITIVE
AST SERPL-CCNC: 58 U/L (ref 1–32)
ATMOSPHERIC PRESS: 749 MMHG
BASE EXCESS BLDA CALC-SCNC: 9.8 MMOL/L (ref 0–2)
BASOPHILS # BLD AUTO: 0.01 10*3/MM3 (ref 0–0.2)
BASOPHILS NFR BLD AUTO: 0.2 % (ref 0–1.5)
BDY SITE: ABNORMAL
BILIRUB SERPL-MCNC: 0.7 MG/DL (ref 0–1.2)
BUN SERPL-MCNC: 36 MG/DL (ref 6–20)
BUN/CREAT SERPL: 81.8 (ref 7–25)
CALCIUM SPEC-SCNC: 9 MG/DL (ref 8.6–10.5)
CHLORIDE SERPL-SCNC: 110 MMOL/L (ref 98–107)
CO2 SERPL-SCNC: 29 MMOL/L (ref 22–29)
CREAT SERPL-MCNC: 0.44 MG/DL (ref 0.57–1)
CRP SERPL-MCNC: 0.81 MG/DL (ref 0–0.5)
DEPRECATED RDW RBC AUTO: 45.4 FL (ref 37–54)
EOSINOPHIL # BLD AUTO: 0.01 10*3/MM3 (ref 0–0.4)
EOSINOPHIL NFR BLD AUTO: 0.2 % (ref 0.3–6.2)
ERYTHROCYTE [DISTWIDTH] IN BLOOD BY AUTOMATED COUNT: 14.6 % (ref 12.3–15.4)
GFR SERPL CREATININE-BSD FRML MDRD: >150 ML/MIN/1.73
GLOBULIN UR ELPH-MCNC: 3.3 GM/DL
GLUCOSE BLDC GLUCOMTR-MCNC: 193 MG/DL (ref 70–130)
GLUCOSE BLDC GLUCOMTR-MCNC: 241 MG/DL (ref 70–130)
GLUCOSE BLDC GLUCOMTR-MCNC: 249 MG/DL (ref 70–130)
GLUCOSE SERPL-MCNC: 283 MG/DL (ref 65–99)
HCO3 BLDA-SCNC: 35.1 MMOL/L (ref 20–26)
HCT VFR BLD AUTO: 31.4 % (ref 34–46.6)
HGB BLD-MCNC: 10.3 G/DL (ref 12–15.9)
IMM GRANULOCYTES # BLD AUTO: 0.19 10*3/MM3 (ref 0–0.05)
IMM GRANULOCYTES NFR BLD AUTO: 3.4 % (ref 0–0.5)
INHALED O2 CONCENTRATION: 30 %
LYMPHOCYTES # BLD AUTO: 0.37 10*3/MM3 (ref 0.7–3.1)
LYMPHOCYTES NFR BLD AUTO: 6.7 % (ref 19.6–45.3)
Lab: ABNORMAL
MCH RBC QN AUTO: 30 PG (ref 26.6–33)
MCHC RBC AUTO-ENTMCNC: 32.8 G/DL (ref 31.5–35.7)
MCV RBC AUTO: 91.5 FL (ref 79–97)
MODALITY: ABNORMAL
MONOCYTES # BLD AUTO: 0.29 10*3/MM3 (ref 0.1–0.9)
MONOCYTES NFR BLD AUTO: 5.2 % (ref 5–12)
NEUTROPHILS NFR BLD AUTO: 4.67 10*3/MM3 (ref 1.7–7)
NEUTROPHILS NFR BLD AUTO: 84.3 % (ref 42.7–76)
NRBC BLD AUTO-RTO: 0.4 /100 WBC (ref 0–0.2)
PCO2 BLDA: 49.5 MM HG (ref 35–45)
PEEP RESPIRATORY: 5 CM[H2O]
PH BLDA: 7.46 PH UNITS (ref 7.35–7.45)
PLATELET # BLD AUTO: 150 10*3/MM3 (ref 140–450)
PMV BLD AUTO: 10.8 FL (ref 6–12)
PO2 BLDA: 70.5 MM HG (ref 83–108)
POTASSIUM SERPL-SCNC: 4.5 MMOL/L (ref 3.5–5.2)
PROT SERPL-MCNC: 6.2 G/DL (ref 6–8.5)
PSV: 10 CMH2O
RBC # BLD AUTO: 3.43 10*6/MM3 (ref 3.77–5.28)
SAO2 % BLDCOA: 93.5 % (ref 94–99)
SODIUM SERPL-SCNC: 149 MMOL/L (ref 136–145)
VENTILATOR MODE: ABNORMAL
WBC # BLD AUTO: 5.54 10*3/MM3 (ref 3.4–10.8)
WHOLE BLOOD HOLD SPECIMEN: NORMAL

## 2021-09-02 PROCEDURE — 94003 VENT MGMT INPAT SUBQ DAY: CPT

## 2021-09-02 PROCEDURE — 94799 UNLISTED PULMONARY SVC/PX: CPT

## 2021-09-02 PROCEDURE — 25010000002 FUROSEMIDE PER 20 MG: Performed by: FAMILY MEDICINE

## 2021-09-02 PROCEDURE — 85025 COMPLETE CBC W/AUTO DIFF WBC: CPT | Performed by: FAMILY MEDICINE

## 2021-09-02 PROCEDURE — 63710000001 INSULIN ASPART PER 5 UNITS: Performed by: FAMILY MEDICINE

## 2021-09-02 PROCEDURE — 82803 BLOOD GASES ANY COMBINATION: CPT

## 2021-09-02 PROCEDURE — 94760 N-INVAS EAR/PLS OXIMETRY 1: CPT

## 2021-09-02 PROCEDURE — 25010000002 ENOXAPARIN PER 10 MG: Performed by: FAMILY MEDICINE

## 2021-09-02 PROCEDURE — 36600 WITHDRAWAL OF ARTERIAL BLOOD: CPT

## 2021-09-02 PROCEDURE — 82962 GLUCOSE BLOOD TEST: CPT

## 2021-09-02 PROCEDURE — 63710000001 INSULIN DETEMIR PER 5 UNITS: Performed by: FAMILY MEDICINE

## 2021-09-02 PROCEDURE — 25010000002 CEFTRIAXONE PER 250 MG: Performed by: FAMILY MEDICINE

## 2021-09-02 PROCEDURE — 86140 C-REACTIVE PROTEIN: CPT | Performed by: FAMILY MEDICINE

## 2021-09-02 PROCEDURE — 25010000002 FENTANYL PER 0.1 MG: Performed by: FAMILY MEDICINE

## 2021-09-02 PROCEDURE — 25010000002 PROPOFOL 10 MG/ML EMULSION: Performed by: FAMILY MEDICINE

## 2021-09-02 PROCEDURE — 25010000002 FLUCONAZOLE PER 200 MG: Performed by: FAMILY MEDICINE

## 2021-09-02 PROCEDURE — 80053 COMPREHEN METABOLIC PANEL: CPT | Performed by: FAMILY MEDICINE

## 2021-09-02 PROCEDURE — 25010000002 DEXAMETHASONE SODIUM PHOSPHATE 10 MG/ML SOLUTION: Performed by: INTERNAL MEDICINE

## 2021-09-02 PROCEDURE — 25010000002 MIDAZOLAM 50 MG/10ML SOLUTION: Performed by: INTERNAL MEDICINE

## 2021-09-02 RX ORDER — SODIUM CHLORIDE 0.9 % (FLUSH) 0.9 %
10 SYRINGE (ML) INJECTION EVERY 12 HOURS SCHEDULED
Status: DISCONTINUED | OUTPATIENT
Start: 2021-09-02 | End: 2021-09-09 | Stop reason: HOSPADM

## 2021-09-02 RX ORDER — AMLODIPINE BESYLATE 5 MG/1
5 TABLET ORAL
Status: DISCONTINUED | OUTPATIENT
Start: 2021-09-02 | End: 2021-09-05

## 2021-09-02 RX ORDER — METOPROLOL TARTRATE 50 MG/1
50 TABLET, FILM COATED ORAL EVERY 12 HOURS SCHEDULED
Status: DISCONTINUED | OUTPATIENT
Start: 2021-09-02 | End: 2021-09-05

## 2021-09-02 RX ORDER — SODIUM CHLORIDE 0.9 % (FLUSH) 0.9 %
20 SYRINGE (ML) INJECTION AS NEEDED
Status: DISCONTINUED | OUTPATIENT
Start: 2021-09-02 | End: 2021-09-09 | Stop reason: HOSPADM

## 2021-09-02 RX ORDER — SODIUM CHLORIDE 0.9 % (FLUSH) 0.9 %
10 SYRINGE (ML) INJECTION AS NEEDED
Status: DISCONTINUED | OUTPATIENT
Start: 2021-09-02 | End: 2021-09-09 | Stop reason: HOSPADM

## 2021-09-02 RX ORDER — CLONIDINE HYDROCHLORIDE 0.1 MG/1
0.1 TABLET ORAL EVERY 6 HOURS PRN
Status: DISCONTINUED | OUTPATIENT
Start: 2021-09-02 | End: 2021-09-09 | Stop reason: HOSPADM

## 2021-09-02 RX ORDER — HYDRALAZINE HYDROCHLORIDE 20 MG/ML
20 INJECTION INTRAMUSCULAR; INTRAVENOUS EVERY 6 HOURS PRN
Status: DISCONTINUED | OUTPATIENT
Start: 2021-09-02 | End: 2021-09-09 | Stop reason: HOSPADM

## 2021-09-02 RX ORDER — LABETALOL HYDROCHLORIDE 5 MG/ML
20 INJECTION, SOLUTION INTRAVENOUS ONCE
Status: COMPLETED | OUTPATIENT
Start: 2021-09-02 | End: 2021-09-02

## 2021-09-02 RX ADMIN — ALBUTEROL SULFATE 2 PUFF: 90 AEROSOL, METERED RESPIRATORY (INHALATION) at 21:26

## 2021-09-02 RX ADMIN — INSULIN DETEMIR 25 UNITS: 100 INJECTION, SOLUTION SUBCUTANEOUS at 21:44

## 2021-09-02 RX ADMIN — ALBUTEROL SULFATE 2 PUFF: 90 AEROSOL, METERED RESPIRATORY (INHALATION) at 07:15

## 2021-09-02 RX ADMIN — DEXAMETHASONE SODIUM PHOSPHATE 10 MG: 10 INJECTION INTRAMUSCULAR; INTRAVENOUS at 20:21

## 2021-09-02 RX ADMIN — INSULIN ASPART 8 UNITS: 100 INJECTION, SOLUTION INTRAVENOUS; SUBCUTANEOUS at 12:33

## 2021-09-02 RX ADMIN — CYANOCOBALAMIN TAB 1000 MCG 1000 MCG: 1000 TAB at 09:00

## 2021-09-02 RX ADMIN — SODIUM CHLORIDE, PRESERVATIVE FREE 10 ML: 5 INJECTION INTRAVENOUS at 20:38

## 2021-09-02 RX ADMIN — PROPOFOL 5 MCG/KG/MIN: 10 INJECTION, EMULSION INTRAVENOUS at 21:45

## 2021-09-02 RX ADMIN — SODIUM CHLORIDE, PRESERVATIVE FREE 10 ML: 5 INJECTION INTRAVENOUS at 13:30

## 2021-09-02 RX ADMIN — DEXAMETHASONE SODIUM PHOSPHATE 10 MG: 10 INJECTION INTRAMUSCULAR; INTRAVENOUS at 09:00

## 2021-09-02 RX ADMIN — PANTOPRAZOLE SODIUM 40 MG: 40 INJECTION, POWDER, FOR SOLUTION INTRAVENOUS at 05:09

## 2021-09-02 RX ADMIN — OLANZAPINE 10 MG: 10 TABLET, FILM COATED ORAL at 20:21

## 2021-09-02 RX ADMIN — METOPROLOL TARTRATE 50 MG: 50 TABLET, FILM COATED ORAL at 20:21

## 2021-09-02 RX ADMIN — ALBUTEROL SULFATE 2 PUFF: 90 AEROSOL, METERED RESPIRATORY (INHALATION) at 04:22

## 2021-09-02 RX ADMIN — AMLODIPINE BESYLATE 5 MG: 5 TABLET ORAL at 12:05

## 2021-09-02 RX ADMIN — INSULIN DETEMIR 25 UNITS: 100 INJECTION, SOLUTION SUBCUTANEOUS at 09:00

## 2021-09-02 RX ADMIN — MIDAZOLAM 1 MG/HR: 5 INJECTION, SOLUTION INTRAMUSCULAR; INTRAVENOUS at 00:55

## 2021-09-02 RX ADMIN — ENOXAPARIN SODIUM 130 MG: 150 INJECTION SUBCUTANEOUS at 20:36

## 2021-09-02 RX ADMIN — INSULIN ASPART 8 UNITS: 100 INJECTION, SOLUTION INTRAVENOUS; SUBCUTANEOUS at 06:12

## 2021-09-02 RX ADMIN — MONTELUKAST 10 MG: 10 TABLET, FILM COATED ORAL at 20:21

## 2021-09-02 RX ADMIN — CETIRIZINE HYDROCHLORIDE 5 MG: 5 TABLET ORAL at 09:00

## 2021-09-02 RX ADMIN — CHLORHEXIDINE GLUCONATE 15 ML: 1.2 RINSE ORAL at 20:21

## 2021-09-02 RX ADMIN — FENTANYL CITRATE: 50 INJECTION, SOLUTION INTRAMUSCULAR; INTRAVENOUS at 00:02

## 2021-09-02 RX ADMIN — LABETALOL HYDROCHLORIDE 10 MG: 5 INJECTION, SOLUTION INTRAVENOUS at 12:06

## 2021-09-02 RX ADMIN — METOPROLOL TARTRATE 50 MG: 50 TABLET, FILM COATED ORAL at 09:00

## 2021-09-02 RX ADMIN — ENOXAPARIN SODIUM 130 MG: 150 INJECTION SUBCUTANEOUS at 08:59

## 2021-09-02 RX ADMIN — FENTANYL CITRATE: 50 INJECTION, SOLUTION INTRAMUSCULAR; INTRAVENOUS at 11:58

## 2021-09-02 RX ADMIN — FUROSEMIDE 20 MG: 10 INJECTION, SOLUTION INTRAMUSCULAR; INTRAVENOUS at 09:00

## 2021-09-02 RX ADMIN — SODIUM CHLORIDE, PRESERVATIVE FREE 10 ML: 5 INJECTION INTRAVENOUS at 13:31

## 2021-09-02 RX ADMIN — SODIUM CHLORIDE, PRESERVATIVE FREE 10 ML: 5 INJECTION INTRAVENOUS at 09:06

## 2021-09-02 RX ADMIN — CHLORHEXIDINE GLUCONATE 15 ML: 1.2 RINSE ORAL at 09:00

## 2021-09-02 RX ADMIN — LOSARTAN POTASSIUM 50 MG: 50 TABLET, FILM COATED ORAL at 09:00

## 2021-09-02 RX ADMIN — FLUCONAZOLE IN SODIUM CHLORIDE 400 MG: 2 INJECTION, SOLUTION INTRAVENOUS at 10:13

## 2021-09-02 RX ADMIN — ALBUTEROL SULFATE 2 PUFF: 90 AEROSOL, METERED RESPIRATORY (INHALATION) at 13:45

## 2021-09-02 RX ADMIN — INSULIN ASPART 8 UNITS: 100 INJECTION, SOLUTION INTRAVENOUS; SUBCUTANEOUS at 18:38

## 2021-09-02 RX ADMIN — CEFTRIAXONE SODIUM 1 G: 1 INJECTION, POWDER, FOR SOLUTION INTRAMUSCULAR; INTRAVENOUS at 13:01

## 2021-09-02 RX ADMIN — ALBUTEROL SULFATE 2 PUFF: 90 AEROSOL, METERED RESPIRATORY (INHALATION) at 23:08

## 2021-09-02 RX ADMIN — ALBUTEROL SULFATE 2 PUFF: 90 AEROSOL, METERED RESPIRATORY (INHALATION) at 09:55

## 2021-09-02 RX ADMIN — BUDESONIDE AND FORMOTEROL FUMARATE DIHYDRATE 2 PUFF: 160; 4.5 AEROSOL RESPIRATORY (INHALATION) at 21:26

## 2021-09-02 RX ADMIN — LABETALOL HYDROCHLORIDE 10 MG: 5 INJECTION, SOLUTION INTRAVENOUS at 18:29

## 2021-09-02 RX ADMIN — LABETALOL HYDROCHLORIDE 20 MG: 5 INJECTION, SOLUTION INTRAVENOUS at 14:33

## 2021-09-02 RX ADMIN — FOLIC ACID 1 MG: 1 TABLET ORAL at 09:00

## 2021-09-02 RX ADMIN — BUDESONIDE AND FORMOTEROL FUMARATE DIHYDRATE 2 PUFF: 160; 4.5 AEROSOL RESPIRATORY (INHALATION) at 07:15

## 2021-09-02 NOTE — PROGRESS NOTES
Adult Nutrition  Assessment    Patient Name:  Corrina Jensen  YOB: 1975  MRN: [id number]  Admit Date:  8/17/2021    Assessment Date:  9/2/2021    Comments:  Pt remains intubated w/ treatment for COVID Pneumonia. Peptamen VHP at goal rate of 30cc/hr. Propofol is contimues at 36.3cc/hr providing 959 calories. Receiving total of 1678cal  and 66g pro. Meets EEN needs. Noted Glu is elevated r/t steroids- levemir increased to 25u B ID. 8/30 MD notes  edema and IV lasix x1 8/30 & 8/31. IV lasix now scheduled Qd routinely. RD notes pt met criteria for severe, acute malnutrition 8/21. Noted wts 8/18 276#, 8/21 265 and 8/21 264# after lasix x2 days. CBW now 248#, down 15# since admit.  RD to follow hospital course, no changes to TF at this time.      Anthropometrics     Row Name 09/02/21 0551          Anthropometrics    Weight  113 kg (248 lb 10.9 oz)           Electronically signed by:  Connie Bowles RD  09/02/21 09:24 CDT

## 2021-09-02 NOTE — PROGRESS NOTES
ABG obtained. Patient still tolerating SBT at this time. Patient is not following commands. RN made aware.  Will continue to monitor the patient.

## 2021-09-02 NOTE — PLAN OF CARE
Goal Outcome Evaluation:  Plan of Care Reviewed With: patient           Outcome Summary: Sedation off for SBT this am. Pt opens eyes but does not follow commands. HTN overnight with PRN labetolol given once. Afebrile overnight. No other changes

## 2021-09-02 NOTE — PROGRESS NOTES
[facility] Medicine Services  INPATIENT PROGRESS NOTE    Length of Stay: 15  Date of Admission: 8/17/2021  Primary Care Physician: Jose Jean-Baptiste MD    Subjective   Chief Complaint: Respiratory failure  HPI: Remains intubated but currently awake.  She is on SBT and tolerating this.  Blood pressures are intermittently elevated still.    Review of Systems   Unable to perform ROS: Intubated      All pertinent negatives and positives are as above. All other systems have been reviewed and are negative unless otherwise stated.     Objective    Temp:  [98.1 °F (36.7 °C)-98.7 °F (37.1 °C)] 98.4 °F (36.9 °C)  Heart Rate:  [60-86] 69  Resp:  [17-26] 17  BP: (163-197)/(69-81) 177/77  FiO2 (%):  [30 %-50 %] 30 %    Physical Exam  Constitutional:       Comments: Intubated and sedated   HENT:      Head: Normocephalic and atraumatic.      Right Ear: External ear normal.      Left Ear: External ear normal.      Nose: Nose normal.      Mouth/Throat:      Mouth: Mucous membranes are moist.      Comments: ET tube in place.  Eyes:      Conjunctiva/sclera: Conjunctivae normal.   Cardiovascular:      Rate and Rhythm: Normal rate and regular rhythm.      Pulses: Normal pulses.      Heart sounds: Normal heart sounds.   Pulmonary:      Comments: Diminished bilaterally  Abdominal:      General: Bowel sounds are normal.      Palpations: Abdomen is soft.      Tenderness: There is no abdominal tenderness.   Musculoskeletal:         General: Swelling present.      Cervical back: Neck supple.   Skin:     General: Skin is warm and dry.      Capillary Refill: Capillary refill takes less than 2 seconds.   Neurological:      General: No focal deficit present.      Mental Status: She is oriented to person, place, and time. Mental status is at baseline.      Coordination: Coordination normal.   Psychiatric:         Mood and Affect: Mood normal.         Behavior: Behavior normal.         Results  Review:  I have reviewed the labs, radiology results, and diagnostic studies.    Laboratory Data:   Results from last 7 days   Lab Units 09/02/21 0427 09/01/21 0245 08/31/21  0331   SODIUM mmol/L 149* 148* 149*   POTASSIUM mmol/L 4.5 4.7 4.8   CHLORIDE mmol/L 110* 110* 110*   CO2 mmol/L 29.0 29.0 29.0   BUN mg/dL 36* 35* 34*   CREATININE mg/dL 0.44* 0.53* 0.60   GLUCOSE mg/dL 283* 310* 326*   CALCIUM mg/dL 9.0 9.2 8.9   BILIRUBIN mg/dL 0.7 0.6 0.7   ALK PHOS U/L 118* 119* 127*   ALT (SGPT) U/L 54* 45* 43*   AST (SGOT) U/L 58* 36* 36*   ANION GAP mmol/L 10.0 9.0 10.0     Estimated Creatinine Clearance: 199.4 mL/min (A) (by C-G formula based on SCr of 0.44 mg/dL (L)).          Results from last 7 days   Lab Units 09/02/21  0427 09/01/21 0245 08/31/21  0331 08/29/21  0558 08/28/21  0309   WBC 10*3/mm3 5.54 6.18 5.57 7.65 9.07   HEMOGLOBIN g/dL 10.3* 9.7* 9.5* 10.0* 10.5*   HEMATOCRIT % 31.4* 30.7* 29.9* 31.1* 32.4*   PLATELETS 10*3/mm3 150 152 161 177 182     Results from last 7 days   Lab Units 09/01/21  0245 08/30/21  1454 08/29/21  0558 08/28/21  0309   INR  1.23* 1.14 1.26* 1.29*       Culture Data:   Blood Culture   Date Value Ref Range Status   08/30/2021 No growth at 2 days  Preliminary   08/30/2021 No growth at 2 days  Preliminary     No results found for: URINECX  No results found for: RESPCX  No results found for: WOUNDCX  No results found for: STOOLCX  No components found for: BODYFLD    Radiology Data:   Imaging Results (Last 24 Hours)     ** No results found for the last 24 hours. **          I have reviewed the patient's current medications.     Assessment/Plan     Principal Problem:    Sepsis, unspecified organism (CMS/[facility])  Active Problems:    Type 2 diabetes mellitus without complication, without long-term current use of insulin (CMS/[facility])    Pneumonia due to COVID-19 virus    Atypical pneumonia    Sepsis with acute renal failure and septic shock (CMS/[facility])    Acute kidney injury (CMS/[facility])    Severe  malnutrition (CMS/[facility])  Transaminitis  Pyuria    Plan:  -Continue mechanical ventilation with patient on SBT for now, today we will make ventilator day 11 as she was initially intubated on 22 August.  We decreased her FiO2 today which she seems to have tolerated.  -If she continues to tolerate SBT and is able to follow commands appropriately then we may be able to consider extubation later this afternoon versus early tomorrow.  -Continue Decadron  -Continue Covid monitoring labs  -She has completed a course of cefepime for suspected underlying pneumonia  -Continue tube feeds for nutrition  -I have increased her home losartan to 50 mg daily as well as increasing her home metoprolol.  We will also continue with the labetalol as needed and adding on amlodipine scheduled daily as well.  -Continue daily Lasix.  -Continue Rocephin and Diflucan  -Continue Levemir 25 units twice daily and increase sliding scale coverage to high-dose sliding scale.  -DVT prophylaxis with treatment dose Lovenox  -CODE STATUS: Full    31 minutes of critical care provided. This time excludes other billable procedures. Time does include preparation of documents, medical consultations, review of old records, and direct bedside care.    I confirmed that the patient's Advance Care Plan is present, code status is documented, or surrogate decision maker is listed in the patient's medical record.    Discharge Planning: In process.  Hopefully able to extubate later today.    Logan Stein MD

## 2021-09-02 NOTE — PLAN OF CARE
Problem: Respiratory Compromise (Sepsis/Septic Shock)  Goal: Effective Oxygenation and Ventilation  Intervention: Optimize Oxygenation and Ventilation  Recent Flowsheet Documentation  Taken 9/2/2021 1345 by Angella Pizarro RRT  Airway/Ventilation Management: airway patency maintained  Taken 9/2/2021 0715 by Angella Pizarro RRT  Airway/Ventilation Management: airway patency maintained     Problem: Respiratory Compromise (Pneumonia)  Goal: Effective Oxygenation and Ventilation  Intervention: Optimize Oxygenation and Ventilation  Recent Flowsheet Documentation  Taken 9/2/2021 1345 by Angella Pizarro RRT  Airway/Ventilation Management: airway patency maintained  Taken 9/2/2021 0715 by Angella Pizarro RRT  Airway/Ventilation Management: airway patency maintained     Problem: Device-Related Complication Risk (Mechanical Ventilation, Invasive)  Goal: Optimal Device Function  Intervention: Optimize Device Care and Function  Recent Flowsheet Documentation  Taken 9/2/2021 0955 by Angella Pizarro RRT  Airway Safety Measures:   mask at bedside   manual resuscitator at bedside   other (see comments)   suction at bedside  Taken 9/2/2021 0715 by Angella Pizarro RRT  Airway Safety Measures:   mask at bedside   manual resuscitator at bedside   manual resuscitator/mask/valve in room   Goal Outcome Evaluation:      Patient is currently intubated. Patient has tolerated SBT for most of the day. RT switched patient back to assist control. Patients bp is still elevated. Patient is not following commands. Will continue to monitor the patient and attempt SBT again in the am.

## 2021-09-03 LAB
ALBUMIN SERPL-MCNC: 3.1 G/DL (ref 3.5–5.2)
ALBUMIN/GLOB SERPL: 0.9 G/DL
ALP SERPL-CCNC: 116 U/L (ref 39–117)
ALT SERPL W P-5'-P-CCNC: 60 U/L (ref 1–33)
ANION GAP SERPL CALCULATED.3IONS-SCNC: 13 MMOL/L (ref 5–15)
ARTERIAL PATENCY WRIST A: ABNORMAL
ARTERIAL PATENCY WRIST A: ABNORMAL
AST SERPL-CCNC: 54 U/L (ref 1–32)
ATMOSPHERIC PRESS: 749 MMHG
ATMOSPHERIC PRESS: 749 MMHG
BASE EXCESS BLDA CALC-SCNC: 7.6 MMOL/L (ref 0–2)
BASE EXCESS BLDA CALC-SCNC: 8 MMOL/L (ref 0–2)
BASOPHILS # BLD AUTO: 0.01 10*3/MM3 (ref 0–0.2)
BASOPHILS NFR BLD AUTO: 0.1 % (ref 0–1.5)
BDY SITE: ABNORMAL
BDY SITE: ABNORMAL
BILIRUB SERPL-MCNC: 0.7 MG/DL (ref 0–1.2)
BUN SERPL-MCNC: 31 MG/DL (ref 6–20)
BUN/CREAT SERPL: 60.8 (ref 7–25)
CALCIUM SPEC-SCNC: 9.4 MG/DL (ref 8.6–10.5)
CHLORIDE SERPL-SCNC: 110 MMOL/L (ref 98–107)
CO2 SERPL-SCNC: 27 MMOL/L (ref 22–29)
CREAT SERPL-MCNC: 0.51 MG/DL (ref 0.57–1)
CRP SERPL-MCNC: 0.54 MG/DL (ref 0–0.5)
D-DIMER, QUANTITATIVE (MAD,POW, STR): 2006 NG/ML (FEU) (ref 0–470)
DEPRECATED RDW RBC AUTO: 47.5 FL (ref 37–54)
EOSINOPHIL # BLD AUTO: 0 10*3/MM3 (ref 0–0.4)
EOSINOPHIL NFR BLD AUTO: 0 % (ref 0.3–6.2)
ERYTHROCYTE [DISTWIDTH] IN BLOOD BY AUTOMATED COUNT: 15.1 % (ref 12.3–15.4)
GFR SERPL CREATININE-BSD FRML MDRD: 129 ML/MIN/1.73
GLOBULIN UR ELPH-MCNC: 3.4 GM/DL
GLUCOSE BLDC GLUCOMTR-MCNC: 248 MG/DL (ref 70–130)
GLUCOSE BLDC GLUCOMTR-MCNC: 258 MG/DL (ref 70–130)
GLUCOSE BLDC GLUCOMTR-MCNC: 305 MG/DL (ref 70–130)
GLUCOSE SERPL-MCNC: 302 MG/DL (ref 65–99)
HCO3 BLDA-SCNC: 32.4 MMOL/L (ref 20–26)
HCO3 BLDA-SCNC: 32.7 MMOL/L (ref 20–26)
HCT VFR BLD AUTO: 31.8 % (ref 34–46.6)
HGB BLD-MCNC: 10.4 G/DL (ref 12–15.9)
IMM GRANULOCYTES # BLD AUTO: 0.18 10*3/MM3 (ref 0–0.05)
IMM GRANULOCYTES NFR BLD AUTO: 2.3 % (ref 0–0.5)
INHALED O2 CONCENTRATION: 30 %
INR PPP: 1.1 (ref 0.8–1.2)
LDH SERPL-CCNC: 494 U/L (ref 135–214)
LYMPHOCYTES # BLD AUTO: 0.45 10*3/MM3 (ref 0.7–3.1)
LYMPHOCYTES NFR BLD AUTO: 5.7 % (ref 19.6–45.3)
Lab: ABNORMAL
Lab: ABNORMAL
MCH RBC QN AUTO: 30.1 PG (ref 26.6–33)
MCHC RBC AUTO-ENTMCNC: 32.7 G/DL (ref 31.5–35.7)
MCV RBC AUTO: 91.9 FL (ref 79–97)
MODALITY: ABNORMAL
MODALITY: ABNORMAL
MONOCYTES # BLD AUTO: 0.56 10*3/MM3 (ref 0.1–0.9)
MONOCYTES NFR BLD AUTO: 7.1 % (ref 5–12)
NEUTROPHILS NFR BLD AUTO: 6.72 10*3/MM3 (ref 1.7–7)
NEUTROPHILS NFR BLD AUTO: 84.8 % (ref 42.7–76)
NRBC BLD AUTO-RTO: 0.4 /100 WBC (ref 0–0.2)
PCO2 BLDA: 45.5 MM HG (ref 35–45)
PCO2 BLDA: 45.7 MM HG (ref 35–45)
PEEP RESPIRATORY: 5 CM[H2O]
PEEP RESPIRATORY: 5 CM[H2O]
PH BLDA: 7.46 PH UNITS (ref 7.35–7.45)
PH BLDA: 7.46 PH UNITS (ref 7.35–7.45)
PLATELET # BLD AUTO: 155 10*3/MM3 (ref 140–450)
PMV BLD AUTO: 11.4 FL (ref 6–12)
PO2 BLDA: 64.1 MM HG (ref 83–108)
PO2 BLDA: 72.4 MM HG (ref 83–108)
POTASSIUM SERPL-SCNC: 4 MMOL/L (ref 3.5–5.2)
PROCALCITONIN SERPL-MCNC: 0.22 NG/ML (ref 0–0.25)
PROT SERPL-MCNC: 6.5 G/DL (ref 6–8.5)
PROTHROMBIN TIME: 14.1 SECONDS (ref 11.1–15.3)
PSV: 8 CMH2O
RBC # BLD AUTO: 3.46 10*6/MM3 (ref 3.77–5.28)
SAO2 % BLDCOA: 91.9 % (ref 94–99)
SAO2 % BLDCOA: 93.8 % (ref 94–99)
SET MECH RESP RATE: 24
SODIUM SERPL-SCNC: 150 MMOL/L (ref 136–145)
VENTILATOR MODE: ABNORMAL
VENTILATOR MODE: AC
VT ON VENT VENT: 453 ML
WBC # BLD AUTO: 7.92 10*3/MM3 (ref 3.4–10.8)

## 2021-09-03 PROCEDURE — 36600 WITHDRAWAL OF ARTERIAL BLOOD: CPT

## 2021-09-03 PROCEDURE — 94003 VENT MGMT INPAT SUBQ DAY: CPT

## 2021-09-03 PROCEDURE — 25010000002 DEXAMETHASONE SODIUM PHOSPHATE 10 MG/ML SOLUTION: Performed by: INTERNAL MEDICINE

## 2021-09-03 PROCEDURE — 85379 FIBRIN DEGRADATION QUANT: CPT | Performed by: FAMILY MEDICINE

## 2021-09-03 PROCEDURE — 25010000002 ENOXAPARIN PER 10 MG: Performed by: FAMILY MEDICINE

## 2021-09-03 PROCEDURE — 25010000002 FLUCONAZOLE PER 200 MG: Performed by: FAMILY MEDICINE

## 2021-09-03 PROCEDURE — 63710000001 INSULIN DETEMIR PER 5 UNITS: Performed by: FAMILY MEDICINE

## 2021-09-03 PROCEDURE — 94799 UNLISTED PULMONARY SVC/PX: CPT

## 2021-09-03 PROCEDURE — 86140 C-REACTIVE PROTEIN: CPT | Performed by: FAMILY MEDICINE

## 2021-09-03 PROCEDURE — 63710000001 INSULIN ASPART PER 5 UNITS: Performed by: FAMILY MEDICINE

## 2021-09-03 PROCEDURE — 84145 PROCALCITONIN (PCT): CPT | Performed by: FAMILY MEDICINE

## 2021-09-03 PROCEDURE — 25010000002 LORAZEPAM PER 2 MG: Performed by: FAMILY MEDICINE

## 2021-09-03 PROCEDURE — 25010000002 CEFTRIAXONE PER 250 MG: Performed by: FAMILY MEDICINE

## 2021-09-03 PROCEDURE — 85610 PROTHROMBIN TIME: CPT | Performed by: FAMILY MEDICINE

## 2021-09-03 PROCEDURE — 25010000002 FUROSEMIDE PER 20 MG: Performed by: FAMILY MEDICINE

## 2021-09-03 PROCEDURE — 82962 GLUCOSE BLOOD TEST: CPT

## 2021-09-03 PROCEDURE — 83615 LACTATE (LD) (LDH) ENZYME: CPT | Performed by: FAMILY MEDICINE

## 2021-09-03 PROCEDURE — 82803 BLOOD GASES ANY COMBINATION: CPT

## 2021-09-03 PROCEDURE — 94760 N-INVAS EAR/PLS OXIMETRY 1: CPT

## 2021-09-03 PROCEDURE — 25010000002 PROPOFOL 10 MG/ML EMULSION: Performed by: FAMILY MEDICINE

## 2021-09-03 PROCEDURE — 85025 COMPLETE CBC W/AUTO DIFF WBC: CPT | Performed by: FAMILY MEDICINE

## 2021-09-03 PROCEDURE — 25010000002 FENTANYL PER 0.1 MG: Performed by: FAMILY MEDICINE

## 2021-09-03 PROCEDURE — 25010000002 DEXAMETHASONE PER 1 MG: Performed by: FAMILY MEDICINE

## 2021-09-03 PROCEDURE — 80053 COMPREHEN METABOLIC PANEL: CPT | Performed by: FAMILY MEDICINE

## 2021-09-03 RX ORDER — DEXAMETHASONE SODIUM PHOSPHATE 4 MG/ML
8 INJECTION, SOLUTION INTRA-ARTICULAR; INTRALESIONAL; INTRAMUSCULAR; INTRAVENOUS; SOFT TISSUE 2 TIMES DAILY
Status: DISCONTINUED | OUTPATIENT
Start: 2021-09-03 | End: 2021-09-05

## 2021-09-03 RX ORDER — LORAZEPAM 2 MG/ML
0.5 INJECTION INTRAMUSCULAR EVERY 6 HOURS PRN
Status: DISCONTINUED | OUTPATIENT
Start: 2021-09-03 | End: 2021-09-09 | Stop reason: HOSPADM

## 2021-09-03 RX ADMIN — FLUCONAZOLE IN SODIUM CHLORIDE 400 MG: 2 INJECTION, SOLUTION INTRAVENOUS at 10:50

## 2021-09-03 RX ADMIN — INSULIN ASPART 16 UNITS: 100 INJECTION, SOLUTION INTRAVENOUS; SUBCUTANEOUS at 11:21

## 2021-09-03 RX ADMIN — AMLODIPINE BESYLATE 5 MG: 5 TABLET ORAL at 09:29

## 2021-09-03 RX ADMIN — SODIUM CHLORIDE 5 MG/HR: 9 INJECTION, SOLUTION INTRAVENOUS at 19:54

## 2021-09-03 RX ADMIN — METOPROLOL TARTRATE 50 MG: 50 TABLET, FILM COATED ORAL at 09:29

## 2021-09-03 RX ADMIN — ACETAMINOPHEN ORAL SOLUTION 650 MG: 650 SOLUTION ORAL at 18:23

## 2021-09-03 RX ADMIN — SODIUM CHLORIDE, PRESERVATIVE FREE 10 ML: 5 INJECTION INTRAVENOUS at 21:16

## 2021-09-03 RX ADMIN — CYANOCOBALAMIN TAB 1000 MCG 1000 MCG: 1000 TAB at 09:29

## 2021-09-03 RX ADMIN — SODIUM CHLORIDE, PRESERVATIVE FREE 10 ML: 5 INJECTION INTRAVENOUS at 11:14

## 2021-09-03 RX ADMIN — CHLORHEXIDINE GLUCONATE 15 ML: 1.2 RINSE ORAL at 20:59

## 2021-09-03 RX ADMIN — ALBUTEROL SULFATE 2 PUFF: 90 AEROSOL, METERED RESPIRATORY (INHALATION) at 07:04

## 2021-09-03 RX ADMIN — PANTOPRAZOLE SODIUM 40 MG: 40 INJECTION, POWDER, FOR SOLUTION INTRAVENOUS at 06:07

## 2021-09-03 RX ADMIN — INSULIN DETEMIR 25 UNITS: 100 INJECTION, SOLUTION SUBCUTANEOUS at 21:11

## 2021-09-03 RX ADMIN — PROPOFOL 15 MCG/KG/MIN: 10 INJECTION, EMULSION INTRAVENOUS at 23:39

## 2021-09-03 RX ADMIN — BUDESONIDE AND FORMOTEROL FUMARATE DIHYDRATE 2 PUFF: 160; 4.5 AEROSOL RESPIRATORY (INHALATION) at 07:03

## 2021-09-03 RX ADMIN — CEFTRIAXONE SODIUM 1 G: 1 INJECTION, POWDER, FOR SOLUTION INTRAMUSCULAR; INTRAVENOUS at 12:22

## 2021-09-03 RX ADMIN — ENOXAPARIN SODIUM 130 MG: 150 INJECTION SUBCUTANEOUS at 20:59

## 2021-09-03 RX ADMIN — SODIUM CHLORIDE 5 MG/HR: 9 INJECTION, SOLUTION INTRAVENOUS at 15:02

## 2021-09-03 RX ADMIN — ALBUTEROL SULFATE 2 PUFF: 90 AEROSOL, METERED RESPIRATORY (INHALATION) at 22:45

## 2021-09-03 RX ADMIN — CETIRIZINE HYDROCHLORIDE 5 MG: 5 TABLET ORAL at 09:29

## 2021-09-03 RX ADMIN — FOLIC ACID 1 MG: 1 TABLET ORAL at 10:09

## 2021-09-03 RX ADMIN — PROPOFOL 30 MCG/KG/MIN: 10 INJECTION, EMULSION INTRAVENOUS at 00:14

## 2021-09-03 RX ADMIN — MONTELUKAST 10 MG: 10 TABLET, FILM COATED ORAL at 20:59

## 2021-09-03 RX ADMIN — BUDESONIDE AND FORMOTEROL FUMARATE DIHYDRATE 2 PUFF: 160; 4.5 AEROSOL RESPIRATORY (INHALATION) at 20:02

## 2021-09-03 RX ADMIN — LOSARTAN POTASSIUM 50 MG: 50 TABLET, FILM COATED ORAL at 09:29

## 2021-09-03 RX ADMIN — ALBUTEROL SULFATE 2 PUFF: 90 AEROSOL, METERED RESPIRATORY (INHALATION) at 10:00

## 2021-09-03 RX ADMIN — INSULIN DETEMIR 25 UNITS: 100 INJECTION, SOLUTION SUBCUTANEOUS at 09:00

## 2021-09-03 RX ADMIN — PROPOFOL 5 MCG/KG/MIN: 10 INJECTION, EMULSION INTRAVENOUS at 19:54

## 2021-09-03 RX ADMIN — FUROSEMIDE 20 MG: 10 INJECTION, SOLUTION INTRAMUSCULAR; INTRAVENOUS at 09:29

## 2021-09-03 RX ADMIN — SODIUM CHLORIDE, PRESERVATIVE FREE 10 ML: 5 INJECTION INTRAVENOUS at 11:15

## 2021-09-03 RX ADMIN — ALBUTEROL SULFATE 2 PUFF: 90 AEROSOL, METERED RESPIRATORY (INHALATION) at 20:02

## 2021-09-03 RX ADMIN — CHLORHEXIDINE GLUCONATE 15 ML: 1.2 RINSE ORAL at 09:27

## 2021-09-03 RX ADMIN — INSULIN ASPART 12 UNITS: 100 INJECTION, SOLUTION INTRAVENOUS; SUBCUTANEOUS at 17:42

## 2021-09-03 RX ADMIN — METOPROLOL TARTRATE 50 MG: 50 TABLET, FILM COATED ORAL at 20:59

## 2021-09-03 RX ADMIN — CLONIDINE HYDROCHLORIDE 0.1 MG: 0.1 TABLET ORAL at 12:16

## 2021-09-03 RX ADMIN — OLANZAPINE 10 MG: 10 TABLET, FILM COATED ORAL at 20:59

## 2021-09-03 RX ADMIN — FENTANYL CITRATE: 50 INJECTION, SOLUTION INTRAMUSCULAR; INTRAVENOUS at 11:56

## 2021-09-03 RX ADMIN — PROPOFOL 50 MCG/KG/MIN: 10 INJECTION, EMULSION INTRAVENOUS at 04:02

## 2021-09-03 RX ADMIN — ENOXAPARIN SODIUM 130 MG: 150 INJECTION SUBCUTANEOUS at 09:28

## 2021-09-03 RX ADMIN — ALBUTEROL SULFATE 2 PUFF: 90 AEROSOL, METERED RESPIRATORY (INHALATION) at 02:05

## 2021-09-03 RX ADMIN — PROPOFOL 50 MCG/KG/MIN: 10 INJECTION, EMULSION INTRAVENOUS at 06:07

## 2021-09-03 RX ADMIN — DEXAMETHASONE SODIUM PHOSPHATE 8 MG: 4 INJECTION, SOLUTION INTRAMUSCULAR; INTRAVENOUS at 20:59

## 2021-09-03 RX ADMIN — LORAZEPAM 0.5 MG: 2 INJECTION INTRAMUSCULAR; INTRAVENOUS at 11:14

## 2021-09-03 RX ADMIN — ALBUTEROL SULFATE 2 PUFF: 90 AEROSOL, METERED RESPIRATORY (INHALATION) at 13:39

## 2021-09-03 RX ADMIN — DEXAMETHASONE SODIUM PHOSPHATE 10 MG: 10 INJECTION INTRAMUSCULAR; INTRAVENOUS at 09:29

## 2021-09-03 RX ADMIN — FENTANYL CITRATE: 50 INJECTION, SOLUTION INTRAMUSCULAR; INTRAVENOUS at 00:01

## 2021-09-03 RX ADMIN — LABETALOL HYDROCHLORIDE 10 MG: 5 INJECTION, SOLUTION INTRAVENOUS at 00:02

## 2021-09-03 NOTE — PLAN OF CARE
Problem: Inability to Wean (Mechanical Ventilation, Invasive)  Goal: Mechanical Ventilation Liberation  Outcome: Ongoing, Progressing     Problem: Device-Related Complication Risk (Mechanical Ventilation, Invasive)  Goal: Optimal Device Function  Intervention: Optimize Device Care and Function  Recent Flowsheet Documentation  Taken 9/3/2021 0205 by Mirella Cotton, RRT  Airway Safety Measures:   suction at bedside   manual resuscitator/mask/valve in room  Taken 9/2/2021 2308 by Mirella Cotton, RRT  Airway Safety Measures:   suction at bedside   manual resuscitator/mask/valve in room  Taken 9/2/2021 2127 by Mirella Cotton, RRT  Airway Safety Measures:   suction at bedside   manual resuscitator/mask/valve in room   Goal Outcome Evaluation:      Pt tolerating current vent settings, notable increase in secretions from ETT this shift causing increased PIP, no other signs of distress noted or changes made this shift.

## 2021-09-03 NOTE — PROGRESS NOTES
[facility] Medicine Services  INPATIENT PROGRESS NOTE    Length of Stay: 16  Date of Admission: 8/17/2021  Primary Care Physician: Jose Jean-Baptiste MD    Subjective   Chief Complaint: Respiratory failure  HPI: Remains intubated and currently awake on my exam. Discussed with nursing and respiratory and we will attempt a breathing trial.    Review of Systems   Unable to perform ROS: Intubated      All pertinent negatives and positives are as above. All other systems have been reviewed and are negative unless otherwise stated.     Objective    Temp:  [98.2 °F (36.8 °C)-100 °F (37.8 °C)] 100 °F (37.8 °C)  Heart Rate:  [] 78  Resp:  [16-28] 16  BP: (149-201)/(64-99) 187/78  FiO2 (%):  [30 %] 30 %    Physical Exam  Constitutional:       Comments: Intubated and sedated   HENT:      Head: Normocephalic and atraumatic.      Right Ear: External ear normal.      Left Ear: External ear normal.      Nose: Nose normal.      Mouth/Throat:      Mouth: Mucous membranes are moist.      Comments: ET tube in place.  Eyes:      Conjunctiva/sclera: Conjunctivae normal.   Cardiovascular:      Rate and Rhythm: Normal rate and regular rhythm.      Pulses: Normal pulses.      Heart sounds: Normal heart sounds.   Pulmonary:      Comments: Diminished bilaterally  Abdominal:      General: Bowel sounds are normal.      Palpations: Abdomen is soft.      Tenderness: There is no abdominal tenderness.   Musculoskeletal:         General: Swelling present.      Cervical back: Neck supple.   Skin:     General: Skin is warm and dry.      Capillary Refill: Capillary refill takes less than 2 seconds.   Neurological:      General: No focal deficit present.      Mental Status: She is oriented to person, place, and time. Mental status is at baseline.      Coordination: Coordination normal.   Psychiatric:         Mood and Affect: Mood normal.         Behavior: Behavior normal.         Results Review:  I  have reviewed the labs, radiology results, and diagnostic studies.    Laboratory Data:   Results from last 7 days   Lab Units 09/03/21 0527 09/02/21 0427 09/01/21  0245   SODIUM mmol/L 150* 149* 148*   POTASSIUM mmol/L 4.0 4.5 4.7   CHLORIDE mmol/L 110* 110* 110*   CO2 mmol/L 27.0 29.0 29.0   BUN mg/dL 31* 36* 35*   CREATININE mg/dL 0.51* 0.44* 0.53*   GLUCOSE mg/dL 302* 283* 310*   CALCIUM mg/dL 9.4 9.0 9.2   BILIRUBIN mg/dL 0.7 0.7 0.6   ALK PHOS U/L 116 118* 119*   ALT (SGPT) U/L 60* 54* 45*   AST (SGOT) U/L 54* 58* 36*   ANION GAP mmol/L 13.0 10.0 9.0     Estimated Creatinine Clearance: 172.1 mL/min (A) (by C-G formula based on SCr of 0.51 mg/dL (L)).          Results from last 7 days   Lab Units 09/03/21 0527 09/02/21 0427 09/01/21 0245 08/31/21  0331 08/29/21  0558   WBC 10*3/mm3 7.92 5.54 6.18 5.57 7.65   HEMOGLOBIN g/dL 10.4* 10.3* 9.7* 9.5* 10.0*   HEMATOCRIT % 31.8* 31.4* 30.7* 29.9* 31.1*   PLATELETS 10*3/mm3 155 150 152 161 177     Results from last 7 days   Lab Units 09/03/21 0527 09/01/21 0245 08/30/21  1454 08/29/21  0558 08/28/21  0309   INR  1.10 1.23* 1.14 1.26* 1.29*       Culture Data:   No results found for: BLOODCX  No results found for: URINECX  No results found for: RESPCX  No results found for: WOUNDCX  No results found for: STOOLCX  No components found for: BODYFLD    Radiology Data:   Imaging Results (Last 24 Hours)     ** No results found for the last 24 hours. **          I have reviewed the patient's current medications.     Assessment/Plan     Principal Problem:    Sepsis, unspecified organism (CMS/[facility])  Active Problems:    Type 2 diabetes mellitus without complication, without long-term current use of insulin (CMS/[facility])    Pneumonia due to COVID-19 virus    Atypical pneumonia    Sepsis with acute renal failure and septic shock (CMS/[facility])    Acute kidney injury (CMS/[facility])    Severe malnutrition (CMS/[facility])  Transaminitis  Pyuria    Plan:  -Patient currently on breathing trial and if  able to tolerate we will recheck an ABG in a couple hours.  -If her ABG is acceptable and she is following commands and appropriate we will plan for extubation later this morning.  -Patient noted being anxious during my exam so Ativan has been ordered for this.  -Continue Decadron but will decrease her dose slightly given her decreasing O2 requirements.  -Continue Covid monitoring labs  -She has completed a course of cefepime for suspected underlying pneumonia  -Continue tube feeds for nutrition  -Continue current antihypertensive medications as well as as needed medicines. I feel as though her elevated blood pressures currently are also likely related to some anxiety from being awake on the ventilator.  -Continue daily Lasix.  -Continue Rocephin and Diflucan  -Continue Levemir 25 units twice daily and increase sliding scale coverage to high-dose sliding scale.  -DVT prophylaxis with treatment dose Lovenox  -CODE STATUS: Full    31 minutes of critical care provided. This time excludes other billable procedures. Time does include preparation of documents, medical consultations, review of old records, and direct bedside care.    I confirmed that the patient's Advance Care Plan is present, code status is documented, or surrogate decision maker is listed in the patient's medical record.    Discharge Planning: In process.  Hopefully able to extubate later today.    Logan Stein MD

## 2021-09-04 LAB
ALBUMIN SERPL-MCNC: 2.8 G/DL (ref 3.5–5.2)
ALBUMIN/GLOB SERPL: 0.8 G/DL
ALP SERPL-CCNC: 103 U/L (ref 39–117)
ALT SERPL W P-5'-P-CCNC: 62 U/L (ref 1–33)
ANION GAP SERPL CALCULATED.3IONS-SCNC: 12 MMOL/L (ref 5–15)
ARTERIAL PATENCY WRIST A: POSITIVE
AST SERPL-CCNC: 48 U/L (ref 1–32)
ATMOSPHERIC PRESS: 747 MMHG
BACTERIA SPEC AEROBE CULT: NORMAL
BACTERIA SPEC AEROBE CULT: NORMAL
BASE EXCESS BLDA CALC-SCNC: 4.8 MMOL/L (ref 0–2)
BASOPHILS # BLD AUTO: 0.01 10*3/MM3 (ref 0–0.2)
BASOPHILS NFR BLD AUTO: 0.1 % (ref 0–1.5)
BDY SITE: ABNORMAL
BILIRUB SERPL-MCNC: 0.9 MG/DL (ref 0–1.2)
BUN SERPL-MCNC: 36 MG/DL (ref 6–20)
BUN/CREAT SERPL: 78.3 (ref 7–25)
CALCIUM SPEC-SCNC: 9 MG/DL (ref 8.6–10.5)
CHLORIDE SERPL-SCNC: 107 MMOL/L (ref 98–107)
CO2 SERPL-SCNC: 25 MMOL/L (ref 22–29)
CREAT SERPL-MCNC: 0.46 MG/DL (ref 0.57–1)
CRP SERPL-MCNC: 0.39 MG/DL (ref 0–0.5)
DEPRECATED RDW RBC AUTO: 49.2 FL (ref 37–54)
EOSINOPHIL # BLD AUTO: 0 10*3/MM3 (ref 0–0.4)
EOSINOPHIL NFR BLD AUTO: 0 % (ref 0.3–6.2)
ERYTHROCYTE [DISTWIDTH] IN BLOOD BY AUTOMATED COUNT: 15.2 % (ref 12.3–15.4)
GFR SERPL CREATININE-BSD FRML MDRD: 145 ML/MIN/1.73
GLOBULIN UR ELPH-MCNC: 3.5 GM/DL
GLUCOSE BLDC GLUCOMTR-MCNC: 248 MG/DL (ref 70–130)
GLUCOSE BLDC GLUCOMTR-MCNC: 248 MG/DL (ref 70–130)
GLUCOSE BLDC GLUCOMTR-MCNC: 262 MG/DL (ref 70–130)
GLUCOSE BLDC GLUCOMTR-MCNC: 262 MG/DL (ref 70–130)
GLUCOSE BLDC GLUCOMTR-MCNC: 296 MG/DL (ref 70–130)
GLUCOSE SERPL-MCNC: 345 MG/DL (ref 65–99)
HCO3 BLDA-SCNC: 29.8 MMOL/L (ref 20–26)
HCT VFR BLD AUTO: 31.3 % (ref 34–46.6)
HGB BLD-MCNC: 10.3 G/DL (ref 12–15.9)
IMM GRANULOCYTES # BLD AUTO: 0.14 10*3/MM3 (ref 0–0.05)
IMM GRANULOCYTES NFR BLD AUTO: 2 % (ref 0–0.5)
INHALED O2 CONCENTRATION: 30 %
LYMPHOCYTES # BLD AUTO: 0.48 10*3/MM3 (ref 0.7–3.1)
LYMPHOCYTES NFR BLD AUTO: 7 % (ref 19.6–45.3)
Lab: ABNORMAL
MCH RBC QN AUTO: 30.3 PG (ref 26.6–33)
MCHC RBC AUTO-ENTMCNC: 32.9 G/DL (ref 31.5–35.7)
MCV RBC AUTO: 92.1 FL (ref 79–97)
MODALITY: ABNORMAL
MONOCYTES # BLD AUTO: 0.4 10*3/MM3 (ref 0.1–0.9)
MONOCYTES NFR BLD AUTO: 5.8 % (ref 5–12)
NEUTROPHILS NFR BLD AUTO: 5.83 10*3/MM3 (ref 1.7–7)
NEUTROPHILS NFR BLD AUTO: 85.1 % (ref 42.7–76)
NRBC BLD AUTO-RTO: 0.3 /100 WBC (ref 0–0.2)
PCO2 BLDA: 45.1 MM HG (ref 35–45)
PEEP RESPIRATORY: 5 CM[H2O]
PH BLDA: 7.43 PH UNITS (ref 7.35–7.45)
PLATELET # BLD AUTO: 132 10*3/MM3 (ref 140–450)
PMV BLD AUTO: 10.7 FL (ref 6–12)
PO2 BLDA: 79.7 MM HG (ref 83–108)
POTASSIUM SERPL-SCNC: 3.6 MMOL/L (ref 3.5–5.2)
POTASSIUM SERPL-SCNC: 4.2 MMOL/L (ref 3.5–5.2)
PROT SERPL-MCNC: 6.3 G/DL (ref 6–8.5)
RBC # BLD AUTO: 3.4 10*6/MM3 (ref 3.77–5.28)
SAO2 % BLDCOA: 95.5 % (ref 94–99)
SODIUM SERPL-SCNC: 144 MMOL/L (ref 136–145)
VENTILATOR MODE: ABNORMAL
WBC # BLD AUTO: 6.86 10*3/MM3 (ref 3.4–10.8)

## 2021-09-04 PROCEDURE — 94799 UNLISTED PULMONARY SVC/PX: CPT

## 2021-09-04 PROCEDURE — 84132 ASSAY OF SERUM POTASSIUM: CPT | Performed by: FAMILY MEDICINE

## 2021-09-04 PROCEDURE — 86140 C-REACTIVE PROTEIN: CPT | Performed by: FAMILY MEDICINE

## 2021-09-04 PROCEDURE — 36600 WITHDRAWAL OF ARTERIAL BLOOD: CPT

## 2021-09-04 PROCEDURE — 94760 N-INVAS EAR/PLS OXIMETRY 1: CPT

## 2021-09-04 PROCEDURE — 25010000002 FUROSEMIDE PER 20 MG: Performed by: FAMILY MEDICINE

## 2021-09-04 PROCEDURE — 25010000002 CEFTRIAXONE PER 250 MG: Performed by: FAMILY MEDICINE

## 2021-09-04 PROCEDURE — 25010000002 DEXAMETHASONE PER 1 MG: Performed by: FAMILY MEDICINE

## 2021-09-04 PROCEDURE — 82803 BLOOD GASES ANY COMBINATION: CPT

## 2021-09-04 PROCEDURE — 25010000002 ENOXAPARIN PER 10 MG: Performed by: FAMILY MEDICINE

## 2021-09-04 PROCEDURE — 87040 BLOOD CULTURE FOR BACTERIA: CPT | Performed by: FAMILY MEDICINE

## 2021-09-04 PROCEDURE — 25010000002 FLUCONAZOLE PER 200 MG: Performed by: FAMILY MEDICINE

## 2021-09-04 PROCEDURE — 63710000001 INSULIN ASPART PER 5 UNITS: Performed by: FAMILY MEDICINE

## 2021-09-04 PROCEDURE — 80053 COMPREHEN METABOLIC PANEL: CPT | Performed by: FAMILY MEDICINE

## 2021-09-04 PROCEDURE — 85025 COMPLETE CBC W/AUTO DIFF WBC: CPT | Performed by: FAMILY MEDICINE

## 2021-09-04 PROCEDURE — 94003 VENT MGMT INPAT SUBQ DAY: CPT

## 2021-09-04 PROCEDURE — 82962 GLUCOSE BLOOD TEST: CPT

## 2021-09-04 PROCEDURE — 63710000001 INSULIN DETEMIR PER 5 UNITS: Performed by: FAMILY MEDICINE

## 2021-09-04 RX ADMIN — POTASSIUM CHLORIDE 40 MEQ: 1.5 POWDER, FOR SOLUTION ORAL at 09:55

## 2021-09-04 RX ADMIN — ALBUTEROL SULFATE 2 PUFF: 90 AEROSOL, METERED RESPIRATORY (INHALATION) at 15:53

## 2021-09-04 RX ADMIN — SODIUM CHLORIDE, PRESERVATIVE FREE 10 ML: 5 INJECTION INTRAVENOUS at 21:56

## 2021-09-04 RX ADMIN — BUDESONIDE AND FORMOTEROL FUMARATE DIHYDRATE 2 PUFF: 160; 4.5 AEROSOL RESPIRATORY (INHALATION) at 22:23

## 2021-09-04 RX ADMIN — METOPROLOL TARTRATE 50 MG: 50 TABLET, FILM COATED ORAL at 09:04

## 2021-09-04 RX ADMIN — SODIUM CHLORIDE 5 MG/HR: 9 INJECTION, SOLUTION INTRAVENOUS at 09:15

## 2021-09-04 RX ADMIN — PANTOPRAZOLE SODIUM 40 MG: 40 INJECTION, POWDER, FOR SOLUTION INTRAVENOUS at 06:06

## 2021-09-04 RX ADMIN — CHLORHEXIDINE GLUCONATE 15 ML: 1.2 RINSE ORAL at 09:05

## 2021-09-04 RX ADMIN — ALBUTEROL SULFATE 2 PUFF: 90 AEROSOL, METERED RESPIRATORY (INHALATION) at 22:23

## 2021-09-04 RX ADMIN — INSULIN ASPART 12 UNITS: 100 INJECTION, SOLUTION INTRAVENOUS; SUBCUTANEOUS at 13:32

## 2021-09-04 RX ADMIN — DEXAMETHASONE SODIUM PHOSPHATE 8 MG: 4 INJECTION, SOLUTION INTRAMUSCULAR; INTRAVENOUS at 09:04

## 2021-09-04 RX ADMIN — ALBUTEROL SULFATE 2 PUFF: 90 AEROSOL, METERED RESPIRATORY (INHALATION) at 19:00

## 2021-09-04 RX ADMIN — LOSARTAN POTASSIUM 50 MG: 50 TABLET, FILM COATED ORAL at 09:04

## 2021-09-04 RX ADMIN — FOLIC ACID 1 MG: 1 TABLET ORAL at 09:04

## 2021-09-04 RX ADMIN — INSULIN ASPART 12 UNITS: 100 INJECTION, SOLUTION INTRAVENOUS; SUBCUTANEOUS at 08:45

## 2021-09-04 RX ADMIN — CYANOCOBALAMIN TAB 1000 MCG 1000 MCG: 1000 TAB at 09:04

## 2021-09-04 RX ADMIN — INSULIN ASPART 8 UNITS: 100 INJECTION, SOLUTION INTRAVENOUS; SUBCUTANEOUS at 17:36

## 2021-09-04 RX ADMIN — ENOXAPARIN SODIUM 130 MG: 150 INJECTION SUBCUTANEOUS at 09:05

## 2021-09-04 RX ADMIN — OLANZAPINE 10 MG: 10 TABLET, FILM COATED ORAL at 21:49

## 2021-09-04 RX ADMIN — TIZANIDINE 4 MG: 4 TABLET ORAL at 21:49

## 2021-09-04 RX ADMIN — METOPROLOL TARTRATE 50 MG: 50 TABLET, FILM COATED ORAL at 21:49

## 2021-09-04 RX ADMIN — SODIUM CHLORIDE 5 MG/HR: 9 INJECTION, SOLUTION INTRAVENOUS at 02:31

## 2021-09-04 RX ADMIN — DEXAMETHASONE SODIUM PHOSPHATE 8 MG: 4 INJECTION, SOLUTION INTRAMUSCULAR; INTRAVENOUS at 21:50

## 2021-09-04 RX ADMIN — FUROSEMIDE 20 MG: 10 INJECTION, SOLUTION INTRAMUSCULAR; INTRAVENOUS at 09:07

## 2021-09-04 RX ADMIN — INSULIN DETEMIR 25 UNITS: 100 INJECTION, SOLUTION SUBCUTANEOUS at 09:06

## 2021-09-04 RX ADMIN — CETIRIZINE HYDROCHLORIDE 5 MG: 5 TABLET ORAL at 09:04

## 2021-09-04 RX ADMIN — INSULIN DETEMIR 25 UNITS: 100 INJECTION, SOLUTION SUBCUTANEOUS at 21:52

## 2021-09-04 RX ADMIN — ALBUTEROL SULFATE 2 PUFF: 90 AEROSOL, METERED RESPIRATORY (INHALATION) at 07:22

## 2021-09-04 RX ADMIN — ALBUTEROL SULFATE 2 PUFF: 90 AEROSOL, METERED RESPIRATORY (INHALATION) at 10:49

## 2021-09-04 RX ADMIN — CHLORHEXIDINE GLUCONATE 15 ML: 1.2 RINSE ORAL at 21:52

## 2021-09-04 RX ADMIN — FLUCONAZOLE IN SODIUM CHLORIDE 400 MG: 2 INJECTION, SOLUTION INTRAVENOUS at 09:54

## 2021-09-04 RX ADMIN — OXYCODONE 5 MG: 5 TABLET ORAL at 21:49

## 2021-09-04 RX ADMIN — BUDESONIDE AND FORMOTEROL FUMARATE DIHYDRATE 2 PUFF: 160; 4.5 AEROSOL RESPIRATORY (INHALATION) at 07:22

## 2021-09-04 RX ADMIN — POTASSIUM CHLORIDE 40 MEQ: 1.5 POWDER, FOR SOLUTION ORAL at 06:00

## 2021-09-04 RX ADMIN — ENOXAPARIN SODIUM 130 MG: 150 INJECTION SUBCUTANEOUS at 21:52

## 2021-09-04 RX ADMIN — MONTELUKAST 10 MG: 10 TABLET, FILM COATED ORAL at 21:49

## 2021-09-04 RX ADMIN — CEFTRIAXONE SODIUM 1 G: 1 INJECTION, POWDER, FOR SOLUTION INTRAMUSCULAR; INTRAVENOUS at 13:36

## 2021-09-04 RX ADMIN — AMLODIPINE BESYLATE 5 MG: 5 TABLET ORAL at 09:04

## 2021-09-04 NOTE — PROGRESS NOTES
[facility] Medicine Services  INPATIENT PROGRESS NOTE    Length of Stay: 17  Date of Admission: 8/17/2021  Primary Care Physician: Jose Jean-Baptiste MD    Subjective   Chief Complaint: Respiratory failure  HPI: Patient seen and examined, she is currently awake on a breathing trial and tolerating this well.  She is quite weak but does follow commands.  She denies any current pain with head shakes and will squeeze my fingers and wiggle her toes.    Review of Systems   Unable to perform ROS: Intubated      All pertinent negatives and positives are as above. All other systems have been reviewed and are negative unless otherwise stated.     Objective    Temp:  [98.6 °F (37 °C)-104.2 °F (40.1 °C)] 98.6 °F (37 °C)  Heart Rate:  [] 82  Resp:  [17-31] 20  BP: (141-201)/(62-84) 153/66  FiO2 (%):  [30 %] 30 %    Physical Exam  Constitutional:       Comments: Intubated and sedated   HENT:      Head: Normocephalic and atraumatic.      Right Ear: External ear normal.      Left Ear: External ear normal.      Nose: Nose normal.      Mouth/Throat:      Mouth: Mucous membranes are moist.      Comments: ET tube in place.  Eyes:      Conjunctiva/sclera: Conjunctivae normal.   Cardiovascular:      Rate and Rhythm: Normal rate and regular rhythm.      Pulses: Normal pulses.      Heart sounds: Normal heart sounds.   Pulmonary:      Comments: Diminished bilaterally  Abdominal:      General: Bowel sounds are normal.      Palpations: Abdomen is soft.      Tenderness: There is no abdominal tenderness.   Musculoskeletal:         General: Swelling present.      Cervical back: Neck supple.   Skin:     General: Skin is warm and dry.      Capillary Refill: Capillary refill takes less than 2 seconds.   Neurological:      General: No focal deficit present.      Mental Status: She is oriented to person, place, and time. Mental status is at baseline.      Coordination: Coordination normal.    Psychiatric:         Mood and Affect: Mood normal.         Behavior: Behavior normal.         Results Review:  I have reviewed the labs, radiology results, and diagnostic studies.    Laboratory Data:   Results from last 7 days   Lab Units 09/04/21 0433 09/03/21 0527 09/02/21 0427   SODIUM mmol/L 144 150* 149*   POTASSIUM mmol/L 3.6 4.0 4.5   CHLORIDE mmol/L 107 110* 110*   CO2 mmol/L 25.0 27.0 29.0   BUN mg/dL 36* 31* 36*   CREATININE mg/dL 0.46* 0.51* 0.44*   GLUCOSE mg/dL 345* 302* 283*   CALCIUM mg/dL 9.0 9.4 9.0   BILIRUBIN mg/dL 0.9 0.7 0.7   ALK PHOS U/L 103 116 118*   ALT (SGPT) U/L 62* 60* 54*   AST (SGOT) U/L 48* 54* 58*   ANION GAP mmol/L 12.0 13.0 10.0     Estimated Creatinine Clearance: 191.7 mL/min (A) (by C-G formula based on SCr of 0.46 mg/dL (L)).          Results from last 7 days   Lab Units 09/04/21 0433 09/03/21 0527 09/02/21 0427 09/01/21 0245 08/31/21  0331   WBC 10*3/mm3 6.86 7.92 5.54 6.18 5.57   HEMOGLOBIN g/dL 10.3* 10.4* 10.3* 9.7* 9.5*   HEMATOCRIT % 31.3* 31.8* 31.4* 30.7* 29.9*   PLATELETS 10*3/mm3 132* 155 150 152 161     Results from last 7 days   Lab Units 09/03/21 0527 09/01/21  0245 08/30/21  1454 08/29/21  0558   INR  1.10 1.23* 1.14 1.26*       Culture Data:   No results found for: BLOODCX  No results found for: URINECX  No results found for: RESPCX  No results found for: WOUNDCX  No results found for: STOOLCX  No components found for: BODYFLD    Radiology Data:   Imaging Results (Last 24 Hours)     ** No results found for the last 24 hours. **          I have reviewed the patient's current medications.     Assessment/Plan     Principal Problem:    Sepsis, unspecified organism (CMS/[facility])  Active Problems:    Type 2 diabetes mellitus without complication, without long-term current use of insulin (CMS/[facility])    Pneumonia due to COVID-19 virus    Atypical pneumonia    Sepsis with acute renal failure and septic shock (CMS/[facility])    Acute kidney injury (CMS/[facility])    Severe  malnutrition (CMS/HCC)  Transaminitis  Pyuria    Plan:  -Currently doing well on SBT, we will attempt to extubate this morning as her mental status appears to be allowing so and her requirements on the ventilator right now are very minimal.  -Extubation order has been placed.  -She did spike a temperature overnight and this is likely related to the Candida growing in her sputum which also means we need to extubate her that much more so at this time as I am concerned the longer we leave the ET tube and the worse her infection may become.  -Discussed with nursing about giving her some Ativan prior to extubation to help with anxiety as she does appear to be somewhat anxious with the tube in.  Orders have already been placed for this.  -Continue Cardene drip for now, I suspect that as we get her extubated we will likely be able to stop this.  Continue her other antihypertensive medications.  -Continue Decadron  -Continue Covid monitoring labs  -She has completed a course of cefepime for suspected underlying pneumonia  -Continue tube feeds for nutrition  -Continue daily Lasix.  -Continue Rocephin and Diflucan  -Continue Levemir 25 units twice daily and increase sliding scale coverage to high-dose sliding scale.  -DVT prophylaxis with treatment dose Lovenox  -CODE STATUS: Full    33 minutes of critical care provided. This time excludes other billable procedures. Time does include preparation of documents, medical consultations, review of old records, and direct bedside care.    I confirmed that the patient's Advance Care Plan is present, code status is documented, or surrogate decision maker is listed in the patient's medical record.    Discharge Planning: In process.  Hopefully able to extubate later today.    Logan Stein MD

## 2021-09-05 LAB
ALBUMIN SERPL-MCNC: 2.9 G/DL (ref 3.5–5.2)
ALBUMIN/GLOB SERPL: 0.9 G/DL
ALP SERPL-CCNC: 104 U/L (ref 39–117)
ALT SERPL W P-5'-P-CCNC: 67 U/L (ref 1–33)
ANION GAP SERPL CALCULATED.3IONS-SCNC: 8 MMOL/L (ref 5–15)
AST SERPL-CCNC: 47 U/L (ref 1–32)
BASOPHILS # BLD AUTO: 0.01 10*3/MM3 (ref 0–0.2)
BASOPHILS NFR BLD AUTO: 0.2 % (ref 0–1.5)
BILIRUB SERPL-MCNC: 0.6 MG/DL (ref 0–1.2)
BUN SERPL-MCNC: 39 MG/DL (ref 6–20)
BUN/CREAT SERPL: 92.9 (ref 7–25)
CALCIUM SPEC-SCNC: 9.5 MG/DL (ref 8.6–10.5)
CHLORIDE SERPL-SCNC: 114 MMOL/L (ref 98–107)
CO2 SERPL-SCNC: 27 MMOL/L (ref 22–29)
CREAT SERPL-MCNC: 0.42 MG/DL (ref 0.57–1)
CRP SERPL-MCNC: 0.43 MG/DL (ref 0–0.5)
D-DIMER, QUANTITATIVE (MAD,POW, STR): 1462 NG/ML (FEU) (ref 0–470)
DEPRECATED RDW RBC AUTO: 48.5 FL (ref 37–54)
EOSINOPHIL # BLD AUTO: 0 10*3/MM3 (ref 0–0.4)
EOSINOPHIL NFR BLD AUTO: 0 % (ref 0.3–6.2)
ERYTHROCYTE [DISTWIDTH] IN BLOOD BY AUTOMATED COUNT: 15.2 % (ref 12.3–15.4)
GFR SERPL CREATININE-BSD FRML MDRD: >150 ML/MIN/1.73
GLOBULIN UR ELPH-MCNC: 3.4 GM/DL
GLUCOSE BLDC GLUCOMTR-MCNC: 212 MG/DL (ref 70–130)
GLUCOSE BLDC GLUCOMTR-MCNC: 228 MG/DL (ref 70–130)
GLUCOSE BLDC GLUCOMTR-MCNC: 230 MG/DL (ref 70–130)
GLUCOSE BLDC GLUCOMTR-MCNC: 255 MG/DL (ref 70–130)
GLUCOSE SERPL-MCNC: 291 MG/DL (ref 65–99)
HCT VFR BLD AUTO: 31.6 % (ref 34–46.6)
HGB BLD-MCNC: 10 G/DL (ref 12–15.9)
IMM GRANULOCYTES # BLD AUTO: 0.1 10*3/MM3 (ref 0–0.05)
IMM GRANULOCYTES NFR BLD AUTO: 2.1 % (ref 0–0.5)
INR PPP: 1.18 (ref 0.8–1.2)
LDH SERPL-CCNC: 395 U/L (ref 135–214)
LYMPHOCYTES # BLD AUTO: 0.35 10*3/MM3 (ref 0.7–3.1)
LYMPHOCYTES NFR BLD AUTO: 7.5 % (ref 19.6–45.3)
MAGNESIUM SERPL-MCNC: 2.3 MG/DL (ref 1.6–2.6)
MCH RBC QN AUTO: 29.4 PG (ref 26.6–33)
MCHC RBC AUTO-ENTMCNC: 31.6 G/DL (ref 31.5–35.7)
MCV RBC AUTO: 92.9 FL (ref 79–97)
MONOCYTES # BLD AUTO: 0.27 10*3/MM3 (ref 0.1–0.9)
MONOCYTES NFR BLD AUTO: 5.8 % (ref 5–12)
NEUTROPHILS NFR BLD AUTO: 3.96 10*3/MM3 (ref 1.7–7)
NEUTROPHILS NFR BLD AUTO: 84.4 % (ref 42.7–76)
NRBC BLD AUTO-RTO: 0.6 /100 WBC (ref 0–0.2)
PLATELET # BLD AUTO: 142 10*3/MM3 (ref 140–450)
PMV BLD AUTO: 11.2 FL (ref 6–12)
POTASSIUM SERPL-SCNC: 4 MMOL/L (ref 3.5–5.2)
PROCALCITONIN SERPL-MCNC: 0.2 NG/ML (ref 0–0.25)
PROT SERPL-MCNC: 6.3 G/DL (ref 6–8.5)
PROTHROMBIN TIME: 14.9 SECONDS (ref 11.1–15.3)
RBC # BLD AUTO: 3.4 10*6/MM3 (ref 3.77–5.28)
SODIUM SERPL-SCNC: 149 MMOL/L (ref 136–145)
WBC # BLD AUTO: 4.69 10*3/MM3 (ref 3.4–10.8)

## 2021-09-05 PROCEDURE — 94799 UNLISTED PULMONARY SVC/PX: CPT

## 2021-09-05 PROCEDURE — 83735 ASSAY OF MAGNESIUM: CPT | Performed by: FAMILY MEDICINE

## 2021-09-05 PROCEDURE — 97166 OT EVAL MOD COMPLEX 45 MIN: CPT

## 2021-09-05 PROCEDURE — 86140 C-REACTIVE PROTEIN: CPT | Performed by: FAMILY MEDICINE

## 2021-09-05 PROCEDURE — 83615 LACTATE (LD) (LDH) ENZYME: CPT | Performed by: FAMILY MEDICINE

## 2021-09-05 PROCEDURE — 25010000002 CEFTRIAXONE PER 250 MG: Performed by: FAMILY MEDICINE

## 2021-09-05 PROCEDURE — 94760 N-INVAS EAR/PLS OXIMETRY 1: CPT

## 2021-09-05 PROCEDURE — 80053 COMPREHEN METABOLIC PANEL: CPT | Performed by: FAMILY MEDICINE

## 2021-09-05 PROCEDURE — 25010000002 ENOXAPARIN PER 10 MG: Performed by: FAMILY MEDICINE

## 2021-09-05 PROCEDURE — 25010000002 FUROSEMIDE PER 20 MG: Performed by: FAMILY MEDICINE

## 2021-09-05 PROCEDURE — 25010000002 FLUCONAZOLE PER 200 MG: Performed by: FAMILY MEDICINE

## 2021-09-05 PROCEDURE — 84145 PROCALCITONIN (PCT): CPT | Performed by: FAMILY MEDICINE

## 2021-09-05 PROCEDURE — 92610 EVALUATE SWALLOWING FUNCTION: CPT | Performed by: SPEECH-LANGUAGE PATHOLOGIST

## 2021-09-05 PROCEDURE — 63710000001 INSULIN ASPART PER 5 UNITS: Performed by: FAMILY MEDICINE

## 2021-09-05 PROCEDURE — 82962 GLUCOSE BLOOD TEST: CPT

## 2021-09-05 PROCEDURE — 25010000002 DEXAMETHASONE PER 1 MG: Performed by: FAMILY MEDICINE

## 2021-09-05 PROCEDURE — 85025 COMPLETE CBC W/AUTO DIFF WBC: CPT | Performed by: FAMILY MEDICINE

## 2021-09-05 PROCEDURE — 63710000001 INSULIN DETEMIR PER 5 UNITS: Performed by: FAMILY MEDICINE

## 2021-09-05 PROCEDURE — 97162 PT EVAL MOD COMPLEX 30 MIN: CPT

## 2021-09-05 PROCEDURE — 85379 FIBRIN DEGRADATION QUANT: CPT | Performed by: FAMILY MEDICINE

## 2021-09-05 PROCEDURE — 85610 PROTHROMBIN TIME: CPT | Performed by: FAMILY MEDICINE

## 2021-09-05 RX ORDER — LOSARTAN POTASSIUM 50 MG/1
100 TABLET ORAL
Status: DISCONTINUED | OUTPATIENT
Start: 2021-09-05 | End: 2021-09-09

## 2021-09-05 RX ORDER — AMLODIPINE BESYLATE 10 MG/1
10 TABLET ORAL
Status: DISCONTINUED | OUTPATIENT
Start: 2021-09-05 | End: 2021-09-09

## 2021-09-05 RX ORDER — METOPROLOL TARTRATE 50 MG/1
100 TABLET, FILM COATED ORAL EVERY 12 HOURS SCHEDULED
Status: DISCONTINUED | OUTPATIENT
Start: 2021-09-05 | End: 2021-09-09

## 2021-09-05 RX ORDER — DEXAMETHASONE SODIUM PHOSPHATE 4 MG/ML
6 INJECTION, SOLUTION INTRA-ARTICULAR; INTRALESIONAL; INTRAMUSCULAR; INTRAVENOUS; SOFT TISSUE 2 TIMES DAILY
Status: DISCONTINUED | OUTPATIENT
Start: 2021-09-05 | End: 2021-09-08

## 2021-09-05 RX ADMIN — ALBUTEROL SULFATE 2 PUFF: 90 AEROSOL, METERED RESPIRATORY (INHALATION) at 08:12

## 2021-09-05 RX ADMIN — SODIUM CHLORIDE, PRESERVATIVE FREE 10 ML: 5 INJECTION INTRAVENOUS at 21:30

## 2021-09-05 RX ADMIN — SODIUM CHLORIDE, PRESERVATIVE FREE 10 ML: 5 INJECTION INTRAVENOUS at 21:15

## 2021-09-05 RX ADMIN — LOSARTAN POTASSIUM 50 MG: 50 TABLET, FILM COATED ORAL at 10:28

## 2021-09-05 RX ADMIN — SODIUM CHLORIDE, PRESERVATIVE FREE 10 ML: 5 INJECTION INTRAVENOUS at 08:00

## 2021-09-05 RX ADMIN — CETIRIZINE HYDROCHLORIDE 5 MG: 5 TABLET ORAL at 08:00

## 2021-09-05 RX ADMIN — BUDESONIDE AND FORMOTEROL FUMARATE DIHYDRATE 2 PUFF: 160; 4.5 AEROSOL RESPIRATORY (INHALATION) at 22:04

## 2021-09-05 RX ADMIN — INSULIN DETEMIR 25 UNITS: 100 INJECTION, SOLUTION SUBCUTANEOUS at 08:00

## 2021-09-05 RX ADMIN — CYANOCOBALAMIN TAB 1000 MCG 1000 MCG: 1000 TAB at 08:00

## 2021-09-05 RX ADMIN — OXYCODONE 5 MG: 5 TABLET ORAL at 14:30

## 2021-09-05 RX ADMIN — OXYCODONE 5 MG: 5 TABLET ORAL at 20:35

## 2021-09-05 RX ADMIN — INSULIN ASPART 12 UNITS: 100 INJECTION, SOLUTION INTRAVENOUS; SUBCUTANEOUS at 10:26

## 2021-09-05 RX ADMIN — SODIUM CHLORIDE, PRESERVATIVE FREE 10 ML: 5 INJECTION INTRAVENOUS at 21:25

## 2021-09-05 RX ADMIN — TIZANIDINE 4 MG: 4 TABLET ORAL at 20:35

## 2021-09-05 RX ADMIN — FOLIC ACID 1 MG: 1 TABLET ORAL at 08:00

## 2021-09-05 RX ADMIN — ALBUTEROL SULFATE 2 PUFF: 90 AEROSOL, METERED RESPIRATORY (INHALATION) at 11:11

## 2021-09-05 RX ADMIN — OXYCODONE 5 MG: 5 TABLET ORAL at 05:38

## 2021-09-05 RX ADMIN — ALBUTEROL SULFATE 2 PUFF: 90 AEROSOL, METERED RESPIRATORY (INHALATION) at 22:04

## 2021-09-05 RX ADMIN — CEFTRIAXONE SODIUM 1 G: 1 INJECTION, POWDER, FOR SOLUTION INTRAMUSCULAR; INTRAVENOUS at 13:55

## 2021-09-05 RX ADMIN — BUDESONIDE AND FORMOTEROL FUMARATE DIHYDRATE 2 PUFF: 160; 4.5 AEROSOL RESPIRATORY (INHALATION) at 08:12

## 2021-09-05 RX ADMIN — SODIUM CHLORIDE, PRESERVATIVE FREE 10 ML: 5 INJECTION INTRAVENOUS at 21:00

## 2021-09-05 RX ADMIN — FLUCONAZOLE IN SODIUM CHLORIDE 400 MG: 2 INJECTION, SOLUTION INTRAVENOUS at 10:27

## 2021-09-05 RX ADMIN — METOPROLOL TARTRATE 100 MG: 100 TABLET, FILM COATED ORAL at 20:35

## 2021-09-05 RX ADMIN — PANTOPRAZOLE SODIUM 40 MG: 40 INJECTION, POWDER, FOR SOLUTION INTRAVENOUS at 05:23

## 2021-09-05 RX ADMIN — MONTELUKAST 10 MG: 10 TABLET, FILM COATED ORAL at 20:35

## 2021-09-05 RX ADMIN — ENOXAPARIN SODIUM 130 MG: 150 INJECTION SUBCUTANEOUS at 20:35

## 2021-09-05 RX ADMIN — METOPROLOL TARTRATE 50 MG: 100 TABLET, FILM COATED ORAL at 10:25

## 2021-09-05 RX ADMIN — LOSARTAN POTASSIUM 50 MG: 50 TABLET, FILM COATED ORAL at 08:00

## 2021-09-05 RX ADMIN — DEXAMETHASONE SODIUM PHOSPHATE 6 MG: 4 INJECTION, SOLUTION INTRAMUSCULAR; INTRAVENOUS at 20:34

## 2021-09-05 RX ADMIN — INSULIN DETEMIR 25 UNITS: 100 INJECTION, SOLUTION SUBCUTANEOUS at 21:50

## 2021-09-05 RX ADMIN — METOPROLOL TARTRATE 50 MG: 50 TABLET, FILM COATED ORAL at 08:00

## 2021-09-05 RX ADMIN — AMLODIPINE BESYLATE 5 MG: 10 TABLET ORAL at 10:24

## 2021-09-05 RX ADMIN — OLANZAPINE 10 MG: 10 TABLET, FILM COATED ORAL at 20:34

## 2021-09-05 RX ADMIN — INSULIN ASPART 8 UNITS: 100 INJECTION, SOLUTION INTRAVENOUS; SUBCUTANEOUS at 12:47

## 2021-09-05 RX ADMIN — DEXAMETHASONE SODIUM PHOSPHATE 8 MG: 4 INJECTION, SOLUTION INTRAMUSCULAR; INTRAVENOUS at 08:00

## 2021-09-05 RX ADMIN — FUROSEMIDE 20 MG: 10 INJECTION, SOLUTION INTRAMUSCULAR; INTRAVENOUS at 08:00

## 2021-09-05 RX ADMIN — ALBUTEROL SULFATE 2 PUFF: 90 AEROSOL, METERED RESPIRATORY (INHALATION) at 15:50

## 2021-09-05 RX ADMIN — ENOXAPARIN SODIUM 130 MG: 150 INJECTION SUBCUTANEOUS at 08:00

## 2021-09-05 NOTE — PLAN OF CARE
Goal Outcome Evaluation:  Plan of Care Reviewed With: patient           Outcome Summary: OT eval completed; co-eval with PT. Pt oriented to self and that she is in hospital. Pt confused and had difficulty recalling social history information and following commands. Pt presents extremely weak with UE muscle strength grossly trace to 2-/5 throughout. Pt (D) of 2 person for rolling. Further mobility deferred due to weakness and inabilty to follow commands. Pt would benefit from continued skilled OT services to address deficits and promote return to highest level of function. Will require rehab post acute setting.

## 2021-09-05 NOTE — PLAN OF CARE
Goal Outcome Evaluation:  Plan of Care Reviewed With: patient        Progress: improving  Outcome Summary: Swallow evaluation completed. Pt with s/s of aspiration on over 50% of po trials of water and ice chips. Pt displays multiple swallows and coughing with more than half the po trials. SLP recommends continued NPO and continue coretrac feedings. SLP will return in am. to re-assesss swallow.

## 2021-09-05 NOTE — THERAPY EVALUATION
Patient Name: Corrina Jensen  : 1975    MRN: [id number]                              Today's Date: 2021       Admit Date: 2021    Visit Dx:     ICD-10-CM ICD-9-CM   1. Sepsis with acute renal failure and septic shock, due to unspecified organism, unspecified acute renal failure type (CMS/[facility])  A41.9 038.9    R65.21 995.92    N17.9 785.52     584.9   2. Lab test positive for detection of COVID-19 virus  U07.1 079.89   3. Oropharyngeal dysphagia  R13.12 787.22   4. Impaired mobility and ADLs  Z74.09 V49.89    Z78.9      Patient Active Problem List   Diagnosis   • Chest pain   • Palpitation   • Tachycardia   • Type 2 diabetes mellitus without complication, without long-term current use of insulin (CMS/[facility])   • Angina effort   • Essential hypertension   • Acute pain of right knee   • Effusion, right knee   • Depressive disorder   • Elevated blood sugar   • Gastroesophageal reflux disease without esophagitis   • Midline low back pain with left-sided sciatica   • Other hyperlipidemia   • Sprain of right ankle   • Abnormal mammogram of left breast   • Malignant neoplasm of left breast in female, estrogen receptor positive (CMS/[facility])   • Pancytopenia (CMS/[facility])   • Pneumonia due to COVID-19 virus   • Atypical pneumonia   • Sepsis with acute renal failure and septic shock (CMS/[facility])   • Acute kidney injury (CMS/[facility])   • Sepsis, unspecified organism (CMS/[facility])   • Severe malnutrition (CMS/[facility])     Past Medical History:   Diagnosis Date   • Acute atopic conjunctivitis    • Acute bronchitis    • Allergic rhinitis due to pollen    • Arrhythmia     wearing heart monitor    • Arthritis    • Asthma    • Bilateral foot pain    • Breast cancer (CMS/[facility])    • Breast cyst    • Breast lump    • Chest pain    • Chronic low back pain    • Contact dermatitis due to drugs and medicine    • Cyst of ovary    • Diabetes mellitus (CMS/[facility])    • GERD (gastroesophageal reflux disease)    • Hyperlipidemia    • Hypertension    •  Infestation by Sarcoptes scabiei annalisa hominis    • Low back pain    • Mastodynia    • Nausea and vomiting    • On long term drug therapy    • Pain in wrist    • Plantar fasciitis    • Rash    • Skin disorder     breast-possible superficial cellulitis   • Spinal stenosis    • Tinea pedis    • Tobacco dependence syndrome    • Upper respiratory infection    • Vitamin D deficiency    • Wheezing      Past Surgical History:   Procedure Laterality Date   • BREAST BIOPSY     • BREAST CYST ASPIRATION     • BREAST LUMPECTOMY Left 1/27/2021    Procedure: LEFT BREAST LUMPECTOMY WITH NEEDLE LOCALIZATION Needle [facility] by radiology first   @07:00 a.m.;  Surgeon: Mat Garay MD;  Location: [facility] OR;  Service: General;  Laterality: Left;   • BREAST LUMPECTOMY WITH SENTINEL NODE BIOPSY AND AXILLARY NODE DISSECTION Left 2/22/2021    Procedure: LEFT BREAST LUMPECTOMY WITH SENTINEL NODE BIOPSY. REMOVAL OF CYST FROM LEFT CHEEK                              (INJECTION @07: 00 A.M);  Surgeon: Mat Garay MD;  Location: [facility] OR;  Service: General;  Laterality: Left;   • BREAST SURGERY      excision breast lesion   • CARDIAC CATHETERIZATION N/A 7/3/2018    Procedure: Coronary angiography;  Surgeon: Arun Cadet MD;  Location: [facility] CATH INVASIVE LOCATION;  Service: Cardiovascular   • CARDIAC CATHETERIZATION     • CHOLECYSTECTOMY     • INJECTION OF MEDICATION      Kenalog (4)     General Information     Row Name 09/05/21 1500          OT Time and Intention    Document Type  evaluation  -AS     Mode of Treatment  co-treatment;physical therapy;occupational therapy  -AS     Row Name 09/05/21 1508          General Information    Patient Profile Reviewed  yes  -AS     Prior Level of Function  independent:;all household mobility;ADL's  -AS     Existing Precautions/Restrictions  fall;oxygen therapy device and L/min  -AS     Barriers to Rehab  medically complex;cognitive  status  -AS     [facility] Name 09/05/21 1501          Living Environment    Lives With  grandchild(melany);spouse  -AS     [facility] Name 09/05/21 1501          Stairs Within Home, Primary    Stairs Comment, Within Home, Primary  difficulty recalling any social history information  -AS     [facility] Name 09/05/21 1501          Cognition    Orientation Status (Cognition)  oriented to;person;place  -AS     Row Name 09/05/21 1501          Safety Issues, Functional Mobility    Safety Issues Affecting Function (Mobility)  ability to follow commands;safety precaution awareness;safety precautions follow-through/compliance;insight into deficits/self-awareness  -AS     Impairments Affecting Function (Mobility)  balance;cognition;endurance/activity tolerance;coordination;pain;range of motion (ROM);strength  -AS       User Key  (r) = Recorded By, (t) = Taken By, (c) = Cosigned By    Initials Name Provider Type    AS Perla Biggs OT Occupational Therapist          Mobility/ADL's     Row Name 09/05/21 1501          Bed Mobility    Bed Mobility  rolling left;rolling right  -AS     Rolling Left [place] (Bed Mobility)  dependent (less than 25% patient effort);2 person assist  -AS     Rolling Right [place] (Bed Mobility)  dependent (less than 25% patient effort);2 person assist  -AS     Bed Mobility, Safety Issues  cognitive deficits limit understanding;decreased use of arms for pushing/pulling;decreased use of legs for bridging/pushing  -AS     [facility] Name 09/05/21 1501          Transfers    Comment (Transfers)  NT due to weakness and poor ability to follow commands  -AS       User Key  (r) = Recorded By, (t) = Taken By, (c) = Cosigned By    Initials Name Provider Type    AS Perla Biggs OT Occupational Therapist        Obj/Interventions     Row Name 09/05/21 1501          Sensory Assessment (Somatosensory)    Sensory Assessment (Somatosensory)  UE sensation intact  -AS     Row Name 09/05/21 1501          Range of Motion Comprehensive     Comment, General Range of Motion  PROM WFL; able to participate in some AAROM in hand and elbow; has some shoulder shrug  -AS     Row Name 09/05/21 1501          Strength Comprehensive (MMT)    Comment, General Manual Muscle Testing (MMT) Assessment  BUE grossly 1/5 to 2-/5 throughout  -AS       User Key  (r) = Recorded By, (t) = Taken By, (c) = Cosigned By    Initials Name Provider Type    AS Perla Biggs, OT Occupational Therapist        Goals/Plan     Row Name 09/05/21 1501          Transfer Goal 1 (OT)    Activity/Assistive Device (Transfer Goal 1, OT)  sit-to-stand/stand-to-sit;bed-to-chair/chair-to-bed;toilet  -AS     [place] Level/Cues Needed (Transfer Goal 1, OT)  moderate assist (50-74% patient effort)  -AS     Time Frame (Transfer Goal 1, OT)  long term goal (LTG);by discharge  -AS     Progress/Outcome (Transfer Goal 1, OT)  goal not met  -AS     Row Name 09/05/21 1501          Bathing Goal 1 (OT)    Activity/Device (Bathing Goal 1, OT)  upper body bathing  -AS     [place] Level/Cues Needed (Bathing Goal 1, OT)  minimum assist (75% or more patient effort)  -AS     Time Frame (Bathing Goal 1, OT)  long term goal (LTG);by discharge  -AS     Progress/Outcomes (Bathing Goal 1, OT)  goal not met  -AS     Row Name 09/05/21 1501          Grooming Goal 1 (OT)    Activity/Device (Grooming Goal 1, OT)  wash face, hands  -AS     [place] (Grooming Goal 1, OT)  supervision required;set-up required  -AS     Time Frame (Grooming Goal 1, OT)  short term goal (STG);5 days  -AS     Progress/Outcome (Grooming Goal 1, OT)  goal not met  -AS     Row Name 09/05/21 1501          ROM Goal 1 (OT)    ROM Goal 1 (OT)  Pt will tolerate 3 sets 10 reps AAROM/AROM  -AS     Time Frame (ROM Goal 1, OT)  long term goal (LTG);by discharge  -AS     Progress/Outcome (ROM Goal 1, OT)  goal not met  -AS     Row Name 09/05/21 1501          Problem Specific Goal 1 (OT)    Problem Specific Goal 1 (OT)  Pt will tolerate  sitting EOB with mod A in prep for functional transfers.  -AS     Time Frame (Problem Specific Goal 1, OT)  long term goal (LTG);by discharge  -AS     Progress/Outcome (Problem Specific Goal 1, OT)  goal not met  -AS     Row Name 09/05/21 1501          Therapy Assessment/Plan (OT)    Planned Therapy Interventions (OT)  activity tolerance training;patient/caregiver education/training;ROM/therapeutic exercise;strengthening exercise;transfer/mobility retraining;adaptive equipment training;BADL retraining;functional balance retraining;occupation/activity based interventions  -AS       User Key  (r) = Recorded By, (t) = Taken By, (c) = Cosigned By    Initials Name Provider Type    AS Perla Biggs OT Occupational Therapist        Clinical Impression     Row Name 09/05/21 1501          Pain Assessment    Additional Documentation  Pain Scale: Numbers Pre/Post-Treatment (Group)  -AS     Row Name 09/05/21 1501          Pain Scale: Numbers Pre/Post-Treatment    Pretreatment Pain Rating  0/10 - no pain  -AS     Pre/Posttreatment Pain Comment  reports pain during LUE ROM and rolling  -AS     Pain Intervention(s)  Repositioned;Rest  -AS     Row Name 09/05/21 1501          Plan of Care Review    Plan of Care Reviewed With  patient  -AS     Row Name 09/05/21 1501          Therapy Assessment/Plan (OT)    Patient/Family Therapy Goal Statement (OT)  unable  -AS     Rehab Potential (OT)  fair, will monitor progress closely  -AS     Criteria for Skilled Therapeutic Interventions Met (OT)  yes;meets criteria;skilled treatment is necessary  -AS     Therapy Frequency (OT)  other (see comments) 5-7days/wk  -AS     Predicted Duration of Therapy Intervention (OT)  until goals met or d/c from facility  -AS     Row Name 09/05/21 1501          Vital Signs    Pre Systolic BP Rehab  157  -AS     Pre Treatment Diastolic BP  74  -AS     Post Systolic BP Rehab  166  -AS     Post Treatment Diastolic BP  80  -AS     Pretreatment Heart Rate  (beats/min)  71  -AS     Posttreatment Heart Rate (beats/min)  70  -AS     Pre SpO2 (%)  94  -AS     O2 Delivery Pre Treatment  nasal cannula 4LPM  -AS     Post SpO2 (%)  95  -AS     O2 Delivery Post Treatment  nasal cannula 4LPM  -AS     Pre Patient Position  Supine  -AS     Post Patient Position  Supine  -AS     Row Name 09/05/21 1501          Positioning and Restraints    Pre-Treatment Position  in bed  -AS     Post Treatment Position  bed  -AS     In Bed  RUE elevated;LUE elevated;SCD pump applied;fowlers;call light within reach;encouraged to call for assist;patient within staff view;notified nsg  -AS       User Key  (r) = Recorded By, (t) = Taken By, (c) = Cosigned By    Initials Name Provider Type    AS Perla Biggs OT Occupational Therapist        Outcome Measures     Row Name 09/05/21 1501          How much help from another is currently needed...    Putting on and taking off regular lower body clothing?  1  -AS     Bathing (including washing, rinsing, and drying)  1  -AS     Toileting (which includes using toilet bed pan or urinal)  1  -AS     Putting on and taking off regular upper body clothing  1  -AS     Taking care of personal grooming (such as brushing teeth)  1  -AS     Eating meals  1  -AS     AM-PAC 6 Clicks Score (OT)  6  -AS     Row Name 09/05/21 1501          Functional Assessment    Outcome Measure Options  AM-PAC 6 Clicks Daily Activity (OT)  -AS       User Key  (r) = Recorded By, (t) = Taken By, (c) = Cosigned By    Initials Name Provider Type    AS Perla Biggs OT Occupational Therapist          Occupational Therapy Education                 Title: PT OT SLP Therapies (In Progress)     Topic: Occupational Therapy (In Progress)     Point: ADL training (Not Started)     Description:   Instruct learner(s) on proper safety adaptation and remediation techniques during self care or transfers.   Instruct in proper use of assistive devices.              Learner Progress:  Not documented in  this visit.          Point: Home exercise program (Not Started)     Description:   Instruct learner(s) on appropriate technique for monitoring, assisting and/or progressing therapeutic exercises/activities.              Learner Progress:  Not documented in this visit.          Point: Precautions (In Progress)     Description:   Instruct learner(s) on prescribed precautions during self-care and functional transfers.              Learning Progress Summary           Patient Acceptance, E, NR,NL by AS at 9/5/2021 [id number]    Comment: role of OT, OT POC, bed mobility, UE assessment                   Point: Body mechanics (Not Started)     Description:   Instruct learner(s) on proper positioning and spine alignment during self-care, functional mobility activities and/or exercises.              Learner Progress:  Not documented in this visit.                      User Key     Initials Effective Dates Name Provider Type Discipline    AS 03/18/21 -  Perla Biggs, OT Occupational Therapist OT              OT Recommendation and Plan  Planned Therapy Interventions (OT): activity tolerance training, patient/caregiver education/training, ROM/therapeutic exercise, strengthening exercise, transfer/mobility retraining, adaptive equipment training, BADL retraining, functional balance retraining, occupation/activity based interventions  Therapy Frequency (OT): other (see comments) (5-7days/wk)  Plan of Care Review  Plan of Care Reviewed With: patient  Outcome Summary: OT eval completed; co-eval with PT. Pt oriented to self and that she is in hospital. Pt confused and had difficulty recalling social history information and following commands. Pt presents extremely weak with UE muscle strength grossly trace to 2-/5 throughout. Pt (D) of 2 person for rolling. Further mobility deferred due to weakness and inabilty to follow commands. Pt would benefit from continued skilled OT services to address deficits and promote return to highest  level of function. Will require rehab post acute setting.     Time Calculation:   Time Calculation- OT     Row Name 09/05/21 1618             Time Calculation- OT    OT Start Time  1500  -AS      OT Stop Time  1524  -AS      OT Time Calculation (min)  24 min  -AS      OT Received On  09/05/21  -AS      OT Goal Re-Cert Due Date  09/18/21  -AS         Untimed Charges    OT Eval/Re-eval Minutes  24  -AS         Total Minutes    Untimed Charges Total Minutes  24  -AS       Total Minutes  24  -AS        User Key  (r) = Recorded By, (t) = Taken By, (c) = Cosigned By    Initials Name Provider Type    AS Perla Biggs OT Occupational Therapist        Therapy Charges for Today     Code Description Service Date Service Provider Modifiers Qty    [id number] HC OT EVAL MOD COMPLEXITY 2 9/5/2021 Perla Biggs OT GO 1               Perla Biggs OT  9/5/2021

## 2021-09-05 NOTE — THERAPY EVALUATION
Patient Name: Corrina Jensen  : 1975    MRN: [id number]                              Today's Date: 2021       Admit Date: 2021    Visit Dx:     ICD-10-CM ICD-9-CM   1. Sepsis with acute renal failure and septic shock, due to unspecified organism, unspecified acute renal failure type (CMS/[facility])  A41.9 038.9    R65.21 995.92    N17.9 785.52     584.9   2. Lab test positive for detection of COVID-19 virus  U07.1 079.89   3. Oropharyngeal dysphagia  R13.12 787.22   4. Impaired mobility and ADLs  Z74.09 V49.89    Z78.9    5. Impaired physical mobility  Z74.09 781.99     Patient Active Problem List   Diagnosis   • Chest pain   • Palpitation   • Tachycardia   • Type 2 diabetes mellitus without complication, without long-term current use of insulin (CMS/[facility])   • Angina effort   • Essential hypertension   • Acute pain of right knee   • Effusion, right knee   • Depressive disorder   • Elevated blood sugar   • Gastroesophageal reflux disease without esophagitis   • Midline low back pain with left-sided sciatica   • Other hyperlipidemia   • Sprain of right ankle   • Abnormal mammogram of left breast   • Malignant neoplasm of left breast in female, estrogen receptor positive (CMS/[facility])   • Pancytopenia (CMS/[facility])   • Pneumonia due to COVID-19 virus   • Atypical pneumonia   • Sepsis with acute renal failure and septic shock (CMS/[facility])   • Acute kidney injury (CMS/[facility])   • Sepsis, unspecified organism (CMS/[facility])   • Severe malnutrition (CMS/[facility])     Past Medical History:   Diagnosis Date   • Acute atopic conjunctivitis    • Acute bronchitis    • Allergic rhinitis due to pollen    • Arrhythmia     wearing heart monitor    • Arthritis    • Asthma    • Bilateral foot pain    • Breast cancer (CMS/[facility])    • Breast cyst    • Breast lump    • Chest pain    • Chronic low back pain    • Contact dermatitis due to drugs and medicine    • Cyst of ovary    • Diabetes mellitus (CMS/[facility])    • GERD (gastroesophageal reflux  disease)    • Hyperlipidemia    • Hypertension    • Infestation by Sarcoptes scabiei annalisa hominis    • Low back pain    • Mastodynia    • Nausea and vomiting    • On long term drug therapy    • Pain in wrist    • Plantar fasciitis    • Rash    • Skin disorder     breast-possible superficial cellulitis   • Spinal stenosis    • Tinea pedis    • Tobacco dependence syndrome    • Upper respiratory infection    • Vitamin D deficiency    • Wheezing      Past Surgical History:   Procedure Laterality Date   • BREAST BIOPSY     • BREAST CYST ASPIRATION     • BREAST LUMPECTOMY Left 1/27/2021    Procedure: LEFT BREAST LUMPECTOMY WITH NEEDLE LOCALIZATION Needle [facility] done [facility] by radiology first   @07:00 a.m.;  Surgeon: Mat Garay MD;  Location: [facility] OR;  Service: General;  Laterality: Left;   • BREAST LUMPECTOMY WITH SENTINEL NODE BIOPSY AND AXILLARY NODE DISSECTION Left 2/22/2021    Procedure: LEFT BREAST LUMPECTOMY WITH SENTINEL NODE BIOPSY. REMOVAL OF CYST FROM LEFT CHEEK                              (INJECTION @07: 00 A.M);  Surgeon: Mat Garay MD;  Location: [facility] OR;  Service: General;  Laterality: Left;   • BREAST SURGERY      excision breast lesion   • CARDIAC CATHETERIZATION N/A 7/3/2018    Procedure: Coronary angiography;  Surgeon: Arun Cadet MD;  Location: [facility] CATH INVASIVE LOCATION;  Service: Cardiovascular   • CARDIAC CATHETERIZATION     • CHOLECYSTECTOMY     • INJECTION OF MEDICATION      Kenalog (4)     General Information     Row Name 09/05/21 1500          Physical Therapy Time and Intention    Document Type  evaluation  -CZ     Mode of Treatment  co-treatment;physical therapy;occupational therapy  -CZ     Row Name 09/05/21 1500          General Information    Patient Profile Reviewed  yes  -CZ     Prior Level of Function  independent:;all household mobility  -CZ     Existing Precautions/Restrictions  fall;oxygen therapy device and L/min   -CZ     Barriers to Rehab  medically complex;cognitive status  -CZ     Row Name 09/05/21 1510 09/05/21 1500       Living Environment    Lives With  --  -CZ  spouse;grandchild(melany)  -    Row Name 09/05/21 1500          Cognition    Orientation Status (Cognition)  oriented to;person;place  -CZ     Row Name 09/05/21 1500          Safety Issues, Functional Mobility    Impairments Affecting Function (Mobility)  strength;endurance/activity tolerance;cognition;balance;coordination;motor planning;motor control;pain  -CZ       User Key  (r) = Recorded By, (t) = Taken By, (c) = Cosigned By    Initials Name Provider Type    CZ Kenny Lam, PT Physical Therapist        Mobility     Row Name 09/05/21 1500          Bed Mobility    Bed Mobility  rolling right;rolling left  -CZ     Rolling Left [place] (Bed Mobility)  dependent (less than 25% patient effort);2 person assist  -CZ     Rolling Right [place] (Bed Mobility)  dependent (less than 25% patient effort);2 person assist  -CZ     Row Name 09/05/21 1500          Bed-Chair Transfer    Bed-Chair [place] (Transfers)  not tested  -CZ     Row Name 09/05/21 1500          Sit-Stand Transfer    Sit-Stand [place] (Transfers)  not tested  -CZ     Row Name 09/05/21 1500          Gait/Stairs (Locomotion)    [place] Level (Gait)  not tested  -       User Key  (r) = Recorded By, (t) = Taken By, (c) = Cosigned By    Initials Name Provider Type    CZ Kenny Lam, PT Physical Therapist        Obj/Interventions     Row Name 09/05/21 1500          Range of Motion Comprehensive    General Range of Motion  bilateral lower extremity ROM WFL  -     Row Name 09/05/21 1500          Strength Comprehensive (MMT)    General Manual Muscle Testing (MMT) Assessment  other (see comments)  -     Comment, General Manual Muscle Testing (MMT) Assessment  BLEs2+/5 grossly.  -     Row Name 09/05/21 1500          Sensory Assessment (Somatosensory)    Sensory Assessment  (Somatosensory)  unable/difficult to assess  -CZ       User Key  (r) = Recorded By, (t) = Taken By, (c) = Cosigned By    Initials Name Provider Type    CZ Kenny Lam, PT Physical Therapist        Goals/Plan     Row Name 09/05/21 1500          Bed Mobility Goal 1 (PT)    Activity/Assistive Device (Bed Mobility Goal 1, PT)  sit to supine/supine to sit  -CZ     [place] Level/Cues Needed (Bed Mobility Goal 1, PT)  contact guard assist  -CZ     Time Frame (Bed Mobility Goal 1, PT)  by discharge  -CZ     Strategies/Barriers (Bed Mobility Goal 1, PT)  HOB flat, no bed rails.  -CZ     Progress/Outcomes (Bed Mobility Goal 1, PT)  goal not met  -CZ     Row Name 09/05/21 1500          Transfer Goal 1 (PT)    Activity/Assistive Device (Transfer Goal 1, PT)  sit-to-stand/stand-to-sit;bed-to-chair/chair-to-bed;walker, rolling  -CZ     [place] Level/Cues Needed (Transfer Goal 1, PT)  contact guard assist  -CZ     Time Frame (Transfer Goal 1, PT)  by discharge  -CZ     Strategies/Barriers (Transfers Goal 1, PT)  Profoundly weak.  -CZ     Progress/Outcome (Transfer Goal 1, PT)  goal not met  -CZ     Row Name 09/05/21 1500          Gait Training Goal 1 (PT)    Activity/Assistive Device (Gait Training Goal 1, PT)  walker, rolling  -CZ     [place] Level (Gait Training Goal 1, PT)  contact guard assist  -CZ     Distance (Gait Training Goal 1, PT)  25'x1.  -CZ     Time Frame (Gait Training Goal 1, PT)  by discharge  -CZ     Strategies/Barriers (Gait Training Goal 1, PT)  Profoundly weak.  -CZ       User Key  (r) = Recorded By, (t) = Taken By, (c) = Cosigned By    Initials Name Provider Type    CZ Kenny Lam, PT Physical Therapist        Clinical Impression     Row Name 09/05/21 1500          Pain    Additional Documentation  Pain Scale: Numbers Pre/Post-Treatment (Group)  -CZ     Row Name 09/05/21 1500          Pain Scale: Numbers Pre/Post-Treatment    Pretreatment Pain Rating  0/10 - no pain  -CZ      Posttreatment Pain Rating  0/10 - no pain  -CZ     Row Name 09/05/21 1500          Plan of Care Review    Plan of Care Reviewed With  patient  -CZ     Outcome Summary  Initial PT evaluation complete, co-evaluation with OT.  Patient is alert, very confused, follows commands poorly.  BLE ROM appears WFL, but strength is quite limited at 2+/5 grossly.  Patient is depenedent x 2 for bed mobility.  Patient will likely need SNF or ARU placement for rehab prior to return home with family.  Goals established, continue skilled PT.  -CZ     Row Name 09/05/21 1500          Therapy Assessment/Plan (PT)    Rehab Potential (PT)  good, to achieve stated therapy goals  -CZ     Criteria for Skilled Interventions Met (PT)  yes;skilled treatment is necessary  -CZ     Row Name 09/05/21 1500          Vital Signs    Pre Systolic BP Rehab  157  -CZ     Pre Treatment Diastolic BP  74  -CZ     Post Systolic BP Rehab  166  -CZ     Post Treatment Diastolic BP  80  -CZ     Pretreatment Heart Rate (beats/min)  71  -CZ     Posttreatment Heart Rate (beats/min)  70  -CZ     Pre SpO2 (%)  94  -CZ     O2 Delivery Pre Treatment  nasal cannula 4 LPM  -CZ     Post SpO2 (%)  95  -CZ     O2 Delivery Post Treatment  nasal cannula  -CZ     Pre Patient Position  Supine  -CZ     Post Patient Position  Supine  -CZ     Row Name 09/05/21 1500          Positioning and Restraints    Pre-Treatment Position  in bed  -CZ     Post Treatment Position  bed  -CZ     In Bed  supine;call light within reach;encouraged to call for assist  -CZ       User Key  (r) = Recorded By, (t) = Taken By, (c) = Cosigned By    Initials Name Provider Type    CZ Kenny Lam, PT Physical Therapist        Outcome Measures     Row Name 09/05/21 1500          How much help from another person do you currently need...    Turning from your back to your side while in flat bed without using bedrails?  1  -CZ     Moving from lying on back to sitting on the side of a flat bed without bedrails?   1  -CZ     Moving to and from a bed to a chair (including a wheelchair)?  1  -CZ     Standing up from a chair using your arms (e.g., wheelchair, bedside chair)?  1  -CZ     Climbing 3-5 steps with a railing?  1  -CZ     To walk in hospital room?  1  -CZ     AM-PAC 6 Clicks Score (PT)  6  -CZ     Row Name 09/05/21 1501 09/05/21 1500       Functional Assessment    Outcome Measure Options  AM-PAC 6 Clicks Daily Activity (OT)  -AS  AM-PAC 6 Clicks Basic Mobility (PT)  -      User Key  (r) = Recorded By, (t) = Taken By, (c) = Cosigned By    Initials Name Provider Type    CZ Kenny Lam, PT Physical Therapist    AS Perla Biggs, OT Occupational Therapist                       Physical Therapy Education                 Title: PT OT SLP Therapies (In Progress)     Topic: Physical Therapy (In Progress)     Point: Mobility training (In Progress)     Learning Progress Summary           Patient Acceptance, E, NR,NL by  at 9/5/2021 [id number]    Comment: PT POC, goals.                   Point: Home exercise program (Not Started)     Learner Progress:  Not documented in this visit.          Point: Body mechanics (Not Started)     Learner Progress:  Not documented in this visit.          Point: Precautions (Not Started)     Learner Progress:  Not documented in this visit.                      User Key     Initials Effective Dates Name Provider Type Discipline     06/16/21 -  Kenny Lam, PT Physical Therapist PT              PT Recommendation and Plan  Planned Therapy Interventions (PT): balance training, bed mobility training, gait training, patient/family education, transfer training, strengthening, stretching  Plan of Care Reviewed With: patient  Outcome Summary: Initial PT evaluation complete, co-evaluation with OT.  Patient is alert, very confused, follows commands poorly.  BLE ROM appears WFL, but strength is quite limited at 2+/5 grossly.  Patient is depenedent x 2 for bed mobility.  Patient will likely need  SNF or ARU placement for rehab prior to return home with family.  Goals established, continue skilled PT.     Time Calculation:   PT Charges     Row Name 09/05/21 1636             Time Calculation    Start Time  1500  -CZ      Stop Time  1524  -CZ      Time Calculation (min)  24 min  -CZ      PT Received On  09/05/21  -CZ      PT Goal Re-Cert Due Date  09/18/21  -CZ         Untimed Charges    PT Eval/Re-eval Minutes  24  -CZ         Total Minutes    Untimed Charges Total Minutes  24  -CZ       Total Minutes  24  -CZ        User Key  (r) = Recorded By, (t) = Taken By, (c) = Cosigned By    Initials Name Provider Type    CZ Kenny Lam, PT Physical Therapist        Therapy Charges for Today     Code Description Service Date Service Provider Modifiers Qty    88863898608 HC PT EVAL MOD COMPLEXITY 2 9/5/2021 Kenny Lam, PT GP 1          PT G-Codes  Outcome Measure Options: AM-PAC 6 Clicks Daily Activity (OT)  AM-PAC 6 Clicks Score (PT): 6  AM-PAC 6 Clicks Score (OT): 6    Kenny Lam PT  9/5/2021

## 2021-09-05 NOTE — PROGRESS NOTES
[facility] Medicine Services  INPATIENT PROGRESS NOTE    Length of Stay: 18  Date of Admission: 8/17/2021  Primary Care Physician: Jose Jean-Baptiste MD    Subjective   Chief Complaint: Respiratory failure  HPI: Patient has not required any further supplemental oxygen since extubation.  She is currently awake and denies any pain or concerns but is also confused and states she is in [facility] and cannot elaborate further on orientation questions.    Review of Systems   Unable to perform ROS: Mental status change      All pertinent negatives and positives are as above. All other systems have been reviewed and are negative unless otherwise stated.     Objective    Temp:  [96.5 °F (35.8 °C)-98.3 °F (36.8 °C)] 96.5 °F (35.8 °C)  Heart Rate:  [55-83] 68  Resp:  [13-18] 16  BP: (122-192)/(56-82) 178/77    Physical Exam  Constitutional:       General: She is not in acute distress.     Appearance: She is not toxic-appearing.   HENT:      Head: Normocephalic and atraumatic.      Right Ear: External ear normal.      Left Ear: External ear normal.      Nose: Nose normal.      Comments: Core track in place     Mouth/Throat:      Mouth: Mucous membranes are moist.      Pharynx: Oropharynx is clear.   Eyes:      Conjunctiva/sclera: Conjunctivae normal.   Cardiovascular:      Rate and Rhythm: Normal rate and regular rhythm.      Pulses: Normal pulses.      Heart sounds: Normal heart sounds.   Pulmonary:      Comments: Diminished bilaterally but otherwise clear  Abdominal:      General: Bowel sounds are normal.      Palpations: Abdomen is soft.      Tenderness: There is no abdominal tenderness.   Musculoskeletal:         General: Swelling present.      Cervical back: Neck supple.   Skin:     General: Skin is warm and dry.      Capillary Refill: Capillary refill takes less than 2 seconds.   Neurological:      Comments: Follows commands, moves extremities, speech is soft but  fluent and understandable.   Psychiatric:         Mood and Affect: Mood normal.         Behavior: Behavior normal.         Results Review:  I have reviewed the labs, radiology results, and diagnostic studies.    Laboratory Data:   Results from last 7 days   Lab Units 09/05/21 0315 09/04/21 1837 09/04/21 0433 09/03/21 0527 09/03/21 0527   SODIUM mmol/L 149*  --  144  --  150*   POTASSIUM mmol/L 4.0 4.2 3.6   < > 4.0   CHLORIDE mmol/L 114*  --  107  --  110*   CO2 mmol/L 27.0  --  25.0  --  27.0   BUN mg/dL 39*  --  36*  --  31*   CREATININE mg/dL 0.42*  --  0.46*  --  0.51*   GLUCOSE mg/dL 291*  --  345*  --  302*   CALCIUM mg/dL 9.5  --  9.0  --  9.4   BILIRUBIN mg/dL 0.6  --  0.9  --  0.7   ALK PHOS U/L 104  --  103  --  116   ALT (SGPT) U/L 67*  --  62*  --  60*   AST (SGOT) U/L 47*  --  48*  --  54*   ANION GAP mmol/L 8.0  --  12.0  --  13.0    < > = values in this interval not displayed.     Estimated Creatinine Clearance: 210 mL/min (A) (by C-G formula based on SCr of 0.42 mg/dL (L)).  Results from last 7 days   Lab Units 09/05/21 0315   MAGNESIUM mg/dL 2.3         Results from last 7 days   Lab Units 09/05/21 0315 09/04/21 0433 09/03/21 0527 09/02/21 0427 09/01/21  0245   WBC 10*3/mm3 4.69 6.86 7.92 5.54 6.18   HEMOGLOBIN g/dL 10.0* 10.3* 10.4* 10.3* 9.7*   HEMATOCRIT % 31.6* 31.3* 31.8* 31.4* 30.7*   PLATELETS 10*3/mm3 142 132* 155 150 152     Results from last 7 days   Lab Units 09/05/21 0315 09/03/21 0527 09/01/21  0245 08/30/21  1454   INR  1.18 1.10 1.23* 1.14       Culture Data:   No results found for: BLOODCX  No results found for: URINECX  No results found for: RESPCX  No results found for: WOUNDCX  No results found for: STOOLCX  No components found for: BODYFLD    Radiology Data:   Imaging Results (Last 24 Hours)     ** No results found for the last 24 hours. **          I have reviewed the patient's current medications.     Assessment/Plan     Principal Problem:    Sepsis, unspecified  organism (CMS/[facility])  Active Problems:    Type 2 diabetes mellitus without complication, without long-term current use of insulin (CMS/[facility])    Pneumonia due to COVID-19 virus    Atypical pneumonia    Sepsis with acute renal failure and septic shock (CMS/[facility])    Acute kidney injury (CMS/[facility])    Severe malnutrition (CMS/[facility])  Transaminitis  Pyuria  Delirium    Plan:  -Continue supplemental oxygen wean as tolerated.  -Extubation order has been placed.  -Repeat blood cultures ordered given her temperature spike the other night but show no growth to date.  -Continue Cardene if required for elevated blood pressure readings  -Increase amlodipine to 10 mg daily, increase metoprolol 100 mg twice daily, and increase losartan 100 mg daily for blood pressure control.  -Continue Decadron but will decrease to 6 mg twice daily.  -Continue Covid monitoring labs  -She has completed a course of cefepime for suspected underlying pneumonia  -Continue tube feeds for nutrition  -Continue daily Lasix.  -Continue Rocephin and Diflucan  -Continue Levemir 25 units twice daily and increase sliding scale coverage to high-dose sliding scale.  -DVT prophylaxis with treatment dose Lovenox  -CODE STATUS: Full    33 minutes of critical care provided. This time excludes other billable procedures. Time does include preparation of documents, medical consultations, review of old records, and direct bedside care.    I confirmed that the patient's Advance Care Plan is present, code status is documented, or surrogate decision maker is listed in the patient's medical record.    Discharge Planning: In process.  Continue CCU level care for now since as she is requiring the Cardene drip.    Logan Stein MD

## 2021-09-05 NOTE — PLAN OF CARE
Goal Outcome Evaluation:  Plan of Care Reviewed With: patient           Outcome Summary: Initial PT evaluation complete, co-evaluation with OT.  Patient is alert, very confused, follows commands poorly.  BLE ROM appears WFL, but strength is quite limited at 2+/5 grossly.  Patient is depenedent x 2 for bed mobility.  Patient will likely need SNF or ARU placement for rehab prior to return home with family.  Goals established, continue skilled PT.

## 2021-09-06 LAB
ALBUMIN SERPL-MCNC: 2.9 G/DL (ref 3.5–5.2)
ALBUMIN/GLOB SERPL: 0.9 G/DL
ALP SERPL-CCNC: 102 U/L (ref 39–117)
ALT SERPL W P-5'-P-CCNC: 114 U/L (ref 1–33)
ANION GAP SERPL CALCULATED.3IONS-SCNC: 11 MMOL/L (ref 5–15)
AST SERPL-CCNC: 92 U/L (ref 1–32)
BASOPHILS # BLD AUTO: 0.01 10*3/MM3 (ref 0–0.2)
BASOPHILS NFR BLD AUTO: 0.2 % (ref 0–1.5)
BILIRUB SERPL-MCNC: 0.5 MG/DL (ref 0–1.2)
BUN SERPL-MCNC: 44 MG/DL (ref 6–20)
BUN/CREAT SERPL: 107.3 (ref 7–25)
CALCIUM SPEC-SCNC: 9.5 MG/DL (ref 8.6–10.5)
CHLORIDE SERPL-SCNC: 114 MMOL/L (ref 98–107)
CO2 SERPL-SCNC: 24 MMOL/L (ref 22–29)
CREAT SERPL-MCNC: 0.41 MG/DL (ref 0.57–1)
CRP SERPL-MCNC: <0.3 MG/DL (ref 0–0.5)
DEPRECATED RDW RBC AUTO: 49.7 FL (ref 37–54)
EOSINOPHIL # BLD AUTO: 0 10*3/MM3 (ref 0–0.4)
EOSINOPHIL NFR BLD AUTO: 0 % (ref 0.3–6.2)
ERYTHROCYTE [DISTWIDTH] IN BLOOD BY AUTOMATED COUNT: 15.4 % (ref 12.3–15.4)
GFR SERPL CREATININE-BSD FRML MDRD: >150 ML/MIN/1.73
GLOBULIN UR ELPH-MCNC: 3.3 GM/DL
GLUCOSE BLDC GLUCOMTR-MCNC: 181 MG/DL (ref 70–130)
GLUCOSE BLDC GLUCOMTR-MCNC: 247 MG/DL (ref 70–130)
GLUCOSE BLDC GLUCOMTR-MCNC: 255 MG/DL (ref 70–130)
GLUCOSE SERPL-MCNC: 259 MG/DL (ref 65–99)
HCT VFR BLD AUTO: 33.5 % (ref 34–46.6)
HGB BLD-MCNC: 10.9 G/DL (ref 12–15.9)
IMM GRANULOCYTES # BLD AUTO: 0.09 10*3/MM3 (ref 0–0.05)
IMM GRANULOCYTES NFR BLD AUTO: 1.4 % (ref 0–0.5)
LYMPHOCYTES # BLD AUTO: 0.49 10*3/MM3 (ref 0.7–3.1)
LYMPHOCYTES NFR BLD AUTO: 7.7 % (ref 19.6–45.3)
MCH RBC QN AUTO: 29.9 PG (ref 26.6–33)
MCHC RBC AUTO-ENTMCNC: 32.5 G/DL (ref 31.5–35.7)
MCV RBC AUTO: 92 FL (ref 79–97)
MONOCYTES # BLD AUTO: 0.45 10*3/MM3 (ref 0.1–0.9)
MONOCYTES NFR BLD AUTO: 7.1 % (ref 5–12)
NEUTROPHILS NFR BLD AUTO: 5.29 10*3/MM3 (ref 1.7–7)
NEUTROPHILS NFR BLD AUTO: 83.6 % (ref 42.7–76)
NRBC BLD AUTO-RTO: 0 /100 WBC (ref 0–0.2)
PLATELET # BLD AUTO: 143 10*3/MM3 (ref 140–450)
PMV BLD AUTO: 11 FL (ref 6–12)
POTASSIUM SERPL-SCNC: 3.6 MMOL/L (ref 3.5–5.2)
POTASSIUM SERPL-SCNC: 4.2 MMOL/L (ref 3.5–5.2)
PROT SERPL-MCNC: 6.2 G/DL (ref 6–8.5)
RBC # BLD AUTO: 3.64 10*6/MM3 (ref 3.77–5.28)
SODIUM SERPL-SCNC: 149 MMOL/L (ref 136–145)
WBC # BLD AUTO: 6.33 10*3/MM3 (ref 3.4–10.8)

## 2021-09-06 PROCEDURE — 94799 UNLISTED PULMONARY SVC/PX: CPT

## 2021-09-06 PROCEDURE — 63710000001 INSULIN ASPART PER 5 UNITS: Performed by: FAMILY MEDICINE

## 2021-09-06 PROCEDURE — 80053 COMPREHEN METABOLIC PANEL: CPT | Performed by: FAMILY MEDICINE

## 2021-09-06 PROCEDURE — 86140 C-REACTIVE PROTEIN: CPT | Performed by: FAMILY MEDICINE

## 2021-09-06 PROCEDURE — 92526 ORAL FUNCTION THERAPY: CPT | Performed by: SPEECH-LANGUAGE PATHOLOGIST

## 2021-09-06 PROCEDURE — 25010000002 ENOXAPARIN PER 10 MG: Performed by: FAMILY MEDICINE

## 2021-09-06 PROCEDURE — 85025 COMPLETE CBC W/AUTO DIFF WBC: CPT | Performed by: FAMILY MEDICINE

## 2021-09-06 PROCEDURE — 25010000002 DEXAMETHASONE PER 1 MG: Performed by: FAMILY MEDICINE

## 2021-09-06 PROCEDURE — 25010000002 FUROSEMIDE PER 20 MG: Performed by: FAMILY MEDICINE

## 2021-09-06 PROCEDURE — 84132 ASSAY OF SERUM POTASSIUM: CPT | Performed by: FAMILY MEDICINE

## 2021-09-06 PROCEDURE — 25010000002 FLUCONAZOLE PER 200 MG: Performed by: FAMILY MEDICINE

## 2021-09-06 PROCEDURE — 63710000001 INSULIN DETEMIR PER 5 UNITS: Performed by: FAMILY MEDICINE

## 2021-09-06 PROCEDURE — 82962 GLUCOSE BLOOD TEST: CPT

## 2021-09-06 PROCEDURE — 97530 THERAPEUTIC ACTIVITIES: CPT

## 2021-09-06 RX ORDER — FLUCONAZOLE 2 MG/ML
400 INJECTION, SOLUTION INTRAVENOUS DAILY
Status: DISCONTINUED | OUTPATIENT
Start: 2021-09-06 | End: 2021-09-08

## 2021-09-06 RX ADMIN — ENOXAPARIN SODIUM 130 MG: 150 INJECTION SUBCUTANEOUS at 09:04

## 2021-09-06 RX ADMIN — CYANOCOBALAMIN TAB 1000 MCG 1000 MCG: 1000 TAB at 08:00

## 2021-09-06 RX ADMIN — AMLODIPINE BESYLATE 10 MG: 10 TABLET ORAL at 08:00

## 2021-09-06 RX ADMIN — SODIUM CHLORIDE, PRESERVATIVE FREE 10 ML: 5 INJECTION INTRAVENOUS at 08:01

## 2021-09-06 RX ADMIN — INSULIN DETEMIR 30 UNITS: 100 INJECTION, SOLUTION SUBCUTANEOUS at 22:32

## 2021-09-06 RX ADMIN — CHLORHEXIDINE GLUCONATE 15 ML: 1.2 RINSE ORAL at 22:30

## 2021-09-06 RX ADMIN — ALBUTEROL SULFATE 2 PUFF: 90 AEROSOL, METERED RESPIRATORY (INHALATION) at 19:58

## 2021-09-06 RX ADMIN — ALBUTEROL SULFATE 2 PUFF: 90 AEROSOL, METERED RESPIRATORY (INHALATION) at 11:44

## 2021-09-06 RX ADMIN — CHLORHEXIDINE GLUCONATE 15 ML: 1.2 RINSE ORAL at 08:00

## 2021-09-06 RX ADMIN — FUROSEMIDE 20 MG: 10 INJECTION, SOLUTION INTRAMUSCULAR; INTRAVENOUS at 08:00

## 2021-09-06 RX ADMIN — DEXAMETHASONE SODIUM PHOSPHATE 6 MG: 4 INJECTION, SOLUTION INTRAMUSCULAR; INTRAVENOUS at 22:32

## 2021-09-06 RX ADMIN — OXYCODONE 5 MG: 5 TABLET ORAL at 22:31

## 2021-09-06 RX ADMIN — ALBUTEROL SULFATE 2 PUFF: 90 AEROSOL, METERED RESPIRATORY (INHALATION) at 08:24

## 2021-09-06 RX ADMIN — ALBUTEROL SULFATE 2 PUFF: 90 AEROSOL, METERED RESPIRATORY (INHALATION) at 22:45

## 2021-09-06 RX ADMIN — SODIUM CHLORIDE, PRESERVATIVE FREE 10 ML: 5 INJECTION INTRAVENOUS at 23:03

## 2021-09-06 RX ADMIN — POTASSIUM CHLORIDE 40 MEQ: 1.5 POWDER, FOR SOLUTION ORAL at 10:02

## 2021-09-06 RX ADMIN — MONTELUKAST 10 MG: 10 TABLET, FILM COATED ORAL at 22:30

## 2021-09-06 RX ADMIN — SODIUM CHLORIDE, PRESERVATIVE FREE 10 ML: 5 INJECTION INTRAVENOUS at 08:02

## 2021-09-06 RX ADMIN — PANTOPRAZOLE SODIUM 40 MG: 40 INJECTION, POWDER, FOR SOLUTION INTRAVENOUS at 06:00

## 2021-09-06 RX ADMIN — DEXAMETHASONE SODIUM PHOSPHATE 6 MG: 4 INJECTION, SOLUTION INTRAMUSCULAR; INTRAVENOUS at 08:00

## 2021-09-06 RX ADMIN — BUDESONIDE AND FORMOTEROL FUMARATE DIHYDRATE 2 PUFF: 160; 4.5 AEROSOL RESPIRATORY (INHALATION) at 19:59

## 2021-09-06 RX ADMIN — CETIRIZINE HYDROCHLORIDE 5 MG: 5 TABLET ORAL at 08:01

## 2021-09-06 RX ADMIN — ENOXAPARIN SODIUM 130 MG: 150 INJECTION SUBCUTANEOUS at 22:31

## 2021-09-06 RX ADMIN — BUDESONIDE AND FORMOTEROL FUMARATE DIHYDRATE 2 PUFF: 160; 4.5 AEROSOL RESPIRATORY (INHALATION) at 08:24

## 2021-09-06 RX ADMIN — LOSARTAN POTASSIUM 100 MG: 50 TABLET, FILM COATED ORAL at 08:01

## 2021-09-06 RX ADMIN — OLANZAPINE 10 MG: 10 TABLET, FILM COATED ORAL at 22:31

## 2021-09-06 RX ADMIN — INSULIN ASPART 8 UNITS: 100 INJECTION, SOLUTION INTRAVENOUS; SUBCUTANEOUS at 07:55

## 2021-09-06 RX ADMIN — ACETAMINOPHEN ORAL SOLUTION 650 MG: 650 SOLUTION ORAL at 23:03

## 2021-09-06 RX ADMIN — INSULIN ASPART 12 UNITS: 100 INJECTION, SOLUTION INTRAVENOUS; SUBCUTANEOUS at 11:59

## 2021-09-06 RX ADMIN — POTASSIUM CHLORIDE 40 MEQ: 1.5 POWDER, FOR SOLUTION ORAL at 06:14

## 2021-09-06 RX ADMIN — INSULIN ASPART 12 UNITS: 100 INJECTION, SOLUTION INTRAVENOUS; SUBCUTANEOUS at 16:34

## 2021-09-06 RX ADMIN — TIZANIDINE 4 MG: 4 TABLET ORAL at 22:30

## 2021-09-06 RX ADMIN — FOLIC ACID 1 MG: 1 TABLET ORAL at 08:01

## 2021-09-06 RX ADMIN — METOPROLOL TARTRATE 100 MG: 100 TABLET, FILM COATED ORAL at 08:00

## 2021-09-06 RX ADMIN — INSULIN DETEMIR 25 UNITS: 100 INJECTION, SOLUTION SUBCUTANEOUS at 08:03

## 2021-09-06 RX ADMIN — METOPROLOL TARTRATE 100 MG: 100 TABLET, FILM COATED ORAL at 22:30

## 2021-09-06 RX ADMIN — FLUCONAZOLE IN SODIUM CHLORIDE 400 MG: 2 INJECTION, SOLUTION INTRAVENOUS at 11:59

## 2021-09-06 RX ADMIN — OXYCODONE 5 MG: 5 TABLET ORAL at 10:02

## 2021-09-06 NOTE — THERAPY TREATMENT NOTE
Acute Care - Speech Language Pathology   Swallow Treatment Note [facility]     Patient Name: Corrina Jensen  : 1975  MRN: [id number]  Today's Date: 2021               Admit Date: 2021    Visit Dx:     ICD-10-CM ICD-9-CM   1. Sepsis with acute renal failure and septic shock, due to unspecified organism, unspecified acute renal failure type (CMS/HCC)  A41.9 038.9    R65.21 995.92    N17.9 785.52     584.9   2. Lab test positive for detection of COVID-19 virus  U07.1 079.89   3. Oropharyngeal dysphagia  R13.12 787.22   4. Impaired mobility and ADLs  Z74.09 V49.89    Z78.9    5. Impaired physical mobility  Z74.09 781.99     Patient Active Problem List   Diagnosis   • Chest pain   • Palpitation   • Tachycardia   • Type 2 diabetes mellitus without complication, without long-term current use of insulin (CMS/[facility])   • Angina effort   • Essential hypertension   • Acute pain of right knee   • Effusion, right knee   • Depressive disorder   • Elevated blood sugar   • Gastroesophageal reflux disease without esophagitis   • Midline low back pain with left-sided sciatica   • Other hyperlipidemia   • Sprain of right ankle   • Abnormal mammogram of left breast   • Malignant neoplasm of left breast in female, estrogen receptor positive (CMS/[facility])   • Pancytopenia (CMS/[facility])   • Pneumonia due to COVID-19 virus   • Atypical pneumonia   • Sepsis with acute renal failure and septic shock (CMS/[facility])   • Acute kidney injury (CMS/[facility])   • Sepsis, unspecified organism (CMS/[facility])   • Severe malnutrition (CMS/[facility])     Past Medical History:   Diagnosis Date   • Acute atopic conjunctivitis    • Acute bronchitis    • Allergic rhinitis due to pollen    • Arrhythmia     wearing heart monitor    • Arthritis    • Asthma    • Bilateral foot pain    • Breast cancer (CMS/HCC)    • Breast cyst    • Breast lump    • Chest pain    • Chronic low back pain    • Contact dermatitis due to drugs and medicine    • Cyst of ovary    •  Diabetes mellitus (CMS/HCC)    • GERD (gastroesophageal reflux disease)    • Hyperlipidemia    • Hypertension    • Infestation by Sarcoptes scabiei annalisa hominis    • Low back pain    • Mastodynia    • Nausea and vomiting    • On long term drug therapy    • Pain in wrist    • Plantar fasciitis    • Rash    • Skin disorder     breast-possible superficial cellulitis   • Spinal stenosis    • Tinea pedis    • Tobacco dependence syndrome    • Upper respiratory infection    • Vitamin D deficiency    • Wheezing      Past Surgical History:   Procedure Laterality Date   • BREAST BIOPSY     • BREAST CYST ASPIRATION     • BREAST LUMPECTOMY Left 1/27/2021    Procedure: LEFT BREAST LUMPECTOMY WITH NEEDLE LOCALIZATION Needle [facility] done [facility] by radiology first   @07:00 a.m.;  Surgeon: Mat Garay MD;  Location: [facility];  Service: General;  Laterality: Left;   • BREAST LUMPECTOMY WITH SENTINEL NODE BIOPSY AND AXILLARY NODE DISSECTION Left 2/22/2021    Procedure: LEFT BREAST LUMPECTOMY WITH SENTINEL NODE BIOPSY. REMOVAL OF CYST FROM LEFT CHEEK                              (INJECTION @07: 00 A.M);  Surgeon: Mat Garay MD;  Location: [facility] OR;  Service: General;  Laterality: Left;   • BREAST SURGERY      excision breast lesion   • CARDIAC CATHETERIZATION N/A 7/3/2018    Procedure: Coronary angiography;  Surgeon: Arun Cadet MD;  Location: [facility] INVASIVE LOCATION;  Service: Cardiovascular   • CARDIAC CATHETERIZATION     • CHOLECYSTECTOMY     • INJECTION OF MEDICATION      Kenalog (4)       SLP Recommendation and Plan  SLP Swallowing Diagnosis: suspected pharyngeal dysphagia  SLP Diet Recommendation: NPO, temporary alternate methods of nutrition/hydration     SLP Rec. for Method of Medication Administration: meds via alternate route     Monitor for Signs of Aspiration: yes, notify SLP if any concerns     Swallow Criteria for Skilled Therapeutic Interventions Met:  demonstrates skilled criteria     Rehab Potential/Prognosis, Swallowing: good, to achieve stated therapy goals  Therapy Frequency (Swallow): 3 days per week, 5 days per week        Daily Summary of Progress (SLP): progress towards functional goals is fair                   Plan of Care Reviewed With: patient  Progress: improving  Outcome Summary: Swallow tx recommended. Pt continues to display s/s of aspiration for ice chips and water. Pt swallow improving however concern for aspiration is high. Pt may have ice chips. Continue NPO and coretrac.         SWALLOW EVALUATION (last 72 hours)      SLP Adult Swallow Evaluation     Row Name 09/06/21 1033 09/05/21 1035                Rehab Evaluation    Document Type  therapy note (daily note)  -EK  evaluation  -EK       Subjective Information  --  no complaints  -EK       Patient Observations  alert;cooperative  -EK  alert;cooperative;agree to therapy  -EK       Care Plan Review  care plan/treatment goals reviewed  -EK  evaluation/treatment results reviewed  -EK       Patient Effort  --  good  -EK          General Information    Patient Profile Reviewed  --  yes  -EK       Pertinent History Of Current Problem  --  Pt extubated yesterday afternoon. Pt with small amount of voicing noted upon evaluation.   -EK       Current Method of Nutrition  NPO;nasogastric feedings  -EK  NPO;nasogastric feedings  -EK       Precautions/Limitations, Vision  --  WFL  -EK       Precautions/Limitations, Hearing  --  WFL  -EK       Plans/Goals Discussed with  --  patient  -EK          Pain    Additional Documentation  --  Pain Scale: Numbers Pre/Post-Treatment (Group)  -EK          Pain Scale: Numbers Pre/Post-Treatment    Pretreatment Pain Rating  --  0/10 - no pain  -EK       Posttreatment Pain Rating  --  0/10 - no pain  -EK          Oral Motor Structure and Function    Dentition Assessment  --  natural, present and adequate  -EK       Secretion Management  WNL/WFL  -EK  WNL/WFL  -EK        Mucosal Quality  --  moist, healthy  -EK       Volitional Swallow  weak  -EK  weak  -EK       Volitional Cough  weak  -EK  weak  -EK          Oral Musculature and Cranial Nerve Assessment    Oral Motor General Assessment  --  generalized oral motor weakness  -EK          General Eating/Swallowing Observations    Respiratory Support Currently in Use  nasal cannula  -EK  nasal cannula  -EK       Eating/Swallowing Skills  fed by SLP  -EK  fed by SLP  -EK       Positioning During Eating  upright 90 degree;upright in bed  -EK  upright 90 degree;upright in bed  -EK       Utensils Used  spoon  -EK  spoon  -EK       Consistencies Trialed  ice chips;thin liquids  -EK  ice chips;thin liquids  -EK          Clinical Swallow Eval    Pharyngeal Phase  suspected pharyngeal impairment  -EK  suspected pharyngeal impairment  -EK       Clinical Swallow Evaluation Summary  Swallow tx recommended. Pt continues to display s/s of aspiration for ice chips and water. Pt swallow improving however concern for aspiration is high. Pt may have ice chips. Continue NPO and coretrac.   -EK  Swallow evaluation completed. Pt with s/s of aspiration on over 50% of po trials of water and ice chips. Pt displays multiple swallows and coughing with more than half the po trials. SLP recommends continued NPO and continue coretrac feedings. SLP will return in am. to re-assesss swallow.   -EK          Pharyngeal Phase Concerns    Pharyngeal Phase Concerns  multiple swallows;cough  -EK  multiple swallows;cough  -EK       Multiple Swallows  thin  -EK  thin  -EK       Cough  thin  -EK  thin  -EK          Clinical Impression    Daily Summary of Progress (SLP)  progress towards functional goals is fair  -EK  --       SLP Swallowing Diagnosis  suspected pharyngeal dysphagia  -EK  suspected pharyngeal dysphagia  -EK       Functional Impact  risk of aspiration/pneumonia  -EK  risk of aspiration/pneumonia  -EK       Rehab Potential/Prognosis, Swallowing  good, to  achieve stated therapy goals  -EK  good, to achieve stated therapy goals  -EK       Swallow Criteria for Skilled Therapeutic Interventions Met  demonstrates skilled criteria  -EK  demonstrates skilled criteria  -EK          Recommendations    Therapy Frequency (Swallow)  3 days per week;5 days per week  -EK  3 days per week;5 days per week  -EK       SLP Diet Recommendation  NPO;temporary alternate methods of nutrition/hydration  -EK  NPO;temporary alternate methods of nutrition/hydration  -EK       Oral Care Recommendations  Oral Care BID/PRN  -EK  Oral Care BID/PRN  -EK       SLP Rec. for Method of Medication Administration  meds via alternate route  -EK  meds via alternate route  -EK       Monitor for Signs of Aspiration  yes;notify SLP if any concerns  -EK  yes;notify SLP if any concerns  -EK          Swallow Goals (SLP)    Oral Nutrition/Hydration Goal Selection (SLP)  oral nutrition/hydration, SLP goal 1  -EK  oral nutrition/hydration, SLP goal 1  -EK          Oral Nutrition/Hydration Goal 1 (SLP)    Oral Nutrition/Hydration Goal 1, SLP  Pt to tolerate least restrictive diet with no s/s of aspiration for adequate nutrition and hdyration.   -EK  Pt to tolerate least restrictive diet with no s/s of aspiration for adequate nutrition and hdyration.   -EK       Time Frame (Oral Nutrition/Hydration Goal 1, SLP)  by discharge  -EK  by discharge  -EK       Progress/Outcomes (Oral Nutrition/Hydration Goal 1, SLP)  goal not met  -EK  other (see comments) new goal   -EK         User Key  (r) = Recorded By, (t) = Taken By, (c) = Cosigned By    Initials Name Effective Dates    Mago Waterman, CCC-SLP 06/16/21 -           EDUCATION  The patient has been educated in the following areas:   Dysphagia (Swallowing Impairment).       SLP GOALS     Row Name 09/06/21 1033 09/05/21 1035          Oral Nutrition/Hydration Goal 1 (SLP)    Oral Nutrition/Hydration Goal 1, SLP  Pt to tolerate least restrictive diet with  no s/s of aspiration for adequate nutrition and hdyration.   -EK  Pt to tolerate least restrictive diet with no s/s of aspiration for adequate nutrition and hdyration.   -EK     Time Frame (Oral Nutrition/Hydration Goal 1, SLP)  by discharge  -EK  by discharge  -EK     Progress/Outcomes (Oral Nutrition/Hydration Goal 1, SLP)  goal not met  -EK  other (see comments) new goal   -EK       User Key  (r) = Recorded By, (t) = Taken By, (c) = Cosigned By    Initials Name Provider Type    Mago Waterman CCC-SLP Speech and Language Pathologist             Time Calculation:   Time Calculation- SLP     Row Name 09/06/21 1448             Time Calculation- SLP    SLP Start Time  1033  -EK      SLP Stop Time  1048  -EK      SLP Time Calculation (min)  15 min  -EK      SLP Received On  09/06/21  -EK        User Key  (r) = Recorded By, (t) = Taken By, (c) = Cosigned By    Initials Name Provider Type    Mago Waterman CCC-SLP Speech and Language Pathologist          Therapy Charges for Today     Code Description Service Date Service Provider Modifiers Qty    [id number]  ST EVAL ORAL PHARYNG SWALLOW 2 9/5/2021 Mago cMkenna CCC-SLP GN 1    [id number] HC ST TREATMENT SWALLOW 1 9/6/2021 Mago Mckenna CCC-SLP GN 1               BRE Stephens  9/6/2021

## 2021-09-06 NOTE — THERAPY TREATMENT NOTE
Patient Name: Corrina Jensen  : 1975    MRN: [id number]                              Today's Date: 2021       Admit Date: 2021    Visit Dx:     ICD-10-CM ICD-9-CM   1. Sepsis with acute renal failure and septic shock, due to unspecified organism, unspecified acute renal failure type (CMS/[facility])  A41.9 038.9    R65.21 995.92    N17.9 785.52     584.9   2. Lab test positive for detection of COVID-19 virus  U07.1 079.89   3. Oropharyngeal dysphagia  R13.12 787.22   4. Impaired mobility and ADLs  Z74.09 V49.89    Z78.9    5. Impaired physical mobility  Z74.09 781.99     Patient Active Problem List   Diagnosis   • Chest pain   • Palpitation   • Tachycardia   • Type 2 diabetes mellitus without complication, without long-term current use of insulin (CMS/[facility])   • Angina effort   • Essential hypertension   • Acute pain of right knee   • Effusion, right knee   • Depressive disorder   • Elevated blood sugar   • Gastroesophageal reflux disease without esophagitis   • Midline low back pain with left-sided sciatica   • Other hyperlipidemia   • Sprain of right ankle   • Abnormal mammogram of left breast   • Malignant neoplasm of left breast in female, estrogen receptor positive (CMS/[facility])   • Pancytopenia (CMS/[facility])   • Pneumonia due to COVID-19 virus   • Atypical pneumonia   • Sepsis with acute renal failure and septic shock (CMS/[facility])   • Acute kidney injury (CMS/[facility])   • Sepsis, unspecified organism (CMS/[facility])   • Severe malnutrition (CMS/[facility])     Past Medical History:   Diagnosis Date   • Acute atopic conjunctivitis    • Acute bronchitis    • Allergic rhinitis due to pollen    • Arrhythmia     wearing heart monitor    • Arthritis    • Asthma    • Bilateral foot pain    • Breast cancer (CMS/[facility])    • Breast cyst    • Breast lump    • Chest pain    • Chronic low back pain    • Contact dermatitis due to drugs and medicine    • Cyst of ovary    • Diabetes mellitus (CMS/[facility])    • GERD (gastroesophageal reflux  disease)    • Hyperlipidemia    • Hypertension    • Infestation by Sarcoptes scabiei annalisa hominis    • Low back pain    • Mastodynia    • Nausea and vomiting    • On long term drug therapy    • Pain in wrist    • Plantar fasciitis    • Rash    • Skin disorder     breast-possible superficial cellulitis   • Spinal stenosis    • Tinea pedis    • Tobacco dependence syndrome    • Upper respiratory infection    • Vitamin D deficiency    • Wheezing      Past Surgical History:   Procedure Laterality Date   • BREAST BIOPSY     • BREAST CYST ASPIRATION     • BREAST LUMPECTOMY Left 1/27/2021    Procedure: LEFT BREAST LUMPECTOMY WITH NEEDLE LOCALIZATION Needle localization to be done in the [facility] by radiology first   @07:00 a.m.;  Surgeon: Mat Garay MD;  Location: [facility] OR;  Service: General;  Laterality: Left;   • BREAST LUMPECTOMY WITH SENTINEL NODE BIOPSY AND AXILLARY NODE DISSECTION Left 2/22/2021    Procedure: LEFT BREAST LUMPECTOMY WITH SENTINEL NODE BIOPSY. REMOVAL OF CYST FROM LEFT CHEEK                              (INJECTION @07: 00 A.M);  Surgeon: Mat Garay MD;  Location: [facility] OR;  Service: General;  Laterality: Left;   • BREAST SURGERY      excision breast lesion   • CARDIAC CATHETERIZATION N/A 7/3/2018    Procedure: Coronary angiography;  Surgeon: Arun Cadet MD;  Location: [facility] CATH INVASIVE LOCATION;  Service: Cardiovascular   • CARDIAC CATHETERIZATION     • CHOLECYSTECTOMY     • INJECTION OF MEDICATION      Kenalog (4)     General Information     Row Name 09/06/21 1051          OT Time and Intention    Document Type  therapy note (daily note)  -AS     Mode of Treatment  occupational therapy  -AS     Row Name 09/06/21 1051          General Information    Existing Precautions/Restrictions  fall;oxygen therapy device and L/min  -AS     Row Name 09/06/21 1051          Safety Issues, Functional Mobility    Impairments Affecting Function (Mobility)   balance;cognition;endurance/activity tolerance;coordination;pain;range of motion (ROM);strength  -AS       User Key  (r) = Recorded By, (t) = Taken By, (c) = Cosigned By    Initials Name Provider Type    AS Perla Biggs OT Occupational Therapist          Mobility/ADL's    No documentation.       Obj/Interventions     Row Name 09/06/21 1051          Shoulder (Therapeutic Exercise)    Shoulder (Therapeutic Exercise)  PROM (passive range of motion)  -AS     Shoulder PROM (Therapeutic Exercise)  bilateral;flexion;external rotation;internal rotation;10 repetitions  -AS     [facility] Name 09/06/21 1051          Elbow/Forearm (Therapeutic Exercise)    Elbow/Forearm (Therapeutic Exercise)  PROM (passive range of motion)  -AS     Elbow/Forearm PROM (Therapeutic Exercise)  bilateral;flexion;extension;supination;pronation;10 repetitions  -AS     [facility] Name 09/06/21 1051          Wrist (Therapeutic Exercise)    Wrist (Therapeutic Exercise)  PROM (passive range of motion)  -AS     Wrist PROM (Therapeutic Exercise)  bilateral;flexion;extension;10 repetitions  -AS     [facility] Name 09/06/21 1051          Hand (Therapeutic Exercise)    Hand (Therapeutic Exercise)  PROM (passive range of motion)  -AS     Hand PROM (Therapeutic Exercise)  bilateral;finger flexion;finger extension;10 repetitions  -AS     [facility] Name 09/06/21 1051          Therapeutic Exercise    Therapeutic Exercise  shoulder;elbow/forearm;wrist;hand  -AS       User Key  (r) = Recorded By, (t) = Taken By, (c) = Cosigned By    Initials Name Provider Type    AS Perla Biggs OT Occupational Therapist        Goals/Plan     [facility] Name 09/06/21 1051          Transfer Goal 1 (OT)    Activity/Assistive Device (Transfer Goal 1, OT)  sit-to-stand/stand-to-sit;bed-to-chair/chair-to-bed;toilet  -AS     [place] Level/Cues Needed (Transfer Goal 1, OT)  moderate assist (50-74% patient effort)  -AS     Time Frame (Transfer Goal 1, OT)  long term goal (LTG);by discharge  -AS      Progress/Outcome (Transfer Goal 1, OT)  goal not met  -AS     [facility] Name 09/06/21 1051          Bathing Goal 1 (OT)    Activity/Device (Bathing Goal 1, OT)  upper body bathing  -AS     [place] Level/Cues Needed (Bathing Goal 1, OT)  minimum assist (75% or more patient effort)  -AS     Time Frame (Bathing Goal 1, OT)  long term goal (LTG);by discharge  -AS     Progress/Outcomes (Bathing Goal 1, OT)  goal not met  -AS     Row Name 09/06/21 1051          Grooming Goal 1 (OT)    Activity/Device (Grooming Goal 1, OT)  wash face, hands  -AS     [place] (Grooming Goal 1, OT)  supervision required;set-up required  -AS     Time Frame (Grooming Goal 1, OT)  short term goal (STG);5 days  -AS     Progress/Outcome (Grooming Goal 1, OT)  goal not met  -AS     Row Name 09/06/21 1051          ROM Goal 1 (OT)    ROM Goal 1 (OT)  Pt will tolerate 3 sets 10 reps AAROM/AROM  -AS     Time Frame (ROM Goal 1, OT)  long term goal (LTG);by discharge  -AS     Progress/Outcome (ROM Goal 1, OT)  goal not met  -AS     Row Name 09/06/21 1051          Problem Specific Goal 1 (OT)    Problem Specific Goal 1 (OT)  Pt will tolerate sitting EOB with mod A in prep for functional transfers.  -AS     Time Frame (Problem Specific Goal 1, OT)  long term goal (LTG);by discharge  -AS     Progress/Outcome (Problem Specific Goal 1, OT)  goal not met  -AS       User Key  (r) = Recorded By, (t) = Taken By, (c) = Cosigned By    Initials Name Provider Type    AS Perla Biggs OT Occupational Therapist        Clinical Impression     Row Name 09/06/21 1051          Pain Assessment    Additional Documentation  Pain Scale: Numbers Pre/Post-Treatment (Group)  -AS     Row Name 09/06/21 1051          Pain Scale: Numbers Pre/Post-Treatment    Pretreatment Pain Rating  0/10 - no pain  -AS     Pre/Posttreatment Pain Comment  reports pain during PROM; unable to toleate many reps; no pain at rest  -AS     Row Name 09/06/21 1051          Plan of Care Review     Plan of Care Reviewed With  patient  -AS     Row Name 09/06/21 1051          Vital Signs    Pre Systolic BP Rehab  148  -AS     Pre Treatment Diastolic BP  73  -AS     Pretreatment Heart Rate (beats/min)  68  -AS     Pre SpO2 (%)  91  -AS     O2 Delivery Pre Treatment  nasal cannula  -AS     Post SpO2 (%)  92  -AS     O2 Delivery Post Treatment  nasal cannula  -AS     Pre Patient Position  Supine  -AS     Post Patient Position  Supine  -AS     Row Name 09/06/21 1051          Positioning and Restraints    Pre-Treatment Position  in bed  -AS     Post Treatment Position  bed  -AS     In Bed  RUE elevated;LUE elevated;supine;notified nsg  -AS       User Key  (r) = Recorded By, (t) = Taken By, (c) = Cosigned By    Initials Name Provider Type    AS Perla Biggs OT Occupational Therapist        Outcome Measures     Row Name 09/06/21 1051          How much help from another is currently needed...    Putting on and taking off regular lower body clothing?  1  -AS     Bathing (including washing, rinsing, and drying)  1  -AS     Toileting (which includes using toilet bed pan or urinal)  1  -AS     Putting on and taking off regular upper body clothing  1  -AS     Taking care of personal grooming (such as brushing teeth)  1  -AS     Eating meals  1  -AS     AM-PAC 6 Clicks Score (OT)  6  -AS     Row Name 09/06/21 1051          Functional Assessment    Outcome Measure Options  AM-PAC 6 Clicks Daily Activity (OT)  -AS       User Key  (r) = Recorded By, (t) = Taken By, (c) = Cosigned By    Initials Name Provider Type    AS Perla Biggs OT Occupational Therapist          Occupational Therapy Education                 Title: PT OT SLP Therapies (In Progress)     Topic: Occupational Therapy (In Progress)     Point: ADL training (Not Started)     Description:   Instruct learner(s) on proper safety adaptation and remediation techniques during self care or transfers.   Instruct in proper use of assistive devices.               "Learner Progress:  Not documented in this visit.          Point: Home exercise program (In Progress)     Description:   Instruct learner(s) on appropriate technique for monitoring, assisting and/or progressing therapeutic exercises/activities.              Learning Progress Summary           Patient Acceptance, E, NL by AS at 9/6/2021 1238    Comment: PROM BUE                   Point: Precautions (In Progress)     Description:   Instruct learner(s) on prescribed precautions during self-care and functional transfers.              Learning Progress Summary           Patient Acceptance, E, NR,NL by AS at 9/5/2021 1606    Comment: role of OT, OT POC, bed mobility, UE assessment                   Point: Body mechanics (Not Started)     Description:   Instruct learner(s) on proper positioning and spine alignment during self-care, functional mobility activities and/or exercises.              Learner Progress:  Not documented in this visit.                      User Key     Initials Effective Dates Name Provider Type Discipline    AS 03/18/21 -  Perla Biggs OT Occupational Therapist OT              OT Recommendation and Plan  Planned Therapy Interventions (OT): activity tolerance training, patient/caregiver education/training, ROM/therapeutic exercise, strengthening exercise, transfer/mobility retraining, adaptive equipment training, BADL retraining, functional balance retraining, occupation/activity based interventions  Therapy Frequency (OT): other (see comments) (5-7days/wk)  Plan of Care Review  Plan of Care Reviewed With: patient  Outcome Summary: OT tx completed. Pt alert and conversant; difficult to understand at times. Performed PROM on BUE shld, elbow, wrist, and hand. Pt unable to tolerate more than 10 reps due to pain. Pt required periodic rest breaks and states \"that is it for now.\" Cont OT POC to progress towards goals.     Time Calculation:   Time Calculation- OT     Row Name 09/06/21 1240             " Time Calculation- OT    OT Start Time  1051  -AS      OT Stop Time  1106  -AS      OT Time Calculation (min)  15 min  -AS      OT Received On  09/06/21  -AS         Timed Charges    92198 - OT Therapeutic Activity Minutes  15  -AS         Total Minutes    Timed Charges Total Minutes  15  -AS       Total Minutes  15  -AS        User Key  (r) = Recorded By, (t) = Taken By, (c) = Cosigned By    Initials Name Provider Type    AS Perla Biggs OT Occupational Therapist        Therapy Charges for Today     Code Description Service Date Service Provider Modifiers Qty    58479005642  OT EVAL MOD COMPLEXITY 2 9/5/2021 Perla Biggs, OT GO 1    72227956912  OT THERAPEUTIC ACT EA 15 MIN 9/6/2021 Perla Biggs OT GO 1               Perla Biggs OT  9/6/2021

## 2021-09-06 NOTE — PROGRESS NOTES
[facility] Medicine Services  INPATIENT PROGRESS NOTE    Length of Stay: 19  Date of Admission: 8/17/2021  Primary Care Physician: Jose Jean-Baptiste MD    Subjective   Chief Complaint: Respiratory failure  HPI: Remains confused and delirious but otherwise doing well.  Has no current complaints or concerns per nursing.    Review of Systems   Unable to perform ROS: Mental status change      All pertinent negatives and positives are as above. All other systems have been reviewed and are negative unless otherwise stated.     Objective    Temp:  [96.1 °F (35.6 °C)-97.1 °F (36.2 °C)] 96.1 °F (35.6 °C)  Heart Rate:  [51-81] 67  Resp:  [15-20] 15  BP: ()/(50-91) 151/79    Physical Exam  Constitutional:       General: She is not in acute distress.     Appearance: She is not toxic-appearing.   HENT:      Head: Normocephalic and atraumatic.      Right Ear: External ear normal.      Left Ear: External ear normal.      Nose: Nose normal.      Comments: Core track in place     Mouth/Throat:      Mouth: Mucous membranes are moist.      Pharynx: Oropharynx is clear.   Eyes:      Conjunctiva/sclera: Conjunctivae normal.   Cardiovascular:      Rate and Rhythm: Normal rate and regular rhythm.      Pulses: Normal pulses.      Heart sounds: Normal heart sounds.   Pulmonary:      Comments: Diminished bilaterally but otherwise clear  Abdominal:      General: Bowel sounds are normal.      Palpations: Abdomen is soft.      Tenderness: There is no abdominal tenderness.   Musculoskeletal:         General: Swelling present.      Cervical back: Neck supple.   Skin:     General: Skin is warm and dry.      Capillary Refill: Capillary refill takes less than 2 seconds.   Neurological:      Comments: Follows commands, moves extremities, speech is soft but fluent and understandable.   Psychiatric:         Mood and Affect: Mood normal.         Behavior: Behavior normal.         Results Review:  I  have reviewed the labs, radiology results, and diagnostic studies.    Laboratory Data:   Results from last 7 days   Lab Units 09/06/21  0328 09/05/21 0315 09/04/21  1837 09/04/21 0433 09/04/21 0433   SODIUM mmol/L 149* 149*  --   --  144   POTASSIUM mmol/L 3.6 4.0 4.2   < > 3.6   CHLORIDE mmol/L 114* 114*  --   --  107   CO2 mmol/L 24.0 27.0  --   --  25.0   BUN mg/dL 44* 39*  --   --  36*   CREATININE mg/dL 0.41* 0.42*  --   --  0.46*   GLUCOSE mg/dL 259* 291*  --   --  345*   CALCIUM mg/dL 9.5 9.5  --   --  9.0   BILIRUBIN mg/dL 0.5 0.6  --   --  0.9   ALK PHOS U/L 102 104  --   --  103   ALT (SGPT) U/L 114* 67*  --   --  62*   AST (SGOT) U/L 92* 47*  --   --  48*   ANION GAP mmol/L 11.0 8.0  --   --  12.0    < > = values in this interval not displayed.     Estimated Creatinine Clearance: 215.1 mL/min (A) (by C-G formula based on SCr of 0.41 mg/dL (L)).  Results from last 7 days   Lab Units 09/05/21 0315   MAGNESIUM mg/dL 2.3         Results from last 7 days   Lab Units 09/06/21 0328 09/05/21 0315 09/04/21 0433 09/03/21 0527 09/02/21  0427   WBC 10*3/mm3 6.33 4.69 6.86 7.92 5.54   HEMOGLOBIN g/dL 10.9* 10.0* 10.3* 10.4* 10.3*   HEMATOCRIT % 33.5* 31.6* 31.3* 31.8* 31.4*   PLATELETS 10*3/mm3 143 142 132* 155 150     Results from last 7 days   Lab Units 09/05/21 0315 09/03/21 0527 09/01/21  0245 08/30/21  1454   INR  1.18 1.10 1.23* 1.14       Culture Data:   Blood Culture   Date Value Ref Range Status   09/04/2021 No growth at 24 hours  Preliminary   09/04/2021 No growth at 24 hours  Preliminary     No results found for: URINECX  No results found for: RESPCX  No results found for: WOUNDCX  No results found for: STOOLCX  No components found for: BODYFLD    Radiology Data:   Imaging Results (Last 24 Hours)     ** No results found for the last 24 hours. **          I have reviewed the patient's current medications.     Assessment/Plan     Principal Problem:    Sepsis, unspecified organism (CMS/[facility])  Active  Problems:    Type 2 diabetes mellitus without complication, without long-term current use of insulin (CMS/[facility])    Pneumonia due to COVID-19 virus    Atypical pneumonia    Sepsis with acute renal failure and septic shock (CMS/[facility])    Acute kidney injury (CMS/[facility])    Severe malnutrition (CMS/[facility])  Transaminitis  Pyuria  Delirium    Plan:  -Continue supplemental oxygen wean as tolerated.  -Repeat blood cultures ordered given her temperature spike the other night but show no growth to date.  -Continue Cardene if required for elevated blood pressure readings  -Continue current blood pressure medicines, consider adding hydralazine if her blood pressure becomes high again.  -Continue Decadron 6 mg twice daily for now but consider decreasing to daily starting tomorrow.  -Continue Covid monitoring labs  -She has completed a course of cefepime for suspected underlying pneumonia  -Continue tube feeds for nutrition  -Continue daily Lasix.  -She is finished Rocephin, Diflucan has been ordered for another 3 days.  Can consider potentially stopping this as well as she is doing better but for now we will continue to monitor.  -Increase Levemir to 30 units twice daily and continue high-dose sliding scale coverage.  Continue glucose checks.  -DVT prophylaxis with treatment dose Lovenox  -CODE STATUS: Full    33 minutes of critical care provided. This time excludes other billable procedures. Time does include preparation of documents, medical consultations, review of old records, and direct bedside care.    I confirmed that the patient's Advance Care Plan is present, code status is documented, or surrogate decision maker is listed in the patient's medical record.    Discharge Planning: In process.  Can transfer up to 4 W as a bed is available.    Logan Stein MD

## 2021-09-06 NOTE — PLAN OF CARE
Goal Outcome Evaluation:  Plan of Care Reviewed With: patient        Progress: improving  Outcome Summary: vss, no cardene or prn meds required, improvement with speech therapy may have ice chips

## 2021-09-06 NOTE — PLAN OF CARE
Goal Outcome Evaluation:  Plan of Care Reviewed With: patient        Progress: improving  Outcome Summary: Swallow tx recommended. Pt continues to display s/s of aspiration for ice chips and water. Pt swallow improving however concern for aspiration is high. Pt may have ice chips. Continue NPO and coretrac.

## 2021-09-06 NOTE — PLAN OF CARE
"Goal Outcome Evaluation:  Plan of Care Reviewed With: patient           Outcome Summary: OT tx completed. Pt alert and conversant; difficult to understand at times. Performed PROM on BUE shld, elbow, wrist, and hand. Pt unable to tolerate more than 10 reps due to pain. Pt required periodic rest breaks and states \"that is it for now.\" Cont OT POC to progress towards goals.  "

## 2021-09-07 LAB
ALBUMIN SERPL-MCNC: 3.2 G/DL (ref 3.5–5.2)
ALBUMIN/GLOB SERPL: 1 G/DL
ALP SERPL-CCNC: 111 U/L (ref 39–117)
ALT SERPL W P-5'-P-CCNC: 217 U/L (ref 1–33)
ANION GAP SERPL CALCULATED.3IONS-SCNC: 11 MMOL/L (ref 5–15)
AST SERPL-CCNC: 139 U/L (ref 1–32)
BASOPHILS # BLD AUTO: 0.01 10*3/MM3 (ref 0–0.2)
BASOPHILS NFR BLD AUTO: 0.1 % (ref 0–1.5)
BILIRUB SERPL-MCNC: 0.6 MG/DL (ref 0–1.2)
BUN SERPL-MCNC: 38 MG/DL (ref 6–20)
BUN/CREAT SERPL: 88.4 (ref 7–25)
CALCIUM SPEC-SCNC: 9.3 MG/DL (ref 8.6–10.5)
CHLORIDE SERPL-SCNC: 111 MMOL/L (ref 98–107)
CO2 SERPL-SCNC: 27 MMOL/L (ref 22–29)
CREAT SERPL-MCNC: 0.43 MG/DL (ref 0.57–1)
CRP SERPL-MCNC: <0.3 MG/DL (ref 0–0.5)
D-DIMER, QUANTITATIVE (MAD,POW, STR): 1503 NG/ML (FEU) (ref 0–470)
DEPRECATED RDW RBC AUTO: 51 FL (ref 37–54)
EOSINOPHIL # BLD AUTO: 0.02 10*3/MM3 (ref 0–0.4)
EOSINOPHIL NFR BLD AUTO: 0.2 % (ref 0.3–6.2)
ERYTHROCYTE [DISTWIDTH] IN BLOOD BY AUTOMATED COUNT: 15.9 % (ref 12.3–15.4)
GFR SERPL CREATININE-BSD FRML MDRD: >150 ML/MIN/1.73
GLOBULIN UR ELPH-MCNC: 3.3 GM/DL
GLUCOSE BLDC GLUCOMTR-MCNC: 234 MG/DL (ref 70–130)
GLUCOSE BLDC GLUCOMTR-MCNC: 251 MG/DL (ref 70–130)
GLUCOSE SERPL-MCNC: 258 MG/DL (ref 65–99)
HCT VFR BLD AUTO: 35.7 % (ref 34–46.6)
HGB BLD-MCNC: 11.4 G/DL (ref 12–15.9)
IMM GRANULOCYTES # BLD AUTO: 0.11 10*3/MM3 (ref 0–0.05)
IMM GRANULOCYTES NFR BLD AUTO: 1.3 % (ref 0–0.5)
INR PPP: 1.18 (ref 0.8–1.2)
LDH SERPL-CCNC: 448 U/L (ref 135–214)
LYMPHOCYTES # BLD AUTO: 1 10*3/MM3 (ref 0.7–3.1)
LYMPHOCYTES NFR BLD AUTO: 11.6 % (ref 19.6–45.3)
MCH RBC QN AUTO: 29.8 PG (ref 26.6–33)
MCHC RBC AUTO-ENTMCNC: 31.9 G/DL (ref 31.5–35.7)
MCV RBC AUTO: 93.2 FL (ref 79–97)
MONOCYTES # BLD AUTO: 0.6 10*3/MM3 (ref 0.1–0.9)
MONOCYTES NFR BLD AUTO: 7 % (ref 5–12)
NEUTROPHILS NFR BLD AUTO: 6.88 10*3/MM3 (ref 1.7–7)
NEUTROPHILS NFR BLD AUTO: 79.8 % (ref 42.7–76)
NRBC BLD AUTO-RTO: 0.3 /100 WBC (ref 0–0.2)
PLATELET # BLD AUTO: 157 10*3/MM3 (ref 140–450)
PMV BLD AUTO: 11.1 FL (ref 6–12)
POTASSIUM SERPL-SCNC: 3.5 MMOL/L (ref 3.5–5.2)
PROCALCITONIN SERPL-MCNC: 0.28 NG/ML (ref 0–0.25)
PROT SERPL-MCNC: 6.5 G/DL (ref 6–8.5)
PROTHROMBIN TIME: 14.9 SECONDS (ref 11.1–15.3)
RBC # BLD AUTO: 3.83 10*6/MM3 (ref 3.77–5.28)
SODIUM SERPL-SCNC: 149 MMOL/L (ref 136–145)
WBC # BLD AUTO: 8.62 10*3/MM3 (ref 3.4–10.8)

## 2021-09-07 PROCEDURE — 86140 C-REACTIVE PROTEIN: CPT | Performed by: FAMILY MEDICINE

## 2021-09-07 PROCEDURE — 85025 COMPLETE CBC W/AUTO DIFF WBC: CPT | Performed by: FAMILY MEDICINE

## 2021-09-07 PROCEDURE — 94799 UNLISTED PULMONARY SVC/PX: CPT

## 2021-09-07 PROCEDURE — 63710000001 INSULIN DETEMIR PER 5 UNITS: Performed by: FAMILY MEDICINE

## 2021-09-07 PROCEDURE — 82962 GLUCOSE BLOOD TEST: CPT

## 2021-09-07 PROCEDURE — 25010000002 FLUCONAZOLE PER 200 MG: Performed by: FAMILY MEDICINE

## 2021-09-07 PROCEDURE — 97110 THERAPEUTIC EXERCISES: CPT

## 2021-09-07 PROCEDURE — 25010000002 DEXAMETHASONE PER 1 MG: Performed by: FAMILY MEDICINE

## 2021-09-07 PROCEDURE — 63710000001 INSULIN ASPART PER 5 UNITS: Performed by: FAMILY MEDICINE

## 2021-09-07 PROCEDURE — 83615 LACTATE (LD) (LDH) ENZYME: CPT | Performed by: FAMILY MEDICINE

## 2021-09-07 PROCEDURE — 25010000002 ENOXAPARIN PER 10 MG: Performed by: FAMILY MEDICINE

## 2021-09-07 PROCEDURE — 25010000002 FUROSEMIDE PER 20 MG: Performed by: FAMILY MEDICINE

## 2021-09-07 PROCEDURE — 80053 COMPREHEN METABOLIC PANEL: CPT | Performed by: FAMILY MEDICINE

## 2021-09-07 PROCEDURE — 85379 FIBRIN DEGRADATION QUANT: CPT | Performed by: FAMILY MEDICINE

## 2021-09-07 PROCEDURE — 85610 PROTHROMBIN TIME: CPT | Performed by: FAMILY MEDICINE

## 2021-09-07 PROCEDURE — 92526 ORAL FUNCTION THERAPY: CPT | Performed by: SPEECH-LANGUAGE PATHOLOGIST

## 2021-09-07 PROCEDURE — 84145 PROCALCITONIN (PCT): CPT | Performed by: FAMILY MEDICINE

## 2021-09-07 RX ADMIN — AMLODIPINE BESYLATE 10 MG: 10 TABLET ORAL at 08:21

## 2021-09-07 RX ADMIN — FLUCONAZOLE IN SODIUM CHLORIDE 400 MG: 2 INJECTION, SOLUTION INTRAVENOUS at 08:25

## 2021-09-07 RX ADMIN — INSULIN DETEMIR 30 UNITS: 100 INJECTION, SOLUTION SUBCUTANEOUS at 22:19

## 2021-09-07 RX ADMIN — INSULIN ASPART 12 UNITS: 100 INJECTION, SOLUTION INTRAVENOUS; SUBCUTANEOUS at 11:15

## 2021-09-07 RX ADMIN — POTASSIUM CHLORIDE 40 MEQ: 1.5 POWDER, FOR SOLUTION ORAL at 06:40

## 2021-09-07 RX ADMIN — CHLORHEXIDINE GLUCONATE 15 ML: 1.2 RINSE ORAL at 22:00

## 2021-09-07 RX ADMIN — SODIUM CHLORIDE, PRESERVATIVE FREE 10 ML: 5 INJECTION INTRAVENOUS at 22:25

## 2021-09-07 RX ADMIN — ALBUTEROL SULFATE 2 PUFF: 90 AEROSOL, METERED RESPIRATORY (INHALATION) at 12:56

## 2021-09-07 RX ADMIN — ALBUTEROL SULFATE 2 PUFF: 90 AEROSOL, METERED RESPIRATORY (INHALATION) at 23:06

## 2021-09-07 RX ADMIN — INSULIN ASPART 8 UNITS: 100 INJECTION, SOLUTION INTRAVENOUS; SUBCUTANEOUS at 17:34

## 2021-09-07 RX ADMIN — FUROSEMIDE 20 MG: 10 INJECTION, SOLUTION INTRAMUSCULAR; INTRAVENOUS at 08:23

## 2021-09-07 RX ADMIN — OLANZAPINE 10 MG: 10 TABLET, FILM COATED ORAL at 22:04

## 2021-09-07 RX ADMIN — SODIUM CHLORIDE, PRESERVATIVE FREE 10 ML: 5 INJECTION INTRAVENOUS at 10:16

## 2021-09-07 RX ADMIN — CETIRIZINE HYDROCHLORIDE 5 MG: 5 TABLET ORAL at 08:21

## 2021-09-07 RX ADMIN — ALBUTEROL SULFATE 2 PUFF: 90 AEROSOL, METERED RESPIRATORY (INHALATION) at 16:23

## 2021-09-07 RX ADMIN — ALBUTEROL SULFATE 2 PUFF: 90 AEROSOL, METERED RESPIRATORY (INHALATION) at 07:29

## 2021-09-07 RX ADMIN — FOLIC ACID 1 MG: 1 TABLET ORAL at 08:21

## 2021-09-07 RX ADMIN — OLANZAPINE 10 MG: 10 TABLET, FILM COATED ORAL at 22:00

## 2021-09-07 RX ADMIN — CYANOCOBALAMIN TAB 1000 MCG 1000 MCG: 1000 TAB at 08:21

## 2021-09-07 RX ADMIN — DEXAMETHASONE SODIUM PHOSPHATE 6 MG: 4 INJECTION, SOLUTION INTRAMUSCULAR; INTRAVENOUS at 08:22

## 2021-09-07 RX ADMIN — INSULIN ASPART 12 UNITS: 100 INJECTION, SOLUTION INTRAVENOUS; SUBCUTANEOUS at 07:33

## 2021-09-07 RX ADMIN — SODIUM CHLORIDE, PRESERVATIVE FREE 10 ML: 5 INJECTION INTRAVENOUS at 22:23

## 2021-09-07 RX ADMIN — SODIUM CHLORIDE, PRESERVATIVE FREE 10 ML: 5 INJECTION INTRAVENOUS at 22:24

## 2021-09-07 RX ADMIN — SODIUM CHLORIDE, PRESERVATIVE FREE 10 ML: 5 INJECTION INTRAVENOUS at 22:03

## 2021-09-07 RX ADMIN — LOSARTAN POTASSIUM 100 MG: 50 TABLET, FILM COATED ORAL at 08:21

## 2021-09-07 RX ADMIN — METOPROLOL TARTRATE 100 MG: 100 TABLET, FILM COATED ORAL at 22:00

## 2021-09-07 RX ADMIN — SODIUM CHLORIDE, PRESERVATIVE FREE 10 ML: 5 INJECTION INTRAVENOUS at 10:17

## 2021-09-07 RX ADMIN — BUDESONIDE AND FORMOTEROL FUMARATE DIHYDRATE 2 PUFF: 160; 4.5 AEROSOL RESPIRATORY (INHALATION) at 07:28

## 2021-09-07 RX ADMIN — DEXAMETHASONE SODIUM PHOSPHATE 6 MG: 4 INJECTION, SOLUTION INTRAMUSCULAR; INTRAVENOUS at 22:00

## 2021-09-07 RX ADMIN — ALBUTEROL SULFATE 2 PUFF: 90 AEROSOL, METERED RESPIRATORY (INHALATION) at 04:27

## 2021-09-07 RX ADMIN — SODIUM CHLORIDE, PRESERVATIVE FREE 10 ML: 5 INJECTION INTRAVENOUS at 22:26

## 2021-09-07 RX ADMIN — ENOXAPARIN SODIUM 130 MG: 150 INJECTION SUBCUTANEOUS at 10:17

## 2021-09-07 RX ADMIN — ENOXAPARIN SODIUM 130 MG: 150 INJECTION SUBCUTANEOUS at 22:00

## 2021-09-07 RX ADMIN — PANTOPRAZOLE SODIUM 40 MG: 40 INJECTION, POWDER, FOR SOLUTION INTRAVENOUS at 06:43

## 2021-09-07 RX ADMIN — METOPROLOL TARTRATE 100 MG: 100 TABLET, FILM COATED ORAL at 08:21

## 2021-09-07 RX ADMIN — INSULIN DETEMIR 30 UNITS: 100 INJECTION, SOLUTION SUBCUTANEOUS at 08:23

## 2021-09-07 NOTE — DISCHARGE PLACEMENT REQUEST
"Corrina Jensen (46 y.o. Female)     Date of Birth Social Security Number Address Home Phone MRN    1975  [address] [phone] [id number]    [employer] Marital Status          [employer]        Admission Date Admission Type Admitting Provider Attending Provider Department, Room/Bed    8/17/21 Emergency Bertha Kraus MD Sethi-Dihenia, Chanda S, MD [facility] CRITICAL CARE STEPDOWN, 08/A    Discharge Date Discharge Disposition Discharge Destination                       Attending Provider: Vianey Zepeda MD    Allergies: Latex, Strawberry, Wound Dressing Adhesive, Bactroban [Mupirocin Calcium], Neomycin-bacitracin-polymyxin [Bacitracin-neomycin-polymyxin], Other    Isolation: Enh Drop/Con   Infection: COVID (confirmed) (08/18/21)   Code Status: CPR    Ht: 167.6 cm (66\")   Wt: 114 kg (251 lb 5.2 oz)    Admission Cmt: None   Principal Problem: Sepsis, unspecified organism (CMS/[facility]) [A41.9]                 Active Insurance as of 8/17/2021     Primary Coverage     Payor Plan Insurance Group Employer/Plan Group    [place] [id number]     Payor Plan Address Payor Plan Phone Number Payor Plan Fax Number Effective Dates    [employer] [address] [address] [phone]  4/1/2020 - None Entered    [facility] [id number]       Subscriber Name Subscriber Birth Date Member ID       CORRINA JENSEN 1975 [id number]                 Emergency Contacts      (Rel.) Home Phone Work Phone Mobile Phone    [place] [phone]    Dayanna Olea (Relative) -- -- [phone]            Emergency Contact Information     Name Relation Home Work Mobile    [place] Jensen  [phone]    Dayanna Olea Relative   [phone]          Insurance Information                [place] Phone: [phone]    Subscriber: " Corrina Jensen Subscriber#: [id number]    Group#: [id number] Precert#:

## 2021-09-07 NOTE — THERAPY TREATMENT NOTE
Patient Name: Corrina Jensen  : 1975    MRN: [id number]                              Today's Date: 2021       Admit Date: 2021    Visit Dx:     ICD-10-CM ICD-9-CM   1. Sepsis with acute renal failure and septic shock, due to unspecified organism, unspecified acute renal failure type (CMS/[facility])  A41.9 038.9    R65.21 995.92    N17.9 785.52     584.9   2. Lab test positive for detection of COVID-19 virus  U07.1 079.89   3. Oropharyngeal dysphagia  R13.12 787.22   4. Impaired mobility and ADLs  Z74.09 V49.89    Z78.9    5. Impaired physical mobility  Z74.09 781.99     Patient Active Problem List   Diagnosis   • Chest pain   • Palpitation   • Tachycardia   • Type 2 diabetes mellitus without complication, without long-term current use of insulin (CMS/[facility])   • Angina effort   • Essential hypertension   • Acute pain of right knee   • Effusion, right knee   • Depressive disorder   • Elevated blood sugar   • Gastroesophageal reflux disease without esophagitis   • Midline low back pain with left-sided sciatica   • Other hyperlipidemia   • Sprain of right ankle   • Abnormal mammogram of left breast   • Malignant neoplasm of left breast in female, estrogen receptor positive (CMS/[facility])   • Pancytopenia (CMS/[facility])   • Pneumonia due to COVID-19 virus   • Atypical pneumonia   • Sepsis with acute renal failure and septic shock (CMS/[facility])   • Acute kidney injury (CMS/[facility])   • Sepsis, unspecified organism (CMS/[facility])   • Severe malnutrition (CMS/[facility])     Past Medical History:   Diagnosis Date   • Acute atopic conjunctivitis    • Acute bronchitis    • Allergic rhinitis due to pollen    • Arrhythmia     wearing heart monitor    • Arthritis    • Asthma    • Bilateral foot pain    • Breast cancer (CMS/[facility])    • Breast cyst    • Breast lump    • Chest pain    • Chronic low back pain    • Contact dermatitis due to drugs and medicine    • Cyst of ovary    • Diabetes mellitus (CMS/[facility])    • GERD (gastroesophageal reflux  disease)    • Hyperlipidemia    • Hypertension    • Infestation by Sarcoptes scabiei annalisa hominis    • Low back pain    • Mastodynia    • Nausea and vomiting    • On long term drug therapy    • Pain in wrist    • Plantar fasciitis    • Rash    • Skin disorder     breast-possible superficial cellulitis   • Spinal stenosis    • Tinea pedis    • Tobacco dependence syndrome    • Upper respiratory infection    • Vitamin D deficiency    • Wheezing      Past Surgical History:   Procedure Laterality Date   • BREAST BIOPSY     • BREAST CYST ASPIRATION     • BREAST LUMPECTOMY Left 1/27/2021    Procedure: LEFT BREAST LUMPECTOMY WITH NEEDLE LOCALIZATION Needle localization [facility] be done [facility] by radiology first   @07:00 a.m.;  Surgeon: Mat Garay MD;  Location: [facility] OR;  Service: General;  Laterality: Left;   • BREAST LUMPECTOMY WITH SENTINEL NODE BIOPSY AND AXILLARY NODE DISSECTION Left 2/22/2021    Procedure: LEFT BREAST LUMPECTOMY WITH SENTINEL NODE BIOPSY. REMOVAL OF CYST FROM LEFT CHEEK                              (INJECTION @07: 00 A.M);  Surgeon: Mat Garay MD;  Location: [facility] OR;  Service: General;  Laterality: Left;   • BREAST SURGERY      excision breast lesion   • CARDIAC CATHETERIZATION N/A 7/3/2018    Procedure: Coronary angiography;  Surgeon: Arun Cadet MD;  Location: [facility] CATH INVASIVE LOCATION;  Service: Cardiovascular   • CARDIAC CATHETERIZATION     • CHOLECYSTECTOMY     • INJECTION OF MEDICATION      Kenalog (4)     General Information     Row Name 09/07/21 1040          OT Time and Intention    Document Type  therapy note (daily note)  -LW     Mode of Treatment  individual therapy;occupational therapy  -LW     Row Name 09/07/21 1040          General Information    Patient Profile Reviewed  yes  -LW     Existing Precautions/Restrictions  fall;oxygen therapy device and L/min  -LW     Row Name 09/07/21 1040          Cognition    Orientation Status  (Cognition)  oriented to;person  -     Row Name 09/07/21 1040          Safety Issues, Functional Mobility    Impairments Affecting Function (Mobility)  endurance/activity tolerance;coordination;balance;range of motion (ROM);strength  -       User Key  (r) = Recorded By, (t) = Taken By, (c) = Cosigned By    Initials Name Provider Type    Blanca Ziegler COTA Occupational Therapy Assistant          Mobility/ADL's    No documentation.       Obj/Interventions     [facility] Name 09/07/21 1040          Shoulder (Therapeutic Exercise)    Shoulder (Therapeutic Exercise)  AROM (active range of motion)  -     Shoulder PROM (Therapeutic Exercise)  bilateral;flexion;extension;aBduction;aDduction;external rotation;internal rotation;supine;10 repetitions;10 second hold  -[facility] Name 09/07/21 1040          Elbow/Forearm (Therapeutic Exercise)    Elbow/Forearm (Therapeutic Exercise)  PROM (passive range of motion)  -     Elbow/Forearm PROM (Therapeutic Exercise)  bilateral;flexion;extension;supination;pronation;supine;10 repetitions;10 second hold  -[facility] Name 09/07/21 1040          Wrist (Therapeutic Exercise)    Wrist (Therapeutic Exercise)  PROM (passive range of motion)  -     Wrist PROM (Therapeutic Exercise)  bilateral;flexion;extension;10 repetitions;10 second hold  -[facility] Name 09/07/21 1040          Hand (Therapeutic Exercise)    Hand (Therapeutic Exercise)  PROM (passive range of motion)  -     Hand PROM (Therapeutic Exercise)  bilateral;finger flexion;finger extension;finger aBduction;finger aDduction  -[facility] Name 09/07/21 1040          Therapeutic Exercise    Therapeutic Exercise  shoulder;elbow/forearm;wrist;hand  -       User Key  (r) = Recorded By, (t) = Taken By, (c) = Cosigned By    Initials Name Provider Type    Blanca Ziegler COTA Occupational Therapy Assistant        Goals/Plan     Row Name 09/07/21 1040          Transfer Goal 1 (OT)    Activity/Assistive Device (Transfer  Goal 1, OT)  sit-to-stand/stand-to-sit;bed-to-chair/chair-to-bed;toilet  -LW     [place] Level/Cues Needed (Transfer Goal 1, OT)  moderate assist (50-74% patient effort)  -LW     Time Frame (Transfer Goal 1, OT)  long term goal (LTG);by discharge  -LW     Progress/Outcome (Transfer Goal 1, OT)  goal not met  -[facility] Name 09/07/21 1040          Bathing Goal 1 (OT)    Activity/Device (Bathing Goal 1, OT)  upper body bathing  -LW     [place] Level/Cues Needed (Bathing Goal 1, OT)  minimum assist (75% or more patient effort)  -LW     Time Frame (Bathing Goal 1, OT)  long term goal (LTG);by discharge  -LW     Progress/Outcomes (Bathing Goal 1, OT)  goal not met  -[facility] Name 09/07/21 1040          Grooming Goal 1 (OT)    Activity/Device (Grooming Goal 1, OT)  wash face, hands  -LW     [place] (Grooming Goal 1, OT)  supervision required;set-up required  -LW     Time Frame (Grooming Goal 1, OT)  short term goal (STG);5 days  -LW     Progress/Outcome (Grooming Goal 1, OT)  goal not met  -[facility] Name 09/07/21 1040          ROM Goal 1 (OT)    ROM Goal 1 (OT)  Pt will tolerate 3 sets 10 reps AAROM/AROM  -LW     Time Frame (ROM Goal 1, OT)  long term goal (LTG);by discharge  -LW     Progress/Outcome (ROM Goal 1, OT)  goal not met  -[facility] Name 09/07/21 1040          Problem Specific Goal 1 (OT)    Problem Specific Goal 1 (OT)  Pt will tolerate sitting EOB with mod A in prep for functional transfers.  -LW     Time Frame (Problem Specific Goal 1, OT)  long term goal (LTG);by discharge  -LW     Progress/Outcome (Problem Specific Goal 1, OT)  goal not met  -LW       User Key  (r) = Recorded By, (t) = Taken By, (c) = Cosigned By    Initials Name Provider Type    LW Blanca Guzman COTA Occupational Therapy Assistant        Clinical Impression     [facility] Name 09/07/21 1040          Pain Scale: Numbers Pre/Post-Treatment    Pretreatment Pain Rating  0/10 - no pain  -LW     Posttreatment Pain Rating  0/10  - no pain  -     Row Name 09/07/21 1040          Plan of Care Review    Outcome Summary  Pt supine in bed agreed to work with LOUISA on ROM with both arms. Pt PROM with B UE 10X1 sith 10 sec hold.  -     Row Name 09/07/21 1040          Positioning and Restraints    Pre-Treatment Position  in bed  -LW     Post Treatment Position  bed  -LW     In Bed  supine  -LW       User Key  (r) = Recorded By, (t) = Taken By, (c) = Cosigned By    Initials Name Provider Type    Blanca Ziegler COTA Occupational Therapy Assistant        Outcome Measures     Row Name 09/07/21 1040          How much help from another is currently needed...    Putting on and taking off regular lower body clothing?  1  -LW     Bathing (including washing, rinsing, and drying)  1  -LW     Toileting (which includes using toilet bed pan or urinal)  1  -LW     Putting on and taking off regular upper body clothing  1  -LW     Taking care of personal grooming (such as brushing teeth)  1  -LW     Eating meals  1  -LW     AM-PAC 6 Clicks Score (OT)  6  -LW       User Key  (r) = Recorded By, (t) = Taken By, (c) = Cosigned By    Initials Name Provider Type    Blanca Ziegler COTA Occupational Therapy Assistant          Occupational Therapy Education                 Title: PT OT SLP Therapies (In Progress)     Topic: Occupational Therapy (Done)     Point: ADL training (Done)     Description:   Instruct learner(s) on proper safety adaptation and remediation techniques during self care or transfers.   Instruct in proper use of assistive devices.              Learning Progress Summary           Patient Acceptance, E,TB, VU by TIGRE at 9/7/2021 1403                   Point: Home exercise program (Done)     Description:   Instruct learner(s) on appropriate technique for monitoring, assisting and/or progressing therapeutic exercises/activities.              Learning Progress Summary           Patient Acceptance, E,TB, VU by TIGRE at 9/7/2021 1403    Acceptance, E,  NL by AS at 9/6/2021 1238    Comment: PROM BUE                   Point: Precautions (Done)     Description:   Instruct learner(s) on prescribed precautions during self-care and functional transfers.              Learning Progress Summary           Patient Acceptance, E,TB, VU by LW at 9/7/2021 1403    Acceptance, E, NR,NL by AS at 9/5/2021 1606    Comment: role of OT, OT POC, bed mobility, UE assessment                   Point: Body mechanics (Done)     Description:   Instruct learner(s) on proper positioning and spine alignment during self-care, functional mobility activities and/or exercises.              Learning Progress Summary           Patient Acceptance, E,TB, VU by LW at 9/7/2021 1403                               User Key     Initials Effective Dates Name Provider Type Discipline    LW 06/16/21 -  Blanca Guzman COTA Occupational Therapy Assistant OT    AS 03/18/21 -  Perla Biggs OT Occupational Therapist OT              OT Recommendation and Plan     Plan of Care Review  Plan of Care Reviewed With: patient  Progress: no change  Outcome Summary: Pt supine in bed agreed to work with LOUISA on ROM with both arms. Pt PROM with B UE 10X1 sith 10 sec hold.     Time Calculation:   Time Calculation- OT     Row Name 09/07/21 1404             Time Calculation- OT    OT Start Time  1040  -LW      OT Stop Time  1120  -LW      OT Time Calculation (min)  40 min  -LW      Total Timed Code Minutes- OT  40 minute(s)  -LW      OT Received On  09/07/21  -LW         Timed Charges    66018 - OT Therapeutic Exercise Minutes  40  -LW         Total Minutes    Timed Charges Total Minutes  40  -LW       Total Minutes  40  -LW        User Key  (r) = Recorded By, (t) = Taken By, (c) = Cosigned By    Initials Name Provider Type    LW Blanca Guzman COTA Occupational Therapy Assistant        Therapy Charges for Today     Code Description Service Date Service Provider Modifiers Qty    87413309525 HC OT THER PROC EA 15 MIN  9/7/2021 Blanca Guzman, JOSSY  3               JOSSY Oliva  9/7/2021

## 2021-09-07 NOTE — CONSULTS
Adult Nutrition  Assessment    Patient Name:  Corrina Jensen  YOB: 1975  MRN: [id number]  Admit Date:  8/17/2021    Assessment Date:  9/7/2021    Comments: Pt has been extubated.  She failed her swallow eval and tube feeding was continued.  However, today she was evaluated and a Pureed diet has been started.  Tube feeding stopped but Coretrak remains.  Predicted inadequate oral intake due to her current cognitive status.  Will add Boost GC to all trays.  Noted--Strawberry allergy will avoid this flavor.  She continues on Decadron.  Blood glucose elevated, exacerbated by steroids, on SSI and Levemir.  RD will continue to monitor her hospital course.     Reason for Assessment     Row Name 09/07/21 1543          Reason for Assessment    Reason For Assessment  follow-up protocol         Nutrition/Diet History     Row Name 09/07/21 1541          Nutrition/Diet History    Typical Food/Fluid Intake  Pt extubated.  SLP did evaluate her and has started her on a pureed diet.  HOwever, they are keeping the Cortrak because of mentation and ? if she will take po.           Labs/Tests/Procedures/Meds     Row Name 09/07/21 1547          Labs/Procedures/Meds    Lab Results Reviewed  reviewed, pertinent        Diagnostic Tests/Procedures    Diagnostic Test/Procedure Reviewed  reviewed, pertinent        Medications    Pertinent Medications Reviewed  reviewed, pertinent         Physical Findings     Row Name 09/07/21 1540          Physical Findings    Overall Physical Appearance  on oxygen therapy           Nutrition Prescription Ordered     Row Name 09/07/21 1546          Nutrition Prescription PO    Current PO Diet  Pureed     Fluid Consistency  Thin     Common Modifiers  Consistent Carbohydrate        Nutrition Prescription EN    Enteral Route  (S) -- Tube feeding just stopped.  Coretrak will be left and they will monitor po.         Evaluation of Received Nutrient/Fluid Intake     Row Name 09/07/21 5975           Calories Evaluation    Enteral Calories (kcal)  (S) 720 After extubation and propofol discontinued.     % of Kcal Needs  42        Protein Evaluation    Enteral Protein (gm)  66     % of Protein Needs  94        Intake Assessment    Energy/Calorie Requirement Assessment  not meeting needs     Protein Requirement Assessment  not meeting needs               Electronically signed by:  Rivka Sarabia RD  09/07/21 16:28 CDT

## 2021-09-07 NOTE — PLAN OF CARE
Goal Outcome Evaluation:  Plan of Care Reviewed With: patient           Outcome Summary: pt dep for rolling and up in bed, PROM performed JAMES RESENDIZ

## 2021-09-07 NOTE — PLAN OF CARE
Goal Outcome Evaluation:  Plan of Care Reviewed With: caregiver        Progress: improving  Outcome Summary: Pt extubated.  SLP evaluated and po diet (pureed/ADA) started.  Tube feeding stopped.  Will add supplements and monitor.

## 2021-09-07 NOTE — THERAPY TREATMENT NOTE
Acute Care - Physical Therapy Treatment Note  [facility]     Patient Name: Corrina Jensen  : 1975  MRN: [id number]  Today's Date: 2021      Visit Dx:     ICD-10-CM ICD-9-CM   1. Sepsis with acute renal failure and septic shock, due to unspecified organism, unspecified acute renal failure type (CMS/[facility])  A41.9 038.9    R65.21 995.92    N17.9 785.52     584.9   2. Lab test positive for detection of COVID-19 virus  U07.1 079.89   3. Oropharyngeal dysphagia  R13.12 787.22   4. Impaired mobility and ADLs  Z74.09 V49.89    Z78.9    5. Impaired physical mobility  Z74.09 781.99     Patient Active Problem List   Diagnosis   • Chest pain   • Palpitation   • Tachycardia   • Type 2 diabetes mellitus without complication, without long-term current use of insulin (CMS/[facility])   • Angina effort   • Essential hypertension   • Acute pain of right knee   • Effusion, right knee   • Depressive disorder   • Elevated blood sugar   • Gastroesophageal reflux disease without esophagitis   • Midline low back pain with left-sided sciatica   • Other hyperlipidemia   • Sprain of right ankle   • Abnormal mammogram of left breast   • Malignant neoplasm of left breast in female, estrogen receptor positive (CMS/[facility])   • Pancytopenia (CMS/[facility])   • Pneumonia due to COVID-19 virus   • Atypical pneumonia   • Sepsis with acute renal failure and septic shock (CMS/[facility])   • Acute kidney injury (CMS/[facility])   • Sepsis, unspecified organism (CMS/[facility])   • Severe malnutrition (CMS/[facility])     Past Medical History:   Diagnosis Date   • Acute atopic conjunctivitis    • Acute bronchitis    • Allergic rhinitis due to pollen    • Arrhythmia     wearing heart monitor    • Arthritis    • Asthma    • Bilateral foot pain    • Breast cancer (CMS/HCC)    • Breast cyst    • Breast lump    • Chest pain    • Chronic low back pain    • Contact dermatitis due to drugs and medicine    • Cyst of ovary    • Diabetes mellitus (CMS/[facility])    • GERD (gastroesophageal  reflux disease)    • Hyperlipidemia    • Hypertension    • Infestation by Sarcoptes scabiei annalisa hominis    • Low back pain    • Mastodynia    • Nausea and vomiting    • On long term drug therapy    • Pain in wrist    • Plantar fasciitis    • Rash    • Skin disorder     breast-possible superficial cellulitis   • Spinal stenosis    • Tinea pedis    • Tobacco dependence syndrome    • Upper respiratory infection    • Vitamin D deficiency    • Wheezing      Past Surgical History:   Procedure Laterality Date   • BREAST BIOPSY     • BREAST CYST ASPIRATION     • BREAST LUMPECTOMY Left 1/27/2021    Procedure: LEFT BREAST LUMPECTOMY WITH NEEDLE LOCALIZATION Needle localization [facility] be done [facility] by radiology first   @07:00 a.m.;  Surgeon: Mat Garay MD;  Location: [facility];  Service: General;  Laterality: Left;   • BREAST LUMPECTOMY WITH SENTINEL NODE BIOPSY AND AXILLARY NODE DISSECTION Left 2/22/2021    Procedure: LEFT BREAST LUMPECTOMY WITH SENTINEL NODE BIOPSY. REMOVAL OF CYST FROM LEFT CHEEK                              (INJECTION @07: 00 A.M);  Surgeon: Mat Garay MD;  Location: [facility] OR;  Service: General;  Laterality: Left;   • BREAST SURGERY      excision breast lesion   • CARDIAC CATHETERIZATION N/A 7/3/2018    Procedure: Coronary angiography;  Surgeon: Arun Cadet MD;  Location: [facility] CATH INVASIVE LOCATION;  Service: Cardiovascular   • CARDIAC CATHETERIZATION     • CHOLECYSTECTOMY     • INJECTION OF MEDICATION      Kenalog (4)        PT Assessment (last 12 hours)      PT Evaluation and Treatment     Row Name 09/07/21 0830          Physical Therapy Time and Intention    Subjective Information  no complaints  -JW     Document Type  therapy note (daily note)  -JW     Mode of Treatment  physical therapy;individual therapy  -JW     Comment  remi mi tx  -JW     Row Name 09/07/21 0830          General Information    Patient Profile Reviewed  yes  -JW     Existing  Precautions/Restrictions  fall;oxygen therapy device and L/min  -[facility] Name 09/07/21 0830          Cognition    Orientation Status (Cognition)  oriented to;person;place  -[facility] Name 09/07/21 0830          Pain Scale: Numbers Pre/Post-Treatment    Pretreatment Pain Rating  0/10 - no pain  -     Posttreatment Pain Rating  0/10 - no pain  -     Pre/Posttreatment Pain Comment  pt does grimmace w/ L ankle ROM and when having bed mobility performed   -JW     Row Name 09/07/21 0830          Range of Motion Comprehensive    General Range of Motion  --  -JW     Row Name 09/07/21 0830          Strength Comprehensive (MMT)    General Manual Muscle Testing (MMT) Assessment  --  -JW     Row Name 09/07/21 0830          Bed Mobility    Bed Mobility  rolling right;rolling left  -     Rolling Left [place] (Bed Mobility)  dependent (less than 25% patient effort);2 person assist  -     Rolling Right [place] (Bed Mobility)  dependent (less than 25% patient effort);2 person assist  -     Assistive Device (Bed Mobility)  draw sheet  -     Comment (Bed Mobility)  up in bed w/ draw sheet, dep x 2  -JW     Row Name 09/07/21 0830          Transfers    Bed-Chair [place] (Transfers)  not tested  -     Sit-Stand [place] (Transfers)  not tested  -JW     Row Name 09/07/21 0830          Gait/Stairs (Locomotion)    [place] Level (Gait)  not tested  -JW     Row Name 09/07/21 0830          Safety Issues, Functional Mobility    Impairments Affecting Function (Mobility)  strength;endurance/activity tolerance;cognition;balance;coordination;motor planning;motor control;pain  -JW     Row Name 09/07/21 0830          Motor Skills    Therapeutic Exercise  hip;knee;ankle  -JW     Row Name 09/07/21 0830          Hip (Therapeutic Exercise)    Hip (Therapeutic Exercise)  PROM (passive range of motion)  -     Hip PROM (Therapeutic Exercise)  bilateral;flexion;aBduction;aDduction;external rotation;internal  "rotation;supine  -     Row Name 09/07/21 0830          Knee (Therapeutic Exercise)    Knee (Therapeutic Exercise)  PROM (passive range of motion)  -     Knee PROM (Therapeutic Exercise)  bilateral;flexion;extension;supine  -     Row Name 09/07/21 0830          Ankle (Therapeutic Exercise)    Ankle (Therapeutic Exercise)  PROM (passive range of motion)  -     Ankle PROM (Therapeutic Exercise)  bilateral;dorsiflexion;plantarflexion HC stretch 3 x 30\"  -     Row Name             Wound 09/06/21 0800 Bilateral gluteal MASD (Moisture associated skin damage)    Wound - Properties Group Placement Date: 09/06/21  -AK Placement Time: 0800  -AK Side: Bilateral  -AK Location: gluteal  -AK Primary Wound Type: MASD  -AK    Retired Wound - Properties Group Date first assessed: 09/06/21  -AK Time first assessed: 0800  -AK Side: Bilateral  -AK Location: gluteal  -AK Primary Wound Type: MASD  -AK    Row Name 09/07/21 0830          Plan of Care Review    Plan of Care Reviewed With  patient  -JW     Outcome Summary  pt dep for rolling and up in bed, PROM performed B LE  -     Row Name 09/07/21 0830          Vital Signs    Pre Systolic BP Rehab  158  -JW     Pre Treatment Diastolic BP  77  -JW     Post Systolic BP Rehab  153  -JW     Post Treatment Diastolic BP  73  -JW     Pretreatment Heart Rate (beats/min)  57  -JW     Posttreatment Heart Rate (beats/min)  58  -JW     Pre SpO2 (%)  91  -JW     O2 Delivery Pre Treatment  supplemental O2  -JW     Post SpO2 (%)  92  -JW     O2 Delivery Post Treatment  supplemental O2  -JW     Pre Patient Position  Supine  -JW     Post Patient Position  Side Lying  -     Row Name 09/07/21 0830          Bed Mobility Goal 1 (PT)    Activity/Assistive Device (Bed Mobility Goal 1, PT)  sit to supine/supine to sit  -     [place] Level/Cues Needed (Bed Mobility Goal 1, PT)  contact guard assist  -JW     Time Frame (Bed Mobility Goal 1, PT)  by discharge  -     Strategies/Barriers (Bed " Mobility Goal 1, PT)  HOB flat, no bed rails.  -JW     Progress/Outcomes (Bed Mobility Goal 1, PT)  goal not met  -JW     Row Name 09/07/21 0830          Transfer Goal 1 (PT)    Activity/Assistive Device (Transfer Goal 1, PT)  sit-to-stand/stand-to-sit;bed-to-chair/chair-to-bed;walker, rolling  -JW     [place] Level/Cues Needed (Transfer Goal 1, PT)  contact guard assist  -JW     Time Frame (Transfer Goal 1, PT)  by discharge  -JW     Strategies/Barriers (Transfers Goal 1, PT)  Profoundly weak.  -JW     Progress/Outcome (Transfer Goal 1, PT)  goal not met  -JW     Row Name 09/07/21 0830          Gait Training Goal 1 (PT)    Activity/Assistive Device (Gait Training Goal 1, PT)  walker, rolling  -JW     [place] Level (Gait Training Goal 1, PT)  contact guard assist  -JW     Distance (Gait Training Goal 1, PT)  25'x1.  -JW     Time Frame (Gait Training Goal 1, PT)  by discharge  -JW     Strategies/Barriers (Gait Training Goal 1, PT)  Profoundly weak.  -JW     Row Name 09/07/21 0830          Positioning and Restraints    Pre-Treatment Position  in bed  -JW     Post Treatment Position  bed  -JW     In Bed  side lying left;call light within reach;encouraged to call for assist  -JW     Row Name 09/07/21 0830          Therapy Assessment/Plan (PT)    Rehab Potential (PT)  good, to achieve stated therapy goals  -     Criteria for Skilled Interventions Met (PT)  yes;skilled treatment is necessary  -       User Key  (r) = Recorded By, (t) = Taken By, (c) = Cosigned By    Initials Name Provider Type    JW Connie Moon, PTA Physical Therapy Assistant    Melissa Hill, RN Registered Nurse        Physical Therapy Education                 Title: PT OT SLP Therapies (In Progress)     Topic: Physical Therapy (In Progress)     Point: Mobility training (In Progress)     Learning Progress Summary           Patient Acceptance, E, NR,NL by AJ at 9/5/2021 1956    Comment: PT POC, goals.                   Point:  Home exercise program (Not Started)     Learner Progress:  Not documented in this visit.          Point: Body mechanics (Not Started)     Learner Progress:  Not documented in this visit.          Point: Precautions (Not Started)     Learner Progress:  Not documented in this visit.                      User Key     Initials Effective Dates Name Provider Type Discipline    CZ 06/16/21 -  Kenny Lam, PT Physical Therapist PT              PT Recommendation and Plan  Anticipated Discharge Disposition (PT): home with 24/7 care, skilled nursing facility, inpatient rehabilitation facility  Therapy Frequency (PT): other (see comments) (5-7 days/week)  Plan of Care Reviewed With: patient  Outcome Summary: pt dep for rolling and up in bed, PROM performed B LE       Time Calculation:   PT Charges     Row Name 09/07/21 0825             Time Calculation    Start Time  0825  -      Stop Time  0855  -      Time Calculation (min)  30 min  -RUKHSANA      PT Received On  09/07/21  -         Time Calculation- PT    Total Timed Code Minutes- PT  30 minute(s)  -        User Key  (r) = Recorded By, (t) = Taken By, (c) = Cosigned By    Initials Name Provider Type    Connie Aldridge PTA Physical Therapy Assistant        Therapy Charges for Today     Code Description Service Date Service Provider Modifiers Qty    95406828733 HC PT THER PROC EA 15 MIN 9/7/2021 Connie Moon PTA GP 2          PT G-Codes  Outcome Measure Options: AM-PAC 6 Clicks Daily Activity (OT)  AM-PAC 6 Clicks Score (PT): 6  AM-PAC 6 Clicks Score (OT): 6    Connie Moon PTA  9/7/2021

## 2021-09-07 NOTE — PLAN OF CARE
Problem: Adult Inpatient Plan of Care  Goal: Plan of Care Review  Outcome: Ongoing, Progressing  Flowsheets  Taken 9/7/2021 1404  Progress: no change  Plan of Care Reviewed With: patient  Taken 9/7/2021 1040  Outcome Summary: Pt supine in bed agreed to work with LOUISA on ROM with both arms. Pt PROM with B UE 10X1 sith 10 sec hold.   Goal Outcome Evaluation:  Plan of Care Reviewed With: patient        Progress: no change  Outcome Summary: Pt supine in bed agreed to work with LOUISA on ROM with both arms. Pt PROM with B UE 10X1 sith 10 sec hold.

## 2021-09-07 NOTE — THERAPY TREATMENT NOTE
CCU/SDU - Speech Language Pathology   Swallow Treatment Note [facility]     Patient Name: Corrina Jensen  : 1975  MRN: [id number]  Today's Date: 2021               Admit Date: 2021     Goal:  Patient will safely tolerate least restricted diet w/no overt s/s of aspiration for adequate nutrition and hydration:  Pt seen for skilled ST services this date to address oropharyngeal dysphagia.  Pt presented as alert and conversational, with a weak voice and kept head turned to the right.  Pt unable to maintain midline positioning d/t weakness.  Pt consumed ice chips w/no overt s/s of aspiration.  Pt consumed thin liquids via spoon w/multiple swallows and cough x1.  Pt safely tolerated puree solids w/good oral clearance; however, did present w/multiple swallows.  SLP recommends advancing diet to puree w/thin liquids via spoon only at this time.  Pt educated on diet advancement, safe swallowing precautions, and POC; pt in agreement.    Visit Dx:     ICD-10-CM ICD-9-CM   1. Sepsis with acute renal failure and septic shock, due to unspecified organism, unspecified acute renal failure type (CMS/[facility])  A41.9 038.9    R65.21 995.92    N17.9 785.52     584.9   2. Lab test positive for detection of COVID-19 virus  U07.1 079.89   3. Oropharyngeal dysphagia  R13.12 787.22   4. Impaired mobility and ADLs  Z74.09 V49.89    Z78.9    5. Impaired physical mobility  Z74.09 781.99     Patient Active Problem List   Diagnosis   • Chest pain   • Palpitation   • Tachycardia   • Type 2 diabetes mellitus without complication, without long-term current use of insulin (CMS/[facility])   • Angina effort   • Essential hypertension   • Acute pain of right knee   • Effusion, right knee   • Depressive disorder   • Elevated blood sugar   • Gastroesophageal reflux disease without esophagitis   • Midline low back pain with left-sided sciatica   • Other hyperlipidemia   • Sprain of right ankle   • Abnormal mammogram of left breast   •  Malignant neoplasm of left breast in female, estrogen receptor positive (CMS/HCC)   • Pancytopenia (CMS/HCC)   • Pneumonia due to COVID-19 virus   • Atypical pneumonia   • Sepsis with acute renal failure and septic shock (CMS/HCC)   • Acute kidney injury (CMS/HCC)   • Sepsis, unspecified organism (CMS/HCC)   • Severe malnutrition (CMS/HCC)     Past Medical History:   Diagnosis Date   • Acute atopic conjunctivitis    • Acute bronchitis    • Allergic rhinitis due to pollen    • Arrhythmia     wearing heart monitor    • Arthritis    • Asthma    • Bilateral foot pain    • Breast cancer (CMS/HCC)    • Breast cyst    • Breast lump    • Chest pain    • Chronic low back pain    • Contact dermatitis due to drugs and medicine    • Cyst of ovary    • Diabetes mellitus (CMS/HCC)    • GERD (gastroesophageal reflux disease)    • Hyperlipidemia    • Hypertension    • Infestation by Sarcoptes scabiei annalisa hominis    • Low back pain    • Mastodynia    • Nausea and vomiting    • On long term drug therapy    • Pain in wrist    • Plantar fasciitis    • Rash    • Skin disorder     breast-possible superficial cellulitis   • Spinal stenosis    • Tinea pedis    • Tobacco dependence syndrome    • Upper respiratory infection    • Vitamin D deficiency    • Wheezing      Past Surgical History:   Procedure Laterality Date   • BREAST BIOPSY     • BREAST CYST ASPIRATION     • BREAST LUMPECTOMY Left 1/27/2021    Procedure: LEFT BREAST LUMPECTOMY WITH NEEDLE LOCALIZATION Needle localization to be done in the [facility] by radiology first   @07:00 a.m.;  Surgeon: Mat Garay MD;  Location: [facility];  Service: General;  Laterality: Left;   • BREAST LUMPECTOMY WITH SENTINEL NODE BIOPSY AND AXILLARY NODE DISSECTION Left 2/22/2021    Procedure: LEFT BREAST LUMPECTOMY WITH SENTINEL NODE BIOPSY. REMOVAL OF CYST FROM LEFT CHEEK                              (INJECTION @07: 00 A.M);  Surgeon: Mat Garay MD;  Location: [facility]  OR;  Service: General;  Laterality: Left;   • BREAST SURGERY      excision breast lesion   • CARDIAC CATHETERIZATION N/A 7/3/2018    Procedure: Coronary angiography;  Surgeon: Arun Cadet MD;  Location: [facility] INVASIVE LOCATION;  Service: Cardiovascular   • CARDIAC CATHETERIZATION     • CHOLECYSTECTOMY     • INJECTION OF MEDICATION      Kenalog (4)       SLP Recommendation and Plan  SLP Swallowing Diagnosis: suspected pharyngeal dysphagia  SLP Diet Recommendation: temporary alternate methods of nutrition/hydration, puree, thin liquids  Recommended Precautions and Strategies: upright posture during/after eating, small bites of food and sips of liquid, no straw, liquid via spoon only, fatigue precautions, general aspiration precautions, assist with feeding, 1:1 supervision  SLP Rec. for Method of Medication Administration: meds via alternate route     Monitor for Signs of Aspiration: yes, notify SLP if any concerns     Swallow Criteria for Skilled Therapeutic Interventions Met: demonstrates skilled criteria     Rehab Potential/Prognosis, Swallowing: good, to achieve stated therapy goals  Therapy Frequency (Swallow): 3 days per week, 5 days per week        Daily Summary of Progress (SLP): progress toward functional goals is good    Plan for Continued Treatment (SLP): Advance diet to puree/thin via spoon only              Plan of Care Reviewed With: patient  Progress: improving  Outcome Summary: Pt seen for skilled ST services this date to address oropharyngeal dysphagia.  Pt presented as alert and conversational, with a weak voice and kept head turned to the right.  Pt unable to maintain midline positioning d/t weakness.  Pt consumed ice chips w/no overt s/s of aspiration.  Pt consumed thin liquids via spoon w/multiple swallows and cough x1.  Pt safely tolerated puree solids w/good oral clearance; however, did present w/multiple swallows.  SLP recommends advancing diet to puree w/thin liquids via spoon  only at this time.  Pt educated on diet advancement, safe swallowing precautions, and POC; pt in agreement.         SWALLOW EVALUATION (last 72 hours)      SLP Adult Swallow Evaluation     Row Name 09/07/21 1254 09/06/21 1033 09/05/21 1035             Rehab Evaluation    Document Type  therapy note (daily note)  -CK  therapy note (daily note)  -EK  evaluation  -EK      Total Evaluation Minutes, SLP  28  -CK  --  --      Subjective Information  no complaints  -CK  --  no complaints  -EK      Patient Observations  alert;cooperative;agree to therapy  -CK  alert;cooperative  -EK  alert;cooperative;agree to therapy  -EK      Patient/Family/Caregiver Comments/Observations  No family present at bedside; covid positive pt  -CK  --  --      Care Plan Review  evaluation/treatment results reviewed;care plan/treatment goals reviewed;risks/benefits reviewed;current/potential barriers reviewed;patient/other agree to care plan  -CK  care plan/treatment goals reviewed  -EK  evaluation/treatment results reviewed  -EK      Patient Effort  good  -CK  --  good  -EK         General Information    Patient Profile Reviewed  yes  -CK  --  yes  -EK      Pertinent History Of Current Problem  --  --  Pt extubated yesterday afternoon. Pt with small amount of voicing noted upon evaluation.   -EK      Current Method of Nutrition  NPO;nasogastric feedings  -CK  NPO;nasogastric feedings  -EK  NPO;nasogastric feedings  -EK      Precautions/Limitations, Vision  WFL  -CK  --  WFL  -EK      Precautions/Limitations, Hearing  WFL  -CK  --  WFL  -EK      Plans/Goals Discussed with  patient  -CK  --  patient  -EK         Pain    Additional Documentation  --  --  Pain Scale: Numbers Pre/Post-Treatment (Group)  -EK         Pain Scale: Numbers Pre/Post-Treatment    Pretreatment Pain Rating  0/10 - no pain  -CK  --  0/10 - no pain  -EK      Posttreatment Pain Rating  0/10 - no pain  -CK  --  0/10 - no pain  -EK         Oral Motor Structure and Function     Dentition Assessment  natural, present and adequate  -CK  --  natural, present and adequate  -EK      Secretion Management  WNL/WFL  -CK  WNL/WFL  -EK  WNL/WFL  -EK      Mucosal Quality  moist, healthy  -CK  --  moist, healthy  -EK      Volitional Swallow  weak  -CK  weak  -EK  weak  -EK      Volitional Cough  weak  -CK  weak  -EK  weak  -EK         Oral Musculature and Cranial Nerve Assessment    Oral Motor General Assessment  generalized oral motor weakness  -CK  --  generalized oral motor weakness  -EK         General Eating/Swallowing Observations    Respiratory Support Currently in Use  nasal cannula  -CK  nasal cannula  -EK  nasal cannula  -EK      Eating/Swallowing Skills  fed by SLP  -CK  fed by SLP  -EK  fed by SLP  -EK      Positioning During Eating  upright 90 degree;upright in bed  -CK  upright 90 degree;upright in bed  -EK  upright 90 degree;upright in bed  -EK      Utensils Used  spoon  -CK  spoon  -EK  spoon  -EK      Consistencies Trialed  ice chips;thin liquids;pureed  -CK  ice chips;thin liquids  -EK  ice chips;thin liquids  -EK      Pre SpO2 (%)  88  -CK  --  --      Post SpO2 (%)  89  -CK  --  --         Clinical Swallow Eval    Pharyngeal Phase  suspected pharyngeal impairment  -CK  suspected pharyngeal impairment  -EK  suspected pharyngeal impairment  -EK      Clinical Swallow Evaluation Summary  Pt seen for skilled ST services this date to address oropharyngeal dysphagia.  Pt presented as alert and conversational, with a weak voice and kept head turned to the right.  Pt unable to maintain midline positioning d/t weakness.  Pt consumed ice chips w/no overt s/s of aspiration.  Pt consumed thin liquids via spoon w/multiple swallows and cough x1.  Pt safely tolerated puree solids w/good oral clearance; however, did present w/multiple swallows.  SLP recommends advancing diet to puree w/thin liquids via spoon only at this time.  Pt educated on diet advancement, safe swallowing precautions, and POC;  pt in agreement.  -CK  Swallow tx recommended. Pt continues to display s/s of aspiration for ice chips and water. Pt swallow improving however concern for aspiration is high. Pt may have ice chips. Continue NPO and coretrac.   -EK  Swallow evaluation completed. Pt with s/s of aspiration on over 50% of po trials of water and ice chips. Pt displays multiple swallows and coughing with more than half the po trials. SLP recommends continued NPO and continue coretrac feedings. SLP will return in am. to re-assesss swallow.   -EK         Pharyngeal Phase Concerns    Pharyngeal Phase Concerns  multiple swallows;cough  -CK  multiple swallows;cough  -EK  multiple swallows;cough  -EK      Multiple Swallows  thin;pudding  -CK  thin  -EK  thin  -EK      Cough  thin  -CK  thin  -EK  thin  -EK         Clinical Impression    Daily Summary of Progress (SLP)  progress toward functional goals is good  -CK  progress towards functional goals is fair  -EK  --      Barriers to Overall Progress (SLP)  weakness  -CK  --  --      SLP Swallowing Diagnosis  suspected pharyngeal dysphagia  -CK  suspected pharyngeal dysphagia  -EK  suspected pharyngeal dysphagia  -EK      Functional Impact  risk of aspiration/pneumonia  -CK  risk of aspiration/pneumonia  -EK  risk of aspiration/pneumonia  -EK      Rehab Potential/Prognosis, Swallowing  good, to achieve stated therapy goals  -CK  good, to achieve stated therapy goals  -EK  good, to achieve stated therapy goals  -EK      Swallow Criteria for Skilled Therapeutic Interventions Met  demonstrates skilled criteria  -CK  demonstrates skilled criteria  -EK  demonstrates skilled criteria  -EK      Plan for Continued Treatment (SLP)  Advance diet to puree/thin via spoon only  -CK  --  --         Recommendations    Therapy Frequency (Swallow)  3 days per week;5 days per week  -CK  3 days per week;5 days per week  -EK  3 days per week;5 days per week  -EK      SLP Diet Recommendation  temporary alternate  methods of nutrition/hydration;puree;thin liquids  -CK  NPO;temporary alternate methods of nutrition/hydration  -EK  NPO;temporary alternate methods of nutrition/hydration  -EK      Recommended Precautions and Strategies  upright posture during/after eating;small bites of food and sips of liquid;no straw;liquid via spoon only;fatigue precautions;general aspiration precautions;assist with feeding;1:1 supervision  -CK  --  --      Oral Care Recommendations  Oral Care BID/PRN  -CK  Oral Care BID/PRN  -EK  Oral Care BID/PRN  -EK      SLP Rec. for Method of Medication Administration  meds via alternate route  -CK  meds via alternate route  -EK  meds via alternate route  -EK      Monitor for Signs of Aspiration  yes;notify SLP if any concerns  -CK  yes;notify SLP if any concerns  -EK  yes;notify SLP if any concerns  -EK         Swallow Goals (SLP)    Oral Nutrition/Hydration Goal Selection (SLP)  oral nutrition/hydration, SLP goal 1  -CK  oral nutrition/hydration, SLP goal 1  -EK  oral nutrition/hydration, SLP goal 1  -EK         Oral Nutrition/Hydration Goal 1 (SLP)    Oral Nutrition/Hydration Goal 1, SLP  Pt to tolerate least restrictive diet with no s/s of aspiration for adequate nutrition and hdyration.   -CK  Pt to tolerate least restrictive diet with no s/s of aspiration for adequate nutrition and hdyration.   -EK  Pt to tolerate least restrictive diet with no s/s of aspiration for adequate nutrition and hdyration.   -EK      Time Frame (Oral Nutrition/Hydration Goal 1, SLP)  by discharge  -CK  by discharge  -EK  by discharge  -EK      Progress/Outcomes (Oral Nutrition/Hydration Goal 1, SLP)  goal ongoing  -CK  goal not met  -EK  other (see comments) new goal   -EK        User Key  (r) = Recorded By, (t) = Taken By, (c) = Cosigned By    Initials Name Effective Dates    EK Mago Mckenna, CCC-SLP 06/16/21 -     Tequila Barrett MS CCC-SLP 06/16/21 -           EDUCATION  The patient has been  educated in the following areas:   Dysphagia (Swallowing Impairment) Oral Care/Hydration Modified Diet Instruction.       SLP GOALS     Row Name 09/07/21 1254 09/06/21 1033 09/05/21 1035       Oral Nutrition/Hydration Goal 1 (SLP)    Oral Nutrition/Hydration Goal 1, SLP  Pt to tolerate least restrictive diet with no s/s of aspiration for adequate nutrition and hdyration.   -CK  Pt to tolerate least restrictive diet with no s/s of aspiration for adequate nutrition and hdyration.   -EK  Pt to tolerate least restrictive diet with no s/s of aspiration for adequate nutrition and hdyration.   -EK    Time Frame (Oral Nutrition/Hydration Goal 1, SLP)  by discharge  -CK  by discharge  -EK  by discharge  -EK    Progress/Outcomes (Oral Nutrition/Hydration Goal 1, SLP)  goal ongoing  -CK  goal not met  -EK  other (see comments) new goal   -EK      User Key  (r) = Recorded By, (t) = Taken By, (c) = Cosigned By    Initials Name Provider Type    Mago Waterman, CCC-SLP Speech and Language Pathologist    Tequila Barrett MS CCC-SLP Speech and Language Pathologist             Time Calculation:   Time Calculation- SLP     Row Name 09/07/21 1322             Time Calculation- SLP    SLP Start Time  1254  -CK      SLP Stop Time  1322  -CK      SLP Time Calculation (min)  28 min  -CK      Total Timed Code Minutes- SLP  28 minute(s)  -CK      SLP Received On  09/07/21  -CK      SLP Goal Re-Cert Due Date  09/18/21  -CK         Untimed Charges    72257-LB Treatment Swallow Minutes  28  -CK         Total Minutes    Untimed Charges Total Minutes  28  -CK       Total Minutes  28  -CK        User Key  (r) = Recorded By, (t) = Taken By, (c) = Cosigned By    Initials Name Provider Type    Tequila Barrett MS CCC-SLP Speech and Language Pathologist          Therapy Charges for Today     Code Description Service Date Service Provider Modifiers Qty    [id number] HC ST TREATMENT SWALLOW 2 9/7/2021 Tequila Mckenna MS  CCC-SLP GN 1               Tequila Mckenna, MS RONEY-SLP  9/7/2021

## 2021-09-07 NOTE — PLAN OF CARE
Goal Outcome Evaluation:  Plan of Care Reviewed With: patient        Progress: improving  Outcome Summary: Pt seen for skilled ST services this date to address oropharyngeal dysphagia.  Pt presented as alert and conversational, with a weak voice and kept head turned to the right.  Pt unable to maintain midline positioning d/t weakness.  Pt consumed ice chips w/no overt s/s of aspiration.  Pt consumed thin liquids via spoon w/multiple swallows and cough x1.  Pt safely tolerated puree solids w/good oral clearance; however, did present w/multiple swallows.  SLP recommends advancing diet to puree w/thin liquids via spoon only at this time.  Pt educated on diet advancement, safe swallowing precautions, and POC; pt in agreement.   PACU

## 2021-09-07 NOTE — PROGRESS NOTES
[facility] Medicine Services  INPATIENT PROGRESS NOTE    Length of Stay: 20  Date of Admission: 8/17/2021  Primary Care Physician: Jose Jean-Baptiste MD    Subjective   Chief Complaint: Respiratory failure  HPI: Remains confused and delirious but otherwise doing well.  Has no current complaints or concerns per nursing.    Review of Systems   Unable to perform ROS: Mental status change      All pertinent negatives and positives are as above. All other systems have been reviewed and are negative unless otherwise stated.     Objective    Temp:  [97.6 °F (36.4 °C)] 97.6 °F (36.4 °C)  Heart Rate:  [] 70  Resp:  [18-20] 20  BP: (102-179)/(56-90) 146/74    Physical Exam  Constitutional:       General: She is not in acute distress.     Appearance: She is not toxic-appearing.   HENT:      Head: Normocephalic and atraumatic.      Right Ear: External ear normal.      Left Ear: External ear normal.      Nose: Nose normal.      Comments: Core track in place     Mouth/Throat:      Mouth: Mucous membranes are moist.      Pharynx: Oropharynx is clear.   Eyes:      Conjunctiva/sclera: Conjunctivae normal.   Cardiovascular:      Rate and Rhythm: Normal rate and regular rhythm.      Pulses: Normal pulses.      Heart sounds: Normal heart sounds.   Pulmonary:      Comments: Diminished bilaterally but otherwise clear  Abdominal:      General: Bowel sounds are normal.      Palpations: Abdomen is soft.      Tenderness: There is no abdominal tenderness.   Musculoskeletal:         General: Swelling present.      Cervical back: Neck supple.   Skin:     General: Skin is warm and dry.      Capillary Refill: Capillary refill takes less than 2 seconds.   Neurological:      Comments: Follows commands, moves extremities, speech is soft but fluent and understandable.   Psychiatric:         Mood and Affect: Mood normal.         Behavior: Behavior normal.         Results Review:  I have reviewed the  labs, radiology results, and diagnostic studies.    Laboratory Data:   Results from last 7 days   Lab Units 09/07/21  0421 09/06/21  1411 09/06/21  0328 09/05/21 0315 09/05/21  0315   SODIUM mmol/L 149*  --  149*  --  149*   POTASSIUM mmol/L 3.5 4.2 3.6   < > 4.0   CHLORIDE mmol/L 111*  --  114*  --  114*   CO2 mmol/L 27.0  --  24.0  --  27.0   BUN mg/dL 38*  --  44*  --  39*   CREATININE mg/dL 0.43*  --  0.41*  --  0.42*   GLUCOSE mg/dL 258*  --  259*  --  291*   CALCIUM mg/dL 9.3  --  9.5  --  9.5   BILIRUBIN mg/dL 0.6  --  0.5  --  0.6   ALK PHOS U/L 111  --  102  --  104   ALT (SGPT) U/L 217*  --  114*  --  67*   AST (SGOT) U/L 139*  --  92*  --  47*   ANION GAP mmol/L 11.0  --  11.0  --  8.0    < > = values in this interval not displayed.     Estimated Creatinine Clearance: 205.1 mL/min (A) (by C-G formula based on SCr of 0.43 mg/dL (L)).  Results from last 7 days   Lab Units 09/05/21 0315   MAGNESIUM mg/dL 2.3         Results from last 7 days   Lab Units 09/07/21  0421 09/06/21  0328 09/05/21 0315 09/04/21  0433 09/03/21  0527   WBC 10*3/mm3 8.62 6.33 4.69 6.86 7.92   HEMOGLOBIN g/dL 11.4* 10.9* 10.0* 10.3* 10.4*   HEMATOCRIT % 35.7 33.5* 31.6* 31.3* 31.8*   PLATELETS 10*3/mm3 157 143 142 132* 155     Results from last 7 days   Lab Units 09/07/21  0421 09/05/21 0315 09/03/21 0527 09/01/21  0245   INR  1.18 1.18 1.10 1.23*       Culture Data:   Blood Culture   Date Value Ref Range Status   09/04/2021 No growth at 2 days  Preliminary     No results found for: URINECX  No results found for: RESPCX  No results found for: WOUNDCX  No results found for: STOOLCX  No components found for: BODYFLD    Radiology Data:   Imaging Results (Last 24 Hours)     ** No results found for the last 24 hours. **          I have reviewed the patient's current medications.     Assessment/Plan     Principal Problem:    Sepsis, unspecified organism (CMS/HCC)  Active Problems:    Type 2 diabetes mellitus without complication,  without long-term current use of insulin (CMS/[facility])    Pneumonia due to COVID-19 virus    Atypical pneumonia    Sepsis with acute renal failure and septic shock (CMS/HCC)    Acute kidney injury (CMS/HCC)    Severe malnutrition (CMS/HCC)  Transaminitis  Pyuria  Delirium  She is severely immune suppressed and weak and unable to think of titration yet from current oxygen level that is in use.       Plan:    1.  Type ii dm and accucheks and slilding scale     2.  Tube feeds to maintain her nutritional needs and treat with sliding scale insulin     3.   covid pneumonia and being treated per protocol and has done somewhat better , but still on hi april oxygen . And she is on decadron and she has finished course of remdesivir nad also abx therapy.      Supportive care and titrate oxygen levels to lower values as soon as she is able to .      33 minutes of critical care provided. This time excludes other billable procedures. Time does include preparation of documents, medical consultations, review of old records, and direct bedside care.    I confirmed that the patient's Advance Care Plan is present, code status is documented, or surrogate decision maker is listed in the patient's medical record.    Discharge Planning: In process.  Can transfer up to 4 W as a bed is available.    Vianey Zepeda MD

## 2021-09-08 PROBLEM — D89.834 CYTOKINE RELEASE SYNDROME, GRADE 4: Status: ACTIVE | Noted: 2021-09-08

## 2021-09-08 LAB
ALBUMIN SERPL-MCNC: 3.2 G/DL (ref 3.5–5.2)
ALBUMIN/GLOB SERPL: 0.9 G/DL
ALP SERPL-CCNC: 114 U/L (ref 39–117)
ALT SERPL W P-5'-P-CCNC: 226 U/L (ref 1–33)
ANION GAP SERPL CALCULATED.3IONS-SCNC: 13 MMOL/L (ref 5–15)
AST SERPL-CCNC: 110 U/L (ref 1–32)
BASOPHILS # BLD AUTO: 0.02 10*3/MM3 (ref 0–0.2)
BASOPHILS NFR BLD AUTO: 0.2 % (ref 0–1.5)
BILIRUB SERPL-MCNC: 1 MG/DL (ref 0–1.2)
BUN SERPL-MCNC: 34 MG/DL (ref 6–20)
BUN/CREAT SERPL: 89.5 (ref 7–25)
CALCIUM SPEC-SCNC: 9 MG/DL (ref 8.6–10.5)
CHLORIDE SERPL-SCNC: 110 MMOL/L (ref 98–107)
CO2 SERPL-SCNC: 24 MMOL/L (ref 22–29)
CREAT SERPL-MCNC: 0.38 MG/DL (ref 0.57–1)
CRP SERPL-MCNC: <0.3 MG/DL (ref 0–0.5)
DEPRECATED RDW RBC AUTO: 52 FL (ref 37–54)
EOSINOPHIL # BLD AUTO: 0.01 10*3/MM3 (ref 0–0.4)
EOSINOPHIL NFR BLD AUTO: 0.1 % (ref 0.3–6.2)
ERYTHROCYTE [DISTWIDTH] IN BLOOD BY AUTOMATED COUNT: 15.9 % (ref 12.3–15.4)
GFR SERPL CREATININE-BSD FRML MDRD: >150 ML/MIN/1.73
GLOBULIN UR ELPH-MCNC: 3.4 GM/DL
GLUCOSE BLDC GLUCOMTR-MCNC: 199 MG/DL (ref 70–130)
GLUCOSE BLDC GLUCOMTR-MCNC: 248 MG/DL (ref 70–130)
GLUCOSE BLDC GLUCOMTR-MCNC: 254 MG/DL (ref 70–130)
GLUCOSE BLDC GLUCOMTR-MCNC: 258 MG/DL (ref 70–130)
GLUCOSE BLDC GLUCOMTR-MCNC: 380 MG/DL (ref 70–130)
GLUCOSE SERPL-MCNC: 250 MG/DL (ref 65–99)
HCT VFR BLD AUTO: 35.1 % (ref 34–46.6)
HGB BLD-MCNC: 11.5 G/DL (ref 12–15.9)
IMM GRANULOCYTES # BLD AUTO: 0.14 10*3/MM3 (ref 0–0.05)
IMM GRANULOCYTES NFR BLD AUTO: 1.6 % (ref 0–0.5)
LYMPHOCYTES # BLD AUTO: 0.55 10*3/MM3 (ref 0.7–3.1)
LYMPHOCYTES NFR BLD AUTO: 6.4 % (ref 19.6–45.3)
MCH RBC QN AUTO: 30.2 PG (ref 26.6–33)
MCHC RBC AUTO-ENTMCNC: 32.8 G/DL (ref 31.5–35.7)
MCV RBC AUTO: 92.1 FL (ref 79–97)
MONOCYTES # BLD AUTO: 0.42 10*3/MM3 (ref 0.1–0.9)
MONOCYTES NFR BLD AUTO: 4.9 % (ref 5–12)
NEUTROPHILS NFR BLD AUTO: 7.39 10*3/MM3 (ref 1.7–7)
NEUTROPHILS NFR BLD AUTO: 86.8 % (ref 42.7–76)
NRBC BLD AUTO-RTO: 0 /100 WBC (ref 0–0.2)
PLATELET # BLD AUTO: 148 10*3/MM3 (ref 140–450)
PMV BLD AUTO: 11.4 FL (ref 6–12)
POTASSIUM SERPL-SCNC: 3.7 MMOL/L (ref 3.5–5.2)
PROT SERPL-MCNC: 6.6 G/DL (ref 6–8.5)
RBC # BLD AUTO: 3.81 10*6/MM3 (ref 3.77–5.28)
SODIUM SERPL-SCNC: 147 MMOL/L (ref 136–145)
WBC # BLD AUTO: 8.53 10*3/MM3 (ref 3.4–10.8)

## 2021-09-08 PROCEDURE — 63710000001 INSULIN DETEMIR PER 5 UNITS: Performed by: FAMILY MEDICINE

## 2021-09-08 PROCEDURE — 82962 GLUCOSE BLOOD TEST: CPT

## 2021-09-08 PROCEDURE — 86140 C-REACTIVE PROTEIN: CPT | Performed by: FAMILY MEDICINE

## 2021-09-08 PROCEDURE — 63710000001 INSULIN DETEMIR PER 5 UNITS: Performed by: INTERNAL MEDICINE

## 2021-09-08 PROCEDURE — 25010000002 FLUCONAZOLE PER 200 MG: Performed by: FAMILY MEDICINE

## 2021-09-08 PROCEDURE — 25010000002 ENOXAPARIN PER 10 MG: Performed by: FAMILY MEDICINE

## 2021-09-08 PROCEDURE — 80053 COMPREHEN METABOLIC PANEL: CPT | Performed by: FAMILY MEDICINE

## 2021-09-08 PROCEDURE — 25010000002 ENOXAPARIN PER 10 MG: Performed by: INTERNAL MEDICINE

## 2021-09-08 PROCEDURE — 25010000002 ONDANSETRON PER 1 MG: Performed by: INTERNAL MEDICINE

## 2021-09-08 PROCEDURE — 63710000001 INSULIN ASPART PER 5 UNITS: Performed by: FAMILY MEDICINE

## 2021-09-08 PROCEDURE — 97110 THERAPEUTIC EXERCISES: CPT

## 2021-09-08 PROCEDURE — 97530 THERAPEUTIC ACTIVITIES: CPT

## 2021-09-08 PROCEDURE — 85025 COMPLETE CBC W/AUTO DIFF WBC: CPT | Performed by: FAMILY MEDICINE

## 2021-09-08 PROCEDURE — 25010000002 DEXAMETHASONE PER 1 MG: Performed by: FAMILY MEDICINE

## 2021-09-08 PROCEDURE — 94799 UNLISTED PULMONARY SVC/PX: CPT

## 2021-09-08 PROCEDURE — 63710000001 INSULIN ASPART PER 5 UNITS: Performed by: INTERNAL MEDICINE

## 2021-09-08 PROCEDURE — 94760 N-INVAS EAR/PLS OXIMETRY 1: CPT

## 2021-09-08 PROCEDURE — 25010000002 FUROSEMIDE PER 20 MG: Performed by: FAMILY MEDICINE

## 2021-09-08 PROCEDURE — 92526 ORAL FUNCTION THERAPY: CPT | Performed by: SPEECH-LANGUAGE PATHOLOGIST

## 2021-09-08 RX ORDER — METOPROLOL TARTRATE 100 MG/1
100 TABLET ORAL 2 TIMES DAILY
Qty: 60 TABLET | Refills: 6 | Status: SHIPPED | OUTPATIENT
Start: 2021-09-08 | End: 2021-09-09 | Stop reason: HOSPADM

## 2021-09-08 RX ORDER — DEXAMETHASONE 6 MG/1
6 TABLET ORAL
Qty: 15 TABLET | Refills: 0 | Status: SHIPPED | OUTPATIENT
Start: 2021-09-09 | End: 2021-09-24

## 2021-09-08 RX ORDER — DEXAMETHASONE SODIUM PHOSPHATE 4 MG/ML
6 INJECTION, SOLUTION INTRA-ARTICULAR; INTRALESIONAL; INTRAMUSCULAR; INTRAVENOUS; SOFT TISSUE DAILY
Status: DISCONTINUED | OUTPATIENT
Start: 2021-09-09 | End: 2021-09-08

## 2021-09-08 RX ORDER — MONTELUKAST SODIUM 10 MG/1
10 TABLET ORAL NIGHTLY
Qty: 30 TABLET | Refills: 6 | Status: SHIPPED | OUTPATIENT
Start: 2021-09-08

## 2021-09-08 RX ADMIN — METOPROLOL TARTRATE 100 MG: 100 TABLET, FILM COATED ORAL at 10:24

## 2021-09-08 RX ADMIN — CHLORHEXIDINE GLUCONATE 15 ML: 1.2 RINSE ORAL at 21:30

## 2021-09-08 RX ADMIN — FLUCONAZOLE IN SODIUM CHLORIDE 400 MG: 2 INJECTION, SOLUTION INTRAVENOUS at 10:25

## 2021-09-08 RX ADMIN — SODIUM CHLORIDE, PRESERVATIVE FREE 10 ML: 5 INJECTION INTRAVENOUS at 20:43

## 2021-09-08 RX ADMIN — INSULIN ASPART 12 UNITS: 100 INJECTION, SOLUTION INTRAVENOUS; SUBCUTANEOUS at 18:09

## 2021-09-08 RX ADMIN — ALBUTEROL SULFATE 2 PUFF: 90 AEROSOL, METERED RESPIRATORY (INHALATION) at 03:12

## 2021-09-08 RX ADMIN — INSULIN DETEMIR 36 UNITS: 100 INJECTION, SOLUTION SUBCUTANEOUS at 21:30

## 2021-09-08 RX ADMIN — SODIUM CHLORIDE, PRESERVATIVE FREE 10 ML: 5 INJECTION INTRAVENOUS at 20:44

## 2021-09-08 RX ADMIN — SODIUM CHLORIDE, PRESERVATIVE FREE 10 ML: 5 INJECTION INTRAVENOUS at 20:45

## 2021-09-08 RX ADMIN — DEXAMETHASONE SODIUM PHOSPHATE 6 MG: 4 INJECTION, SOLUTION INTRAMUSCULAR; INTRAVENOUS at 10:23

## 2021-09-08 RX ADMIN — CETIRIZINE HYDROCHLORIDE 5 MG: 5 TABLET ORAL at 10:24

## 2021-09-08 RX ADMIN — ACETAMINOPHEN 650 MG: 325 TABLET, FILM COATED ORAL at 21:55

## 2021-09-08 RX ADMIN — FUROSEMIDE 20 MG: 10 INJECTION, SOLUTION INTRAMUSCULAR; INTRAVENOUS at 10:23

## 2021-09-08 RX ADMIN — CYANOCOBALAMIN TAB 1000 MCG 1000 MCG: 1000 TAB at 10:24

## 2021-09-08 RX ADMIN — MONTELUKAST 10 MG: 10 TABLET, FILM COATED ORAL at 20:46

## 2021-09-08 RX ADMIN — ALBUTEROL SULFATE 2 PUFF: 90 AEROSOL, METERED RESPIRATORY (INHALATION) at 11:44

## 2021-09-08 RX ADMIN — AMLODIPINE BESYLATE 10 MG: 10 TABLET ORAL at 10:25

## 2021-09-08 RX ADMIN — SODIUM CHLORIDE, PRESERVATIVE FREE 10 ML: 5 INJECTION INTRAVENOUS at 10:25

## 2021-09-08 RX ADMIN — BUDESONIDE AND FORMOTEROL FUMARATE DIHYDRATE 2 PUFF: 160; 4.5 AEROSOL RESPIRATORY (INHALATION) at 08:23

## 2021-09-08 RX ADMIN — ONDANSETRON 4 MG: 2 INJECTION INTRAMUSCULAR; INTRAVENOUS at 10:35

## 2021-09-08 RX ADMIN — ENOXAPARIN SODIUM 100 MG: 100 INJECTION SUBCUTANEOUS at 21:29

## 2021-09-08 RX ADMIN — FOLIC ACID 1 MG: 1 TABLET ORAL at 10:24

## 2021-09-08 RX ADMIN — SODIUM CHLORIDE, PRESERVATIVE FREE 10 ML: 5 INJECTION INTRAVENOUS at 20:42

## 2021-09-08 RX ADMIN — SODIUM CHLORIDE, PRESERVATIVE FREE 10 ML: 5 INJECTION INTRAVENOUS at 10:26

## 2021-09-08 RX ADMIN — INSULIN DETEMIR 30 UNITS: 100 INJECTION, SOLUTION SUBCUTANEOUS at 10:39

## 2021-09-08 RX ADMIN — LOSARTAN POTASSIUM 100 MG: 50 TABLET, FILM COATED ORAL at 10:24

## 2021-09-08 RX ADMIN — ALBUTEROL SULFATE 2 PUFF: 90 AEROSOL, METERED RESPIRATORY (INHALATION) at 08:24

## 2021-09-08 RX ADMIN — OLANZAPINE 10 MG: 10 TABLET, FILM COATED ORAL at 20:45

## 2021-09-08 RX ADMIN — INSULIN ASPART 12 UNITS: 100 INJECTION, SOLUTION INTRAVENOUS; SUBCUTANEOUS at 10:22

## 2021-09-08 RX ADMIN — ENOXAPARIN SODIUM 130 MG: 150 INJECTION SUBCUTANEOUS at 10:36

## 2021-09-08 RX ADMIN — METOPROLOL TARTRATE 100 MG: 100 TABLET, FILM COATED ORAL at 20:46

## 2021-09-08 RX ADMIN — INSULIN ASPART 6 UNITS: 100 INJECTION, SOLUTION INTRAVENOUS; SUBCUTANEOUS at 22:39

## 2021-09-08 RX ADMIN — PANTOPRAZOLE SODIUM 40 MG: 40 INJECTION, POWDER, FOR SOLUTION INTRAVENOUS at 05:41

## 2021-09-08 NOTE — THERAPY TREATMENT NOTE
Acute Care - Physical Therapy Treatment Note  [facility]     Patient Name: Corrina Jensen  : 1975  MRN: [id number]  Today's Date: 2021      Visit Dx:     ICD-10-CM ICD-9-CM   1. Sepsis with acute renal failure and septic shock, due to unspecified organism, unspecified acute renal failure type (CMS/[facility])  A41.9 038.9    R65.21 995.92    N17.9 785.52     584.9   2. Lab test positive for detection of COVID-19 virus  U07.1 079.89   3. Oropharyngeal dysphagia  R13.12 787.22   4. Impaired mobility and ADLs  Z74.09 V49.89    Z78.9    5. Impaired physical mobility  Z74.09 781.99     Patient Active Problem List   Diagnosis   • Chest pain   • Palpitation   • Tachycardia   • Type 2 diabetes mellitus without complication, without long-term current use of insulin (CMS/[facility])   • Angina effort   • Essential hypertension   • Acute pain of right knee   • Effusion, right knee   • Depressive disorder   • Elevated blood sugar   • Gastroesophageal reflux disease without esophagitis   • Midline low back pain with left-sided sciatica   • Other hyperlipidemia   • Sprain of right ankle   • Abnormal mammogram of left breast   • Malignant neoplasm of left breast in female, estrogen receptor positive (CMS/[facility])   • Pancytopenia (CMS/[facility])   • Pneumonia due to COVID-19 virus   • Atypical pneumonia   • Sepsis with acute renal failure and septic shock (CMS/[facility])   • Acute kidney injury (CMS/[facility])   • Sepsis, unspecified organism (CMS/[facility])   • Severe malnutrition (CMS/[facility])     Past Medical History:   Diagnosis Date   • Acute atopic conjunctivitis    • Acute bronchitis    • Allergic rhinitis due to pollen    • Arrhythmia     wearing heart monitor    • Arthritis    • Asthma    • Bilateral foot pain    • Breast cancer (CMS/HCC)    • Breast cyst    • Breast lump    • Chest pain    • Chronic low back pain    • Contact dermatitis due to drugs and medicine    • Cyst of ovary    • Diabetes mellitus (CMS/HCC)    • GERD (gastroesophageal  reflux disease)    • Hyperlipidemia    • Hypertension    • Infestation by Sarcoptes scabiei annalisa hominis    • Low back pain    • Mastodynia    • Nausea and vomiting    • On long term drug therapy    • Pain in wrist    • Plantar fasciitis    • Rash    • Skin disorder     breast-possible superficial cellulitis   • Spinal stenosis    • Tinea pedis    • Tobacco dependence syndrome    • Upper respiratory infection    • Vitamin D deficiency    • Wheezing      Past Surgical History:   Procedure Laterality Date   • BREAST BIOPSY     • BREAST CYST ASPIRATION     • BREAST LUMPECTOMY Left 1/27/2021    Procedure: LEFT BREAST LUMPECTOMY WITH NEEDLE LOCALIZATION Needle localization to be done in the [facility] by radiology first   @07:00 a.m.;  Surgeon: Mat Garay MD;  Location: [facility];  Service: General;  Laterality: Left;   • BREAST LUMPECTOMY WITH SENTINEL NODE BIOPSY AND AXILLARY NODE DISSECTION Left 2/22/2021    Procedure: LEFT BREAST LUMPECTOMY WITH SENTINEL NODE BIOPSY. REMOVAL OF CYST FROM LEFT CHEEK                              (INJECTION @07: 00 A.M);  Surgeon: Mat Garay MD;  Location: [facility] OR;  Service: General;  Laterality: Left;   • BREAST SURGERY      excision breast lesion   • CARDIAC CATHETERIZATION N/A 7/3/2018    Procedure: Coronary angiography;  Surgeon: Arun Cadet MD;  Location: [facility] CATH INVASIVE LOCATION;  Service: Cardiovascular   • CARDIAC CATHETERIZATION     • CHOLECYSTECTOMY     • INJECTION OF MEDICATION      Kenalog (4)        PT Assessment (last 12 hours)      PT Evaluation and Treatment     Row Name 09/08/21 0907          Physical Therapy Time and Intention    Subjective Information  no complaints  -TW     Document Type  therapy note (daily note)  -TW     Mode of Treatment  physical therapy;individual therapy  -TW     Patient Effort  good  -TW     Row Name 09/08/21 0907          General Information    Patient Profile Reviewed  yes  -TW     Patient  Observations  alert;cooperative;agree to therapy  -TW     Patient/Family/Caregiver Comments/Observations  none  -TW     General Observations of Patient  Pt supine in bed.  -TW     Existing Precautions/Restrictions  fall;oxygen therapy device and L/min  -TW     Row Name 09/08/21 0907          Cognition    Orientation Status (Cognition)  oriented to;person  -TW     Follows Commands (Cognition)  follows one-step commands;50-74% accuracy  -TW     Personal Safety Interventions  muscle strengthening facilitated  -TW     Row Name 09/08/21 0907          Pain Scale: Numbers Pre/Post-Treatment    Pretreatment Pain Rating  0/10 - no pain  -TW     Posttreatment Pain Rating  0/10 - no pain  -TW     Row Name 09/08/21 0907          Bed Mobility    Bed Mobility  rolling right;rolling left  -TW     Rolling Left [place] (Bed Mobility)  dependent (less than 25% patient effort);2 person assist  -TW     Rolling Right [place] (Bed Mobility)  dependent (less than 25% patient effort);2 person assist  -TW     Assistive Device (Bed Mobility)  draw sheet  -TW     Row Name 09/08/21 0907          Transfers    Bed-Chair [place] (Transfers)  not tested  -TW     Sit-Stand [place] (Transfers)  not tested  -TW     Row Name 09/08/21 0907          Gait/Stairs (Locomotion)    [place] Level (Gait)  not tested  -TW     Row Name 09/08/21 0907          Safety Issues, Functional Mobility    Impairments Affecting Function (Mobility)  strength;endurance/activity tolerance;cognition;balance;coordination;motor planning;motor control;pain  -TW     Row Name 09/08/21 0907          Hip (Therapeutic Exercise)    Hip PROM (Therapeutic Exercise)  bilateral;supine  -TW     Row Name 09/08/21 0907          Knee (Therapeutic Exercise)    Knee PROM (Therapeutic Exercise)  bilateral;supine  -TW     Row Name 09/08/21 0907          Ankle (Therapeutic Exercise)    Ankle PROM (Therapeutic Exercise)  bilateral;supine  -TW     Row Name             Wound  09/06/21 0800 Bilateral gluteal MASD (Moisture associated skin damage)    Wound - Properties Group Placement Date: 09/06/21  -AK Placement Time: 0800  -AK Side: Bilateral  -AK Location: gluteal  -AK Primary Wound Type: MASD  -AK    Retired Wound - Properties Group Date first assessed: 09/06/21  -AK Time first assessed: 0800  -AK Side: Bilateral  -AK Location: gluteal  -AK Primary Wound Type: MASD  -AK    Row Name 09/08/21 0907          Plan of Care Review    Plan of Care Reviewed With  patient  -TW     Progress  no change  -TW     Outcome Summary  Pt and nsg agreeable to therapy. Pt supine in bed and agreeable to LE ther ex. Pt unable to assist with B LE ther ex consistantly. Pt performed rolling to each side in bed with dep assist from PTA for improved positioning. Pt would cont to benefit from therapy upon DC.  -TW     Row Name 09/08/21 0907          Vital Signs    Pretreatment Heart Rate (beats/min)  80  -TW     Posttreatment Heart Rate (beats/min)  84  -TW     Pre SpO2 (%)  94  -TW     O2 Delivery Pre Treatment  hi-flow  -TW     Post SpO2 (%)  97  -TW     Pre Patient Position  Supine  -TW     Intra Patient Position  Side Lying  -TW     Post Patient Position  Supine  -TW     Row Name 09/08/21 0907          Bed Mobility Goal 1 (PT)    Activity/Assistive Device (Bed Mobility Goal 1, PT)  sit to supine/supine to sit  -TW     [place] Level/Cues Needed (Bed Mobility Goal 1, PT)  contact guard assist  -TW     Time Frame (Bed Mobility Goal 1, PT)  by discharge  -TW     Strategies/Barriers (Bed Mobility Goal 1, PT)  HOB flat, no bed rails.  -TW     Progress/Outcomes (Bed Mobility Goal 1, PT)  goal not met  -TW     Row Name 09/08/21 0907          Transfer Goal 1 (PT)    Activity/Assistive Device (Transfer Goal 1, PT)  sit-to-stand/stand-to-sit;bed-to-chair/chair-to-bed;walker, rolling  -TW     [place] Level/Cues Needed (Transfer Goal 1, PT)  contact guard assist  -TW     Time Frame (Transfer Goal 1, PT)  by  discharge  -TW     Strategies/Barriers (Transfers Goal 1, PT)  Profoundly weak.  -TW     Progress/Outcome (Transfer Goal 1, PT)  goal not met  -TW     Row Name 09/08/21 0907          Gait Training Goal 1 (PT)    Activity/Assistive Device (Gait Training Goal 1, PT)  walker, rolling  -TW     [place] Level (Gait Training Goal 1, PT)  contact guard assist  -TW     Distance (Gait Training Goal 1, PT)  25'x1.  -TW     Time Frame (Gait Training Goal 1, PT)  by discharge  -TW     Strategies/Barriers (Gait Training Goal 1, PT)  Profoundly weak.  -TW     Row Name 09/08/21 0907          Positioning and Restraints    Pre-Treatment Position  in bed  -TW     Post Treatment Position  bed  -TW     In Bed  supine;call light within reach;encouraged to call for assist;exit alarm on  -TW     Row Name 09/08/21 0907          Therapy Assessment/Plan (PT)    Rehab Potential (PT)  good, to achieve stated therapy goals  -TW     Criteria for Skilled Interventions Met (PT)  yes;skilled treatment is necessary  -TW       User Key  (r) = Recorded By, (t) = Taken By, (c) = Cosigned By    Initials Name Provider Type    Melissa Hill, RN Registered Nurse    TW Keanu Payne, PTA Physical Therapy Assistant        Physical Therapy Education                 Title: PT OT SLP Therapies (In Progress)     Topic: Physical Therapy (In Progress)     Point: Mobility training (In Progress)     Learning Progress Summary           Patient Acceptance, E, NR,NL by  at 9/5/2021 2787    Comment: PT POC, goals.                   Point: Home exercise program (Not Started)     Learner Progress:  Not documented in this visit.          Point: Body mechanics (Not Started)     Learner Progress:  Not documented in this visit.          Point: Precautions (Not Started)     Learner Progress:  Not documented in this visit.                      User Key     Initials Effective Dates Name Provider Type Discipline     06/16/21 -  Kenny Lam, PT Physical  Therapist PT              PT Recommendation and Plan  Anticipated Discharge Disposition (PT): home with 24/7 care, skilled nursing facility, inpatient rehabilitation facility  Therapy Frequency (PT): other (see comments) (5-7 days/week)  Plan of Care Reviewed With: patient  Progress: no change  Outcome Summary: Pt and nsg agreeable to therapy. Pt supine in bed and agreeable to LE ther ex. Pt unable to assist with B LE ther ex consistantly. Pt performed rolling to each side in bed with dep assist from PTA for improved positioning. Pt would cont to benefit from therapy upon DC.       Time Calculation:   PT Charges     Row Name 09/08/21 1203             Time Calculation    Start Time  0907  -TW      Stop Time  0933  -TW      Time Calculation (min)  26 min  -TW         Time Calculation- PT    Total Timed Code Minutes- PT  26 minute(s)  -TW        User Key  (r) = Recorded By, (t) = Taken By, (c) = Cosigned By    Initials Name Provider Type    TW Keanu Payne PTA Physical Therapy Assistant        Therapy Charges for Today     Code Description Service Date Service Provider Modifiers Qty    01432544236 HC PT THER PROC EA 15 MIN 9/8/2021 Keanu Payne PTA GP 1    08730778489 HC PT THERAPEUTIC ACT EA 15 MIN 9/8/2021 Keanu Payne PTA GP 1          PT G-Codes  Outcome Measure Options: AM-PAC 6 Clicks Daily Activity (OT)  AM-PAC 6 Clicks Score (PT): 6  AM-PAC 6 Clicks Score (OT): 6    Keanu Payne PTA  9/8/2021

## 2021-09-08 NOTE — PLAN OF CARE
Goal Outcome Evaluation:           Progress: no change  Outcome Summary: Vss, doesnt appear to be in pain, wayne mc'd, resting between care, will con to monitor.

## 2021-09-08 NOTE — PLAN OF CARE
Problem: Adult Inpatient Plan of Care  Goal: Plan of Care Review  Outcome: Ongoing, Progressing  Flowsheets  Taken 9/8/2021 1340 by Blanca Guzman COTA  Plan of Care Reviewed With: patient  Taken 9/8/2021 1043 by Tequila Mckenna MS CCC-SLP  Progress: no change  Taken 9/8/2021 1030 by Blanca Guzman COTA  Progress: no change  Outcome Summary: Pt agreed to PROM with UE. Pt supine in bed all needs in reach.   Goal Outcome Evaluation:  Plan of Care Reviewed With: patient        Progress: no change  Outcome Summary: Pt agreed to PROM with UE. Pt supine in bed all needs in reach.

## 2021-09-08 NOTE — NURSING NOTE
Called MD about tube feed, she said we will see how she does with puree diet and may need to restart tube feed.

## 2021-09-08 NOTE — PLAN OF CARE
Goal Outcome Evaluation:  Plan of Care Reviewed With: patient        Progress: no change  Outcome Summary: Pt and nsg agreeable to therapy. Pt supine in bed and agreeable to LE ther ex. Pt unable to assist with B LE ther ex consistantly. Pt performed rolling to each side in bed with dep assist from PTA for improved positioning. Pt would cont to benefit from therapy upon DC.

## 2021-09-08 NOTE — DISCHARGE INSTRUCTIONS
[facility] management and therpay for acute covid illness that is difficult to recover from  Patient has been in hospital for nearly one month and just got stable enough to go to [facility] for rehab.

## 2021-09-08 NOTE — DISCHARGE PLACEMENT REQUEST
"Corrina Jensen (46 y.o. Female)     Date of Birth Social Security Number Address Home Phone MRN    1975  [address] [address] [phone] [id number]    Church Marital Status          [employer]        Admission Date Admission Type Admitting Provider Attending Provider Department, Room/Bed    8/17/21 Emergency Bertha Kraus MD Haigler, Stuart S, MD [address], [address]    Discharge Date Discharge Disposition Discharge Destination                       Attending Provider: Zak Bonds MD    Allergies: Latex, Strawberry, Wound Dressing Adhesive, Bactroban [Mupirocin Calcium], Neomycin-bacitracin-polymyxin [Bacitracin-neomycin-polymyxin], Other    Isolation: Enh Drop/Con   Infection: COVID (confirmed) (08/18/21)   Code Status: CPR    Ht: 167.6 cm (66\")   Wt: 103 kg (227 lb 14.4 oz)    Admission Cmt: None   Principal Problem: Sepsis, unspecified organism (CMS/[facility]) [A41.9]                 Active Insurance as of 8/17/2021     Primary Coverage     Payor Plan Insurance Group Employer/Plan Group    [place] [id number]     Payor Plan Address Payor Plan Phone Number Payor Plan Fax Number Effective Dates    [employer] [address] [address] [phone]  4/1/2020 - None Entered    [facility] [id number]       Subscriber Name Subscriber Birth Date Member ID       CORRINA JENSEN 1975 [id number]                 Emergency Contacts      (Rel.) Home Phone Work Phone Mobile Phone    [place] [phone]    Dayanna Olea (Relative) -- -- [phone]            Emergency Contact Information     Name Relation Home Work Mobile    [place]  [phone]    Dayanna Olea Relative   [phone]          Insurance Information                [place] Phone: [phone]    Subscriber: Corrina Jensen " Subscriber#: [id number]    Group#: [id number] Precert#:

## 2021-09-08 NOTE — PLAN OF CARE
Goal Outcome Evaluation:  Plan of Care Reviewed With: patient       Status: improving:    Cognition remains impaired, pt is only oriented to name. Is calm and cooperative.     Physically pt is very weak. Is unable to lift extremities off mattress/pillow. Very weak grasp in hands, very weak dorsi/plantar flexion and footdrop noted.     SpO2 maintained above 92% on 10 L humidified HFNC this shift.    [place] remains in place, pt was started on pureed diet yesterday. Has been able to take meds  po, crushed in applesauce (small bites) with no swallowing difficulty noted.    Is on diabetic management protocol - see orders and mar.      Noted moisture associated wound to crevace/coccyx. Turns/reposition every 2 hours as tolerated by staff. Veda care almost each time for bowel incontinence. (Liquid dark brown stool)    Noted edema in hands, feet. BUE elevated, BLE elevated, heels floated this shift.    Adequate urine output via black.    Denies pain when asked, nonverbal indicators absent.    Afebrile, on tele with cont pulse ox, resp even and unlabored. See VS flow sheet.     Caps present to central and midline ports. CHG bath done.    Fall precautions in place.

## 2021-09-08 NOTE — THERAPY TREATMENT NOTE
Acute Care - Speech Language Pathology   Swallow Treatment Note [facility]     Patient Name: Corrina Jensen  : 1975  MRN: [id number]  Today's Date: 2021               Admit Date: 2021     Goal:  Patient will safely tolerate least restricted diet w/no overt s/s of aspiration for adequate nutrition and hydration:  Pt seen for skilled ST services this date to address oropharyngeal dysphagia.  Pt consumed puree solids w/no difficulty; however, did demonstrate decreased mastication w/more solid puree (pear mold).  Pt consumed thin liquids via spoon w/cough x1 over 10 trials.  Pt consumed meds whole in puree 1 pill per spoonful.  Pt had difficulty clearing and required liquid wash to clear pill.  Pt became nauseous and vomited up small amount of applesauce and 1 pill.  Nsg present and administered meds and then provided pt w/zophran.  SLP recommends continued current diet at this time.    Visit Dx:     ICD-10-CM ICD-9-CM   1. Sepsis with acute renal failure and septic shock, due to unspecified organism, unspecified acute renal failure type (CMS/[facility])  A41.9 038.9    R65.21 995.92    N17.9 785.52     584.9   2. Lab test positive for detection of COVID-19 virus  U07.1 079.89   3. Oropharyngeal dysphagia  R13.12 787.22   4. Impaired mobility and ADLs  Z74.09 V49.89    Z78.9    5. Impaired physical mobility  Z74.09 781.99     Patient Active Problem List   Diagnosis   • Chest pain   • Palpitation   • Tachycardia   • Type 2 diabetes mellitus without complication, without long-term current use of insulin (CMS/[facility])   • Angina effort   • Essential hypertension   • Acute pain of right knee   • Effusion, right knee   • Depressive disorder   • Elevated blood sugar   • Gastroesophageal reflux disease without esophagitis   • Midline low back pain with left-sided sciatica   • Other hyperlipidemia   • Sprain of right ankle   • Abnormal mammogram of left breast   • Malignant neoplasm of left breast in female,  estrogen receptor positive (CMS/HCC)   • Pancytopenia (CMS/HCC)   • Pneumonia due to COVID-19 virus   • Atypical pneumonia   • Sepsis with acute renal failure and septic shock (CMS/HCC)   • Acute kidney injury (CMS/HCC)   • Sepsis, unspecified organism (CMS/HCC)   • Severe malnutrition (CMS/HCC)     Past Medical History:   Diagnosis Date   • Acute atopic conjunctivitis    • Acute bronchitis    • Allergic rhinitis due to pollen    • Arrhythmia     wearing heart monitor    • Arthritis    • Asthma    • Bilateral foot pain    • Breast cancer (CMS/HCC)    • Breast cyst    • Breast lump    • Chest pain    • Chronic low back pain    • Contact dermatitis due to drugs and medicine    • Cyst of ovary    • Diabetes mellitus (CMS/HCC)    • GERD (gastroesophageal reflux disease)    • Hyperlipidemia    • Hypertension    • Infestation by Sarcoptes scabiei annalisa hominis    • Low back pain    • Mastodynia    • Nausea and vomiting    • On long term drug therapy    • Pain in wrist    • Plantar fasciitis    • Rash    • Skin disorder     breast-possible superficial cellulitis   • Spinal stenosis    • Tinea pedis    • Tobacco dependence syndrome    • Upper respiratory infection    • Vitamin D deficiency    • Wheezing      Past Surgical History:   Procedure Laterality Date   • BREAST BIOPSY     • BREAST CYST ASPIRATION     • BREAST LUMPECTOMY Left 1/27/2021    Procedure: LEFT BREAST LUMPECTOMY WITH NEEDLE LOCALIZATION Needle localization [facility] be done [facility] by radiology first   @07:00 a.m.;  Surgeon: Mat Garay MD;  Location: [facility];  Service: General;  Laterality: Left;   • BREAST LUMPECTOMY WITH SENTINEL NODE BIOPSY AND AXILLARY NODE DISSECTION Left 2/22/2021    Procedure: LEFT BREAST LUMPECTOMY WITH SENTINEL NODE BIOPSY. REMOVAL OF CYST FROM LEFT CHEEK                              (INJECTION @07: 00 A.M);  Surgeon: Mat Garay MD;  Location: [facility];  Service: General;  Laterality: Left;   •  BREAST SURGERY      excision breast lesion   • CARDIAC CATHETERIZATION N/A 7/3/2018    Procedure: Coronary angiography;  Surgeon: Arun Cadet MD;  Location: [facility] INVASIVE LOCATION;  Service: Cardiovascular   • CARDIAC CATHETERIZATION     • CHOLECYSTECTOMY     • INJECTION OF MEDICATION      Kenalog (4)       SLP Recommendation and Plan  SLP Swallowing Diagnosis: suspected pharyngeal dysphagia  SLP Diet Recommendation: temporary alternate methods of nutrition/hydration, puree, thin liquids  Recommended Precautions and Strategies: upright posture during/after eating, small bites of food and sips of liquid, no straw, liquid via spoon only, fatigue precautions, general aspiration precautions, assist with feeding, 1:1 supervision  SLP Rec. for Method of Medication Administration: meds via alternate route     Monitor for Signs of Aspiration: yes, notify SLP if any concerns     Swallow Criteria for Skilled Therapeutic Interventions Met: demonstrates skilled criteria     Rehab Potential/Prognosis, Swallowing: good, to achieve stated therapy goals  Therapy Frequency (Swallow): 3 days per week, 5 days per week        Daily Summary of Progress (SLP): progress towards functional goals is fair    Plan for Continued Treatment (SLP): Continue POC              Plan of Care Reviewed With: patient  Progress: no change  Outcome Summary: Pt seen for skilled ST services this date to address oropharyngeal dysphagia.  Pt consumed puree solids w/no difficulty; however, did demonstrate decreased mastication w/more solid puree (pear mold).  Pt consumed thin liquids via spoon w/cough x1 over 10 trials.  Pt consumed meds whole in puree 1 pill per spoonful.  Pt had difficulty clearing and required liquid wash to clear pill.  Pt became nauseous and vomited up small amount of applesauce and 1 pill.  Nsg present and administered meds and then provided pt w/zophran.  SLP recommends continued current diet at this time.         SWALLOW  EVALUATION (last 72 hours)      SLP Adult Swallow Evaluation     Row Name 09/08/21 0952 09/07/21 1254 09/06/21 1033             Rehab Evaluation    Document Type  therapy note (daily note)  -CK  therapy note (daily note)  -CK  therapy note (daily note)  -EK      Total Evaluation Minutes, SLP  45  -CK  28  -CK  --      Subjective Information  no complaints  -CK  no complaints  -CK  --      Patient Observations  cooperative;alert;agree to therapy  -CK  alert;cooperative;agree to therapy  -CK  alert;cooperative  -EK      Patient/Family/Caregiver Comments/Observations  No family present at bedside  -CK  No family present at bedside; covid positive pt  -CK  --      Care Plan Review  evaluation/treatment results reviewed;care plan/treatment goals reviewed;risks/benefits reviewed;current/potential barriers reviewed;patient/other agree to care plan  -CK  evaluation/treatment results reviewed;care plan/treatment goals reviewed;risks/benefits reviewed;current/potential barriers reviewed;patient/other agree to care plan  -CK  care plan/treatment goals reviewed  -EK      Patient Effort  good  -CK  good  -CK  --         General Information    Patient Profile Reviewed  yes  -CK  yes  -CK  --      Current Method of Nutrition  nasogastric feedings;pureed;thin liquids  -CK  NPO;nasogastric feedings  -CK  NPO;nasogastric feedings  -EK      Precautions/Limitations, Vision  WFL  -CK  WFL  -CK  --      Precautions/Limitations, Hearing  WFL  -CK  WFL  -CK  --      Plans/Goals Discussed with  patient  -CK  patient  -CK  --         Pain Scale: Numbers Pre/Post-Treatment    Pretreatment Pain Rating  0/10 - no pain  -CK  0/10 - no pain  -CK  --      Posttreatment Pain Rating  0/10 - no pain  -CK  0/10 - no pain  -CK  --         Oral Motor Structure and Function    Dentition Assessment  natural, present and adequate  -CK  natural, present and adequate  -CK  --      Secretion Management  WNL/WFL  -CK  WNL/WFL  -CK  WNL/WFL  -EK      Mucosal  Quality  moist, healthy  -CK  moist, healthy  -CK  --      Volitional Swallow  weak  -CK  weak  -CK  weak  -EK      Volitional Cough  weak  -CK  weak  -CK  weak  -EK         Oral Musculature and Cranial Nerve Assessment    Oral Motor General Assessment  generalized oral motor weakness  -CK  generalized oral motor weakness  -CK  --         General Eating/Swallowing Observations    Respiratory Support Currently in Use  nasal cannula  -CK  nasal cannula  -CK  nasal cannula  -EK      Eating/Swallowing Skills  fed by SLP  -CK  fed by SLP  -CK  fed by SLP  -EK      Positioning During Eating  upright 90 degree;upright in bed  -CK  upright 90 degree;upright in bed  -CK  upright 90 degree;upright in bed  -EK      Utensils Used  spoon  -CK  spoon  -CK  spoon  -EK      Consistencies Trialed  thin liquids;pureed  -CK  ice chips;thin liquids;pureed  -CK  ice chips;thin liquids  -EK      Pre SpO2 (%)  --  88  -CK  --      Post SpO2 (%)  --  89  -CK  --         Clinical Swallow Eval    Pharyngeal Phase  suspected pharyngeal impairment  -CK  suspected pharyngeal impairment  -CK  suspected pharyngeal impairment  -EK      Clinical Swallow Evaluation Summary  Pt seen for skilled ST services this date to address oropharyngeal dysphagia.  Pt consumed puree solids w/no difficulty; however, did demonstrate decreased mastication w/more solid puree (pear mold).  Pt consumed thin liquids via spoon w/cough x1 over 10 trials.  Pt consumed meds whole in puree 1 pill per spoonful.  Pt had difficulty clearing and required liquid wash to clear pill.  Pt became nauseous and vomited up small amount of applesauce and 1 pill.  Nsg present and administered meds and then provided pt w/zophran.  SLP recommends continued current diet at this time.  -CK  Pt seen for skilled ST services this date to address oropharyngeal dysphagia.  Pt presented as alert and conversational, with a weak voice and kept head turned to the right.  Pt unable to maintain midline  positioning d/t weakness.  Pt consumed ice chips w/no overt s/s of aspiration.  Pt consumed thin liquids via spoon w/multiple swallows and cough x1.  Pt safely tolerated puree solids w/good oral clearance; however, did present w/multiple swallows.  SLP recommends advancing diet to puree w/thin liquids via spoon only at this time.  Pt educated on diet advancement, safe swallowing precautions, and POC; pt in agreement.  -CK  Swallow tx recommended. Pt continues to display s/s of aspiration for ice chips and water. Pt swallow improving however concern for aspiration is high. Pt may have ice chips. Continue NPO and coretrac.   -EK         Pharyngeal Phase Concerns    Pharyngeal Phase Concerns  cough  -CK  multiple swallows;cough  -CK  multiple swallows;cough  -EK      Multiple Swallows  --  -CK  thin;pudding  -CK  thin  -EK      Cough  thin  -CK  thin  -CK  thin  -EK         Clinical Impression    Daily Summary of Progress (SLP)  progress towards functional goals is fair  -CK  progress toward functional goals is good  -CK  progress towards functional goals is fair  -EK      Barriers to Overall Progress (SLP)  weakness  -CK  weakness  -CK  --      SLP Swallowing Diagnosis  suspected pharyngeal dysphagia  -CK  suspected pharyngeal dysphagia  -CK  suspected pharyngeal dysphagia  -EK      Functional Impact  risk of aspiration/pneumonia  -CK  risk of aspiration/pneumonia  -CK  risk of aspiration/pneumonia  -EK      Rehab Potential/Prognosis, Swallowing  good, to achieve stated therapy goals  -CK  good, to achieve stated therapy goals  -CK  good, to achieve stated therapy goals  -EK      Swallow Criteria for Skilled Therapeutic Interventions Met  demonstrates skilled criteria  -CK  demonstrates skilled criteria  -CK  demonstrates skilled criteria  -EK      Plan for Continued Treatment (SLP)  Continue POC  -CK  Advance diet to puree/thin via spoon only  -CK  --         Recommendations    Therapy Frequency (Swallow)  3 days per  week;5 days per week  -CK  3 days per week;5 days per week  -CK  3 days per week;5 days per week  -EK      SLP Diet Recommendation  temporary alternate methods of nutrition/hydration;puree;thin liquids  -CK  temporary alternate methods of nutrition/hydration;puree;thin liquids  -CK  NPO;temporary alternate methods of nutrition/hydration  -EK      Recommended Precautions and Strategies  upright posture during/after eating;small bites of food and sips of liquid;no straw;liquid via spoon only;fatigue precautions;general aspiration precautions;assist with feeding;1:1 supervision  -CK  upright posture during/after eating;small bites of food and sips of liquid;no straw;liquid via spoon only;fatigue precautions;general aspiration precautions;assist with feeding;1:1 supervision  -CK  --      Oral Care Recommendations  Oral Care BID/PRN  -CK  Oral Care BID/PRN  -CK  Oral Care BID/PRN  -EK      SLP Rec. for Method of Medication Administration  meds via alternate route  -CK  meds via alternate route  -CK  meds via alternate route  -EK      Monitor for Signs of Aspiration  yes;notify SLP if any concerns  -CK  yes;notify SLP if any concerns  -CK  yes;notify SLP if any concerns  -EK         Swallow Goals (SLP)    Oral Nutrition/Hydration Goal Selection (SLP)  oral nutrition/hydration, SLP goal 1  -CK  oral nutrition/hydration, SLP goal 1  -CK  oral nutrition/hydration, SLP goal 1  -EK         Oral Nutrition/Hydration Goal 1 (SLP)    Oral Nutrition/Hydration Goal 1, SLP  Pt to tolerate least restrictive diet with no s/s of aspiration for adequate nutrition and hdyration.   -CK  Pt to tolerate least restrictive diet with no s/s of aspiration for adequate nutrition and hdyration.   -CK  Pt to tolerate least restrictive diet with no s/s of aspiration for adequate nutrition and hdyration.   -EK      Time Frame (Oral Nutrition/Hydration Goal 1, SLP)  by discharge  -CK  by discharge  -CK  by discharge  -EK      Progress/Outcomes (Oral  Nutrition/Hydration Goal 1, SLP)  goal ongoing  -CK  goal ongoing  -CK  goal not met  -EK        User Key  (r) = Recorded By, (t) = Taken By, (c) = Cosigned By    Initials Name Effective Dates    Mago Waterman CCC-SLP 06/16/21 -     CK Tequila Mckenna MS CCC-SLP 06/16/21 -           EDUCATION  The patient has been educated in the following areas:   Dysphagia (Swallowing Impairment) Modified Diet Instruction.       SLP GOALS     Row Name 09/08/21 0952 09/07/21 1254 09/06/21 1033       Oral Nutrition/Hydration Goal 1 (SLP)    Oral Nutrition/Hydration Goal 1, SLP  Pt to tolerate least restrictive diet with no s/s of aspiration for adequate nutrition and hdyration.   -CK  Pt to tolerate least restrictive diet with no s/s of aspiration for adequate nutrition and hdyration.   -CK  Pt to tolerate least restrictive diet with no s/s of aspiration for adequate nutrition and hdyration.   -EK    Time Frame (Oral Nutrition/Hydration Goal 1, SLP)  by discharge  -CK  by discharge  -CK  by discharge  -EK    Progress/Outcomes (Oral Nutrition/Hydration Goal 1, SLP)  goal ongoing  -CK  goal ongoing  -CK  goal not met  -EK      User Key  (r) = Recorded By, (t) = Taken By, (c) = Cosigned By    Initials Name Provider Type    Mago Waterman, CCC-SLP Speech and Language Pathologist    Tequila Barrett, MS CCC-SLP Speech and Language Pathologist             Time Calculation:   Time Calculation- SLP     Row Name 09/08/21 1044             Time Calculation- SLP    SLP Start Time  0953  -CK      SLP Stop Time  1038  -CK      SLP Time Calculation (min)  45 min  -CK      Total Timed Code Minutes- SLP  45 minute(s)  -CK      SLP Received On  09/08/21  -CK      SLP Goal Re-Cert Due Date  09/18/21  -CK         Untimed Charges    83243-HE Treatment Swallow Minutes  45  -CK         Total Minutes    Untimed Charges Total Minutes  45  -CK       Total Minutes  45  -CK        User Key  (r) = Recorded By, (t) =  Taken By, (c) = Cosigned By    Initials Name Provider Type    CK Tequila Mckenna, MS CCC-SLP Speech and Language Pathologist          Therapy Charges for Today     Code Description Service Date Service Provider Modifiers Qty    [id number] HC ST TREATMENT SWALLOW 2 9/7/2021 Tequila Mckenna, MS CCC-SLP GN 1    [id number] HC ST TREATMENT SWALLOW 3 9/8/2021 Tequila Mckenna, MS SIM-SLP GN 1               MS [place]  9/8/2021

## 2021-09-08 NOTE — PLAN OF CARE
Goal Outcome Evaluation:  Plan of Care Reviewed With: patient        Progress: no change  Outcome Summary: Pt seen for skilled ST services this date to address oropharyngeal dysphagia.  Pt consumed puree solids w/no difficulty; however, did demonstrate decreased mastication w/more solid puree (pear mold).  Pt consumed thin liquids via spoon w/cough x1 over 10 trials.  Pt consumed meds whole in puree 1 pill per spoonful.  Pt had difficulty clearing and required liquid wash to clear pill.  Pt became nauseous and vomited up small amount of applesauce and 1 pill.  Nsg present and administered meds and then provided pt w/zophran.  SLP recommends continued current diet at this time.

## 2021-09-08 NOTE — DISCHARGE SUMMARY
[facility] Medicine Services  DISCHARGE SUMMARY       Date of Admission: 8/17/2021  Date of Discharge:  9/8/2021  Primary Care Physician: Jose Jean-Baptiste MD    Presenting Problem/History of Present Illness:  Sepsis with acute renal failure and septic shock, due to unspecified organism, unspecified acute renal failure type (CMS/HCC) [A41.9, R65.21, N17.9]  Lab test positive for detection of COVID-19 virus [U07.1]       Final Discharge Diagnoses:  Active Hospital Problems    Diagnosis    • **Sepsis, unspecified organism (CMS/[facility])    • Cytokine release syndrome, grade 4    • Severe malnutrition (CMS/[facility])    • Pneumonia due to COVID-19 virus    • Atypical pneumonia    • Acute kidney injury (CMS/[facility])    • Sepsis with acute renal failure and septic shock (CMS/[facility])    • Type 2 diabetes mellitus without complication, without long-term current use of insulin (CMS/[facility])        Consults:   Consults     No orders found from 7/19/2021 to 8/18/2021.          Procedures Performed:                 Pertinent Test Results:   Lab Results (most recent)     Procedure Component Value Units Date/Time    POC Glucose Once [[id number]]  (Abnormal) Collected: 09/07/21 1914    Specimen: Blood Updated: 09/08/21 1524     Glucose 199 mg/dL      Comment: RN NotifiedOperator: [id number] RICKIE Corona ID: [id number]       Blood Culture - Blood, Arm, Right [[id number]] Collected: 09/04/21 1428    Specimen: Blood from Arm, Right Updated: 09/08/21 1445     Blood Culture No growth at 4 days    Blood Culture - Blood, Hand, Right [[id number]] Collected: 09/04/21 1230    Specimen: Blood from Hand, Right Updated: 09/08/21 1300     Blood Culture No growth at 4 days    POC Glucose Once [[id number]]  (Abnormal) Collected: 09/08/21 1045    Specimen: Blood Updated: 09/08/21 1126     Glucose 248 mg/dL      Comment: RN NotifiedOperator: [id number] ANTWON ESPINOSAAMemonserrat ID: [id number]       C-reactive Protein  [[id number]]  (Normal) Collected: 09/08/21 0607    Specimen: Blood Updated: 09/08/21 0739     C-Reactive Protein <0.30 mg/dL     Comprehensive Metabolic Panel [[id number]]  (Abnormal) Collected: 09/08/21 0607    Specimen: Blood Updated: 09/08/21 0725     Glucose 250 mg/dL      BUN 34 mg/dL      Creatinine 0.38 mg/dL      Sodium 147 mmol/L      Potassium 3.7 mmol/L      Chloride 110 mmol/L      CO2 24.0 mmol/L      Calcium 9.0 mg/dL      Total Protein 6.6 g/dL      Albumin 3.20 g/dL      ALT (SGPT) 226 U/L      AST (SGOT) 110 U/L      Alkaline Phosphatase 114 U/L      Total Bilirubin 1.0 mg/dL      eGFR Non African Amer >150 mL/min/1.73      Globulin 3.4 gm/dL      A/G Ratio 0.9 g/dL      BUN/Creatinine Ratio 89.5     Anion Gap 13.0 mmol/L     Narrative:      GFR Normal >60  Chronic Kidney Disease <60  Kidney Failure <15      CBC & Differential [[id number]]  (Abnormal) Collected: 09/08/21 0607    Specimen: Blood Updated: 09/08/21 0659    Narrative:      The following orders were created for panel order CBC & Differential.  Procedure                               Abnormality         Status                     ---------                               -----------         ------                     CBC Auto Differential[[id number]]        Abnormal            Final result                 Please view results for these tests on the individual orders.    CBC Auto Differential [[id number]]  (Abnormal) Collected: 09/08/21 0607    Specimen: Blood Updated: 09/08/21 0659     WBC 8.53 10*3/mm3      RBC 3.81 10*6/mm3      Hemoglobin 11.5 g/dL      Hematocrit 35.1 %      MCV 92.1 fL      MCH 30.2 pg      MCHC 32.8 g/dL      RDW 15.9 %      RDW-SD 52.0 fl      MPV 11.4 fL      Platelets 148 10*3/mm3      Neutrophil % 86.8 %      Lymphocyte % 6.4 %      Monocyte % 4.9 %      Eosinophil % 0.1 %      Basophil % 0.2 %      Immature Grans % 1.6 %      Neutrophils, Absolute 7.39 10*3/mm3      Lymphocytes, Absolute 0.55 10*3/mm3      Monocytes,  "Absolute 0.42 10*3/mm3      Eosinophils, Absolute 0.01 10*3/mm3      Basophils, Absolute 0.02 10*3/mm3      Immature Grans, Absolute 0.14 10*3/mm3      nRBC 0.0 /100 WBC     C-reactive Protein [[id number]]  (Normal) Collected: 09/07/21 0421    Specimen: Blood Updated: 09/07/21 0532     C-Reactive Protein <0.30 mg/dL     Procalcitonin [[id number]]  (Abnormal) Collected: 09/07/21 0421    Specimen: Blood Updated: 09/07/21 0523     Procalcitonin 0.28 ng/mL     Narrative:      As a Marker for Sepsis (Non-Neonates):     1. <0.5 ng/mL represents a low risk of severe sepsis and/or septic shock.  2. >2 ng/mL represents a high risk of severe sepsis and/or septic shock.    As a Marker for Lower Respiratory Tract Infections that require antibiotic therapy:  PCT on Admission     Antibiotic Therapy             6-12 Hrs later  >0.5                          Strongly Recommended            >0.25 - <0.5             Recommended  0.1 - 0.25                  Discouraged                       Remeasure/reassess PCT  <0.1                         Strongly Discouraged         Remeasure/reassess PCT      As 28 day mortality risk marker: \"Change in Procalcitonin Result\" (>80% or <=80%) if Day 0 (or Day 1) and Day 4 values are available. Refer to [email or web address]    Change in PCT <=80 %   A decrease of PCT levels below or equal to 80% defines a positive change in PCT test result representing a higher risk for 28-day all-cause mortality of patients diagnosed with severe sepsis or septic shock.    Change in PCT >80 %   A decrease of PCT levels of more than 80% defines a negative change in PCT result representing a lower risk for 28-day all-cause mortality of patients diagnosed with severe sepsis or septic shock.              Results may be falsely decreased if patient taking Biotin.     D-dimer, Quantitative [[id number]]  (Abnormal) Collected: 09/07/21 0421    Specimen: Blood Updated: 09/07/21 0514     D-Dimer, Quantitative " 1,503 ng/mL (FEU)     Narrative:      Dimer values <500 ng/ml FEU are FDA approved as aid in diagnosis of deep venous thrombosis and pulmonary embolism.  This test should not be used in an exclusion strategy with pretest probability alone.    A recent guideline regarding diagnosis for pulmonary thromboembolism recommends an adjusted exclusion criterion of age x 10 ng/ml FEU for patients >50 years of age ([employer] Intern Med 2015; 163: 701-711).      Comprehensive Metabolic Panel [[id number]]  (Abnormal) Collected: 09/07/21 0421    Specimen: Blood Updated: 09/07/21 0513     Glucose 258 mg/dL      BUN 38 mg/dL      Creatinine 0.43 mg/dL      Sodium 149 mmol/L      Potassium 3.5 mmol/L      Chloride 111 mmol/L      CO2 27.0 mmol/L      Calcium 9.3 mg/dL      Total Protein 6.5 g/dL      Albumin 3.20 g/dL      ALT (SGPT) 217 U/L      AST (SGOT) 139 U/L      Alkaline Phosphatase 111 U/L      Total Bilirubin 0.6 mg/dL      eGFR Non African Amer >150 mL/min/1.73      Globulin 3.3 gm/dL      A/G Ratio 1.0 g/dL      BUN/Creatinine Ratio 88.4     Anion Gap 11.0 mmol/L     Narrative:      GFR Normal >60  Chronic Kidney Disease <60  Kidney Failure <15      Lactate Dehydrogenase [[id number]]  (Abnormal) Collected: 09/07/21 0421    Specimen: Blood Updated: 09/07/21 0512      U/L     Protime-INR [[id number]]  (Normal) Collected: 09/07/21 0421    Specimen: Blood Updated: 09/07/21 0512     Protime 14.9 Seconds      INR 1.18    Narrative:      Therapeutic range for most indications is 2.0-3.0 INR,  or 2.5-3.5 for mechanical heart valves.    CBC & Differential [[id number]]  (Abnormal) Collected: 09/07/21 0421    Specimen: Blood Updated: 09/07/21 0454    Narrative:      The following orders were created for panel order CBC & Differential.  Procedure                               Abnormality         Status                     ---------                               -----------         ------                     CBC Auto  "Differential[[id number]]        Abnormal            Final result                 Please view results for these tests on the individual orders.    CBC Auto Differential [[id number]]  (Abnormal) Collected: 09/07/21 0421    Specimen: Blood Updated: 09/07/21 0454     WBC 8.62 10*3/mm3      RBC 3.83 10*6/mm3      Hemoglobin 11.4 g/dL      Hematocrit 35.7 %      MCV 93.2 fL      MCH 29.8 pg      MCHC 31.9 g/dL      RDW 15.9 %      RDW-SD 51.0 fl      MPV 11.1 fL      Platelets 157 10*3/mm3      Neutrophil % 79.8 %      Lymphocyte % 11.6 %      Monocyte % 7.0 %      Eosinophil % 0.2 %      Basophil % 0.1 %      Immature Grans % 1.3 %      Neutrophils, Absolute 6.88 10*3/mm3      Lymphocytes, Absolute 1.00 10*3/mm3      Monocytes, Absolute 0.60 10*3/mm3      Eosinophils, Absolute 0.02 10*3/mm3      Basophils, Absolute 0.01 10*3/mm3      Immature Grans, Absolute 0.11 10*3/mm3      nRBC 0.3 /100 WBC     Potassium [[id number]]  (Normal) Collected: 09/06/21 1411    Specimen: Blood Updated: 09/06/21 1436     Potassium 4.2 mmol/L     Procalcitonin [[id number]]  (Normal) Collected: 09/05/21 0315    Specimen: Blood Updated: 09/05/21 1227     Procalcitonin 0.20 ng/mL     Narrative:      As a Marker for Sepsis (Non-Neonates):     1. <0.5 ng/mL represents a low risk of severe sepsis and/or septic shock.  2. >2 ng/mL represents a high risk of severe sepsis and/or septic shock.    As a Marker for Lower Respiratory Tract Infections that require antibiotic therapy:  PCT on Admission     Antibiotic Therapy             6-12 Hrs later  >0.5                          Strongly Recommended            >0.25 - <0.5             Recommended  0.1 - 0.25                  Discouraged                       Remeasure/reassess PCT  <0.1                         Strongly Discouraged         Remeasure/reassess PCT      As 28 day mortality risk marker: \"Change in Procalcitonin Result\" (>80% or <=80%) if Day 0 (or Day 1) and Day 4 values are available. Refer to " [email or web address]    Change in PCT <=80 %   A decrease of PCT levels below or equal to 80% defines a positive change in PCT test result representing a higher risk for 28-day all-cause mortality of patients diagnosed with severe sepsis or septic shock.    Change in PCT >80 %   A decrease of PCT levels of more than 80% defines a negative change in PCT result representing a lower risk for 28-day all-cause mortality of patients diagnosed with severe sepsis or septic shock.              Results may be falsely decreased if patient taking Biotin.     Magnesium [[id number]]  (Normal) Collected: 09/05/21 0315    Specimen: Blood Updated: 09/05/21 0952     Magnesium 2.3 mg/dL     Protime-INR [[id number]]  (Normal) Collected: 09/05/21 0315    Specimen: Blood Updated: 09/05/21 0432     Protime 14.9 Seconds      INR 1.18    Narrative:      Therapeutic range for most indications is 2.0-3.0 INR,  or 2.5-3.5 for mechanical heart valves.    D-dimer, Quantitative [[id number]]  (Abnormal) Collected: 09/05/21 0315    Specimen: Blood Updated: 09/05/21 0432     D-Dimer, Quantitative 1,462 ng/mL (FEU)     Narrative:      Dimer values <500 ng/ml FEU are FDA approved as aid in diagnosis of deep venous thrombosis and pulmonary embolism.  This test should not be used in an exclusion strategy with pretest probability alone.    A recent guideline regarding diagnosis for pulmonary thromboembolism recommends an adjusted exclusion criterion of age x 10 ng/ml FEU for patients >50 years of age ([employer]e Intern Med 2015; 163: 701-711).      Lactate Dehydrogenase [[id number]]  (Abnormal) Collected: 09/05/21 0315    Specimen: Blood Updated: 09/05/21 0414      U/L     Potassium [[id number]]  (Normal) Collected: 09/04/21 1837    Specimen: Blood Updated: 09/04/21 1849     Potassium 4.2 mmol/L      Comment: Slight hemolysis detected by analyzer. Results may be affected.       Blood Gas, Arterial - [[id number]]  (Abnormal) Collected: 09/04/21  0730    Specimen: Arterial Blood Updated: 09/04/21 0739     Site Right Radial     Robbie's Test Positive     pH, Arterial 7.428 pH units      pCO2, Arterial 45.1 mm Hg      Comment: 83 Value above reference range        pO2, Arterial 79.7 mm Hg      Comment: 84 Value below reference range        HCO3, Arterial 29.8 mmol/L      Comment: 83 Value above reference range        Base Excess, Arterial 4.8 mmol/L      Comment: 83 Value above reference range        O2 Saturation, Arterial 95.5 %      Barometric Pressure for Blood Gas 747 mmHg      Modality Ventilator     FIO2 30 %      Ventilator Mode NA     PEEP 5.0     Collected by RT     Comment: Meter: [id number]     :  [id number]       Blood Gas, Arterial - [[id number]]  (Abnormal) Collected: 09/03/21 1207    Specimen: Arterial Blood Updated: 09/03/21 1212     Site Right Radial     Robbie's Test N/A     pH, Arterial 7.459 pH units      Comment: 83 Value above reference range        pCO2, Arterial 45.7 mm Hg      Comment: 83 Value above reference range        pO2, Arterial 72.4 mm Hg      Comment: 84 Value below reference range        HCO3, Arterial 32.4 mmol/L      Comment: 83 Value above reference range        Base Excess, Arterial 7.6 mmol/L      Comment: 83 Value above reference range        O2 Saturation, Arterial 93.8 %      Comment: 84 Value below reference range        Barometric Pressure for Blood Gas 749 mmHg      Modality Ventilator     Ventilator Mode PSV     PEEP 5.0     PSV 8.0 cmH2O      Collected by VALERIANO DESAI     Comment: Meter: [id number]     :  [id number]       Extra Tubes [[id number]] Collected: 09/01/21 1154    Specimen: Blood, Venous Line Updated: 09/02/21 1101    Narrative:      The following orders were created for panel order Extra Tubes.  Procedure                               Abnormality         Status                     ---------                               -----------         ------                     Marcia  Top[[id number]]                                     Final result                 Please view results for these tests on the individual orders.    Lavender Top [[id number]] Collected: 09/01/21 1154    Specimen: Blood Updated: 09/02/21 1101     Extra Tube hold for add-on     Comment: Auto resulted       Urine Culture - Urine, Urine, Clean Catch [[id number]]  (Abnormal) Collected: 08/29/21 1829    Specimen: Urine, Clean Catch Updated: 08/30/21 1625     Urine Culture Yeast isolated    Narrative:      No further workup being performed    Urinalysis, Microscopic Only - Urine, Clean Catch [[id number]]  (Abnormal) Collected: 08/29/21 1829    Specimen: Urine, Clean Catch Updated: 08/29/21 1905     RBC, UA 31-50 /HPF      WBC, UA 31-50 /HPF      Bacteria, UA 2+ /HPF      Squamous Epithelial Cells, UA 3-5 /HPF      Yeast, UA       Large/3+ Budding Yeast w/Hyphae     /HPF     Hyaline Casts, UA Too Numerous to Count /LPF      Methodology Manual Light Microscopy    Urinalysis With Culture If Indicated - Urine, Clean Catch [[id number]]  (Abnormal) Collected: 08/29/21 1829    Specimen: Urine, Clean Catch Updated: 08/29/21 1844     Color, UA Yellow     Appearance, UA Turbid     pH, UA 6.0     Specific Gravity, UA 1.029     Glucose, UA >=1000 mg/dL (3+)     Ketones, UA Trace     Bilirubin, UA Negative     Blood, UA Large (3+)     Protein, UA 30 mg/dL (1+)     Leuk Esterase, UA Moderate (2+)     Nitrite, UA Negative     Urobilinogen, UA 0.2 E.U./dL    Manual Differential [[id number]]  (Abnormal) Collected: 08/29/21 0558    Specimen: Blood Updated: 08/29/21 0747     Neutrophil % 73.0 %      Lymphocyte % 5.0 %      Monocyte % 3.0 %      Bands %  18.0 %      Metamyelocyte % 1.0 %      Neutrophils Absolute 6.96 10*3/mm3      Lymphocytes Absolute 0.38 10*3/mm3      Monocytes Absolute 0.23 10*3/mm3      Anisocytosis Slight/1+     WBC Morphology Normal     Platelet Estimate Adequate    Respiratory Culture - Sputum, Bronchus [[id number]]  (Abnormal)  Collected: 08/26/21 1701    Specimen: Sputum from Bronchus Updated: 08/28/21 1054     Respiratory Culture Scant growth (1+) Candida albicans      No Normal Respiratory Mona     Gram Stain Moderate (3+) Mixed bacterial mona      Moderate (3+) WBCs seen      Few (2+) Epithelial cells seen    Extra Tubes [[id number]] Collected: 08/26/21 1924    Specimen: Blood, Venous Line Updated: 08/26/21 2030    Narrative:      The following orders were created for panel order Extra Tubes.  Procedure                               Abnormality         Status                     ---------                               -----------         ------                     Gold Top - SST[[id number]]                                   Final result                 Please view results for these tests on the individual orders.    Gold Top - SST [[id number]] Collected: 08/26/21 1924    Specimen: Blood Updated: 08/26/21 2030     Extra Tube Hold for add-ons.     Comment: Auto resulted.       Hepatitis Panel, Acute [[id number]]  (Normal) Collected: 08/26/21 1717    Specimen: Blood Updated: 08/26/21 1810     Hepatitis B Surface Ag Non-Reactive     Hep A IgM Non-Reactive     Hep B C IgM Non-Reactive     Hepatitis C Ab Non-Reactive    Narrative:      Results may be falsely decreased if patient taking Biotin.     Creatinine, Urine, Random - Urine, Clean Catch [[id number]] Collected: 08/25/21 1618    Specimen: Urine, Clean Catch Updated: 08/26/21 0125     Creatinine, Urine 66.1 mg/dL     Narrative:      Reference intervals for random urine have not been established.  Clinical usage is dependent upon physician's interpretation in combination with other laboratory tests.       Sodium, Urine, Random - Urine, Clean Catch [[id number]] Collected: 08/25/21 1618    Specimen: Urine, Clean Catch Updated: 08/25/21 1714     Sodium, Urine <20 mmol/L     Narrative:      Reference intervals for random urine have not been established.  Clinical usage is dependent upon  physician's interpretation in combination with other laboratory tests.       S. Pneumo Ag Urine or CSF - Urine, Urine, Clean Catch [[id number]]  (Normal) Collected: 08/25/21 1618    Specimen: Urine, Clean Catch Updated: 08/25/21 1638     Strep Pneumo Ag Negative    Legionella Antigen, Urine - Urine, Urine, Clean Catch [[id number]]  (Normal) Collected: 08/25/21 1618    Specimen: Urine, Clean Catch Updated: 08/25/21 1638     LEGIONELLA ANTIGEN, URINE Negative    Ferritin [[id number]]  (Abnormal) Collected: 08/25/21 1415    Specimen: Blood Updated: 08/25/21 1450     Ferritin 751.90 ng/mL     Narrative:      Results may be falsely decreased if patient taking Biotin.      Hepatic Function Panel [[id number]]  (Abnormal) Collected: 08/25/21 0350    Specimen: Blood Updated: 08/25/21 1419     Total Protein 6.4 g/dL      Albumin 3.10 g/dL      ALT (SGPT) 87 U/L      AST (SGOT) 117 U/L      Alkaline Phosphatase 189 U/L      Total Bilirubin 0.4 mg/dL      Bilirubin, Direct 0.3 mg/dL      Bilirubin, Indirect 0.1 mg/dL     Manual Differential [[id number]]  (Abnormal) Collected: 08/25/21 0349    Specimen: Blood Updated: 08/25/21 0643     Neutrophil % 66.0 %      Lymphocyte % 14.0 %      Monocyte % 10.0 %      Bands %  9.0 %      Metamyelocyte % 1.0 %      Neutrophils Absolute 3.95 10*3/mm3      Lymphocytes Absolute 0.74 10*3/mm3      Monocytes Absolute 0.53 10*3/mm3      nRBC 10.0 /100 WBC      Anisocytosis Slight/1+     Dacrocytes --     Comment: Rare tear drop cells observed        Ovalocytes --     Comment: Few ovalocytes observed        Polychromasia --     Comment: Few polychromic cells observed        WBC Morphology Normal     Platelet Estimate Decreased    Basic Metabolic Panel [[id number]]  (Abnormal) Collected: 08/25/21 0350    Specimen: Blood Updated: 08/25/21 0453     Glucose 270 mg/dL      BUN 37 mg/dL      Creatinine 0.93 mg/dL      Sodium 142 mmol/L      Potassium 4.3 mmol/L      Chloride 108 mmol/L      CO2 24.0 mmol/L       Calcium 8.9 mg/dL      eGFR Non African Amer 64 mL/min/1.73      BUN/Creatinine Ratio 39.8     Anion Gap 10.0 mmol/L     Narrative:      GFR Normal >60  Chronic Kidney Disease <60  Kidney Failure <15      Basic Metabolic Panel [[id number]]  (Abnormal) Collected: 08/24/21 1811    Specimen: Blood Updated: 08/24/21 1841     Glucose 387 mg/dL      BUN 35 mg/dL      Creatinine 1.04 mg/dL      Sodium 140 mmol/L      Potassium 4.4 mmol/L      Chloride 106 mmol/L      CO2 24.0 mmol/L      Calcium 8.9 mg/dL      eGFR Non African Amer 57 mL/min/1.73      BUN/Creatinine Ratio 33.7     Anion Gap 10.0 mmol/L     Narrative:      GFR Normal >60  Chronic Kidney Disease <60  Kidney Failure <15      Respiratory Culture - Sputum, ET Suction [[id number]] Collected: 08/22/21 0726    Specimen: Sputum from ET Suction Updated: 08/24/21 0935     Respiratory Culture No growth at 2 days     Gram Stain Few (2+) WBCs per low power field      Rare (1+) Epithelial cells per low power field      No organisms seen    Magnesium [[id number]]  (Normal) Collected: 08/24/21 0355    Specimen: Blood Updated: 08/24/21 0459     Magnesium 2.2 mg/dL     Vancomycin, Trough [[id number]]  (Normal) Collected: 08/21/21 2323    Specimen: Blood Updated: 08/21/21 2354     Vancomycin Trough 14.30 mcg/mL     Vancomycin, Peak [[id number]]  (Abnormal) Collected: 08/21/21 2132    Specimen: Blood Updated: 08/21/21 2213     Vancomycin Peak 15.60 mcg/mL     MRSA Screen, PCR (Inpatient) - Swab, Nares [[id number]]  (Normal) Collected: 08/20/21 1408    Specimen: Swab from Nares Updated: 08/20/21 1529     MRSA, PCR Negative    Narrative:      Performed by real-time polymerase chain reaction (qPCR).    Urinalysis, Microscopic Only - Urine, Clean Catch [[id number]]  (Abnormal) Collected: 08/19/21 1426    Specimen: Urine, Clean Catch Updated: 08/19/21 1454     RBC, UA 0-2 /HPF      WBC, UA 0-2 /HPF      Bacteria, UA Trace /HPF      Squamous Epithelial Cells, UA 3-5 /HPF      Hyaline  Casts, UA None Seen /LPF      Methodology Manual Light Microscopy    Urinalysis With Culture If Indicated - Urine, Clean Catch [[id number]]  (Abnormal) Collected: 08/19/21 1426    Specimen: Urine, Clean Catch Updated: 08/19/21 1447     Color, UA Yellow     Appearance, UA Clear     pH, UA 6.0     Specific Gravity, UA 1.007     Glucose,  mg/dL (2+)     Ketones, UA Negative     Bilirubin, UA Negative     Blood, UA Small (1+)     Protein, UA Negative     Leuk Esterase, UA Negative     Nitrite, UA Negative     Urobilinogen, UA 0.2 E.U./dL    Ferritin [[id number]]  (Abnormal) Collected: 08/19/21 0554    Specimen: Blood Updated: 08/19/21 0622     Ferritin 395.10 ng/mL     Narrative:      Results may be falsely decreased if patient taking Biotin.      Lactic Acid, Plasma [[id number]]  (Normal) Collected: 08/19/21 0554    Specimen: Blood Updated: 08/19/21 0613     Lactate 0.8 mmol/L     Peripheral Blood Smear [[id number]] Collected: 08/18/21 1018    Specimen: Blood Updated: 08/18/21 1328     Performed by: Dr. Guzman     Pathologist Interpretation Mild Bicytopenia, Rare giant platelets, Minimal RBC polychromasia, No Blasts seen, clinical correlation necessary    Lavender Top [[id number]] Collected: 08/18/21 1018    Specimen: Blood Updated: 08/18/21 1130     Extra Tube hold for add-on     Comment: Auto resulted       Urinalysis With Microscopic If Indicated (No Culture) - Urine, Clean Catch [[id number]]  (Abnormal) Collected: 08/18/21 0517    Specimen: Urine, Clean Catch Updated: 08/18/21 0602     Color, UA Yellow     Appearance, UA Cloudy     pH, UA 6.0     Specific Gravity, UA 1.008     Glucose, UA Negative     Ketones, UA Negative     Bilirubin, UA Negative     Blood, UA Small (1+)     Protein, UA Trace     Leuk Esterase, UA Moderate (2+)     Nitrite, UA Negative     Urobilinogen, UA 0.2 E.U./dL    STAT Lactic Acid, Reflex [[id number]]  (Abnormal) Collected: 08/18/21 0324    Specimen: Blood Updated: 08/18/21 0341     Lactate  2.4 mmol/L     [place] Draw [[id number]] Collected: 08/18/21 0009    Specimen: Blood Updated: 08/18/21 0115    Narrative:      The following orders were created for panel order [place] Draw.  Procedure                               Abnormality         Status                     ---------                               -----------         ------                     Green Top (Gel)[[id number]]                                  Final result               Lavender Top[[id number]]                                     Final result               Gold Top - SST[[id number]]                                                                Please view results for these tests on the individual orders.    Green Top (Gel) [[id number]] Collected: 08/18/21 0009    Specimen: Blood Updated: 08/18/21 0115     Extra Tube Hold for add-ons.     Comment: Auto resulted.       COVID-19 and FLU A/B PCR - Swab, Nasopharynx [[id number]]  (Abnormal) Collected: 08/18/21 0009    Specimen: Swab from Nasopharynx Updated: 08/18/21 0048     COVID19 Detected     Influenza A PCR Not Detected     Influenza B PCR Not Detected    Narrative:      Fact sheet for providers: https://www.fda.gov/media/156464/download    Fact sheet for patients: https://www.fda.gov/media/796080/download    Test performed by PCR.  Influenza A and Influenza B negative results should be considered presumptive in samples that have a positive SARS-CoV-2 result.    Competitive inhibition studies showed that SARS-CoV-2 virus, when present at concentrations above 3.6E+04 copies/mL, can inhibit the detection and amplification of influenza A and influenza B virus RNA if present at or below 1.8E+02 copies/mL or 4.9E+02 copies/mL, respectively, and may lead to false negative influenza virus results. If co-infection with influenza A or influenza B virus is suspected in samples with a positive SARS-CoV-2 result, the sample should be re-tested with another FDA cleared, approved, or authorized influenza  test, if influenza virus detection would change clinical management.    Lactic Acid, Plasma [[id number]]  (Abnormal) Collected: 08/18/21 0009    Specimen: Blood Updated: 08/18/21 0044     Lactate 3.0 mmol/L     Troponin [[id number]]  (Normal) Collected: 08/18/21 0009    Specimen: Blood Updated: 08/18/21 0043     Troponin T <0.010 ng/mL     Narrative:      Troponin T Reference Range:  <= 0.03 ng/mL-   Negative for AMI  >0.03 ng/mL-     Abnormal for myocardial necrosis.  Clinicians would have to utilize clinical acumen, EKG, Troponin and serial changes to determine if it is an Acute Myocardial Infarction or myocardial injury due to an underlying chronic condition.       Results may be falsely decreased if patient taking Biotin.      BNP [[id number]]  (Abnormal) Collected: 08/18/21 0009    Specimen: Blood Updated: 08/18/21 0041     proBNP 552.0 pg/mL     Narrative:      Among patients with dyspnea, NT-proBNP is highly sensitive for the detection of acute congestive heart failure. In addition NT-proBNP of <300 pg/ml effectively rules out acute congestive heart failure with 99% negative predictive value.    Results may be falsely decreased if patient taking Biotin.      hCG, Serum, Qualitative [[id number]]  (Normal) Collected: 08/18/21 0008    Specimen: Blood Updated: 08/18/21 0035     HCG Qualitative Negative        Imaging Results (Most Recent)     Procedure Component Value Units Date/Time    XR Chest 1 View [[id number]] Collected: 08/29/21 1709     Updated: 08/30/21 0000    Narrative:      EXAM:    XR Chest, 1 View    EXAM DATE/TIME:    August 29, 2021 at 1714 hours    CLINICAL HISTORY:    The patient is 46 years old and is Female; intubated, fever,  A41.9 Sepsis, unspecified organism R65.21 Severe sepsis with  septic shock N17.9 Acute kidney failure, unspecified U07.1  COVID-19    TECHNIQUE:    Frontal view of the chest.    COMPARISON:    Chest radiograph August 27, 2021    FINDINGS:    LUNGS:  The lungs are  hyperinflated. Diffuse pulmonary  opacities are again noted and not significantly changed.     PLEURAL SPACE:  Unremarkable.  No pneumothorax.    HEART:  Unremarkable.  No cardiomegaly.    MEDIASTINUM:  Unremarkable.    BONES/JOINTS:  The bones and soft tissues are stable.    TUBES, LINES AND DEVICES:  Right upper extremity PICC tip,  endotracheal tube, and duotube are unchanged in position from  prior exam.    UPPER ABDOMEN:  Unremarkable as visualized.      Impression:      1.  Overall, stable appearance of the diffuse pulmonary  opacities.  2.  Support lines and tubes in appropriate position.    Electronically signed by:  Alta Stack MD  8/29/2021 11:59 PM  CDT Workstation: [id number]    US Liver [[id number]] Collected: 08/28/21 0540     Updated: 08/28/21 0727    Narrative:      Ultrasound liver, right upper quadrant.    HISTORY: Elevated liver function tests.       Findings: Liver demonstrates a somewhat heterogenous appearance  of slight nodularity of the surface. This suggests early changes  of cirrhosis.    The gallbladder is surgically absent. Common bile duct 0.57 cm.  Pancreas obscured by overlying bowel gas.    Right kidney is enlarged, 15.41 x 7.26 x 6.79 cm. Normal aorta  and inferior vena cava.    FINDINGS: Liver has a heterogenous pattern of architectural and a  nodular surface suggesting early changes of cirrhosis. No masses  or intrahepatic biliary dilatation.    Gallbladder surgically absent. Normal common bile duct 0.59 cm.  Pancreas obscured by overlying bowel gas. Right kidney is  slightly enlarged but otherwise unremarkable.    Electronically signed by:  Jp Hutchinson MD  8/28/2021 7:26 AM CDT  Workstation: [id number]    XR Chest 1 View [[id number]] Collected: 08/27/21 1933     Updated: 08/27/21 1953    Narrative:      EXAM:  XR CHEST 1 VIEW    TECHNIQUE:   Single frontal radiograph of the chest.    VIEWS:  1 view    CLINICAL HISTORY:   46 years  Female  et tube placement, A41.9 Sepsis,  unspecified  organism R65.21 Severe sepsis with septic shock N17.9 Acute  kidney failure, unspecified U07.1 COVID-19    COMPARISON:   August 27, 2021 chest x-ray    FINDINGS:   Support lines and tubes: Endotracheal tube tip is 4.5 cm above  the richie. The enteric tube tip is below the diaphragm but off  the field-of-view. The right PICC tip overlies the proximal right  atrium.  Lungs: Bilateral widespread patchy airspace opacities are not  significantly changed.  Pleura: No pleural effusion. No pneumothorax.  Heart/mediastinum: The cardiac silhouette is not enlarged.  Bones: No acute osseous findings.  Soft tissues: Unremarkable.      Impression:      Bilateral widespread patchy airspace opacities are not  significantly changed.    Electronically signed by:  Dario Vargas MD  8/27/2021 7:52  PM CDT Workstation: [id number]    XR Chest 1 View [[id number]] Collected: 08/27/21 1849     Updated: 08/27/21 1934    Narrative:      EXAM:    XR Chest, 1 View    CLINICAL HISTORY:    The patient is 46 years old and is Female; tube placement,  A41.9 Sepsis, unspecified organism R65.21 Severe sepsis with  septic shock N17.9 Acute kidney failure, unspecified U07.1  COVID-19    TECHNIQUE:    Frontal view of the chest.    COMPARISON:    Chest radiograph 8/25/2021    FINDINGS:    LIMITATIONS:  Limited examination secondary to patient  rotation.    LUNGS: Stable moderate bilateral peribronchovascular and hazy  airspace opacities.    PLEURAL SPACE:  Unremarkable.  No pneumothorax.    HEART:  Unremarkable.  No cardiomegaly.    MEDIASTINUM:  Unremarkable.    BONES/JOINTS:  Unremarkable.    TUBES, LINES AND DEVICES:  Endotracheal tube tip approximately  5.9 cm above the richie. Enteric tube coursing below the  diaphragm with tip beyond the confines of the film. Right upper  extremity PICC is well seen in the upper chest and projecting  over the superior aspect of SVC. PICC is not well seen coursing  over the mediastinum in the  expected location of the SVC as seen  on prior radiograph.     Impression:      1. Stable airspace opacities.  2. Right upper extremity PICC not well seen coursing over the  expected location of the SVC. Findings may secondary to patient  positioning/rotation. Recommend repeat radiograph to confirm  placement.     Electronically signed by:  Audrey Amaya MD  8/27/2021 7:33 PM  CDT Workstation: [id number]    XR Abdomen KUB [[id number]] Collected: 08/27/21 1124     Updated: 08/27/21 1148    Narrative:      Abdomen, KUB         CLINICAL INDICATION: Cortrac placement    COMPARISON: None       FINDINGS: Single supine view of the abdomen demonstrates an  enteric tube with its tip pointing inferiorly in the duodenal  bulb.      Impression:      As above.    Electronically signed by:  Jp Hutchinson MD  8/27/2021 11:47 AM CDT  Workstation: [id number]    XR Chest Post CVA Port [[id number]] Collected: 08/25/21 1341     Updated: 08/25/21 1419    Narrative:      EXAM: XR CHEST 1 VIEW    COMPARISONS: Radiograph 8/23/2021    INDICATION: PICC line placement, A41.9 Sepsis, unspecified  organism R65.21 Severe sepsis with septic shock N17.9 Acute  kidney failure, unspecified U07.1 COVID-19    FINDINGS:  Frontal view of the chest.    Endotracheal tube 4.3 cm from the richie. Right PICC with tip  likely in the right atrium. Enteric tube courses inferior to the  diaphragm with tip excluded from field-of-view.    Diffuse bilateral airspace opacities are not significantly  changed. Cardiac mediastinal silhouette is stable with possible  mild cardiomegaly versus magnification artifact from portable  technique. No effusion or pneumothorax. No acute osseous  abnormality.      Impression:      Right PICC with tip likely in the right atrium. Remaining support  apparatus is stable.    No change in diffuse bilateral airspace disease/pneumonia.    Electronically signed by:  Hussain Cai MD  8/25/2021  2:18 PM CDT Workstation: [id number]     IR PICC W Imaging Guidance [[id number]] Resulted: 08/25/21 1401     Updated: 08/25/21 1401    Narrative:      This procedure was auto-finalized with no dictation required.    US Guidance PICC NC [[id number]] Resulted: 08/25/21 1349     Updated: 08/25/21 1349    Narrative:      This procedure was auto-finalized with no dictation required.    XR Chest 1 View [[id number]] Collected: 08/23/21 0435     Updated: 08/23/21 0728    Narrative:      PROCEDURE: Single view AP upright portable chest x-ray at 4:42  AM.    INDICATION: Follow-up respiratory failure/ARDS., A41.9 Sepsis,  unspecified organism R65.21 Severe sepsis with septic shock N17.9  Acute kidney failure, unspecified U07.1 COVID-19    COMPARISON: 8/22/2021 chest x-ray and earlier chest exams.    FINDINGS:    ET tube remains in place tip about 3.8 cm above the richie.  Nasogastric tube extends well below left hemidiaphragm tip not  included.    Mild interval improvement of the bilateral diffuse consolidation  and infiltrates throughout both lungs with no significant  residual.  No pleural effusions.    Heart size appears normal with.      Impression:      Mild interval improvement in the diffuse pulmonary infiltrates  and opacification with significant residual.    No pleural effusion.    ET tube and NG tube in place detailed above.    [id number]    Electronically signed by:  Santana Swanson MD  8/23/2021 7:27  AM CDT Workstation: [id number]    US Guided Vascular Access [[id number]] Collected: 08/22/21 1000     Updated: 08/23/21 0035    Narrative:      PROCEDURE: Ultrasound Guidance Vascular Access      Ordering physician(s): ETHAN VILLARREAL    Clinical Indication: Intravenous access    Findings:    The right basilic vein was sonographically evaluated and  determined to be patent. Concurrent realtime ultrasound was used  to visualize needle entry into the right basilic vein and a  permanent image was stored for permanent recording and reporting.          Impression:       Impression:    Ultrasound guidance utilized for intravenous access as above.    [id number]    Electronically signed by:  Dilip Berkowitz MD  8/22/2021 11:59 AM  CDT Workstation: [email or web address]    IR Insert Midline Without Port Pump 5 Plus [[id number]] Resulted: 08/22/21 1040     Updated: 08/22/21 1040    Narrative:      This procedure was auto-finalized with no dictation required.    XR Chest 1 View [[id number]] Collected: 08/22/21 0603     Updated: 08/22/21 0648    Narrative:      PORTABLE CHEST X-RAY    CLINICAL HISTORY: intubated, A41.9 Sepsis, unspecified organism  R65.21 Severe sepsis with septic shock N17.9 Acute kidney  failure, unspecified U07.1 COVID-19    COMPARISON: 8/21/2021.    FINDINGS: Portable AP view of the chest was obtained with  overlying monitor leads in place. ET tube terminates at the level  of the mid thoracic trachea. Nasogastric tube extends well below  the diaphragm, tip is not imaged. Lungs are fairly well inflated.  There is been significant worsening in diffuse dense opacities  bilaterally likely related to severe pneumonia and/or edema,  possibly ARDS. No significant pleural fluid. Cardiac margins are  obscured.      Impression:      Interval intubation with significant worsening in  extensive pulmonary opacities      Electronically signed by:  Nakul Marte MD  8/22/2021 6:47 AM  CDT Workstation: [id number]    CT Head Without Contrast [[id number]] Collected: 08/22/21 0202     Updated: 08/22/21 0316    Narrative:      EXAM DESCRIPTION:  CT HEAD WO CONTRAST 8/22/2021 2:02 AM CDT  [facility]: CT HEAD WITHOUT IV CONTRAST     CLINICAL HISTORY:  46 years Female, Delirium, A41.9 Sepsis, unspecified organism  R65.21 Severe sepsis with septic shock N17.9 Acute kidney  failure, unspecified U07.1 COVID-19    COMPARISON:  None    TECHNIQUE: Axial thin section CT images of the brain were  obtained from the base of the skull to the vertex without  intravenous contrast. Sagittal and coronal reconstructed  images  were also obtained.    The protocol utilizes one or more of the following dose reduction  techniques:  automated exposure control, adjustment of mA and/or  kV according to patient size, and/or use of iterative  reconstruction technique.    FINDINGS: Quality of examination is somewhat degraded secondary  to mild motion artifact.    BRAIN: Gray-white matter differentiation is preserved.  No  hyperdense vessel sign is seen.  No evidence of an acute infarct  is noted.    CEREBROSPINAL FLUID SPACES: Ventricles, cerebral sulci and basal  cisterns are normal for patient's age.  No hydrocephalus or  ventricular entrapment is noted.  No extraaxial fluid collection  is noted.    MASS EFFECT: There is no mass effect or midline shift.    HEMORRHAGE: No intracranial hemorrhage is noted.    SELLA: The sella and suprasellar cistern appear unremarkable.    PARANASAL SINUSES: The visualized portions of the paranasal  sinuses demonstrate mild diffuse mucosal thickening as well as  nonspecific trace air-fluid levels in bilateral sphenoid sinuses.    MASTOID AIR CELLS: The visualized portions of the mastoid air  cells are well aerated.    ORBITS: The visualized portions of the orbits appear  unremarkable.    SOFT TISSUES: No scalp soft tissue swelling is noted.    CALVARIUM: No calvarial fracture is noted.  No lytic bone lesion  is seen.    ATHEROSCLEROSIS: Mild amount of calcified intracranial  atherosclerosis is noted.      Impression:        NO ACUTE INTRACRANIAL ABNORMALITY IS NOTED.    Electronically signed by:  Shen Welch MD  8/22/2021 3:15 AM  CDT Workstation: [id number]    XR Chest 1 View [[id number]] Collected: 08/21/21 1555     Updated: 08/21/21 1613    Narrative:        PORTABLE CHEST    HISTORY: Sepsis. Severe sepsis with septic shock. Acute kidney  failure. Covid 1913.    Portable AP supine upright film of the chest was obtained at 3:50  PM.  COMPARISON: August 18, 2021    FINDINGS:   There are now extensive  bilateral infiltrates compatible with  pneumonia.  Areas of dense consolidation left midlung and lung base.  The heart is not enlarged.  The pulmonary vasculature is not increased.  No pleural effusion.  No pneumothorax.  No acute osseous abnormality.  Degenerative changes are present in the thoracic spine.      Impression:      CONCLUSION:  There are now extensive bilateral infiltrates compatible with  pneumonia.  Areas of dense consolidation left midlung and lung base.    [id number]    Electronically signed by:  Dilip Berkowitz MD  8/21/2021 4:12 PM CDT  Workstation: [email or web address] Bilateral [[id number]] Collected: 08/18/21 1048     Updated: 08/18/21 1226    Narrative:      EXAMINATION: Ultrasound, retroperitoneal / renal     CLINICAL INDICATION / HISTORY: HUGO, A41.9 Sepsis, unspecified  organism R65.21 Severe sepsis with septic shock N17.9 Acute  kidney failure, unspecified U07.1 COVID-19  COMPARISON:  None  TECHNIQUE:  Grayscale and color Doppler ultrasound.    FINDINGS:    Right kidney: The right kidney is normal in size and  echogenicity. It measures 11.6 cm in length. There is no evidence  of suspicious mass or calculus.     Left kidney: The left kidney is normal in size and echogenicity.  It measures 12.3 cm in length. There is no evidence of suspicious  mass or calculus. There is a questionable exophytic complex cyst  versus splenule adjacent to the left kidney which measures 1.8 x  1.3 x 1.7 cm. The technologist states that this does not appear  to be connected to the left kidney and that a splenule is felt to  be more likely.    Urinary bladder: Unremarkable         Impression:      There is a questionable exophytic complex cyst versus splenule  adjacent to the left kidney which measures 1.8 x 1.3 x 1.7 cm.  The technologist states that this does not appear to be connected  to the left kidney and that a splenule is felt to be more likely.  Recommend contrast enhanced CT of the abdomen for  further  evaluation.    Electronically signed by:  Binu Farooq MD  8/18/2021 12:25 PM  CDT Workstation: [id number]    XR Chest 1 View [[id number]] Collected: 08/18/21 0013     Updated: 08/18/21 0036    Narrative:      EXAM DESCRIPTION:  XR CHEST 1 VW  [facility]    CLINICAL HISTORY:  46 years Female, SOA Triage Protocol    COMPARISON:  4/8/2018  Number of views: 1  FINDINGS:  The cardiomediastinal silhouette is normal. The lungs are clear.  There is no pleural fluid.    No acute osseous abnormalities are seen.      Impression:      No acute cardiothoracic process is seen.    Electronically signed by:  Logan Irizarry MD  8/18/2021 12:35 AM CDT  Workstation: [id number]          Chief Complaint on Day of Discharge: comes into ER with complaints of increased shortness of breath and cough and overallworsening hypoxemia   She was not doing well at home and subsequently came to hospital for therpay.     Hospital Course:  The patient is a 46 y.o. female who presented to [facility] with increased overall worsening respiratory distress and increased dyspnea and weakness and lethargy.  She was evaluated in er and noted to have findings consistent with acute covid pneumonia and pneumonitis and she was on 100 per non rebreather at time of admit    She continued to decline and needed intubation on 8/17 and she went thru prolonged phase of intubation and was difficult to extubate and finally 9/5 she was able to get off vent and move to floor. '    She is terrifically weak and no strength to participate in any adl's   She has developed a severe myopathy overall       She still has increased oxygen requirements and she is being discharged to [facility] for strengthening and improvement of overall functional capability     She has breast ca as her underlying disease process and is immune suppressed due to that   Transfer to [facility] for strengthenign and improvement.      Condition on Discharge:  Stable and  "improving overall.     Physical Exam on Discharge:  /80 (BP Location: Right arm, Patient Position: Lying)   Pulse 86   Temp 96.8 °F (36 °C) (Oral)   Resp 18   Ht 167.6 cm (66\")   Wt 103 kg (227 lb 14.4 oz)   LMP 07/17/2021   SpO2 96%   Breastfeeding No   BMI 36.78 kg/m²   Physical Exam  Vitals and nursing note reviewed.   Constitutional:       Appearance: She is obese. She is ill-appearing.   HENT:      Head: Normocephalic and atraumatic.      Nose: Nose normal.      Mouth/Throat:      Mouth: Mucous membranes are moist.   Cardiovascular:      Rate and Rhythm: Regular rhythm. Tachycardia present.      Pulses: Normal pulses.      Heart sounds: Normal heart sounds.   Abdominal:      General: Abdomen is flat. Bowel sounds are normal.   Musculoskeletal:      Cervical back: Normal range of motion.      Right lower leg: Edema present.      Left lower leg: Edema present.   Skin:     General: Skin is warm.           Discharge Disposition:  Long Term Care (DC - External)    Discharge Medications:     Discharge Medications      New Medications      Instructions Start Date   insulin aspart 100 UNIT/ML injection  Commonly known as: novoLOG   0-24 Units, Subcutaneous, 3 Times Daily Before Meals      insulin detemir 100 UNIT/ML injection  Commonly known as: LEVEMIR   36 Units, Subcutaneous, 2 Times Daily      metoprolol tartrate 100 MG tablet  Commonly known as: LOPRESSOR   100 mg, Oral, 2 Times Daily      montelukast 10 MG tablet  Commonly known as: SINGULAIR   10 mg, Oral, Nightly         Changes to Medications      Instructions Start Date   FeroSul 325 (65 FE) MG tablet  Generic drug: ferrous sulfate  What changed:   · medication strength  · See the new instructions.   TAKE ONE TABLET BY MOUTH ONE A DAY WITH BREAKFAST         Continue These Medications      Instructions Start Date   amitriptyline 50 MG tablet  Commonly known as: ELAVIL   50 mg, Oral, Nightly      anastrozole 1 MG tablet  Commonly known as: " ARIMIDEX   1 mg, Oral, Daily      atorvastatin 20 MG tablet  Commonly known as: LIPITOR   20 mg, Oral, Nightly      B-D ULTRAFINE III SHORT PEN 31G X 8 MM misc  Generic drug: Insulin Pen Needle   No dose, route, or frequency recorded.      BASAGLAR KWIKPEN 100 UNIT/ML injection pen   10 Units, Subcutaneous, Nightly      cetirizine 10 MG tablet  Commonly known as: zyrTEC   10 mg, Oral, Daily      docusate sodium 100 MG capsule  Commonly known as: COLACE   100 mg, Oral, 2 Times Daily PRN      folic acid 1 MG tablet  Commonly known as: FOLVITE   1 mg, Oral, Daily      freestyle lancets   1 each, Other, 4 Times Daily      FREESTYLE LITE test strip  Generic drug: glucose blood   1 stick, Percutaneous, 4 Times Daily      glyburide micronized 6 MG tablet  Commonly known as: GLYNASE   No dose, route, or frequency recorded.      GLYBURIDE PO   6 mg, Oral, Daily      losartan 25 MG tablet  Commonly known as: COZAAR   25 mg, Oral, Daily      Lyrica 150 MG capsule  Generic drug: pregabalin   150 mg, Oral, 3 Times Daily      meloxicam 15 MG tablet  Commonly known as: MOBIC   15 mg, Oral, Daily      metFORMIN 1000 MG tablet  Commonly known as: GLUCOPHAGE   1,000 mg, Oral, 2 Times Daily      OLANZapine 10 MG tablet  Commonly known as: zyPREXA   10 mg, Oral, Nightly      omeprazole 40 MG capsule  Commonly known as: priLOSEC   40 mg, Oral, Daily      oxyCODONE-acetaminophen  MG per tablet  Commonly known as: PERCOCET   1 tablet, Oral, Every 6 Hours      tiZANidine 4 MG tablet  Commonly known as: ZANAFLEX   4 mg, Oral, Every 6 Hours PRN      triamcinolone 0.5 % cream  Commonly known as: KENALOG   Topical, 3 Times Daily PRN      Trulicity 1.5 MG/0.5ML solution pen-injector  Generic drug: Dulaglutide   1.5 mg, Subcutaneous, Weekly      Trulicity 1.5 MG/0.5ML solution pen-injector  Generic drug: Dulaglutide   Weekly      Ventolin  (90 Base) MCG/ACT inhaler  Generic drug: albuterol sulfate HFA   2 puffs, Inhalation, Every 4  Hours      vitamin B-12 1000 MCG tablet  Commonly known as: CYANOCOBALAMIN   1,000 mcg, Oral, Daily             Discharge Diet: regular     Activity at Discharge: as tolerated     Discharge Care Plan/Instructions: follow up with pt and ot at [facility]     Follow-up Appointments:   Future Appointments   Date Time Provider Department Center   9/29/2021  3:15 PM [facility] OP INFU CHAIR [facility]   10/27/2021  1:45 PM NURSE [facility]   10/27/2021  2:15 PM Katarina Patel MD [facility] ONC [facility]   10/27/2021  3:15 PM [facility] OP INFU CHAIR 15 [facility]   12/20/2021  1:15 PM Marvin Mcconnell MD [facility] None       Test Results Pending at Discharge:   Pending Labs     Order Current Status    Blood Culture - Blood, Arm, Right Preliminary result    Blood Culture - Blood, Hand, Right Preliminary result          The patient has current or prior documentation of LVEF less than 40%, or moderate to severely depressed left ventricular systolic function. The patent was prescribed or already taking an ACE inhibitor or ARB.     The patient has current or prior documentation of LVEF less than 40%, or moderate to severely depressed left ventricular systolic function. The patient was prescribed or already taking a beta-blocker.       Vianey Zepeda MD    Time: greater than 30 minutes spent in going over and doing discharge summary

## 2021-09-08 NOTE — PROGRESS NOTES
[facility] Medicine Services  INPATIENT PROGRESS NOTE    Length of Stay: 21  Date of Admission: 8/17/2021  Primary Care Physician: Jose Jean-Baptiste MD    Subjective   Chief Complaint: Respiratory failure  HPI: Remains confused and delirious but otherwise doing well.  Has no current complaints or concerns per nursing.    Review of Systems   Unable to perform ROS: Mental status change      All pertinent negatives and positives are as above. All other systems have been reviewed and are negative unless otherwise stated.     Objective    Temp:  [96.8 °F (36 °C)-98.7 °F (37.1 °C)] 96.8 °F (36 °C)  Heart Rate:  [61-86] 86  Resp:  [18-22] 18  BP: (129-173)/(70-99) 141/80    Physical Exam  Constitutional:       General: She is not in acute distress.     Appearance: She is not toxic-appearing.   HENT:      Head: Normocephalic and atraumatic.      Right Ear: External ear normal.      Left Ear: External ear normal.      Nose: Nose normal.      Comments: Core track in place     Mouth/Throat:      Mouth: Mucous membranes are moist.      Pharynx: Oropharynx is clear.   Eyes:      Conjunctiva/sclera: Conjunctivae normal.   Cardiovascular:      Rate and Rhythm: Normal rate and regular rhythm.      Pulses: Normal pulses.      Heart sounds: Normal heart sounds.   Pulmonary:      Comments: Diminished bilaterally but otherwise clear  Abdominal:      General: Bowel sounds are normal.      Palpations: Abdomen is soft.      Tenderness: There is no abdominal tenderness.   Musculoskeletal:         General: Swelling present.      Cervical back: Neck supple.   Skin:     General: Skin is warm and dry.      Capillary Refill: Capillary refill takes less than 2 seconds.   Neurological:      Comments: Follows commands, moves extremities, speech is soft but fluent and understandable.   Psychiatric:         Mood and Affect: Mood normal.         Behavior: Behavior normal.         Results Review:  I have  reviewed the labs, radiology results, and diagnostic studies.    Laboratory Data:   Results from last 7 days   Lab Units 09/08/21  0607 09/07/21  0421 09/06/21  1411 09/06/21  0328 09/06/21  0328   SODIUM mmol/L 147* 149*  --   --  149*   POTASSIUM mmol/L 3.7 3.5 4.2   < > 3.6   CHLORIDE mmol/L 110* 111*  --   --  114*   CO2 mmol/L 24.0 27.0  --   --  24.0   BUN mg/dL 34* 38*  --   --  44*   CREATININE mg/dL 0.38* 0.43*  --   --  0.41*   GLUCOSE mg/dL 250* 258*  --   --  259*   CALCIUM mg/dL 9.0 9.3  --   --  9.5   BILIRUBIN mg/dL 1.0 0.6  --   --  0.5   ALK PHOS U/L 114 111  --   --  102   ALT (SGPT) U/L 226* 217*  --   --  114*   AST (SGOT) U/L 110* 139*  --   --  92*   ANION GAP mmol/L 13.0 11.0  --   --  11.0    < > = values in this interval not displayed.     Estimated Creatinine Clearance: 219.5 mL/min (A) (by C-G formula based on SCr of 0.38 mg/dL (L)).  Results from last 7 days   Lab Units 09/05/21  0315   MAGNESIUM mg/dL 2.3         Results from last 7 days   Lab Units 09/08/21  0607 09/07/21 0421 09/06/21 0328 09/05/21 0315 09/04/21  0433   WBC 10*3/mm3 8.53 8.62 6.33 4.69 6.86   HEMOGLOBIN g/dL 11.5* 11.4* 10.9* 10.0* 10.3*   HEMATOCRIT % 35.1 35.7 33.5* 31.6* 31.3*   PLATELETS 10*3/mm3 148 157 143 142 132*     Results from last 7 days   Lab Units 09/07/21 0421 09/05/21 0315 09/03/21  0527   INR  1.18 1.18 1.10       Culture Data:   No results found for: BLOODCX  No results found for: URINECX  No results found for: RESPCX  No results found for: WOUNDCX  No results found for: STOOLCX  No components found for: BODYFLD    Radiology Data:   Imaging Results (Last 24 Hours)     ** No results found for the last 24 hours. **          I have reviewed the patient's current medications.     Assessment/Plan     Principal Problem:    Sepsis, unspecified organism (CMS/HCC)  Active Problems:    Type 2 diabetes mellitus without complication, without long-term current use of insulin (CMS/HCC)    Pneumonia due to  COVID-19 virus    Atypical pneumonia    Sepsis with acute renal failure and septic shock (CMS/HCC)    Acute kidney injury (CMS/HCC)    Severe malnutrition (CMS/HCC)  Transaminitis  Pyuria  Delirium  She is severely immune suppressed and weak and despite maximal efforts today, have her down to 7 liters of oxygen at this point for respiratory status maintaining .     Hold off on tube feeds starting just yet   She is starting to take oral intake.  She has severe morbid obesity  'however with this level of illness she has developed protein calorie malnutrition and cachexia.      Plan:    1.  Type ii dm and accucheks and slilding scale   Continue with oral diet.     2,  covid pneumonia and being treated per protocol and has done somewhat better , but still on hi april oxygen . And she is on decadron and she has finished course of remdesivir nad also abx therapy.      Supportive care and titrate oxygen levels to 7 liters today.  She is wait list and evaluation for ltac      I confirmed that the patient's Advance Care Plan is present, code status is documented, or surrogate decision maker is listed in the patient's medical record.    Discharge Planning: In process.    Vianey Zepeda MD

## 2021-09-09 ENCOUNTER — HOSPITAL ENCOUNTER (OUTPATIENT)
Facility: HOSPITAL | Age: 46
Discharge: NURSING, REHAB OR LONG-TERM CARE | End: 2021-10-01
Attending: INTERNAL MEDICINE | Admitting: INTERNAL MEDICINE

## 2021-09-09 VITALS
DIASTOLIC BLOOD PRESSURE: 55 MMHG | SYSTOLIC BLOOD PRESSURE: 109 MMHG | OXYGEN SATURATION: 95 % | WEIGHT: 228.6 LBS | BODY MASS INDEX: 36.74 KG/M2 | TEMPERATURE: 99.2 F | HEIGHT: 66 IN | HEART RATE: 60 BPM | RESPIRATION RATE: 20 BRPM

## 2021-09-09 DIAGNOSIS — R13.12 OROPHARYNGEAL DYSPHAGIA: ICD-10-CM

## 2021-09-09 DIAGNOSIS — Z78.9 IMPAIRED MOBILITY AND ACTIVITIES OF DAILY LIVING: ICD-10-CM

## 2021-09-09 DIAGNOSIS — Z74.09 IMPAIRED FUNCTIONAL MOBILITY, BALANCE, GAIT, AND ENDURANCE: ICD-10-CM

## 2021-09-09 DIAGNOSIS — Z74.09 IMPAIRED MOBILITY AND ACTIVITIES OF DAILY LIVING: ICD-10-CM

## 2021-09-09 LAB
ALBUMIN SERPL-MCNC: 3.3 G/DL (ref 3.5–5.2)
ALBUMIN/GLOB SERPL: 1.1 G/DL
ALP SERPL-CCNC: 110 U/L (ref 39–117)
ALT SERPL W P-5'-P-CCNC: 202 U/L (ref 1–33)
ANION GAP SERPL CALCULATED.3IONS-SCNC: 10 MMOL/L (ref 5–15)
ANION GAP SERPL CALCULATED.3IONS-SCNC: 12 MMOL/L (ref 5–15)
AST SERPL-CCNC: 97 U/L (ref 1–32)
BACTERIA SPEC AEROBE CULT: NORMAL
BACTERIA SPEC AEROBE CULT: NORMAL
BASOPHILS # BLD AUTO: 0.02 10*3/MM3 (ref 0–0.2)
BASOPHILS NFR BLD AUTO: 0.2 % (ref 0–1.5)
BILIRUB SERPL-MCNC: 0.8 MG/DL (ref 0–1.2)
BUN SERPL-MCNC: 39 MG/DL (ref 6–20)
BUN SERPL-MCNC: 41 MG/DL (ref 6–20)
BUN/CREAT SERPL: 75.9 (ref 7–25)
BUN/CREAT SERPL: 83 (ref 7–25)
CALCIUM SPEC-SCNC: 9 MG/DL (ref 8.6–10.5)
CALCIUM SPEC-SCNC: 9.1 MG/DL (ref 8.6–10.5)
CHLORIDE SERPL-SCNC: 115 MMOL/L (ref 98–107)
CHLORIDE SERPL-SCNC: 117 MMOL/L (ref 98–107)
CO2 SERPL-SCNC: 24 MMOL/L (ref 22–29)
CO2 SERPL-SCNC: 27 MMOL/L (ref 22–29)
CREAT SERPL-MCNC: 0.47 MG/DL (ref 0.57–1)
CREAT SERPL-MCNC: 0.54 MG/DL (ref 0.57–1)
CRP SERPL-MCNC: <0.3 MG/DL (ref 0–0.5)
D-DIMER, QUANTITATIVE (MAD,POW, STR): 1560 NG/ML (FEU) (ref 0–470)
DEPRECATED RDW RBC AUTO: 55.5 FL (ref 37–54)
EOSINOPHIL # BLD AUTO: 0.07 10*3/MM3 (ref 0–0.4)
EOSINOPHIL NFR BLD AUTO: 0.6 % (ref 0.3–6.2)
ERYTHROCYTE [DISTWIDTH] IN BLOOD BY AUTOMATED COUNT: 16.8 % (ref 12.3–15.4)
GFR SERPL CREATININE-BSD FRML MDRD: 120 ML/MIN/1.73
GFR SERPL CREATININE-BSD FRML MDRD: 141 ML/MIN/1.73
GLOBULIN UR ELPH-MCNC: 3.1 GM/DL
GLUCOSE BLDC GLUCOMTR-MCNC: 197 MG/DL (ref 70–130)
GLUCOSE BLDC GLUCOMTR-MCNC: 248 MG/DL (ref 70–130)
GLUCOSE BLDC GLUCOMTR-MCNC: 355 MG/DL (ref 70–130)
GLUCOSE SERPL-MCNC: 213 MG/DL (ref 65–99)
GLUCOSE SERPL-MCNC: 388 MG/DL (ref 65–99)
HCT VFR BLD AUTO: 36.1 % (ref 34–46.6)
HGB BLD-MCNC: 11.4 G/DL (ref 12–15.9)
IMM GRANULOCYTES # BLD AUTO: 0.14 10*3/MM3 (ref 0–0.05)
IMM GRANULOCYTES NFR BLD AUTO: 1.1 % (ref 0–0.5)
INR PPP: 1.2 (ref 0.8–1.2)
LDH SERPL-CCNC: 393 U/L (ref 135–214)
LYMPHOCYTES # BLD AUTO: 1.35 10*3/MM3 (ref 0.7–3.1)
LYMPHOCYTES NFR BLD AUTO: 10.8 % (ref 19.6–45.3)
MCH RBC QN AUTO: 30.2 PG (ref 26.6–33)
MCHC RBC AUTO-ENTMCNC: 31.6 G/DL (ref 31.5–35.7)
MCV RBC AUTO: 95.5 FL (ref 79–97)
MONOCYTES # BLD AUTO: 0.67 10*3/MM3 (ref 0.1–0.9)
MONOCYTES NFR BLD AUTO: 5.4 % (ref 5–12)
NEUTROPHILS NFR BLD AUTO: 10.21 10*3/MM3 (ref 1.7–7)
NEUTROPHILS NFR BLD AUTO: 81.9 % (ref 42.7–76)
NRBC BLD AUTO-RTO: 0.2 /100 WBC (ref 0–0.2)
PLATELET # BLD AUTO: 141 10*3/MM3 (ref 140–450)
PMV BLD AUTO: 11.5 FL (ref 6–12)
POTASSIUM SERPL-SCNC: 3.5 MMOL/L (ref 3.5–5.2)
POTASSIUM SERPL-SCNC: 3.8 MMOL/L (ref 3.5–5.2)
PROCALCITONIN SERPL-MCNC: 0.26 NG/ML (ref 0–0.25)
PROT SERPL-MCNC: 6.4 G/DL (ref 6–8.5)
PROTHROMBIN TIME: 15.1 SECONDS (ref 11.1–15.3)
QT INTERVAL: 430 MS
QTC INTERVAL: 477 MS
RBC # BLD AUTO: 3.78 10*6/MM3 (ref 3.77–5.28)
SODIUM SERPL-SCNC: 151 MMOL/L (ref 136–145)
SODIUM SERPL-SCNC: 154 MMOL/L (ref 136–145)
WBC # BLD AUTO: 12.46 10*3/MM3 (ref 3.4–10.8)
WHOLE BLOOD HOLD SPECIMEN: NORMAL

## 2021-09-09 PROCEDURE — 83615 LACTATE (LD) (LDH) ENZYME: CPT | Performed by: FAMILY MEDICINE

## 2021-09-09 PROCEDURE — 63710000001 INSULIN DETEMIR PER 5 UNITS: Performed by: INTERNAL MEDICINE

## 2021-09-09 PROCEDURE — 25010000002 ALBUMIN HUMAN 25% PER 50 ML: Performed by: INTERNAL MEDICINE

## 2021-09-09 PROCEDURE — 87040 BLOOD CULTURE FOR BACTERIA: CPT | Performed by: INTERNAL MEDICINE

## 2021-09-09 PROCEDURE — 82962 GLUCOSE BLOOD TEST: CPT

## 2021-09-09 PROCEDURE — 93005 ELECTROCARDIOGRAM TRACING: CPT | Performed by: INTERNAL MEDICINE

## 2021-09-09 PROCEDURE — 63710000001 INSULIN DETEMIR PER 5 UNITS: Performed by: NURSE PRACTITIONER

## 2021-09-09 PROCEDURE — 63710000001 DEXAMETHASONE PER 0.25 MG: Performed by: INTERNAL MEDICINE

## 2021-09-09 PROCEDURE — 63710000001 INSULIN ASPART PER 5 UNITS: Performed by: FAMILY MEDICINE

## 2021-09-09 PROCEDURE — 97110 THERAPEUTIC EXERCISES: CPT

## 2021-09-09 PROCEDURE — 80053 COMPREHEN METABOLIC PANEL: CPT | Performed by: FAMILY MEDICINE

## 2021-09-09 PROCEDURE — 85025 COMPLETE CBC W/AUTO DIFF WBC: CPT | Performed by: FAMILY MEDICINE

## 2021-09-09 PROCEDURE — 84134 ASSAY OF PREALBUMIN: CPT | Performed by: INTERNAL MEDICINE

## 2021-09-09 PROCEDURE — 92526 ORAL FUNCTION THERAPY: CPT | Performed by: SPEECH-LANGUAGE PATHOLOGIST

## 2021-09-09 PROCEDURE — 93010 ELECTROCARDIOGRAM REPORT: CPT | Performed by: INTERNAL MEDICINE

## 2021-09-09 PROCEDURE — 86140 C-REACTIVE PROTEIN: CPT | Performed by: FAMILY MEDICINE

## 2021-09-09 PROCEDURE — P9047 ALBUMIN (HUMAN), 25%, 50ML: HCPCS | Performed by: INTERNAL MEDICINE

## 2021-09-09 PROCEDURE — 85610 PROTHROMBIN TIME: CPT | Performed by: FAMILY MEDICINE

## 2021-09-09 PROCEDURE — 85379 FIBRIN DEGRADATION QUANT: CPT | Performed by: FAMILY MEDICINE

## 2021-09-09 PROCEDURE — 97530 THERAPEUTIC ACTIVITIES: CPT

## 2021-09-09 PROCEDURE — 84145 PROCALCITONIN (PCT): CPT | Performed by: FAMILY MEDICINE

## 2021-09-09 PROCEDURE — 25010000002 ENOXAPARIN PER 10 MG: Performed by: INTERNAL MEDICINE

## 2021-09-09 PROCEDURE — 25010000002 ENOXAPARIN PER 10 MG: Performed by: NURSE PRACTITIONER

## 2021-09-09 RX ORDER — BUDESONIDE AND FORMOTEROL FUMARATE DIHYDRATE 160; 4.5 UG/1; UG/1
2 AEROSOL RESPIRATORY (INHALATION)
Status: DISCONTINUED | OUTPATIENT
Start: 2021-09-09 | End: 2021-09-11

## 2021-09-09 RX ORDER — POTASSIUM CHLORIDE 1.5 G/1.77G
40 POWDER, FOR SOLUTION ORAL AS NEEDED
Status: DISCONTINUED | OUTPATIENT
Start: 2021-09-09 | End: 2021-09-09 | Stop reason: HOSPADM

## 2021-09-09 RX ORDER — CLONIDINE HYDROCHLORIDE 0.1 MG/1
0.1 TABLET ORAL EVERY 6 HOURS PRN
Status: DISCONTINUED | OUTPATIENT
Start: 2021-09-09 | End: 2021-10-01 | Stop reason: HOSPADM

## 2021-09-09 RX ORDER — ALBUTEROL SULFATE 90 UG/1
2 AEROSOL, METERED RESPIRATORY (INHALATION) EVERY 4 HOURS
Status: DISCONTINUED | OUTPATIENT
Start: 2021-09-09 | End: 2021-09-11

## 2021-09-09 RX ORDER — HEPARIN SODIUM,PORCINE 10 UNIT/ML
30 VIAL (ML) INTRAVENOUS EVERY 8 HOURS SCHEDULED
Status: DISCONTINUED | OUTPATIENT
Start: 2021-09-09 | End: 2021-10-01 | Stop reason: HOSPADM

## 2021-09-09 RX ORDER — METOPROLOL TARTRATE 50 MG/1
50 TABLET, FILM COATED ORAL EVERY 12 HOURS SCHEDULED
Start: 2021-09-09 | End: 2022-05-13

## 2021-09-09 RX ORDER — ONDANSETRON 4 MG/1
4 TABLET, FILM COATED ORAL EVERY 6 HOURS PRN
Status: DISCONTINUED | OUTPATIENT
Start: 2021-09-09 | End: 2021-10-01 | Stop reason: HOSPADM

## 2021-09-09 RX ORDER — LOSARTAN POTASSIUM 50 MG/1
50 TABLET ORAL
Status: DISCONTINUED | OUTPATIENT
Start: 2021-09-10 | End: 2021-10-01 | Stop reason: HOSPADM

## 2021-09-09 RX ORDER — ONDANSETRON 2 MG/ML
4 INJECTION INTRAMUSCULAR; INTRAVENOUS EVERY 6 HOURS PRN
Status: DISCONTINUED | OUTPATIENT
Start: 2021-09-09 | End: 2021-10-01 | Stop reason: HOSPADM

## 2021-09-09 RX ORDER — PANTOPRAZOLE SODIUM 40 MG/10ML
40 INJECTION, POWDER, LYOPHILIZED, FOR SOLUTION INTRAVENOUS
Status: DISCONTINUED | OUTPATIENT
Start: 2021-09-10 | End: 2021-09-14

## 2021-09-09 RX ORDER — METOPROLOL TARTRATE 50 MG/1
50 TABLET, FILM COATED ORAL EVERY 12 HOURS SCHEDULED
Status: DISCONTINUED | OUTPATIENT
Start: 2021-09-09 | End: 2021-09-09 | Stop reason: HOSPADM

## 2021-09-09 RX ORDER — OXYCODONE HYDROCHLORIDE 5 MG/1
5 TABLET ORAL EVERY 4 HOURS PRN
Status: DISCONTINUED | OUTPATIENT
Start: 2021-09-09 | End: 2021-10-01 | Stop reason: HOSPADM

## 2021-09-09 RX ORDER — POTASSIUM CHLORIDE 750 MG/1
40 CAPSULE, EXTENDED RELEASE ORAL AS NEEDED
Status: DISCONTINUED | OUTPATIENT
Start: 2021-09-09 | End: 2021-09-09 | Stop reason: HOSPADM

## 2021-09-09 RX ORDER — ACETAMINOPHEN 650 MG/1
650 SUPPOSITORY RECTAL EVERY 4 HOURS PRN
Status: DISCONTINUED | OUTPATIENT
Start: 2021-09-09 | End: 2021-10-01 | Stop reason: HOSPADM

## 2021-09-09 RX ORDER — OLANZAPINE 10 MG/1
10 TABLET ORAL NIGHTLY
Status: DISCONTINUED | OUTPATIENT
Start: 2021-09-09 | End: 2021-10-01 | Stop reason: HOSPADM

## 2021-09-09 RX ORDER — LOSARTAN POTASSIUM 50 MG/1
50 TABLET ORAL
Status: DISCONTINUED | OUTPATIENT
Start: 2021-09-09 | End: 2021-09-09 | Stop reason: HOSPADM

## 2021-09-09 RX ORDER — CHLORHEXIDINE GLUCONATE 0.12 MG/ML
15 RINSE ORAL EVERY 12 HOURS SCHEDULED
Status: DISCONTINUED | OUTPATIENT
Start: 2021-09-09 | End: 2021-10-01 | Stop reason: HOSPADM

## 2021-09-09 RX ORDER — ACETAMINOPHEN 325 MG/1
650 TABLET ORAL EVERY 4 HOURS PRN
Status: DISCONTINUED | OUTPATIENT
Start: 2021-09-09 | End: 2021-10-01 | Stop reason: HOSPADM

## 2021-09-09 RX ORDER — BENZONATATE 100 MG/1
200 CAPSULE ORAL EVERY 4 HOURS PRN
Status: DISCONTINUED | OUTPATIENT
Start: 2021-09-09 | End: 2021-10-01 | Stop reason: HOSPADM

## 2021-09-09 RX ORDER — FOLIC ACID 1 MG/1
1 TABLET ORAL DAILY
Status: DISCONTINUED | OUTPATIENT
Start: 2021-09-10 | End: 2021-10-01 | Stop reason: HOSPADM

## 2021-09-09 RX ORDER — NICOTINE POLACRILEX 4 MG
15 LOZENGE BUCCAL
Status: DISCONTINUED | OUTPATIENT
Start: 2021-09-09 | End: 2021-10-01 | Stop reason: HOSPADM

## 2021-09-09 RX ORDER — DEXTROSE AND SODIUM CHLORIDE 5; .45 G/100ML; G/100ML
125 INJECTION, SOLUTION INTRAVENOUS CONTINUOUS
Status: DISPENSED | OUTPATIENT
Start: 2021-09-09 | End: 2021-09-09

## 2021-09-09 RX ORDER — SODIUM CHLORIDE 0.9 % (FLUSH) 0.9 %
10 SYRINGE (ML) INJECTION EVERY 8 HOURS
Status: DISCONTINUED | OUTPATIENT
Start: 2021-09-09 | End: 2021-10-01 | Stop reason: HOSPADM

## 2021-09-09 RX ORDER — METOPROLOL TARTRATE 50 MG/1
50 TABLET, FILM COATED ORAL EVERY 12 HOURS SCHEDULED
Status: DISCONTINUED | OUTPATIENT
Start: 2021-09-09 | End: 2021-10-01 | Stop reason: HOSPADM

## 2021-09-09 RX ORDER — DOCUSATE SODIUM 100 MG/1
100 CAPSULE, LIQUID FILLED ORAL 2 TIMES DAILY PRN
Status: DISCONTINUED | OUTPATIENT
Start: 2021-09-09 | End: 2021-10-01 | Stop reason: HOSPADM

## 2021-09-09 RX ORDER — AMLODIPINE BESYLATE 5 MG/1
5 TABLET ORAL
Status: DISCONTINUED | OUTPATIENT
Start: 2021-09-10 | End: 2021-10-01 | Stop reason: HOSPADM

## 2021-09-09 RX ORDER — SODIUM CHLORIDE 0.9 % (FLUSH) 0.9 %
10 SYRINGE (ML) INJECTION AS NEEDED
Status: DISCONTINUED | OUTPATIENT
Start: 2021-09-09 | End: 2021-10-01 | Stop reason: HOSPADM

## 2021-09-09 RX ORDER — POTASSIUM CHLORIDE 7.45 MG/ML
10 INJECTION INTRAVENOUS
Status: DISCONTINUED | OUTPATIENT
Start: 2021-09-09 | End: 2021-09-09 | Stop reason: HOSPADM

## 2021-09-09 RX ORDER — DEXTROSE MONOHYDRATE 25 G/50ML
25 INJECTION, SOLUTION INTRAVENOUS
Status: DISCONTINUED | OUTPATIENT
Start: 2021-09-09 | End: 2021-10-01 | Stop reason: HOSPADM

## 2021-09-09 RX ORDER — CETIRIZINE HYDROCHLORIDE 5 MG/1
5 TABLET ORAL DAILY
Status: DISCONTINUED | OUTPATIENT
Start: 2021-09-10 | End: 2021-10-01 | Stop reason: HOSPADM

## 2021-09-09 RX ORDER — AMLODIPINE BESYLATE 5 MG/1
5 TABLET ORAL
Status: DISCONTINUED | OUTPATIENT
Start: 2021-09-09 | End: 2021-09-09 | Stop reason: HOSPADM

## 2021-09-09 RX ORDER — MONTELUKAST SODIUM 10 MG/1
10 TABLET ORAL NIGHTLY
Status: DISCONTINUED | OUTPATIENT
Start: 2021-09-09 | End: 2021-10-01 | Stop reason: HOSPADM

## 2021-09-09 RX ORDER — ALBUMIN (HUMAN) 12.5 G/50ML
25 SOLUTION INTRAVENOUS ONCE
Status: COMPLETED | OUTPATIENT
Start: 2021-09-09 | End: 2021-09-09

## 2021-09-09 RX ORDER — LANOLIN ALCOHOL/MO/W.PET/CERES
1000 CREAM (GRAM) TOPICAL DAILY
Status: DISCONTINUED | OUTPATIENT
Start: 2021-09-10 | End: 2021-10-01 | Stop reason: HOSPADM

## 2021-09-09 RX ADMIN — AMLODIPINE BESYLATE 5 MG: 5 TABLET ORAL at 09:08

## 2021-09-09 RX ADMIN — ENOXAPARIN SODIUM 100 MG: 100 INJECTION SUBCUTANEOUS at 09:08

## 2021-09-09 RX ADMIN — INSULIN ASPART 20 UNITS: 100 INJECTION, SOLUTION INTRAVENOUS; SUBCUTANEOUS at 11:13

## 2021-09-09 RX ADMIN — INSULIN ASPART 2 UNITS: 100 INJECTION, SOLUTION INTRAVENOUS; SUBCUTANEOUS at 09:09

## 2021-09-09 RX ADMIN — CYANOCOBALAMIN TAB 1000 MCG 1000 MCG: 1000 TAB at 09:10

## 2021-09-09 RX ADMIN — LOSARTAN POTASSIUM 50 MG: 50 TABLET, FILM COATED ORAL at 09:09

## 2021-09-09 RX ADMIN — FOLIC ACID 1 MG: 1 TABLET ORAL at 09:09

## 2021-09-09 RX ADMIN — ALBUMIN HUMAN 25 G: 0.25 SOLUTION INTRAVENOUS at 10:44

## 2021-09-09 RX ADMIN — DEXAMETHASONE 6 MG: 2 TABLET ORAL at 09:08

## 2021-09-09 RX ADMIN — INSULIN DETEMIR 36 UNITS: 100 INJECTION, SOLUTION SUBCUTANEOUS at 09:30

## 2021-09-09 RX ADMIN — DEXTROSE AND SODIUM CHLORIDE 125 ML/HR: 5; 450 INJECTION, SOLUTION INTRAVENOUS at 10:44

## 2021-09-09 RX ADMIN — CETIRIZINE HYDROCHLORIDE 5 MG: 5 TABLET ORAL at 09:08

## 2021-09-09 RX ADMIN — CHLORHEXIDINE GLUCONATE 15 ML: 1.2 RINSE ORAL at 10:44

## 2021-09-09 RX ADMIN — PANTOPRAZOLE SODIUM 40 MG: 40 INJECTION, POWDER, FOR SOLUTION INTRAVENOUS at 05:39

## 2021-09-09 RX ADMIN — METOPROLOL TARTRATE 50 MG: 50 TABLET ORAL at 09:10

## 2021-09-09 NOTE — THERAPY TREATMENT NOTE
Acute Care - Speech Language Pathology   Swallow Treatment Note [facility]     Patient Name: Corrian Jensen  : 1975  MRN: [id number]  Today's Date: 2021               Admit Date: 2021    Visit Dx:     ICD-10-CM ICD-9-CM   1. Sepsis with acute renal failure and septic shock, due to unspecified organism, unspecified acute renal failure type (CMS/HCC)  A41.9 038.9    R65.21 995.92    N17.9 785.52     584.9   2. Lab test positive for detection of COVID-19 virus  U07.1 079.89   3. Oropharyngeal dysphagia  R13.12 787.22   4. Impaired mobility and ADLs  Z74.09 V49.89    Z78.9    5. Impaired physical mobility  Z74.09 781.99     Patient Active Problem List   Diagnosis   • Chest pain   • Palpitation   • Tachycardia   • Type 2 diabetes mellitus without complication, without long-term current use of insulin (CMS/[facility])   • Angina effort   • Essential hypertension   • Acute pain of right knee   • Effusion, right knee   • Depressive disorder   • Elevated blood sugar   • Gastroesophageal reflux disease without esophagitis   • Midline low back pain with left-sided sciatica   • Other hyperlipidemia   • Sprain of right ankle   • Abnormal mammogram of left breast   • Malignant neoplasm of left breast in female, estrogen receptor positive (CMS/[facility])   • Pancytopenia (CMS/[facility])   • Pneumonia due to COVID-19 virus   • Atypical pneumonia   • Sepsis with acute renal failure and septic shock (CMS/[facility])   • Acute kidney injury (CMS/[facility])   • Sepsis, unspecified organism (CMS/[facility])   • Severe malnutrition (CMS/[facility])   • Cytokine release syndrome, grade 4     Past Medical History:   Diagnosis Date   • Acute atopic conjunctivitis    • Acute bronchitis    • Allergic rhinitis due to pollen    • Arrhythmia     wearing heart monitor    • Arthritis    • Asthma    • Bilateral foot pain    • Breast cancer (CMS/HCC)    • Breast cyst    • Breast lump    • Chest pain    • Chronic low back pain    • Contact dermatitis due to drugs and  medicine    • Cyst of ovary    • Diabetes mellitus (CMS/HCC)    • GERD (gastroesophageal reflux disease)    • Hyperlipidemia    • Hypertension    • Infestation by Sarcoptes scabiei annalisa hominis    • Low back pain    • Mastodynia    • Nausea and vomiting    • On long term drug therapy    • Pain in wrist    • Plantar fasciitis    • Rash    • Skin disorder     breast-possible superficial cellulitis   • Spinal stenosis    • Tinea pedis    • Tobacco dependence syndrome    • Upper respiratory infection    • Vitamin D deficiency    • Wheezing      Past Surgical History:   Procedure Laterality Date   • BREAST BIOPSY     • BREAST CYST ASPIRATION     • BREAST LUMPECTOMY Left 1/27/2021    Procedure: LEFT BREAST LUMPECTOMY WITH NEEDLE LOCALIZATION Needle localization [facility] be done [facility] by radiology first   @07:00 a.m.;  Surgeon: Mat Garay MD;  Location: [facility];  Service: General;  Laterality: Left;   • BREAST LUMPECTOMY WITH SENTINEL NODE BIOPSY AND AXILLARY NODE DISSECTION Left 2/22/2021    Procedure: LEFT BREAST LUMPECTOMY WITH SENTINEL NODE BIOPSY. REMOVAL OF CYST FROM LEFT CHEEK                              (INJECTION @07: 00 A.M);  Surgeon: Mat Garay MD;  Location: [facility] OR;  Service: General;  Laterality: Left;   • BREAST SURGERY      excision breast lesion   • CARDIAC CATHETERIZATION N/A 7/3/2018    Procedure: Coronary angiography;  Surgeon: Arun Cadet MD;  Location: [facility] CATH INVASIVE LOCATION;  Service: Cardiovascular   • CARDIAC CATHETERIZATION     • CHOLECYSTECTOMY     • INJECTION OF MEDICATION      Kenalog (4)       SLP Recommendation and Plan  SLP Swallowing Diagnosis: swallow WFL, other (see comments) (general weakness)  SLP Diet Recommendation: puree, thin liquids  Recommended Precautions and Strategies: upright posture during/after eating, small bites of food and sips of liquid, no straw, liquid via spoon only, fatigue precautions, general aspiration  precautions, assist with feeding, 1:1 supervision  SLP Rec. for Method of Medication Administration: meds via alternate route     Monitor for Signs of Aspiration: yes, notify SLP if any concerns     Swallow Criteria for Skilled Therapeutic Interventions Met: demonstrates skilled criteria     Rehab Potential/Prognosis, Swallowing: good, to achieve stated therapy goals  Therapy Frequency (Swallow): 3 days per week, 5 days per week        Daily Summary of Progress (SLP): progress toward functional goals as expected                   Plan of Care Reviewed With: patient  Progress: improving  Outcome Summary:  dysphagia treatment provided this date.  pt confused, but tolerating liquids via thin straw with no s/s of aspiration.  FT in nare, but not running.  pt expected to d/c to LTACH today.  recommend f/u for diet advancment next session         SWALLOW EVALUATION (last 72 hours)      SLP Adult Swallow Evaluation     Row Name 09/09/21 1250 09/08/21 0952 09/07/21 1254             Rehab Evaluation    Document Type  therapy note (daily note)  -EC  therapy note (daily note)  -CK  therapy note (daily note)  -CK      Total Evaluation Minutes, SLP  --  45  -CK  28  -CK      Subjective Information  no complaints  -EC  no complaints  -CK  no complaints  -CK      Patient Observations  alert;cooperative;agree to therapy  -EC  cooperative;alert;agree to therapy  -CK  alert;cooperative;agree to therapy  -CK      Patient/Family/Caregiver Comments/Observations  none  -EC  No family present at bedside  -CK  No family present at bedside; covid positive pt  -CK      Care Plan Review  evaluation/treatment results reviewed;care plan/treatment goals reviewed;risks/benefits reviewed;current/potential barriers reviewed;patient/other agree to care plan  -EC  evaluation/treatment results reviewed;care plan/treatment goals reviewed;risks/benefits reviewed;current/potential barriers reviewed;patient/other agree to care plan  -CK   evaluation/treatment results reviewed;care plan/treatment goals reviewed;risks/benefits reviewed;current/potential barriers reviewed;patient/other agree to care plan  -CK      Patient Effort  good  -EC  good  -CK  good  -CK         General Information    Patient Profile Reviewed  yes  -EC  yes  -CK  yes  -CK      Current Method of Nutrition  nasogastric feedings;pureed;thin liquids  -EC  nasogastric feedings;pureed;thin liquids  -CK  NPO;nasogastric feedings  -CK      Precautions/Limitations, Vision  WFL  -EC  WFL  -CK  WFL  -CK      Precautions/Limitations, Hearing  WFL  -EC  WFL  -CK  WFL  -CK      Plans/Goals Discussed with  patient  -EC  patient  -CK  patient  -CK         Pain Scale: Numbers Pre/Post-Treatment    Pretreatment Pain Rating  0/10 - no pain  -EC  0/10 - no pain  -CK  0/10 - no pain  -CK      Posttreatment Pain Rating  0/10 - no pain  -EC  0/10 - no pain  -CK  0/10 - no pain  -CK         Oral Motor Structure and Function    Dentition Assessment  natural, present and adequate  -EC  natural, present and adequate  -CK  natural, present and adequate  -CK      Secretion Management  WNL/WFL  -EC  WNL/WFL  -CK  WNL/WFL  -CK      Mucosal Quality  moist, healthy  -EC  moist, healthy  -CK  moist, healthy  -CK      Volitional Swallow  weak  -EC  weak  -CK  weak  -CK      Volitional Cough  weak  -EC  weak  -CK  weak  -CK         Oral Musculature and Cranial Nerve Assessment    Oral Motor General Assessment  generalized oral motor weakness  -EC  generalized oral motor weakness  -CK  generalized oral motor weakness  -CK         General Eating/Swallowing Observations    Respiratory Support Currently in Use  nasal cannula  -EC  nasal cannula  -CK  nasal cannula  -CK      Eating/Swallowing Skills  fed by SLP  -EC  fed by SLP  -CK  fed by SLP  -CK      Positioning During Eating  upright 90 degree;upright in bed  -EC  upright 90 degree;upright in bed  -CK  upright 90 degree;upright in bed  -CK      Utensils Used  straw   -EC  spoon  -CK  spoon  -CK      Consistencies Trialed  thin liquids;nectar/syrup-thick liquids boost and water /straw  -EC  thin liquids;pureed  -CK  ice chips;thin liquids;pureed  -CK      Pre SpO2 (%)  --  --  88  -CK      Post SpO2 (%)  --  --  89  -CK         Clinical Swallow Eval    Pharyngeal Phase  --  suspected pharyngeal impairment  -CK  suspected pharyngeal impairment  -CK      Clinical Swallow Evaluation Summary  pt consumed 240 boost via thin straw and 240 water via thin straw.  there were no s/s of aspiration.  restrictions for no straw removed.   -EC  Pt seen for skilled ST services this date to address oropharyngeal dysphagia.  Pt consumed puree solids w/no difficulty; however, did demonstrate decreased mastication w/more solid puree (pear mold).  Pt consumed thin liquids via spoon w/cough x1 over 10 trials.  Pt consumed meds whole in puree 1 pill per spoonful.  Pt had difficulty clearing and required liquid wash to clear pill.  Pt became nauseous and vomited up small amount of applesauce and 1 pill.  Nsg present and administered meds and then provided pt w/zophran.  SLP recommends continued current diet at this time.  -CK  Pt seen for skilled ST services this date to address oropharyngeal dysphagia.  Pt presented as alert and conversational, with a weak voice and kept head turned to the right.  Pt unable to maintain midline positioning d/t weakness.  Pt consumed ice chips w/no overt s/s of aspiration.  Pt consumed thin liquids via spoon w/multiple swallows and cough x1.  Pt safely tolerated puree solids w/good oral clearance; however, did present w/multiple swallows.  SLP recommends advancing diet to puree w/thin liquids via spoon only at this time.  Pt educated on diet advancement, safe swallowing precautions, and POC; pt in agreement.  -CK         Pharyngeal Phase Concerns    Pharyngeal Phase Concerns  --  cough  -CK  multiple swallows;cough  -CK      Multiple Swallows  --  --  -CK  thin;pudding   -CK      Cough  --  thin  -CK  thin  -CK         Clinical Impression    Daily Summary of Progress (SLP)  progress toward functional goals as expected  -EC  progress towards functional goals is fair  -CK  progress toward functional goals is good  -CK      Barriers to Overall Progress (SLP)  weakness  -EC  weakness  -CK  weakness  -CK      SLP Swallowing Diagnosis  swallow WFL;other (see comments) general weakness  -EC  suspected pharyngeal dysphagia  -CK  suspected pharyngeal dysphagia  -CK      Functional Impact  risk of malnutrition  -EC  risk of aspiration/pneumonia  -CK  risk of aspiration/pneumonia  -CK      Rehab Potential/Prognosis, Swallowing  good, to achieve stated therapy goals  -EC  good, to achieve stated therapy goals  -CK  good, to achieve stated therapy goals  -CK      Swallow Criteria for Skilled Therapeutic Interventions Met  demonstrates skilled criteria  -EC  demonstrates skilled criteria  -CK  demonstrates skilled criteria  -CK      Plan for Continued Treatment (SLP)  --  Continue POC  -CK  Advance diet to puree/thin via spoon only  -CK         Recommendations    Therapy Frequency (Swallow)  3 days per week;5 days per week  -EC  3 days per week;5 days per week  -CK  3 days per week;5 days per week  -CK      SLP Diet Recommendation  puree;thin liquids  -EC  temporary alternate methods of nutrition/hydration;puree;thin liquids  -CK  temporary alternate methods of nutrition/hydration;puree;thin liquids  -CK      Recommended Precautions and Strategies  upright posture during/after eating;small bites of food and sips of liquid;no straw;liquid via spoon only;fatigue precautions;general aspiration precautions;assist with feeding;1:1 supervision  -EC  upright posture during/after eating;small bites of food and sips of liquid;no straw;liquid via spoon only;fatigue precautions;general aspiration precautions;assist with feeding;1:1 supervision  -CK  upright posture during/after eating;small bites of food  and sips of liquid;no straw;liquid via spoon only;fatigue precautions;general aspiration precautions;assist with feeding;1:1 supervision  -CK      Oral Care Recommendations  Oral Care BID/PRN  -EC  Oral Care BID/PRN  -CK  Oral Care BID/PRN  -CK      SLP Rec. for Method of Medication Administration  meds via alternate route  -EC  meds via alternate route  -CK  meds via alternate route  -CK      Monitor for Signs of Aspiration  yes;notify SLP if any concerns  -EC  yes;notify SLP if any concerns  -CK  yes;notify SLP if any concerns  -CK         Swallow Goals (SLP)    Oral Nutrition/Hydration Goal Selection (SLP)  oral nutrition/hydration, SLP goal 1  -EC  oral nutrition/hydration, SLP goal 1  -CK  oral nutrition/hydration, SLP goal 1  -CK         Oral Nutrition/Hydration Goal 1 (SLP)    Oral Nutrition/Hydration Goal 1, SLP  Pt to tolerate least restrictive diet with no s/s of aspiration for adequate nutrition and hdyration.   -EC  Pt to tolerate least restrictive diet with no s/s of aspiration for adequate nutrition and hdyration.   -CK  Pt to tolerate least restrictive diet with no s/s of aspiration for adequate nutrition and hdyration.   -CK      Time Frame (Oral Nutrition/Hydration Goal 1, SLP)  by discharge  -EC  by discharge  -CK  by discharge  -CK      Barriers (Oral Nutrition/Hydration Goal 1, SLP)  weakness  -EC  --  --      Progress/Outcomes (Oral Nutrition/Hydration Goal 1, SLP)  goal ongoing  -EC  goal ongoing  -CK  goal ongoing  -CK        User Key  (r) = Recorded By, (t) = Taken By, (c) = Cosigned By    Initials Name Effective Dates    Mago Cornejo CCC-SLP 06/16/21 -     CK Tequila Mckenna MS CCC-SLP 06/16/21 -           EDUCATION  The patient has been educated in the following areas:   Dysphagia (Swallowing Impairment) Modified Diet Instruction.       SLP GOALS     Row Name 09/09/21 1250 09/08/21 0952 09/07/21 1254       Oral Nutrition/Hydration Goal 1 (SLP)    Oral Nutrition/Hydration Goal  1, SLP  Pt to tolerate least restrictive diet with no s/s of aspiration for adequate nutrition and hdyration.   -EC  Pt to tolerate least restrictive diet with no s/s of aspiration for adequate nutrition and hdyration.   -CK  Pt to tolerate least restrictive diet with no s/s of aspiration for adequate nutrition and hdyration.   -CK    Time Frame (Oral Nutrition/Hydration Goal 1, SLP)  by discharge  -EC  by discharge  -CK  by discharge  -CK    Barriers (Oral Nutrition/Hydration Goal 1, SLP)  weakness  -EC  --  --    Progress/Outcomes (Oral Nutrition/Hydration Goal 1, SLP)  goal ongoing  -EC  goal ongoing  -CK  goal ongoing  -CK      User Key  (r) = Recorded By, (t) = Taken By, (c) = Cosigned By    Initials Name Provider Type    Mago Cornejo CCC-SLP Speech and Language Pathologist    CK Tequila Mckenna, MS CCC-SLP Speech and Language Pathologist             Time Calculation:   Time Calculation- SLP     Row Name 09/09/21 1326             Time Calculation- SLP    SLP Start Time  1250  -EC      SLP Stop Time  1322  -EC      SLP Time Calculation (min)  32 min  -EC      Total Timed Code Minutes- SLP  32 minute(s)  -EC      SLP Received On  09/09/21  -EC      SLP Goal Re-Cert Due Date  09/18/21  -EC         Untimed Charges    15275-MI Treatment Swallow Minutes  32  -EC         Total Minutes    Untimed Charges Total Minutes  32  -EC       Total Minutes  32  -EC        User Key  (r) = Recorded By, (t) = Taken By, (c) = Cosigned By    Initials Name Provider Type    Mago Cornejo CCC-SLP Speech and Language Pathologist          Therapy Charges for Today     Code Description Service Date Service Provider Modifiers Qty    [id number]  ST TREATMENT SWALLOW 2 9/9/2021 Mago Sarkar CCC-SLP GN 1               BRE Mauro  9/9/2021

## 2021-09-09 NOTE — PLAN OF CARE
Goal Outcome Evaluation:  Plan of Care Reviewed With: patient        Progress: no change     Plan of Care Reviewed With: patient     Low grade temp this shift - see VS flowsheet. Md notified of same. Pt was to go to LTAC tonight but with new temp elevated HR at that time, transfer to LTAC was delayed until at least daytime hours on 9/9 per MD order.      Cognition remains impaired, pt is still oriented to name. Remains calm and cooperative.      Persistent weakness: Physically pt is very weak. Is unable to lift extremities off mattress/pillow. Very weak grasp in hands, very weak dorsi/plantar flexion and footdrop noted.      Maintaining SpO2 on 7L HFNC this shift. Was on 10 L prior.     [place] remains in place, tolerating small bites of pureed foods, occasional intermittent nausea. Meds crushed in pudding or applesauce.     Required coverage for  elevated glucose last hs - see order and mar.    Noted moisture associated wound to crevace/coccyx. Turns/reposition every 2 hours as tolerated by staff.     Noted edema in hands, feet. BUE elevated, BLE elevated, heels floated this shift.     Adequate urine output following removal of black.      Denies pain when asked, nonverbal indicators absent.  Remains on on tele with cont pulse ox, resp even and unlabored. See VS flow sheet.      Caps present to central and midline ports.   Fall precautions in place.

## 2021-09-09 NOTE — DISCHARGE SUMMARY
[facility] Medicine Services  DISCHARGE SUMMARY       Date of Admission: 8/17/2021  Date of Discharge:  9/9/2021  Primary Care Physician: Jose Jean-Baptiste MD    Presenting Problem/History of Present Illness:  Sepsis with acute renal failure and septic shock, due to unspecified organism, unspecified acute renal failure type (CMS/[facility]) [A41.9, R65.21, N17.9]  Lab test positive for detection of COVID-19 virus [U07.1]       Final Discharge Diagnoses:  Active Hospital Problems    Diagnosis    • **Sepsis, unspecified organism (CMS/[facility])    • Cytokine release syndrome, grade 4    • Severe malnutrition (CMS/[facility])    • Pneumonia due to COVID-19 virus    • Atypical pneumonia    • Acute kidney injury (CMS/[facility])    • Sepsis with acute renal failure and septic shock (CMS/[facility])    • Type 2 diabetes mellitus without complication, without long-term current use of insulin (CMS/[facility])        Consults:   Consults     No orders found from 7/19/2021 to 8/18/2021.        Pertinent Test Results:   Lab Results (last 24 hours)     Procedure Component Value Units Date/Time    Blood Culture - Blood, Arm, Right [[id number]] Collected: 09/04/21 1428    Specimen: Blood from Arm, Right Updated: 09/09/21 1445     Blood Culture No growth at 5 days    Blood Culture - Blood, Hand, Right [[id number]] Collected: 09/09/21 1333    Specimen: Blood from Hand, Right Updated: 09/09/21 1333    Extra Tubes [[id number]] Collected: 09/09/21 1220    Specimen: Blood from Hand, Right Updated: 09/09/21 1330    Narrative:      The following orders were created for panel order Extra Tubes.  Procedure                               Abnormality         Status                     ---------                               -----------         ------                     Lavender Top[[id number]]                                     Final result                 Please view results for these tests on the individual orders.    Marcia Williamson  "[[id number]] Collected: 09/09/21 1220    Specimen: Blood from Hand, Right Updated: 09/09/21 1330     Extra Tube hold for add-on     Comment: Auto resulted       Procalcitonin [[id number]]  (Abnormal) Collected: 09/09/21 1217    Specimen: Blood Updated: 09/09/21 1311     Procalcitonin 0.26 ng/mL     Narrative:      As a Marker for Sepsis (Non-Neonates):     1. <0.5 ng/mL represents a low risk of severe sepsis and/or septic shock.  2. >2 ng/mL represents a high risk of severe sepsis and/or septic shock.    As a Marker for Lower Respiratory Tract Infections that require antibiotic therapy:  PCT on Admission     Antibiotic Therapy             6-12 Hrs later  >0.5                          Strongly Recommended            >0.25 - <0.5             Recommended  0.1 - 0.25                  Discouraged                       Remeasure/reassess PCT  <0.1                         Strongly Discouraged         Remeasure/reassess PCT      As 28 day mortality risk marker: \"Change in Procalcitonin Result\" (>80% or <=80%) if Day 0 (or Day 1) and Day 4 values are available. Refer to http://www.[employer]-pct-calculator.com/    Change in PCT <=80 %   A decrease of PCT levels below or equal to 80% defines a positive change in PCT test result representing a higher risk for 28-day all-cause mortality of patients diagnosed with severe sepsis or septic shock.    Change in PCT >80 %   A decrease of PCT levels of more than 80% defines a negative change in PCT result representing a lower risk for 28-day all-cause mortality of patients diagnosed with severe sepsis or septic shock.              Results may be falsely decreased if patient taking Biotin.     Basic Metabolic Panel [[id number]]  (Abnormal) Collected: 09/09/21 1217    Specimen: Blood Updated: 09/09/21 1306     Glucose 388 mg/dL      BUN 41 mg/dL      Creatinine 0.54 mg/dL      Sodium 151 mmol/L      Potassium 3.8 mmol/L      Chloride 115 mmol/L      CO2 24.0 mmol/L      Calcium 9.1 mg/dL      " eGFR Non African Amer 120 mL/min/1.73      BUN/Creatinine Ratio 75.9     Anion Gap 12.0 mmol/L     Narrative:      GFR Normal >60  Chronic Kidney Disease <60  Kidney Failure <15      Blood Culture - Blood, Hand, Right [[id number]] Collected: 09/04/21 1230    Specimen: Blood from Hand, Right Updated: 09/09/21 1300     Blood Culture No growth at 5 days    Blood Culture - Blood, Hand, Right [[id number]] Collected: 09/09/21 1220    Specimen: Blood from Hand, Right Updated: 09/09/21 1232    POC Glucose Once [[id number]]  (Abnormal) Collected: 09/09/21 1053    Specimen: Blood Updated: 09/09/21 1122     Glucose 355 mg/dL      Comment: RN NotifiedOperator: [id number] EMILY DIALLOMeter ID: [id number]       POC Glucose Once [[id number]]  (Abnormal) Collected: 09/09/21 0714    Specimen: Blood Updated: 09/09/21 1122     Glucose 197 mg/dL      Comment: RN NotifiedOperator: [id number] EMILY DIALLOMeter ID: [id number]       C-reactive Protein [[id number]]  (Normal) Collected: 09/09/21 0638    Specimen: Blood Updated: 09/09/21 0747     C-Reactive Protein <0.30 mg/dL     Comprehensive Metabolic Panel [[id number]]  (Abnormal) Collected: 09/09/21 0638    Specimen: Blood Updated: 09/09/21 0734     Glucose 213 mg/dL      BUN 39 mg/dL      Creatinine 0.47 mg/dL      Sodium 154 mmol/L      Potassium 3.5 mmol/L      Chloride 117 mmol/L      CO2 27.0 mmol/L      Calcium 9.0 mg/dL      Total Protein 6.4 g/dL      Albumin 3.30 g/dL      ALT (SGPT) 202 U/L      AST (SGOT) 97 U/L      Alkaline Phosphatase 110 U/L      Total Bilirubin 0.8 mg/dL      eGFR Non African Amer 141 mL/min/1.73      Globulin 3.1 gm/dL      A/G Ratio 1.1 g/dL      BUN/Creatinine Ratio 83.0     Anion Gap 10.0 mmol/L     Narrative:      GFR Normal >60  Chronic Kidney Disease <60  Kidney Failure <15      Lactate Dehydrogenase [[id number]]  (Abnormal) Collected: 09/09/21 0638    Specimen: Blood Updated: 09/09/21 0734      U/L     D-dimer, Quantitative [[id number]]   (Abnormal) Collected: 09/09/21 0638    Specimen: Blood Updated: 09/09/21 0731     D-Dimer, Quantitative 1,560 ng/mL (FEU)     Narrative:      Dimer values <500 ng/ml FEU are FDA approved as aid in diagnosis of deep venous thrombosis and pulmonary embolism.  This test should not be used in an exclusion strategy with pretest probability alone.    A recent guideline regarding diagnosis for pulmonary thromboembolism recommends an adjusted exclusion criterion of age x 10 ng/ml FEU for patients >50 years of age ([employer] Intern Med 2015; 163: 701-711).      Protime-INR [[id number]]  (Normal) Collected: 09/09/21 0638    Specimen: Blood Updated: 09/09/21 0731     Protime 15.1 Seconds      INR 1.20    Narrative:      Therapeutic range for most indications is 2.0-3.0 INR,  or 2.5-3.5 for mechanical heart valves.    CBC & Differential [[id number]]  (Abnormal) Collected: 09/09/21 0638    Specimen: Blood Updated: 09/09/21 0715    Narrative:      The following orders were created for panel order CBC & Differential.  Procedure                               Abnormality         Status                     ---------                               -----------         ------                     CBC Auto Differential[[id number]]        Abnormal            Final result                 Please view results for these tests on the individual orders.    CBC Auto Differential [[id number]]  (Abnormal) Collected: 09/09/21 0638    Specimen: Blood Updated: 09/09/21 0715     WBC 12.46 10*3/mm3      RBC 3.78 10*6/mm3      Hemoglobin 11.4 g/dL      Hematocrit 36.1 %      MCV 95.5 fL      MCH 30.2 pg      MCHC 31.6 g/dL      RDW 16.8 %      RDW-SD 55.5 fl      MPV 11.5 fL      Platelets 141 10*3/mm3      Neutrophil % 81.9 %      Lymphocyte % 10.8 %      Monocyte % 5.4 %      Eosinophil % 0.6 %      Basophil % 0.2 %      Immature Grans % 1.1 %      Neutrophils, Absolute 10.21 10*3/mm3      Lymphocytes, Absolute 1.35 10*3/mm3      Monocytes, Absolute 0.67  "10*3/mm3      Eosinophils, Absolute 0.07 10*3/mm3      Basophils, Absolute 0.02 10*3/mm3      Immature Grans, Absolute 0.14 10*3/mm3      nRBC 0.2 /100 WBC     POC Glucose Once [[id number]]  (Abnormal) Collected: 09/08/21 1955    Specimen: Blood Updated: 09/08/21 2034     Glucose 380 mg/dL      Comment: RN NotifiedOperator: [id number] RICKIE TIAMeter ID: [id number]           Imaging Results (Last 24 Hours)     ** No results found for the last 24 hours. **        Chief Complaint on Day of Discharge: No complaints    Hospital Course:   This is a 46 year old female with PMH of DM2, GERD, HLD, HTN and asthma that [facility] with complaints of dyspnea, weakness and lethargy.  The patient was diagnosed with COVID pneumonia and required intubation on 8/17/2021.  She was eventually extubated on 9/5/2021.  The patient is extremely debilitated with no strength for ADLs.  She currently is requiring 7 liters of oxygen. She has been accepted to [facility] for pulmonary and physical rehabilitation.      Condition on Discharge:  Stable    Physical Exam on Discharge:  /55 (BP Location: Right arm, Patient Position: Lying)   Pulse 60   Temp 99.2 °F (37.3 °C) (Oral)   Resp 20   Ht 167.6 cm (66\")   Wt 104 kg (228 lb 9.6 oz)   LMP 07/17/2021   SpO2 95%   Breastfeeding No   BMI 36.90 kg/m²   Physical Exam  Constitutional:       Appearance: She is well-developed.   HENT:      Head: Normocephalic and atraumatic.   Eyes:      Pupils: Pupils are equal, round, and reactive to light.   Cardiovascular:      Rate and Rhythm: Normal rate and regular rhythm.   Pulmonary:      Effort: Pulmonary effort is normal.      Breath sounds: Normal breath sounds.   Abdominal:      General: Bowel sounds are normal.      Palpations: Abdomen is soft.   Musculoskeletal:         General: Normal range of motion.      Cervical back: Normal range of motion and neck supple.   Skin:     General: Skin is warm and dry.   Neurological:      " Mental Status: She is alert and oriented to person, place, and time.   Psychiatric:         Behavior: Behavior normal.       Discharge Disposition:  Long Term Care (DC - External)    Discharge Medications:     Discharge Medications      New Medications      Instructions Start Date   dexamethasone 6 MG tablet  Commonly known as: DECADRON   6 mg, Oral, Daily With Breakfast      enoxaparin 100 MG/ML solution syringe  Commonly known as: LOVENOX   1 mg/kg (100 mg), Subcutaneous, Every 12 Hours      insulin aspart 100 UNIT/ML injection  Commonly known as: novoLOG   0-24 Units, Subcutaneous, 3 Times Daily Before Meals      insulin detemir 100 UNIT/ML injection  Commonly known as: LEVEMIR   36 Units, Subcutaneous, 2 Times Daily      metoprolol tartrate 50 MG tablet  Commonly known as: LOPRESSOR   50 mg, Oral, Every 12 Hours Scheduled      montelukast 10 MG tablet  Commonly known as: SINGULAIR   10 mg, Oral, Nightly         Changes to Medications      Instructions Start Date   FeroSul 325 (65 FE) MG tablet  Generic drug: ferrous sulfate  What changed:   · medication strength  · See the new instructions.   TAKE ONE TABLET BY MOUTH ONE A DAY WITH BREAKFAST         Continue These Medications      Instructions Start Date   amitriptyline 50 MG tablet  Commonly known as: ELAVIL   50 mg, Oral, Nightly      anastrozole 1 MG tablet  Commonly known as: ARIMIDEX   1 mg, Oral, Daily      atorvastatin 20 MG tablet  Commonly known as: LIPITOR   20 mg, Oral, Nightly      B-D ULTRAFINE III SHORT PEN 31G X 8 MM misc  Generic drug: Insulin Pen Needle   No dose, route, or frequency recorded.      BASAGLAR KWIKPEN 100 UNIT/ML injection pen   10 Units, Subcutaneous, Nightly      cetirizine 10 MG tablet  Commonly known as: zyrTEC   10 mg, Oral, Daily      docusate sodium 100 MG capsule  Commonly known as: COLACE   100 mg, Oral, 2 Times Daily PRN      folic acid 1 MG tablet  Commonly known as: FOLVITE   1 mg, Oral, Daily      freestyle lancets   1  each, Other, 4 Times Daily      FREESTYLE LITE test strip  Generic drug: glucose blood   1 stick, Percutaneous, 4 Times Daily      glyburide micronized 6 MG tablet  Commonly known as: GLYNASE   No dose, route, or frequency recorded.      GLYBURIDE PO   6 mg, Oral, Daily      losartan 25 MG tablet  Commonly known as: COZAAR   25 mg, Oral, Daily      Lyrica 150 MG capsule  Generic drug: pregabalin   150 mg, Oral, 3 Times Daily      meloxicam 15 MG tablet  Commonly known as: MOBIC   15 mg, Oral, Daily      metFORMIN 1000 MG tablet  Commonly known as: GLUCOPHAGE   1,000 mg, Oral, 2 Times Daily      OLANZapine 10 MG tablet  Commonly known as: zyPREXA   10 mg, Oral, Nightly      omeprazole 40 MG capsule  Commonly known as: priLOSEC   40 mg, Oral, Daily      oxyCODONE-acetaminophen  MG per tablet  Commonly known as: PERCOCET   1 tablet, Oral, Every 6 Hours      tiZANidine 4 MG tablet  Commonly known as: ZANAFLEX   4 mg, Oral, Every 6 Hours PRN      triamcinolone 0.5 % cream  Commonly known as: KENALOG   Topical, 3 Times Daily PRN      Trulicity 1.5 MG/0.5ML solution pen-injector  Generic drug: Dulaglutide   1.5 mg, Subcutaneous, Weekly      Trulicity 1.5 MG/0.5ML solution pen-injector  Generic drug: Dulaglutide   Weekly      Ventolin  (90 Base) MCG/ACT inhaler  Generic drug: albuterol sulfate HFA   2 puffs, Inhalation, Every 4 Hours      vitamin B-12 1000 MCG tablet  Commonly known as: CYANOCOBALAMIN   1,000 mcg, Oral, Daily             Discharge Diet:   Diet Instructions     Diet: Dysphagia, Consistent Carbohydrate, Tube Feeding; Thin Liquids, No Restrictions; Pureed; Continuous; Peptamen Intense VHP 10 ml/hr.  Advance 10 ml/hr to goal rate of 30 ml/hr.  25 ml q 1 hour water flush.      Discharge Diet:  Dysphagia  Consistent Carbohydrate  Tube Feeding       Fluid Consistency: Thin Liquids, No Restrictions    Pureed Options: Pureed    Feeding Type: Continuous    Formula & Rate: Peptamen Intense VHP 10 ml/hr.   Advance 10 ml/hr to goal rate of 30 ml/hr.  25 ml q 1 hour water flush.          Activity at Discharge:   Activity Instructions     Activity as Tolerated            Discharge Care Plan/Instructions: As above.     Follow-up Appointment:   Contact information for follow-up providers     Jose Jean-Baptiste MD Follow up on 8/30/2021.    Specialty: Family Medicine  Why: Hospital follow up appointment Monday 8/30/21 at 10:15am.  Contact information:  [address] [place] [id number]  [phone]                   Contact information for after-discharge care     Destination     [facility] .    Service: Long Term Acute Care  Contact information:  [address] [place] [id number] [phone]                             Test Results Pending at Discharge:   Pending Labs     Order Current Status    Blood Culture - Blood, Hand, Right In process    Blood Culture - Blood, Hand, Right In process            This document has been electronically signed by LOGAN Olson on September 9, 2021 16:02 CDT        Time: Greater than 30 minutes.

## 2021-09-09 NOTE — SIGNIFICANT NOTE
Patient transfer was delayed to [facility] as she ran a slight temperature and tachycardia.     Which given her pathology and prolonged admission from   covid pneumonia with multiorgan failure she was scheduled to go to   LONG TERM ACUTE CARE FACILITY     She had just completed full course of abx theray   And antiviral therapy     She is off tube feeds and trying to eat and has done minimal with that    She is not able to care for herself in any manner, she needs help with all adl's she is bedffast    There are many causes for her fever     Atelectasis ;  With hypoxemia and poor respiratory effort and weakness and will need respiratory care plan and chest precussion therapy.     Recurrent   Sx of pneumonia and pneumonitis   And mild low grade fevers are expected and will recur    She has small sacral and coccyx wound additionally that could contribute to fever    None of these are acute or acutely able to be changed in 24 hours to have held the transfer that is why she is going to another acute care long term care facility     Will obtain cultures again and respiratory care plan     And continue to move forward with discharge plans.

## 2021-09-09 NOTE — PLAN OF CARE
Goal Outcome Evaluation:  Plan of Care Reviewed With: patient        Progress: improving  Outcome Summary: ST dysphagia treatment provided this date.  pt confused, but tolerating liquids via thin straw with no s/s of aspiration.  FT in nare, but not running.  pt expected to d/c to [facility] today.  recommend f/u for diet advancment next session

## 2021-09-09 NOTE — THERAPY TREATMENT NOTE
Patient Name: Corrina Jensen  : 1975    MRN: [id number]                              Today's Date: 2021       Admit Date: 2021    Visit Dx:     ICD-10-CM ICD-9-CM   1. Sepsis with acute renal failure and septic shock, due to unspecified organism, unspecified acute renal failure type (CMS/[facility])  A41.9 038.9    R65.21 995.92    N17.9 785.52     584.9   2. Lab test positive for detection of COVID-19 virus  U07.1 079.89   3. Oropharyngeal dysphagia  R13.12 787.22   4. Impaired mobility and ADLs  Z74.09 V49.89    Z78.9    5. Impaired physical mobility  Z74.09 781.99     Patient Active Problem List   Diagnosis   • Chest pain   • Palpitation   • Tachycardia   • Type 2 diabetes mellitus without complication, without long-term current use of insulin (CMS/[facility])   • Angina effort   • Essential hypertension   • Acute pain of right knee   • Effusion, right knee   • Depressive disorder   • Elevated blood sugar   • Gastroesophageal reflux disease without esophagitis   • Midline low back pain with left-sided sciatica   • Other hyperlipidemia   • Sprain of right ankle   • Abnormal mammogram of left breast   • Malignant neoplasm of left breast in female, estrogen receptor positive (CMS/[facility])   • Pancytopenia (CMS/[facility])   • Pneumonia due to COVID-19 virus   • Atypical pneumonia   • Sepsis with acute renal failure and septic shock (CMS/[facility])   • Acute kidney injury (CMS/[facility])   • Sepsis, unspecified organism (CMS/[facility])   • Severe malnutrition (CMS/[facility])   • Cytokine release syndrome, grade 4     Past Medical History:   Diagnosis Date   • Acute atopic conjunctivitis    • Acute bronchitis    • Allergic rhinitis due to pollen    • Arrhythmia     wearing heart monitor    • Arthritis    • Asthma    • Bilateral foot pain    • Breast cancer (CMS/[facility])    • Breast cyst    • Breast lump    • Chest pain    • Chronic low back pain    • Contact dermatitis due to drugs and medicine    • Cyst of ovary    • Diabetes mellitus (CMS/[facility])     • GERD (gastroesophageal reflux disease)    • Hyperlipidemia    • Hypertension    • Infestation by Sarcoptes scabiei annalisa hominis    • Low back pain    • Mastodynia    • Nausea and vomiting    • On long term drug therapy    • Pain in wrist    • Plantar fasciitis    • Rash    • Skin disorder     breast-possible superficial cellulitis   • Spinal stenosis    • Tinea pedis    • Tobacco dependence syndrome    • Upper respiratory infection    • Vitamin D deficiency    • Wheezing      Past Surgical History:   Procedure Laterality Date   • BREAST BIOPSY     • BREAST CYST ASPIRATION     • BREAST LUMPECTOMY Left 1/27/2021    Procedure: LEFT BREAST LUMPECTOMY WITH NEEDLE LOCALIZATION Needle [facility] by radiology first   @07:00 a.m.;  Surgeon: Mat Garay MD;  Location: [facility] OR;  Service: General;  Laterality: Left;   • BREAST LUMPECTOMY WITH SENTINEL NODE BIOPSY AND AXILLARY NODE DISSECTION Left 2/22/2021    Procedure: LEFT BREAST LUMPECTOMY WITH SENTINEL NODE BIOPSY. REMOVAL OF CYST FROM LEFT CHEEK                              (INJECTION @07: 00 A.M);  Surgeon: Mat Garay MD;  Location: [facility] OR;  Service: General;  Laterality: Left;   • BREAST SURGERY      excision breast lesion   • CARDIAC CATHETERIZATION N/A 7/3/2018    Procedure: Coronary angiography;  Surgeon: Arun Cadet MD;  Location: [facility] CATH INVASIVE LOCATION;  Service: Cardiovascular   • CARDIAC CATHETERIZATION     • CHOLECYSTECTOMY     • INJECTION OF MEDICATION      Kenalog (4)     General Information     Row Name 09/09/21 0856          OT Time and Intention    Document Type  therapy note (daily note)  -AS     Mode of Treatment  occupational therapy;individual therapy  -AS     Row Name 09/09/21 0856          General Information    Existing Precautions/Restrictions  fall;oxygen therapy device and L/min  -AS     Row Name 09/09/21 0856          Cognition    Orientation Status (Cognition)   oriented to;person  -AS     Row Name 09/09/21 0856          Safety Issues, Functional Mobility    Safety Issues Affecting Function (Mobility)  ability to follow commands;safety precautions follow-through/compliance;safety precaution awareness;insight into deficits/self-awareness  -AS     Impairments Affecting Function (Mobility)  endurance/activity tolerance;cognition;range of motion (ROM);shortness of breath;strength;motor control;pain  -AS       User Key  (r) = Recorded By, (t) = Taken By, (c) = Cosigned By    Initials Name Provider Type    AS Perla Biggs OT Occupational Therapist          Mobility/ADL's     Row Name 09/09/21 0856          Bed Mobility    Bed Mobility  rolling right;rolling left  -AS     Rolling Left [place] (Bed Mobility)  dependent (less than 25% patient effort);2 person assist  -AS     Rolling Right [place] (Bed Mobility)  dependent (less than 25% patient effort);2 person assist  -AS     Bed Mobility, Safety Issues  cognitive deficits limit understanding;decreased use of arms for pushing/pulling;decreased use of legs for bridging/pushing  -AS     Assistive Device (Bed Mobility)  draw sheet  -AS     [facility] Name 09/09/21 0856          Activities of Daily Living    BADL Assessment/Intervention  bathing;grooming  -AS     [facility] Name 09/09/21 0856          Bathing Assessment/Intervention    [place] Level (Bathing)  dependent (less than 25% patient effort)  -AS     [facility] Name 09/09/21 0856          Grooming Assessment/Training    [place] Level (Grooming)  dependent (less than 25% patient effort)  -AS       User Key  (r) = Recorded By, (t) = Taken By, (c) = Cosigned By    Initials Name Provider Type    AS Perla Biggs OT Occupational Therapist        Obj/Interventions     Row Name 09/09/21 0856          Shoulder (Therapeutic Exercise)    Shoulder PROM (Therapeutic Exercise)  bilateral;flexion;external rotation;internal rotation;aBduction;supine;10 repetitions  -AS     Row Name  09/09/21 0856          Elbow/Forearm (Therapeutic Exercise)    Elbow/Forearm PROM (Therapeutic Exercise)  bilateral;flexion;extension;supination;pronation;10 repetitions  -AS     Row Name 09/09/21 0856          Wrist (Therapeutic Exercise)    Wrist PROM (Therapeutic Exercise)  bilateral;flexion;extension;radial deviation;ulnar deviation  -AS     Row Name 09/09/21 0856          Hand (Therapeutic Exercise)    Hand PROM (Therapeutic Exercise)  bilateral;finger flexion;finger extension  -AS       User Key  (r) = Recorded By, (t) = Taken By, (c) = Cosigned By    Initials Name Provider Type    AS Perla Biggs, OT Occupational Therapist        Goals/Plan     Row Name 09/09/21 0856          Transfer Goal 1 (OT)    Activity/Assistive Device (Transfer Goal 1, OT)  sit-to-stand/stand-to-sit;bed-to-chair/chair-to-bed;toilet  -AS     [place] Level/Cues Needed (Transfer Goal 1, OT)  moderate assist (50-74% patient effort)  -AS     Time Frame (Transfer Goal 1, OT)  long term goal (LTG);by discharge  -AS     Progress/Outcome (Transfer Goal 1, OT)  goal not met  -AS     Row Name 09/09/21 0856          Bathing Goal 1 (OT)    Activity/Device (Bathing Goal 1, OT)  upper body bathing  -AS     [place] Level/Cues Needed (Bathing Goal 1, OT)  minimum assist (75% or more patient effort)  -AS     Time Frame (Bathing Goal 1, OT)  long term goal (LTG);by discharge  -AS     Progress/Outcomes (Bathing Goal 1, OT)  goal not met  -AS     Row Name 09/09/21 0856          Grooming Goal 1 (OT)    Activity/Device (Grooming Goal 1, OT)  wash face, hands  -AS     [place] (Grooming Goal 1, OT)  supervision required;set-up required  -AS     Time Frame (Grooming Goal 1, OT)  short term goal (STG);5 days  -AS     Progress/Outcome (Grooming Goal 1, OT)  goal not met  -AS     Row Name 09/09/21 0856          ROM Goal 1 (OT)    ROM Goal 1 (OT)  Pt will tolerate 3 sets 10 reps AAROM/AROM  -AS     Time Frame (ROM Goal 1, OT)  long term goal  (LTG);by discharge  -AS     Progress/Outcome (ROM Goal 1, OT)  goal not met  -AS     Row Name 09/09/21 0856          Problem Specific Goal 1 (OT)    Problem Specific Goal 1 (OT)  Pt will tolerate sitting EOB with mod A in prep for functional transfers.  -AS     Time Frame (Problem Specific Goal 1, OT)  long term goal (LTG);by discharge  -AS     Progress/Outcome (Problem Specific Goal 1, OT)  goal not met  -AS       User Key  (r) = Recorded By, (t) = Taken By, (c) = Cosigned By    Initials Name Provider Type    AS Perla Biggs OT Occupational Therapist        Clinical Impression     Row Name 09/09/21 0856          Pain Assessment    Additional Documentation  Pain Scale: Numbers Pre/Post-Treatment (Group)  -AS     Row Name 09/09/21 0856          Pain Scale: Numbers Pre/Post-Treatment    Pretreatment Pain Rating  0/10 - no pain  -AS     Posttreatment Pain Rating  0/10 - no pain  -AS     Pre/Posttreatment Pain Comment  reports pain all over during PROM and bed mobility  -AS     Row Name 09/09/21 0856          Plan of Care Review    Plan of Care Reviewed With  patient  -AS     Row Name 09/09/21 0856          Positioning and Restraints    Pre-Treatment Position  in bed  -AS     Post Treatment Position  bed  -AS     In Bed  supine;call light within reach;encouraged to call for assist;exit alarm on;heels elevated;RUE elevated;LUE elevated  -AS       User Key  (r) = Recorded By, (t) = Taken By, (c) = Cosigned By    Initials Name Provider Type    AS Perla Biggs OT Occupational Therapist        Outcome Measures     Row Name 09/09/21 0856          How much help from another is currently needed...    Putting on and taking off regular lower body clothing?  1  -AS     Bathing (including washing, rinsing, and drying)  1  -AS     Toileting (which includes using toilet bed pan or urinal)  1  -AS     Putting on and taking off regular upper body clothing  1  -AS     Taking care of personal grooming (such as brushing teeth)   1  -AS     Eating meals  1  -AS     AM-PAC 6 Clicks Score (OT)  6  -AS     Row Name 09/09/21 0856          Functional Assessment    Outcome Measure Options  AM-PAC 6 Clicks Daily Activity (OT)  -AS       User Key  (r) = Recorded By, (t) = Taken By, (c) = Cosigned By    Initials Name Provider Type    AS Perla Biggs OT Occupational Therapist          Occupational Therapy Education                 Title: PT OT SLP Therapies (In Progress)     Topic: Occupational Therapy (Done)     Point: ADL training (Done)     Description:   Instruct learner(s) on proper safety adaptation and remediation techniques during self care or transfers.   Instruct in proper use of assistive devices.              Learning Progress Summary           Patient Acceptance, E,TB, VU by LW at 9/8/2021 1340    Acceptance, E,TB, VU by LW at 9/7/2021 1403                   Point: Home exercise program (Done)     Description:   Instruct learner(s) on appropriate technique for monitoring, assisting and/or progressing therapeutic exercises/activities.              Learning Progress Summary           Patient Acceptance, E,TB, VU by LW at 9/8/2021 1340    Acceptance, E,TB, VU by LW at 9/7/2021 1403    Acceptance, E, NL by AS at 9/6/2021 1238    Comment: PROM BUE                   Point: Precautions (Done)     Description:   Instruct learner(s) on prescribed precautions during self-care and functional transfers.              Learning Progress Summary           Patient Acceptance, E,TB, VU by LW at 9/8/2021 1340    Acceptance, E,TB, VU by LW at 9/7/2021 1403    Acceptance, E, NR,NL by AS at 9/5/2021 1606    Comment: role of OT, OT POC, bed mobility, UE assessment                   Point: Body mechanics (Done)     Description:   Instruct learner(s) on proper positioning and spine alignment during self-care, functional mobility activities and/or exercises.              Learning Progress Summary           Patient Acceptance, E,TB, VU by LW at 9/8/2021 1340     Acceptance, E,TB, VU by LW at 9/7/2021 1403                               User Key     Initials Effective Dates Name Provider Type Discipline    LW 06/16/21 -  Blanca Guzman COTA Occupational Therapy Assistant OT    AS 03/18/21 -  Perla Biggs OT Occupational Therapist OT              OT Recommendation and Plan  Planned Therapy Interventions (OT): activity tolerance training, patient/caregiver education/training, ROM/therapeutic exercise, strengthening exercise, transfer/mobility retraining, adaptive equipment training, BADL retraining, functional balance retraining, occupation/activity based interventions  Therapy Frequency (OT): other (see comments) (5-7days/wk)  Plan of Care Review  Plan of Care Reviewed With: patient  Outcome Summary: OT tx completed. Pt alert and oriented to self. Pt required (D) for bathing and (D) of 2 person for rolling L<>R. Pt tolerated 1 set 10 reps of BUE PROM/AAROM. Pt noted to attempt to participate more with iproved muscle activation R>L, However still remains extremely weak with rossly trace movement. No goals met; cont OT POC.     Time Calculation:   Time Calculation- OT     Row Name 09/09/21 1024             Time Calculation- OT    OT Start Time  0956  -AS      OT Stop Time  1023  -AS      OT Time Calculation (min)  27 min  -AS      OT Received On  09/09/21  -AS         Timed Charges    29461 - OT Therapeutic Activity Minutes  27  -AS         Total Minutes    Timed Charges Total Minutes  27  -AS       Total Minutes  27  -AS        User Key  (r) = Recorded By, (t) = Taken By, (c) = Cosigned By    Initials Name Provider Type    AS Perla Biggs OT Occupational Therapist        Therapy Charges for Today     Code Description Service Date Service Provider Modifiers Qty    [id number]  OT THERAPEUTIC ACT EA 15 MIN 9/9/2021 Perla Biggs OT GO 2               Perla Biggs OT  9/9/2021

## 2021-09-09 NOTE — THERAPY TREATMENT NOTE
Patient Name: Corrina Jensen  : 1975    MRN: [id number]                              Today's Date: 2021       Admit Date: 2021    Visit Dx:     ICD-10-CM ICD-9-CM   1. Sepsis with acute renal failure and septic shock, due to unspecified organism, unspecified acute renal failure type (CMS/[facility])  A41.9 038.9    R65.21 995.92    N17.9 785.52     584.9   2. Lab test positive for detection of COVID-19 virus  U07.1 079.89   3. Oropharyngeal dysphagia  R13.12 787.22   4. Impaired mobility and ADLs  Z74.09 V49.89    Z78.9    5. Impaired physical mobility  Z74.09 781.99     Patient Active Problem List   Diagnosis   • Chest pain   • Palpitation   • Tachycardia   • Type 2 diabetes mellitus without complication, without long-term current use of insulin (CMS/[facility])   • Angina effort   • Essential hypertension   • Acute pain of right knee   • Effusion, right knee   • Depressive disorder   • Elevated blood sugar   • Gastroesophageal reflux disease without esophagitis   • Midline low back pain with left-sided sciatica   • Other hyperlipidemia   • Sprain of right ankle   • Abnormal mammogram of left breast   • Malignant neoplasm of left breast in female, estrogen receptor positive (CMS/[facility])   • Pancytopenia (CMS/[facility])   • Pneumonia due to COVID-19 virus   • Atypical pneumonia   • Sepsis with acute renal failure and septic shock (CMS/[facility])   • Acute kidney injury (CMS/[facility])   • Sepsis, unspecified organism (CMS/[facility])   • Severe malnutrition (CMS/[facility])   • Cytokine release syndrome, grade 4     Past Medical History:   Diagnosis Date   • Acute atopic conjunctivitis    • Acute bronchitis    • Allergic rhinitis due to pollen    • Arrhythmia     wearing heart monitor    • Arthritis    • Asthma    • Bilateral foot pain    • Breast cancer (CMS/[facility])    • Breast cyst    • Breast lump    • Chest pain    • Chronic low back pain    • Contact dermatitis due to drugs and medicine    • Cyst of ovary    • Diabetes mellitus (CMS/[facility])     • GERD (gastroesophageal reflux disease)    • Hyperlipidemia    • Hypertension    • Infestation by Sarcoptes scabiei annalisa hominis    • Low back pain    • Mastodynia    • Nausea and vomiting    • On long term drug therapy    • Pain in wrist    • Plantar fasciitis    • Rash    • Skin disorder     breast-possible superficial cellulitis   • Spinal stenosis    • Tinea pedis    • Tobacco dependence syndrome    • Upper respiratory infection    • Vitamin D deficiency    • Wheezing      Past Surgical History:   Procedure Laterality Date   • BREAST BIOPSY     • BREAST CYST ASPIRATION     • BREAST LUMPECTOMY Left 1/27/2021    Procedure: LEFT BREAST LUMPECTOMY WITH NEEDLE LOCALIZATION Needle localization [facility] be done [facility] by radiology first   @07:00 a.m.;  Surgeon: Mat Garay MD;  Location: [facility] OR;  Service: General;  Laterality: Left;   • BREAST LUMPECTOMY WITH SENTINEL NODE BIOPSY AND AXILLARY NODE DISSECTION Left 2/22/2021    Procedure: LEFT BREAST LUMPECTOMY WITH SENTINEL NODE BIOPSY. REMOVAL OF CYST FROM LEFT CHEEK                              (INJECTION @07: 00 A.M);  Surgeon: Mat Garay MD;  Location: [facility] OR;  Service: General;  Laterality: Left;   • BREAST SURGERY      excision breast lesion   • CARDIAC CATHETERIZATION N/A 7/3/2018    Procedure: Coronary angiography;  Surgeon: Arun Cadet MD;  Location: [facility] CATH INVASIVE LOCATION;  Service: Cardiovascular   • CARDIAC CATHETERIZATION     • CHOLECYSTECTOMY     • INJECTION OF MEDICATION      Kenalog (4)     General Information     Row Name 09/09/21 1030          Physical Therapy Time and Intention    Document Type  therapy note (daily note)  -CZ     Mode of Treatment  individual therapy;physical therapy  -CZ     Row Name 09/09/21 1030          General Information    Patient Profile Reviewed  yes  -CZ     Existing Precautions/Restrictions  fall;oxygen therapy device and L/min  -CZ     Barriers to Rehab  medically  complex  -CZ     Row Name 09/09/21 1030          Cognition    Orientation Status (Cognition)  oriented to;person  -CZ     Row Name 09/09/21 1030          Safety Issues, Functional Mobility    Impairments Affecting Function (Mobility)  endurance/activity tolerance;cognition;range of motion (ROM);shortness of breath;strength;motor control;pain  -CZ       User Key  (r) = Recorded By, (t) = Taken By, (c) = Cosigned By    Initials Name Provider Type    Kenny Arias, PT Physical Therapist        Mobility    No documentation.       Obj/Interventions     Row Name 09/09/21 1030          Range of Motion Comprehensive    General Range of Motion  bilateral lower extremity ROM WFL  -CZ     Row Name 09/09/21 1030          Balance    Comment, Balance  Supine BLE AAROM: ankle PF/DF, knee flex/ext, hip flex/ext, ABD/ADD, IR/ER.  x10 each, extensive encouragement to follow through with each repetition.  -CZ       User Key  (r) = Recorded By, (t) = Taken By, (c) = Cosigned By    Initials Name Provider Type    Kenny Arias, PT Physical Therapist        Goals/Plan     Row Name 09/09/21 1030          Bed Mobility Goal 1 (PT)    Activity/Assistive Device (Bed Mobility Goal 1, PT)  sit to supine/supine to sit  -CZ     [place] Level/Cues Needed (Bed Mobility Goal 1, PT)  contact guard assist  -CZ     Time Frame (Bed Mobility Goal 1, PT)  by discharge  -CZ     Strategies/Barriers (Bed Mobility Goal 1, PT)  HOB flat, no bed rails.  -CZ     Progress/Outcomes (Bed Mobility Goal 1, PT)  goal not met  -CZ     Row Name 09/09/21 1030          Transfer Goal 1 (PT)    Activity/Assistive Device (Transfer Goal 1, PT)  sit-to-stand/stand-to-sit;bed-to-chair/chair-to-bed;walker, rolling  -CZ     [place] Level/Cues Needed (Transfer Goal 1, PT)  contact guard assist  -CZ     Time Frame (Transfer Goal 1, PT)  by discharge  -CZ     Strategies/Barriers (Transfers Goal 1, PT)  Profoundly weak.  -CZ     Progress/Outcome (Transfer Goal 1,  PT)  goal not met  -     Row Name 09/09/21 1030          Gait Training Goal 1 (PT)    Activity/Assistive Device (Gait Training Goal 1, PT)  walker, rolling  -CZ     [place] Level (Gait Training Goal 1, PT)  moderate assist (50-74% patient effort)  -CZ     Distance (Gait Training Goal 1, PT)  10'x1.  -CZ     Time Frame (Gait Training Goal 1, PT)  by discharge  -CZ     Strategies/Barriers (Gait Training Goal 1, PT)  Profoundly weak.  -CZ       User Key  (r) = Recorded By, (t) = Taken By, (c) = Cosigned By    Initials Name Provider Type    CZ Kenny Lam, PT Physical Therapist        Clinical Impression     Row Name 09/09/21 1030          Pain    Additional Documentation  Pain Scale: Numbers Pre/Post-Treatment (Group)  -     Row Name 09/09/21 1030          Pain Scale: Numbers Pre/Post-Treatment    Pretreatment Pain Rating  0/10 - no pain  -     Row Name 09/09/21 1030          Plan of Care Review    Outcome Summary  PT treatment: patient is alert, communicates intermittently. Provided BLE AAROM x 10 reps each,  extensive encouragement to follow through with each repetition. Goals remain appropriate, continue skilled PT.  -     Row Name 09/09/21 1030          Therapy Assessment/Plan (PT)    Rehab Potential (PT)  fair, will monitor progress closely  -CZ     Criteria for Skilled Interventions Met (PT)  yes;skilled treatment is necessary  -     Row Name 09/09/21 1030          Vital Signs    Pre Systolic BP Rehab  --  -CZ     Pre Treatment Diastolic BP  --  -CZ     Intra Systolic BP Rehab  109  -CZ     Intra Treatment Diastolic BP  55  -CZ     Intratreatment Heart Rate (beats/min)  60  -CZ     O2 Delivery Pre Treatment  nasal cannula  -CZ     Intra SpO2 (%)  95  -CZ     Intra Patient Position  Supine  -     Row Name 09/09/21 1030          Positioning and Restraints    Pre-Treatment Position  in bed  -CZ     Post Treatment Position  bed  -CZ     In Bed  supine;call light within reach;exit alarm  on;encouraged to call for assist  -       User Key  (r) = Recorded By, (t) = Taken By, (c) = Cosigned By    Initials Name Provider Type    CZ Kenny Lam, PT Physical Therapist        Outcome Measures     Row Name 09/09/21 1030          How much help from another person do you currently need...    Turning from your back to your side while in flat bed without using bedrails?  1  -CZ     Moving from lying on back to sitting on the side of a flat bed without bedrails?  1  -CZ     Moving to and from a bed to a chair (including a wheelchair)?  1  -CZ     Standing up from a chair using your arms (e.g., wheelchair, bedside chair)?  1  -CZ     Climbing 3-5 steps with a railing?  1  -CZ     To walk in hospital room?  1  -CZ     AM-PAC 6 Clicks Score (PT)  6  -CZ     Row Name 09/09/21 1030 09/09/21 0856       Functional Assessment    Outcome Measure Options  AM-PAC 6 Clicks Basic Mobility (PT)  -CZ  AM-PAC 6 Clicks Daily Activity (OT)  -AS      User Key  (r) = Recorded By, (t) = Taken By, (c) = Cosigned By    Initials Name Provider Type    Kenny Arias, PT Physical Therapist    AS Perla Biggs, OT Occupational Therapist                       Physical Therapy Education                 Title: PT OT SLP Therapies (In Progress)     Topic: Physical Therapy (In Progress)     Point: Mobility training (Done)     Learning Progress Summary           Patient Acceptance, E, VU,NR by  at 9/9/2021 1125    Comment: PT POC, goals, AAROM technique.    Acceptance, E, NR,NL by  at 9/5/2021 1633    Comment: PT POC, goals.                   Point: Home exercise program (Not Started)     Learner Progress:  Not documented in this visit.          Point: Body mechanics (Not Started)     Learner Progress:  Not documented in this visit.          Point: Precautions (Not Started)     Learner Progress:  Not documented in this visit.                      User Key     Initials Effective Dates Name Provider Type [facility]  06/16/21 -  Kenny Lam, PT Physical Therapist PT              PT Recommendation and Plan  Planned Therapy Interventions (PT): balance training, bed mobility training, gait training, patient/family education, transfer training, strengthening, stretching  Plan of Care Reviewed With: patient  Outcome Summary: PT treatment: patient is alert, communicates intermittently. Provided BLE AAROM x 10 reps each,  extensive encouragement to follow through with each repetition. Goals remain appropriate, continue skilled PT.     Time Calculation:   PT Charges     Row Name 09/09/21 1127             Time Calculation    Start Time  1030  -CZ      Stop Time  1114  -CZ      Time Calculation (min)  44 min  -CZ      PT Received On  09/09/21  -CZ         Time Calculation- PT    Total Timed Code Minutes- PT  44 minute(s)  -CZ         Timed Charges    22172 - PT Therapeutic Exercise Minutes  44  -CZ         Total Minutes    Timed Charges Total Minutes  44  -CZ       Total Minutes  44  -CZ        User Key  (r) = Recorded By, (t) = Taken By, (c) = Cosigned By    Initials Name Provider Type    CZ Kenny Lam, PT Physical Therapist        Therapy Charges for Today     Code Description Service Date Service Provider Modifiers Qty    99618574862 HC PT THER PROC EA 15 MIN 9/9/2021 Kenny Lam, PT GP 3          PT G-Codes  Outcome Measure Options: AM-PAC 6 Clicks Basic Mobility (PT)  AM-PAC 6 Clicks Score (PT): 6  AM-PAC 6 Clicks Score (OT): 6    Kenny Lam PT  9/9/2021

## 2021-09-09 NOTE — PLAN OF CARE
Goal Outcome Evaluation:  Plan of Care Reviewed With: patient           Outcome Summary: PT treatment: patient is alert, communicates intermittently. Provided BLE AAROM x 10 reps each,  extensive encouragement to follow through with each repetition. Goals remain appropriate, continue skilled PT.

## 2021-09-09 NOTE — PLAN OF CARE
Goal Outcome Evaluation:  Plan of Care Reviewed With: patient           Outcome Summary: OT tx completed. Pt alert and oriented to self. Pt required (D) for bathing and (D) of 2 person for rolling L<>R. Pt tolerated 1 set 10 reps of BUE PROM. Pt noted to attempt to participate more with improved muscle activation R>L, However still remains extremely weak with grossly trace movement. No goals met; cont OT POC.

## 2021-09-10 ENCOUNTER — OUTSIDE FACILITY SERVICE (OUTPATIENT)
Dept: PULMONOLOGY | Facility: CLINIC | Age: 46
End: 2021-09-10

## 2021-09-10 LAB
ALBUMIN SERPL-MCNC: 3.6 G/DL (ref 3.5–5.2)
ALBUMIN/GLOB SERPL: 1.2 G/DL
ALP SERPL-CCNC: 100 U/L (ref 39–117)
ALT SERPL W P-5'-P-CCNC: 175 U/L (ref 1–33)
ANION GAP SERPL CALCULATED.3IONS-SCNC: 11 MMOL/L (ref 5–15)
AST SERPL-CCNC: 78 U/L (ref 1–32)
BASOPHILS # BLD AUTO: 0.01 10*3/MM3 (ref 0–0.2)
BASOPHILS NFR BLD AUTO: 0.1 % (ref 0–1.5)
BILIRUB SERPL-MCNC: 0.8 MG/DL (ref 0–1.2)
BUN SERPL-MCNC: 34 MG/DL (ref 6–20)
BUN/CREAT SERPL: 82.9 (ref 7–25)
CALCIUM SPEC-SCNC: 9.3 MG/DL (ref 8.6–10.5)
CHLORIDE SERPL-SCNC: 118 MMOL/L (ref 98–107)
CO2 SERPL-SCNC: 26 MMOL/L (ref 22–29)
CREAT SERPL-MCNC: 0.41 MG/DL (ref 0.57–1)
DEPRECATED RDW RBC AUTO: 56.6 FL (ref 37–54)
EOSINOPHIL # BLD AUTO: 0.08 10*3/MM3 (ref 0–0.4)
EOSINOPHIL NFR BLD AUTO: 0.9 % (ref 0.3–6.2)
ERYTHROCYTE [DISTWIDTH] IN BLOOD BY AUTOMATED COUNT: 16.7 % (ref 12.3–15.4)
GFR SERPL CREATININE-BSD FRML MDRD: >150 ML/MIN/1.73
GLOBULIN UR ELPH-MCNC: 3.1 GM/DL
GLUCOSE BLDC GLUCOMTR-MCNC: 219 MG/DL (ref 70–130)
GLUCOSE BLDC GLUCOMTR-MCNC: 304 MG/DL (ref 70–130)
GLUCOSE BLDC GLUCOMTR-MCNC: 335 MG/DL (ref 70–130)
GLUCOSE BLDC GLUCOMTR-MCNC: 372 MG/DL (ref 70–130)
GLUCOSE BLDC GLUCOMTR-MCNC: 437 MG/DL (ref 70–130)
GLUCOSE BLDC GLUCOMTR-MCNC: 441 MG/DL (ref 70–130)
GLUCOSE SERPL-MCNC: 224 MG/DL (ref 65–99)
HCT VFR BLD AUTO: 32.9 % (ref 34–46.6)
HGB BLD-MCNC: 10.5 G/DL (ref 12–15.9)
IMM GRANULOCYTES # BLD AUTO: 0.09 10*3/MM3 (ref 0–0.05)
IMM GRANULOCYTES NFR BLD AUTO: 1 % (ref 0–0.5)
LYMPHOCYTES # BLD AUTO: 1.26 10*3/MM3 (ref 0.7–3.1)
LYMPHOCYTES NFR BLD AUTO: 13.5 % (ref 19.6–45.3)
MCH RBC QN AUTO: 30.4 PG (ref 26.6–33)
MCHC RBC AUTO-ENTMCNC: 31.9 G/DL (ref 31.5–35.7)
MCV RBC AUTO: 95.4 FL (ref 79–97)
MONOCYTES # BLD AUTO: 0.56 10*3/MM3 (ref 0.1–0.9)
MONOCYTES NFR BLD AUTO: 6 % (ref 5–12)
NEUTROPHILS NFR BLD AUTO: 7.32 10*3/MM3 (ref 1.7–7)
NEUTROPHILS NFR BLD AUTO: 78.5 % (ref 42.7–76)
NRBC BLD AUTO-RTO: 0 /100 WBC (ref 0–0.2)
PLATELET # BLD AUTO: 113 10*3/MM3 (ref 140–450)
PMV BLD AUTO: 11.9 FL (ref 6–12)
POTASSIUM SERPL-SCNC: 3.3 MMOL/L (ref 3.5–5.2)
PREALB SERPL-MCNC: 24.8 MG/DL (ref 20–40)
PROT SERPL-MCNC: 6.7 G/DL (ref 6–8.5)
RBC # BLD AUTO: 3.45 10*6/MM3 (ref 3.77–5.28)
SODIUM SERPL-SCNC: 155 MMOL/L (ref 136–145)
WBC # BLD AUTO: 9.32 10*3/MM3 (ref 3.4–10.8)

## 2021-09-10 PROCEDURE — 25010000002 ENOXAPARIN PER 10 MG: Performed by: NURSE PRACTITIONER

## 2021-09-10 PROCEDURE — 92610 EVALUATE SWALLOWING FUNCTION: CPT | Performed by: SPEECH-LANGUAGE PATHOLOGIST

## 2021-09-10 PROCEDURE — 63710000001 DEXAMETHASONE PER 0.25 MG: Performed by: NURSE PRACTITIONER

## 2021-09-10 PROCEDURE — 80053 COMPREHEN METABOLIC PANEL: CPT | Performed by: INTERNAL MEDICINE

## 2021-09-10 PROCEDURE — 63710000001 INSULIN DETEMIR PER 5 UNITS: Performed by: NURSE PRACTITIONER

## 2021-09-10 PROCEDURE — 85025 COMPLETE CBC W/AUTO DIFF WBC: CPT | Performed by: INTERNAL MEDICINE

## 2021-09-10 PROCEDURE — 97166 OT EVAL MOD COMPLEX 45 MIN: CPT

## 2021-09-10 PROCEDURE — 82962 GLUCOSE BLOOD TEST: CPT

## 2021-09-10 PROCEDURE — 97162 PT EVAL MOD COMPLEX 30 MIN: CPT

## 2021-09-10 PROCEDURE — 99291 CRITICAL CARE FIRST HOUR: CPT | Performed by: INTERNAL MEDICINE

## 2021-09-10 NOTE — PAYOR COMM NOTE
"Joi Delmer  Case Management Extender  [phone] phone  [phone] fax      Ref# [id number]    Corrina Jensen (46 y.o. Female)     Date of Birth Social Security Number Address Home Phone MRN    [id number]  [id number] [facility] [phone] [id number]    Rastafari Marital Status          Latter-day        Admission Date Admission Type Admitting Provider Attending Provider Department, Room/Bed    8/17/21 Emergency Bertha Kraus MD  [address], 407/1    Discharge Date Discharge Disposition Discharge Destination        9/9/2021 Long Term Care (DC - External)              Attending Provider: (none)   Allergies: Latex, Strawberry, Wound Dressing Adhesive, Bactroban [Mupirocin Calcium], Neomycin-bacitracin-polymyxin [Bacitracin-neomycin-polymyxin], Other    Isolation: None   Infection: COVID (confirmed) (08/18/21)   Code Status: Prior    Ht: 167.6 cm (66\")   Wt: 104 kg (228 lb 9.6 oz)    Admission Cmt: None   Principal Problem: Sepsis, unspecified organism (CMS/[facility]) [A41.9]                 Active Insurance as of 8/17/2021     Primary Coverage     Payor Plan Insurance Group Employer/Plan Group    [place] [id number]     Payor Plan Address Payor Plan Phone Number Payor Plan Fax Number Effective Dates    [employer] [address] [address] [phone]  4/1/2020 - None Entered    [facility] [id number]       Subscriber Name Subscriber Birth Date Member ID       CORRINA JENSEN 1975 [id number]                 Emergency Contacts      (Rel.) Home Phone Work Phone Mobile Phone    [place] Jensen (Spouse) [phone]    Dayanna Olea (Relative) -- -- [phone]               Discharge Summary      Vianey Zepeda MD at 09/08/21 1608              [facility] Medicine Services  DISCHARGE SUMMARY       Date of Admission: " 8/17/2021  Date of Discharge:  9/8/2021  Primary Care Physician: Jose Jean-Baptiste MD    Presenting Problem/History of Present Illness:  Sepsis with acute renal failure and septic shock, due to unspecified organism, unspecified acute renal failure type (CMS/HCC) [A41.9, R65.21, N17.9]  Lab test positive for detection of COVID-19 virus [U07.1]       Final Discharge Diagnoses:  Active Hospital Problems    Diagnosis    • **Sepsis, unspecified organism (CMS/[facility])    • Cytokine release syndrome, grade 4    • Severe malnutrition (CMS/[facility])    • Pneumonia due to COVID-19 virus    • Atypical pneumonia    • Acute kidney injury (CMS/[facility])    • Sepsis with acute renal failure and septic shock (CMS/[facility])    • Type 2 diabetes mellitus without complication, without long-term current use of insulin (CMS/[facility])        Consults:   Consults     No orders found from 7/19/2021 to 8/18/2021.          Procedures Performed:                 Pertinent Test Results:   Lab Results (most recent)     Procedure Component Value Units Date/Time    POC Glucose Once [[id number]]  (Abnormal) Collected: 09/07/21 1914    Specimen: Blood Updated: 09/08/21 1524     Glucose 199 mg/dL      Comment: RN NotifiedOperator: [id number] RICKIE Corona ID: [id number]       Blood Culture - Blood, Arm, Right [[id number]] Collected: 09/04/21 1428    Specimen: Blood from Arm, Right Updated: 09/08/21 1445     Blood Culture No growth at 4 days    Blood Culture - Blood, Hand, Right [[id number]] Collected: 09/04/21 1230    Specimen: Blood from Hand, Right Updated: 09/08/21 1300     Blood Culture No growth at 4 days    POC Glucose Once [[id number]]  (Abnormal) Collected: 09/08/21 1045    Specimen: Blood Updated: 09/08/21 1126     Glucose 248 mg/dL      Comment: RN NotifiedOperator: [id number] ANTWON Ko ID: [id number]       C-reactive Protein [[id number]]  (Normal) Collected: 09/08/21 0607    Specimen: Blood Updated: 09/08/21 0739     C-Reactive Protein <0.30 mg/dL      Comprehensive Metabolic Panel [[id number]]  (Abnormal) Collected: 09/08/21 0607    Specimen: Blood Updated: 09/08/21 0725     Glucose 250 mg/dL      BUN 34 mg/dL      Creatinine 0.38 mg/dL      Sodium 147 mmol/L      Potassium 3.7 mmol/L      Chloride 110 mmol/L      CO2 24.0 mmol/L      Calcium 9.0 mg/dL      Total Protein 6.6 g/dL      Albumin 3.20 g/dL      ALT (SGPT) 226 U/L      AST (SGOT) 110 U/L      Alkaline Phosphatase 114 U/L      Total Bilirubin 1.0 mg/dL      eGFR Non African Amer >150 mL/min/1.73      Globulin 3.4 gm/dL      A/G Ratio 0.9 g/dL      BUN/Creatinine Ratio 89.5     Anion Gap 13.0 mmol/L     Narrative:      GFR Normal >60  Chronic Kidney Disease <60  Kidney Failure <15      CBC & Differential [[id number]]  (Abnormal) Collected: 09/08/21 0607    Specimen: Blood Updated: 09/08/21 0659    Narrative:      The following orders were created for panel order CBC & Differential.  Procedure                               Abnormality         Status                     ---------                               -----------         ------                     CBC Auto Differential[[id number]]        Abnormal            Final result                 Please view results for these tests on the individual orders.    CBC Auto Differential [[id number]]  (Abnormal) Collected: 09/08/21 0607    Specimen: Blood Updated: 09/08/21 0659     WBC 8.53 10*3/mm3      RBC 3.81 10*6/mm3      Hemoglobin 11.5 g/dL      Hematocrit 35.1 %      MCV 92.1 fL      MCH 30.2 pg      MCHC 32.8 g/dL      RDW 15.9 %      RDW-SD 52.0 fl      MPV 11.4 fL      Platelets 148 10*3/mm3      Neutrophil % 86.8 %      Lymphocyte % 6.4 %      Monocyte % 4.9 %      Eosinophil % 0.1 %      Basophil % 0.2 %      Immature Grans % 1.6 %      Neutrophils, Absolute 7.39 10*3/mm3      Lymphocytes, Absolute 0.55 10*3/mm3      Monocytes, Absolute 0.42 10*3/mm3      Eosinophils, Absolute 0.01 10*3/mm3      Basophils, Absolute 0.02 10*3/mm3      Immature Grans,  "Absolute 0.14 10*3/mm3      nRBC 0.0 /100 WBC     C-reactive Protein [[id number]]  (Normal) Collected: 09/07/21 0421    Specimen: Blood Updated: 09/07/21 0532     C-Reactive Protein <0.30 mg/dL     Procalcitonin [[id number]]  (Abnormal) Collected: 09/07/21 0421    Specimen: Blood Updated: 09/07/21 0523     Procalcitonin 0.28 ng/mL     Narrative:      As a Marker for Sepsis (Non-Neonates):     1. <0.5 ng/mL represents a low risk of severe sepsis and/or septic shock.  2. >2 ng/mL represents a high risk of severe sepsis and/or septic shock.    As a Marker for Lower Respiratory Tract Infections that require antibiotic therapy:  PCT on Admission     Antibiotic Therapy             6-12 Hrs later  >0.5                          Strongly Recommended            >0.25 - <0.5             Recommended  0.1 - 0.25                  Discouraged                       Remeasure/reassess PCT  <0.1                         Strongly Discouraged         Remeasure/reassess PCT      As 28 day mortality risk marker: \"Change in Procalcitonin Result\" (>80% or <=80%) if Day 0 (or Day 1) and Day 4 values are available. Refer to http://www.[employer]-pct-calculator.com/    Change in PCT <=80 %   A decrease of PCT levels below or equal to 80% defines a positive change in PCT test result representing a higher risk for 28-day all-cause mortality of patients diagnosed with severe sepsis or septic shock.    Change in PCT >80 %   A decrease of PCT levels of more than 80% defines a negative change in PCT result representing a lower risk for 28-day all-cause mortality of patients diagnosed with severe sepsis or septic shock.              Results may be falsely decreased if patient taking Biotin.     D-dimer, Quantitative [[id number]]  (Abnormal) Collected: 09/07/21 0421    Specimen: Blood Updated: 09/07/21 0514     D-Dimer, Quantitative 1,503 ng/mL (FEU)     Narrative:      Dimer values <500 ng/ml FEU are FDA approved as aid in diagnosis of deep venous " thrombosis and pulmonary embolism.  This test should not be used in an exclusion strategy with pretest probability alone.    A recent guideline regarding diagnosis for pulmonary thromboembolism recommends an adjusted exclusion criterion of age x 10 ng/ml FEU for patients >50 years of age (Natalee Intern Med 2015; 163: 701-711).      Comprehensive Metabolic Panel [[id number]]  (Abnormal) Collected: 09/07/21 0421    Specimen: Blood Updated: 09/07/21 0513     Glucose 258 mg/dL      BUN 38 mg/dL      Creatinine 0.43 mg/dL      Sodium 149 mmol/L      Potassium 3.5 mmol/L      Chloride 111 mmol/L      CO2 27.0 mmol/L      Calcium 9.3 mg/dL      Total Protein 6.5 g/dL      Albumin 3.20 g/dL      ALT (SGPT) 217 U/L      AST (SGOT) 139 U/L      Alkaline Phosphatase 111 U/L      Total Bilirubin 0.6 mg/dL      eGFR Non African Amer >150 mL/min/1.73      Globulin 3.3 gm/dL      A/G Ratio 1.0 g/dL      BUN/Creatinine Ratio 88.4     Anion Gap 11.0 mmol/L     Narrative:      GFR Normal >60  Chronic Kidney Disease <60  Kidney Failure <15      Lactate Dehydrogenase [[id number]]  (Abnormal) Collected: 09/07/21 0421    Specimen: Blood Updated: 09/07/21 0512      U/L     Protime-INR [[id number]]  (Normal) Collected: 09/07/21 0421    Specimen: Blood Updated: 09/07/21 0512     Protime 14.9 Seconds      INR 1.18    Narrative:      Therapeutic range for most indications is 2.0-3.0 INR,  or 2.5-3.5 for mechanical heart valves.    CBC & Differential [[id number]]  (Abnormal) Collected: 09/07/21 0421    Specimen: Blood Updated: 09/07/21 0454    Narrative:      The following orders were created for panel order CBC & Differential.  Procedure                               Abnormality         Status                     ---------                               -----------         ------                     CBC Auto Differential[[id number]]        Abnormal            Final result                 Please view results for these tests on the individual  "orders.    CBC Auto Differential [[id number]]  (Abnormal) Collected: 09/07/21 0421    Specimen: Blood Updated: 09/07/21 0454     WBC 8.62 10*3/mm3      RBC 3.83 10*6/mm3      Hemoglobin 11.4 g/dL      Hematocrit 35.7 %      MCV 93.2 fL      MCH 29.8 pg      MCHC 31.9 g/dL      RDW 15.9 %      RDW-SD 51.0 fl      MPV 11.1 fL      Platelets 157 10*3/mm3      Neutrophil % 79.8 %      Lymphocyte % 11.6 %      Monocyte % 7.0 %      Eosinophil % 0.2 %      Basophil % 0.1 %      Immature Grans % 1.3 %      Neutrophils, Absolute 6.88 10*3/mm3      Lymphocytes, Absolute 1.00 10*3/mm3      Monocytes, Absolute 0.60 10*3/mm3      Eosinophils, Absolute 0.02 10*3/mm3      Basophils, Absolute 0.01 10*3/mm3      Immature Grans, Absolute 0.11 10*3/mm3      nRBC 0.3 /100 WBC     Potassium [[id number]]  (Normal) Collected: 09/06/21 1411    Specimen: Blood Updated: 09/06/21 1436     Potassium 4.2 mmol/L     Procalcitonin [[id number]]  (Normal) Collected: 09/05/21 0315    Specimen: Blood Updated: 09/05/21 1227     Procalcitonin 0.20 ng/mL     Narrative:      As a Marker for Sepsis (Non-Neonates):     1. <0.5 ng/mL represents a low risk of severe sepsis and/or septic shock.  2. >2 ng/mL represents a high risk of severe sepsis and/or septic shock.    As a Marker for Lower Respiratory Tract Infections that require antibiotic therapy:  PCT on Admission     Antibiotic Therapy             6-12 Hrs later  >0.5                          Strongly Recommended            >0.25 - <0.5             Recommended  0.1 - 0.25                  Discouraged                       Remeasure/reassess PCT  <0.1                         Strongly Discouraged         Remeasure/reassess PCT      As 28 day mortality risk marker: \"Change in Procalcitonin Result\" (>80% or <=80%) if Day 0 (or Day 1) and Day 4 values are available. Refer to http://www.[email or web address]-pct-calculator.com/    Change in PCT <=80 %   A decrease of PCT levels below or equal to 80% defines a positive change " in PCT test result representing a higher risk for 28-day all-cause mortality of patients diagnosed with severe sepsis or septic shock.    Change in PCT >80 %   A decrease of PCT levels of more than 80% defines a negative change in PCT result representing a lower risk for 28-day all-cause mortality of patients diagnosed with severe sepsis or septic shock.              Results may be falsely decreased if patient taking Biotin.     Magnesium [[id number]]  (Normal) Collected: 09/05/21 0315    Specimen: Blood Updated: 09/05/21 0952     Magnesium 2.3 mg/dL     Protime-INR [[id number]]  (Normal) Collected: 09/05/21 0315    Specimen: Blood Updated: 09/05/21 0432     Protime 14.9 Seconds      INR 1.18    Narrative:      Therapeutic range for most indications is 2.0-3.0 INR,  or 2.5-3.5 for mechanical heart valves.    D-dimer, Quantitative [[id number]]  (Abnormal) Collected: 09/05/21 0315    Specimen: Blood Updated: 09/05/21 0432     D-Dimer, Quantitative 1,462 ng/mL (FEU)     Narrative:      Dimer values <500 ng/ml FEU are FDA approved as aid in diagnosis of deep venous thrombosis and pulmonary embolism.  This test should not be used in an exclusion strategy with pretest probability alone.    A recent guideline regarding diagnosis for pulmonary thromboembolism recommends an adjusted exclusion criterion of age x 10 ng/ml FEU for patients >50 years of age (Natalee Intern Med 2015; 163: 701-711).      Lactate Dehydrogenase [[id number]]  (Abnormal) Collected: 09/05/21 0315    Specimen: Blood Updated: 09/05/21 0414      U/L     Potassium [[id number]]  (Normal) Collected: 09/04/21 1837    Specimen: Blood Updated: 09/04/21 1849     Potassium 4.2 mmol/L      Comment: Slight hemolysis detected by analyzer. Results may be affected.       Blood Gas, Arterial - [[id number]]  (Abnormal) Collected: 09/04/21 0730    Specimen: Arterial Blood Updated: 09/04/21 0739     Site Right Radial     Robbie's Test Positive     pH, Arterial 7.428 pH units       pCO2, Arterial 45.1 mm Hg      Comment: 83 Value above reference range        pO2, Arterial 79.7 mm Hg      Comment: 84 Value below reference range        HCO3, Arterial 29.8 mmol/L      Comment: 83 Value above reference range        Base Excess, Arterial 4.8 mmol/L      Comment: 83 Value above reference range        O2 Saturation, Arterial 95.5 %      Barometric Pressure for Blood Gas 747 mmHg      Modality Ventilator     FIO2 30 %      Ventilator Mode NA     PEEP 5.0     Collected by RT     Comment: Meter: [id number]     :  [id number]       Blood Gas, Arterial - [[id number]]  (Abnormal) Collected: 09/03/21 1207    Specimen: Arterial Blood Updated: 09/03/21 1212     Site Right Radial     Robbie's Test N/A     pH, Arterial 7.459 pH units      Comment: 83 Value above reference range        pCO2, Arterial 45.7 mm Hg      Comment: 83 Value above reference range        pO2, Arterial 72.4 mm Hg      Comment: 84 Value below reference range        HCO3, Arterial 32.4 mmol/L      Comment: 83 Value above reference range        Base Excess, Arterial 7.6 mmol/L      Comment: 83 Value above reference range        O2 Saturation, Arterial 93.8 %      Comment: 84 Value below reference range        Barometric Pressure for Blood Gas 749 mmHg      Modality Ventilator     Ventilator Mode PSV     PEEP 5.0     PSV 8.0 cmH2O      Collected by VALERIANO DESAI     Comment: Meter: [id number]     :  [id number]       Extra Tubes [[id number]] Collected: 09/01/21 1154    Specimen: Blood, Venous Line Updated: 09/02/21 1101    Narrative:      The following orders were created for panel order Extra Tubes.  Procedure                               Abnormality         Status                     ---------                               -----------         ------                     Lavender Top[[id number]]                                     Final result                 Please view results for these tests on the individual orders.     Lavender Top [[id number]] Collected: 09/01/21 1154    Specimen: Blood Updated: 09/02/21 1101     Extra Tube hold for add-on     Comment: Auto resulted       Urine Culture - Urine, Urine, Clean Catch [[id number]]  (Abnormal) Collected: 08/29/21 1829    Specimen: Urine, Clean Catch Updated: 08/30/21 1625     Urine Culture Yeast isolated    Narrative:      No further workup being performed    Urinalysis, Microscopic Only - Urine, Clean Catch [[id number]]  (Abnormal) Collected: 08/29/21 1829    Specimen: Urine, Clean Catch Updated: 08/29/21 1905     RBC, UA 31-50 /HPF      WBC, UA 31-50 /HPF      Bacteria, UA 2+ /HPF      Squamous Epithelial Cells, UA 3-5 /HPF      Yeast, UA       Large/3+ Budding Yeast w/Hyphae     /HPF     Hyaline Casts, UA Too Numerous to Count /LPF      Methodology Manual Light Microscopy    Urinalysis With Culture If Indicated - Urine, Clean Catch [[id number]]  (Abnormal) Collected: 08/29/21 1829    Specimen: Urine, Clean Catch Updated: 08/29/21 1844     Color, UA Yellow     Appearance, UA Turbid     pH, UA 6.0     Specific Gravity, UA 1.029     Glucose, UA >=1000 mg/dL (3+)     Ketones, UA Trace     Bilirubin, UA Negative     Blood, UA Large (3+)     Protein, UA 30 mg/dL (1+)     Leuk Esterase, UA Moderate (2+)     Nitrite, UA Negative     Urobilinogen, UA 0.2 E.U./dL    Manual Differential [[id number]]  (Abnormal) Collected: 08/29/21 0558    Specimen: Blood Updated: 08/29/21 0747     Neutrophil % 73.0 %      Lymphocyte % 5.0 %      Monocyte % 3.0 %      Bands %  18.0 %      Metamyelocyte % 1.0 %      Neutrophils Absolute 6.96 10*3/mm3      Lymphocytes Absolute 0.38 10*3/mm3      Monocytes Absolute 0.23 10*3/mm3      Anisocytosis Slight/1+     WBC Morphology Normal     Platelet Estimate Adequate    Respiratory Culture - Sputum, Bronchus [[id number]]  (Abnormal) Collected: 08/26/21 1701    Specimen: Sputum from Bronchus Updated: 08/28/21 1054     Respiratory Culture Scant growth (1+) Candida albicans       No Normal Respiratory Mona     Gram Stain Moderate (3+) Mixed bacterial mona      Moderate (3+) WBCs seen      Few (2+) Epithelial cells seen    Extra Tubes [[id number]] Collected: 08/26/21 1924    Specimen: Blood, Venous Line Updated: 08/26/21 2030    Narrative:      The following orders were created for panel order Extra Tubes.  Procedure                               Abnormality         Status                     ---------                               -----------         ------                     Gold Top - SST[[id number]]                                   Final result                 Please view results for these tests on the individual orders.    Gold Top - SST [[id number]] Collected: 08/26/21 1924    Specimen: Blood Updated: 08/26/21 2030     Extra Tube Hold for add-ons.     Comment: Auto resulted.       Hepatitis Panel, Acute [[id number]]  (Normal) Collected: 08/26/21 1717    Specimen: Blood Updated: 08/26/21 1810     Hepatitis B Surface Ag Non-Reactive     Hep A IgM Non-Reactive     Hep B C IgM Non-Reactive     Hepatitis C Ab Non-Reactive    Narrative:      Results may be falsely decreased if patient taking Biotin.     Creatinine, Urine, Random - Urine, Clean Catch [[id number]] Collected: 08/25/21 1618    Specimen: Urine, Clean Catch Updated: 08/26/21 0125     Creatinine, Urine 66.1 mg/dL     Narrative:      Reference intervals for random urine have not been established.  Clinical usage is dependent upon physician's interpretation in combination with other laboratory tests.       Sodium, Urine, Random - Urine, Clean Catch [[id number]] Collected: 08/25/21 1618    Specimen: Urine, Clean Catch Updated: 08/25/21 1714     Sodium, Urine <20 mmol/L     Narrative:      Reference intervals for random urine have not been established.  Clinical usage is dependent upon physician's interpretation in combination with other laboratory tests.       S. Pneumo Ag Urine or CSF - Urine, Urine, Clean Catch [[id number]]  (Normal)  Collected: 08/25/21 1618    Specimen: Urine, Clean Catch Updated: 08/25/21 1638     Strep Pneumo Ag Negative    Legionella Antigen, Urine - Urine, Urine, Clean Catch [[id number]]  (Normal) Collected: 08/25/21 1618    Specimen: Urine, Clean Catch Updated: 08/25/21 1638     LEGIONELLA ANTIGEN, URINE Negative    Ferritin [[id number]]  (Abnormal) Collected: 08/25/21 1415    Specimen: Blood Updated: 08/25/21 1450     Ferritin 751.90 ng/mL     Narrative:      Results may be falsely decreased if patient taking Biotin.      Hepatic Function Panel [[id number]]  (Abnormal) Collected: 08/25/21 0350    Specimen: Blood Updated: 08/25/21 1419     Total Protein 6.4 g/dL      Albumin 3.10 g/dL      ALT (SGPT) 87 U/L      AST (SGOT) 117 U/L      Alkaline Phosphatase 189 U/L      Total Bilirubin 0.4 mg/dL      Bilirubin, Direct 0.3 mg/dL      Bilirubin, Indirect 0.1 mg/dL     Manual Differential [[id number]]  (Abnormal) Collected: 08/25/21 0349    Specimen: Blood Updated: 08/25/21 0643     Neutrophil % 66.0 %      Lymphocyte % 14.0 %      Monocyte % 10.0 %      Bands %  9.0 %      Metamyelocyte % 1.0 %      Neutrophils Absolute 3.95 10*3/mm3      Lymphocytes Absolute 0.74 10*3/mm3      Monocytes Absolute 0.53 10*3/mm3      nRBC 10.0 /100 WBC      Anisocytosis Slight/1+     Dacrocytes --     Comment: Rare tear drop cells observed        Ovalocytes --     Comment: Few ovalocytes observed        Polychromasia --     Comment: Few polychromic cells observed        WBC Morphology Normal     Platelet Estimate Decreased    Basic Metabolic Panel [[id number]]  (Abnormal) Collected: 08/25/21 0350    Specimen: Blood Updated: 08/25/21 0453     Glucose 270 mg/dL      BUN 37 mg/dL      Creatinine 0.93 mg/dL      Sodium 142 mmol/L      Potassium 4.3 mmol/L      Chloride 108 mmol/L      CO2 24.0 mmol/L      Calcium 8.9 mg/dL      eGFR Non African Amer 64 mL/min/1.73      BUN/Creatinine Ratio 39.8     Anion Gap 10.0 mmol/L     Narrative:      GFR Normal  >60  Chronic Kidney Disease <60  Kidney Failure <15      Basic Metabolic Panel [[id number]]  (Abnormal) Collected: 08/24/21 1811    Specimen: Blood Updated: 08/24/21 1841     Glucose 387 mg/dL      BUN 35 mg/dL      Creatinine 1.04 mg/dL      Sodium 140 mmol/L      Potassium 4.4 mmol/L      Chloride 106 mmol/L      CO2 24.0 mmol/L      Calcium 8.9 mg/dL      eGFR Non African Amer 57 mL/min/1.73      BUN/Creatinine Ratio 33.7     Anion Gap 10.0 mmol/L     Narrative:      GFR Normal >60  Chronic Kidney Disease <60  Kidney Failure <15      Respiratory Culture - Sputum, ET Suction [[id number]] Collected: 08/22/21 0726    Specimen: Sputum from ET Suction Updated: 08/24/21 0935     Respiratory Culture No growth at 2 days     Gram Stain Few (2+) WBCs per low power field      Rare (1+) Epithelial cells per low power field      No organisms seen    Magnesium [[id number]]  (Normal) Collected: 08/24/21 0355    Specimen: Blood Updated: 08/24/21 0459     Magnesium 2.2 mg/dL     Vancomycin, Trough [[id number]]  (Normal) Collected: 08/21/21 2323    Specimen: Blood Updated: 08/21/21 2354     Vancomycin Trough 14.30 mcg/mL     Vancomycin, Peak [[id number]]  (Abnormal) Collected: 08/21/21 2132    Specimen: Blood Updated: 08/21/21 2213     Vancomycin Peak 15.60 mcg/mL     MRSA Screen, PCR (Inpatient) - Swab, Nares [[id number]]  (Normal) Collected: 08/20/21 1408    Specimen: Swab from Nares Updated: 08/20/21 1529     MRSA, PCR Negative    Narrative:      Performed by real-time polymerase chain reaction (qPCR).    Urinalysis, Microscopic Only - Urine, Clean Catch [[id number]]  (Abnormal) Collected: 08/19/21 1426    Specimen: Urine, Clean Catch Updated: 08/19/21 1454     RBC, UA 0-2 /HPF      WBC, UA 0-2 /HPF      Bacteria, UA Trace /HPF      Squamous Epithelial Cells, UA 3-5 /HPF      Hyaline Casts, UA None Seen /LPF      Methodology Manual Light Microscopy    Urinalysis With Culture If Indicated - Urine, Clean Catch [[id number]]  (Abnormal)  Collected: 08/19/21 1426    Specimen: Urine, Clean Catch Updated: 08/19/21 1447     Color, UA Yellow     Appearance, UA Clear     pH, UA 6.0     Specific Gravity, UA 1.007     Glucose,  mg/dL (2+)     Ketones, UA Negative     Bilirubin, UA Negative     Blood, UA Small (1+)     Protein, UA Negative     Leuk Esterase, UA Negative     Nitrite, UA Negative     Urobilinogen, UA 0.2 E.U./dL    Ferritin [[id number]]  (Abnormal) Collected: 08/19/21 0554    Specimen: Blood Updated: 08/19/21 0622     Ferritin 395.10 ng/mL     Narrative:      Results may be falsely decreased if patient taking Biotin.      Lactic Acid, Plasma [[id number]]  (Normal) Collected: 08/19/21 0554    Specimen: Blood Updated: 08/19/21 0613     Lactate 0.8 mmol/L     Peripheral Blood Smear [[id number]] Collected: 08/18/21 1018    Specimen: Blood Updated: 08/18/21 1328     Performed by: Dr. Guzman     Pathologist Interpretation Mild Bicytopenia, Rare giant platelets, Minimal RBC polychromasia, No Blasts seen, clinical correlation necessary    Lavender Top [[id number]] Collected: 08/18/21 1018    Specimen: Blood Updated: 08/18/21 1130     Extra Tube hold for add-on     Comment: Auto resulted       Urinalysis With Microscopic If Indicated (No Culture) - Urine, Clean Catch [[id number]]  (Abnormal) Collected: 08/18/21 0517    Specimen: Urine, Clean Catch Updated: 08/18/21 0602     Color, UA Yellow     Appearance, UA Cloudy     pH, UA 6.0     Specific Gravity, UA 1.008     Glucose, UA Negative     Ketones, UA Negative     Bilirubin, UA Negative     Blood, UA Small (1+)     Protein, UA Trace     Leuk Esterase, UA Moderate (2+)     Nitrite, UA Negative     Urobilinogen, UA 0.2 E.U./dL    STAT Lactic Acid, Reflex [[id number]]  (Abnormal) Collected: 08/18/21 0324    Specimen: Blood Updated: 08/18/21 0341     Lactate 2.4 mmol/L     [place] Draw [[id number]] Collected: 08/18/21 0009    Specimen: Blood Updated: 08/18/21 0115    Narrative:      The following orders were  created for panel order [place] [facility].  Procedure                               Abnormality         Status                     ---------                               -----------         ------                     Green Top (Gel)[[id number]]                                  Final result               Lavender Top[[id number]]                                     Final result               Gold Top - SST[[id number]]                                                                Please view results for these tests on the individual orders.    Green Top (Gel) [[id number]] Collected: 08/18/21 0009    Specimen: Blood Updated: 08/18/21 0115     Extra Tube Hold for add-ons.     Comment: Auto resulted.       COVID-19 and FLU A/B PCR - Swab, Nasopharynx [[id number]]  (Abnormal) Collected: 08/18/21 0009    Specimen: Swab from Nasopharynx Updated: 08/18/21 0048     COVID19 Detected     Influenza A PCR Not Detected     Influenza B PCR Not Detected    Narrative:      Fact sheet for providers: https://www.fda.gov/media/581240/download    Fact sheet for patients: https://www.fda.gov/media/977282/download    Test performed by PCR.  Influenza A and Influenza B negative results should be considered presumptive in samples that have a positive SARS-CoV-2 result.    Competitive inhibition studies showed that SARS-CoV-2 virus, when present at concentrations above 3.6E+04 copies/mL, can inhibit the detection and amplification of influenza A and influenza B virus RNA if present at or below 1.8E+02 copies/mL or 4.9E+02 copies/mL, respectively, and may lead to false negative influenza virus results. If co-infection with influenza A or influenza B virus is suspected in samples with a positive SARS-CoV-2 result, the sample should be re-tested with another FDA cleared, approved, or authorized influenza test, if influenza virus detection would change clinical management.    Lactic Acid, Plasma [[id number]]  (Abnormal) Collected: 08/18/21 0009     Specimen: Blood Updated: 08/18/21 0044     Lactate 3.0 mmol/L     Troponin [[id number]]  (Normal) Collected: 08/18/21 0009    Specimen: Blood Updated: 08/18/21 0043     Troponin T <0.010 ng/mL     Narrative:      Troponin T Reference Range:  <= 0.03 ng/mL-   Negative for AMI  >0.03 ng/mL-     Abnormal for myocardial necrosis.  Clinicians would have to utilize clinical acumen, EKG, Troponin and serial changes to determine if it is an Acute Myocardial Infarction or myocardial injury due to an underlying chronic condition.       Results may be falsely decreased if patient taking Biotin.      BNP [[id number]]  (Abnormal) Collected: 08/18/21 0009    Specimen: Blood Updated: 08/18/21 0041     proBNP 552.0 pg/mL     Narrative:      Among patients with dyspnea, NT-proBNP is highly sensitive for the detection of acute congestive heart failure. In addition NT-proBNP of <300 pg/ml effectively rules out acute congestive heart failure with 99% negative predictive value.    Results may be falsely decreased if patient taking Biotin.      hCG, Serum, Qualitative [[id number]]  (Normal) Collected: 08/18/21 0008    Specimen: Blood Updated: 08/18/21 0035     HCG Qualitative Negative        Imaging Results (Most Recent)     Procedure Component Value Units Date/Time    XR Chest 1 View [[id number]] Collected: 08/29/21 1709     Updated: 08/30/21 0000    Narrative:      EXAM:    XR Chest, 1 View    EXAM DATE/TIME:    August 29, 2021 at 1714 hours    CLINICAL HISTORY:    The patient is 46 years old and is Female; intubated, fever,  A41.9 Sepsis, unspecified organism R65.21 Severe sepsis with  septic shock N17.9 Acute kidney failure, unspecified U07.1  COVID-19    TECHNIQUE:    Frontal view of the chest.    COMPARISON:    Chest radiograph August 27, 2021    FINDINGS:    LUNGS:  The lungs are hyperinflated. Diffuse pulmonary  opacities are again noted and not significantly changed.     PLEURAL SPACE:  Unremarkable.  No pneumothorax.    HEART:   Unremarkable.  No cardiomegaly.    MEDIASTINUM:  Unremarkable.    BONES/JOINTS:  The bones and soft tissues are stable.    TUBES, LINES AND DEVICES:  Right upper extremity PICC tip,  endotracheal tube, and duotube are unchanged in position from  prior exam.    UPPER ABDOMEN:  Unremarkable as visualized.      Impression:      1.  Overall, stable appearance of the diffuse pulmonary  opacities.  2.  Support lines and tubes in appropriate position.    Electronically signed by:  Alta Stack MD  8/29/2021 11:59 PM  CDT Workstation: [id number]    US Liver [[id number]] Collected: 08/28/21 0540     Updated: 08/28/21 0727    Narrative:      Ultrasound liver, right upper quadrant.    HISTORY: Elevated liver function tests.       Findings: Liver demonstrates a somewhat heterogenous appearance  of slight nodularity of the surface. This suggests early changes  of cirrhosis.    The gallbladder is surgically absent. Common bile duct 0.57 cm.  Pancreas obscured by overlying bowel gas.    Right kidney is enlarged, 15.41 x 7.26 x 6.79 cm. Normal aorta  and inferior vena cava.    FINDINGS: Liver has a heterogenous pattern of architectural and a  nodular surface suggesting early changes of cirrhosis. No masses  or intrahepatic biliary dilatation.    Gallbladder surgically absent. Normal common bile duct 0.59 cm.  Pancreas obscured by overlying bowel gas. Right kidney is  slightly enlarged but otherwise unremarkable.    Electronically signed by:  Jp Hutchinson MD  8/28/2021 7:26 AM CDT  Workstation: [id number]    XR Chest 1 View [[id number]] Collected: 08/27/21 1933     Updated: 08/27/21 1953    Narrative:      EXAM:  XR CHEST 1 VIEW    TECHNIQUE:   Single frontal radiograph of the chest.    VIEWS:  1 view    CLINICAL HISTORY:   46 years  Female  et tube placement, A41.9 Sepsis, unspecified  organism R65.21 Severe sepsis with septic shock N17.9 Acute  kidney failure, unspecified U07.1 COVID-19    COMPARISON:   August 27, 2021 chest  x-ray    FINDINGS:   Support lines and tubes: Endotracheal tube tip is 4.5 cm above  the richie. The enteric tube tip is below the diaphragm but off  the field-of-view. The right PICC tip overlies the proximal right  atrium.  Lungs: Bilateral widespread patchy airspace opacities are not  significantly changed.  Pleura: No pleural effusion. No pneumothorax.  Heart/mediastinum: The cardiac silhouette is not enlarged.  Bones: No acute osseous findings.  Soft tissues: Unremarkable.      Impression:      Bilateral widespread patchy airspace opacities are not  significantly changed.    Electronically signed by:  Dario Vargas MD  8/27/2021 7:52  PM CDT Workstation: [id number]    XR Chest 1 View [[id number]] Collected: 08/27/21 1849     Updated: 08/27/21 1934    Narrative:      EXAM:    XR Chest, 1 View    CLINICAL HISTORY:    The patient is 46 years old and is Female; tube placement,  A41.9 Sepsis, unspecified organism R65.21 Severe sepsis with  septic shock N17.9 Acute kidney failure, unspecified U07.1  COVID-19    TECHNIQUE:    Frontal view of the chest.    COMPARISON:    Chest radiograph 8/25/2021    FINDINGS:    LIMITATIONS:  Limited examination secondary to patient  rotation.    LUNGS: Stable moderate bilateral peribronchovascular and hazy  airspace opacities.    PLEURAL SPACE:  Unremarkable.  No pneumothorax.    HEART:  Unremarkable.  No cardiomegaly.    MEDIASTINUM:  Unremarkable.    BONES/JOINTS:  Unremarkable.    TUBES, LINES AND DEVICES:  Endotracheal tube tip approximately  5.9 cm above the richie. Enteric tube coursing below the  diaphragm with tip beyond the confines of the film. Right upper  extremity PICC is well seen in the upper chest and projecting  over the superior aspect of SVC. PICC is not well seen coursing  over the mediastinum in the expected location of the SVC as seen  on prior radiograph.     Impression:      1. Stable airspace opacities.  2. Right upper extremity PICC not well seen  coursing over the  expected location of the SVC. Findings may secondary to patient  positioning/rotation. Recommend repeat radiograph to confirm  placement.     Electronically signed by:  Audrey Amaya MD  8/27/2021 7:33 PM  CDT Workstation: [id number]    XR Abdomen KUB [[id number]] Collected: 08/27/21 1124     Updated: 08/27/21 1148    Narrative:      Abdomen, KUB         CLINICAL INDICATION: Cortrac placement    COMPARISON: None       FINDINGS: Single supine view of the abdomen demonstrates an  enteric tube with its tip pointing inferiorly in the duodenal  bulb.      Impression:      As above.    Electronically signed by:  Jp Hutchinson MD  8/27/2021 11:47 AM CDT  Workstation: [id number]    XR Chest Post CVA Port [[id number]] Collected: 08/25/21 1341     Updated: 08/25/21 1419    Narrative:      EXAM: XR CHEST 1 VIEW    COMPARISONS: Radiograph 8/23/2021    INDICATION: PICC line placement, A41.9 Sepsis, unspecified  organism R65.21 Severe sepsis with septic shock N17.9 Acute  kidney failure, unspecified U07.1 COVID-19    FINDINGS:  Frontal view of the chest.    Endotracheal tube 4.3 cm from the richie. Right PICC with tip  likely in the right atrium. Enteric tube courses inferior to the  diaphragm with tip excluded from field-of-view.    Diffuse bilateral airspace opacities are not significantly  changed. Cardiac mediastinal silhouette is stable with possible  mild cardiomegaly versus magnification artifact from portable  technique. No effusion or pneumothorax. No acute osseous  abnormality.      Impression:      Right PICC with tip likely in the right atrium. Remaining support  apparatus is stable.    No change in diffuse bilateral airspace disease/pneumonia.    Electronically signed by:  Hussain Cai MD  8/25/2021  2:18 PM CDT Workstation: [id number]    IR PICC W Imaging Guidance [[id number]] Resulted: 08/25/21 1401     Updated: 08/25/21 1401    Narrative:      This procedure was auto-finalized with no  dictation required.    US Guidance [facility] [[id number]] Resulted: 08/25/21 1349     Updated: 08/25/21 1349    Narrative:      This procedure was auto-finalized with no dictation required.    XR Chest 1 View [[id number]] Collected: 08/23/21 0435     Updated: 08/23/21 0728    Narrative:      PROCEDURE: Single view AP upright portable chest x-ray at 4:42  AM.    INDICATION: Follow-up respiratory failure/ARDS., A41.9 Sepsis,  unspecified organism R65.21 Severe sepsis with septic shock N17.9  Acute kidney failure, unspecified U07.1 COVID-19    COMPARISON: 8/22/2021 chest x-ray and earlier chest exams.    FINDINGS:    ET tube remains in place tip about 3.8 cm above the richie.  Nasogastric tube extends well below left hemidiaphragm tip not  included.    Mild interval improvement of the bilateral diffuse consolidation  and infiltrates throughout both lungs with no significant  residual.  No pleural effusions.    Heart size appears normal with.      Impression:      Mild interval improvement in the diffuse pulmonary infiltrates  and opacification with significant residual.    No pleural effusion.    ET tube and NG tube in place detailed above.    97113    Electronically signed by:  Santana Swanson MD  8/23/2021 7:27  AM CDT Workstation: [id number]    US Guided Vascular Access [[id number]] Collected: 08/22/21 1000     Updated: 08/23/21 0035    Narrative:      PROCEDURE: Ultrasound Guidance Vascular Access      Ordering physician(s): ETHAN VILLARREAL    Clinical Indication: Intravenous access    Findings:    The right basilic vein was sonographically evaluated and  determined to be patent. Concurrent realtime ultrasound was used  to visualize needle entry into the right basilic vein and a  permanent image was stored for permanent recording and reporting.          Impression:      Impression:    Ultrasound guidance utilized for intravenous access as above.    [id number]    Electronically signed by:  Dilip Berkowitz MD  8/22/2021 11:59  AM  CDT Workstation: [id number]    IR Insert Midline Without Port Pump 5 Plus [[id number]] Resulted: 08/22/21 1040     Updated: 08/22/21 1040    Narrative:      This procedure was auto-finalized with no dictation required.    XR Chest 1 View [[id number]] Collected: 08/22/21 0603     Updated: 08/22/21 0648    Narrative:      PORTABLE CHEST X-RAY    CLINICAL HISTORY: intubated, A41.9 Sepsis, unspecified organism  R65.21 Severe sepsis with septic shock N17.9 Acute kidney  failure, unspecified U07.1 COVID-19    COMPARISON: 8/21/2021.    FINDINGS: Portable AP view of the chest was obtained with  overlying monitor leads in place. ET tube terminates at the level  of the mid thoracic trachea. Nasogastric tube extends well below  the diaphragm, tip is not imaged. Lungs are fairly well inflated.  There is been significant worsening in diffuse dense opacities  bilaterally likely related to severe pneumonia and/or edema,  possibly ARDS. No significant pleural fluid. Cardiac margins are  obscured.      Impression:      Interval intubation with significant worsening in  extensive pulmonary opacities      Electronically signed by:  Nakul Marte MD  8/22/2021 6:47 AM  CDT Workstation: [id number]    CT Head Without Contrast [[id number]] Collected: 08/22/21 0202     Updated: 08/22/21 0316    Narrative:      EXAM DESCRIPTION:  CT HEAD WO CONTRAST 8/22/2021 2:02 AM CDT  [facility]: CT HEAD WITHOUT IV CONTRAST     CLINICAL HISTORY:  46 years Female, Delirium, A41.9 Sepsis, unspecified organism  R65.21 Severe sepsis with septic shock N17.9 Acute kidney  failure, unspecified U07.1 COVID-19    COMPARISON:  None    TECHNIQUE: Axial thin section CT images of the brain were  obtained from the base of the skull to the vertex without  intravenous contrast. Sagittal and coronal reconstructed images  were also obtained.    The protocol utilizes one or more of the following dose reduction  techniques:  automated exposure control, adjustment of mA  and/or  kV according to patient size, and/or use of iterative  reconstruction technique.    FINDINGS: Quality of examination is somewhat degraded secondary  to mild motion artifact.    BRAIN: Gray-white matter differentiation is preserved.  No  hyperdense vessel sign is seen.  No evidence of an acute infarct  is noted.    CEREBROSPINAL FLUID SPACES: Ventricles, cerebral sulci and basal  cisterns are normal for patient's age.  No hydrocephalus or  ventricular entrapment is noted.  No extraaxial fluid collection  is noted.    MASS EFFECT: There is no mass effect or midline shift.    HEMORRHAGE: No intracranial hemorrhage is noted.    SELLA: The sella and suprasellar cistern appear unremarkable.    PARANASAL SINUSES: The visualized portions of the paranasal  sinuses demonstrate mild diffuse mucosal thickening as well as  nonspecific trace air-fluid levels in bilateral sphenoid sinuses.    MASTOID AIR CELLS: The visualized portions of the mastoid air  cells are well aerated.    ORBITS: The visualized portions of the orbits appear  unremarkable.    SOFT TISSUES: No scalp soft tissue swelling is noted.    CALVARIUM: No calvarial fracture is noted.  No lytic bone lesion  is seen.    ATHEROSCLEROSIS: Mild amount of calcified intracranial  atherosclerosis is noted.      Impression:        NO ACUTE INTRACRANIAL ABNORMALITY IS NOTED.    Electronically signed by:  Shen Welch MD  8/22/2021 3:15 AM  CDT Workstation: [id number]    XR Chest 1 View [[id number]] Collected: 08/21/21 1555     Updated: 08/21/21 1613    Narrative:        PORTABLE CHEST    HISTORY: Sepsis. Severe sepsis with septic shock. Acute kidney  failure. Covid 1913.    Portable AP supine upright film of the chest was obtained at 3:50  PM.  COMPARISON: August 18, 2021    FINDINGS:   There are now extensive bilateral infiltrates compatible with  pneumonia.  Areas of dense consolidation left midlung and lung base.  The heart is not enlarged.  The pulmonary  vasculature is not increased.  No pleural effusion.  No pneumothorax.  No acute osseous abnormality.  Degenerative changes are present in the thoracic spine.      Impression:      CONCLUSION:  There are now extensive bilateral infiltrates compatible with  pneumonia.  Areas of dense consolidation left midlung and lung base.    90245    Electronically signed by:  Dilip Berkowitz MD  8/21/2021 4:12 PM CDT  Workstation: [email or web address] Renal Bilateral [[id number]] Collected: 08/18/21 1048     Updated: 08/18/21 1226    Narrative:      EXAMINATION: Ultrasound, retroperitoneal / renal     CLINICAL INDICATION / HISTORY: HUGO, A41.9 Sepsis, unspecified  organism R65.21 Severe sepsis with septic shock N17.9 Acute  kidney failure, unspecified U07.1 COVID-19  COMPARISON:  None  TECHNIQUE:  Grayscale and color Doppler ultrasound.    FINDINGS:    Right kidney: The right kidney is normal in size and  echogenicity. It measures 11.6 cm in length. There is no evidence  of suspicious mass or calculus.     Left kidney: The left kidney is normal in size and echogenicity.  It measures 12.3 cm in length. There is no evidence of suspicious  mass or calculus. There is a questionable exophytic complex cyst  versus splenule adjacent to the left kidney which measures 1.8 x  1.3 x 1.7 cm. The technologist states that this does not appear  to be connected to the left kidney and that a splenule is felt to  be more likely.    Urinary bladder: Unremarkable         Impression:      There is a questionable exophytic complex cyst versus splenule  adjacent to the left kidney which measures 1.8 x 1.3 x 1.7 cm.  The technologist states that this does not appear to be connected  to the left kidney and that a splenule is felt to be more likely.  Recommend contrast enhanced CT of the abdomen for further  evaluation.    Electronically signed by:  Binu Farooq MD  8/18/2021 12:25 PM  CDT Workstation: [id number]    XR Chest 1 View [[id number]] Collected:  08/18/21 0013     Updated: 08/18/21 0036    Narrative:      EXAM DESCRIPTION:  XR CHEST 1 VW  [facility]    CLINICAL HISTORY:  46 years Female, SOA Triage Protocol    COMPARISON:  4/8/2018  Number of views: 1  FINDINGS:  The cardiomediastinal silhouette is normal. The lungs are clear.  There is no pleural fluid.    No acute osseous abnormalities are seen.      Impression:      No acute cardiothoracic process is seen.    Electronically signed by:  Logan Irizarry MD  8/18/2021 12:35 AM CDT  Workstation: [id number]          Chief Complaint on Day of Discharge: comes into ER with complaints of increased shortness of breath and cough and overallworsening hypoxemia   She was not doing well at home and subsequently came to hospital for therpay.     Hospital Course:  The patient is a 46 y.o. female who presented to [facility] with increased overall worsening respiratory distress and increased dyspnea and weakness and lethargy.  She was evaluated in er and noted to have findings consistent with acute covid pneumonia and pneumonitis and she was on 100 per non rebreather at time of admit    She continued to decline and needed intubation on 8/17 and she went thru prolonged phase of intubation and was difficult to extubate and finally 9/5 she was able to get off vent and move to floor. '    She is terrifically weak and no strength to participate in any adl's   She has developed a severe myopathy overall       She still has increased oxygen requirements and she is being discharged to [facility] for strengthening and improvement of overall functional capability     She has breast ca as her underlying disease process and is immune suppressed due to that   Transfer to [facility] for strengthenign and improvement.      Condition on Discharge:  Stable and improving overall.     Physical Exam on Discharge:  /80 (BP Location: Right arm, Patient Position: Lying)   Pulse 86   Temp 96.8 °F (36 °C) (Oral)   Resp  "18   Ht 167.6 cm (66\")   Wt 103 kg (227 lb 14.4 oz)   LMP 07/17/2021   SpO2 96%   Breastfeeding No   BMI 36.78 kg/m²   Physical Exam  Vitals and nursing note reviewed.   Constitutional:       Appearance: She is obese. She is ill-appearing.   HENT:      Head: Normocephalic and atraumatic.      Nose: Nose normal.      Mouth/Throat:      Mouth: Mucous membranes are moist.   Cardiovascular:      Rate and Rhythm: Regular rhythm. Tachycardia present.      Pulses: Normal pulses.      Heart sounds: Normal heart sounds.   Abdominal:      General: Abdomen is flat. Bowel sounds are normal.   Musculoskeletal:      Cervical back: Normal range of motion.      Right lower leg: Edema present.      Left lower leg: Edema present.   Skin:     General: Skin is warm.           Discharge Disposition:  Long Term Care (DC - External)    Discharge Medications:     Discharge Medications      New Medications      Instructions Start Date   insulin aspart 100 UNIT/ML injection  Commonly known as: novoLOG   0-24 Units, Subcutaneous, 3 Times Daily Before Meals      insulin detemir 100 UNIT/ML injection  Commonly known as: LEVEMIR   36 Units, Subcutaneous, 2 Times Daily      metoprolol tartrate 100 MG tablet  Commonly known as: LOPRESSOR   100 mg, Oral, 2 Times Daily      montelukast 10 MG tablet  Commonly known as: SINGULAIR   10 mg, Oral, Nightly         Changes to Medications      Instructions Start Date   FeroSul 325 (65 FE) MG tablet  Generic drug: ferrous sulfate  What changed:   · medication strength  · See the new instructions.   TAKE ONE TABLET BY MOUTH ONE A DAY WITH BREAKFAST         Continue These Medications      Instructions Start Date   amitriptyline 50 MG tablet  Commonly known as: ELAVIL   50 mg, Oral, Nightly      anastrozole 1 MG tablet  Commonly known as: ARIMIDEX   1 mg, Oral, Daily      atorvastatin 20 MG tablet  Commonly known as: LIPITOR   20 mg, Oral, Nightly      B-D ULTRAFINE III SHORT PEN 31G X 8 MM misc  Generic " drug: Insulin Pen Needle   No dose, route, or frequency recorded.      BASAGLAR KWIKPEN 100 UNIT/ML injection pen   10 Units, Subcutaneous, Nightly      cetirizine 10 MG tablet  Commonly known as: zyrTEC   10 mg, Oral, Daily      docusate sodium 100 MG capsule  Commonly known as: COLACE   100 mg, Oral, 2 Times Daily PRN      folic acid 1 MG tablet  Commonly known as: FOLVITE   1 mg, Oral, Daily      freestyle lancets   1 each, Other, 4 Times Daily      FREESTYLE LITE test strip  Generic drug: glucose blood   1 stick, Percutaneous, 4 Times Daily      glyburide micronized 6 MG tablet  Commonly known as: GLYNASE   No dose, route, or frequency recorded.      GLYBURIDE PO   6 mg, Oral, Daily      losartan 25 MG tablet  Commonly known as: COZAAR   25 mg, Oral, Daily      Lyrica 150 MG capsule  Generic drug: pregabalin   150 mg, Oral, 3 Times Daily      meloxicam 15 MG tablet  Commonly known as: MOBIC   15 mg, Oral, Daily      metFORMIN 1000 MG tablet  Commonly known as: GLUCOPHAGE   1,000 mg, Oral, 2 Times Daily      OLANZapine 10 MG tablet  Commonly known as: zyPREXA   10 mg, Oral, Nightly      omeprazole 40 MG capsule  Commonly known as: priLOSEC   40 mg, Oral, Daily      oxyCODONE-acetaminophen  MG per tablet  Commonly known as: PERCOCET   1 tablet, Oral, Every 6 Hours      tiZANidine 4 MG tablet  Commonly known as: ZANAFLEX   4 mg, Oral, Every 6 Hours PRN      triamcinolone 0.5 % cream  Commonly known as: KENALOG   Topical, 3 Times Daily PRN      Trulicity 1.5 MG/0.5ML solution pen-injector  Generic drug: Dulaglutide   1.5 mg, Subcutaneous, Weekly      Trulicity 1.5 MG/0.5ML solution pen-injector  Generic drug: Dulaglutide   Weekly      Ventolin  (90 Base) MCG/ACT inhaler  Generic drug: albuterol sulfate HFA   2 puffs, Inhalation, Every 4 Hours      vitamin B-12 1000 MCG tablet  Commonly known as: CYANOCOBALAMIN   1,000 mcg, Oral, Daily             Discharge Diet: regular     Activity at Discharge: as  tolerated     Discharge Care Plan/Instructions: follow up with pt and ot at [facility]     Follow-up Appointments:   Future Appointments   Date Time Provider Department Center   9/29/2021  3:15 PM [facility] OP INFU CHAIR 14  MAD OPI MAD   10/27/2021  1:45 PM NURSE [facility] MAD OPI MAD   10/27/2021  2:15 PM Katarina Patel MD [facility] ONC [facility]   10/27/2021  3:15 PM [facility] OP INFU CHAIR 15  MAD OPI MAD   12/20/2021  1:15 PM Marvin Mcconnell MD [facility] None       Test Results Pending at Discharge:   Pending Labs     Order Current Status    Blood Culture - Blood, Arm, Right Preliminary result    Blood Culture - Blood, Hand, Right Preliminary result          The patient has current or prior documentation of LVEF less than 40%, or moderate to severely depressed left ventricular systolic function. The patent was prescribed or already taking an ACE inhibitor or ARB.     The patient has current or prior documentation of LVEF less than 40%, or moderate to severely depressed left ventricular systolic function. The patient was prescribed or already taking a beta-blocker.       Vianey Zepeda MD    Time: greater than 30 minutes spent in going over and doing discharge summary               Electronically signed by Vianey Zepeda MD at 09/08/21 1632     Ivet Bob APRN at 09/09/21 1457     Attestation signed by Bertha Kraus MD at 09/09/21 2220    I have reviewed the documentation and agree with the plan as outlined.                      [facility] Medicine Services  DISCHARGE SUMMARY       Date of Admission: 8/17/2021  Date of Discharge:  9/9/2021  Primary Care Physician: Jose Jean-Baptiste MD    Presenting Problem/History of Present Illness:  Sepsis with acute renal failure and septic shock, due to unspecified organism, unspecified acute renal failure type (CMS/[facility]) [A41.9, R65.21, N17.9]  Lab test positive for detection of COVID-19 virus  [U07.1]       Final Discharge Diagnoses:  Active Hospital Problems    Diagnosis    • **Sepsis, unspecified organism (CMS/[facility])    • Cytokine release syndrome, grade 4    • Severe malnutrition (CMS/[facility])    • Pneumonia due to COVID-19 virus    • Atypical pneumonia    • Acute kidney injury (CMS/[facility])    • Sepsis with acute renal failure and septic shock (CMS/[facility])    • Type 2 diabetes mellitus without complication, without long-term current use of insulin (CMS/[facility])        Consults:   Consults     No orders found from 7/19/2021 to 8/18/2021.        Pertinent Test Results:   Lab Results (last 24 hours)     Procedure Component Value Units Date/Time    Blood Culture - Blood, Arm, Right [[id number]] Collected: 09/04/21 1428    Specimen: Blood from Arm, Right Updated: 09/09/21 1445     Blood Culture No growth at 5 days    Blood Culture - Blood, Hand, Right [[id number]] Collected: 09/09/21 1333    Specimen: Blood from Hand, Right Updated: 09/09/21 1333    Extra Tubes [[id number]] Collected: 09/09/21 1220    Specimen: Blood from Hand, Right Updated: 09/09/21 1330    Narrative:      The following orders were created for panel order Extra Tubes.  Procedure                               Abnormality         Status                     ---------                               -----------         ------                     Lavender Top[[id number]]                                     Final result                 Please view results for these tests on the individual orders.    Lavender Top [[id number]] Collected: 09/09/21 1220    Specimen: Blood from Hand, Right Updated: 09/09/21 1330     Extra Tube hold for add-on     Comment: Auto resulted       Procalcitonin [[id number]]  (Abnormal) Collected: 09/09/21 1217    Specimen: Blood Updated: 09/09/21 1311     Procalcitonin 0.26 ng/mL     Narrative:      As a Marker for Sepsis (Non-Neonates):     1. <0.5 ng/mL represents a low risk of severe sepsis and/or septic shock.  2. >2 ng/mL represents a high  "risk of severe sepsis and/or septic shock.    As a Marker for Lower Respiratory Tract Infections that require antibiotic therapy:  PCT on Admission     Antibiotic Therapy             6-12 Hrs later  >0.5                          Strongly Recommended            >0.25 - <0.5             Recommended  0.1 - 0.25                  Discouraged                       Remeasure/reassess PCT  <0.1                         Strongly Discouraged         Remeasure/reassess PCT      As 28 day mortality risk marker: \"Change in Procalcitonin Result\" (>80% or <=80%) if Day 0 (or Day 1) and Day 4 values are available. Refer to [email or web address]/    Change in PCT <=80 %   A decrease of PCT levels below or equal to 80% defines a positive change in PCT test result representing a higher risk for 28-day all-cause mortality of patients diagnosed with severe sepsis or septic shock.    Change in PCT >80 %   A decrease of PCT levels of more than 80% defines a negative change in PCT result representing a lower risk for 28-day all-cause mortality of patients diagnosed with severe sepsis or septic shock.              Results may be falsely decreased if patient taking Biotin.     Basic Metabolic Panel [[id number]]  (Abnormal) Collected: 09/09/21 1217    Specimen: Blood Updated: 09/09/21 1306     Glucose 388 mg/dL      BUN 41 mg/dL      Creatinine 0.54 mg/dL      Sodium 151 mmol/L      Potassium 3.8 mmol/L      Chloride 115 mmol/L      CO2 24.0 mmol/L      Calcium 9.1 mg/dL      eGFR Non African Amer 120 mL/min/1.73      BUN/Creatinine Ratio 75.9     Anion Gap 12.0 mmol/L     Narrative:      GFR Normal >60  Chronic Kidney Disease <60  Kidney Failure <15      Blood Culture - Blood, Hand, Right [[id number]] Collected: 09/04/21 1230    Specimen: Blood from Hand, Right Updated: 09/09/21 1300     Blood Culture No growth at 5 days    Blood Culture - Blood, Hand, Right [[id number]] Collected: 09/09/21 1220    Specimen: Blood from Hand, Right " Updated: 09/09/21 1232    POC Glucose Once [[id number]]  (Abnormal) Collected: 09/09/21 1053    Specimen: Blood Updated: 09/09/21 1122     Glucose 355 mg/dL      Comment: RN NotifiedOperator: [id number] EMILY Guy ID: [id number]       POC Glucose Once [[id number]]  (Abnormal) Collected: 09/09/21 0714    Specimen: Blood Updated: 09/09/21 1122     Glucose 197 mg/dL      Comment: RN NotifiedOperator: [id number] EMILY DIALLOMeter ID: [id number]       C-reactive Protein [[id number]]  (Normal) Collected: 09/09/21 0638    Specimen: Blood Updated: 09/09/21 0747     C-Reactive Protein <0.30 mg/dL     Comprehensive Metabolic Panel [[id number]]  (Abnormal) Collected: 09/09/21 0638    Specimen: Blood Updated: 09/09/21 0734     Glucose 213 mg/dL      BUN 39 mg/dL      Creatinine 0.47 mg/dL      Sodium 154 mmol/L      Potassium 3.5 mmol/L      Chloride 117 mmol/L      CO2 27.0 mmol/L      Calcium 9.0 mg/dL      Total Protein 6.4 g/dL      Albumin 3.30 g/dL      ALT (SGPT) 202 U/L      AST (SGOT) 97 U/L      Alkaline Phosphatase 110 U/L      Total Bilirubin 0.8 mg/dL      eGFR Non African Amer 141 mL/min/1.73      Globulin 3.1 gm/dL      A/G Ratio 1.1 g/dL      BUN/Creatinine Ratio 83.0     Anion Gap 10.0 mmol/L     Narrative:      GFR Normal >60  Chronic Kidney Disease <60  Kidney Failure <15      Lactate Dehydrogenase [[id number]]  (Abnormal) Collected: 09/09/21 0638    Specimen: Blood Updated: 09/09/21 0734      U/L     D-dimer, Quantitative [[id number]]  (Abnormal) Collected: 09/09/21 0638    Specimen: Blood Updated: 09/09/21 0731     D-Dimer, Quantitative 1,560 ng/mL (FEU)     Narrative:      Dimer values <500 ng/ml FEU are FDA approved as aid in diagnosis of deep venous thrombosis and pulmonary embolism.  This test should not be used in an exclusion strategy with pretest probability alone.    A recent guideline regarding diagnosis for pulmonary thromboembolism recommends an adjusted exclusion criterion of  age x 10 ng/ml FEU for patients >50 years of age ([employer] Intern Med 2015; 163: 701-711).      Protime-INR [[id number]]  (Normal) Collected: 09/09/21 0638    Specimen: Blood Updated: 09/09/21 0731     Protime 15.1 Seconds      INR 1.20    Narrative:      Therapeutic range for most indications is 2.0-3.0 INR,  or 2.5-3.5 for mechanical heart valves.    CBC & Differential [[id number]]  (Abnormal) Collected: 09/09/21 0638    Specimen: Blood Updated: 09/09/21 0715    Narrative:      The following orders were created for panel order CBC & Differential.  Procedure                               Abnormality         Status                     ---------                               -----------         ------                     CBC Auto Differential[[id number]]        Abnormal            Final result                 Please view results for these tests on the individual orders.    CBC Auto Differential [[id number]]  (Abnormal) Collected: 09/09/21 0638    Specimen: Blood Updated: 09/09/21 0715     WBC 12.46 10*3/mm3      RBC 3.78 10*6/mm3      Hemoglobin 11.4 g/dL      Hematocrit 36.1 %      MCV 95.5 fL      MCH 30.2 pg      MCHC 31.6 g/dL      RDW 16.8 %      RDW-SD 55.5 fl      MPV 11.5 fL      Platelets 141 10*3/mm3      Neutrophil % 81.9 %      Lymphocyte % 10.8 %      Monocyte % 5.4 %      Eosinophil % 0.6 %      Basophil % 0.2 %      Immature Grans % 1.1 %      Neutrophils, Absolute 10.21 10*3/mm3      Lymphocytes, Absolute 1.35 10*3/mm3      Monocytes, Absolute 0.67 10*3/mm3      Eosinophils, Absolute 0.07 10*3/mm3      Basophils, Absolute 0.02 10*3/mm3      Immature Grans, Absolute 0.14 10*3/mm3      nRBC 0.2 /100 WBC     POC Glucose Once [[id number]]  (Abnormal) Collected: 09/08/21 1955    Specimen: Blood Updated: 09/08/21 2034     Glucose 380 mg/dL      Comment: RN NotifiedOperator: [id number] RICKIE NevolutionMeter ID: [id number]           Imaging Results (Last 24 Hours)     ** No results found for the last 24 hours. **     "    Chief Complaint on Day of Discharge: No complaints    Hospital Course:   This is a 46 year old female with PMH of DM2, GERD, HLD, HTN and asthma that presented to [facility] on 8/17/2021 with complaints of dyspnea, weakness and lethargy.  The patient was diagnosed with COVID pneumonia and required intubation on 8/17/2021.  She was eventually extubated on 9/5/2021.  The patient is extremely debilitated with no strength for ADLs.  She currently is requiring 7 liters of oxygen. She has been accepted to [facility] for pulmonary and physical rehabilitation.      Condition on Discharge:  Stable    Physical Exam on Discharge:  /55 (BP Location: Right arm, Patient Position: Lying)   Pulse 60   Temp 99.2 °F (37.3 °C) (Oral)   Resp 20   Ht 167.6 cm (66\")   Wt 104 kg (228 lb 9.6 oz)   LMP 07/17/2021   SpO2 95%   Breastfeeding No   BMI 36.90 kg/m²   Physical Exam  Constitutional:       Appearance: She is well-developed.   HENT:      Head: Normocephalic and atraumatic.   Eyes:      Pupils: Pupils are equal, round, and reactive to light.   Cardiovascular:      Rate and Rhythm: Normal rate and regular rhythm.   Pulmonary:      Effort: Pulmonary effort is normal.      Breath sounds: Normal breath sounds.   Abdominal:      General: Bowel sounds are normal.      Palpations: Abdomen is soft.   Musculoskeletal:         General: Normal range of motion.      Cervical back: Normal range of motion and neck supple.   Skin:     General: Skin is warm and dry.   Neurological:      Mental Status: She is alert and oriented to person, place, and time.   Psychiatric:         Behavior: Behavior normal.       Discharge Disposition:  Long Term Care (DC - External)    Discharge Medications:     Discharge Medications      New Medications      Instructions Start Date   dexamethasone 6 MG tablet  Commonly known as: DECADRON   6 mg, Oral, Daily With Breakfast      enoxaparin 100 MG/ML solution syringe  Commonly known as: LOVENOX   1 mg/kg (100 mg), " Subcutaneous, Every 12 Hours      insulin aspart 100 UNIT/ML injection  Commonly known as: novoLOG   0-24 Units, Subcutaneous, 3 Times Daily Before Meals      insulin detemir 100 UNIT/ML injection  Commonly known as: LEVEMIR   36 Units, Subcutaneous, 2 Times Daily      metoprolol tartrate 50 MG tablet  Commonly known as: LOPRESSOR   50 mg, Oral, Every 12 Hours Scheduled      montelukast 10 MG tablet  Commonly known as: SINGULAIR   10 mg, Oral, Nightly         Changes to Medications      Instructions Start Date   FeroSul 325 (65 FE) MG tablet  Generic drug: ferrous sulfate  What changed:   · medication strength  · See the new instructions.   TAKE ONE TABLET BY MOUTH ONE A DAY WITH BREAKFAST         Continue These Medications      Instructions Start Date   amitriptyline 50 MG tablet  Commonly known as: ELAVIL   50 mg, Oral, Nightly      anastrozole 1 MG tablet  Commonly known as: ARIMIDEX   1 mg, Oral, Daily      atorvastatin 20 MG tablet  Commonly known as: LIPITOR   20 mg, Oral, Nightly      B-D ULTRAFINE III SHORT PEN 31G X 8 MM misc  Generic drug: Insulin Pen Needle   No dose, route, or frequency recorded.      BASAGLAR KWIKPEN 100 UNIT/ML injection pen   10 Units, Subcutaneous, Nightly      cetirizine 10 MG tablet  Commonly known as: zyrTEC   10 mg, Oral, Daily      docusate sodium 100 MG capsule  Commonly known as: COLACE   100 mg, Oral, 2 Times Daily PRN      folic acid 1 MG tablet  Commonly known as: FOLVITE   1 mg, Oral, Daily      freestyle lancets   1 each, Other, 4 Times Daily      FREESTYLE LITE test strip  Generic drug: glucose blood   1 stick, Percutaneous, 4 Times Daily      glyburide micronized 6 MG tablet  Commonly known as: GLYNASE   No dose, route, or frequency recorded.      GLYBURIDE PO   6 mg, Oral, Daily      losartan 25 MG tablet  Commonly known as: COZAAR   25 mg, Oral, Daily      Lyrica 150 MG capsule  Generic drug: pregabalin   150 mg, Oral, 3 Times Daily      meloxicam 15 MG  tablet  Commonly known as: MOBIC   15 mg, Oral, Daily      metFORMIN 1000 MG tablet  Commonly known as: GLUCOPHAGE   1,000 mg, Oral, 2 Times Daily      OLANZapine 10 MG tablet  Commonly known as: zyPREXA   10 mg, Oral, Nightly      omeprazole 40 MG capsule  Commonly known as: priLOSEC   40 mg, Oral, Daily      oxyCODONE-acetaminophen  MG per tablet  Commonly known as: PERCOCET   1 tablet, Oral, Every 6 Hours      tiZANidine 4 MG tablet  Commonly known as: ZANAFLEX   4 mg, Oral, Every 6 Hours PRN      triamcinolone 0.5 % cream  Commonly known as: KENALOG   Topical, 3 Times Daily PRN      Trulicity 1.5 MG/0.5ML solution pen-injector  Generic drug: Dulaglutide   1.5 mg, Subcutaneous, Weekly      Trulicity 1.5 MG/0.5ML solution pen-injector  Generic drug: Dulaglutide   Weekly      Ventolin  (90 Base) MCG/ACT inhaler  Generic drug: albuterol sulfate HFA   2 puffs, Inhalation, Every 4 Hours      vitamin B-12 1000 MCG tablet  Commonly known as: CYANOCOBALAMIN   1,000 mcg, Oral, Daily             Discharge Diet:   Diet Instructions     Diet: Dysphagia, Consistent Carbohydrate, Tube Feeding; Thin Liquids, No Restrictions; Pureed; Continuous; Peptamen Intense VHP 10 ml/hr.  Advance 10 ml/hr to goal rate of 30 ml/hr.  25 ml q 1 hour water flush.      Discharge Diet:  Dysphagia  Consistent Carbohydrate  Tube Feeding       Fluid Consistency: Thin Liquids, No Restrictions    Pureed Options: Pureed    Feeding Type: Continuous    Formula & Rate: Peptamen Intense VHP 10 ml/hr.  Advance 10 ml/hr to goal rate of 30 ml/hr.  25 ml q 1 hour water flush.          Activity at Discharge:   Activity Instructions     Activity as Tolerated            Discharge Care Plan/Instructions: As above.     Follow-up Appointment:   Contact information for follow-up providers     Jose Jean-Baptiste MD Follow up on 8/30/2021.    Specialty: Family Medicine  Why: Hospital follow up appointment Monday 8/30/21 at 10:15am.  Contact  information:  [address] [facility] [address]  [phone]                   Contact information for after-discharge care     Destination     [facility] .    Service: Long Term Acute Care  Contact information:  [address] [place] [address]  [place] [address] [phone]                             Test Results Pending at Discharge:   Pending Labs     Order Current Status    Blood Culture - Blood, Hand, Right In process    Blood Culture - Blood, Hand, Right In process            This document has been electronically signed by LOGAN Olson on September 9, 2021 16:02 CDT        Time: Greater than 30 minutes.                 Electronically signed by Bertha Kraus MD at 09/09/21 1026

## 2021-09-11 ENCOUNTER — OUTSIDE FACILITY SERVICE (OUTPATIENT)
Dept: PULMONOLOGY | Facility: CLINIC | Age: 46
End: 2021-09-11

## 2021-09-11 PROCEDURE — 63710000001 DEXAMETHASONE PER 0.25 MG: Performed by: NURSE PRACTITIONER

## 2021-09-11 PROCEDURE — 82962 GLUCOSE BLOOD TEST: CPT

## 2021-09-11 PROCEDURE — 63710000001 INSULIN DETEMIR PER 5 UNITS: Performed by: NURSE PRACTITIONER

## 2021-09-11 PROCEDURE — 99291 CRITICAL CARE FIRST HOUR: CPT | Performed by: INTERNAL MEDICINE

## 2021-09-11 PROCEDURE — 25010000002 ENOXAPARIN PER 10 MG: Performed by: NURSE PRACTITIONER

## 2021-09-11 RX ORDER — SODIUM CHLORIDE 450 MG/100ML
150 INJECTION, SOLUTION INTRAVENOUS CONTINUOUS
Status: DISCONTINUED | OUTPATIENT
Start: 2021-09-11 | End: 2021-09-13

## 2021-09-11 RX ORDER — ALBUTEROL SULFATE 90 UG/1
2 AEROSOL, METERED RESPIRATORY (INHALATION) EVERY 4 HOURS PRN
Status: DISCONTINUED | OUTPATIENT
Start: 2021-09-11 | End: 2021-10-01 | Stop reason: HOSPADM

## 2021-09-12 ENCOUNTER — OUTSIDE FACILITY SERVICE (OUTPATIENT)
Dept: PULMONOLOGY | Facility: CLINIC | Age: 46
End: 2021-09-12

## 2021-09-12 LAB
GLUCOSE BLDC GLUCOMTR-MCNC: 196 MG/DL (ref 70–130)
GLUCOSE BLDC GLUCOMTR-MCNC: 234 MG/DL (ref 70–130)
GLUCOSE BLDC GLUCOMTR-MCNC: 313 MG/DL (ref 70–130)
GLUCOSE BLDC GLUCOMTR-MCNC: 328 MG/DL (ref 70–130)
GLUCOSE BLDC GLUCOMTR-MCNC: 343 MG/DL (ref 70–130)
GLUCOSE BLDC GLUCOMTR-MCNC: 343 MG/DL (ref 70–130)
GLUCOSE BLDC GLUCOMTR-MCNC: 352 MG/DL (ref 70–130)

## 2021-09-12 PROCEDURE — 82962 GLUCOSE BLOOD TEST: CPT

## 2021-09-12 PROCEDURE — 63710000001 DEXAMETHASONE PER 0.25 MG: Performed by: NURSE PRACTITIONER

## 2021-09-12 PROCEDURE — 25010000002 ENOXAPARIN PER 10 MG: Performed by: NURSE PRACTITIONER

## 2021-09-12 PROCEDURE — 63710000001 INSULIN DETEMIR PER 5 UNITS: Performed by: NURSE PRACTITIONER

## 2021-09-12 PROCEDURE — 99291 CRITICAL CARE FIRST HOUR: CPT | Performed by: INTERNAL MEDICINE

## 2021-09-13 ENCOUNTER — OUTSIDE FACILITY SERVICE (OUTPATIENT)
Dept: PULMONOLOGY | Facility: CLINIC | Age: 46
End: 2021-09-13

## 2021-09-13 LAB
ALBUMIN SERPL-MCNC: 3.3 G/DL (ref 3.5–5.2)
ALBUMIN/GLOB SERPL: 1.2 G/DL
ALP SERPL-CCNC: 94 U/L (ref 39–117)
ALT SERPL W P-5'-P-CCNC: 154 U/L (ref 1–33)
ANION GAP SERPL CALCULATED.3IONS-SCNC: 10 MMOL/L (ref 5–15)
ANION GAP SERPL CALCULATED.3IONS-SCNC: 9 MMOL/L (ref 5–15)
AST SERPL-CCNC: 73 U/L (ref 1–32)
BILIRUB SERPL-MCNC: 0.7 MG/DL (ref 0–1.2)
BUN SERPL-MCNC: 23 MG/DL (ref 6–20)
BUN SERPL-MCNC: 28 MG/DL (ref 6–20)
BUN/CREAT SERPL: 57.5 (ref 7–25)
BUN/CREAT SERPL: 68.3 (ref 7–25)
CALCIUM SPEC-SCNC: 8.7 MG/DL (ref 8.6–10.5)
CALCIUM SPEC-SCNC: 8.8 MG/DL (ref 8.6–10.5)
CHLORIDE SERPL-SCNC: 116 MMOL/L (ref 98–107)
CHLORIDE SERPL-SCNC: 123 MMOL/L (ref 98–107)
CO2 SERPL-SCNC: 21 MMOL/L (ref 22–29)
CO2 SERPL-SCNC: 25 MMOL/L (ref 22–29)
CREAT SERPL-MCNC: 0.4 MG/DL (ref 0.57–1)
CREAT SERPL-MCNC: 0.41 MG/DL (ref 0.57–1)
DEPRECATED RDW RBC AUTO: 60.1 FL (ref 37–54)
ERYTHROCYTE [DISTWIDTH] IN BLOOD BY AUTOMATED COUNT: 17.2 % (ref 12.3–15.4)
GFR SERPL CREATININE-BSD FRML MDRD: >150 ML/MIN/1.73
GFR SERPL CREATININE-BSD FRML MDRD: >150 ML/MIN/1.73
GLOBULIN UR ELPH-MCNC: 2.8 GM/DL
GLUCOSE BLDC GLUCOMTR-MCNC: 176 MG/DL (ref 70–130)
GLUCOSE BLDC GLUCOMTR-MCNC: 273 MG/DL (ref 70–130)
GLUCOSE BLDC GLUCOMTR-MCNC: 353 MG/DL (ref 70–130)
GLUCOSE BLDC GLUCOMTR-MCNC: 391 MG/DL (ref 70–130)
GLUCOSE SERPL-MCNC: 170 MG/DL (ref 65–99)
GLUCOSE SERPL-MCNC: 342 MG/DL (ref 65–99)
HCT VFR BLD AUTO: 27.4 % (ref 34–46.6)
HGB BLD-MCNC: 8.7 G/DL (ref 12–15.9)
MCH RBC QN AUTO: 31.2 PG (ref 26.6–33)
MCHC RBC AUTO-ENTMCNC: 31.8 G/DL (ref 31.5–35.7)
MCV RBC AUTO: 98.2 FL (ref 79–97)
PLATELET # BLD AUTO: 55 10*3/MM3 (ref 140–450)
PMV BLD AUTO: 12.3 FL (ref 6–12)
POTASSIUM SERPL-SCNC: 3.3 MMOL/L (ref 3.5–5.2)
POTASSIUM SERPL-SCNC: 4.2 MMOL/L (ref 3.5–5.2)
PROT SERPL-MCNC: 6.1 G/DL (ref 6–8.5)
RBC # BLD AUTO: 2.79 10*6/MM3 (ref 3.77–5.28)
SODIUM SERPL-SCNC: 147 MMOL/L (ref 136–145)
SODIUM SERPL-SCNC: 157 MMOL/L (ref 136–145)
WBC # BLD AUTO: 5.94 10*3/MM3 (ref 3.4–10.8)

## 2021-09-13 PROCEDURE — 97110 THERAPEUTIC EXERCISES: CPT

## 2021-09-13 PROCEDURE — 80048 BASIC METABOLIC PNL TOTAL CA: CPT | Performed by: INTERNAL MEDICINE

## 2021-09-13 PROCEDURE — 63710000001 INSULIN DETEMIR PER 5 UNITS: Performed by: NURSE PRACTITIONER

## 2021-09-13 PROCEDURE — 97535 SELF CARE MNGMENT TRAINING: CPT

## 2021-09-13 PROCEDURE — 82962 GLUCOSE BLOOD TEST: CPT

## 2021-09-13 PROCEDURE — 25010000002 ENOXAPARIN PER 10 MG: Performed by: NURSE PRACTITIONER

## 2021-09-13 PROCEDURE — 63710000001 DEXAMETHASONE PER 0.25 MG: Performed by: NURSE PRACTITIONER

## 2021-09-13 PROCEDURE — 92526 ORAL FUNCTION THERAPY: CPT | Performed by: SPEECH-LANGUAGE PATHOLOGIST

## 2021-09-13 PROCEDURE — 99291 CRITICAL CARE FIRST HOUR: CPT | Performed by: INTERNAL MEDICINE

## 2021-09-13 PROCEDURE — 85027 COMPLETE CBC AUTOMATED: CPT | Performed by: INTERNAL MEDICINE

## 2021-09-13 PROCEDURE — 80053 COMPREHEN METABOLIC PANEL: CPT | Performed by: INTERNAL MEDICINE

## 2021-09-13 RX ORDER — POTASSIUM CHLORIDE 1.5 G/1.77G
40 POWDER, FOR SOLUTION ORAL ONCE
Status: DISCONTINUED | OUTPATIENT
Start: 2021-09-13 | End: 2021-09-14

## 2021-09-14 ENCOUNTER — OUTSIDE FACILITY SERVICE (OUTPATIENT)
Dept: PULMONOLOGY | Facility: CLINIC | Age: 46
End: 2021-09-14

## 2021-09-14 LAB
ANION GAP SERPL CALCULATED.3IONS-SCNC: 9 MMOL/L (ref 5–15)
BACTERIA SPEC AEROBE CULT: NORMAL
BACTERIA SPEC AEROBE CULT: NORMAL
BUN SERPL-MCNC: 21 MG/DL (ref 6–20)
BUN/CREAT SERPL: 70 (ref 7–25)
CALCIUM SPEC-SCNC: 8.1 MG/DL (ref 8.6–10.5)
CHLORIDE SERPL-SCNC: 111 MMOL/L (ref 98–107)
CO2 SERPL-SCNC: 22 MMOL/L (ref 22–29)
CREAT SERPL-MCNC: 0.3 MG/DL (ref 0.57–1)
GFR SERPL CREATININE-BSD FRML MDRD: >150 ML/MIN/1.73
GLUCOSE BLDC GLUCOMTR-MCNC: 135 MG/DL (ref 70–130)
GLUCOSE BLDC GLUCOMTR-MCNC: 288 MG/DL (ref 70–130)
GLUCOSE BLDC GLUCOMTR-MCNC: 303 MG/DL (ref 70–130)
GLUCOSE BLDC GLUCOMTR-MCNC: 356 MG/DL (ref 70–130)
GLUCOSE BLDC GLUCOMTR-MCNC: 420 MG/DL (ref 70–130)
GLUCOSE BLDC GLUCOMTR-MCNC: 424 MG/DL (ref 70–130)
GLUCOSE SERPL-MCNC: 309 MG/DL (ref 65–99)
POTASSIUM SERPL-SCNC: 3.5 MMOL/L (ref 3.5–5.2)
SODIUM SERPL-SCNC: 142 MMOL/L (ref 136–145)

## 2021-09-14 PROCEDURE — 97110 THERAPEUTIC EXERCISES: CPT

## 2021-09-14 PROCEDURE — 63710000001 DEXAMETHASONE PER 0.25 MG: Performed by: NURSE PRACTITIONER

## 2021-09-14 PROCEDURE — 82962 GLUCOSE BLOOD TEST: CPT

## 2021-09-14 PROCEDURE — 25010000002 ENOXAPARIN PER 10 MG: Performed by: NURSE PRACTITIONER

## 2021-09-14 PROCEDURE — 99291 CRITICAL CARE FIRST HOUR: CPT | Performed by: INTERNAL MEDICINE

## 2021-09-14 PROCEDURE — 80048 BASIC METABOLIC PNL TOTAL CA: CPT | Performed by: INTERNAL MEDICINE

## 2021-09-14 PROCEDURE — 97535 SELF CARE MNGMENT TRAINING: CPT

## 2021-09-14 PROCEDURE — 25010000002 FUROSEMIDE PER 20 MG: Performed by: INTERNAL MEDICINE

## 2021-09-14 PROCEDURE — 92526 ORAL FUNCTION THERAPY: CPT | Performed by: SPEECH-LANGUAGE PATHOLOGIST

## 2021-09-14 PROCEDURE — 97530 THERAPEUTIC ACTIVITIES: CPT

## 2021-09-14 PROCEDURE — 63710000001 INSULIN DETEMIR PER 5 UNITS: Performed by: NURSE PRACTITIONER

## 2021-09-14 RX ORDER — PANTOPRAZOLE SODIUM 40 MG/1
40 TABLET, DELAYED RELEASE ORAL
Status: DISCONTINUED | OUTPATIENT
Start: 2021-09-15 | End: 2021-10-01 | Stop reason: HOSPADM

## 2021-09-14 RX ORDER — FUROSEMIDE 10 MG/ML
40 INJECTION INTRAMUSCULAR; INTRAVENOUS ONCE
Status: DISCONTINUED | OUTPATIENT
Start: 2021-09-14 | End: 2021-09-20

## 2021-09-14 RX ORDER — POTASSIUM CHLORIDE 750 MG/1
80 CAPSULE, EXTENDED RELEASE ORAL ONCE
Status: DISCONTINUED | OUTPATIENT
Start: 2021-09-14 | End: 2021-09-15

## 2021-09-14 RX ORDER — MELATONIN
1000 DAILY
Status: DISCONTINUED | OUTPATIENT
Start: 2021-09-14 | End: 2021-10-01 | Stop reason: HOSPADM

## 2021-09-15 ENCOUNTER — APPOINTMENT (OUTPATIENT)
Dept: GENERAL RADIOLOGY | Facility: HOSPITAL | Age: 46
End: 2021-09-15

## 2021-09-15 ENCOUNTER — OUTSIDE FACILITY SERVICE (OUTPATIENT)
Dept: PULMONOLOGY | Facility: CLINIC | Age: 46
End: 2021-09-15

## 2021-09-15 LAB
ANION GAP SERPL CALCULATED.3IONS-SCNC: 10 MMOL/L (ref 5–15)
BUN SERPL-MCNC: 17 MG/DL (ref 6–20)
BUN/CREAT SERPL: 51.5 (ref 7–25)
CALCIUM SPEC-SCNC: 8.7 MG/DL (ref 8.6–10.5)
CHLORIDE SERPL-SCNC: 105 MMOL/L (ref 98–107)
CO2 SERPL-SCNC: 23 MMOL/L (ref 22–29)
CREAT SERPL-MCNC: 0.33 MG/DL (ref 0.57–1)
GFR SERPL CREATININE-BSD FRML MDRD: >150 ML/MIN/1.73
GLUCOSE BLDC GLUCOMTR-MCNC: 218 MG/DL (ref 70–130)
GLUCOSE BLDC GLUCOMTR-MCNC: 234 MG/DL (ref 70–130)
GLUCOSE BLDC GLUCOMTR-MCNC: 234 MG/DL (ref 70–130)
GLUCOSE BLDC GLUCOMTR-MCNC: 289 MG/DL (ref 70–130)
GLUCOSE SERPL-MCNC: 220 MG/DL (ref 65–99)
POTASSIUM SERPL-SCNC: 2.9 MMOL/L (ref 3.5–5.2)
POTASSIUM SERPL-SCNC: 4.4 MMOL/L (ref 3.5–5.2)
SODIUM SERPL-SCNC: 138 MMOL/L (ref 136–145)

## 2021-09-15 PROCEDURE — 63710000001 INSULIN DETEMIR PER 5 UNITS: Performed by: NURSE PRACTITIONER

## 2021-09-15 PROCEDURE — 99291 CRITICAL CARE FIRST HOUR: CPT | Performed by: INTERNAL MEDICINE

## 2021-09-15 PROCEDURE — 84132 ASSAY OF SERUM POTASSIUM: CPT | Performed by: INTERNAL MEDICINE

## 2021-09-15 PROCEDURE — 92526 ORAL FUNCTION THERAPY: CPT | Performed by: SPEECH-LANGUAGE PATHOLOGIST

## 2021-09-15 PROCEDURE — 82962 GLUCOSE BLOOD TEST: CPT

## 2021-09-15 PROCEDURE — 97530 THERAPEUTIC ACTIVITIES: CPT

## 2021-09-15 PROCEDURE — 71045 X-RAY EXAM CHEST 1 VIEW: CPT

## 2021-09-15 PROCEDURE — 80048 BASIC METABOLIC PNL TOTAL CA: CPT | Performed by: INTERNAL MEDICINE

## 2021-09-15 PROCEDURE — 25010000002 ENOXAPARIN PER 10 MG: Performed by: NURSE PRACTITIONER

## 2021-09-15 PROCEDURE — 63710000001 INSULIN ASPART PER 5 UNITS: Performed by: NURSE PRACTITIONER

## 2021-09-15 PROCEDURE — 97110 THERAPEUTIC EXERCISES: CPT

## 2021-09-15 PROCEDURE — 63710000001 DEXAMETHASONE PER 0.25 MG: Performed by: NURSE PRACTITIONER

## 2021-09-15 RX ORDER — PREDNISONE 10 MG/1
10 TABLET ORAL
Status: DISCONTINUED | OUTPATIENT
Start: 2021-09-28 | End: 2021-10-01 | Stop reason: HOSPADM

## 2021-09-15 RX ORDER — PREDNISONE 20 MG/1
40 TABLET ORAL
Status: DISPENSED | OUTPATIENT
Start: 2021-09-16 | End: 2021-09-20

## 2021-09-15 RX ORDER — POTASSIUM CHLORIDE 750 MG/1
40 CAPSULE, EXTENDED RELEASE ORAL EVERY 4 HOURS
Status: DISPENSED | OUTPATIENT
Start: 2021-09-15 | End: 2021-09-15

## 2021-09-15 RX ORDER — PREDNISONE 20 MG/1
20 TABLET ORAL
Status: DISPENSED | OUTPATIENT
Start: 2021-09-24 | End: 2021-09-28

## 2021-09-16 ENCOUNTER — OUTSIDE FACILITY SERVICE (OUTPATIENT)
Dept: PULMONOLOGY | Facility: CLINIC | Age: 46
End: 2021-09-16

## 2021-09-16 LAB
ALBUMIN SERPL-MCNC: 3 G/DL (ref 3.5–5.2)
ALBUMIN/GLOB SERPL: 1.1 G/DL
ALP SERPL-CCNC: 113 U/L (ref 39–117)
ALT SERPL W P-5'-P-CCNC: 151 U/L (ref 1–33)
ANION GAP SERPL CALCULATED.3IONS-SCNC: 11 MMOL/L (ref 5–15)
AST SERPL-CCNC: 86 U/L (ref 1–32)
BILIRUB SERPL-MCNC: 0.7 MG/DL (ref 0–1.2)
BUN SERPL-MCNC: 11 MG/DL (ref 6–20)
BUN/CREAT SERPL: 45.8 (ref 7–25)
CALCIUM SPEC-SCNC: 8.9 MG/DL (ref 8.6–10.5)
CHLORIDE SERPL-SCNC: 108 MMOL/L (ref 98–107)
CO2 SERPL-SCNC: 24 MMOL/L (ref 22–29)
CREAT SERPL-MCNC: 0.24 MG/DL (ref 0.57–1)
DEPRECATED RDW RBC AUTO: 56.1 FL (ref 37–54)
ERYTHROCYTE [DISTWIDTH] IN BLOOD BY AUTOMATED COUNT: 18 % (ref 12.3–15.4)
GFR SERPL CREATININE-BSD FRML MDRD: >150 ML/MIN/1.73
GLOBULIN UR ELPH-MCNC: 2.7 GM/DL
GLUCOSE BLDC GLUCOMTR-MCNC: 231 MG/DL (ref 70–130)
GLUCOSE BLDC GLUCOMTR-MCNC: 235 MG/DL (ref 70–130)
GLUCOSE BLDC GLUCOMTR-MCNC: 263 MG/DL (ref 70–130)
GLUCOSE BLDC GLUCOMTR-MCNC: 81 MG/DL (ref 70–130)
GLUCOSE SERPL-MCNC: 75 MG/DL (ref 65–99)
HCT VFR BLD AUTO: 26.5 % (ref 34–46.6)
HGB BLD-MCNC: 8.8 G/DL (ref 12–15.9)
MCH RBC QN AUTO: 30.8 PG (ref 26.6–33)
MCHC RBC AUTO-ENTMCNC: 33.2 G/DL (ref 31.5–35.7)
MCV RBC AUTO: 92.7 FL (ref 79–97)
PLATELET # BLD AUTO: 45 10*3/MM3 (ref 140–450)
PMV BLD AUTO: 13.1 FL (ref 6–12)
POTASSIUM SERPL-SCNC: 3.3 MMOL/L (ref 3.5–5.2)
PROT SERPL-MCNC: 5.7 G/DL (ref 6–8.5)
RBC # BLD AUTO: 2.86 10*6/MM3 (ref 3.77–5.28)
SODIUM SERPL-SCNC: 143 MMOL/L (ref 136–145)
WBC # BLD AUTO: 4.43 10*3/MM3 (ref 3.4–10.8)

## 2021-09-16 PROCEDURE — 80053 COMPREHEN METABOLIC PANEL: CPT | Performed by: INTERNAL MEDICINE

## 2021-09-16 PROCEDURE — 85027 COMPLETE CBC AUTOMATED: CPT | Performed by: INTERNAL MEDICINE

## 2021-09-16 PROCEDURE — 97530 THERAPEUTIC ACTIVITIES: CPT

## 2021-09-16 PROCEDURE — 25010000002 ENOXAPARIN PER 10 MG: Performed by: NURSE PRACTITIONER

## 2021-09-16 PROCEDURE — 63710000001 INSULIN DETEMIR PER 5 UNITS: Performed by: NURSE PRACTITIONER

## 2021-09-16 PROCEDURE — 82962 GLUCOSE BLOOD TEST: CPT

## 2021-09-16 PROCEDURE — 99291 CRITICAL CARE FIRST HOUR: CPT | Performed by: INTERNAL MEDICINE

## 2021-09-16 PROCEDURE — 63710000001 PREDNISONE PER 1 MG: Performed by: INTERNAL MEDICINE

## 2021-09-16 PROCEDURE — 92526 ORAL FUNCTION THERAPY: CPT | Performed by: SPEECH-LANGUAGE PATHOLOGIST

## 2021-09-16 PROCEDURE — 97110 THERAPEUTIC EXERCISES: CPT

## 2021-09-17 ENCOUNTER — OUTSIDE FACILITY SERVICE (OUTPATIENT)
Dept: PULMONOLOGY | Facility: CLINIC | Age: 46
End: 2021-09-17

## 2021-09-17 LAB
ANISOCYTOSIS BLD QL: ABNORMAL
APTT PPP: 20.1 SECONDS (ref 20–40.3)
DEPRECATED RDW RBC AUTO: 62.3 FL (ref 37–54)
EOSINOPHIL # BLD MANUAL: 0.11 10*3/MM3 (ref 0–0.4)
EOSINOPHIL NFR BLD MANUAL: 2 % (ref 0.3–6.2)
ERYTHROCYTE [DISTWIDTH] IN BLOOD BY AUTOMATED COUNT: 19.1 % (ref 12.3–15.4)
GLUCOSE BLDC GLUCOMTR-MCNC: 122 MG/DL (ref 70–130)
GLUCOSE BLDC GLUCOMTR-MCNC: 282 MG/DL (ref 70–130)
GLUCOSE BLDC GLUCOMTR-MCNC: 283 MG/DL (ref 70–130)
GLUCOSE BLDC GLUCOMTR-MCNC: 292 MG/DL (ref 70–130)
HCT VFR BLD AUTO: 28.6 % (ref 34–46.6)
HGB BLD-MCNC: 9.6 G/DL (ref 12–15.9)
HOLD SPECIMEN: NORMAL
LYMPHOCYTES # BLD MANUAL: 1.1 10*3/MM3 (ref 0.7–3.1)
LYMPHOCYTES NFR BLD MANUAL: 19 % (ref 19.6–45.3)
LYMPHOCYTES NFR BLD MANUAL: 2 % (ref 5–12)
MCH RBC QN AUTO: 32.1 PG (ref 26.6–33)
MCHC RBC AUTO-ENTMCNC: 33.6 G/DL (ref 31.5–35.7)
MCV RBC AUTO: 95.7 FL (ref 79–97)
MONOCYTES # BLD AUTO: 0.11 10*3/MM3 (ref 0.1–0.9)
NEUTROPHILS # BLD AUTO: 4.17 10*3/MM3 (ref 1.7–7)
NEUTROPHILS NFR BLD MANUAL: 76 % (ref 42.7–76)
NRBC SPEC MANUAL: 5 /100 WBC (ref 0–0.2)
PLATELET # BLD AUTO: 53 10*3/MM3 (ref 140–450)
PMV BLD AUTO: 13 FL (ref 6–12)
POLYCHROMASIA BLD QL SMEAR: ABNORMAL
RBC # BLD AUTO: 2.99 10*6/MM3 (ref 3.77–5.28)
SMALL PLATELETS BLD QL SMEAR: ABNORMAL
VARIANT LYMPHS NFR BLD MANUAL: 1 % (ref 0–5)
WBC # BLD AUTO: 5.49 10*3/MM3 (ref 3.4–10.8)
WBC MORPH BLD: NORMAL

## 2021-09-17 PROCEDURE — 85007 BL SMEAR W/DIFF WBC COUNT: CPT | Performed by: INTERNAL MEDICINE

## 2021-09-17 PROCEDURE — 63710000001 PREDNISONE PER 1 MG: Performed by: INTERNAL MEDICINE

## 2021-09-17 PROCEDURE — 99233 SBSQ HOSP IP/OBS HIGH 50: CPT | Performed by: INTERNAL MEDICINE

## 2021-09-17 PROCEDURE — 97110 THERAPEUTIC EXERCISES: CPT

## 2021-09-17 PROCEDURE — 85730 THROMBOPLASTIN TIME PARTIAL: CPT | Performed by: INTERNAL MEDICINE

## 2021-09-17 PROCEDURE — 97530 THERAPEUTIC ACTIVITIES: CPT

## 2021-09-17 PROCEDURE — 97535 SELF CARE MNGMENT TRAINING: CPT

## 2021-09-17 PROCEDURE — 82962 GLUCOSE BLOOD TEST: CPT

## 2021-09-17 PROCEDURE — 85025 COMPLETE CBC W/AUTO DIFF WBC: CPT | Performed by: INTERNAL MEDICINE

## 2021-09-17 PROCEDURE — 63710000001 INSULIN DETEMIR PER 5 UNITS: Performed by: NURSE PRACTITIONER

## 2021-09-17 PROCEDURE — 92526 ORAL FUNCTION THERAPY: CPT | Performed by: SPEECH-LANGUAGE PATHOLOGIST

## 2021-09-18 ENCOUNTER — OUTSIDE FACILITY SERVICE (OUTPATIENT)
Dept: PULMONOLOGY | Facility: CLINIC | Age: 46
End: 2021-09-18

## 2021-09-18 LAB
APTT PPP: 25.4 SECONDS (ref 20–40.3)
BASOPHILS # BLD AUTO: 0.01 10*3/MM3 (ref 0–0.2)
BASOPHILS NFR BLD AUTO: 0.2 % (ref 0–1.5)
DEPRECATED RDW RBC AUTO: 63.6 FL (ref 37–54)
EOSINOPHIL # BLD AUTO: 0.16 10*3/MM3 (ref 0–0.4)
EOSINOPHIL NFR BLD AUTO: 3.3 % (ref 0.3–6.2)
ERYTHROCYTE [DISTWIDTH] IN BLOOD BY AUTOMATED COUNT: 19.6 % (ref 12.3–15.4)
GLUCOSE BLDC GLUCOMTR-MCNC: 182 MG/DL (ref 70–130)
GLUCOSE BLDC GLUCOMTR-MCNC: 294 MG/DL (ref 70–130)
GLUCOSE BLDC GLUCOMTR-MCNC: 349 MG/DL (ref 70–130)
GLUCOSE BLDC GLUCOMTR-MCNC: 361 MG/DL (ref 70–130)
HCT VFR BLD AUTO: 28.2 % (ref 34–46.6)
HGB BLD-MCNC: 9.6 G/DL (ref 12–15.9)
HOLD SPECIMEN: NORMAL
IMM GRANULOCYTES # BLD AUTO: 0.22 10*3/MM3 (ref 0–0.05)
IMM GRANULOCYTES NFR BLD AUTO: 4.6 % (ref 0–0.5)
LYMPHOCYTES # BLD AUTO: 1.18 10*3/MM3 (ref 0.7–3.1)
LYMPHOCYTES NFR BLD AUTO: 24.4 % (ref 19.6–45.3)
MCH RBC QN AUTO: 32 PG (ref 26.6–33)
MCHC RBC AUTO-ENTMCNC: 34 G/DL (ref 31.5–35.7)
MCV RBC AUTO: 94 FL (ref 79–97)
MONOCYTES # BLD AUTO: 0.25 10*3/MM3 (ref 0.1–0.9)
MONOCYTES NFR BLD AUTO: 5.2 % (ref 5–12)
NEUTROPHILS NFR BLD AUTO: 3.01 10*3/MM3 (ref 1.7–7)
NEUTROPHILS NFR BLD AUTO: 62.3 % (ref 42.7–76)
NRBC BLD AUTO-RTO: 2.9 /100 WBC (ref 0–0.2)
PLATELET # BLD AUTO: 51 10*3/MM3 (ref 140–450)
PMV BLD AUTO: 12.8 FL (ref 6–12)
RBC # BLD AUTO: 3 10*6/MM3 (ref 3.77–5.28)
WBC # BLD AUTO: 4.83 10*3/MM3 (ref 3.4–10.8)

## 2021-09-18 PROCEDURE — 82962 GLUCOSE BLOOD TEST: CPT

## 2021-09-18 PROCEDURE — 85025 COMPLETE CBC W/AUTO DIFF WBC: CPT | Performed by: INTERNAL MEDICINE

## 2021-09-18 PROCEDURE — 85730 THROMBOPLASTIN TIME PARTIAL: CPT | Performed by: INTERNAL MEDICINE

## 2021-09-18 PROCEDURE — 63710000001 PREDNISONE PER 1 MG: Performed by: INTERNAL MEDICINE

## 2021-09-18 PROCEDURE — 99232 SBSQ HOSP IP/OBS MODERATE 35: CPT | Performed by: INTERNAL MEDICINE

## 2021-09-18 PROCEDURE — 63710000001 INSULIN DETEMIR PER 5 UNITS: Performed by: NURSE PRACTITIONER

## 2021-09-19 ENCOUNTER — OUTSIDE FACILITY SERVICE (OUTPATIENT)
Dept: PULMONOLOGY | Facility: CLINIC | Age: 46
End: 2021-09-19

## 2021-09-19 LAB
APTT PPP: 24.1 SECONDS (ref 20–40.3)
BASOPHILS # BLD AUTO: 0.01 10*3/MM3 (ref 0–0.2)
BASOPHILS NFR BLD AUTO: 0.2 % (ref 0–1.5)
DEPRECATED RDW RBC AUTO: 61.5 FL (ref 37–54)
EOSINOPHIL # BLD AUTO: 0.13 10*3/MM3 (ref 0–0.4)
EOSINOPHIL NFR BLD AUTO: 2.5 % (ref 0.3–6.2)
ERYTHROCYTE [DISTWIDTH] IN BLOOD BY AUTOMATED COUNT: 19.2 % (ref 12.3–15.4)
GLUCOSE BLDC GLUCOMTR-MCNC: 145 MG/DL (ref 70–130)
GLUCOSE BLDC GLUCOMTR-MCNC: 213 MG/DL (ref 70–130)
GLUCOSE BLDC GLUCOMTR-MCNC: 331 MG/DL (ref 70–130)
GLUCOSE BLDC GLUCOMTR-MCNC: 340 MG/DL (ref 70–130)
HCT VFR BLD AUTO: 28.1 % (ref 34–46.6)
HGB BLD-MCNC: 9.7 G/DL (ref 12–15.9)
IMM GRANULOCYTES # BLD AUTO: 0.19 10*3/MM3 (ref 0–0.05)
IMM GRANULOCYTES NFR BLD AUTO: 3.7 % (ref 0–0.5)
LYMPHOCYTES # BLD AUTO: 1.2 10*3/MM3 (ref 0.7–3.1)
LYMPHOCYTES NFR BLD AUTO: 23.2 % (ref 19.6–45.3)
MCH RBC QN AUTO: 32 PG (ref 26.6–33)
MCHC RBC AUTO-ENTMCNC: 34.5 G/DL (ref 31.5–35.7)
MCV RBC AUTO: 92.7 FL (ref 79–97)
MONOCYTES # BLD AUTO: 0.32 10*3/MM3 (ref 0.1–0.9)
MONOCYTES NFR BLD AUTO: 6.2 % (ref 5–12)
NEUTROPHILS NFR BLD AUTO: 3.33 10*3/MM3 (ref 1.7–7)
NEUTROPHILS NFR BLD AUTO: 64.2 % (ref 42.7–76)
NRBC BLD AUTO-RTO: 1.9 /100 WBC (ref 0–0.2)
PLATELET # BLD AUTO: 59 10*3/MM3 (ref 140–450)
PMV BLD AUTO: 12.4 FL (ref 6–12)
RBC # BLD AUTO: 3.03 10*6/MM3 (ref 3.77–5.28)
WBC # BLD AUTO: 5.18 10*3/MM3 (ref 3.4–10.8)

## 2021-09-19 PROCEDURE — 63710000001 INSULIN DETEMIR PER 5 UNITS: Performed by: NURSE PRACTITIONER

## 2021-09-19 PROCEDURE — 82962 GLUCOSE BLOOD TEST: CPT

## 2021-09-19 PROCEDURE — 97530 THERAPEUTIC ACTIVITIES: CPT

## 2021-09-19 PROCEDURE — 85025 COMPLETE CBC W/AUTO DIFF WBC: CPT | Performed by: INTERNAL MEDICINE

## 2021-09-19 PROCEDURE — 99232 SBSQ HOSP IP/OBS MODERATE 35: CPT | Performed by: INTERNAL MEDICINE

## 2021-09-19 PROCEDURE — 97110 THERAPEUTIC EXERCISES: CPT

## 2021-09-19 PROCEDURE — 63710000001 PREDNISONE PER 1 MG: Performed by: INTERNAL MEDICINE

## 2021-09-19 PROCEDURE — 85730 THROMBOPLASTIN TIME PARTIAL: CPT | Performed by: INTERNAL MEDICINE

## 2021-09-20 ENCOUNTER — OUTSIDE FACILITY SERVICE (OUTPATIENT)
Dept: PULMONOLOGY | Facility: CLINIC | Age: 46
End: 2021-09-20

## 2021-09-20 LAB
ALBUMIN SERPL-MCNC: 3.2 G/DL (ref 3.5–5.2)
ALBUMIN/GLOB SERPL: 1.2 G/DL
ALP SERPL-CCNC: 162 U/L (ref 39–117)
ALT SERPL W P-5'-P-CCNC: 159 U/L (ref 1–33)
ANION GAP SERPL CALCULATED.3IONS-SCNC: 9 MMOL/L (ref 5–15)
APTT PPP: 23.8 SECONDS (ref 20–40.3)
AST SERPL-CCNC: 69 U/L (ref 1–32)
BASOPHILS # BLD AUTO: 0.01 10*3/MM3 (ref 0–0.2)
BASOPHILS NFR BLD AUTO: 0.2 % (ref 0–1.5)
BILIRUB SERPL-MCNC: 0.8 MG/DL (ref 0–1.2)
BUN SERPL-MCNC: 11 MG/DL (ref 6–20)
BUN/CREAT SERPL: 44 (ref 7–25)
CALCIUM SPEC-SCNC: 8.7 MG/DL (ref 8.6–10.5)
CHLORIDE SERPL-SCNC: 103 MMOL/L (ref 98–107)
CO2 SERPL-SCNC: 25 MMOL/L (ref 22–29)
CREAT SERPL-MCNC: 0.25 MG/DL (ref 0.57–1)
DEPRECATED RDW RBC AUTO: 64.2 FL (ref 37–54)
EOSINOPHIL # BLD AUTO: 0.12 10*3/MM3 (ref 0–0.4)
EOSINOPHIL NFR BLD AUTO: 2.5 % (ref 0.3–6.2)
ERYTHROCYTE [DISTWIDTH] IN BLOOD BY AUTOMATED COUNT: 19.3 % (ref 12.3–15.4)
GFR SERPL CREATININE-BSD FRML MDRD: >150 ML/MIN/1.73
GLOBULIN UR ELPH-MCNC: 2.7 GM/DL
GLUCOSE BLDC GLUCOMTR-MCNC: 137 MG/DL (ref 70–130)
GLUCOSE BLDC GLUCOMTR-MCNC: 156 MG/DL (ref 70–130)
GLUCOSE BLDC GLUCOMTR-MCNC: 231 MG/DL (ref 70–130)
GLUCOSE BLDC GLUCOMTR-MCNC: 240 MG/DL (ref 70–130)
GLUCOSE BLDC GLUCOMTR-MCNC: 262 MG/DL (ref 70–130)
GLUCOSE SERPL-MCNC: 138 MG/DL (ref 65–99)
HCT VFR BLD AUTO: 28.4 % (ref 34–46.6)
HGB BLD-MCNC: 9.6 G/DL (ref 12–15.9)
IMM GRANULOCYTES # BLD AUTO: 0.1 10*3/MM3 (ref 0–0.05)
IMM GRANULOCYTES NFR BLD AUTO: 2 % (ref 0–0.5)
LYMPHOCYTES # BLD AUTO: 1.35 10*3/MM3 (ref 0.7–3.1)
LYMPHOCYTES NFR BLD AUTO: 27.6 % (ref 19.6–45.3)
MCH RBC QN AUTO: 32.3 PG (ref 26.6–33)
MCHC RBC AUTO-ENTMCNC: 33.8 G/DL (ref 31.5–35.7)
MCV RBC AUTO: 95.6 FL (ref 79–97)
MONOCYTES # BLD AUTO: 0.3 10*3/MM3 (ref 0.1–0.9)
MONOCYTES NFR BLD AUTO: 6.1 % (ref 5–12)
NEUTROPHILS NFR BLD AUTO: 3.01 10*3/MM3 (ref 1.7–7)
NEUTROPHILS NFR BLD AUTO: 61.6 % (ref 42.7–76)
NRBC BLD AUTO-RTO: 1.4 /100 WBC (ref 0–0.2)
PLATELET # BLD AUTO: 69 10*3/MM3 (ref 140–450)
PMV BLD AUTO: 12.3 FL (ref 6–12)
POTASSIUM SERPL-SCNC: 3.4 MMOL/L (ref 3.5–5.2)
PROT SERPL-MCNC: 5.9 G/DL (ref 6–8.5)
RBC # BLD AUTO: 2.97 10*6/MM3 (ref 3.77–5.28)
SODIUM SERPL-SCNC: 137 MMOL/L (ref 136–145)
WBC # BLD AUTO: 4.89 10*3/MM3 (ref 3.4–10.8)

## 2021-09-20 PROCEDURE — 63710000001 PREDNISONE PER 5 MG: Performed by: INTERNAL MEDICINE

## 2021-09-20 PROCEDURE — 97530 THERAPEUTIC ACTIVITIES: CPT

## 2021-09-20 PROCEDURE — 80053 COMPREHEN METABOLIC PANEL: CPT | Performed by: INTERNAL MEDICINE

## 2021-09-20 PROCEDURE — 99232 SBSQ HOSP IP/OBS MODERATE 35: CPT | Performed by: INTERNAL MEDICINE

## 2021-09-20 PROCEDURE — 63710000001 PREDNISONE PER 1 MG: Performed by: INTERNAL MEDICINE

## 2021-09-20 PROCEDURE — 85025 COMPLETE CBC W/AUTO DIFF WBC: CPT | Performed by: INTERNAL MEDICINE

## 2021-09-20 PROCEDURE — 82962 GLUCOSE BLOOD TEST: CPT

## 2021-09-20 PROCEDURE — 85730 THROMBOPLASTIN TIME PARTIAL: CPT | Performed by: INTERNAL MEDICINE

## 2021-09-20 PROCEDURE — 63710000001 INSULIN DETEMIR PER 5 UNITS: Performed by: NURSE PRACTITIONER

## 2021-09-20 PROCEDURE — 97110 THERAPEUTIC EXERCISES: CPT

## 2021-09-20 RX ORDER — POTASSIUM CHLORIDE 1.5 G/1.77G
40 POWDER, FOR SOLUTION ORAL ONCE
Status: DISCONTINUED | OUTPATIENT
Start: 2021-09-20 | End: 2021-09-30

## 2021-09-20 RX ORDER — LACTULOSE 10 G/15ML
10 SOLUTION ORAL 3 TIMES DAILY
Status: DISCONTINUED | OUTPATIENT
Start: 2021-09-20 | End: 2021-10-01 | Stop reason: HOSPADM

## 2021-09-21 ENCOUNTER — APPOINTMENT (OUTPATIENT)
Dept: GENERAL RADIOLOGY | Facility: HOSPITAL | Age: 46
End: 2021-09-21

## 2021-09-21 LAB
GLUCOSE BLDC GLUCOMTR-MCNC: 162 MG/DL (ref 70–130)
GLUCOSE BLDC GLUCOMTR-MCNC: 260 MG/DL (ref 70–130)

## 2021-09-21 PROCEDURE — 97110 THERAPEUTIC EXERCISES: CPT

## 2021-09-21 PROCEDURE — 63710000001 INSULIN DETEMIR PER 5 UNITS: Performed by: NURSE PRACTITIONER

## 2021-09-21 PROCEDURE — 82962 GLUCOSE BLOOD TEST: CPT

## 2021-09-21 PROCEDURE — 73560 X-RAY EXAM OF KNEE 1 OR 2: CPT

## 2021-09-21 PROCEDURE — 63710000001 PREDNISONE PER 1 MG: Performed by: INTERNAL MEDICINE

## 2021-09-21 PROCEDURE — 97535 SELF CARE MNGMENT TRAINING: CPT

## 2021-09-21 PROCEDURE — 63710000001 PREDNISONE PER 5 MG: Performed by: INTERNAL MEDICINE

## 2021-09-21 PROCEDURE — 97530 THERAPEUTIC ACTIVITIES: CPT

## 2021-09-21 RX ORDER — ESCITALOPRAM OXALATE 10 MG/1
10 TABLET ORAL DAILY
Status: DISCONTINUED | OUTPATIENT
Start: 2021-09-21 | End: 2021-10-01 | Stop reason: HOSPADM

## 2021-09-22 LAB
APTT PPP: 25.5 SECONDS (ref 20–40.3)
BASOPHILS # BLD AUTO: 0 10*3/MM3 (ref 0–0.2)
BASOPHILS NFR BLD AUTO: 0 % (ref 0–1.5)
DEPRECATED RDW RBC AUTO: 66.5 FL (ref 37–54)
EOSINOPHIL # BLD AUTO: 0.15 10*3/MM3 (ref 0–0.4)
EOSINOPHIL NFR BLD AUTO: 3.8 % (ref 0.3–6.2)
ERYTHROCYTE [DISTWIDTH] IN BLOOD BY AUTOMATED COUNT: 19.7 % (ref 12.3–15.4)
GLUCOSE BLDC GLUCOMTR-MCNC: 108 MG/DL (ref 70–130)
GLUCOSE BLDC GLUCOMTR-MCNC: 255 MG/DL (ref 70–130)
GLUCOSE BLDC GLUCOMTR-MCNC: 260 MG/DL (ref 70–130)
GLUCOSE BLDC GLUCOMTR-MCNC: 261 MG/DL (ref 70–130)
GLUCOSE BLDC GLUCOMTR-MCNC: 291 MG/DL (ref 70–130)
GLUCOSE BLDC GLUCOMTR-MCNC: 305 MG/DL (ref 70–130)
HCT VFR BLD AUTO: 28.8 % (ref 34–46.6)
HGB BLD-MCNC: 9.5 G/DL (ref 12–15.9)
IMM GRANULOCYTES # BLD AUTO: 0.07 10*3/MM3 (ref 0–0.05)
IMM GRANULOCYTES NFR BLD AUTO: 1.8 % (ref 0–0.5)
LYMPHOCYTES # BLD AUTO: 1.33 10*3/MM3 (ref 0.7–3.1)
LYMPHOCYTES NFR BLD AUTO: 33.4 % (ref 19.6–45.3)
MCH RBC QN AUTO: 31.9 PG (ref 26.6–33)
MCHC RBC AUTO-ENTMCNC: 33 G/DL (ref 31.5–35.7)
MCV RBC AUTO: 96.6 FL (ref 79–97)
MONOCYTES # BLD AUTO: 0.29 10*3/MM3 (ref 0.1–0.9)
MONOCYTES NFR BLD AUTO: 7.3 % (ref 5–12)
NEUTROPHILS NFR BLD AUTO: 2.14 10*3/MM3 (ref 1.7–7)
NEUTROPHILS NFR BLD AUTO: 53.7 % (ref 42.7–76)
NRBC BLD AUTO-RTO: 1.3 /100 WBC (ref 0–0.2)
PLATELET # BLD AUTO: 78 10*3/MM3 (ref 140–450)
PMV BLD AUTO: 11.7 FL (ref 6–12)
RBC # BLD AUTO: 2.98 10*6/MM3 (ref 3.77–5.28)
WBC # BLD AUTO: 3.98 10*3/MM3 (ref 3.4–10.8)

## 2021-09-22 PROCEDURE — 97530 THERAPEUTIC ACTIVITIES: CPT

## 2021-09-22 PROCEDURE — 82962 GLUCOSE BLOOD TEST: CPT

## 2021-09-22 PROCEDURE — 85730 THROMBOPLASTIN TIME PARTIAL: CPT | Performed by: INTERNAL MEDICINE

## 2021-09-22 PROCEDURE — 97535 SELF CARE MNGMENT TRAINING: CPT

## 2021-09-22 PROCEDURE — 63710000001 PREDNISONE PER 1 MG: Performed by: INTERNAL MEDICINE

## 2021-09-22 PROCEDURE — 63710000001 PREDNISONE PER 5 MG: Performed by: INTERNAL MEDICINE

## 2021-09-22 PROCEDURE — 97110 THERAPEUTIC EXERCISES: CPT

## 2021-09-22 PROCEDURE — 85025 COMPLETE CBC W/AUTO DIFF WBC: CPT | Performed by: INTERNAL MEDICINE

## 2021-09-22 PROCEDURE — 63710000001 INSULIN DETEMIR PER 5 UNITS: Performed by: NURSE PRACTITIONER

## 2021-09-23 LAB
ALBUMIN SERPL-MCNC: 3.2 G/DL (ref 3.5–5.2)
ALBUMIN/GLOB SERPL: 1.2 G/DL
ALP SERPL-CCNC: 170 U/L (ref 39–117)
ALT SERPL W P-5'-P-CCNC: 141 U/L (ref 1–33)
ANION GAP SERPL CALCULATED.3IONS-SCNC: 10 MMOL/L (ref 5–15)
APTT PPP: 25.9 SECONDS (ref 20–40.3)
AST SERPL-CCNC: 55 U/L (ref 1–32)
BASOPHILS # BLD AUTO: 0.01 10*3/MM3 (ref 0–0.2)
BASOPHILS NFR BLD AUTO: 0.2 % (ref 0–1.5)
BILIRUB SERPL-MCNC: 0.7 MG/DL (ref 0–1.2)
BUN SERPL-MCNC: 9 MG/DL (ref 6–20)
BUN/CREAT SERPL: 39.1 (ref 7–25)
CALCIUM SPEC-SCNC: 8.4 MG/DL (ref 8.6–10.5)
CHLORIDE SERPL-SCNC: 101 MMOL/L (ref 98–107)
CO2 SERPL-SCNC: 27 MMOL/L (ref 22–29)
CREAT SERPL-MCNC: 0.23 MG/DL (ref 0.57–1)
DEPRECATED RDW RBC AUTO: 66.4 FL (ref 37–54)
EOSINOPHIL # BLD AUTO: 0.13 10*3/MM3 (ref 0–0.4)
EOSINOPHIL NFR BLD AUTO: 2.8 % (ref 0.3–6.2)
ERYTHROCYTE [DISTWIDTH] IN BLOOD BY AUTOMATED COUNT: 19.6 % (ref 12.3–15.4)
GFR SERPL CREATININE-BSD FRML MDRD: >150 ML/MIN/1.73
GLOBULIN UR ELPH-MCNC: 2.6 GM/DL
GLUCOSE BLDC GLUCOMTR-MCNC: 186 MG/DL (ref 70–130)
GLUCOSE BLDC GLUCOMTR-MCNC: 193 MG/DL (ref 70–130)
GLUCOSE BLDC GLUCOMTR-MCNC: 228 MG/DL (ref 70–130)
GLUCOSE SERPL-MCNC: 125 MG/DL (ref 65–99)
HCT VFR BLD AUTO: 29.3 % (ref 34–46.6)
HGB BLD-MCNC: 9.8 G/DL (ref 12–15.9)
IMM GRANULOCYTES # BLD AUTO: 0.11 10*3/MM3 (ref 0–0.05)
IMM GRANULOCYTES NFR BLD AUTO: 2.4 % (ref 0–0.5)
LYMPHOCYTES # BLD AUTO: 1.24 10*3/MM3 (ref 0.7–3.1)
LYMPHOCYTES NFR BLD AUTO: 26.7 % (ref 19.6–45.3)
MCH RBC QN AUTO: 32 PG (ref 26.6–33)
MCHC RBC AUTO-ENTMCNC: 33.4 G/DL (ref 31.5–35.7)
MCV RBC AUTO: 95.8 FL (ref 79–97)
MONOCYTES # BLD AUTO: 0.35 10*3/MM3 (ref 0.1–0.9)
MONOCYTES NFR BLD AUTO: 7.5 % (ref 5–12)
NEUTROPHILS NFR BLD AUTO: 2.81 10*3/MM3 (ref 1.7–7)
NEUTROPHILS NFR BLD AUTO: 60.4 % (ref 42.7–76)
NRBC BLD AUTO-RTO: 0.4 /100 WBC (ref 0–0.2)
PLATELET # BLD AUTO: 91 10*3/MM3 (ref 140–450)
PMV BLD AUTO: 11.4 FL (ref 6–12)
POTASSIUM SERPL-SCNC: 3.3 MMOL/L (ref 3.5–5.2)
PROT SERPL-MCNC: 5.8 G/DL (ref 6–8.5)
RBC # BLD AUTO: 3.06 10*6/MM3 (ref 3.77–5.28)
SODIUM SERPL-SCNC: 138 MMOL/L (ref 136–145)
WBC # BLD AUTO: 4.65 10*3/MM3 (ref 3.4–10.8)

## 2021-09-23 PROCEDURE — 63710000001 PREDNISONE PER 1 MG: Performed by: INTERNAL MEDICINE

## 2021-09-23 PROCEDURE — 97110 THERAPEUTIC EXERCISES: CPT

## 2021-09-23 PROCEDURE — 97535 SELF CARE MNGMENT TRAINING: CPT

## 2021-09-23 PROCEDURE — 63710000001 PREDNISONE PER 5 MG: Performed by: INTERNAL MEDICINE

## 2021-09-23 PROCEDURE — 82962 GLUCOSE BLOOD TEST: CPT

## 2021-09-23 PROCEDURE — 80053 COMPREHEN METABOLIC PANEL: CPT | Performed by: INTERNAL MEDICINE

## 2021-09-23 PROCEDURE — 63710000001 INSULIN DETEMIR PER 5 UNITS: Performed by: NURSE PRACTITIONER

## 2021-09-23 PROCEDURE — 85730 THROMBOPLASTIN TIME PARTIAL: CPT | Performed by: INTERNAL MEDICINE

## 2021-09-23 PROCEDURE — 97530 THERAPEUTIC ACTIVITIES: CPT

## 2021-09-23 PROCEDURE — 85025 COMPLETE CBC W/AUTO DIFF WBC: CPT | Performed by: INTERNAL MEDICINE

## 2021-09-23 RX ORDER — POTASSIUM CHLORIDE 750 MG/1
40 CAPSULE, EXTENDED RELEASE ORAL ONCE
Status: DISCONTINUED | OUTPATIENT
Start: 2021-09-23 | End: 2021-09-30

## 2021-09-24 LAB
GLUCOSE BLDC GLUCOMTR-MCNC: 204 MG/DL (ref 70–130)
GLUCOSE BLDC GLUCOMTR-MCNC: 219 MG/DL (ref 70–130)
GLUCOSE BLDC GLUCOMTR-MCNC: 278 MG/DL (ref 70–130)
GLUCOSE BLDC GLUCOMTR-MCNC: 96 MG/DL (ref 70–130)

## 2021-09-24 PROCEDURE — 63710000001 INSULIN DETEMIR PER 5 UNITS: Performed by: NURSE PRACTITIONER

## 2021-09-24 PROCEDURE — 82962 GLUCOSE BLOOD TEST: CPT

## 2021-09-24 PROCEDURE — 97110 THERAPEUTIC EXERCISES: CPT

## 2021-09-24 PROCEDURE — 63710000001 PREDNISONE PER 1 MG: Performed by: INTERNAL MEDICINE

## 2021-09-25 LAB
GLUCOSE BLDC GLUCOMTR-MCNC: 105 MG/DL (ref 70–130)
GLUCOSE BLDC GLUCOMTR-MCNC: 164 MG/DL (ref 70–130)
GLUCOSE BLDC GLUCOMTR-MCNC: 187 MG/DL (ref 70–130)

## 2021-09-25 PROCEDURE — 82962 GLUCOSE BLOOD TEST: CPT

## 2021-09-25 PROCEDURE — 63710000001 INSULIN DETEMIR PER 5 UNITS: Performed by: NURSE PRACTITIONER

## 2021-09-25 PROCEDURE — 63710000001 PREDNISONE PER 1 MG: Performed by: INTERNAL MEDICINE

## 2021-09-25 PROCEDURE — 97530 THERAPEUTIC ACTIVITIES: CPT

## 2021-09-25 PROCEDURE — 97110 THERAPEUTIC EXERCISES: CPT

## 2021-09-26 ENCOUNTER — APPOINTMENT (OUTPATIENT)
Dept: ULTRASOUND IMAGING | Facility: HOSPITAL | Age: 46
End: 2021-09-26

## 2021-09-26 LAB
APTT PPP: 26.9 SECONDS (ref 20–40.3)
BASOPHILS # BLD AUTO: 0.01 10*3/MM3 (ref 0–0.2)
BASOPHILS NFR BLD AUTO: 0.2 % (ref 0–1.5)
DEPRECATED RDW RBC AUTO: 66.5 FL (ref 37–54)
EOSINOPHIL # BLD AUTO: 0.09 10*3/MM3 (ref 0–0.4)
EOSINOPHIL NFR BLD AUTO: 1.8 % (ref 0.3–6.2)
ERYTHROCYTE [DISTWIDTH] IN BLOOD BY AUTOMATED COUNT: 19.3 % (ref 12.3–15.4)
HCT VFR BLD AUTO: 33 % (ref 34–46.6)
HGB BLD-MCNC: 10.8 G/DL (ref 12–15.9)
IMM GRANULOCYTES # BLD AUTO: 0.07 10*3/MM3 (ref 0–0.05)
IMM GRANULOCYTES NFR BLD AUTO: 1.4 % (ref 0–0.5)
LYMPHOCYTES # BLD AUTO: 1.23 10*3/MM3 (ref 0.7–3.1)
LYMPHOCYTES NFR BLD AUTO: 24.1 % (ref 19.6–45.3)
MCH RBC QN AUTO: 31.7 PG (ref 26.6–33)
MCHC RBC AUTO-ENTMCNC: 32.7 G/DL (ref 31.5–35.7)
MCV RBC AUTO: 96.8 FL (ref 79–97)
MONOCYTES # BLD AUTO: 0.47 10*3/MM3 (ref 0.1–0.9)
MONOCYTES NFR BLD AUTO: 9.2 % (ref 5–12)
NEUTROPHILS NFR BLD AUTO: 3.23 10*3/MM3 (ref 1.7–7)
NEUTROPHILS NFR BLD AUTO: 63.3 % (ref 42.7–76)
NRBC BLD AUTO-RTO: 0.8 /100 WBC (ref 0–0.2)
PLATELET # BLD AUTO: 115 10*3/MM3 (ref 140–450)
PMV BLD AUTO: 10.5 FL (ref 6–12)
RBC # BLD AUTO: 3.41 10*6/MM3 (ref 3.77–5.28)
WBC # BLD AUTO: 5.1 10*3/MM3 (ref 3.4–10.8)

## 2021-09-26 PROCEDURE — 85730 THROMBOPLASTIN TIME PARTIAL: CPT | Performed by: INTERNAL MEDICINE

## 2021-09-26 PROCEDURE — 63710000001 INSULIN DETEMIR PER 5 UNITS: Performed by: NURSE PRACTITIONER

## 2021-09-26 PROCEDURE — 63710000001 PREDNISONE PER 1 MG: Performed by: INTERNAL MEDICINE

## 2021-09-26 PROCEDURE — 85025 COMPLETE CBC W/AUTO DIFF WBC: CPT | Performed by: INTERNAL MEDICINE

## 2021-09-26 PROCEDURE — 93971 EXTREMITY STUDY: CPT

## 2021-09-26 PROCEDURE — 82962 GLUCOSE BLOOD TEST: CPT

## 2021-09-27 LAB
ALBUMIN SERPL-MCNC: 3.5 G/DL (ref 3.5–5.2)
ALBUMIN/GLOB SERPL: 1.2 G/DL
ALP SERPL-CCNC: 187 U/L (ref 39–117)
ALT SERPL W P-5'-P-CCNC: 100 U/L (ref 1–33)
ANION GAP SERPL CALCULATED.3IONS-SCNC: 10 MMOL/L (ref 5–15)
APTT PPP: 29.3 SECONDS (ref 20–40.3)
AST SERPL-CCNC: 39 U/L (ref 1–32)
BASOPHILS # BLD AUTO: 0.02 10*3/MM3 (ref 0–0.2)
BASOPHILS NFR BLD AUTO: 0.4 % (ref 0–1.5)
BILIRUB SERPL-MCNC: 0.9 MG/DL (ref 0–1.2)
BUN SERPL-MCNC: 10 MG/DL (ref 6–20)
BUN/CREAT SERPL: 35.7 (ref 7–25)
CALCIUM SPEC-SCNC: 9.1 MG/DL (ref 8.6–10.5)
CHLORIDE SERPL-SCNC: 104 MMOL/L (ref 98–107)
CO2 SERPL-SCNC: 26 MMOL/L (ref 22–29)
CREAT SERPL-MCNC: 0.28 MG/DL (ref 0.57–1)
DEPRECATED RDW RBC AUTO: 66.3 FL (ref 37–54)
EOSINOPHIL # BLD AUTO: 0.06 10*3/MM3 (ref 0–0.4)
EOSINOPHIL NFR BLD AUTO: 1.2 % (ref 0.3–6.2)
ERYTHROCYTE [DISTWIDTH] IN BLOOD BY AUTOMATED COUNT: 19.1 % (ref 12.3–15.4)
GFR SERPL CREATININE-BSD FRML MDRD: >150 ML/MIN/1.73
GLOBULIN UR ELPH-MCNC: 2.9 GM/DL
GLUCOSE BLDC GLUCOMTR-MCNC: 135 MG/DL (ref 70–130)
GLUCOSE BLDC GLUCOMTR-MCNC: 163 MG/DL (ref 70–130)
GLUCOSE BLDC GLUCOMTR-MCNC: 167 MG/DL (ref 70–130)
GLUCOSE BLDC GLUCOMTR-MCNC: 177 MG/DL (ref 70–130)
GLUCOSE BLDC GLUCOMTR-MCNC: 180 MG/DL (ref 70–130)
GLUCOSE BLDC GLUCOMTR-MCNC: 224 MG/DL (ref 70–130)
GLUCOSE BLDC GLUCOMTR-MCNC: 242 MG/DL (ref 70–130)
GLUCOSE BLDC GLUCOMTR-MCNC: 83 MG/DL (ref 70–130)
GLUCOSE SERPL-MCNC: 95 MG/DL (ref 65–99)
HCT VFR BLD AUTO: 31.5 % (ref 34–46.6)
HGB BLD-MCNC: 10.5 G/DL (ref 12–15.9)
IMM GRANULOCYTES # BLD AUTO: 0.08 10*3/MM3 (ref 0–0.05)
IMM GRANULOCYTES NFR BLD AUTO: 1.6 % (ref 0–0.5)
LYMPHOCYTES # BLD AUTO: 1.46 10*3/MM3 (ref 0.7–3.1)
LYMPHOCYTES NFR BLD AUTO: 29.3 % (ref 19.6–45.3)
MCH RBC QN AUTO: 32 PG (ref 26.6–33)
MCHC RBC AUTO-ENTMCNC: 33.3 G/DL (ref 31.5–35.7)
MCV RBC AUTO: 96 FL (ref 79–97)
MONOCYTES # BLD AUTO: 0.43 10*3/MM3 (ref 0.1–0.9)
MONOCYTES NFR BLD AUTO: 8.6 % (ref 5–12)
NEUTROPHILS NFR BLD AUTO: 2.93 10*3/MM3 (ref 1.7–7)
NEUTROPHILS NFR BLD AUTO: 58.9 % (ref 42.7–76)
NRBC BLD AUTO-RTO: 0.6 /100 WBC (ref 0–0.2)
PLATELET # BLD AUTO: 127 10*3/MM3 (ref 140–450)
PMV BLD AUTO: 10.2 FL (ref 6–12)
POTASSIUM SERPL-SCNC: 3.4 MMOL/L (ref 3.5–5.2)
POTASSIUM SERPL-SCNC: 4.2 MMOL/L (ref 3.5–5.2)
PROT SERPL-MCNC: 6.4 G/DL (ref 6–8.5)
RBC # BLD AUTO: 3.28 10*6/MM3 (ref 3.77–5.28)
SODIUM SERPL-SCNC: 140 MMOL/L (ref 136–145)
WBC # BLD AUTO: 4.98 10*3/MM3 (ref 3.4–10.8)

## 2021-09-27 PROCEDURE — 63710000001 INSULIN DETEMIR PER 5 UNITS: Performed by: NURSE PRACTITIONER

## 2021-09-27 PROCEDURE — 84132 ASSAY OF SERUM POTASSIUM: CPT | Performed by: INTERNAL MEDICINE

## 2021-09-27 PROCEDURE — 82962 GLUCOSE BLOOD TEST: CPT

## 2021-09-27 PROCEDURE — 85025 COMPLETE CBC W/AUTO DIFF WBC: CPT | Performed by: INTERNAL MEDICINE

## 2021-09-27 PROCEDURE — 63710000001 PREDNISONE PER 1 MG: Performed by: INTERNAL MEDICINE

## 2021-09-27 PROCEDURE — 97110 THERAPEUTIC EXERCISES: CPT

## 2021-09-27 PROCEDURE — 85730 THROMBOPLASTIN TIME PARTIAL: CPT | Performed by: INTERNAL MEDICINE

## 2021-09-27 PROCEDURE — 80053 COMPREHEN METABOLIC PANEL: CPT | Performed by: INTERNAL MEDICINE

## 2021-09-27 PROCEDURE — 97116 GAIT TRAINING THERAPY: CPT

## 2021-09-27 RX ORDER — POTASSIUM CHLORIDE 750 MG/1
40 CAPSULE, EXTENDED RELEASE ORAL ONCE
Status: DISCONTINUED | OUTPATIENT
Start: 2021-09-27 | End: 2021-10-01 | Stop reason: HOSPADM

## 2021-09-28 LAB
APTT PPP: 30.4 SECONDS (ref 20–40.3)
BASOPHILS # BLD AUTO: 0.02 10*3/MM3 (ref 0–0.2)
BASOPHILS NFR BLD AUTO: 0.4 % (ref 0–1.5)
DEPRECATED RDW RBC AUTO: 65.6 FL (ref 37–54)
EOSINOPHIL # BLD AUTO: 0.05 10*3/MM3 (ref 0–0.4)
EOSINOPHIL NFR BLD AUTO: 1.1 % (ref 0.3–6.2)
ERYTHROCYTE [DISTWIDTH] IN BLOOD BY AUTOMATED COUNT: 19.1 % (ref 12.3–15.4)
GLUCOSE BLDC GLUCOMTR-MCNC: 135 MG/DL (ref 70–130)
GLUCOSE BLDC GLUCOMTR-MCNC: 162 MG/DL (ref 70–130)
GLUCOSE BLDC GLUCOMTR-MCNC: 207 MG/DL (ref 70–130)
HCT VFR BLD AUTO: 32.3 % (ref 34–46.6)
HGB BLD-MCNC: 10.8 G/DL (ref 12–15.9)
IMM GRANULOCYTES # BLD AUTO: 0.08 10*3/MM3 (ref 0–0.05)
IMM GRANULOCYTES NFR BLD AUTO: 1.8 % (ref 0–0.5)
LYMPHOCYTES # BLD AUTO: 1.34 10*3/MM3 (ref 0.7–3.1)
LYMPHOCYTES NFR BLD AUTO: 30 % (ref 19.6–45.3)
MCH RBC QN AUTO: 32 PG (ref 26.6–33)
MCHC RBC AUTO-ENTMCNC: 33.4 G/DL (ref 31.5–35.7)
MCV RBC AUTO: 95.8 FL (ref 79–97)
MONOCYTES # BLD AUTO: 0.41 10*3/MM3 (ref 0.1–0.9)
MONOCYTES NFR BLD AUTO: 9.2 % (ref 5–12)
NEUTROPHILS NFR BLD AUTO: 2.57 10*3/MM3 (ref 1.7–7)
NEUTROPHILS NFR BLD AUTO: 57.5 % (ref 42.7–76)
NRBC BLD AUTO-RTO: 0.4 /100 WBC (ref 0–0.2)
PLATELET # BLD AUTO: 131 10*3/MM3 (ref 140–450)
PMV BLD AUTO: 10 FL (ref 6–12)
RBC # BLD AUTO: 3.37 10*6/MM3 (ref 3.77–5.28)
WBC # BLD AUTO: 4.47 10*3/MM3 (ref 3.4–10.8)

## 2021-09-28 PROCEDURE — 97535 SELF CARE MNGMENT TRAINING: CPT

## 2021-09-28 PROCEDURE — 63710000001 INSULIN DETEMIR PER 5 UNITS: Performed by: NURSE PRACTITIONER

## 2021-09-28 PROCEDURE — 85025 COMPLETE CBC W/AUTO DIFF WBC: CPT | Performed by: INTERNAL MEDICINE

## 2021-09-28 PROCEDURE — 97110 THERAPEUTIC EXERCISES: CPT

## 2021-09-28 PROCEDURE — 82962 GLUCOSE BLOOD TEST: CPT

## 2021-09-28 PROCEDURE — 63710000001 PREDNISONE PER 5 MG: Performed by: INTERNAL MEDICINE

## 2021-09-28 PROCEDURE — 97530 THERAPEUTIC ACTIVITIES: CPT

## 2021-09-28 PROCEDURE — 85730 THROMBOPLASTIN TIME PARTIAL: CPT | Performed by: INTERNAL MEDICINE

## 2021-09-29 LAB
AMORPH URATE CRY URNS QL MICRO: ABNORMAL /HPF
APTT PPP: 22 SECONDS (ref 20–40.3)
BACTERIA UR QL AUTO: ABNORMAL /HPF
BASOPHILS # BLD AUTO: 0.03 10*3/MM3 (ref 0–0.2)
BASOPHILS NFR BLD AUTO: 0.8 % (ref 0–1.5)
BILIRUB UR QL STRIP: NEGATIVE
CLARITY UR: ABNORMAL
COLOR UR: YELLOW
DEPRECATED RDW RBC AUTO: 62.9 FL (ref 37–54)
EOSINOPHIL # BLD AUTO: 0.04 10*3/MM3 (ref 0–0.4)
EOSINOPHIL NFR BLD AUTO: 1 % (ref 0.3–6.2)
ERYTHROCYTE [DISTWIDTH] IN BLOOD BY AUTOMATED COUNT: 18.4 % (ref 12.3–15.4)
GLUCOSE BLDC GLUCOMTR-MCNC: 164 MG/DL (ref 70–130)
GLUCOSE BLDC GLUCOMTR-MCNC: 198 MG/DL (ref 70–130)
GLUCOSE BLDC GLUCOMTR-MCNC: 201 MG/DL (ref 70–130)
GLUCOSE UR STRIP-MCNC: NEGATIVE MG/DL
HCT VFR BLD AUTO: 31.7 % (ref 34–46.6)
HGB BLD-MCNC: 10.8 G/DL (ref 12–15.9)
HGB UR QL STRIP.AUTO: ABNORMAL
HYALINE CASTS UR QL AUTO: ABNORMAL /LPF
IMM GRANULOCYTES # BLD AUTO: 0.06 10*3/MM3 (ref 0–0.05)
IMM GRANULOCYTES NFR BLD AUTO: 1.5 % (ref 0–0.5)
KETONES UR QL STRIP: NEGATIVE
LEUKOCYTE ESTERASE UR QL STRIP.AUTO: ABNORMAL
LYMPHOCYTES # BLD AUTO: 0.96 10*3/MM3 (ref 0.7–3.1)
LYMPHOCYTES NFR BLD AUTO: 24.5 % (ref 19.6–45.3)
MCH RBC QN AUTO: 32 PG (ref 26.6–33)
MCHC RBC AUTO-ENTMCNC: 34.1 G/DL (ref 31.5–35.7)
MCV RBC AUTO: 93.8 FL (ref 79–97)
MONOCYTES # BLD AUTO: 0.36 10*3/MM3 (ref 0.1–0.9)
MONOCYTES NFR BLD AUTO: 9.2 % (ref 5–12)
NEUTROPHILS NFR BLD AUTO: 2.47 10*3/MM3 (ref 1.7–7)
NEUTROPHILS NFR BLD AUTO: 63 % (ref 42.7–76)
NITRITE UR QL STRIP: NEGATIVE
NRBC BLD AUTO-RTO: 0 /100 WBC (ref 0–0.2)
PH UR STRIP.AUTO: 6.5 [PH] (ref 5–9)
PLATELET # BLD AUTO: 123 10*3/MM3 (ref 140–450)
PMV BLD AUTO: 10.2 FL (ref 6–12)
PROT UR QL STRIP: NEGATIVE
RBC # BLD AUTO: 3.38 10*6/MM3 (ref 3.77–5.28)
RBC # UR: ABNORMAL /HPF
REF LAB TEST METHOD: ABNORMAL
SP GR UR STRIP: 1 (ref 1–1.03)
SQUAMOUS #/AREA URNS HPF: ABNORMAL /HPF
UROBILINOGEN UR QL STRIP: ABNORMAL
WBC # BLD AUTO: 3.92 10*3/MM3 (ref 3.4–10.8)
WBC UR QL AUTO: ABNORMAL /HPF

## 2021-09-29 PROCEDURE — 85025 COMPLETE CBC W/AUTO DIFF WBC: CPT | Performed by: INTERNAL MEDICINE

## 2021-09-29 PROCEDURE — 85730 THROMBOPLASTIN TIME PARTIAL: CPT | Performed by: INTERNAL MEDICINE

## 2021-09-29 PROCEDURE — 97535 SELF CARE MNGMENT TRAINING: CPT

## 2021-09-29 PROCEDURE — 63710000001 PREDNISONE PER 5 MG: Performed by: INTERNAL MEDICINE

## 2021-09-29 PROCEDURE — 82962 GLUCOSE BLOOD TEST: CPT

## 2021-09-29 PROCEDURE — 63710000001 INSULIN DETEMIR PER 5 UNITS: Performed by: NURSE PRACTITIONER

## 2021-09-29 PROCEDURE — 97110 THERAPEUTIC EXERCISES: CPT

## 2021-09-29 PROCEDURE — 81001 URINALYSIS AUTO W/SCOPE: CPT | Performed by: INTERNAL MEDICINE

## 2021-09-29 PROCEDURE — 25010000002 CEFTRIAXONE PER 250 MG: Performed by: NURSE PRACTITIONER

## 2021-09-29 PROCEDURE — 97530 THERAPEUTIC ACTIVITIES: CPT

## 2021-09-30 LAB
ALBUMIN SERPL-MCNC: 3.5 G/DL (ref 3.5–5.2)
ALBUMIN/GLOB SERPL: 1.2 G/DL
ALP SERPL-CCNC: 151 U/L (ref 39–117)
ALT SERPL W P-5'-P-CCNC: 68 U/L (ref 1–33)
ANION GAP SERPL CALCULATED.3IONS-SCNC: 10 MMOL/L (ref 5–15)
APTT PPP: 32.6 SECONDS (ref 20–40.3)
AST SERPL-CCNC: 28 U/L (ref 1–32)
BASOPHILS # BLD AUTO: 0.02 10*3/MM3 (ref 0–0.2)
BASOPHILS NFR BLD AUTO: 0.5 % (ref 0–1.5)
BILIRUB SERPL-MCNC: 0.8 MG/DL (ref 0–1.2)
BUN SERPL-MCNC: 6 MG/DL (ref 6–20)
BUN/CREAT SERPL: 20.7 (ref 7–25)
CALCIUM SPEC-SCNC: 9 MG/DL (ref 8.6–10.5)
CHLORIDE SERPL-SCNC: 103 MMOL/L (ref 98–107)
CO2 SERPL-SCNC: 26 MMOL/L (ref 22–29)
CREAT SERPL-MCNC: 0.29 MG/DL (ref 0.57–1)
DEPRECATED RDW RBC AUTO: 63 FL (ref 37–54)
EOSINOPHIL # BLD AUTO: 0.04 10*3/MM3 (ref 0–0.4)
EOSINOPHIL NFR BLD AUTO: 1.1 % (ref 0.3–6.2)
ERYTHROCYTE [DISTWIDTH] IN BLOOD BY AUTOMATED COUNT: 18.4 % (ref 12.3–15.4)
GFR SERPL CREATININE-BSD FRML MDRD: >150 ML/MIN/1.73
GLOBULIN UR ELPH-MCNC: 2.9 GM/DL
GLUCOSE BLDC GLUCOMTR-MCNC: 138 MG/DL (ref 70–130)
GLUCOSE BLDC GLUCOMTR-MCNC: 165 MG/DL (ref 70–130)
GLUCOSE BLDC GLUCOMTR-MCNC: 201 MG/DL (ref 70–130)
GLUCOSE BLDC GLUCOMTR-MCNC: 237 MG/DL (ref 70–130)
GLUCOSE SERPL-MCNC: 142 MG/DL (ref 65–99)
HCT VFR BLD AUTO: 31.1 % (ref 34–46.6)
HGB BLD-MCNC: 10.5 G/DL (ref 12–15.9)
IMM GRANULOCYTES # BLD AUTO: 0.07 10*3/MM3 (ref 0–0.05)
IMM GRANULOCYTES NFR BLD AUTO: 1.9 % (ref 0–0.5)
LYMPHOCYTES # BLD AUTO: 1.15 10*3/MM3 (ref 0.7–3.1)
LYMPHOCYTES NFR BLD AUTO: 31.5 % (ref 19.6–45.3)
MCH RBC QN AUTO: 31.8 PG (ref 26.6–33)
MCHC RBC AUTO-ENTMCNC: 33.8 G/DL (ref 31.5–35.7)
MCV RBC AUTO: 94.2 FL (ref 79–97)
MONOCYTES # BLD AUTO: 0.44 10*3/MM3 (ref 0.1–0.9)
MONOCYTES NFR BLD AUTO: 12.1 % (ref 5–12)
NEUTROPHILS NFR BLD AUTO: 1.93 10*3/MM3 (ref 1.7–7)
NEUTROPHILS NFR BLD AUTO: 52.9 % (ref 42.7–76)
NRBC BLD AUTO-RTO: 0 /100 WBC (ref 0–0.2)
PLATELET # BLD AUTO: 131 10*3/MM3 (ref 140–450)
PMV BLD AUTO: 10 FL (ref 6–12)
POTASSIUM SERPL-SCNC: 3.2 MMOL/L (ref 3.5–5.2)
POTASSIUM SERPL-SCNC: 3.5 MMOL/L (ref 3.5–5.2)
PROT SERPL-MCNC: 6.4 G/DL (ref 6–8.5)
RBC # BLD AUTO: 3.3 10*6/MM3 (ref 3.77–5.28)
SODIUM SERPL-SCNC: 139 MMOL/L (ref 136–145)
WBC # BLD AUTO: 3.65 10*3/MM3 (ref 3.4–10.8)

## 2021-09-30 PROCEDURE — 63710000001 INSULIN DETEMIR PER 5 UNITS: Performed by: NURSE PRACTITIONER

## 2021-09-30 PROCEDURE — 80053 COMPREHEN METABOLIC PANEL: CPT | Performed by: INTERNAL MEDICINE

## 2021-09-30 PROCEDURE — 63710000001 PREDNISONE PER 5 MG: Performed by: INTERNAL MEDICINE

## 2021-09-30 PROCEDURE — 85730 THROMBOPLASTIN TIME PARTIAL: CPT | Performed by: INTERNAL MEDICINE

## 2021-09-30 PROCEDURE — 85025 COMPLETE CBC W/AUTO DIFF WBC: CPT | Performed by: INTERNAL MEDICINE

## 2021-09-30 PROCEDURE — 97530 THERAPEUTIC ACTIVITIES: CPT

## 2021-09-30 PROCEDURE — 82962 GLUCOSE BLOOD TEST: CPT

## 2021-09-30 PROCEDURE — 97110 THERAPEUTIC EXERCISES: CPT

## 2021-09-30 PROCEDURE — 84132 ASSAY OF SERUM POTASSIUM: CPT | Performed by: INTERNAL MEDICINE

## 2021-09-30 RX ORDER — POTASSIUM CHLORIDE 750 MG/1
40 CAPSULE, EXTENDED RELEASE ORAL ONCE
Status: DISCONTINUED | OUTPATIENT
Start: 2021-09-30 | End: 2021-10-01 | Stop reason: HOSPADM

## 2021-10-01 VITALS — HEART RATE: 79 BPM

## 2021-10-01 LAB
APTT PPP: 32.7 SECONDS (ref 20–40.3)
BASOPHILS # BLD AUTO: 0.04 10*3/MM3 (ref 0–0.2)
BASOPHILS NFR BLD AUTO: 1.1 % (ref 0–1.5)
DEPRECATED RDW RBC AUTO: 63.6 FL (ref 37–54)
EOSINOPHIL # BLD AUTO: 0.05 10*3/MM3 (ref 0–0.4)
EOSINOPHIL NFR BLD AUTO: 1.3 % (ref 0.3–6.2)
ERYTHROCYTE [DISTWIDTH] IN BLOOD BY AUTOMATED COUNT: 18.2 % (ref 12.3–15.4)
GLUCOSE BLDC GLUCOMTR-MCNC: 110 MG/DL (ref 70–130)
GLUCOSE BLDC GLUCOMTR-MCNC: 177 MG/DL (ref 70–130)
HCT VFR BLD AUTO: 33.2 % (ref 34–46.6)
HGB BLD-MCNC: 10.8 G/DL (ref 12–15.9)
HOLD SPECIMEN: NORMAL
IMM GRANULOCYTES # BLD AUTO: 0.06 10*3/MM3 (ref 0–0.05)
IMM GRANULOCYTES NFR BLD AUTO: 1.6 % (ref 0–0.5)
LYMPHOCYTES # BLD AUTO: 1.24 10*3/MM3 (ref 0.7–3.1)
LYMPHOCYTES NFR BLD AUTO: 32.8 % (ref 19.6–45.3)
MCH RBC QN AUTO: 31.2 PG (ref 26.6–33)
MCHC RBC AUTO-ENTMCNC: 32.5 G/DL (ref 31.5–35.7)
MCV RBC AUTO: 96 FL (ref 79–97)
MONOCYTES # BLD AUTO: 0.48 10*3/MM3 (ref 0.1–0.9)
MONOCYTES NFR BLD AUTO: 12.7 % (ref 5–12)
NEUTROPHILS NFR BLD AUTO: 1.91 10*3/MM3 (ref 1.7–7)
NEUTROPHILS NFR BLD AUTO: 50.5 % (ref 42.7–76)
NRBC BLD AUTO-RTO: 0.5 /100 WBC (ref 0–0.2)
PLATELET # BLD AUTO: 142 10*3/MM3 (ref 140–450)
PMV BLD AUTO: 10.2 FL (ref 6–12)
RBC # BLD AUTO: 3.46 10*6/MM3 (ref 3.77–5.28)
WBC # BLD AUTO: 3.78 10*3/MM3 (ref 3.4–10.8)

## 2021-10-01 PROCEDURE — 82962 GLUCOSE BLOOD TEST: CPT

## 2021-10-01 PROCEDURE — 63710000001 PREDNISONE PER 5 MG: Performed by: INTERNAL MEDICINE

## 2021-10-01 PROCEDURE — 85025 COMPLETE CBC W/AUTO DIFF WBC: CPT | Performed by: INTERNAL MEDICINE

## 2021-10-01 PROCEDURE — 85730 THROMBOPLASTIN TIME PARTIAL: CPT | Performed by: INTERNAL MEDICINE

## 2021-10-01 PROCEDURE — 63710000001 INSULIN DETEMIR PER 5 UNITS: Performed by: NURSE PRACTITIONER

## 2021-10-04 ENCOUNTER — TELEPHONE (OUTPATIENT)
Dept: RADIATION ONCOLOGY | Facility: HOSPITAL | Age: 46
End: 2021-10-04

## 2021-10-04 ENCOUNTER — LAB REQUISITION (OUTPATIENT)
Dept: LAB | Facility: HOSPITAL | Age: 46
End: 2021-10-04

## 2021-10-04 ENCOUNTER — TELEPHONE (OUTPATIENT)
Dept: FAMILY MEDICINE CLINIC | Facility: CLINIC | Age: 46
End: 2021-10-04

## 2021-10-04 ENCOUNTER — NURSING HOME (OUTPATIENT)
Dept: FAMILY MEDICINE CLINIC | Facility: CLINIC | Age: 46
End: 2021-10-04

## 2021-10-04 VITALS — SYSTOLIC BLOOD PRESSURE: 110 MMHG | DIASTOLIC BLOOD PRESSURE: 66 MMHG | OXYGEN SATURATION: 88 % | HEART RATE: 88 BPM

## 2021-10-04 DIAGNOSIS — E55.9 VITAMIN D DEFICIENCY: ICD-10-CM

## 2021-10-04 DIAGNOSIS — J12.82 PNEUMONIA DUE TO COVID-19 VIRUS: ICD-10-CM

## 2021-10-04 DIAGNOSIS — R53.1 WEAKNESS ACQUIRED IN INTENSIVE CARE UNIT: Primary | ICD-10-CM

## 2021-10-04 DIAGNOSIS — N39.0 URINARY TRACT INFECTION WITHOUT HEMATURIA, SITE UNSPECIFIED: ICD-10-CM

## 2021-10-04 DIAGNOSIS — F32.A DEPRESSIVE DISORDER: ICD-10-CM

## 2021-10-04 DIAGNOSIS — N39.0 URINARY TRACT INFECTION, SITE NOT SPECIFIED: ICD-10-CM

## 2021-10-04 DIAGNOSIS — E11.649 TYPE 2 DIABETES MELLITUS WITH HYPOGLYCEMIA WITHOUT COMA, WITH LONG-TERM CURRENT USE OF INSULIN (HCC): ICD-10-CM

## 2021-10-04 DIAGNOSIS — L89.159 PRESSURE INJURY OF SKIN OF SACRAL REGION, UNSPECIFIED INJURY STAGE: ICD-10-CM

## 2021-10-04 DIAGNOSIS — U07.1 PNEUMONIA DUE TO COVID-19 VIRUS: ICD-10-CM

## 2021-10-04 DIAGNOSIS — Z79.4 TYPE 2 DIABETES MELLITUS WITH HYPOGLYCEMIA WITHOUT COMA, WITH LONG-TERM CURRENT USE OF INSULIN (HCC): ICD-10-CM

## 2021-10-04 DIAGNOSIS — I10 ESSENTIAL HYPERTENSION: ICD-10-CM

## 2021-10-04 PROBLEM — R00.2 PALPITATION: Status: RESOLVED | Noted: 2018-05-29 | Resolved: 2021-10-04

## 2021-10-04 PROBLEM — E43 SEVERE MALNUTRITION: Status: RESOLVED | Noted: 2021-08-26 | Resolved: 2021-10-04

## 2021-10-04 PROBLEM — S93.401A SPRAIN OF RIGHT ANKLE: Status: RESOLVED | Noted: 2018-01-02 | Resolved: 2021-10-04

## 2021-10-04 PROBLEM — R00.0 TACHYCARDIA: Status: RESOLVED | Noted: 2018-05-29 | Resolved: 2021-10-04

## 2021-10-04 LAB
BACTERIA UR QL AUTO: ABNORMAL /HPF
BILIRUB UR QL STRIP: NEGATIVE
CLARITY UR: CLEAR
COLOR UR: ABNORMAL
GLUCOSE UR STRIP-MCNC: NEGATIVE MG/DL
HGB UR QL STRIP.AUTO: ABNORMAL
HYALINE CASTS UR QL AUTO: ABNORMAL /LPF
KETONES UR QL STRIP: NEGATIVE
LEUKOCYTE ESTERASE UR QL STRIP.AUTO: ABNORMAL
NITRITE UR QL STRIP: NEGATIVE
PH UR STRIP.AUTO: 6.5 [PH] (ref 5–9)
PROT UR QL STRIP: NEGATIVE
RBC # UR: ABNORMAL /HPF
REF LAB TEST METHOD: ABNORMAL
SP GR UR STRIP: 1 (ref 1–1.03)
SQUAMOUS #/AREA URNS HPF: ABNORMAL /HPF
UROBILINOGEN UR QL STRIP: ABNORMAL
WBC UR QL AUTO: ABNORMAL /HPF

## 2021-10-04 PROCEDURE — 99306 1ST NF CARE HIGH MDM 50: CPT | Performed by: STUDENT IN AN ORGANIZED HEALTH CARE EDUCATION/TRAINING PROGRAM

## 2021-10-04 PROCEDURE — 81001 URINALYSIS AUTO W/SCOPE: CPT | Performed by: FAMILY MEDICINE

## 2021-10-04 PROCEDURE — 87086 URINE CULTURE/COLONY COUNT: CPT | Performed by: FAMILY MEDICINE

## 2021-10-04 NOTE — PROGRESS NOTES
NURSING HOME HISTORY AND PHYSICAL    NAME: Corrina Jensen  : 1975  MRN: [id number]    DATE & TIME SEEN: 10/04/21 at 7:55AM    PCP: Jose Jean-Baptiste MD    CODE STATUS: Full code    NURSING HOME: [facility]    Chief Complaint: Weakness s/p COVID inpatient hospitalization    HPI: Corrina Jensen is a 46 y.o. female with PMH of DM, HTN, depression, HDL, GERD, and left breast cancer who was admitted to [facility] s/p intubation due to COVID pneumonia for physical rehabilitation. Patient reports having some mild back pain, which is controlled with Percocet. Patient reports an overall weakness that has been present since she was extubated, especially on her upper extremities bilaterally. Patient states that she is unable to use her arms due to weakness, and can not dorsiflex his lower extremities bilaterally and is unable to walk currently. Patient has not started her physical therapy yet. Patient admits to some vaginal discomfort. She currently has a black catheter placed and was receiving rocephin to treat a UTI. Patient denies any fevers, chills, headaches, dizziness, lightheadedness, cough, sore throat, chest pain, shortness of breath, abdominal pain, diarrhea, constipation, nausea or vomiting. Patient reports that she got the first dose of Pfizer vaccine and couldn't get the second dose because she already tested positive for COVID. Patient expresses her desire to complete the schedule vaccines for COVID.    CONCURRENT MEDICAL HISTORY:  Past Medical History:   Diagnosis Date   • Acute atopic conjunctivitis    • Acute bronchitis    • Allergic rhinitis due to pollen    • Arrhythmia     wearing heart monitor    • Arthritis    • Asthma    • Bilateral foot pain    • Breast cancer (HCC)    • Breast cyst    • Breast lump    • Chest pain    • Chronic low back pain    • Contact dermatitis due to drugs and medicine    • Cyst of ovary    • Diabetes mellitus (HCC)    • GERD  (gastroesophageal reflux disease)    • Hyperlipidemia    • Hypertension    • Infestation by Sarcoptes scabiei annalisa hominis    • Low back pain    • Mastodynia    • Nausea and vomiting    • On long term drug therapy    • Pain in wrist    • Plantar fasciitis    • Rash    • Skin disorder     breast-possible superficial cellulitis   • Spinal stenosis    • Tinea pedis    • Tobacco dependence syndrome    • Upper respiratory infection    • Vitamin D deficiency    • Wheezing        PAST SURGICAL HISTORY:  Past Surgical History:   Procedure Laterality Date   • BREAST BIOPSY     • BREAST CYST ASPIRATION     • BREAST LUMPECTOMY Left 1/27/2021    Procedure: LEFT BREAST LUMPECTOMY WITH NEEDLE LOCALIZATION Needle localization to be done in the women's center by radiology first   @07:00 a.m.;  Surgeon: Mat Garay MD;  Location: [facility] OR;  Service: General;  Laterality: Left;   • BREAST LUMPECTOMY WITH SENTINEL NODE BIOPSY AND AXILLARY NODE DISSECTION Left 2/22/2021    Procedure: LEFT BREAST LUMPECTOMY WITH SENTINEL NODE BIOPSY. REMOVAL OF CYST FROM LEFT CHEEK                              (INJECTION @07: 00 A.M);  Surgeon: Mat Garay MD;  Location: [facility] OR;  Service: General;  Laterality: Left;   • BREAST SURGERY      excision breast lesion   • CARDIAC CATHETERIZATION N/A 7/3/2018    Procedure: Coronary angiography;  Surgeon: Arun Cadet MD;  Location: [facility] CATH INVASIVE LOCATION;  Service: Cardiovascular   • CARDIAC CATHETERIZATION     • CHOLECYSTECTOMY     • INJECTION OF MEDICATION      Kenalog (4)       FAMILY HISTORY:  Family History   Problem Relation Age of Onset   • Coronary artery disease Other    • Breast cancer Mother    • Breast cancer Maternal Aunt    • Diabetes Father         SOCIAL HISTORY:  Social History     Socioeconomic History   • Marital status:      Spouse name: Not on file   • Number of children: Not on file   • Years of education: Not on file   • Highest  education level: Not on file   Tobacco Use   • Smoking status: Current Every Day Smoker     Types: Electronic Cigarette   • Smokeless tobacco: Never Used   Vaping Use   • Vaping Use: Every day   • Substances: Nicotine   • Devices: Refillable tank   Substance and Sexual Activity   • Alcohol use: No   • Drug use: No   • Sexual activity: Defer       CURRENT MEDS:    Current Outpatient Medications:   •  amitriptyline (ELAVIL) 50 MG tablet, Take 50 mg by mouth Every Night., Disp: , Rfl:   •  anastrozole (ARIMIDEX) 1 MG tablet, TAKE 1 TABLET BY MOUTH DAILY, Disp: 90 tablet, Rfl: 0  •  atorvastatin (LIPITOR) 20 MG tablet, Take 20 mg by mouth Every Night., Disp: , Rfl:   •  B-D ULTRAFINE III SHORT PEN 31G X 8 MM misc, , Disp: , Rfl:   •  cetirizine (zyrTEC) 10 MG tablet, Take 10 mg by mouth Daily., Disp: , Rfl:   •  docusate sodium (COLACE) 100 MG capsule, Take 1 capsule by mouth 2 (Two) Times a Day As Needed for Constipation., Disp: 60 capsule, Rfl: 2  •  FeroSul 325 (65 Fe) MG tablet, TAKE ONE TABLET BY MOUTH ONE A DAY WITH BREAKFAST, Disp: 30 tablet, Rfl: 3  •  folic acid (FOLVITE) 1 MG tablet, TAKE 1 TABLET BY MOUTH DAILY, Disp: 90 tablet, Rfl: 1  •  FREESTYLE LITE test strip, 1 stick by Percutaneous route 4 (Four) Times a Day., Disp: , Rfl:   •  glyburide micronized (GLYNASE) 6 MG tablet, , Disp: , Rfl:   •  GLYBURIDE PO, Take 6 mg by mouth Daily., Disp: , Rfl:   •  insulin aspart (novoLOG) 100 UNIT/ML injection, Inject 0-24 Units under the skin into the appropriate area as directed 3 (Three) Times a Day Before Meals., Disp: 100 mL, Rfl: 12  •  insulin detemir (LEVEMIR) 100 UNIT/ML injection, Inject 36 Units under the skin into the appropriate area as directed 2 (Two) Times a Day for 30 days., Disp: 100 mL, Rfl: 12  •  Lancets (FREESTYLE) lancets, 1 each by Other route 4 (Four) Times a Day., Disp: , Rfl:   •  losartan (COZAAR) 25 MG tablet, Take 1 tablet by mouth Daily., Disp: 30 tablet, Rfl: 4  •  LYRICA 150 MG  capsule, Take 150 mg by mouth 3 (Three) Times a Day., Disp: , Rfl:   •  metoprolol tartrate (LOPRESSOR) 50 MG tablet, Take 1 tablet by mouth Every 12 (Twelve) Hours., Disp: , Rfl:   •  montelukast (SINGULAIR) 10 MG tablet, Take 1 tablet by mouth Every Night., Disp: 30 tablet, Rfl: 6  •  OLANZapine (zyPREXA) 10 MG tablet, Take 10 mg by mouth Every Night., Disp: , Rfl:   •  omeprazole (priLOSEC) 40 MG capsule, Take 40 mg by mouth Daily., Disp: , Rfl:   •  oxyCODONE-acetaminophen (PERCOCET)  MG per tablet, Take 1 tablet by mouth Every 6 (Six) Hours., Disp: , Rfl:   •  tiZANidine (ZANAFLEX) 4 MG tablet, Take 4 mg by mouth Every 6 (Six) Hours As Needed., Disp: , Rfl:   •  VENTOLIN  (90 Base) MCG/ACT inhaler, Inhale 2 puffs Every 4 (Four) Hours., Disp: , Rfl:   •  vitamin B-12 (CYANOCOBALAMIN) 1000 MCG tablet, Take 1 tablet by mouth Daily., Disp: 90 tablet, Rfl: 1    ALLERGIES:  Latex, Strawberry, Wound dressing adhesive, Bactroban [mupirocin calcium], Neomycin-bacitracin-polymyxin [bacitracin-neomycin-polymyxin], and Other    Review of Systems:  Review of Systems   Constitutional: Negative for appetite change, chills, diaphoresis, fatigue and fever.   HENT: Negative for ear pain, postnasal drip, rhinorrhea, sinus pressure, sinus pain, sneezing, sore throat and trouble swallowing.    Eyes: Negative for discharge and visual disturbance.   Respiratory: Negative for cough, chest tightness and shortness of breath.    Cardiovascular: Negative for chest pain, palpitations and leg swelling.   Gastrointestinal: Negative for abdominal pain, constipation, diarrhea, nausea and vomiting.   Genitourinary: Positive for dysuria. Negative for difficulty urinating and frequency.   Musculoskeletal: Positive for back pain. Negative for joint swelling and myalgias.   Skin: Positive for wound. Negative for rash.   Neurological: Negative for dizziness, numbness and headaches.   Psychiatric/Behavioral: Negative for behavioral  problems. The patient is not nervous/anxious.        /66   Pulse 88   SpO2 (!) 88%   Physical Exam  Constitutional:       General: She is awake.      Appearance: She is obese. She is not ill-appearing or toxic-appearing.   HENT:      Head: Atraumatic.      Comments: TM wasting     Nose: Nose normal.      Mouth/Throat:      Mouth: Mucous membranes are moist.      Pharynx: Oropharynx is clear.   Eyes:      Conjunctiva/sclera: Conjunctivae normal.   Cardiovascular:      Rate and Rhythm: Normal rate and regular rhythm.   Pulmonary:      Effort: Pulmonary effort is normal.      Breath sounds: Normal breath sounds. No wheezing or rhonchi.   Abdominal:      General: Bowel sounds are normal.      Palpations: Abdomen is soft.      Tenderness: There is no abdominal tenderness.   Musculoskeletal:      Cervical back: Neck supple.      Comments: Bilateral trace edema in lower extremities.    Skin:         Neurological:      Mental Status: She is alert.      Sensory: Sensation is intact.      Motor: Weakness present.      Comments: Foot dorsiflexion 2/5 b/l  Plantar flexion 4/5 b/l  Decrease ROM of upper extremities           ASSESSMENT/PLAN: 46 y.o. female with:  Problem List Items Addressed This Visit        Cardiac and Vasculature    Essential hypertension  - Continue current medications.    History of DVT on left leg  - Continue current medications        Mental Health    Depressive disorder  - Continue current medications       Other    Pneumonia due to COVID-19 virus  - Patient completed her dexamethasone 10 mg for 10 days.       Other Visit Diagnoses     Weakness acquired in intensive care unit    -  Primary  - PT/OT evaluation and treatment pending  - Will return in 2 weeks to reevaluate progress of rehab    Type 2 diabetes mellitus with hypoglycemia without coma, with long-term current use of insulin (HCC)      - Patient currently on 46 units of Levemir BID and sliding scale  - Currently stable     Pressure  injury of skin of sacral region, unspecified injury stage        Vitamin D deficiency        Urinary tract infection without hematuria, site unspecified      - Urine culture ordered  - Remove black catheter   Chronic Back Pain  - Continue current meds               Dr. Robb is the attending on record for this patient, He is aware of the patient's status and agrees with the above.    Signature  [place] [address]  Office: [phone]        This document has been electronically signed by Meredith Brandon MD on October 4, 2021 15:02 CDT       This document has been electronically signed by Meredith Brandon MD on October 4, 2021 15:02 CDT

## 2021-10-04 NOTE — TELEPHONE ENCOUNTER
Received a call from Dominick who is a charge nurse at [facility] and [facility]. She said, per patient, she is supposed to be taking an estrogen replacement, but Dominick does not see such orders. Please call to clarify [phone]

## 2021-10-04 NOTE — TELEPHONE ENCOUNTER
[facility] AND [facility] IS REQUESTING A CALL BACK ABOUT REMOVING PT'S CATHETER.    THEIR CALL BACK NUMBER [phone] ASK FOR ROMEO.

## 2021-10-05 ENCOUNTER — TELEPHONE (OUTPATIENT)
Dept: FAMILY MEDICINE CLINIC | Facility: CLINIC | Age: 46
End: 2021-10-05

## 2021-10-05 LAB — BACTERIA SPEC AEROBE CULT: NORMAL

## 2021-10-05 NOTE — TELEPHONE ENCOUNTER
Connie from [facility] and [facility] called wanting to let you know that her urinalysis came back good. If you have any questions you can call [phone]

## 2021-10-08 RX ORDER — OXYCODONE AND ACETAMINOPHEN 10; 325 MG/1; MG/1
1 TABLET ORAL EVERY 4 HOURS PRN
Qty: 120 TABLET | Refills: 0 | Status: SHIPPED | OUTPATIENT
Start: 2021-10-08 | End: 2021-10-18 | Stop reason: SDUPTHER

## 2021-10-15 NOTE — PROGRESS NOTES
Nursing Home History and Physical Note       PATIENT NAME: Corrina Jensen                                                                          YOB: 1975            DATE OF SERVICE: 10/04/2021  FACILITY: [][facility]    [x] [facility]   [] [facility]    [] [facility]    [] [facility]   [] Other   ______________________________________________________________________     CHIEF COMPLAINT:  First-time visit      HISTORY OF PRESENT ILLNESS:   46-year-old female recently hospitalized with Covid pneumonia with respiratory failure requiring mechanical ventilation and prolonged hospitalization currently undergoing rehab admitted to [facility] for ongoing rehab.  Patient continues to have severe weakness along with ongoing issues with back pain treated with opioids generalized weakness and fatigue she is unable to dorsiflex her lower extremities and is unable to bear weight.  Patient has not started physical therapy as of this visit.  Please see Dr. Brandon's note for more detailed information regarding this encounter physical exam findings and plan of care.  PAST MEDICAL & SURGICAL HISTORY:   Past Medical History:   Diagnosis Date   • Acute atopic conjunctivitis    • Acute bronchitis    • Allergic rhinitis due to pollen    • Arrhythmia     wearing heart monitor    • Arthritis    • Asthma    • Bilateral foot pain    • Breast cancer (HCC)    • Breast cyst    • Breast lump    • Chest pain    • Chronic low back pain    • Contact dermatitis due to drugs and medicine    • Cyst of ovary    • Diabetes mellitus (HCC)    • GERD (gastroesophageal reflux disease)    • Hyperlipidemia    • Hypertension    • Infestation by Sarcoptes scabiei annalisa hominis    • Low back pain    • Mastodynia    • Nausea and vomiting    • On long term drug therapy    • Pain in wrist    • Plantar fasciitis    • Rash    • Skin disorder     breast-possible superficial cellulitis   • Spinal  stenosis    • Tinea pedis    • Tobacco dependence syndrome    • Upper respiratory infection    • Vitamin D deficiency    • Wheezing       Past Surgical History:   Procedure Laterality Date   • BREAST BIOPSY     • BREAST CYST ASPIRATION     • BREAST LUMPECTOMY Left 1/27/2021    Procedure: LEFT BREAST LUMPECTOMY WITH NEEDLE LOCALIZATION Needle localization to be done in the women's center by radiology first   @07:00 a.m.;  Surgeon: Mat Garay MD;  Location: [facility] OR;  Service: General;  Laterality: Left;   • BREAST LUMPECTOMY WITH SENTINEL NODE BIOPSY AND AXILLARY NODE DISSECTION Left 2/22/2021    Procedure: LEFT BREAST LUMPECTOMY WITH SENTINEL NODE BIOPSY. REMOVAL OF CYST FROM LEFT CHEEK                              (INJECTION @07: 00 A.M);  Surgeon: Mat Garay MD;  Location: [facility] OR;  Service: General;  Laterality: Left;   • BREAST SURGERY      excision breast lesion   • CARDIAC CATHETERIZATION N/A 7/3/2018    Procedure: Coronary angiography;  Surgeon: Arun Cadet MD;  Location: [facility] CATH INVASIVE LOCATION;  Service: Cardiovascular   • CARDIAC CATHETERIZATION     • CHOLECYSTECTOMY     • INJECTION OF MEDICATION      Kenalog (4)         MEDICATIONS:  I have reviewed and reconciled the patients medication list in the patients chart at the skilled nursing facility today.      ALLERGIES:    Allergies   Allergen Reactions   • Latex Itching and Other (See Comments)     Redness of skin   • Strawberry Cough   • Wound Dressing Adhesive Other (See Comments)     Steri Strips cause blistering of skin   • Bactroban [Mupirocin Calcium] Rash   • Neomycin-Bacitracin-Polymyxin [Bacitracin-Neomycin-Polymyxin] Rash   • Other Rash     All antibiotic creams         SOCIAL HISTORY:    Social History     Socioeconomic History   • Marital status:    Tobacco Use   • Smoking status: Current Every Day Smoker     Types: Electronic Cigarette   • Smokeless tobacco: Never Used   Vaping Use   •  Vaping Use: Every day   • Substances: Nicotine   • Devices: Refillable tank   Substance and Sexual Activity   • Alcohol use: No   • Drug use: No   • Sexual activity: Defer       FAMILY HISTORY:    Family History   Problem Relation Age of Onset   • Coronary artery disease Other    • Breast cancer Mother    • Breast cancer Maternal Aunt    • Diabetes Father        REVIEW OF SYSTEMS:  Patient currently is dependent in all activities of daily living due to her severe debilitated state  Review of Systems      PHYSICAL EXAMINATION:   VITAL SIGNS: Vitals are reviewed in the chart and [facility] please see Dr. Brandon's note for more detailed information regarding physical exam findings BP is 110/60 pulse 88 respirations 16 O2 sat 88%    Physical Exam please see Dr. Brandon's note for more detailed information regarding physical exam findings    RECORDS REVIEW:   I have reviewed and interpreted the following lab test results  obtained at the time of the visit today.     ASSESSMENT     Diagnoses and all orders for this visit:    1. Weakness acquired in intensive care unit (Primary)    2. Type 2 diabetes mellitus with hypoglycemia without coma, with long-term current use of insulin (HCC)    3. Pneumonia due to COVID-19 virus    4. Pressure injury of skin of sacral region, unspecified injury stage    5. Depressive disorder    6. Vitamin D deficiency    7. Essential hypertension    8. Urinary tract infection without hematuria, site unspecified  -     Urine Culture - Urine, Urine, Catheter; Future        PLAN  Continue PT and OT as tolerated patient is completing antibiotic therapy via PICC line in her right upper extremity will check urine cultures and continue rehab with the goal of getting the patient back home again recheck 14 days or as needed I was present throughout the encounter and saw the patient alongside Dr. Brandon  -  Discussed Patient in detail with nursing/staff, addressed all needs today.     -  Plan of Care  Reviewed   -  PT/OT Reviewed   -  Order Changes  -  Discharge Plans Reviewed  -  Advance Directive on file with Nursing Home.   -  POA on file with Nursing Home.   -  Code Status listed: -  Full Code   -  DNR          Total face to face time spent with patient 45 minutes . Of which  15 minutes where in counseling the patient and family.       This document has been electronically signed by Vik Robb MD on October 15, 2021 16:31 CDT      Vik Robb MD.

## 2021-10-16 DIAGNOSIS — M54.50 LOW BACK PAIN, UNSPECIFIED BACK PAIN LATERALITY, UNSPECIFIED CHRONICITY, UNSPECIFIED WHETHER SCIATICA PRESENT: Primary | ICD-10-CM

## 2021-10-16 RX ORDER — OXYCODONE HYDROCHLORIDE 5 MG/1
5 TABLET ORAL EVERY 4 HOURS PRN
Qty: 10 TABLET | Refills: 0 | Status: SHIPPED | OUTPATIENT
Start: 2021-10-16 | End: 2021-11-02 | Stop reason: SDUPTHER

## 2021-10-16 NOTE — PROGRESS NOTES
Got a call from [facility] requesting this patient's Oxycodone. They state she's been getting the 5mg without acetaminophen and her record shows the Percocet 10-325mg.     I will send the 5mg oxycodone without acetaminophen to [facility] in [place] that [facility] uses just to get her through the weekend. They can check with Dr. Brandon for further refills during the week.     [facility] report number [id number] was pulled on 10/16/21, and was evaluated for medication compliance and/or abuse. Based on the report the medication use and compliance appears to be accurate.           This document has been electronically signed by Gianna Grissom MD on October 16, 2021 08:58 CDT

## 2021-10-18 ENCOUNTER — NURSING HOME (OUTPATIENT)
Dept: FAMILY MEDICINE CLINIC | Facility: CLINIC | Age: 46
End: 2021-10-18

## 2021-10-18 VITALS
DIASTOLIC BLOOD PRESSURE: 66 MMHG | TEMPERATURE: 97.8 F | OXYGEN SATURATION: 98 % | SYSTOLIC BLOOD PRESSURE: 110 MMHG | RESPIRATION RATE: 18 BRPM | HEART RATE: 77 BPM

## 2021-10-18 DIAGNOSIS — L89.159 PRESSURE INJURY OF SKIN OF SACRAL REGION, UNSPECIFIED INJURY STAGE: ICD-10-CM

## 2021-10-18 DIAGNOSIS — R53.1 WEAKNESS ACQUIRED IN INTENSIVE CARE UNIT: Primary | ICD-10-CM

## 2021-10-18 DIAGNOSIS — E11.649 TYPE 2 DIABETES MELLITUS WITH HYPOGLYCEMIA WITHOUT COMA, WITH LONG-TERM CURRENT USE OF INSULIN (HCC): ICD-10-CM

## 2021-10-18 DIAGNOSIS — M54.50 LOW BACK PAIN, UNSPECIFIED BACK PAIN LATERALITY, UNSPECIFIED CHRONICITY, UNSPECIFIED WHETHER SCIATICA PRESENT: ICD-10-CM

## 2021-10-18 DIAGNOSIS — U07.1 PNEUMONIA DUE TO COVID-19 VIRUS: ICD-10-CM

## 2021-10-18 DIAGNOSIS — Z86.718 HISTORY OF DVT (DEEP VEIN THROMBOSIS): ICD-10-CM

## 2021-10-18 DIAGNOSIS — F32.A DEPRESSIVE DISORDER: ICD-10-CM

## 2021-10-18 DIAGNOSIS — J12.82 PNEUMONIA DUE TO COVID-19 VIRUS: ICD-10-CM

## 2021-10-18 DIAGNOSIS — I10 ESSENTIAL HYPERTENSION: ICD-10-CM

## 2021-10-18 DIAGNOSIS — Z79.4 TYPE 2 DIABETES MELLITUS WITH HYPOGLYCEMIA WITHOUT COMA, WITH LONG-TERM CURRENT USE OF INSULIN (HCC): ICD-10-CM

## 2021-10-18 PROCEDURE — 99307 SBSQ NF CARE SF MDM 10: CPT | Performed by: STUDENT IN AN ORGANIZED HEALTH CARE EDUCATION/TRAINING PROGRAM

## 2021-10-18 RX ORDER — OXYCODONE AND ACETAMINOPHEN 10; 325 MG/1; MG/1
1 TABLET ORAL EVERY 4 HOURS PRN
Qty: 120 TABLET | Refills: 0 | Status: SHIPPED | OUTPATIENT
Start: 2021-10-18 | End: 2022-05-13 | Stop reason: ALTCHOICE

## 2021-10-18 NOTE — PROGRESS NOTES
NURSING HOME HISTORY AND PHYSICAL    NAME: Corrina Jensen  : 1975  MRN: [id number]    DATE & TIME SEEN: 10/18/21 7:45AM    PCP: Jose Jean-Baptiste MD    CODE STATUS: Full code    NURSING HOME: [facility]    Chief Complaint: Weakness s/p intensive care unit    HPI: Corrina Jensen is a 46 y.o. female with CMH of DM, HTN, GERD, depression, and chronic back pain currently receiving rehab after being in the ICU due to COVID pneumonia. Patient reports she has been feeling better. She reports improved strength on her upper and lower extremities. She is able to feed herself, but reports still some weakness on the right hand. Patient still needs help while walking, and is using the wheelchair to ambulate. She reports that her back pain is under control with the percocet. Currently has not complaints. She is looking forward to go home to her previous living arrangements once she recovers her strength back.     CONCURRENT MEDICAL HISTORY:  Past Medical History:   Diagnosis Date   • Acute atopic conjunctivitis    • Acute bronchitis    • Allergic rhinitis due to pollen    • Arrhythmia     wearing heart monitor    • Arthritis    • Asthma    • Bilateral foot pain    • Breast cancer (HCC)    • Breast cyst    • Breast lump    • Chest pain    • Chronic low back pain    • Contact dermatitis due to drugs and medicine    • Cyst of ovary    • Diabetes mellitus (HCC)    • GERD (gastroesophageal reflux disease)    • Hyperlipidemia    • Hypertension    • Infestation by Sarcoptes scabiei annalisa hominis    • Low back pain    • Mastodynia    • Nausea and vomiting    • On long term drug therapy    • Pain in wrist    • Plantar fasciitis    • Rash    • Skin disorder     breast-possible superficial cellulitis   • Spinal stenosis    • Tinea pedis    • Tobacco dependence syndrome    • Upper respiratory infection    • Vitamin D deficiency    • Wheezing        PAST SURGICAL HISTORY:  Past Surgical History:   Procedure Laterality Date   •  BREAST BIOPSY     • BREAST CYST ASPIRATION     • BREAST LUMPECTOMY Left 1/27/2021    Procedure: LEFT BREAST LUMPECTOMY WITH NEEDLE LOCALIZATION Needle localization to be done [facility] by radiology first   @07:00 a.m.;  Surgeon: Mat Garay MD;  Location: [facility] OR;  Service: General;  Laterality: Left;   • BREAST LUMPECTOMY WITH SENTINEL NODE BIOPSY AND AXILLARY NODE DISSECTION Left 2/22/2021    Procedure: LEFT BREAST LUMPECTOMY WITH SENTINEL NODE BIOPSY. REMOVAL OF CYST FROM LEFT CHEEK                              (INJECTION @07: 00 A.M);  Surgeon: Mat Garay MD;  Location: [facility] OR;  Service: General;  Laterality: Left;   • BREAST SURGERY      excision breast lesion   • CARDIAC CATHETERIZATION N/A 7/3/2018    Procedure: Coronary angiography;  Surgeon: Arun Cadet MD;  Location: [facility] CATH INVASIVE LOCATION;  Service: Cardiovascular   • CARDIAC CATHETERIZATION     • CHOLECYSTECTOMY     • INJECTION OF MEDICATION      Kenalog (4)       FAMILY HISTORY:  Family History   Problem Relation Age of Onset   • Coronary artery disease Other    • Breast cancer Mother    • Breast cancer Maternal Aunt    • Diabetes Father         SOCIAL HISTORY:  Social History     Socioeconomic History   • Marital status:    Tobacco Use   • Smoking status: Current Every Day Smoker     Types: Electronic Cigarette   • Smokeless tobacco: Never Used   Vaping Use   • Vaping Use: Every day   • Substances: Nicotine   • Devices: RefDobleasble tank   Substance and Sexual Activity   • Alcohol use: No   • Drug use: No   • Sexual activity: Defer       CURRENT MEDS:    Current Outpatient Medications:   •  amitriptyline (ELAVIL) 50 MG tablet, Take 50 mg by mouth Every Night., Disp: , Rfl:   •  anastrozole (ARIMIDEX) 1 MG tablet, TAKE 1 TABLET BY MOUTH DAILY, Disp: 90 tablet, Rfl: 0  •  atorvastatin (LIPITOR) 20 MG tablet, Take 20 mg by mouth Every Night., Disp: , Rfl:   •  B-D ULTRAFINE III SHORT PEN 31G  X 8 MM misc, , Disp: , Rfl:   •  cetirizine (zyrTEC) 10 MG tablet, Take 10 mg by mouth Daily., Disp: , Rfl:   •  docusate sodium (COLACE) 100 MG capsule, Take 1 capsule by mouth 2 (Two) Times a Day As Needed for Constipation., Disp: 60 capsule, Rfl: 2  •  FeroSul 325 (65 Fe) MG tablet, TAKE ONE TABLET BY MOUTH ONE A DAY WITH BREAKFAST, Disp: 30 tablet, Rfl: 3  •  folic acid (FOLVITE) 1 MG tablet, TAKE 1 TABLET BY MOUTH DAILY, Disp: 90 tablet, Rfl: 1  •  FREESTYLE LITE test strip, 1 stick by Percutaneous route 4 (Four) Times a Day., Disp: , Rfl:   •  glyburide micronized (GLYNASE) 6 MG tablet, , Disp: , Rfl:   •  GLYBURIDE PO, Take 6 mg by mouth Daily., Disp: , Rfl:   •  insulin aspart (novoLOG) 100 UNIT/ML injection, Inject 0-24 Units under the skin into the appropriate area as directed 3 (Three) Times a Day Before Meals., Disp: 100 mL, Rfl: 12  •  insulin detemir (LEVEMIR) 100 UNIT/ML injection, Inject 36 Units under the skin into the appropriate area as directed 2 (Two) Times a Day for 30 days., Disp: 100 mL, Rfl: 12  •  Lancets (FREESTYLE) lancets, 1 each by Other route 4 (Four) Times a Day., Disp: , Rfl:   •  losartan (COZAAR) 25 MG tablet, Take 1 tablet by mouth Daily., Disp: 30 tablet, Rfl: 4  •  LYRICA 150 MG capsule, Take 150 mg by mouth 3 (Three) Times a Day., Disp: , Rfl:   •  metoprolol tartrate (LOPRESSOR) 50 MG tablet, Take 1 tablet by mouth Every 12 (Twelve) Hours., Disp: , Rfl:   •  montelukast (SINGULAIR) 10 MG tablet, Take 1 tablet by mouth Every Night., Disp: 30 tablet, Rfl: 6  •  OLANZapine (zyPREXA) 10 MG tablet, Take 10 mg by mouth Every Night., Disp: , Rfl:   •  omeprazole (priLOSEC) 40 MG capsule, Take 40 mg by mouth Daily., Disp: , Rfl:   •  oxyCODONE (Roxicodone) 5 MG immediate release tablet, Take 1 tablet by mouth Every 4 (Four) Hours As Needed for Moderate Pain  (back pain)., Disp: 10 tablet, Rfl: 0  •  oxyCODONE-acetaminophen (PERCOCET)  MG per tablet, Take 1 tablet by mouth Every  4 (Four) Hours As Needed for Moderate Pain ., Disp: 120 tablet, Rfl: 0  •  tiZANidine (ZANAFLEX) 4 MG tablet, Take 4 mg by mouth Every 6 (Six) Hours As Needed., Disp: , Rfl:   •  VENTOLIN  (90 Base) MCG/ACT inhaler, Inhale 2 puffs Every 4 (Four) Hours., Disp: , Rfl:   •  vitamin B-12 (CYANOCOBALAMIN) 1000 MCG tablet, Take 1 tablet by mouth Daily., Disp: 90 tablet, Rfl: 1    ALLERGIES:  Latex, Strawberry, Wound dressing adhesive, Bactroban [mupirocin calcium], Neomycin-bacitracin-polymyxin [bacitracin-neomycin-polymyxin], and Other    Review of Systems:  Review of Systems   Constitutional: Negative for appetite change, chills, fatigue and fever.   HENT: Negative for ear pain, postnasal drip, rhinorrhea, sinus pressure, sinus pain, sneezing, sore throat and trouble swallowing.    Eyes: Negative for redness and visual disturbance.   Respiratory: Negative for cough, chest tightness and shortness of breath.    Cardiovascular: Negative for chest pain, palpitations and leg swelling.   Gastrointestinal: Negative for abdominal pain, constipation, diarrhea, nausea and vomiting.   Genitourinary: Negative for dysuria.   Musculoskeletal: Positive for back pain. Negative for myalgias.   Skin: Negative for rash.   Neurological: Positive for weakness. Negative for dizziness, numbness and headaches.        Improved weakness on upper and lower extremities. More in the upper extremities        /66   Pulse 77   Temp 97.8 °F (36.6 °C)   Resp 18   SpO2 98%   Physical Exam  Constitutional:       General: She is not in acute distress.     Appearance: She is not ill-appearing or toxic-appearing.   HENT:      Head: Normocephalic and atraumatic.      Right Ear: External ear normal.      Left Ear: External ear normal.      Nose: Nose normal.      Mouth/Throat:      Mouth: Mucous membranes are moist.      Pharynx: Oropharynx is clear.   Eyes:      Conjunctiva/sclera: Conjunctivae normal.   Cardiovascular:      Rate and Rhythm:  Normal rate and regular rhythm.   Pulmonary:      Effort: Pulmonary effort is normal. No respiratory distress.      Breath sounds: Normal breath sounds. No wheezing.   Abdominal:      General: Bowel sounds are normal.      Palpations: Abdomen is soft.      Tenderness: There is no abdominal tenderness.   Musculoskeletal:      Right upper arm: No swelling, edema or deformity.      Left upper arm: No swelling, edema or deformity.      Right lower le+ Edema present.      Left lower le+ Edema present.      Comments: Improved strength on upper extremities. More on the left side.  Improved strength  on lower extremities. Dorsiflexion 3/5 on lower extremities bilaterally.    Neurological:      Mental Status: She is alert and oriented to person, place, and time.   Psychiatric:         Mood and Affect: Mood normal.          ASSESSMENT/PLAN: 46 y.o. female with:  Problem List Items Addressed This Visit        Cardiac and Vasculature    Essential hypertension  - Stable  - Continue current medications       Mental Health    Depressive disorder  - Stable  - Continue current medications        Other    Pneumonia due to COVID-19 virus  - Resolved  - Patient saturating 98% on RA      Other Visit Diagnoses     Weakness acquired in intensive care unit    -  Primary  - Improving  - Continue with PT/OT      Type 2 diabetes mellitus with hypoglycemia without coma, with long-term current use of insulin (HCC)      - Stable  - Continue current medications     Low back pain, unspecified back pain laterality, unspecified chronicity, unspecified whether sciatica present      - Stable  - Continue current medications     History of DVT (deep vein thrombosis)    - Continue current medications       Pressure injury of skin of sacral region, unspecified injury stage    - Stable  - Continue current medications  - Continue with dressing changes          Dr. Robb is the attending on record for this patient, He is aware of the patient's  status and agrees with the above.    Signature  Meredith Brandon. MD  [address], [place] [address]  Office: [phone]        This document has been electronically signed by Meredith Brandon MD on October 18, 2021 09:04 CDT

## 2021-10-27 PROBLEM — I82.512 CHRONIC DEEP VEIN THROMBOSIS (DVT) OF FEMORAL VEIN OF LEFT LOWER EXTREMITY (HCC): Status: ACTIVE | Noted: 2021-10-27

## 2021-11-02 ENCOUNTER — TELEPHONE (OUTPATIENT)
Dept: FAMILY MEDICINE CLINIC | Facility: CLINIC | Age: 46
End: 2021-11-02

## 2021-11-02 DIAGNOSIS — M54.50 LOW BACK PAIN, UNSPECIFIED BACK PAIN LATERALITY, UNSPECIFIED CHRONICITY, UNSPECIFIED WHETHER SCIATICA PRESENT: ICD-10-CM

## 2021-11-02 RX ORDER — OXYCODONE HYDROCHLORIDE 5 MG/1
5 TABLET ORAL EVERY 4 HOURS PRN
Qty: 30 TABLET | Refills: 0 | Status: SHIPPED | OUTPATIENT
Start: 2021-11-02 | End: 2022-05-13 | Stop reason: ALTCHOICE

## 2021-11-02 NOTE — TELEPHONE ENCOUNTER
Nursing home requested refill of oxycodone. She reported that patient is taking about 3-4 pills a day. On 10/18/21 it was send 120 pills. She will have medication until 11/18/21, however nurse states that she has 10 pills left. Patient will be seen on 11/9/21 and evaluated. I will give nursing home a call to speak with nurse in charge of patient to have a better understanding of why she is running low on her medication.

## 2021-11-02 NOTE — TELEPHONE ENCOUNTER
Incoming Refill Request      Medication requested (name and dose): oxyCODONE (Roxicodone) 5 MG immediate release tablet    [facility] where [facility]    Additional details provided by patient: PATIENT IS ALMOST OUT    Best call back number: [phone]    Does the patient have less than a 3 day supply:  [x] Yes  [] No    Cori Franco Rep  11/02/21, 10:25 CDT

## 2021-11-02 NOTE — TELEPHONE ENCOUNTER
Incoming Refill Request      Medication requested (name and dose): oxyCODONE (Roxicodone) 5 MG immediate release tablet    [facility]    Additional details provided by patient:RX THAT WAS SENT IN WAS 10MG. IT SHOULD HAVE BEEN 5MG  Best call back number: [phone]    Does the patient have less than a 3 day supply:  [x] Yes  [] No    Cori Franco Rep  11/02/21, 15:19 CDT

## 2021-11-03 NOTE — TELEPHONE ENCOUNTER
Spoke with the pharmacy and they have dispensed 30 pills to the nursing home. Pharmacist, Mat, told me that the nursing home has to give them a call to request the pharmacy to dispense more pills. Spoke with the nurse, Connie,and let her know that she needs to call the pharmacy for them to dispense more medications.

## 2021-11-05 DIAGNOSIS — C50.412 MALIGNANT NEOPLASM OF UPPER-OUTER QUADRANT OF LEFT BREAST IN FEMALE, ESTROGEN RECEPTOR POSITIVE (HCC): Primary | ICD-10-CM

## 2021-11-05 DIAGNOSIS — Z17.0 MALIGNANT NEOPLASM OF UPPER-OUTER QUADRANT OF LEFT BREAST IN FEMALE, ESTROGEN RECEPTOR POSITIVE (HCC): Primary | ICD-10-CM

## 2021-11-08 ENCOUNTER — INFUSION (OUTPATIENT)
Dept: ONCOLOGY | Facility: HOSPITAL | Age: 46
End: 2021-11-08

## 2021-11-08 ENCOUNTER — OFFICE VISIT (OUTPATIENT)
Dept: ONCOLOGY | Facility: CLINIC | Age: 46
End: 2021-11-08

## 2021-11-08 ENCOUNTER — LAB (OUTPATIENT)
Dept: ONCOLOGY | Facility: HOSPITAL | Age: 46
End: 2021-11-08

## 2021-11-08 VITALS
TEMPERATURE: 98.3 F | HEART RATE: 70 BPM | SYSTOLIC BLOOD PRESSURE: 132 MMHG | BODY MASS INDEX: 37.66 KG/M2 | DIASTOLIC BLOOD PRESSURE: 53 MMHG | OXYGEN SATURATION: 94 % | WEIGHT: 233.3 LBS

## 2021-11-08 DIAGNOSIS — Z17.0 MALIGNANT NEOPLASM OF UPPER-OUTER QUADRANT OF LEFT BREAST IN FEMALE, ESTROGEN RECEPTOR POSITIVE (HCC): Primary | Chronic | ICD-10-CM

## 2021-11-08 DIAGNOSIS — C50.412 MALIGNANT NEOPLASM OF UPPER-OUTER QUADRANT OF LEFT BREAST IN FEMALE, ESTROGEN RECEPTOR POSITIVE (HCC): Primary | ICD-10-CM

## 2021-11-08 DIAGNOSIS — I10 ESSENTIAL HYPERTENSION: ICD-10-CM

## 2021-11-08 DIAGNOSIS — C50.412 MALIGNANT NEOPLASM OF UPPER-OUTER QUADRANT OF LEFT BREAST IN FEMALE, ESTROGEN RECEPTOR POSITIVE (HCC): Primary | Chronic | ICD-10-CM

## 2021-11-08 DIAGNOSIS — C50.412 MALIGNANT NEOPLASM OF UPPER-OUTER QUADRANT OF LEFT BREAST IN FEMALE, ESTROGEN RECEPTOR POSITIVE (HCC): ICD-10-CM

## 2021-11-08 DIAGNOSIS — Z17.0 MALIGNANT NEOPLASM OF UPPER-OUTER QUADRANT OF LEFT BREAST IN FEMALE, ESTROGEN RECEPTOR POSITIVE (HCC): ICD-10-CM

## 2021-11-08 DIAGNOSIS — Z17.0 MALIGNANT NEOPLASM OF UPPER-OUTER QUADRANT OF LEFT BREAST IN FEMALE, ESTROGEN RECEPTOR POSITIVE (HCC): Primary | ICD-10-CM

## 2021-11-08 DIAGNOSIS — D61.818 PANCYTOPENIA (HCC): Chronic | ICD-10-CM

## 2021-11-08 PROBLEM — D89.834 CYTOKINE RELEASE SYNDROME, GRADE 4: Status: RESOLVED | Noted: 2021-09-08 | Resolved: 2021-11-08

## 2021-11-08 LAB
ALBUMIN SERPL-MCNC: 3.6 G/DL (ref 3.5–5.2)
ALBUMIN/GLOB SERPL: 1.1 G/DL
ALP SERPL-CCNC: 122 U/L (ref 39–117)
ALT SERPL W P-5'-P-CCNC: 30 U/L (ref 1–33)
ANION GAP SERPL CALCULATED.3IONS-SCNC: 13 MMOL/L (ref 5–15)
AST SERPL-CCNC: 44 U/L (ref 1–32)
BASOPHILS # BLD AUTO: 0.03 10*3/MM3 (ref 0–0.2)
BASOPHILS NFR BLD AUTO: 0.6 % (ref 0–1.5)
BILIRUB SERPL-MCNC: 0.4 MG/DL (ref 0–1.2)
BUN SERPL-MCNC: 5 MG/DL (ref 6–20)
BUN/CREAT SERPL: 9.1 (ref 7–25)
CALCIUM SPEC-SCNC: 9.1 MG/DL (ref 8.6–10.5)
CHLORIDE SERPL-SCNC: 100 MMOL/L (ref 98–107)
CO2 SERPL-SCNC: 28 MMOL/L (ref 22–29)
CREAT SERPL-MCNC: 0.55 MG/DL (ref 0.57–1)
DEPRECATED RDW RBC AUTO: 44.2 FL (ref 37–54)
EOSINOPHIL # BLD AUTO: 0.03 10*3/MM3 (ref 0–0.4)
EOSINOPHIL NFR BLD AUTO: 0.6 % (ref 0.3–6.2)
ERYTHROCYTE [DISTWIDTH] IN BLOOD BY AUTOMATED COUNT: 13.4 % (ref 12.3–15.4)
GFR SERPL CREATININE-BSD FRML MDRD: 118 ML/MIN/1.73
GLOBULIN UR ELPH-MCNC: 3.2 GM/DL
GLUCOSE SERPL-MCNC: 302 MG/DL (ref 65–99)
HCT VFR BLD AUTO: 35.4 % (ref 34–46.6)
HGB BLD-MCNC: 11.7 G/DL (ref 12–15.9)
IMM GRANULOCYTES # BLD AUTO: 0.04 10*3/MM3 (ref 0–0.05)
IMM GRANULOCYTES NFR BLD AUTO: 0.7 % (ref 0–0.5)
LYMPHOCYTES # BLD AUTO: 1.08 10*3/MM3 (ref 0.7–3.1)
LYMPHOCYTES NFR BLD AUTO: 19.8 % (ref 19.6–45.3)
MCH RBC QN AUTO: 29.8 PG (ref 26.6–33)
MCHC RBC AUTO-ENTMCNC: 33.1 G/DL (ref 31.5–35.7)
MCV RBC AUTO: 90.3 FL (ref 79–97)
MONOCYTES # BLD AUTO: 0.6 10*3/MM3 (ref 0.1–0.9)
MONOCYTES NFR BLD AUTO: 11 % (ref 5–12)
NEUTROPHILS NFR BLD AUTO: 3.67 10*3/MM3 (ref 1.7–7)
NEUTROPHILS NFR BLD AUTO: 67.3 % (ref 42.7–76)
NRBC BLD AUTO-RTO: 0 /100 WBC (ref 0–0.2)
PLATELET # BLD AUTO: 226 10*3/MM3 (ref 140–450)
PMV BLD AUTO: 10.4 FL (ref 6–12)
POTASSIUM SERPL-SCNC: 3.2 MMOL/L (ref 3.5–5.2)
PROT SERPL-MCNC: 6.8 G/DL (ref 6–8.5)
RBC # BLD AUTO: 3.92 10*6/MM3 (ref 3.77–5.28)
SODIUM SERPL-SCNC: 141 MMOL/L (ref 136–145)
WBC # BLD AUTO: 5.45 10*3/MM3 (ref 3.4–10.8)

## 2021-11-08 PROCEDURE — 96402 CHEMO HORMON ANTINEOPL SQ/IM: CPT | Performed by: INTERNAL MEDICINE

## 2021-11-08 PROCEDURE — 36415 COLL VENOUS BLD VENIPUNCTURE: CPT | Performed by: INTERNAL MEDICINE

## 2021-11-08 PROCEDURE — 80053 COMPREHEN METABOLIC PANEL: CPT

## 2021-11-08 PROCEDURE — 85025 COMPLETE CBC W/AUTO DIFF WBC: CPT

## 2021-11-08 PROCEDURE — 25010000002 LEUPROLIDE ACETATE PER 7.5 MG: Performed by: INTERNAL MEDICINE

## 2021-11-08 PROCEDURE — 99214 OFFICE O/P EST MOD 30 MIN: CPT | Performed by: INTERNAL MEDICINE

## 2021-11-08 RX ORDER — CEFTRIAXONE SODIUM 10 G/100ML
INJECTION, POWDER, FOR SOLUTION INTRAVENOUS
COMMUNITY
Start: 2021-10-01 | End: 2022-05-13

## 2021-11-08 RX ORDER — APIXABAN 5 MG/1
TABLET, FILM COATED ORAL EVERY 12 HOURS SCHEDULED
COMMUNITY
Start: 2021-10-03 | End: 2022-06-20

## 2021-11-08 RX ORDER — MELATONIN
COMMUNITY
Start: 2021-10-25

## 2021-11-08 RX ORDER — AMLODIPINE BESYLATE 5 MG/1
TABLET ORAL
COMMUNITY
Start: 2021-10-25

## 2021-11-08 RX ADMIN — LEUPROLIDE ACETATE 7.5 MG: KIT at 14:26

## 2021-11-10 NOTE — PROGRESS NOTES
Subjective     Corrina Jensen seen in follow-up for breast cancer.  Since her last visit she was diagnosed and hospitalized with Covid infection.  Fortunately made recovery.  She is currently taking Arimidex.  She did missed her Lupron injection due to Covid infection.  We will resume Lupron injection.  She is tolerating Arimidex well without any major side effects.    Past Medical History, Past Surgical History, Social History, Family History have been reviewed and are without significant changes except as mentioned.        Medications:  The current medication list was reviewed in the EMR    ALLERGIES:    Allergies   Allergen Reactions   • Latex Itching and Other (See Comments)     Redness of skin   • Strawberry Cough   • Wound Dressing Adhesive Other (See Comments)     Steri Strips cause blistering of skin   • Bactroban [Mupirocin Calcium] Rash   • Neomycin-Bacitracin-Polymyxin [Bacitracin-Neomycin-Polymyxin] Rash   • Other Rash     All antibiotic creams       Objective      Vitals:    11/08/21 1408   BP: 132/53   Pulse: 70   Temp: 98.3 °F (36.8 °C)   SpO2: 94%   Weight: 106 kg (233 lb 4.8 oz)   PainSc:   6     Current Status 6/2/2021   ECOG score 0       Physical Exam  Vitals and nursing note reviewed.   Constitutional:       Appearance: Normal appearance.   Abdominal:      General: There is no distension.      Palpations: Abdomen is soft. There is no mass.      Tenderness: There is no abdominal tenderness.   Neurological:      General: No focal deficit present.      Mental Status: She is alert and oriented to person, place, and time. Mental status is at baseline.   Psychiatric:         Mood and Affect: Mood normal.         Behavior: Behavior normal.         Thought Content: Thought content normal.           RECENT LABS:Independently reviewed and summarized  Hematology WBC   Date Value Ref Range Status   11/08/2021 5.45 3.40 - 10.80 10*3/mm3 Final     RBC   Date Value Ref Range Status   11/08/2021 3.92  3.77 - 5.28 10*6/mm3 Final     Hemoglobin   Date Value Ref Range Status   11/08/2021 11.7 (L) 12.0 - 15.9 g/dL Final     Hematocrit   Date Value Ref Range Status   11/08/2021 35.4 34.0 - 46.6 % Final     Platelets   Date Value Ref Range Status   11/08/2021 226 140 - 450 10*3/mm3 Final            Lab Results   Component Value Date    GLUCOSE 302 (H) 11/08/2021    BUN 5 (L) 11/08/2021    CREATININE 0.55 (L) 11/08/2021    EGFRIFNONA 118 11/08/2021    BCR 9.1 11/08/2021    K 3.2 (L) 11/08/2021    CO2 28.0 11/08/2021    CALCIUM 9.1 11/08/2021    ALBUMIN 3.60 11/08/2021    AST 44 (H) 11/08/2021    ALT 30 11/08/2021             Diagnosis:   (1) Left breast cancer   Stage IA (pT1a, pN0(sn),cM0)   ER 95% IN 95% HER IHC negative          Oncology/Hematology History   Malignant neoplasm of left breast in female, estrogen receptor positive (CMS/HCC)   2/12/2021 Initial Diagnosis     Malignant neoplasm of left breast in female, estrogen receptor positive (CMS/HCC)      3/9/2021 Cancer Staged     Staging form: Breast, AJCC 8th Edition  - Pathologic stage from 3/9/2021: Stage IA (pT1a, pN0(sn), cM0, G1, ER+, IN+, HER2-) - Signed by Katarina Patel MD on 3/9/2021      4/1/2021 - 5/10/2021 Radiation     Radiation OncologyTreatment Course:  Corrina Jensen received 5040 cGy in 28 fractions to LT BREAST via External Beam Radiation - EBRT.      6/9/2021 -  Chemotherapy     OP SUPPORTIVE Leuprolide 7.5 mg Q28D               (2) Pancytopenia   (3) Hypertension       Assessment/Plan     (1) Left breast cancer     Chronic, stable  Treated with lumpectomy and radiation.  Currently on adjuvant hormonal therapy with ovarian suppression with Lupron every month and Arimidex.  She did miss her Lupron injection last month due to Covid infection.  She is recovered from Covid infection well.  I will make sure we start her on Lupron injection every month.  She will continue to take Arimidex.  She is tolerating treatment well without any  major side effects.  We will continue surveillance for breast cancer.  Lupron injection given in the clinic today.    (2) Pancytopenia   Chronic, stable.  Suspect this is secondary to fatty liver disease and splenomegaly.  Continue B12 and folic acid supplements.  Check CBC in 3 months.    (3) Hypertension   Chronic, stable.  Continue current antihypertensive medication.  Check CMP in 3 months.      11/10/2021      CC:

## 2021-11-22 RX ORDER — ANASTROZOLE 1 MG/1
1 TABLET ORAL DAILY
Qty: 90 TABLET | Refills: 0 | Status: SHIPPED | OUTPATIENT
Start: 2021-11-22 | End: 2022-01-31 | Stop reason: SDUPTHER

## 2021-11-22 NOTE — TELEPHONE ENCOUNTER
Rx Refill Note  Requested Prescriptions     Pending Prescriptions Disp Refills   • anastrozole (ARIMIDEX) 1 MG tablet [Pharmacy Med Name: ANASTROZOLE 1MG TABLETS]  0     Sig: TAKE 1 TABLET BY MOUTH DAILY      Last office visit with prescribing clinician: 11/8/2021      Next office visit with prescribing clinician: 1/31/2022            Connie Urias RN  11/22/21, 08:32 CST

## 2021-11-26 ENCOUNTER — HOSPITAL ENCOUNTER (EMERGENCY)
Facility: HOSPITAL | Age: 46
Discharge: HOME | End: 2021-11-26
Attending: EMERGENCY MEDICINE | Admitting: EMERGENCY MEDICINE

## 2021-11-26 ENCOUNTER — APPOINTMENT (OUTPATIENT)
Dept: GENERAL RADIOLOGY | Facility: HOSPITAL | Age: 46
End: 2021-11-26

## 2021-11-26 ENCOUNTER — APPOINTMENT (OUTPATIENT)
Dept: CT IMAGING | Facility: HOSPITAL | Age: 46
End: 2021-11-26

## 2021-11-26 VITALS
SYSTOLIC BLOOD PRESSURE: 129 MMHG | OXYGEN SATURATION: 97 % | HEART RATE: 83 BPM | WEIGHT: 233 LBS | HEIGHT: 66 IN | BODY MASS INDEX: 37.45 KG/M2 | DIASTOLIC BLOOD PRESSURE: 58 MMHG | RESPIRATION RATE: 18 BRPM | TEMPERATURE: 97.5 F

## 2021-11-26 DIAGNOSIS — M25.521 RIGHT ELBOW PAIN: ICD-10-CM
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
## 2021-11-26 DIAGNOSIS — R51.9 LEFT FACIAL PAIN: ICD-10-CM

## 2021-11-26 LAB — B-HCG UR QL: NEGATIVE

## 2021-11-26 PROCEDURE — 99283 EMERGENCY DEPT VISIT LOW MDM: CPT

## 2021-11-26 PROCEDURE — 70486 CT MAXILLOFACIAL W/O DYE: CPT

## 2021-11-26 PROCEDURE — 70450 CT HEAD/BRAIN W/O DYE: CPT

## 2021-11-26 PROCEDURE — 81025 URINE PREGNANCY TEST: CPT | Performed by: STUDENT IN AN ORGANIZED HEALTH CARE EDUCATION/TRAINING PROGRAM

## 2021-11-26 PROCEDURE — 73080 X-RAY EXAM OF ELBOW: CPT

## 2021-12-06 ENCOUNTER — INFUSION (OUTPATIENT)
Dept: ONCOLOGY | Facility: HOSPITAL | Age: 46
End: 2021-12-06

## 2021-12-06 VITALS
DIASTOLIC BLOOD PRESSURE: 59 MMHG | SYSTOLIC BLOOD PRESSURE: 120 MMHG | TEMPERATURE: 97.1 F | RESPIRATION RATE: 18 BRPM | HEART RATE: 95 BPM

## 2021-12-06 DIAGNOSIS — Z17.0 MALIGNANT NEOPLASM OF UPPER-OUTER QUADRANT OF LEFT BREAST IN FEMALE, ESTROGEN RECEPTOR POSITIVE (HCC): Primary | ICD-10-CM

## 2021-12-06 DIAGNOSIS — C50.412 MALIGNANT NEOPLASM OF UPPER-OUTER QUADRANT OF LEFT BREAST IN FEMALE, ESTROGEN RECEPTOR POSITIVE (HCC): Primary | ICD-10-CM

## 2021-12-06 PROCEDURE — 25010000002 LEUPROLIDE ACETATE PER 7.5 MG: Performed by: INTERNAL MEDICINE

## 2021-12-06 PROCEDURE — 96402 CHEMO HORMON ANTINEOPL SQ/IM: CPT | Performed by: INTERNAL MEDICINE

## 2021-12-06 RX ADMIN — LEUPROLIDE ACETATE 7.5 MG: KIT at 11:26

## 2021-12-20 ENCOUNTER — HOSPITAL ENCOUNTER (OUTPATIENT)
Dept: RADIATION ONCOLOGY | Facility: HOSPITAL | Age: 46
Setting detail: RADIATION/ONCOLOGY SERIES
End: 2021-12-20

## 2021-12-20 ENCOUNTER — OFFICE VISIT (OUTPATIENT)
Dept: RADIATION ONCOLOGY | Facility: HOSPITAL | Age: 46
End: 2021-12-20

## 2021-12-20 VITALS
SYSTOLIC BLOOD PRESSURE: 121 MMHG | BODY MASS INDEX: 38.3 KG/M2 | HEART RATE: 78 BPM | DIASTOLIC BLOOD PRESSURE: 61 MMHG | TEMPERATURE: 96.8 F | OXYGEN SATURATION: 97 % | RESPIRATION RATE: 18 BRPM | WEIGHT: 237.3 LBS

## 2021-12-20 DIAGNOSIS — Z17.0 MALIGNANT NEOPLASM OF UPPER-OUTER QUADRANT OF LEFT BREAST IN FEMALE, ESTROGEN RECEPTOR POSITIVE (HCC): Primary | Chronic | ICD-10-CM

## 2021-12-20 DIAGNOSIS — C50.412 MALIGNANT NEOPLASM OF UPPER-OUTER QUADRANT OF LEFT BREAST IN FEMALE, ESTROGEN RECEPTOR POSITIVE (HCC): Primary | Chronic | ICD-10-CM

## 2021-12-20 PROCEDURE — G0463 HOSPITAL OUTPT CLINIC VISIT: HCPCS | Performed by: STUDENT IN AN ORGANIZED HEALTH CARE EDUCATION/TRAINING PROGRAM

## 2021-12-20 PROCEDURE — 99213 OFFICE O/P EST LOW 20 MIN: CPT | Performed by: STUDENT IN AN ORGANIZED HEALTH CARE EDUCATION/TRAINING PROGRAM

## 2021-12-20 RX ORDER — CHOLECALCIFEROL (VITAMIN D3) 25 MCG
TABLET,CHEWABLE ORAL
COMMUNITY
Start: 2021-12-07 | End: 2022-05-27

## 2021-12-20 RX ORDER — PANTOPRAZOLE SODIUM 40 MG/1
40 TABLET, DELAYED RELEASE ORAL DAILY
COMMUNITY

## 2021-12-20 RX ORDER — PREDNISONE 10 MG/1
TABLET ORAL
COMMUNITY
Start: 2021-12-01

## 2021-12-20 RX ORDER — CLONIDINE HYDROCHLORIDE 0.1 MG/1
0.1 TABLET ORAL EVERY 6 HOURS PRN
COMMUNITY
Start: 2021-12-07

## 2021-12-20 NOTE — PROGRESS NOTES
Radiation Oncology Follow Up      Patient: Corrina Jensen   YOB: 1975   Medical Record Number: [id number]   Date of Visit: December 20, 2021   Primary Diagnosis:  History of:  Cancer Staging  Malignant neoplasm of left breast in female, estrogen receptor positive (HCC)  Staging form: Breast, AJCC 8th Edition  - Pathologic stage from 3/9/2021: Stage IA (pT1a, pN0(sn), cM0, G1, ER+, NJ+, HER2-) - Signed by Katarina Patel MD on 3/9/2021      Prior Treatment:  - 1/27/2021 left breast lumpectomy  - 2/22/2021 re-excision and SLNB  - 5/10/2021 - adjuvant radiation to 5040 cGy in 28 fx  - adjuvant leuprolide+anastrozole    CIED: none                                            History of Present Illness:  Ms. Jensen is a 46 y.o. female with a history of left breast cancer. She was noted to have anomalies in the upper outer quadrant of the left breast on recent mammograms.  The patient underwent breast conservation surgery on 01/27/2021.  Examination of resected tissue revealed infiltrating ductal carcinoma measuring 4 x 3 mm.  Tumor extended to the margins of resection.  The patient underwent reexcision of the left breast tumor bed and left sentinel sentinel lymph node biopsies on 02/22/2021.  No residual malignancy was noted within the breast specimen.  Sampled sentinel lymph nodes were negative for metastases.  The patient was evaluated by Dr. Guy.  Postoperative breast radiotherapy was recommended. She completed this on 5/10/2021. She then started leuprolide and anastrozole.    She is doing well today. She unfortunately has not picked up her anastrozole for 2 weeks due to a misunderstanding but is going to pick that up now. She does note some left breast tenderness but she does monthly self breast exams and does not note any concerning findings in the left breast. She did have COVID recently and was on the ventilator. She does get some activity. Weight went down with her illness but is now  rising. She has chronic lower back and hip pains, unchanged. No significant side effects from hormone therapy.      Review of Systems       All other systems reviewed and are negative.      Past Medical History:   Diagnosis Date   • Acute atopic conjunctivitis    • Acute bronchitis    • Allergic rhinitis due to pollen    • Arrhythmia     wearing heart monitor    • Arthritis    • Asthma    • Bilateral foot pain    • Breast cancer (HCC)    • Breast cyst    • Breast lump    • Chest pain    • Chronic low back pain    • Contact dermatitis due to drugs and medicine    • Cyst of ovary    • Diabetes mellitus (HCC)    • GERD (gastroesophageal reflux disease)    • Hyperlipidemia    • Hypertension    • Infestation by Sarcoptes scabiei annalisa hominis    • Low back pain    • Mastodynia    • Nausea and vomiting    • On long term drug therapy    • Pain in wrist    • Plantar fasciitis    • Rash    • Skin disorder     breast-possible superficial cellulitis   • Spinal stenosis    • Tinea pedis    • Tobacco dependence syndrome    • Upper respiratory infection    • Vitamin D deficiency    • Wheezing         Past Surgical History:   Procedure Laterality Date   • BREAST BIOPSY     • BREAST CYST ASPIRATION     • BREAST LUMPECTOMY Left 1/27/2021    Procedure: LEFT BREAST LUMPECTOMY WITH NEEDLE LOCALIZATION Needle localization [facility] be done [facility] by radiology first   @07:00 a.m.;  Surgeon: Mat Garay MD;  Location: [facility];  Service: General;  Laterality: Left;   • BREAST LUMPECTOMY WITH SENTINEL NODE BIOPSY AND AXILLARY NODE DISSECTION Left 2/22/2021    Procedure: LEFT BREAST LUMPECTOMY WITH SENTINEL NODE BIOPSY. REMOVAL OF CYST FROM LEFT CHEEK                              (INJECTION @07: 00 A.M);  Surgeon: Mat Garay MD;  Location: [facility];  Service: General;  Laterality: Left;   • BREAST SURGERY      excision breast lesion   • CARDIAC CATHETERIZATION N/A 7/3/2018    Procedure: Coronary  angiography;  Surgeon: Arun Cadet MD;  Location: [facility] INVASIVE LOCATION;  Service: Cardiovascular   • CARDIAC CATHETERIZATION     • CHOLECYSTECTOMY     • INJECTION OF MEDICATION      Kenalog (4)      Family History   Problem Relation Age of Onset   • Coronary artery disease Other    • Breast cancer Mother    • Breast cancer Maternal Aunt    • Diabetes Father         Social History     Socioeconomic History   • Marital status:    Tobacco Use   • Smoking status: Current Every Day Smoker     Types: Electronic Cigarette   • Smokeless tobacco: Never Used   Vaping Use   • Vaping Use: Every day   • Substances: Nicotine   • Devices: [email or web address]   Substance and Sexual Activity   • Alcohol use: No   • Drug use: No   • Sexual activity: Defer       Allergies:  Adhesive tape, Latex, Strawberry, Wound dressing adhesive, Bactroban [mupirocin calcium], Neomycin-bacitracin-polymyxin [bacitracin-neomycin-polymyxin], and Other   Prior to Admission medications    Medication Sig Start Date End Date Taking? Authorizing Provider   amLODIPine (NORVASC) 5 MG tablet  10/25/21  Yes Caitie Clark MD   anastrozole (ARIMIDEX) 1 MG tablet TAKE 1 TABLET BY MOUTH DAILY 11/22/21  Yes Katarina Patel MD   cetirizine (zyrTEC) 10 MG tablet Take 10 mg by mouth Daily.   Yes ProviderCaitie MD   cloNIDine (CATAPRES) 0.1 MG tablet Take 0.1 mg by mouth Every 6 (Six) Hours As Needed. 12/7/21  Yes Caitie Clark MD   Eliquis 5 MG tablet tablet  10/3/21  Yes aCitie Clark MD   folic acid (FOLVITE) 1 MG tablet TAKE 1 TABLET BY MOUTH DAILY 8/25/21  Yes Katarina Patel MD   FREESTYLE LITE test strip 1 stick by Percutaneous route 4 (Four) Times a Day. 7/23/18  Yes Caitie Clark MD   insulin aspart (novoLOG) 100 UNIT/ML injection Inject 0-24 Units under the skin into the appropriate area as directed 3 (Three) Times a Day Before Meals.  Patient taking differently: Inject 46 Units  under the skin into the appropriate area as directed 2 (Two) Times a Day. 9/8/21  Yes Vianey Zepeda MD   Lancets (FREESTYLE) lancets 1 each by Other route 4 (Four) Times a Day. 7/25/18  Yes Caitie Clark MD   montelukast (SINGULAIR) 10 MG tablet Take 1 tablet by mouth Every Night. 9/8/21  Yes Vianey Zepeda MD   OLANZapine (zyPREXA) 10 MG tablet Take 10 mg by mouth Every Night. 12/26/17  Yes Caitie Clark MD   oxyCODONE-acetaminophen (PERCOCET)  MG per tablet Take 1 tablet by mouth Every 4 (Four) Hours As Needed for Moderate Pain .  Patient taking differently: Take 1 tablet by mouth 3 (Three) Times a Day. 10/18/21  Yes Vik Robb MD   pantoprazole (PROTONIX) 40 MG EC tablet Take 40 mg by mouth Daily.   Yes Caitie Clark MD   amitriptyline (ELAVIL) 50 MG tablet Take 50 mg by mouth Every Night. 12/2/20   Caitie Clark MD   atorvastatin (LIPITOR) 20 MG tablet Take 20 mg by mouth Every Night.    Caitie Clark MD   B-D ULTRAFINE III SHORT PEN 31G X 8 MM misc  12/8/20   Caitie Clark MD   cefTRIAXone (ROCEPHIN) 10 g injection  10/1/21   Caitie Clark MD   cholecalciferol (VITAMIN D3) 25 MCG (1000 UT) tablet  10/25/21   Caitie Clark MD   Cyanocobalamin (B-12) 1000 MCG tablet controlled-release  12/7/21   Caitie Clark MD   docusate sodium (COLACE) 100 MG capsule Take 1 capsule by mouth 2 (Two) Times a Day As Needed for Constipation. 5/18/21   Kamaljit Lacey MD   FeroSul 325 (65 Fe) MG tablet TAKE ONE TABLET BY MOUTH ONE A DAY WITH BREAKFAST 8/25/21   Katarina Patel MD   glyburide micronized (GLYNASE) 6 MG tablet  2/28/21   Caitie Clark MD   GLYBURIDE PO Take 6 mg by mouth Daily.    Caitie Clark MD   insulin detemir (LEVEMIR) 100 UNIT/ML injection Inject 36 Units under the skin into the appropriate area as directed 2 (Two) Times a Day for 30 days. 9/8/21 10/8/21  Vianey Zepeda MD    losartan (COZAAR) 25 MG tablet Take 1 tablet by mouth Daily. 3/24/20   Yue Howard MD   LYRICA 150 MG capsule Take 150 mg by mouth 3 (Three) Times a Day. 7/12/18   Caitie Clark MD   metoprolol tartrate (LOPRESSOR) 50 MG tablet Take 1 tablet by mouth Every 12 (Twelve) Hours. 9/9/21   Ivet Bob APRN   omeprazole (priLOSEC) 40 MG capsule Take 40 mg by mouth Daily. 5/25/15   Caitie Clark MD   oxyCODONE (Roxicodone) 5 MG immediate release tablet Take 1 tablet by mouth Every 4 (Four) Hours As Needed for Moderate Pain  (back pain). 11/2/21   Vik Robb MD   predniSONE (DELTASONE) 10 MG tablet  12/1/21   Caitie Clark MD   tiZANidine (ZANAFLEX) 4 MG tablet Take 4 mg by mouth Every 6 (Six) Hours As Needed. 9/21/17   Caitie Clark MD   VENTOLIN  (90 Base) MCG/ACT inhaler Inhale 2 puffs Every 4 (Four) Hours. 8/5/18   Caitie Clark MD   vitamin B-12 (CYANOCOBALAMIN) 1000 MCG tablet Take 1 tablet by mouth Daily. 5/18/21   Kamaljit Lacey MD        Pain:(on a scale of 0-10)    Pain Score    12/20/21 1300   PainSc:   6   PainLoc: Back        Corrina Jensen reports a pain score of 6.  Given her pain assessment as noted, treatment options were discussed and the following options were decided upon as a follow-up plan to address the patient's pain: continuation of current treatment plan for pain. Chronic pain unrelated to breast cancer.       Quality of Life:   ECOG: (0) Fully active, able to carry on all predisease performance without restriction    Advance Directives (For Healthcare)  Pre-existing AND/MOST/POLST Order: No  Advance Directive Status: Patient does not have advance directive  Have you reviewed your Advance Directive and is it valid for this stay?: Not applicable  Literature Provided on Advance Directives: No  Patient Requests Assistance on Advance Directives: Patient Declined    PHQ-9 Depression Screening:  Little interest or pleasure in doing  things? 0   Feeling down, depressed, or hopeless? 3   Trouble falling or staying asleep, or sleeping too much? 0   Feeling tired or having little energy? 0   Poor appetite or overeating?     Feeling bad about yourself - or that you are a failure or have let yourself or your family down? 0   Trouble concentrating on things, such as reading the newspaper or watching television? 0   Moving or speaking so slowly that other people could have noticed? Or the opposite - being so fidgety or restless that you have been moving around a lot more than usual? 0   Thoughts that you would be better off dead, or of hurting yourself in some way? 0   PHQ-9 Total Score 3   If you checked off any problems, how difficult have these problems made it for you to do your work, take care of things at home, or get along with other people? Somewhat difficult    PHQ-9 Total Score: 3       Physical Examination:  Vitals:    12/20/21 1300   BP: 121/61   Pulse: 78   Resp: 18   Temp: 96.8 °F (36 °C)   SpO2: 97%     Wt Readings from Last 3 Encounters:   12/20/21 108 kg (237 lb 4.8 oz)   11/26/21 106 kg (233 lb)   11/08/21 106 kg (233 lb 4.8 oz)     Body mass index is 38.3 kg/m².    Constitutional: The patient is a well-developed, well-nourished female in no acute distress.  HEENT: Normocephalic, atraumatic. EOMI. Nose and ears without abnormalities upon visual inspection.  Lymphatics: No cervical/supraclavicular/axillary lymphadenopathy is palpated bilaterally.  CV: Regular rate and rhythm.  No murmurs, rubs, or gallops are appreciated.  Respiratory: Lungs clear to auscultation bilaterally.  Breasts: Surgical scars in the left breast and axilla are well healed, and there are no suspicious lesions or nodules palpated. Cavity in the post-op region. Left breast slightly smaller in size than right. The right breast is within normal limits, with no suspicious lesions or nodules palpated. Female nurse chaperone present for entire exam.  GI: Abdomen soft,  nontender, nondistended, with no hepatosplenomegaly or masses palpated.  Musculoskeletal: lower spine and BL hip tenderness. No clubbing, cyanosis, or edema.  Neurologic: Cranial nerves II through XII are grossly intact, with no focal neurological deficits noted on exam.  Psychiatric: Alert and oriented. Normal affect, with no anxiety or depression noted.    Imaging:  XR Elbow 3+ View Right    Result Date: 11/26/2021  1.  Normal right elbow.    Electronically signed by:  Jp Hutchinson MD  11/26/2021 11:44 AM CST Workstation: [id number]    XR Knee 1 or 2 View Left    Result Date: 9/22/2021  CONCLUSION: Small patellar and small tibial spurs. Moderate narrowing of the medial joint space. Very small suprapatellar effusion. [id number] Electronically signed by:  Dilip Berkowitz MD  9/22/2021 11:36 AM CDT Workstation: [email or web address]    CT Head Without Contrast    Result Date: 11/26/2021  No acute intracranial process. Electronically signed by:  Hussain Cai MD  11/26/2021 11:26 AM CST Workstation: [id number]    XR Chest 1 View    Result Date: 9/15/2021  CONCLUSION: Endotracheal tube has been removed. Feeding tube remains in place with tip well below level of diaphragm. Right PICC line remains place with tip projected over the junction of the superior vena cava and the right atrium. Minimal cardiomegaly and minimal pulmonary vascular congestion. Significant improvement in the bilateral interstitial infiltrates and/or edema. [id number] Electronically signed by:  Dilip Berkowitz MD  9/15/2021 11:28 AM CDT Workstation: [id number]    XR Chest 1 View    Result Date: 8/29/2021  1.  Overall, stable appearance of the diffuse pulmonary opacities. 2.  Support lines and tubes in appropriate position. Electronically signed by:  Alta Stack MD  8/29/2021 11:59 PM CDT Workstation: [id number]    XR Chest 1 View    Result Date: 8/27/2021  Bilateral widespread patchy airspace opacities are not significantly changed. Electronically signed by:   Dario Vargas MD  8/27/2021 7:52 PM CDT Workstation: [id number]    XR Chest 1 View    Result Date: 8/27/2021  1. Stable airspace opacities. 2. Right upper extremity PICC not well seen coursing over the expected location of the SVC. Findings may secondary to patient positioning/rotation. Recommend repeat radiograph to confirm placement. Electronically signed by:  Audrey Amaya MD  8/27/2021 7:33 PM CDT Workstation: [id number]    XR Chest 1 View    Result Date: 8/23/2021  Mild interval improvement in the diffuse pulmonary infiltrates and opacification with significant residual. No pleural effusion. ET tube and NG tube in place detailed above. [id number] Electronically signed by:  Santana Swanson MD  8/23/2021 7:27 AM CDT Workstation: [id number]    US Venous Doppler Lower Extremity Left (duplex)    Result Date: 9/26/2021  IMPRESSION Patient with extensive intraluminal thrombus from the level of the common femoral vein on the left side to the calf with involvement of the left common femoral, superficial femoral, popliteal, and peroneal veins. Electronically signed by:  Arvind Enriquez MD  9/26/2021 2:15 PM CDT Workstation: [id number]    CT Facial Bones Without Contrast    Result Date: 11/26/2021  No acute facial bone abnormality. Minimal left anterior facial soft tissue fat stranding. Electronically signed by:  Hussain Cai MD  11/26/2021 11:29 AM CST Workstation: [id number]    XR Abdomen KUB    Result Date: 8/27/2021  As above. Electronically signed by:  Jp Hutchinson MD  8/27/2021 11:47 AM CDT Workstation: [id number]    XR Chest Post CVA Port    Result Date: 8/25/2021  Right PICC with tip likely in the right atrium. Remaining support apparatus is stable. No change in diffuse bilateral airspace disease/pneumonia. Electronically signed by:  Hussain Cai MD  8/25/2021 2:18 PM CDT Workstation: [id number]      Recent Pathology:   none    Labs:   Lab Results   Component Value Date    GLUCOSE 302 (H)  11/08/2021    BUN 5 (L) 11/08/2021    CREATININE 0.55 (L) 11/08/2021    EGFRIFNONA 118 11/08/2021    BCR 9.1 11/08/2021    K 3.2 (L) 11/08/2021    CO2 28.0 11/08/2021    CALCIUM 9.1 11/08/2021    ALBUMIN 3.60 11/08/2021    AST 44 (H) 11/08/2021    ALT 30 11/08/2021      WBC   Date Value Ref Range Status   11/08/2021 5.45 3.40 - 10.80 10*3/mm3 Final     Hemoglobin   Date Value Ref Range Status   11/08/2021 11.7 (L) 12.0 - 15.9 g/dL Final     Hematocrit   Date Value Ref Range Status   11/08/2021 35.4 34.0 - 46.6 % Final     Platelets   Date Value Ref Range Status   11/08/2021 226 140 - 450 10*3/mm3 Final    No results found for: PSA No results found for: CEA       ASSESSMENT/PLAN:  Ms. Jensen is a 46 y.o. female with a history of left breast cancer treated with surgery, radiation, and hormone therapy. Personally reviewed available physician notes, imaging, and pathology since last visit.    She is doing well today without concern for recurrence. She will restart hormone therapy. Encouraged her monthly self breast exams. Encouraged her to increase her cardiovascular exercise. She states she has follow up with her surgeon and is getting a mammogram in January. I will see her back in 4 months or sooner if needed.    Time spent reviewing records and speaking with, evaluating, and counseling patient: 15 minutes.    Electronically signed by Marvin Mcconnell MD 12/20/21 13:43 CST

## 2022-01-03 ENCOUNTER — INFUSION (OUTPATIENT)
Dept: ONCOLOGY | Facility: HOSPITAL | Age: 47
End: 2022-01-03

## 2022-01-03 VITALS
HEART RATE: 80 BPM | DIASTOLIC BLOOD PRESSURE: 59 MMHG | TEMPERATURE: 97.2 F | SYSTOLIC BLOOD PRESSURE: 121 MMHG | RESPIRATION RATE: 16 BRPM

## 2022-01-03 DIAGNOSIS — Z17.0 MALIGNANT NEOPLASM OF UPPER-OUTER QUADRANT OF LEFT BREAST IN FEMALE, ESTROGEN RECEPTOR POSITIVE: Primary | ICD-10-CM

## 2022-01-03 DIAGNOSIS — C50.412 MALIGNANT NEOPLASM OF UPPER-OUTER QUADRANT OF LEFT BREAST IN FEMALE, ESTROGEN RECEPTOR POSITIVE: Primary | ICD-10-CM

## 2022-01-03 PROCEDURE — 96402 CHEMO HORMON ANTINEOPL SQ/IM: CPT | Performed by: INTERNAL MEDICINE

## 2022-01-03 PROCEDURE — 25010000002 LEUPROLIDE ACETATE PER 7.5 MG: Performed by: INTERNAL MEDICINE

## 2022-01-03 RX ADMIN — LEUPROLIDE ACETATE 7.5 MG: KIT at 11:00

## 2022-01-31 ENCOUNTER — LAB (OUTPATIENT)
Dept: ONCOLOGY | Facility: HOSPITAL | Age: 47
End: 2022-01-31

## 2022-01-31 ENCOUNTER — INFUSION (OUTPATIENT)
Dept: ONCOLOGY | Facility: HOSPITAL | Age: 47
End: 2022-01-31

## 2022-01-31 ENCOUNTER — OFFICE VISIT (OUTPATIENT)
Dept: ONCOLOGY | Facility: CLINIC | Age: 47
End: 2022-01-31

## 2022-01-31 VITALS
SYSTOLIC BLOOD PRESSURE: 132 MMHG | DIASTOLIC BLOOD PRESSURE: 55 MMHG | RESPIRATION RATE: 18 BRPM | HEIGHT: 65 IN | BODY MASS INDEX: 38 KG/M2 | TEMPERATURE: 97.5 F | HEART RATE: 89 BPM | WEIGHT: 228.1 LBS

## 2022-01-31 DIAGNOSIS — D61.818 PANCYTOPENIA: ICD-10-CM

## 2022-01-31 DIAGNOSIS — D61.818 PANCYTOPENIA: Chronic | ICD-10-CM

## 2022-01-31 DIAGNOSIS — Z17.0 MALIGNANT NEOPLASM OF UPPER-OUTER QUADRANT OF LEFT BREAST IN FEMALE, ESTROGEN RECEPTOR POSITIVE: ICD-10-CM

## 2022-01-31 DIAGNOSIS — Z17.0 MALIGNANT NEOPLASM OF UPPER-OUTER QUADRANT OF LEFT BREAST IN FEMALE, ESTROGEN RECEPTOR POSITIVE: Primary | ICD-10-CM

## 2022-01-31 DIAGNOSIS — C50.412 MALIGNANT NEOPLASM OF UPPER-OUTER QUADRANT OF LEFT BREAST IN FEMALE, ESTROGEN RECEPTOR POSITIVE: Primary | ICD-10-CM

## 2022-01-31 DIAGNOSIS — I10 ESSENTIAL HYPERTENSION: ICD-10-CM

## 2022-01-31 DIAGNOSIS — C50.412 MALIGNANT NEOPLASM OF UPPER-OUTER QUADRANT OF LEFT BREAST IN FEMALE, ESTROGEN RECEPTOR POSITIVE: ICD-10-CM

## 2022-01-31 LAB
ALBUMIN SERPL-MCNC: 4.3 G/DL (ref 3.5–5.2)
ALBUMIN/GLOB SERPL: 1.3 G/DL
ALP SERPL-CCNC: 197 U/L (ref 39–117)
ALT SERPL W P-5'-P-CCNC: 53 U/L (ref 1–33)
ANION GAP SERPL CALCULATED.3IONS-SCNC: 9 MMOL/L (ref 5–15)
AST SERPL-CCNC: 57 U/L (ref 1–32)
BASOPHILS # BLD AUTO: 0.03 10*3/MM3 (ref 0–0.2)
BASOPHILS NFR BLD AUTO: 0.6 % (ref 0–1.5)
BILIRUB SERPL-MCNC: 0.5 MG/DL (ref 0–1.2)
BUN SERPL-MCNC: 6 MG/DL (ref 6–20)
BUN/CREAT SERPL: 8.6 (ref 7–25)
CALCIUM SPEC-SCNC: 9.7 MG/DL (ref 8.6–10.5)
CHLORIDE SERPL-SCNC: 100 MMOL/L (ref 98–107)
CO2 SERPL-SCNC: 29 MMOL/L (ref 22–29)
CREAT SERPL-MCNC: 0.7 MG/DL (ref 0.57–1)
DEPRECATED RDW RBC AUTO: 39.9 FL (ref 37–54)
EOSINOPHIL # BLD AUTO: 0.09 10*3/MM3 (ref 0–0.4)
EOSINOPHIL NFR BLD AUTO: 1.9 % (ref 0.3–6.2)
ERYTHROCYTE [DISTWIDTH] IN BLOOD BY AUTOMATED COUNT: 14.4 % (ref 12.3–15.4)
ESTRADIOL SERPL HS-MCNC: <5 PG/ML
GFR SERPL CREATININE-BSD FRML MDRD: 89 ML/MIN/1.73
GLOBULIN UR ELPH-MCNC: 3.3 GM/DL
GLUCOSE SERPL-MCNC: 365 MG/DL (ref 65–99)
HCT VFR BLD AUTO: 39.5 % (ref 34–46.6)
HGB BLD-MCNC: 13.4 G/DL (ref 12–15.9)
HOLD SPECIMEN: NORMAL
IMM GRANULOCYTES # BLD AUTO: 0.01 10*3/MM3 (ref 0–0.05)
IMM GRANULOCYTES NFR BLD AUTO: 0.2 % (ref 0–0.5)
LYMPHOCYTES # BLD AUTO: 1.26 10*3/MM3 (ref 0.7–3.1)
LYMPHOCYTES NFR BLD AUTO: 26.7 % (ref 19.6–45.3)
MCH RBC QN AUTO: 26.6 PG (ref 26.6–33)
MCHC RBC AUTO-ENTMCNC: 33.9 G/DL (ref 31.5–35.7)
MCV RBC AUTO: 78.4 FL (ref 79–97)
MONOCYTES # BLD AUTO: 0.48 10*3/MM3 (ref 0.1–0.9)
MONOCYTES NFR BLD AUTO: 10.2 % (ref 5–12)
NEUTROPHILS NFR BLD AUTO: 2.85 10*3/MM3 (ref 1.7–7)
NEUTROPHILS NFR BLD AUTO: 60.4 % (ref 42.7–76)
NRBC BLD AUTO-RTO: 0 /100 WBC (ref 0–0.2)
PLATELET # BLD AUTO: 133 10*3/MM3 (ref 140–450)
PMV BLD AUTO: 11.2 FL (ref 6–12)
POTASSIUM SERPL-SCNC: 3.5 MMOL/L (ref 3.5–5.2)
PROT SERPL-MCNC: 7.6 G/DL (ref 6–8.5)
RBC # BLD AUTO: 5.04 10*6/MM3 (ref 3.77–5.28)
SODIUM SERPL-SCNC: 138 MMOL/L (ref 136–145)
WBC NRBC COR # BLD: 4.72 10*3/MM3 (ref 3.4–10.8)

## 2022-01-31 PROCEDURE — 85025 COMPLETE CBC W/AUTO DIFF WBC: CPT

## 2022-01-31 PROCEDURE — 25010000002 LEUPROLIDE ACETATE PER 7.5 MG: Performed by: INTERNAL MEDICINE

## 2022-01-31 PROCEDURE — 96402 CHEMO HORMON ANTINEOPL SQ/IM: CPT | Performed by: INTERNAL MEDICINE

## 2022-01-31 PROCEDURE — 80053 COMPREHEN METABOLIC PANEL: CPT

## 2022-01-31 PROCEDURE — 99214 OFFICE O/P EST MOD 30 MIN: CPT | Performed by: INTERNAL MEDICINE

## 2022-01-31 PROCEDURE — 82670 ASSAY OF TOTAL ESTRADIOL: CPT

## 2022-01-31 PROCEDURE — 36415 COLL VENOUS BLD VENIPUNCTURE: CPT | Performed by: INTERNAL MEDICINE

## 2022-01-31 RX ORDER — ANASTROZOLE 1 MG/1
1 TABLET ORAL DAILY
Qty: 90 TABLET | Refills: 0 | Status: SHIPPED | OUTPATIENT
Start: 2022-01-31 | End: 2022-02-28 | Stop reason: SDUPTHER

## 2022-01-31 RX ADMIN — LEUPROLIDE ACETATE 7.5 MG: KIT at 11:04

## 2022-02-06 NOTE — PROGRESS NOTES
"  Subjective     Corrina Jensen was seen in follow-up for breast cancer.  She is feeling well overall since last visit.  Has recovered from Covid well.  She remains on monthly Lupron and Arimidex.  Tolerating treatment well without any side effects.  No new medication.  No new hospitalization.    Past Medical History, Past Surgical History, Social History, Family History have been reviewed and are without significant changes except as mentioned.        Medications:  The current medication list was reviewed in the EMR    ALLERGIES:    Allergies   Allergen Reactions   • Adhesive Tape Unknown - Low Severity   • Latex Itching and Other (See Comments)     Redness of skin   • Strawberry Cough   • Wound Dressing Adhesive Other (See Comments)     Steri Strips cause blistering of skin   • Bactroban [Mupirocin Calcium] Rash   • Neomycin-Bacitracin-Polymyxin [Bacitracin-Neomycin-Polymyxin] Rash   • Other Rash     All antibiotic creams       Objective      Vitals:    01/31/22 1032   BP: 132/55   Pulse: 89   Resp: 18   Temp: 97.5 °F (36.4 °C)   TempSrc: Temporal   Weight: 103 kg (228 lb 1.6 oz)   Height: 165.1 cm (65\")   PainSc:   6   PainLoc: Back     Current Status 1/31/2022   ECOG score 0       Physical Exam  Vitals and nursing note reviewed.   Constitutional:       Appearance: She is obese.   Cardiovascular:      Rate and Rhythm: Normal rate and regular rhythm.   Pulmonary:      Effort: Pulmonary effort is normal.   Abdominal:      General: There is no distension.      Palpations: Abdomen is soft.      Tenderness: There is no abdominal tenderness.   Neurological:      General: No focal deficit present.      Mental Status: She is alert and oriented to person, place, and time. Mental status is at baseline.   Psychiatric:         Mood and Affect: Mood normal.         Behavior: Behavior normal.         Thought Content: Thought content normal.               RECENT LABS:Independently reviewed and summarized  Hematology WBC "   Date Value Ref Range Status   01/31/2022 4.72 3.40 - 10.80 10*3/mm3 Final     RBC   Date Value Ref Range Status   01/31/2022 5.04 3.77 - 5.28 10*6/mm3 Final     Hemoglobin   Date Value Ref Range Status   01/31/2022 13.4 12.0 - 15.9 g/dL Final     Hematocrit   Date Value Ref Range Status   01/31/2022 39.5 34.0 - 46.6 % Final     Platelets   Date Value Ref Range Status   01/31/2022 133 (L) 140 - 450 10*3/mm3 Final            Lab Results   Component Value Date    GLUCOSE 365 (H) 01/31/2022    BUN 6 01/31/2022    CREATININE 0.70 01/31/2022    EGFRIFNONA 89 01/31/2022    BCR 8.6 01/31/2022    K 3.5 01/31/2022    CO2 29.0 01/31/2022    CALCIUM 9.7 01/31/2022    ALBUMIN 4.30 01/31/2022    AST 57 (H) 01/31/2022    ALT 53 (H) 01/31/2022       Corrina Eve Jensen reports a pain score of 6.  Given her pain assessment as noted, treatment options were discussed and the following options were decided upon as a follow-up plan to address the patient's pain: referral to Primary Care for assistance in pain treatment guidance.    Patient screened positive for depression based on a PHQ-9 score of 3 on 1/31/2022. Follow-up recommendations include: Suicide Risk Assessment performed.      Diagnosis:   (1) Left breast cancer   Stage IA (pT1a, pN0(sn),cM0)   ER 95% VT 95% HER IHC negative            Oncology/Hematology History   Malignant neoplasm of left breast in female, estrogen receptor positive (CMS/HCC)   2/12/2021 Initial Diagnosis     Malignant neoplasm of left breast in female, estrogen receptor positive (CMS/HCC)      3/9/2021 Cancer Staged     Staging form: Breast, AJCC 8th Edition  - Pathologic stage from 3/9/2021: Stage IA (pT1a, pN0(sn), cM0, G1, ER+, VT+, HER2-) - Signed by Katarina Patel MD on 3/9/2021      4/1/2021 - 5/10/2021 Radiation     Radiation OncologyTreatment Course:  Corrina Jensen received 5040 cGy in 28 fractions to LT BREAST via External Beam Radiation - EBRT.      6/9/2021 -  Chemotherapy      OP SUPPORTIVE Leuprolide 7.5 mg Q28D               (2) Pancytopenia         Assessment/Plan     (1) Left breast cancer     Chronic, stable.  Treated with lumpectomy and radiation.  Continue adjuvant hormonal therapy with ovarian suppression with Lupron every month with Arimidex.  She is tolerating treatment well without any major side effects.  I will give her Lupron injection today.  Is requesting new prescription of Arimidex which was sent to the pharmacy.  I will order mammogram for breast cancer surveillance.  I will plan to see her back in 3 months with CBC, CMP.   Continue monthly Lupron.    (2) Pancytopenia     Chronic, stable.  Suspect secondary to fatty liver disease and splenomegaly.  Continue B12 and folic acid supplement.  Check CBC, CMP in 3 months.    2/6/2022      CC:

## 2022-02-28 ENCOUNTER — INFUSION (OUTPATIENT)
Dept: ONCOLOGY | Facility: HOSPITAL | Age: 47
End: 2022-02-28

## 2022-02-28 VITALS — TEMPERATURE: 98.7 F | SYSTOLIC BLOOD PRESSURE: 126 MMHG | RESPIRATION RATE: 18 BRPM | DIASTOLIC BLOOD PRESSURE: 58 MMHG

## 2022-02-28 DIAGNOSIS — Z17.0 MALIGNANT NEOPLASM OF UPPER-OUTER QUADRANT OF LEFT BREAST IN FEMALE, ESTROGEN RECEPTOR POSITIVE: Primary | ICD-10-CM

## 2022-02-28 DIAGNOSIS — C50.412 MALIGNANT NEOPLASM OF UPPER-OUTER QUADRANT OF LEFT BREAST IN FEMALE, ESTROGEN RECEPTOR POSITIVE: Primary | ICD-10-CM

## 2022-02-28 PROCEDURE — 96402 CHEMO HORMON ANTINEOPL SQ/IM: CPT | Performed by: INTERNAL MEDICINE

## 2022-02-28 PROCEDURE — 25010000002 LEUPROLIDE ACETATE PER 7.5 MG: Performed by: INTERNAL MEDICINE

## 2022-02-28 RX ORDER — INSULIN DETEMIR 100 [IU]/ML
INJECTION, SOLUTION SUBCUTANEOUS
COMMUNITY
Start: 2022-02-21 | End: 2022-05-27

## 2022-02-28 RX ORDER — INSULIN ASPART 100 [IU]/ML
INJECTION, SOLUTION INTRAVENOUS; SUBCUTANEOUS
COMMUNITY
Start: 2022-02-20 | End: 2022-05-27

## 2022-02-28 RX ORDER — ANASTROZOLE 1 MG/1
1 TABLET ORAL DAILY
Qty: 90 TABLET | Refills: 0 | Status: SHIPPED | OUTPATIENT
Start: 2022-02-28 | End: 2022-04-25 | Stop reason: SDUPTHER

## 2022-02-28 RX ADMIN — LEUPROLIDE ACETATE 7.5 MG: KIT at 10:08

## 2022-03-28 ENCOUNTER — INFUSION (OUTPATIENT)
Dept: ONCOLOGY | Facility: HOSPITAL | Age: 47
End: 2022-03-28

## 2022-03-28 VITALS — DIASTOLIC BLOOD PRESSURE: 56 MMHG | SYSTOLIC BLOOD PRESSURE: 118 MMHG | TEMPERATURE: 97.3 F | HEART RATE: 82 BPM

## 2022-03-28 DIAGNOSIS — C50.412 MALIGNANT NEOPLASM OF UPPER-OUTER QUADRANT OF LEFT BREAST IN FEMALE, ESTROGEN RECEPTOR POSITIVE: Primary | ICD-10-CM

## 2022-03-28 DIAGNOSIS — Z17.0 MALIGNANT NEOPLASM OF UPPER-OUTER QUADRANT OF LEFT BREAST IN FEMALE, ESTROGEN RECEPTOR POSITIVE: Primary | ICD-10-CM

## 2022-03-28 PROCEDURE — 96402 CHEMO HORMON ANTINEOPL SQ/IM: CPT | Performed by: INTERNAL MEDICINE

## 2022-03-28 PROCEDURE — 25010000002 LEUPROLIDE ACETATE PER 7.5 MG: Performed by: INTERNAL MEDICINE

## 2022-03-28 RX ADMIN — LEUPROLIDE ACETATE 7.5 MG: KIT at 11:08

## 2022-04-21 ENCOUNTER — APPOINTMENT (OUTPATIENT)
Dept: RADIATION ONCOLOGY | Facility: HOSPITAL | Age: 47
End: 2022-04-21

## 2022-04-25 ENCOUNTER — LAB (OUTPATIENT)
Dept: ONCOLOGY | Facility: HOSPITAL | Age: 47
End: 2022-04-25

## 2022-04-25 ENCOUNTER — OFFICE VISIT (OUTPATIENT)
Dept: ONCOLOGY | Facility: CLINIC | Age: 47
End: 2022-04-25

## 2022-04-25 ENCOUNTER — INFUSION (OUTPATIENT)
Dept: ONCOLOGY | Facility: HOSPITAL | Age: 47
End: 2022-04-25

## 2022-04-25 VITALS
HEART RATE: 89 BPM | OXYGEN SATURATION: 93 % | WEIGHT: 227 LBS | TEMPERATURE: 97.3 F | BODY MASS INDEX: 37.77 KG/M2 | DIASTOLIC BLOOD PRESSURE: 58 MMHG | SYSTOLIC BLOOD PRESSURE: 120 MMHG | RESPIRATION RATE: 18 BRPM

## 2022-04-25 DIAGNOSIS — D61.818 PANCYTOPENIA: ICD-10-CM

## 2022-04-25 DIAGNOSIS — C50.412 MALIGNANT NEOPLASM OF UPPER-OUTER QUADRANT OF LEFT BREAST IN FEMALE, ESTROGEN RECEPTOR POSITIVE: Primary | Chronic | ICD-10-CM

## 2022-04-25 DIAGNOSIS — D61.818 PANCYTOPENIA: Chronic | ICD-10-CM

## 2022-04-25 DIAGNOSIS — Z17.0 MALIGNANT NEOPLASM OF UPPER-OUTER QUADRANT OF LEFT BREAST IN FEMALE, ESTROGEN RECEPTOR POSITIVE: ICD-10-CM

## 2022-04-25 DIAGNOSIS — Z17.0 MALIGNANT NEOPLASM OF UPPER-OUTER QUADRANT OF LEFT BREAST IN FEMALE, ESTROGEN RECEPTOR POSITIVE: Primary | Chronic | ICD-10-CM

## 2022-04-25 DIAGNOSIS — C50.412 MALIGNANT NEOPLASM OF UPPER-OUTER QUADRANT OF LEFT BREAST IN FEMALE, ESTROGEN RECEPTOR POSITIVE: ICD-10-CM

## 2022-04-25 DIAGNOSIS — C50.412 MALIGNANT NEOPLASM OF UPPER-OUTER QUADRANT OF LEFT BREAST IN FEMALE, ESTROGEN RECEPTOR POSITIVE: Primary | ICD-10-CM

## 2022-04-25 DIAGNOSIS — Z17.0 MALIGNANT NEOPLASM OF UPPER-OUTER QUADRANT OF LEFT BREAST IN FEMALE, ESTROGEN RECEPTOR POSITIVE: Primary | ICD-10-CM

## 2022-04-25 LAB
ALBUMIN SERPL-MCNC: 4 G/DL (ref 3.5–5.2)
ALBUMIN/GLOB SERPL: 1.3 G/DL
ALP SERPL-CCNC: 161 U/L (ref 39–117)
ALT SERPL W P-5'-P-CCNC: 32 U/L (ref 1–33)
ANION GAP SERPL CALCULATED.3IONS-SCNC: 13 MMOL/L (ref 5–15)
AST SERPL-CCNC: 34 U/L (ref 1–32)
BASOPHILS # BLD AUTO: 0.03 10*3/MM3 (ref 0–0.2)
BASOPHILS NFR BLD AUTO: 0.5 % (ref 0–1.5)
BILIRUB SERPL-MCNC: 0.3 MG/DL (ref 0–1.2)
BUN SERPL-MCNC: 6 MG/DL (ref 6–20)
BUN/CREAT SERPL: 9.8 (ref 7–25)
CALCIUM SPEC-SCNC: 9.5 MG/DL (ref 8.6–10.5)
CHLORIDE SERPL-SCNC: 102 MMOL/L (ref 98–107)
CO2 SERPL-SCNC: 25 MMOL/L (ref 22–29)
CREAT SERPL-MCNC: 0.61 MG/DL (ref 0.57–1)
DEPRECATED RDW RBC AUTO: 44.2 FL (ref 37–54)
EGFRCR SERPLBLD CKD-EPI 2021: 110.4 ML/MIN/1.73
EOSINOPHIL # BLD AUTO: 0.15 10*3/MM3 (ref 0–0.4)
EOSINOPHIL NFR BLD AUTO: 2.7 % (ref 0.3–6.2)
ERYTHROCYTE [DISTWIDTH] IN BLOOD BY AUTOMATED COUNT: 14.4 % (ref 12.3–15.4)
GLOBULIN UR ELPH-MCNC: 3 GM/DL
GLUCOSE SERPL-MCNC: 130 MG/DL (ref 65–99)
HCT VFR BLD AUTO: 43 % (ref 34–46.6)
HGB BLD-MCNC: 14.9 G/DL (ref 12–15.9)
HOLD SPECIMEN: NORMAL
IMM GRANULOCYTES # BLD AUTO: 0.01 10*3/MM3 (ref 0–0.05)
IMM GRANULOCYTES NFR BLD AUTO: 0.2 % (ref 0–0.5)
LYMPHOCYTES # BLD AUTO: 1.58 10*3/MM3 (ref 0.7–3.1)
LYMPHOCYTES NFR BLD AUTO: 28.4 % (ref 19.6–45.3)
MCH RBC QN AUTO: 29.7 PG (ref 26.6–33)
MCHC RBC AUTO-ENTMCNC: 34.7 G/DL (ref 31.5–35.7)
MCV RBC AUTO: 85.8 FL (ref 79–97)
MONOCYTES # BLD AUTO: 0.42 10*3/MM3 (ref 0.1–0.9)
MONOCYTES NFR BLD AUTO: 7.5 % (ref 5–12)
NEUTROPHILS NFR BLD AUTO: 3.38 10*3/MM3 (ref 1.7–7)
NEUTROPHILS NFR BLD AUTO: 60.7 % (ref 42.7–76)
NRBC BLD AUTO-RTO: 0 /100 WBC (ref 0–0.2)
PLATELET # BLD AUTO: 154 10*3/MM3 (ref 140–450)
PMV BLD AUTO: 10.5 FL (ref 6–12)
POTASSIUM SERPL-SCNC: 3.5 MMOL/L (ref 3.5–5.2)
PROT SERPL-MCNC: 7 G/DL (ref 6–8.5)
RBC # BLD AUTO: 5.01 10*6/MM3 (ref 3.77–5.28)
SODIUM SERPL-SCNC: 140 MMOL/L (ref 136–145)
WBC NRBC COR # BLD: 5.57 10*3/MM3 (ref 3.4–10.8)

## 2022-04-25 PROCEDURE — 85025 COMPLETE CBC W/AUTO DIFF WBC: CPT

## 2022-04-25 PROCEDURE — 36415 COLL VENOUS BLD VENIPUNCTURE: CPT | Performed by: INTERNAL MEDICINE

## 2022-04-25 PROCEDURE — 80053 COMPREHEN METABOLIC PANEL: CPT

## 2022-04-25 PROCEDURE — 25010000002 LEUPROLIDE ACETATE PER 7.5 MG: Performed by: INTERNAL MEDICINE

## 2022-04-25 PROCEDURE — 99214 OFFICE O/P EST MOD 30 MIN: CPT | Performed by: INTERNAL MEDICINE

## 2022-04-25 PROCEDURE — 96402 CHEMO HORMON ANTINEOPL SQ/IM: CPT | Performed by: INTERNAL MEDICINE

## 2022-04-25 RX ORDER — ANASTROZOLE 1 MG/1
1 TABLET ORAL DAILY
Qty: 90 TABLET | Refills: 0 | Status: SHIPPED | OUTPATIENT
Start: 2022-04-25 | End: 2022-06-20 | Stop reason: SDUPTHER

## 2022-04-25 RX ORDER — POTASSIUM CHLORIDE 750 MG/1
TABLET, FILM COATED, EXTENDED RELEASE ORAL
COMMUNITY
Start: 2022-04-02

## 2022-04-25 RX ORDER — HYDROCODONE BITARTRATE AND ACETAMINOPHEN 5; 325 MG/1; MG/1
TABLET ORAL
COMMUNITY
Start: 2022-04-04 | End: 2022-05-13

## 2022-04-25 RX ORDER — ESCITALOPRAM OXALATE 10 MG/1
TABLET ORAL
COMMUNITY
Start: 2022-03-07 | End: 2022-05-13 | Stop reason: ALTCHOICE

## 2022-04-25 RX ADMIN — LEUPROLIDE ACETATE 7.5 MG: KIT at 10:16

## 2022-04-25 NOTE — PROGRESS NOTES
Subjective     Corrina Jensen was seen in follow-up for breast cancer.  She is feeling overall well since last visit.  Denies any new health issues.  Remains on Lupron and Arimidex.  She did not show up for her last mammogram appointment.  We will reschedule her.  No new medications.  No new hospitalizations.      Past Medical History, Past Surgical History, Social History, Family History have been reviewed and are without significant changes except as mentioned.        Medications:  The current medication list was reviewed in the EMR    ALLERGIES:    Allergies   Allergen Reactions   • Adhesive Tape Unknown - Low Severity   • Latex Itching and Other (See Comments)     Redness of skin   • Strawberry Cough   • Wound Dressing Adhesive Other (See Comments)     Steri Strips cause blistering of skin   • Bactroban [Mupirocin Calcium] Rash   • Neomycin-Bacitracin-Polymyxin [Bacitracin-Neomycin-Polymyxin] Rash   • Other Rash     All antibiotic creams       Objective      Vitals:    04/25/22 0935   BP: 120/58   Pulse: 89   Resp: 18   Temp: 97.3 °F (36.3 °C)   SpO2: 93%   Weight: 103 kg (227 lb)   PainSc:   5     Current Status 3/28/2022   ECOG score 0       Physical Exam  Vitals and nursing note reviewed.   Constitutional:       Appearance: Normal appearance.   Neurological:      General: No focal deficit present.      Mental Status: She is alert and oriented to person, place, and time. Mental status is at baseline.   Psychiatric:         Mood and Affect: Mood normal.         Behavior: Behavior normal.         Thought Content: Thought content normal.               RECENT LABS:Independently reviewed and summarized  Hematology WBC   Date Value Ref Range Status   04/25/2022 5.57 3.40 - 10.80 10*3/mm3 Final     RBC   Date Value Ref Range Status   04/25/2022 5.01 3.77 - 5.28 10*6/mm3 Final     Hemoglobin   Date Value Ref Range Status   04/25/2022 14.9 12.0 - 15.9 g/dL Final     Hematocrit   Date Value Ref Range Status    04/25/2022 43.0 34.0 - 46.6 % Final     Platelets   Date Value Ref Range Status   04/25/2022 154 140 - 450 10*3/mm3 Final            Lab Results   Component Value Date    GLUCOSE 130 (H) 04/25/2022    BUN 6 04/25/2022    CREATININE 0.61 04/25/2022    EGFRIFNONA 89 01/31/2022    BCR 9.8 04/25/2022    K 3.5 04/25/2022    CO2 25.0 04/25/2022    CALCIUM 9.5 04/25/2022    ALBUMIN 4.00 04/25/2022    AST 34 (H) 04/25/2022    ALT 32 04/25/2022       Corrina Jensen reports a pain score of 5.  Given her pain assessment as noted, treatment options were discussed and the following options were decided upon as a follow-up plan to address the patient's pain: referral to Primary Care for assistance in pain treatment guidance.    Patient screened positive for depression based on a PHQ-9 score of 8 on 4/25/2022. Follow-up recommendations include: Suicide Risk Assessment performed.      Diagnosis:   (1) Left breast cancer   Stage IA (pT1a, pN0(sn),cM0)   ER 95% MN 95% HER IHC negative            Oncology/Hematology History   Malignant neoplasm of left breast in female, estrogen receptor positive (CMS/HCC)   2/12/2021 Initial Diagnosis     Malignant neoplasm of left breast in female, estrogen receptor positive (CMS/HCC)      3/9/2021 Cancer Staged     Staging form: Breast, AJCC 8th Edition  - Pathologic stage from 3/9/2021: Stage IA (pT1a, pN0(sn), cM0, G1, ER+, MN+, HER2-) - Signed by Katarina Patel MD on 3/9/2021      4/1/2021 - 5/10/2021 Radiation     Radiation OncologyTreatment Course:  Corrina Jensen received 5040 cGy in 28 fractions to LT BREAST via External Beam Radiation - EBRT.      6/9/2021 -  Chemotherapy     OP SUPPORTIVE Leuprolide 7.5 mg Q28D               (2) Pancytopenia         Assessment/Plan       (1) Left breast cancer     Chronic, stable.  Treated with lumpectomy and radiation.  Continue adjuvant hormonal therapy with ovarian suppression with Lupron every month with Arimidex.  She is  tolerating treatment well without any major side effects.  I will give her Lupron injection today.  Is requesting new prescription of Arimidex which was sent to the pharmacy.  I will order mammogram for breast cancer surveillance.  I will plan to see her back in 3 months with CBC, CMP.   Continue monthly Lupron.       (2) Pancytopenia     Chronic, stable.  Suspect secondary to fatty liver disease and splenomegaly.  Continue B12 and folic acid supplements.  Check CBC, CMP in 3 months.    4/25/2022      CC:

## 2022-05-11 ENCOUNTER — HOSPITAL ENCOUNTER (OUTPATIENT)
Dept: ULTRASOUND IMAGING | Facility: HOSPITAL | Age: 47
Discharge: HOME | End: 2022-05-11
Admitting: INTERNAL MEDICINE

## 2022-05-11 DIAGNOSIS — R92.8 ABNORMAL MAMMOGRAM: ICD-10-CM

## 2022-05-11 PROCEDURE — 76642 ULTRASOUND BREAST LIMITED: CPT

## 2022-05-13 ENCOUNTER — OFFICE VISIT (OUTPATIENT)
Dept: SURGERY | Facility: CLINIC | Age: 47
End: 2022-05-13

## 2022-05-13 VITALS
SYSTOLIC BLOOD PRESSURE: 100 MMHG | HEIGHT: 65 IN | HEART RATE: 74 BPM | WEIGHT: 231.2 LBS | BODY MASS INDEX: 38.52 KG/M2 | TEMPERATURE: 97.2 F | DIASTOLIC BLOOD PRESSURE: 60 MMHG

## 2022-05-13 DIAGNOSIS — R92.8 ABNORMAL MAMMOGRAM: Primary | ICD-10-CM

## 2022-05-13 PROCEDURE — 99214 OFFICE O/P EST MOD 30 MIN: CPT | Performed by: SURGERY

## 2022-05-13 RX ORDER — DIPHENHYDRAMINE HCL 25 MG
25 CAPSULE ORAL 2 TIMES DAILY
COMMUNITY

## 2022-05-13 RX ORDER — PROCHLORPERAZINE 25 MG/1
SUPPOSITORY RECTAL
COMMUNITY
Start: 2022-03-07 | End: 2022-07-11 | Stop reason: SDUPTHER

## 2022-05-13 RX ORDER — PROCHLORPERAZINE 25 MG/1
SUPPOSITORY RECTAL
COMMUNITY
Start: 2022-04-18 | End: 2023-01-31

## 2022-05-13 RX ORDER — PROCHLORPERAZINE 25 MG/1
SUPPOSITORY RECTAL
COMMUNITY
Start: 2022-03-07

## 2022-05-13 RX ORDER — PREGABALIN 75 MG/1
75 CAPSULE ORAL 3 TIMES DAILY
COMMUNITY
Start: 2022-04-28

## 2022-05-13 NOTE — PROGRESS NOTES
46-year-old female who is referred to us from radiology for about an abnormality noted on left mammogram.  Patient is now over a year out from her left breast lumpectomy and sentinel lymph node biopsy for node-negative breast cancer.  She was cared for by Dr. Garay.  This was the first mammogram that she has had since her surgery.  Reviewed her mammograms I think with the radiologist talking about is where her lumpectomy was done.  Since there are no prior studies to this since her surgery there is not anything to compare things to.  My review of the films though I suspect that is what they talked about and I need to review that with the radiologist involved.  Reviewed the pathology and procedures and best I can tell patient was initially set up for ultrasound and then changed to stereotactic mammotome biopsy by Dr. Garay.  The next documentation is she underwent a needle localized biopsy that demonstrated invasive cancer.  She subsequently was then brought back and underwent a lumpectomy and sentinel lymph node.  The second pathology report says focal ductal atypical hyperplasia and margins of this was negative and there was no invasive cancer or carcinoma in situ present in the specimen.  [place] lymph nodes were negative for total of 3.  I would suggest that the entire cancer was removed I would suggest that what were concerned about is the biopsy site.  We look at the pictures clips or they are consistent with being marked for radiation.  Patient has no complaints.  She is also followed in the [facility].  She takes Eliquis for history of stroke.    Vitals:    05/13/22 1418   BP: 100/60   Pulse: 74   Temp: 97.2 °F (36.2 °C)       Allergies:   Allergies   Allergen Reactions   • Adhesive Tape Unknown - Low Severity   • Latex Itching and Other (See Comments)     Redness of skin   • Strawberry Cough   • Wound Dressing Adhesive Other (See Comments)     Steri Strips cause blistering of skin   • Bactroban  [Mupirocin Calcium] Rash   • Neomycin-Bacitracin-Polymyxin [Bacitracin-Neomycin-Polymyxin] Rash   • Other Rash     All antibiotic creams       Home Medications:  Prior to Admission medications    Medication Sig Start Date End Date Taking? Authorizing Provider   amLODIPine (NORVASC) 5 MG tablet  10/25/21  Yes Caitie Clark MD   anastrozole (ARIMIDEX) 1 MG tablet Take 1 tablet by mouth Daily. 4/25/22  Yes Katarina Patel MD   B-D ULTRAFINE III SHORT PEN 31G X 8 MM misc  12/8/20  Yes Caitie Clark MD   cetirizine (zyrTEC) 10 MG tablet Take 10 mg by mouth Daily.   Yes Caitie Clark MD   cholecalciferol (VITAMIN D3) 25 MCG (1000 UT) tablet  10/25/21  Yes Caitie Clark MD   cloNIDine (CATAPRES) 0.1 MG tablet Take 0.1 mg by mouth Every 6 (Six) Hours As Needed. 12/7/21  Yes Caitie Clark MD   Continuous Blood Gluc  (Dexcom G6 ) device  3/7/22  Yes Caitie Clark MD   Continuous Blood Gluc Sensor (Dexcom G6 Sensor)  4/18/22  Yes Caitie Clark MD   Continuous Blood Gluc Transmit (Dexcom G6 Transmitter) misc  3/7/22  Yes Caitie Clark MD   Cyanocobalamin (B-12) 1000 MCG tablet controlled-release  12/7/21  Yes Caitie Clark MD   diphenhydrAMINE (BENADRYL) 25 mg capsule Take 25 mg by mouth 2 (Two) Times a Day.   Yes Caitie Clark MD   Eliquis 5 MG tablet tablet Every 12 (Twelve) Hours. 10/3/21  Yes Caitie Clark MD   FeroSul 325 (65 Fe) MG tablet TAKE ONE TABLET BY MOUTH ONE A DAY WITH BREAKFAST 8/25/21  Yes Katarina Patel MD   folic acid (FOLVITE) 1 MG tablet TAKE 1 TABLET BY MOUTH DAILY 8/25/21  Yes Katarina Patel MD   FREESTYLE LITE test strip 1 stick by Percutaneous route 4 (Four) Times a Day. 7/23/18  Yes Caitie Clark MD   insulin aspart (novoLOG) 100 UNIT/ML injection Inject 0-24 Units under the skin into the appropriate area as directed 3 (Three) Times a Day Before Meals.  Patient  taking differently: Inject 46 Units under the skin into the appropriate area as directed 2 (Two) Times a Day. 9/8/21  Yes Vianey Zepeda MD   Insulin Aspart FlexPen 100 UNIT/ML solution pen-injector  2/20/22  Yes Caitie Clark MD   Lancets (FREESTYLE) lancets 1 each by Other route 4 (Four) Times a Day. 7/25/18  Yes Caitie Clark MD   Levemir FlexTouch 100 UNIT/ML injection  2/21/22  Yes ProviderCaitie MD   LYRICA 150 MG capsule Take 150 mg by mouth 3 (Three) Times a Day. 7/12/18  Yes Caitie Clark MD   montelukast (SINGULAIR) 10 MG tablet Take 1 tablet by mouth Every Night. 9/8/21  Yes Vianey Zepeda MD   OLANZapine (zyPREXA) 10 MG tablet Take 10 mg by mouth Every Night. 12/26/17  Yes ProviderCaitie MD   pantoprazole (PROTONIX) 40 MG EC tablet Take 40 mg by mouth Daily.   Yes Provider, MD Caitie   potassium chloride 10 MEQ CR tablet  4/2/22  Yes Provider, MD Caitie   predniSONE (DELTASONE) 10 MG tablet  12/1/21  Yes ProviderCaitie MD   pregabalin (LYRICA) 75 MG capsule  4/28/22  Yes Provider, MD Caitie   VENTOLIN  (90 Base) MCG/ACT inhaler Inhale 2 puffs Every 4 (Four) Hours. 8/5/18  Yes ProviderCaitie MD   vitamin B-12 (CYANOCOBALAMIN) 1000 MCG tablet Take 1 tablet by mouth Daily. 5/18/21  Yes Kamaljit Laecy MD   insulin detemir (LEVEMIR) 100 UNIT/ML injection Inject 36 Units under the skin into the appropriate area as directed 2 (Two) Times a Day for 30 days. 9/8/21 10/8/21  Vianey Zepeda MD   amitriptyline (ELAVIL) 50 MG tablet Take 50 mg by mouth Every Night. 12/2/20 5/13/22  Caitie Clark MD   atorvastatin (LIPITOR) 20 MG tablet Take 20 mg by mouth Every Night.  5/13/22  Caitie Clark MD   cefTRIAXone (ROCEPHIN) 10 g injection  10/1/21 5/13/22  Provider, MD Caitie   docusate sodium (COLACE) 100 MG capsule Take 1 capsule by mouth 2 (Two) Times a Day As Needed for Constipation. 5/18/21  5/13/22  Kamaljit Lacey MD   escitalopram (LEXAPRO) 10 MG tablet  3/7/22 5/13/22  Caitie Clark MD   glyburide micronized (GLYNASE) 6 MG tablet  2/28/21 5/13/22  Caitie Clark MD   GLYBURIDE PO Take 6 mg by mouth Daily.  5/13/22  Caitie Clark MD   HYDROcodone-acetaminophen (NORCO) 5-325 MG per tablet  4/4/22 5/13/22  Caitie Clark MD   losartan (COZAAR) 25 MG tablet Take 1 tablet by mouth Daily. 3/24/20 5/13/22  Yue Howard MD   metoprolol tartrate (LOPRESSOR) 50 MG tablet Take 1 tablet by mouth Every 12 (Twelve) Hours. 9/9/21 5/13/22  Ivet Bob APRN   omeprazole (priLOSEC) 40 MG capsule Take 40 mg by mouth Daily. 5/25/15 5/13/22  Caitie Clark MD   oxyCODONE (Roxicodone) 5 MG immediate release tablet Take 1 tablet by mouth Every 4 (Four) Hours As Needed for Moderate Pain  (back pain). 11/2/21 5/13/22  Vik Robb MD   oxyCODONE-acetaminophen (PERCOCET)  MG per tablet Take 1 tablet by mouth Every 4 (Four) Hours As Needed for Moderate Pain .  Patient taking differently: Take 1 tablet by mouth 3 (Three) Times a Day. 10/18/21 5/13/22  Vik Robb MD   tiZANidine (ZANAFLEX) 4 MG tablet Take 4 mg by mouth Every 6 (Six) Hours As Needed. 9/21/17 5/13/22  Caitie Clark MD       Social History     Socioeconomic History   • Marital status:    Tobacco Use   • Smoking status: Current Every Day Smoker     Packs/day: 1.00     Types: Cigarettes   • Smokeless tobacco: Never Used   Vaping Use   • Vaping Use: Former   • Substances: Nicotine   • Devices: [email or web address] tank   • Passive vaping exposure: Yes   Substance and Sexual Activity   • Alcohol use: Yes     Comment: very rare   • Drug use: Not Currently     Types: Marijuana     Comment: none in 10 years   • Sexual activity: Defer       Past Medical History:   Diagnosis Date   • Acute atopic conjunctivitis    • Acute bronchitis    • Allergic rhinitis due to pollen    • Arrhythmia     wearing heart  monitor    • Arthritis    • Asthma    • Bilateral foot pain    • Breast cancer (HCC)    • Breast cyst    • Breast lump    • Chest pain    • Chronic low back pain    • Contact dermatitis due to drugs and medicine    • Cyst of ovary    • Diabetes mellitus (HCC)    • GERD (gastroesophageal reflux disease)    • Hyperlipidemia    • Hypertension    • Infestation by Sarcoptes scabiei annalisa hominis    • Low back pain    • Mastodynia    • Nausea and vomiting    • On long term drug therapy    • Pain in wrist    • Plantar fasciitis    • Rash    • Skin disorder     breast-possible superficial cellulitis   • Spinal stenosis    • Tinea pedis    • Tobacco dependence syndrome    • Upper respiratory infection    • Vitamin D deficiency    • Wheezing        Family History   Problem Relation Age of Onset   • Breast cancer Mother    • Diabetes Father    • Breast cancer Paternal Grandmother    • Breast cancer Maternal Aunt    • Coronary artery disease Other        Past Surgical History:   Procedure Laterality Date   • BREAST BIOPSY     • BREAST CYST ASPIRATION     • BREAST LUMPECTOMY Left 1/27/2021    Procedure: LEFT BREAST LUMPECTOMY WITH NEEDLE LOCALIZATION Needle localization to be done in the [facility] by radiology first   @07:00 a.m.;  Surgeon: Mat Garay MD;  Location: [facility];  Service: General;  Laterality: Left;   • BREAST LUMPECTOMY WITH SENTINEL NODE BIOPSY AND AXILLARY NODE DISSECTION Left 2/22/2021    Procedure: LEFT BREAST LUMPECTOMY WITH SENTINEL NODE BIOPSY. REMOVAL OF CYST FROM LEFT CHEEK                              (INJECTION @07: 00 A.M);  Surgeon: Mat Garay MD;  Location: [facility] OR;  Service: General;  Laterality: Left;   • BREAST SURGERY      excision breast lesion   • CARDIAC CATHETERIZATION N/A 7/3/2018    Procedure: Coronary angiography;  Surgeon: Arun Cadet MD;  Location: [facility] CATH INVASIVE LOCATION;  Service: Cardiovascular   • CARDIAC CATHETERIZATION     •  CHOLECYSTECTOMY     • INJECTION OF MEDICATION      Kenalog (4)       Review of systems  Denies chest pain  Denies shortness of breath  Does smoke  Denies any abdominal complaints  Denies any urinary complaints      Alert and appropriate  Nontoxic  Nonicteric  Lungs clear  Heart regular rate and rhythm  Left breast smaller than right breast.  No palpable masses in either breast no adenopathy.  Scars on left breast are healing nicely.  Abdomen unremarkable  Extremities unremarkable    Assessment and plan  We will review the x-ray findings with radiology.  Patient will follow up with us in a week or sooner if she has any other concerns or questions.  I went over this with her and answered all of her questions.

## 2022-05-23 ENCOUNTER — INFUSION (OUTPATIENT)
Dept: ONCOLOGY | Facility: HOSPITAL | Age: 47
End: 2022-05-23

## 2022-05-23 VITALS
TEMPERATURE: 97.3 F | SYSTOLIC BLOOD PRESSURE: 121 MMHG | HEART RATE: 71 BPM | DIASTOLIC BLOOD PRESSURE: 59 MMHG | RESPIRATION RATE: 18 BRPM

## 2022-05-23 DIAGNOSIS — Z17.0 MALIGNANT NEOPLASM OF UPPER-OUTER QUADRANT OF LEFT BREAST IN FEMALE, ESTROGEN RECEPTOR POSITIVE: Primary | ICD-10-CM

## 2022-05-23 DIAGNOSIS — C50.412 MALIGNANT NEOPLASM OF UPPER-OUTER QUADRANT OF LEFT BREAST IN FEMALE, ESTROGEN RECEPTOR POSITIVE: Primary | ICD-10-CM

## 2022-05-23 PROCEDURE — 25010000002 LEUPROLIDE ACETATE PER 7.5 MG: Performed by: INTERNAL MEDICINE

## 2022-05-23 PROCEDURE — 96402 CHEMO HORMON ANTINEOPL SQ/IM: CPT | Performed by: INTERNAL MEDICINE

## 2022-05-23 RX ADMIN — LEUPROLIDE ACETATE 7.5 MG: KIT at 13:43

## 2022-05-25 ENCOUNTER — OFFICE VISIT (OUTPATIENT)
Dept: SURGERY | Facility: CLINIC | Age: 47
End: 2022-05-25

## 2022-05-25 VITALS
TEMPERATURE: 97.4 F | HEIGHT: 65 IN | BODY MASS INDEX: 38.09 KG/M2 | DIASTOLIC BLOOD PRESSURE: 70 MMHG | WEIGHT: 228.6 LBS | SYSTOLIC BLOOD PRESSURE: 116 MMHG | HEART RATE: 72 BPM

## 2022-05-25 DIAGNOSIS — R92.8 ABNORMAL MAMMOGRAM: Primary | ICD-10-CM

## 2022-05-25 PROCEDURE — 99214 OFFICE O/P EST MOD 30 MIN: CPT | Performed by: SURGERY

## 2022-05-25 RX ORDER — HYDROCODONE BITARTRATE AND ACETAMINOPHEN 7.5; 325 MG/1; MG/1
TABLET ORAL 3 TIMES DAILY
COMMUNITY
Start: 2022-05-16 | End: 2023-02-07

## 2022-05-25 RX ORDER — DIAZEPAM 5 MG/1
5 TABLET ORAL ONCE
Status: CANCELLED | OUTPATIENT
Start: 2022-05-25 | End: 2022-05-25

## 2022-05-25 RX ORDER — LIDOCAINE HYDROCHLORIDE AND EPINEPHRINE 10; 10 MG/ML; UG/ML
20 INJECTION, SOLUTION INFILTRATION; PERINEURAL ONCE
Status: CANCELLED | OUTPATIENT
Start: 2022-05-25 | End: 2022-05-25

## 2022-05-25 NOTE — PROGRESS NOTES
46-year-old female who is now over a year out from her left breast lumpectomy and sentinel lymph node biopsy for node negative breast cancer.  Original care was provided by Dr. Garay from surgery.  She had her first ever mammogram which led to an ultrasound which I have just gone over with the radiologist who was concerned about the lumpectomy site.  Looking at her preoperative studies radiology is very concerned about the lumpectomy site.  I went over with him and explained to him that this was the lumpectomy site and was marked with clips and would expect it to look like this.  The problem is he does not have any prior studies to compare this to.    Vitals:    05/25/22 1016   BP: 116/70   Pulse: 72   Temp: 97.4 °F (36.3 °C)       Allergies:   Allergies   Allergen Reactions   • Adhesive Tape Unknown - Low Severity   • Latex Itching and Other (See Comments)     Redness of skin   • Strawberry Cough   • Wound Dressing Adhesive Other (See Comments)     Steri Strips cause blistering of skin   • Bactroban [Mupirocin Calcium] Rash   • Neomycin-Bacitracin-Polymyxin [Bacitracin-Neomycin-Polymyxin] Rash   • Other Rash     All antibiotic creams       Home Medications:  Prior to Admission medications    Medication Sig Start Date End Date Taking? Authorizing Provider   amLODIPine (NORVASC) 5 MG tablet  10/25/21  Yes Caitie Clark MD   anastrozole (ARIMIDEX) 1 MG tablet Take 1 tablet by mouth Daily. 4/25/22  Yes Katarina Patel MD   B-D ULTRAFINE III SHORT PEN 31G X 8 MM misc  12/8/20  Yes Caitie Clark MD   cetirizine (zyrTEC) 10 MG tablet Take 10 mg by mouth Daily.   Yes Caitie Clark MD   cholecalciferol (VITAMIN D3) 25 MCG (1000 UT) tablet  10/25/21  Yes Caitie Clark MD   cloNIDine (CATAPRES) 0.1 MG tablet Take 0.1 mg by mouth Every 6 (Six) Hours As Needed. 12/7/21  Yes Caitie Clark MD   Continuous Blood Gluc  (Dexcom G6 ) device  3/7/22  Yes Amber  MD Caitie   Continuous Blood Gluc Sensor (Dexcom G6 Sensor)  4/18/22  Yes Caitie Clark MD   Continuous Blood Gluc Transmit (Dexcom G6 Transmitter) misc  3/7/22  Yes Caitie Clark MD   Cyanocobalamin (B-12) 1000 MCG tablet controlled-release  12/7/21  Yes Caitie Clark MD   diphenhydrAMINE (BENADRYL) 25 mg capsule Take 25 mg by mouth 2 (Two) Times a Day.   Yes Caitie Clark MD   Eliquis 5 MG tablet tablet Every 12 (Twelve) Hours. 10/3/21  Yes Caitie Clark MD   FeroSul 325 (65 Fe) MG tablet TAKE ONE TABLET BY MOUTH ONE A DAY WITH BREAKFAST 8/25/21  Yes Katarina Patel MD   folic acid (FOLVITE) 1 MG tablet TAKE 1 TABLET BY MOUTH DAILY 8/25/21  Yes Katarina Patel MD   FREESTYLE LITE test strip 1 stick by Percutaneous route 4 (Four) Times a Day. 7/23/18  Yes Caitie Clark MD   HYDROcodone-acetaminophen (NORCO) 7.5-325 MG per tablet As Needed. 5/16/22  Yes Caitie Clark MD   insulin aspart (novoLOG) 100 UNIT/ML injection Inject 0-24 Units under the skin into the appropriate area as directed 3 (Three) Times a Day Before Meals.  Patient taking differently: Inject 46 Units under the skin into the appropriate area as directed 2 (Two) Times a Day. 9/8/21  Yes Vianey Zepeda MD   Insulin Aspart FlexPen 100 UNIT/ML solution pen-injector  2/20/22  Yes Caitie Clark MD   Lancets (FREESTYLE) lancets 1 each by Other route 4 (Four) Times a Day. 7/25/18  Yes Caitie Clark MD   Levemir FlexTouch 100 UNIT/ML injection  2/21/22  Yes Caitie Clark MD   LYRICA 150 MG capsule Take 150 mg by mouth 3 (Three) Times a Day. 7/12/18  Yes Caitie Clark MD   montelukast (SINGULAIR) 10 MG tablet Take 1 tablet by mouth Every Night. 9/8/21  Yes Vianey Zepeda MD   OLANZapine (zyPREXA) 10 MG tablet Take 10 mg by mouth Every Night. 12/26/17  Yes Provider, MD Caitie   pantoprazole (PROTONIX) 40 MG EC tablet Take 40 mg  by mouth Daily.   Yes Provider, MD Caitie   potassium chloride 10 MEQ CR tablet  4/2/22  Yes ProviderCaitie MD   predniSONE (DELTASONE) 10 MG tablet  12/1/21  Yes Caitie Clark MD   pregabalin (LYRICA) 75 MG capsule  4/28/22  Yes Provider, MD Caitie   VENTOLIN  (90 Base) MCG/ACT inhaler Inhale 2 puffs Every 4 (Four) Hours. 8/5/18  Yes Caitie Clark MD   vitamin B-12 (CYANOCOBALAMIN) 1000 MCG tablet Take 1 tablet by mouth Daily. 5/18/21  Yes Kamaljit Lacey MD   insulin detemir (LEVEMIR) 100 UNIT/ML injection Inject 36 Units under the skin into the appropriate area as directed 2 (Two) Times a Day for 30 days. 9/8/21 10/8/21  Vianey Zepeda MD       Social History     Socioeconomic History   • Marital status:    Tobacco Use   • Smoking status: Current Every Day Smoker     Packs/day: 1.00     Types: Cigarettes   • Smokeless tobacco: Never Used   Vaping Use   • Vaping Use: Former   • Substances: Nicotine   • Devices: Ref[employer]ible tank   • Passive vaping exposure: Yes   Substance and Sexual Activity   • Alcohol use: Yes     Comment: very rare   • Drug use: Not Currently     Types: Marijuana     Comment: none in 10 years   • Sexual activity: Defer       Past Medical History:   Diagnosis Date   • Acute atopic conjunctivitis    • Acute bronchitis    • Allergic rhinitis due to pollen    • Arrhythmia     wearing heart monitor    • Arthritis    • Asthma    • Bilateral foot pain    • Breast cancer (HCC)    • Breast cyst    • Breast lump    • Chest pain    • Chronic low back pain    • Contact dermatitis due to drugs and medicine    • Cyst of ovary    • Diabetes mellitus (HCC)    • GERD (gastroesophageal reflux disease)    • Hyperlipidemia    • Hypertension    • Infestation by Sarcoptes scabiei annalisa hominis    • Low back pain    • Mastodynia    • Nausea and vomiting    • On long term drug therapy    • Pain in wrist    • Plantar fasciitis    • Rash    • Skin disorder      breast-possible superficial cellulitis   • Spinal stenosis    • Tinea pedis    • Tobacco dependence syndrome    • Upper respiratory infection    • Vitamin D deficiency    • Wheezing        Family History   Problem Relation Age of Onset   • Breast cancer Mother    • Diabetes Father    • Breast cancer Paternal Grandmother    • Breast cancer Maternal Aunt    • Coronary artery disease Other        Past Surgical History:   Procedure Laterality Date   • BREAST BIOPSY     • BREAST CYST ASPIRATION     • BREAST LUMPECTOMY Left 1/27/2021    Procedure: LEFT BREAST LUMPECTOMY WITH NEEDLE LOCALIZATION Needle localization to be done in the women's center by radiology first   @07:00 a.m.;  Surgeon: Mat Garay MD;  Location: [facility] OR;  Service: General;  Laterality: Left;   • BREAST LUMPECTOMY WITH SENTINEL NODE BIOPSY AND AXILLARY NODE DISSECTION Left 2/22/2021    Procedure: LEFT BREAST LUMPECTOMY WITH SENTINEL NODE BIOPSY. REMOVAL OF CYST FROM LEFT CHEEK                              (INJECTION @07: 00 A.M);  Surgeon: Mat Garay MD;  Location: [facility] OR;  Service: General;  Laterality: Left;   • BREAST SURGERY      excision breast lesion   • CARDIAC CATHETERIZATION N/A 7/3/2018    Procedure: Coronary angiography;  Surgeon: Arun Cadet MD;  Location: [facility] CATH INVASIVE LOCATION;  Service: Cardiovascular   • CARDIAC CATHETERIZATION     • CHOLECYSTECTOMY     • INJECTION OF MEDICATION      Kenalog (4)     Review of systems  Denies chest pain  Denies shortness of breath  Takes Eliquis due to a history of DVT when she had COVID.  No history of stroke or A. Fib  Or PE  No abdominal complaints  No breast complaints      Alert appropriate  Nontoxic  Nonicteric  Lungs clear breath sounds equal  Heart regular rate and rhythm  No JVD  Breasts are symmetric incisions on left breast are healing nicely no arm swelling  Abdomen soft  Extremities unremarkable  Neurologically grossly intact      Assessment  and plan  Long discussion with patient about the situation and going over the mammogram and ultrasound.  Radiologist requested biopsy and I went over that with the patient and she agrees and wishes to proceed with ultrasound-guided right breast mammotome biopsy fully discussed the procedure alternatives risk benefits with her she clear understands and wishes to proceed.  Patient will hold her Eliquis for 2 days prior to the procedure

## 2022-05-27 ENCOUNTER — HOSPITAL ENCOUNTER (OUTPATIENT)
Dept: ULTRASOUND IMAGING | Facility: HOSPITAL | Age: 47
Discharge: HOME | End: 2022-05-27
Admitting: SURGERY

## 2022-05-27 VITALS
OXYGEN SATURATION: 95 % | TEMPERATURE: 97.7 F | HEART RATE: 74 BPM | WEIGHT: 229.5 LBS | SYSTOLIC BLOOD PRESSURE: 106 MMHG | BODY MASS INDEX: 38.24 KG/M2 | HEIGHT: 65 IN | DIASTOLIC BLOOD PRESSURE: 60 MMHG | RESPIRATION RATE: 18 BRPM

## 2022-05-27 DIAGNOSIS — R92.8 ABNORMAL MAMMOGRAM: ICD-10-CM

## 2022-05-27 PROCEDURE — A4648 IMPLANTABLE TISSUE MARKER: HCPCS

## 2022-05-27 PROCEDURE — 19083 BX BREAST 1ST LESION US IMAG: CPT | Performed by: SURGERY

## 2022-05-27 RX ORDER — LIDOCAINE HYDROCHLORIDE AND EPINEPHRINE 10; 10 MG/ML; UG/ML
20 INJECTION, SOLUTION INFILTRATION; PERINEURAL ONCE
Status: COMPLETED | OUTPATIENT
Start: 2022-05-27 | End: 2022-05-27

## 2022-05-27 RX ORDER — DIAZEPAM 5 MG/1
5 TABLET ORAL ONCE
Status: COMPLETED | OUTPATIENT
Start: 2022-05-27 | End: 2022-05-27

## 2022-05-27 RX ADMIN — LIDOCAINE HYDROCHLORIDE AND EPINEPHRINE 15 ML: 10; 10 INJECTION, SOLUTION INFILTRATION; PERINEURAL at 11:13

## 2022-05-27 RX ADMIN — DIAZEPAM 5 MG: 5 TABLET ORAL at 09:16

## 2022-05-31 LAB — REF LAB TEST METHOD: NORMAL

## 2022-05-31 RX ORDER — FERROUS SULFATE 325(65) MG
TABLET ORAL
Qty: 30 TABLET | Refills: 3 | Status: SHIPPED | OUTPATIENT
Start: 2022-05-31 | End: 2022-09-26

## 2022-05-31 NOTE — TELEPHONE ENCOUNTER
Rx Refill Note  Requested Prescriptions     Pending Prescriptions Disp Refills   • FeroSul 325 (65 Fe) MG tablet [Pharmacy Med Name: FERROUS SULFATE 325MG (5GR) TABS] 30 tablet 3     Sig: TAKE 1 TABLET BY MOUTH EVERY DAY WITH BREAKFAST      Last office visit with prescribing clinician: 4/25/2022      Next office visit with prescribing clinician: 7/18/2022            Melissa Avalos RN  05/31/22, 08:01 CDT

## 2022-06-03 ENCOUNTER — OFFICE VISIT (OUTPATIENT)
Dept: SURGERY | Facility: CLINIC | Age: 47
End: 2022-06-03

## 2022-06-03 VITALS
WEIGHT: 235 LBS | SYSTOLIC BLOOD PRESSURE: 120 MMHG | BODY MASS INDEX: 34.8 KG/M2 | DIASTOLIC BLOOD PRESSURE: 78 MMHG | TEMPERATURE: 96.9 F | HEART RATE: 78 BPM | HEIGHT: 69 IN

## 2022-06-03 DIAGNOSIS — R92.8 ABNORMAL MAMMOGRAM: Primary | ICD-10-CM

## 2022-06-03 PROCEDURE — 99212 OFFICE O/P EST SF 10 MIN: CPT | Performed by: SURGERY

## 2022-06-03 NOTE — PROGRESS NOTES
46-year-old female returns after her recent left breast mammotome biopsy.  See previous notes.  Pathology was fat necrosis with cystic degeneration and foreign body reaction and stromal fibrosis.  This was negative for any atypia or any malignancy.  This is consistent with the clinical presentation the patient had had breast cancer and had a appropriate resection in the past and this is residual scar from that surgery.  This is clearly in concordance with the clinical findings.  Went over this with the patient answered all of her questions.  Patient has her follow-up with Dr. Guy she is in for continued follow-up she will follow-up with us on apparent basis

## 2022-06-20 ENCOUNTER — INFUSION (OUTPATIENT)
Dept: ONCOLOGY | Facility: HOSPITAL | Age: 47
End: 2022-06-20

## 2022-06-20 VITALS
TEMPERATURE: 97.2 F | HEART RATE: 81 BPM | RESPIRATION RATE: 16 BRPM | SYSTOLIC BLOOD PRESSURE: 113 MMHG | DIASTOLIC BLOOD PRESSURE: 58 MMHG

## 2022-06-20 DIAGNOSIS — Z17.0 MALIGNANT NEOPLASM OF UPPER-OUTER QUADRANT OF LEFT BREAST IN FEMALE, ESTROGEN RECEPTOR POSITIVE: Primary | ICD-10-CM

## 2022-06-20 DIAGNOSIS — C50.412 MALIGNANT NEOPLASM OF UPPER-OUTER QUADRANT OF LEFT BREAST IN FEMALE, ESTROGEN RECEPTOR POSITIVE: Primary | ICD-10-CM

## 2022-06-20 PROCEDURE — 25010000002 LEUPROLIDE ACETATE PER 7.5 MG: Performed by: INTERNAL MEDICINE

## 2022-06-20 PROCEDURE — 96402 CHEMO HORMON ANTINEOPL SQ/IM: CPT | Performed by: INTERNAL MEDICINE

## 2022-06-20 RX ORDER — ANASTROZOLE 1 MG/1
1 TABLET ORAL DAILY
Qty: 90 TABLET | Refills: 0 | Status: SHIPPED | OUTPATIENT
Start: 2022-06-20 | End: 2022-07-18 | Stop reason: SDUPTHER

## 2022-06-20 RX ADMIN — LEUPROLIDE ACETATE 7.5 MG: KIT at 13:20

## 2022-07-03 NOTE — ANESTHESIA POSTPROCEDURE EVALUATION
Patient: Corrina Jensen    Procedure Summary     Date: 02/22/21 Room / Location: [facility] OR 03 / [facility] OR    Anesthesia Start: 0830 Anesthesia Stop: 1054    Procedure: LEFT BREAST LUMPECTOMY WITH SENTINEL NODE BIOPSY. REMOVAL OF CYST FROM LEFT CHEEK                              (INJECTION @07: 00 A.M) (Left Breast) Diagnosis:       Malignant neoplasm of left breast in female, estrogen receptor positive, unspecified site of breast (CMS/HCC)      (Malignant neoplasm of left breast in female, estrogen receptor positive, unspecified site of breast (CMS/HCC) [C50.912, Z17.0])    Surgeon: Mat Garay MD Provider: Zak Lin MD    Anesthesia Type: general ASA Status: 3          Anesthesia Type: general    Vitals  No vitals data found for the desired time range.          Post Anesthesia Care and Evaluation    Patient location during evaluation: bedside  Patient participation: waiting for patient participation  Level of consciousness: sleepy but conscious  Pain score: 0  Pain management: adequate  Airway patency: patent  Anesthetic complications: No anesthetic complications  PONV Status: none  Cardiovascular status: acceptable  Respiratory status: acceptable  Hydration status: acceptable    Comments: Protective airway reflexes intact      
color consistent with ethnicity/race

## 2022-07-11 ENCOUNTER — OFFICE VISIT (OUTPATIENT)
Dept: ENDOCRINOLOGY | Facility: CLINIC | Age: 47
End: 2022-07-11

## 2022-07-11 VITALS
HEIGHT: 66 IN | WEIGHT: 240.4 LBS | BODY MASS INDEX: 38.63 KG/M2 | DIASTOLIC BLOOD PRESSURE: 84 MMHG | SYSTOLIC BLOOD PRESSURE: 122 MMHG | OXYGEN SATURATION: 96 % | HEART RATE: 76 BPM

## 2022-07-11 DIAGNOSIS — F17.200 SMOKER: ICD-10-CM

## 2022-07-11 DIAGNOSIS — I10 ESSENTIAL HYPERTENSION: ICD-10-CM

## 2022-07-11 DIAGNOSIS — E78.49 OTHER HYPERLIPIDEMIA: ICD-10-CM

## 2022-07-11 DIAGNOSIS — Z79.4 TYPE 2 DIABETES MELLITUS WITH HYPERGLYCEMIA, WITH LONG-TERM CURRENT USE OF INSULIN: Primary | ICD-10-CM

## 2022-07-11 DIAGNOSIS — Z79.4 TYPE 2 DIABETES MELLITUS WITHOUT COMPLICATION, WITH LONG-TERM CURRENT USE OF INSULIN: ICD-10-CM

## 2022-07-11 DIAGNOSIS — E11.65 TYPE 2 DIABETES MELLITUS WITH HYPERGLYCEMIA, WITH LONG-TERM CURRENT USE OF INSULIN: Primary | ICD-10-CM

## 2022-07-11 DIAGNOSIS — E11.9 TYPE 2 DIABETES MELLITUS WITHOUT COMPLICATION, WITH LONG-TERM CURRENT USE OF INSULIN: ICD-10-CM

## 2022-07-11 LAB — GLUCOSE BLDC GLUCOMTR-MCNC: 171 MG/DL (ref 70–130)

## 2022-07-11 PROCEDURE — 82962 GLUCOSE BLOOD TEST: CPT | Performed by: NURSE PRACTITIONER

## 2022-07-11 PROCEDURE — 99214 OFFICE O/P EST MOD 30 MIN: CPT | Performed by: NURSE PRACTITIONER

## 2022-07-11 RX ORDER — ONDANSETRON 4 MG/1
4 TABLET, ORALLY DISINTEGRATING ORAL EVERY 8 HOURS PRN
Qty: 30 TABLET | Refills: 1 | Status: SHIPPED | OUTPATIENT
Start: 2022-07-11

## 2022-07-11 RX ORDER — PROCHLORPERAZINE 25 MG/1
1 SUPPOSITORY RECTAL
Qty: 1 EACH | Refills: 3 | Status: SHIPPED | OUTPATIENT
Start: 2022-07-11 | End: 2023-01-31

## 2022-07-11 RX ORDER — SEMAGLUTIDE 1.34 MG/ML
INJECTION, SOLUTION SUBCUTANEOUS
Qty: 1 PEN | Refills: 11 | Status: SHIPPED | OUTPATIENT
Start: 2022-07-11

## 2022-07-11 NOTE — PATIENT INSTRUCTIONS
Taking Levemir 46 units BID ---decrease to 42 units twice a day     Taking novolog     20 units TID ---decrease to 15 units       Start ozempic 0.25 mg one weekly for 4 weeks then increase to 0.5 mg weekly     Side effects include nausea,     If vomiting or abdominal pain stop medication      Call in zofran for nausea

## 2022-07-11 NOTE — PROGRESS NOTES
"Chief Complaint  Diabetes    Subjective          Corrina Jensen presents to [facility]  History of Present Illness     In office visit     Referring provider Rommel Mooney MD     Chief Complaint   Patient presents with   • Diabetes       HPI    47 year old female presents for consultation       Reason -- diabetes mellitus type 2       Duration--diagnosed 5 years ago     Context --presented with symptoms     Timing constant     Quality  Not controlled     Severity moderate     Macrovascular complications--none     Microvascular complications --neuropathy, no DR, no renal disease       Current diabetes regimen     Insulin         Current glucose monitoring     Fingerstick's    Checks 4 times daily     Has dexcom -needs a new transmitter         Review of Systems - General ROS: negative              Objective   Vital Signs:   /84   Pulse 76   Ht 167.6 cm (66\")   Wt 109 kg (240 lb 6.4 oz)   SpO2 96%   BMI 38.80 kg/m²     Physical Exam  Constitutional:       Appearance: Normal appearance.   Cardiovascular:      Rate and Rhythm: Regular rhythm.      Heart sounds: Normal heart sounds.   Pulmonary:      Breath sounds: Normal breath sounds.   Musculoskeletal:         General: Normal range of motion.      Cervical back: Normal range of motion.   Neurological:      Mental Status: She is alert.        Result Review :   The following data was reviewed by: LOGAN Cabrera on 07/11/2022:  Common labs    Common Labsle 3/7/22 3/7/22 3/7/22 3/7/22 4/25/22 4/25/22 6/13/22 6/13/22    1348 1348 1348 1348 0927 0927 1610 1610   Glucose      130 (A)     Glucose   262 (A)    112 (A)    BUN   10   6 7    Creatinine   0.6 (A)   0.61 0.6 (A)    Sodium   135 (A)   140 139    Potassium   3.4 (A)   3.5 3.9    Chloride   98   102 102    Calcium   9.6   9.5 9.4    Albumin   4.3   4.00 4.3    Total Bilirubin   0.68   0.3 0.65    Alkaline Phosphatase   206 (A)   161 (A) 142 " (A)    AST (SGOT)   44 (A)   34 (A) 42 (A)    ALT (SGPT)   48   32 38    WBC     5.57      Hemoglobin     14.9      Hematocrit     43.0      Platelets     154      Total Cholesterol  183         Triglycerides  207 (A)         HDL Cholesterol  42         LDL Cholesterol   100         Hemoglobin A1C    11.5 (A)    8.0 (A)   Microalbumin, Urine 46.1 (A)          (A) Abnormal value       Comments are available for some flowsheets but are not being displayed.                     Assessment and Plan    Diagnoses and all orders for this visit:    1. Type 2 diabetes mellitus with hyperglycemia, with long-term current use of insulin (HCC) (Primary)  -     POC Glucose Fingerstick    2. Type 2 diabetes mellitus without complication, with long-term current use of insulin (HCC)    3. Essential hypertension    4. Other hyperlipidemia    5. Smoker    Other orders  -     Continuous Blood Gluc Transmit (Dexcom G6 Transmitter) misc; Inject 1 each under the skin into the appropriate area as directed Every 3 (Three) Months.  Dispense: 1 each; Refill: 3  -     Semaglutide,0.25 or 0.5MG/DOS, (Ozempic, 0.25 or 0.5 MG/DOSE,) 2 MG/1.5ML solution pen-injector; 0.25 mg once a week for 4 weeks then 0.5 mg weekly  Dispense: 1 pen; Refill: 11  -     ondansetron ODT (Zofran ODT) 4 MG disintegrating tablet; Place 1 tablet on the tongue Every 8 (Eight) Hours As Needed for Nausea or Vomiting (nausea).  Dispense: 30 tablet; Refill: 1                           Glycemic Management:      Diabetes mellitus type 2       Lab Results   Component Value Date    HGBA1C 8.0 (H) 06/13/2022         Taking Levemir 46 units BID ---decrease to 42 units twice a day     Taking novolog     20 units TID ---decrease to 15 units       Start ozempic 0.25 mg one weekly for 4 weeks then increase to 0.5 mg weekly     Side effects include nausea,     If vomiting or abdominal pain stop medication      Call in zofran for nausea       Next appt add jardiance         Goals for  sugar    Fasting and before meals 80 to 130    2 hours after meals 180 or less      Aim for 45 grams of carbohydrate per meal    Aim for 15 grams of carbohydrate per snack         Microvascular Complications Monitoring       Last eye exam---- May 2022, no DR     Neuropathy --yes     Taking Lyrica 75 mg po bid         Lipid Management:       Not on statin     Blood Pressure Management:      Taking norvasc 5 mg one daily     Taking catapres 0.1 mg tid         Thyroid Health    Lab Results   Component Value Date    TSH 1.66 03/07/2022         Bone Health         Taking vitamin d def.         Weight Management:    Body mass index is 38.8 kg/m².        Preventive Care:     Smoker    Corrina Jensen  reports that she has been smoking cigarettes. She has been smoking about 1.00 pack per day. She has never used smokeless tobacco.. I have educated her on the risk of diseases from using tobacco products such as cancer, COPD and heart disease.     I advised her to quit and she is not willing to quit.    I spent 3  minutes counseling the patient.         Records from  received and reviewed from 2022   Thank you for this consultation             Follow Up   Return in about 3 months (around 10/11/2022) for Recheck.  Patient was given instructions and counseling regarding her condition or for health maintenance advice. Please see specific information pulled into the AVS if appropriate.         This document has been electronically signed by LOGAN Cabrera on July 11, 2022 17:10 CDT.

## 2022-07-18 ENCOUNTER — OFFICE VISIT (OUTPATIENT)
Dept: ONCOLOGY | Facility: CLINIC | Age: 47
End: 2022-07-18

## 2022-07-18 ENCOUNTER — INFUSION (OUTPATIENT)
Dept: ONCOLOGY | Facility: HOSPITAL | Age: 47
End: 2022-07-18

## 2022-07-18 ENCOUNTER — LAB (OUTPATIENT)
Dept: ONCOLOGY | Facility: HOSPITAL | Age: 47
End: 2022-07-18

## 2022-07-18 ENCOUNTER — TELEPHONE (OUTPATIENT)
Dept: ENDOCRINOLOGY | Facility: CLINIC | Age: 47
End: 2022-07-18

## 2022-07-18 VITALS
SYSTOLIC BLOOD PRESSURE: 123 MMHG | BODY MASS INDEX: 39.08 KG/M2 | TEMPERATURE: 97.8 F | HEART RATE: 61 BPM | WEIGHT: 242.1 LBS | RESPIRATION RATE: 18 BRPM | DIASTOLIC BLOOD PRESSURE: 66 MMHG | OXYGEN SATURATION: 96 %

## 2022-07-18 DIAGNOSIS — Z17.0 MALIGNANT NEOPLASM OF UPPER-OUTER QUADRANT OF LEFT BREAST IN FEMALE, ESTROGEN RECEPTOR POSITIVE: Chronic | ICD-10-CM

## 2022-07-18 DIAGNOSIS — Z79.811 USE OF ANASTROZOLE (ARIMIDEX): ICD-10-CM

## 2022-07-18 DIAGNOSIS — D61.818 PANCYTOPENIA: Chronic | ICD-10-CM

## 2022-07-18 DIAGNOSIS — Z17.0 MALIGNANT NEOPLASM OF UPPER-OUTER QUADRANT OF LEFT BREAST IN FEMALE, ESTROGEN RECEPTOR POSITIVE: Primary | ICD-10-CM

## 2022-07-18 DIAGNOSIS — C50.412 MALIGNANT NEOPLASM OF UPPER-OUTER QUADRANT OF LEFT BREAST IN FEMALE, ESTROGEN RECEPTOR POSITIVE: Chronic | ICD-10-CM

## 2022-07-18 DIAGNOSIS — Z17.0 MALIGNANT NEOPLASM OF UPPER-OUTER QUADRANT OF LEFT BREAST IN FEMALE, ESTROGEN RECEPTOR POSITIVE: Primary | Chronic | ICD-10-CM

## 2022-07-18 DIAGNOSIS — C50.412 MALIGNANT NEOPLASM OF UPPER-OUTER QUADRANT OF LEFT BREAST IN FEMALE, ESTROGEN RECEPTOR POSITIVE: Primary | ICD-10-CM

## 2022-07-18 DIAGNOSIS — C50.412 MALIGNANT NEOPLASM OF UPPER-OUTER QUADRANT OF LEFT BREAST IN FEMALE, ESTROGEN RECEPTOR POSITIVE: Primary | Chronic | ICD-10-CM

## 2022-07-18 LAB
ALBUMIN SERPL-MCNC: 4.1 G/DL (ref 3.5–5.2)
ALBUMIN/GLOB SERPL: 1.3 G/DL
ALP SERPL-CCNC: 158 U/L (ref 39–117)
ALT SERPL W P-5'-P-CCNC: 39 U/L (ref 1–33)
ANION GAP SERPL CALCULATED.3IONS-SCNC: 7 MMOL/L (ref 5–15)
AST SERPL-CCNC: 48 U/L (ref 1–32)
BILIRUB SERPL-MCNC: 0.3 MG/DL (ref 0–1.2)
BUN SERPL-MCNC: 5 MG/DL (ref 6–20)
BUN/CREAT SERPL: 8.1 (ref 7–25)
CALCIUM SPEC-SCNC: 9.4 MG/DL (ref 8.6–10.5)
CHLORIDE SERPL-SCNC: 102 MMOL/L (ref 98–107)
CO2 SERPL-SCNC: 30 MMOL/L (ref 22–29)
CREAT SERPL-MCNC: 0.62 MG/DL (ref 0.57–1)
DEPRECATED RDW RBC AUTO: 43.8 FL (ref 37–54)
EGFRCR SERPLBLD CKD-EPI 2021: 109.3 ML/MIN/1.73
ERYTHROCYTE [DISTWIDTH] IN BLOOD BY AUTOMATED COUNT: 13.1 % (ref 12.3–15.4)
GLOBULIN UR ELPH-MCNC: 3.1 GM/DL
GLUCOSE SERPL-MCNC: 197 MG/DL (ref 65–99)
HCT VFR BLD AUTO: 42.8 % (ref 34–46.6)
HGB BLD-MCNC: 14.3 G/DL (ref 12–15.9)
MCH RBC QN AUTO: 30.3 PG (ref 26.6–33)
MCHC RBC AUTO-ENTMCNC: 33.4 G/DL (ref 31.5–35.7)
MCV RBC AUTO: 90.7 FL (ref 79–97)
PLATELET # BLD AUTO: 141 10*3/MM3 (ref 140–450)
PMV BLD AUTO: 10.3 FL (ref 6–12)
POTASSIUM SERPL-SCNC: 4.3 MMOL/L (ref 3.5–5.2)
PROT SERPL-MCNC: 7.2 G/DL (ref 6–8.5)
RBC # BLD AUTO: 4.72 10*6/MM3 (ref 3.77–5.28)
SODIUM SERPL-SCNC: 139 MMOL/L (ref 136–145)
WBC NRBC COR # BLD: 5.96 10*3/MM3 (ref 3.4–10.8)

## 2022-07-18 PROCEDURE — 96402 CHEMO HORMON ANTINEOPL SQ/IM: CPT | Performed by: INTERNAL MEDICINE

## 2022-07-18 PROCEDURE — 80053 COMPREHEN METABOLIC PANEL: CPT

## 2022-07-18 PROCEDURE — 36415 COLL VENOUS BLD VENIPUNCTURE: CPT | Performed by: INTERNAL MEDICINE

## 2022-07-18 PROCEDURE — 99214 OFFICE O/P EST MOD 30 MIN: CPT | Performed by: INTERNAL MEDICINE

## 2022-07-18 PROCEDURE — 25010000002 LEUPROLIDE ACETATE PER 7.5 MG: Performed by: INTERNAL MEDICINE

## 2022-07-18 PROCEDURE — 85027 COMPLETE CBC AUTOMATED: CPT

## 2022-07-18 RX ORDER — ANASTROZOLE 1 MG/1
1 TABLET ORAL DAILY
Qty: 90 TABLET | Refills: 0 | Status: SHIPPED | OUTPATIENT
Start: 2022-07-18 | End: 2022-10-10 | Stop reason: SDUPTHER

## 2022-07-18 RX ADMIN — LEUPROLIDE ACETATE 7.5 MG: KIT at 13:51

## 2022-07-18 NOTE — TELEPHONE ENCOUNTER
PT CALLED AND IS REQUESTING TO BE CHANGED FROM DEXCOM G6 TO FREESTYLE ANA PAULA SO HER INSURANCE WILL COVER IT. SHE NEEDS THIS SENT TO [facility].    THANK YOU

## 2022-07-18 NOTE — PROGRESS NOTES
Subjective     Corrina Jensen was seen in follow-up for breast cancer.  She did have abnormal mammogram however biopsy showed no evidence of cancer.  She remains on monthly Lupron as well as Arimidex.  Tolerating well without any major side effects.    Past Medical History, Past Surgical History, Social History, Family History have been reviewed and are without significant changes except as mentioned.      Medications:  The current medication list was reviewed in the EMR    ALLERGIES:    Allergies   Allergen Reactions   • Latex Itching and Other (See Comments)     Redness of skin   • Strawberry Cough   • Wound Dressing Adhesive Other (See Comments)     Steri Strips cause blistering of skin   • Bactroban [Mupirocin Calcium] Rash   • Neomycin-Bacitracin-Polymyxin [Bacitracin-Neomycin-Polymyxin] Rash   • Other Rash     All antibiotic creams       Objective      Vitals:    07/18/22 1303   BP: 123/66   Pulse: 61   Resp: 18   Temp: 97.8 °F (36.6 °C)   SpO2: 96%   Weight: 110 kg (242 lb 1.6 oz)   PainSc:   7   PainLoc: Back  Comment: Lower     Current Status 6/20/2022   ECOG score 0       Physical Exam  Vitals and nursing note reviewed.   Constitutional:       Appearance: Normal appearance.   Neurological:      General: No focal deficit present.      Mental Status: She is alert and oriented to person, place, and time. Mental status is at baseline.   Psychiatric:         Mood and Affect: Mood normal.         Behavior: Behavior normal.               RECENT LABS:Independently reviewed and summarized  Hematology WBC   Date Value Ref Range Status   07/18/2022 5.96 3.40 - 10.80 10*3/mm3 Final     RBC   Date Value Ref Range Status   07/18/2022 4.72 3.77 - 5.28 10*6/mm3 Final     Hemoglobin   Date Value Ref Range Status   07/18/2022 14.3 12.0 - 15.9 g/dL Final     Hematocrit   Date Value Ref Range Status   07/18/2022 42.8 34.0 - 46.6 % Final     Platelets   Date Value Ref Range Status   07/18/2022 141 140 - 450 10*3/mm3  Final       Lab Results   Component Value Date    GLUCOSE 197 (H) 07/18/2022    BUN 5 (L) 07/18/2022    CREATININE 0.62 07/18/2022    EGFRIFNONA 89 01/31/2022    BCR 8.1 07/18/2022    K 4.3 07/18/2022    CO2 30.0 (H) 07/18/2022    CALCIUM 9.4 07/18/2022    ALBUMIN 4.10 07/18/2022    AST 48 (H) 07/18/2022    ALT 39 (H) 07/18/2022            Imaging (independently reviewed and summarized):     XR Spine Lumbar 2 or 3 View    Result Date: 6/14/2022     Negative. [id number]    Mammo Diagnostic Digital Tomosynthesis Left With CAD    Result Date: 5/11/2022  Left breast post lumpectomy site asymmetric density described above which would be suspicious for possible malignancy. Recommend biopsy to further evaluate. BI-RADS category 4: Suspicious abnormality Recommendation: Stereotactic/surgical  biopsy.  Electronically signed by:  Van Sotomayor MD  5/11/2022 3:32 PM CDT Workstation: [id number]    US Breast Left Limited    Result Date: 5/11/2022  Left breast post lumpectomy site asymmetric density described above which would be suspicious for possible malignancy. Recommend biopsy to further evaluate. BI-RADS category 4: Suspicious abnormality Recommendation: Stereotactic/surgical  biopsy.  Electronically signed by:  Van Sotomayor MD  5/11/2022 3:32 PM CDT Workstation: [id number]    Mammo screening digital tomosynthesis bilateral w CAD    Result Date: 5/10/2022  1.Asymmetric density involving the left breast upper quadrant seen on MLO view only in region of prior lumpectomy. This most likely represents normal postsurgical changes. However, would recommend spot compression imaging true lateral projection left breast and possibly ultrasound to further evaluate.  2.Left breast upper quadrant calcifications which should be further characterized with spot mag imaging and true lateral projection left breast. BI-RADS category 0: Additional imaging required. Recommendation: Call back for additional views Electronically signed by:   Van Sotomayor MD  5/10/2022 9:23 AM CDT Workstation: [id number]        Pathology (Result reviewed):     DIAGNOSIS:   LEFT BREAST, CORE BIOPSY:   Hyalinized stromal fibrosis   Fat necrosis with cystic degeneration and foreign body reaction   Negative for atypical hyperplasia or malignancy         Corrina Jensen reports a pain score of 7.  Given her pain assessment as noted, treatment options were discussed and the following options were decided upon as a follow-up plan to address the patient's pain: referral to Primary Care for assistance in pain treatment guidance.    Patient screened positive for depression based on a PHQ-9 score of 5 on 7/18/2022. Follow-up recommendations include: Suicide Risk Assessment performed.      Oncology/Hematology History   Malignant neoplasm of left breast in female, estrogen receptor positive (HCC)   1/7/2021 Imaging    Abnormal screening mammogram     1/13/2021 Imaging    Diagnostic mammogram with BI-RADS 4     1/27/2021 Surgery    Surgery       Procedure:  Lumpectomy       Location:  left      Completeness of resection:  extension to margins  ER/HI+  HER 2 negative by IHC       2/12/2021 Initial Diagnosis    Malignant neoplasm of left breast in female, estrogen receptor positive (CMS/HCC)     2/12/2021 Surgery    Surgery       Re excision with SLN bx showed no malignancy     3/9/2021 Cancer Staged    Staging form: Breast, AJCC 8th Edition  - Pathologic stage from 3/9/2021: Stage IA (pT1a, pN0(sn), cM0, G1, ER+, HI+, HER2-) - Signed by Katarina Patel MD on 3/9/2021     4/1/2021 - 5/10/2021 Radiation    Radiation OncologyTreatment Course:  Corrina Jensen received 5040 cGy in 28 fractions to LT BREAST via External Beam Radiation - EBRT.     6/2/2021 -  Hormonal Therapy    Medication(s):    leuprolide (LUPRON) injection 7.5 mg, Once, 11 of 12 cycles  Arimidex 1 mg daily     7/15/2022 Survivorship    Survivorship Care Plan completed and discussed with patient.  Copy of  Survivorship Care Plan provided to patient and primary care provider.                       Diagnosis:   (1) Left breast cancer   Stage IA (pT1a, pN0(sn),cM0)   ER 95% SC 95% HER IHC negative     (2) Pancytopenia     Assessment & Plan        (1) Left breast cancer     Chronic, stable  Treated with lumpectomy and radiation.  She remains on adjuvant hormonal therapy with ovarian suppression with Lupron every month and Arimidex.  She did have abnormal mammogram however biopsy showed benign changes no evidence of recurrent cancer.  She did have little bit of spotting earlier in the month however this has stopped.  This was the first time this is happened since starting Lupron.  We will go ahead and give her a Lupron injection today.  She will continue taking Arimidex.  New prescription sent to the pharmacy.  I will check CBC, CMP and estradiol in 3 months.  She has not had any DEXA scan performed.  I will order DEXA scan to evaluate for bone mineral density.  Continue monthly Lupron.      (2) Pancytopenia     Chronic, stable.  Suspect this is Oz liver disease related as well as splenomegaly.  Continue B12 and folic acid supplements.  Check CBC, CMP in 3 months.       7/18/2022      CC:

## 2022-07-19 DIAGNOSIS — Z79.4 TYPE 2 DIABETES MELLITUS WITH HYPERGLYCEMIA, WITH LONG-TERM CURRENT USE OF INSULIN: Primary | ICD-10-CM

## 2022-07-19 DIAGNOSIS — E11.65 TYPE 2 DIABETES MELLITUS WITH HYPERGLYCEMIA, WITH LONG-TERM CURRENT USE OF INSULIN: Primary | ICD-10-CM

## 2022-07-21 ENCOUNTER — DOCUMENTATION (OUTPATIENT)
Dept: ENDOCRINOLOGY | Facility: CLINIC | Age: 47
End: 2022-07-21

## 2022-07-25 ENCOUNTER — DOCUMENTATION (OUTPATIENT)
Dept: ENDOCRINOLOGY | Facility: CLINIC | Age: 47
End: 2022-07-25

## 2022-07-25 NOTE — PROGRESS NOTES
ADDITIONAL INFO FOR PA WAS FAXED TO [employer] @ [phone]    CONFIRMATION RECEIVED  SENT TO MED REC

## 2022-08-05 ENCOUNTER — TELEPHONE (OUTPATIENT)
Dept: ONCOLOGY | Facility: HOSPITAL | Age: 47
End: 2022-08-05

## 2022-08-05 NOTE — TELEPHONE ENCOUNTER
----- Message from Katarina Patel MD sent at 8/4/2022  4:33 PM CDT -----  Bone density slightly lower. Continue monitoring. No osteoporosis.

## 2022-08-05 NOTE — TELEPHONE ENCOUNTER
Contacted pt regarding test results. PT verbalizes understanding and denies any further questions.

## 2022-08-09 DIAGNOSIS — C50.412 MALIGNANT NEOPLASM OF UPPER-OUTER QUADRANT OF LEFT BREAST IN FEMALE, ESTROGEN RECEPTOR POSITIVE: Primary | ICD-10-CM

## 2022-08-09 DIAGNOSIS — Z17.0 MALIGNANT NEOPLASM OF UPPER-OUTER QUADRANT OF LEFT BREAST IN FEMALE, ESTROGEN RECEPTOR POSITIVE: Primary | ICD-10-CM

## 2022-08-12 ENCOUNTER — OFFICE VISIT (OUTPATIENT)
Dept: ENDOCRINOLOGY | Facility: CLINIC | Age: 47
End: 2022-08-12

## 2022-08-12 DIAGNOSIS — Z79.4 TYPE 2 DIABETES MELLITUS WITH HYPERGLYCEMIA, WITH LONG-TERM CURRENT USE OF INSULIN: ICD-10-CM

## 2022-08-12 DIAGNOSIS — E11.65 TYPE 2 DIABETES MELLITUS WITH HYPERGLYCEMIA, WITH LONG-TERM CURRENT USE OF INSULIN: ICD-10-CM

## 2022-08-12 PROCEDURE — 98960 EDU&TRN PT SELF-MGMT NQHP 1: CPT | Performed by: DIETITIAN, REGISTERED

## 2022-08-12 NOTE — PROGRESS NOTES
Corrina Jensen is a 47 y.o. female referred for outpatient diabetes education by Mil FORD. Patient attended individual education for 1 hour on 08/12/2022. Topics covered included:     1. Healthy Food Choices   i. Provided patient with detailed carbohydrate counting guide.    ii. Instructed patient to eat 45-60 60-75 grams of carbohydrate with each meal (3-4 4-5 exchange choices) and 15-30 grams of carb for snacks (1-2 exchange choices).   iii. Provided patient with list of non-starchy vegetables and foods that are low in carbohydrate for snacks and to incorporate with meals (Planning Healthy Meals Packet).    iv. Choose fruits, vegetables, whole grains, legumes, low-fat milk, fiber-rich foods, minimal saturated fats, and watch cholesterol and sodium intake.    v. Reviewed carbohydrate-containing foods, standard serving sizes, and measuring foods.   vi. Reviewed the difference between simple and complex carbohydrate. Encouraged patient to choose complex carbohydrates more often.      2. Pathophysiology of Diabetes   i. Explained pre-diabetes and diabetes. Reviewed disease process of type 2 diabetes.    ii. Explained common conditions associated with uncontrolled diabetes (diabetic neuropathy, retinopathy, nephropathy, heart disease, skin infections, and kidney disease)     3. Hyperglycemia/Hypoglycemia   i. Discussed signs, symptoms, and difference between hypo/hyperglycemia - and the proper treatment guidelines for both.    ii. Explained A1C and the average blood sugar that goes along with the A1C level.    iii. Discussed how to check blood sugar. Stated when checking blood sugar to check different times of the day to see if there is a pattern. For example: Check fasting blood glucose in the morning, check before lunch, check 2 hours after a meal, and at bedtime. Reminded to check blood sugar if ever feels jittery to know if blood sugar is high or low.     4. Pt reports hypoglycemia overnight. Adjusted  Levemir to 38 units BID.     Provided patient with Diabetes and You book, and Planning Healthy Meals book. Provided handout of sample menu with carbs listed.      Thank you Mil FORD for this referral.        ELLEN Bray, SASCHA LD

## 2022-08-15 ENCOUNTER — INFUSION (OUTPATIENT)
Dept: ONCOLOGY | Facility: HOSPITAL | Age: 47
End: 2022-08-15

## 2022-08-15 VITALS — HEART RATE: 76 BPM | SYSTOLIC BLOOD PRESSURE: 127 MMHG | DIASTOLIC BLOOD PRESSURE: 63 MMHG | RESPIRATION RATE: 18 BRPM

## 2022-08-15 DIAGNOSIS — C50.412 MALIGNANT NEOPLASM OF UPPER-OUTER QUADRANT OF LEFT BREAST IN FEMALE, ESTROGEN RECEPTOR POSITIVE: Primary | ICD-10-CM

## 2022-08-15 DIAGNOSIS — Z17.0 MALIGNANT NEOPLASM OF UPPER-OUTER QUADRANT OF LEFT BREAST IN FEMALE, ESTROGEN RECEPTOR POSITIVE: Primary | ICD-10-CM

## 2022-08-15 PROCEDURE — 25010000002 LEUPROLIDE ACETATE PER 7.5 MG: Performed by: INTERNAL MEDICINE

## 2022-08-15 PROCEDURE — 96402 CHEMO HORMON ANTINEOPL SQ/IM: CPT | Performed by: INTERNAL MEDICINE

## 2022-08-15 RX ORDER — AMOXICILLIN AND CLAVULANATE POTASSIUM 875; 125 MG/1; MG/1
TABLET, FILM COATED ORAL
COMMUNITY
Start: 2022-08-12

## 2022-08-15 RX ORDER — OFLOXACIN 3 MG/ML
SOLUTION AURICULAR (OTIC)
COMMUNITY
Start: 2022-08-12

## 2022-08-15 RX ORDER — VALACYCLOVIR HYDROCHLORIDE 1 G/1
TABLET, FILM COATED ORAL
COMMUNITY
Start: 2022-08-12

## 2022-08-15 RX ORDER — METHYLPREDNISOLONE 4 MG/1
TABLET ORAL
COMMUNITY
Start: 2022-08-12

## 2022-08-15 RX ADMIN — LEUPROLIDE ACETATE 7.5 MG: KIT at 13:52

## 2022-09-01 ENCOUNTER — TELEPHONE (OUTPATIENT)
Dept: ENDOCRINOLOGY | Facility: CLINIC | Age: 47
End: 2022-09-01

## 2022-09-01 NOTE — TELEPHONE ENCOUNTER
Pt called requesting to speak with Mil or her nurse. She said her blood sugar is dropping low after starting a new medication and would like to speak with someone.    Thanks

## 2022-09-02 ENCOUNTER — TELEPHONE (OUTPATIENT)
Dept: ENDOCRINOLOGY | Facility: CLINIC | Age: 47
End: 2022-09-02

## 2022-09-02 NOTE — TELEPHONE ENCOUNTER
Pt reports low BG at different times of the day (ranging 40s-50s). Decreased Levemir to 32 units BID.

## 2022-09-12 ENCOUNTER — INFUSION (OUTPATIENT)
Dept: ONCOLOGY | Facility: HOSPITAL | Age: 47
End: 2022-09-12

## 2022-09-12 VITALS
RESPIRATION RATE: 18 BRPM | DIASTOLIC BLOOD PRESSURE: 67 MMHG | SYSTOLIC BLOOD PRESSURE: 118 MMHG | HEART RATE: 66 BPM | TEMPERATURE: 96.6 F

## 2022-09-12 DIAGNOSIS — C50.412 MALIGNANT NEOPLASM OF UPPER-OUTER QUADRANT OF LEFT BREAST IN FEMALE, ESTROGEN RECEPTOR POSITIVE: Primary | ICD-10-CM

## 2022-09-12 DIAGNOSIS — Z17.0 MALIGNANT NEOPLASM OF UPPER-OUTER QUADRANT OF LEFT BREAST IN FEMALE, ESTROGEN RECEPTOR POSITIVE: Primary | ICD-10-CM

## 2022-09-12 PROCEDURE — 25010000002 LEUPROLIDE ACETATE PER 7.5 MG: Performed by: INTERNAL MEDICINE

## 2022-09-12 PROCEDURE — 96402 CHEMO HORMON ANTINEOPL SQ/IM: CPT | Performed by: INTERNAL MEDICINE

## 2022-09-12 RX ADMIN — LEUPROLIDE ACETATE 7.5 MG: KIT at 13:26

## 2022-09-14 ENCOUNTER — TELEPHONE (OUTPATIENT)
Dept: ENDOCRINOLOGY | Facility: CLINIC | Age: 47
End: 2022-09-14

## 2022-09-14 NOTE — TELEPHONE ENCOUNTER
Pt continues to have low BG at different times of the day (40-60 range). FBS normally less than 120. Decrease Levemir to 25 units BID and Novolog to 10 units with meals. Pt will call back Friday with updates.

## 2022-09-14 NOTE — TELEPHONE ENCOUNTER
Pt called stating her sugar is still dropping and it was so low she didn't take her insulin last night. She would like to speak with Fritz Jaeger's nurse.    Thanks

## 2022-09-16 ENCOUNTER — TELEPHONE (OUTPATIENT)
Dept: ENDOCRINOLOGY | Facility: CLINIC | Age: 47
End: 2022-09-16

## 2022-09-16 NOTE — TELEPHONE ENCOUNTER
Pt called requesting to speak with Mil or Fritz. She was calling to let them know her sugar is around 164 and is better than what it was. She said before it was running low.    Thanks

## 2022-09-23 ENCOUNTER — OFFICE VISIT (OUTPATIENT)
Dept: ENDOCRINOLOGY | Facility: CLINIC | Age: 47
End: 2022-09-23

## 2022-09-23 ENCOUNTER — TELEPHONE (OUTPATIENT)
Dept: ENDOCRINOLOGY | Facility: CLINIC | Age: 47
End: 2022-09-23

## 2022-09-23 DIAGNOSIS — Z79.4 TYPE 2 DIABETES MELLITUS WITH HYPERGLYCEMIA, WITH LONG-TERM CURRENT USE OF INSULIN: ICD-10-CM

## 2022-09-23 DIAGNOSIS — E11.65 TYPE 2 DIABETES MELLITUS WITH HYPERGLYCEMIA, WITH LONG-TERM CURRENT USE OF INSULIN: ICD-10-CM

## 2022-09-23 PROCEDURE — 98960 EDU&TRN PT SELF-MGMT NQHP 1: CPT | Performed by: DIETITIAN, REGISTERED

## 2022-09-23 RX ORDER — INSULIN PMP CART,AUT,G6/7,CNTR
1 EACH SUBCUTANEOUS ONCE
Qty: 1 KIT | Refills: 0 | Status: SHIPPED | OUTPATIENT
Start: 2022-09-23 | End: 2022-09-23

## 2022-09-23 NOTE — PROGRESS NOTES
Corrina Jensen is a 47 y.o. female referred for outpatient diabetes education by Mil FORD. Patient attended individual education for an hour and a half on 09/23/2022. Topics covered included:     1. Healthy Food Choices   i. Provided patient with detailed carbohydrate counting guide.    ii. Instructed patient to eat 45-60 grams of carbohydrate with each meal (3-4 exchange choices) and 15 grams of carb for snacks (1 exchange choices).   iii. Provided patient with list of non-starchy vegetables and foods that are low in carbohydrate for snacks and to incorporate with meals (Planning Healthy Meals Packet).    iv. Choose fruits, vegetables, whole grains, legumes, low-fat milk, fiber-rich foods, minimal saturated fats, and watch cholesterol and sodium intake.    v. Reviewed carbohydrate-containing foods, standard serving sizes, and measuring foods.   vi. Reviewed the difference between simple and complex carbohydrate. Encouraged patient to choose complex carbohydrates more often.   vii. Reviewed label reading. Discussed looking at serving size, total carbohydrates, fiber, saturated fat, sodium. Reviewed  amounts of each to hav per day & per meal.      2. Pathophysiology of Diabetes   i. Explained pre-diabetes and diabetes. Reviewed disease process of type 2 diabetes.    ii. Explained common conditions associated with uncontrolled diabetes (diabetic neuropathy, retinopathy, nephropathy, heart disease, skin infections, and kidney disease)     3. Hyperglycemia/Hypoglycemia   i. Discussed signs, symptoms, and difference between hypo/hyperglycemia - and the proper treatment guidelines for both.    ii. Explained A1C and the average blood sugar that goes along with the A1C level.    iii. Discussed how to check blood sugar. Stated when checking blood sugar to check different times of the day to see if there is a pattern. For example: Check fasting blood glucose in the morning, check before lunch, check 2 hours  after a meal, and at bedtime. Reminded to check blood sugar if ever feels jittery to know if blood sugar is high or low.     4. Dexcom reports scanned in. Patient having multiple lows d at different times of the day. Decreased Levemir to 20 units BID. Pt normally has low after breakfast insulin. Decreased Apidra to 8 units. Discussed possibility of Omnipod; pt interested in persuing.     Provided patient with Diabetes and You book, and Planning Healthy Meals book. Provided handout of sample menu with carbs listed.      Thank you Mil FORD for this referral.      ELLEN Bray, RD, LD

## 2022-09-26 RX ORDER — FERROUS SULFATE 325(65) MG
TABLET ORAL
Qty: 30 TABLET | Refills: 3 | Status: SHIPPED | OUTPATIENT
Start: 2022-09-26 | End: 2023-02-20

## 2022-09-26 NOTE — TELEPHONE ENCOUNTER
Rx Refill Note  Requested Prescriptions     Pending Prescriptions Disp Refills   • FeroSul 325 (65 Fe) MG tablet [Pharmacy Med Name: FERROUS SULFATE 325MG (5GR) TABS] 30 tablet 3     Sig: TAKE 1 TABLET BY MOUTH EVERY DAY WITH BREAKFAST      Last office visit with prescribing clinician: 7/18/2022      Next office visit with prescribing clinician: 10/10/2022            Cori Aguilar Rep  09/26/22, 07:54 CDT

## 2022-09-29 ENCOUNTER — TELEPHONE (OUTPATIENT)
Dept: ENDOCRINOLOGY | Facility: CLINIC | Age: 47
End: 2022-09-29

## 2022-09-29 RX ORDER — INSULIN PMP CART,AUT,G6/7,CNTR
1 EACH SUBCUTANEOUS ONCE
Qty: 1 KIT | Refills: 0 | Status: SHIPPED | OUTPATIENT
Start: 2022-09-29 | End: 2022-09-29

## 2022-10-04 ENCOUNTER — TELEPHONE (OUTPATIENT)
Dept: ENDOCRINOLOGY | Facility: CLINIC | Age: 47
End: 2022-10-04

## 2022-10-04 NOTE — TELEPHONE ENCOUNTER
[facility] called and left a voicemail requesting clarification on directions for the RX for the omni pod.    Thanks

## 2022-10-04 NOTE — TELEPHONE ENCOUNTER
Can you please help me?  I do not know if the patient needs OmniPod 5 her OmniPod Dash the messages are not making sense if you can please help me figure out what the patient is needing I would greatly appreciated

## 2022-10-05 NOTE — TELEPHONE ENCOUNTER
The pharmacist just needed clarification on frequency of pod change. They do not currently have the Omnipod 5 in stock but have it ordered.

## 2022-10-10 ENCOUNTER — OFFICE VISIT (OUTPATIENT)
Dept: ONCOLOGY | Facility: CLINIC | Age: 47
End: 2022-10-10

## 2022-10-10 ENCOUNTER — LAB (OUTPATIENT)
Dept: ONCOLOGY | Facility: HOSPITAL | Age: 47
End: 2022-10-10

## 2022-10-10 ENCOUNTER — INFUSION (OUTPATIENT)
Dept: ONCOLOGY | Facility: HOSPITAL | Age: 47
End: 2022-10-10

## 2022-10-10 VITALS
OXYGEN SATURATION: 93 % | TEMPERATURE: 96.3 F | BODY MASS INDEX: 39.22 KG/M2 | RESPIRATION RATE: 18 BRPM | SYSTOLIC BLOOD PRESSURE: 117 MMHG | WEIGHT: 243 LBS | HEART RATE: 74 BPM | DIASTOLIC BLOOD PRESSURE: 68 MMHG

## 2022-10-10 DIAGNOSIS — M85.88 OSTEOPENIA OF LUMBAR SPINE: ICD-10-CM

## 2022-10-10 DIAGNOSIS — Z17.0 MALIGNANT NEOPLASM OF UPPER-OUTER QUADRANT OF LEFT BREAST IN FEMALE, ESTROGEN RECEPTOR POSITIVE: Primary | Chronic | ICD-10-CM

## 2022-10-10 DIAGNOSIS — C50.412 MALIGNANT NEOPLASM OF UPPER-OUTER QUADRANT OF LEFT BREAST IN FEMALE, ESTROGEN RECEPTOR POSITIVE: ICD-10-CM

## 2022-10-10 DIAGNOSIS — Z17.0 MALIGNANT NEOPLASM OF UPPER-OUTER QUADRANT OF LEFT BREAST IN FEMALE, ESTROGEN RECEPTOR POSITIVE: Primary | ICD-10-CM

## 2022-10-10 DIAGNOSIS — C50.412 MALIGNANT NEOPLASM OF UPPER-OUTER QUADRANT OF LEFT BREAST IN FEMALE, ESTROGEN RECEPTOR POSITIVE: Primary | ICD-10-CM

## 2022-10-10 DIAGNOSIS — Z17.0 MALIGNANT NEOPLASM OF UPPER-OUTER QUADRANT OF LEFT BREAST IN FEMALE, ESTROGEN RECEPTOR POSITIVE: ICD-10-CM

## 2022-10-10 DIAGNOSIS — C50.412 MALIGNANT NEOPLASM OF UPPER-OUTER QUADRANT OF LEFT BREAST IN FEMALE, ESTROGEN RECEPTOR POSITIVE: Primary | Chronic | ICD-10-CM

## 2022-10-10 DIAGNOSIS — D61.818 PANCYTOPENIA: Chronic | ICD-10-CM

## 2022-10-10 LAB
ALBUMIN SERPL-MCNC: 4.4 G/DL (ref 3.5–5.2)
ALBUMIN/GLOB SERPL: 1.6 G/DL
ALP SERPL-CCNC: 127 U/L (ref 39–117)
ALT SERPL W P-5'-P-CCNC: 29 U/L (ref 1–33)
ANION GAP SERPL CALCULATED.3IONS-SCNC: 8 MMOL/L (ref 5–15)
AST SERPL-CCNC: 27 U/L (ref 1–32)
BILIRUB SERPL-MCNC: 0.5 MG/DL (ref 0–1.2)
BUN SERPL-MCNC: 7 MG/DL (ref 6–20)
BUN/CREAT SERPL: 9.1 (ref 7–25)
CALCIUM SPEC-SCNC: 9.2 MG/DL (ref 8.6–10.5)
CHLORIDE SERPL-SCNC: 102 MMOL/L (ref 98–107)
CO2 SERPL-SCNC: 28 MMOL/L (ref 22–29)
CREAT SERPL-MCNC: 0.77 MG/DL (ref 0.57–1)
DEPRECATED RDW RBC AUTO: 43.8 FL (ref 37–54)
EGFRCR SERPLBLD CKD-EPI 2021: 94.7 ML/MIN/1.73
ERYTHROCYTE [DISTWIDTH] IN BLOOD BY AUTOMATED COUNT: 13.2 % (ref 12.3–15.4)
GLOBULIN UR ELPH-MCNC: 2.8 GM/DL
GLUCOSE SERPL-MCNC: 128 MG/DL (ref 65–99)
HCT VFR BLD AUTO: 46.2 % (ref 34–46.6)
HGB BLD-MCNC: 15.6 G/DL (ref 12–15.9)
HOLD SPECIMEN: NORMAL
MCH RBC QN AUTO: 30.6 PG (ref 26.6–33)
MCHC RBC AUTO-ENTMCNC: 33.8 G/DL (ref 31.5–35.7)
MCV RBC AUTO: 90.8 FL (ref 79–97)
PLATELET # BLD AUTO: 133 10*3/MM3 (ref 140–450)
PMV BLD AUTO: 10.3 FL (ref 6–12)
POTASSIUM SERPL-SCNC: 3.6 MMOL/L (ref 3.5–5.2)
PROT SERPL-MCNC: 7.2 G/DL (ref 6–8.5)
RBC # BLD AUTO: 5.09 10*6/MM3 (ref 3.77–5.28)
SODIUM SERPL-SCNC: 138 MMOL/L (ref 136–145)
WBC NRBC COR # BLD: 7.53 10*3/MM3 (ref 3.4–10.8)

## 2022-10-10 PROCEDURE — 96402 CHEMO HORMON ANTINEOPL SQ/IM: CPT | Performed by: INTERNAL MEDICINE

## 2022-10-10 PROCEDURE — 25010000002 LEUPROLIDE ACETATE PER 7.5 MG: Performed by: INTERNAL MEDICINE

## 2022-10-10 PROCEDURE — 80053 COMPREHEN METABOLIC PANEL: CPT

## 2022-10-10 PROCEDURE — 85027 COMPLETE CBC AUTOMATED: CPT

## 2022-10-10 PROCEDURE — 36415 COLL VENOUS BLD VENIPUNCTURE: CPT | Performed by: INTERNAL MEDICINE

## 2022-10-10 PROCEDURE — 99214 OFFICE O/P EST MOD 30 MIN: CPT | Performed by: INTERNAL MEDICINE

## 2022-10-10 RX ORDER — ANASTROZOLE 1 MG/1
1 TABLET ORAL DAILY
Qty: 90 TABLET | Refills: 0 | Status: SHIPPED | OUTPATIENT
Start: 2022-10-10 | End: 2023-01-03 | Stop reason: SDUPTHER

## 2022-10-10 RX ADMIN — LEUPROLIDE ACETATE 7.5 MG: KIT at 14:00

## 2022-10-10 NOTE — PROGRESS NOTES
Subjective     Corrina Jensen was seen in follow-up for breast cancer.  No new health issues since last visit.   No new medications.   No new hospitalization.   Feels well.       Past Medical History, Past Surgical History, Social History, Family History have been reviewed and are without significant changes except as mentioned.        Medications:  The current medication list was reviewed in the EMR    ALLERGIES:    Allergies   Allergen Reactions   • Latex Itching and Other (See Comments)     Redness of skin   • Strawberry Cough   • Wound Dressing Adhesive Other (See Comments)     Steri Strips cause blistering of skin   • Bactroban [Mupirocin Calcium] Rash   • Neomycin-Bacitracin-Polymyxin [Bacitracin-Neomycin-Polymyxin] Rash   • Other Rash     All antibiotic creams       Objective      Vitals:    10/10/22 1334   BP: 117/68   Pulse: 74   Resp: 18   Temp: 96.3 °F (35.7 °C)   SpO2: 93%   Weight: 110 kg (243 lb)   PainSc:   6     Current Status 9/12/2022   ECOG score 0       Physical Exam  Vitals and nursing note reviewed.   Constitutional:       Appearance: Normal appearance.   Neurological:      General: No focal deficit present.      Mental Status: She is alert and oriented to person, place, and time. Mental status is at baseline.   Psychiatric:         Mood and Affect: Mood normal.         Behavior: Behavior normal.         Thought Content: Thought content normal.               RECENT LABS:Independently reviewed and summarized  Hematology WBC   Date Value Ref Range Status   10/10/2022 7.53 3.40 - 10.80 10*3/mm3 Final     RBC   Date Value Ref Range Status   10/10/2022 5.09 3.77 - 5.28 10*6/mm3 Final     Hemoglobin   Date Value Ref Range Status   10/10/2022 15.6 12.0 - 15.9 g/dL Final     Hematocrit   Date Value Ref Range Status   10/10/2022 46.2 34.0 - 46.6 % Final     Platelets   Date Value Ref Range Status   10/10/2022 133 (L) 140 - 450 10*3/mm3 Final     Lab Results   Component Value Date    GLUCOSE 128  (H) 10/10/2022    BUN 7 10/10/2022    CREATININE 0.77 10/10/2022    EGFRIFNONA 89 01/31/2022    BCR 9.1 10/10/2022    K 3.6 10/10/2022    CO2 28.0 10/10/2022    CALCIUM 9.2 10/10/2022    ALBUMIN 4.40 10/10/2022    AST 27 10/10/2022    ALT 29 10/10/2022            Imaging (independently reviewed and summarized):     DEXA Bone Density Axial    Result Date: 8/4/2022  CONCLUSION: Hips normal bone density. Average lumbar spine bone density is also normal. (Note is made the L3 T score of -1.1 and the L4 T score of -2.1 correspond to osteopenia. Fracture risk increased for the L3 and L4 vertebral bodies.) Bone density has diminished from prior study. [id number] Electronically signed by:  Dilip Berkowitz MD  8/4/2022 12:01 AM CDT Workstation: [id number]      Corrina Jensen reports a pain score of 6.  Given her pain assessment as noted, treatment options were discussed and the following options were decided upon as a follow-up plan to address the patient's pain: referral to Primary Care for assistance in pain treatment guidance.    Patient screened negative for depression based on a PHQ-9 score of 0 on 10/10/2022.       Diagnosis:   (1) Left breast cancer   Stage IA (pT1a, pN0(sn),cM0)   ER 95% ME 95% HER IHC negative      (2) Pancytopenia   (3) Osteopenia     Assessment & Plan     (1) Left breast cancer     Chronic, stable.  Treated with lumpectomy and radiation.  She remains on adjuvant hormonal therapy with ovarian suppression and Lupron every month and Arimidex.  Mammogram showed no evidence of recurrence.  She is doing well overall.  New prescription of Arimidex sent to the pharmacy.  Lupron injection given in the clinic today.  Will continue Lupron every month.  I will plan to see her back in 3 months with CBC, CMP.      (2) Pancytopenia     Chronic, stable.  Suspect this is due to fatty liver disease as well as splenomegaly.  Continue B12 and folic acid supplement.  Check CBC, CMP in 3 months.    (3) Osteopenia     New  issue.  Result of DEXA scan reviewed.  They show mild osteopenia involving lumbar spine.  Recommend continue calcium and vitamin D supplement.  Recheck DEXA scan in a year.  Closely monitor      10/10/2022      CC:

## 2022-10-18 ENCOUNTER — TELEMEDICINE (OUTPATIENT)
Dept: ENDOCRINOLOGY | Facility: CLINIC | Age: 47
End: 2022-10-18

## 2022-10-18 DIAGNOSIS — I10 PRIMARY HYPERTENSION: ICD-10-CM

## 2022-10-18 DIAGNOSIS — F17.200 SMOKER: ICD-10-CM

## 2022-10-18 DIAGNOSIS — E11.649 TYPE 2 DIABETES MELLITUS WITH HYPOGLYCEMIA WITHOUT COMA, WITH LONG-TERM CURRENT USE OF INSULIN: Primary | ICD-10-CM

## 2022-10-18 DIAGNOSIS — Z79.4 TYPE 2 DIABETES MELLITUS WITH HYPOGLYCEMIA WITHOUT COMA, WITH LONG-TERM CURRENT USE OF INSULIN: Primary | ICD-10-CM

## 2022-10-18 PROCEDURE — 99214 OFFICE O/P EST MOD 30 MIN: CPT | Performed by: NURSE PRACTITIONER

## 2022-10-18 RX ORDER — FLUCONAZOLE 150 MG/1
150 TABLET ORAL ONCE
Qty: 1 TABLET | Refills: 0 | Status: SHIPPED | OUTPATIENT
Start: 2022-10-18 | End: 2022-10-18

## 2022-10-18 NOTE — PROGRESS NOTES
Chief Complaint  Diabetes    Subjective          Corrina Eve Jensen presents to [facility] ENDOCRINOLOGY  History of Present Illness       You have chosen to receive care through a telehealth visit.  Do you consent to use a video/audio connection for your medical care today? Yes            TELEHEALTH VIDEO VISIT     This a video visit due to [facility] current guidelines for social distancing due to the COVID 19 pandemic        Mode of Visit: Video  Location of patient: home  You have chosen to receive care through a telehealth visit.  Does the patient consent to use a video/audio connection for your medical care today? Yes  The visit included audio and video interaction. No technical issues occurred during this visit.         47 year old female presents for follow up       Reason -- diabetes mellitus type 2        Duration--diagnosed 5 years ago      Context --presented with symptoms      Timing constant      Quality  Not controlled      Severity moderate      Macrovascular complications--none      Microvascular complications --neuropathy, no DR, no renal disease         Current diabetes regimen      Insulin            Current glucose monitoring      Fingerstick's     Checks 4 times daily      Has dexcom -    States doing great     stayiing under 150    Was having lows but stopped novolog and no longer low      Review of Systems - General ROS: negative              Objective   Vital Signs:   There were no vitals taken for this visit.    Physical Exam  Neurological:      General: No focal deficit present.      Mental Status: She is alert.   Psychiatric:         Mood and Affect: Mood normal.         Thought Content: Thought content normal.        Result Review :   The following data was reviewed by: LOGAN Cabrera on 10/18/2022:  Common labs    Common Labs 6/13/22 6/13/22 7/18/22 7/18/22 10/10/22 10/10/22    1610 1610 1213 1213 1325 1325   Glucose    197 (A)  128 (A)   Glucose 112  (A)        BUN 7   5 (A)  7   Creatinine 0.6 (A)   0.62  0.77   Sodium 139   139  138   Potassium 3.9   4.3  3.6   Chloride 102   102  102   Calcium 9.4   9.4  9.2   Albumin 4.3   4.10  4.40   Total Bilirubin 0.65   0.3  0.5   Alkaline Phosphatase 142 (A)   158 (A)  127 (A)   AST (SGOT) 42 (A)   48 (A)  27   ALT (SGPT) 38   39 (A)  29   WBC   5.96  7.53    Hemoglobin   14.3  15.6    Hematocrit   42.8  46.2    Platelets   141  133 (A)    Hemoglobin A1C  8.0 (A)       (A) Abnormal value                      Assessment and Plan    Diagnoses and all orders for this visit:    1. Type 2 diabetes mellitus with hypoglycemia without coma, with long-term current use of insulin (HCC) (Primary)    2. Smoker    3. Primary hypertension    Other orders  -     empagliflozin (Jardiance) 10 MG tablet tablet; Take 1 tablet by mouth Daily. Before bkfast  Dispense: 30 tablet; Refill: 11  -     fluconazole (Diflucan) 150 MG tablet; Take 1 tablet by mouth 1 (One) Time for 1 dose.  Dispense: 1 tablet; Refill: 0           Glycemic Management:        Diabetes mellitus type 2         Lab Results   Component Value Date    HGBA1C 8.0 (H) 06/13/2022             Taking Levemir 20 units BID      Taking novolog only using if high carb meals              Taking  Ozempic 0.5 mg once weekly      Side effects include nausea,      If vomiting or abdominal pain stop medication       Call in zofran for nausea          add jardiance 10 mg one daily          Goals for sugar     Fasting and before meals 80 to 130     2 hours after meals 180 or less        Aim for 45 grams of carbohydrate per meal     Aim for 15 grams of carbohydrate per snack            Microvascular Complications Monitoring         Last eye exam---- May 2022, no DR      Neuropathy --yes      Taking Lyrica 75 mg po bid            Lipid Management:         Not on statin      Blood Pressure Management:        Taking norvasc 5 mg one daily      Taking catapres 0.1 mg tid            Thyroid  Health           Lab Results   Component Value Date     TSH 1.66 03/07/2022            Bone Health            Taking vitamin d def.            Weight Management:     Body mass index is 38.8 kg/m².           Preventive Care:      Smoker        Corrina Jensen  reports that she has been smoking cigarettes. She has been smoking an average of 1 pack per day. She has never used smokeless tobacco.. I have educated her on the risk of diseases from using tobacco products such as cancer and COPD.     I advised her to quit and she is not willing to quit.    I spent 3  minutes counseling the patient.                                  Follow Up   No follow-ups on file.  Patient was given instructions and counseling regarding her condition or for health maintenance advice. Please see specific information pulled into the AVS if appropriate.         This document has been electronically signed by LOGAN Cabrera on October 18, 2022 11:17 CDT.

## 2022-11-07 ENCOUNTER — INFUSION (OUTPATIENT)
Dept: ONCOLOGY | Facility: HOSPITAL | Age: 47
End: 2022-11-07

## 2022-11-07 DIAGNOSIS — Z17.0 MALIGNANT NEOPLASM OF UPPER-OUTER QUADRANT OF LEFT BREAST IN FEMALE, ESTROGEN RECEPTOR POSITIVE: Primary | ICD-10-CM

## 2022-11-07 DIAGNOSIS — C50.412 MALIGNANT NEOPLASM OF UPPER-OUTER QUADRANT OF LEFT BREAST IN FEMALE, ESTROGEN RECEPTOR POSITIVE: Primary | ICD-10-CM

## 2022-11-07 LAB — HOLD SPECIMEN: NORMAL

## 2022-11-07 PROCEDURE — 82670 ASSAY OF TOTAL ESTRADIOL: CPT

## 2022-11-07 PROCEDURE — 36415 COLL VENOUS BLD VENIPUNCTURE: CPT

## 2022-11-07 PROCEDURE — 96402 CHEMO HORMON ANTINEOPL SQ/IM: CPT | Performed by: INTERNAL MEDICINE

## 2022-11-07 PROCEDURE — 25010000002 LEUPROLIDE ACETATE PER 7.5 MG: Performed by: INTERNAL MEDICINE

## 2022-11-07 RX ADMIN — LEUPROLIDE ACETATE 7.5 MG: KIT at 14:34

## 2022-11-08 LAB — ESTRADIOL SERPL HS-MCNC: 5.4 PG/ML

## 2022-11-23 ENCOUNTER — OFFICE VISIT (OUTPATIENT)
Dept: ENDOCRINOLOGY | Facility: CLINIC | Age: 47
End: 2022-11-23

## 2022-11-23 DIAGNOSIS — Z79.4 TYPE 2 DIABETES MELLITUS WITH HYPOGLYCEMIA WITHOUT COMA, WITH LONG-TERM CURRENT USE OF INSULIN: ICD-10-CM

## 2022-11-23 DIAGNOSIS — E11.649 TYPE 2 DIABETES MELLITUS WITH HYPOGLYCEMIA WITHOUT COMA, WITH LONG-TERM CURRENT USE OF INSULIN: ICD-10-CM

## 2022-11-23 PROCEDURE — 98960 EDU&TRN PT SELF-MGMT NQHP 1: CPT | Performed by: DIETITIAN, REGISTERED

## 2022-11-23 NOTE — PROGRESS NOTES
Mode of visit: telephone  Location of pt: in car  (passenger)  You have chosen to receive care through a telehealth visit. Does the pt consent to use an audio connection for your medical care today? Yes    Corrina Jensen is a 47 y.o. female referred for outpatient diabetes education by Mil FORD. Patient attended individual education for 30 minutes on 11/23/2022. Topics covered included:     1. Healthy Food Choices   i. Instructed patient to eat 45-60 grams of carbohydrate with each meal (3-4 exchange choices) and 15 grams of carb for snacks (1 exchange choices).   iii. Provided patient with list of non-starchy vegetables and foods that are low in carbohydrate for snacks and to incorporate with meals    iv. Choose fruits, vegetables, whole grains, legumes, low-fat milk, fiber-rich foods, minimal saturated fats, and watch cholesterol and sodium intake.    v. Reviewed carbohydrate-containing foods, standard serving sizes, and measuring foods.   vi. Reviewed the difference between simple and complex carbohydrate. Encouraged patient to choose complex carbohydrates more often.   vii. Reviewed label reading. Discussed looking at serving size, total carbohydrates, fiber, saturated fat, sodium. Reviewed  amounts of each to have per day & per meal.     Thank you Mil FORD for this referral.      ELLEN Bray, RD, LD

## 2022-11-29 DIAGNOSIS — Z17.0 MALIGNANT NEOPLASM OF UPPER-OUTER QUADRANT OF LEFT BREAST IN FEMALE, ESTROGEN RECEPTOR POSITIVE: Primary | ICD-10-CM

## 2022-11-29 DIAGNOSIS — C50.412 MALIGNANT NEOPLASM OF UPPER-OUTER QUADRANT OF LEFT BREAST IN FEMALE, ESTROGEN RECEPTOR POSITIVE: Primary | ICD-10-CM

## 2022-12-05 ENCOUNTER — INFUSION (OUTPATIENT)
Dept: ONCOLOGY | Facility: HOSPITAL | Age: 47
End: 2022-12-05

## 2022-12-05 VITALS — DIASTOLIC BLOOD PRESSURE: 66 MMHG | HEART RATE: 78 BPM | SYSTOLIC BLOOD PRESSURE: 108 MMHG

## 2022-12-05 DIAGNOSIS — C50.412 MALIGNANT NEOPLASM OF UPPER-OUTER QUADRANT OF LEFT BREAST IN FEMALE, ESTROGEN RECEPTOR POSITIVE: Primary | ICD-10-CM

## 2022-12-05 DIAGNOSIS — Z17.0 MALIGNANT NEOPLASM OF UPPER-OUTER QUADRANT OF LEFT BREAST IN FEMALE, ESTROGEN RECEPTOR POSITIVE: Primary | ICD-10-CM

## 2022-12-05 PROCEDURE — 25010000002 LEUPROLIDE ACETATE PER 7.5 MG: Performed by: INTERNAL MEDICINE

## 2022-12-05 PROCEDURE — 96402 CHEMO HORMON ANTINEOPL SQ/IM: CPT | Performed by: INTERNAL MEDICINE

## 2022-12-05 RX ADMIN — LEUPROLIDE ACETATE 7.5 MG: KIT at 15:42

## 2022-12-19 ENCOUNTER — DOCUMENTATION (OUTPATIENT)
Dept: ENDOCRINOLOGY | Facility: CLINIC | Age: 47
End: 2022-12-19

## 2023-01-03 ENCOUNTER — INFUSION (OUTPATIENT)
Dept: ONCOLOGY | Facility: HOSPITAL | Age: 48
End: 2023-01-03
Payer: MEDICAID

## 2023-01-03 ENCOUNTER — LAB (OUTPATIENT)
Dept: ONCOLOGY | Facility: HOSPITAL | Age: 48
End: 2023-01-03
Payer: MEDICAID

## 2023-01-03 ENCOUNTER — OFFICE VISIT (OUTPATIENT)
Dept: ONCOLOGY | Facility: CLINIC | Age: 48
End: 2023-01-03
Payer: MEDICAID

## 2023-01-03 VITALS
DIASTOLIC BLOOD PRESSURE: 56 MMHG | HEART RATE: 83 BPM | BODY MASS INDEX: 39.87 KG/M2 | RESPIRATION RATE: 18 BRPM | WEIGHT: 247 LBS | SYSTOLIC BLOOD PRESSURE: 122 MMHG | OXYGEN SATURATION: 97 %

## 2023-01-03 DIAGNOSIS — Z17.0 MALIGNANT NEOPLASM OF UPPER-OUTER QUADRANT OF LEFT BREAST IN FEMALE, ESTROGEN RECEPTOR POSITIVE: ICD-10-CM

## 2023-01-03 DIAGNOSIS — C50.412 MALIGNANT NEOPLASM OF UPPER-OUTER QUADRANT OF LEFT BREAST IN FEMALE, ESTROGEN RECEPTOR POSITIVE: ICD-10-CM

## 2023-01-03 DIAGNOSIS — Z17.0 MALIGNANT NEOPLASM OF UPPER-OUTER QUADRANT OF LEFT BREAST IN FEMALE, ESTROGEN RECEPTOR POSITIVE: Primary | Chronic | ICD-10-CM

## 2023-01-03 DIAGNOSIS — D61.818 PANCYTOPENIA: ICD-10-CM

## 2023-01-03 DIAGNOSIS — Z17.0 MALIGNANT NEOPLASM OF UPPER-OUTER QUADRANT OF LEFT BREAST IN FEMALE, ESTROGEN RECEPTOR POSITIVE: Primary | ICD-10-CM

## 2023-01-03 DIAGNOSIS — D61.818 PANCYTOPENIA: Chronic | ICD-10-CM

## 2023-01-03 DIAGNOSIS — C50.412 MALIGNANT NEOPLASM OF UPPER-OUTER QUADRANT OF LEFT BREAST IN FEMALE, ESTROGEN RECEPTOR POSITIVE: Primary | ICD-10-CM

## 2023-01-03 DIAGNOSIS — M85.88 OSTEOPENIA OF LUMBAR SPINE: ICD-10-CM

## 2023-01-03 DIAGNOSIS — C50.412 MALIGNANT NEOPLASM OF UPPER-OUTER QUADRANT OF LEFT BREAST IN FEMALE, ESTROGEN RECEPTOR POSITIVE: Primary | Chronic | ICD-10-CM

## 2023-01-03 LAB
ALBUMIN SERPL-MCNC: 3.9 G/DL (ref 3.5–5.2)
ALBUMIN/GLOB SERPL: 1.2 G/DL
ALP SERPL-CCNC: 122 U/L (ref 39–117)
ALT SERPL W P-5'-P-CCNC: 32 U/L (ref 1–33)
ANION GAP SERPL CALCULATED.3IONS-SCNC: 12 MMOL/L (ref 5–15)
AST SERPL-CCNC: 29 U/L (ref 1–32)
BASOPHILS # BLD AUTO: 0.06 10*3/MM3 (ref 0–0.2)
BASOPHILS NFR BLD AUTO: 0.8 % (ref 0–1.5)
BILIRUB SERPL-MCNC: 0.5 MG/DL (ref 0–1.2)
BUN SERPL-MCNC: 6 MG/DL (ref 6–20)
BUN/CREAT SERPL: 8.8 (ref 7–25)
CALCIUM SPEC-SCNC: 9.2 MG/DL (ref 8.6–10.5)
CHLORIDE SERPL-SCNC: 101 MMOL/L (ref 98–107)
CO2 SERPL-SCNC: 26 MMOL/L (ref 22–29)
CREAT SERPL-MCNC: 0.68 MG/DL (ref 0.57–1)
DEPRECATED RDW RBC AUTO: 41.4 FL (ref 37–54)
EGFRCR SERPLBLD CKD-EPI 2021: 106.9 ML/MIN/1.73
EOSINOPHIL # BLD AUTO: 0.15 10*3/MM3 (ref 0–0.4)
EOSINOPHIL NFR BLD AUTO: 1.9 % (ref 0.3–6.2)
ERYTHROCYTE [DISTWIDTH] IN BLOOD BY AUTOMATED COUNT: 12.6 % (ref 12.3–15.4)
GLOBULIN UR ELPH-MCNC: 3.2 GM/DL
GLUCOSE SERPL-MCNC: 128 MG/DL (ref 65–99)
HCT VFR BLD AUTO: 43.3 % (ref 34–46.6)
HGB BLD-MCNC: 15 G/DL (ref 12–15.9)
HOLD SPECIMEN: NORMAL
IMM GRANULOCYTES # BLD AUTO: 0.1 10*3/MM3 (ref 0–0.05)
IMM GRANULOCYTES NFR BLD AUTO: 1.3 % (ref 0–0.5)
LYMPHOCYTES # BLD AUTO: 2.47 10*3/MM3 (ref 0.7–3.1)
LYMPHOCYTES NFR BLD AUTO: 32 % (ref 19.6–45.3)
MCH RBC QN AUTO: 31.3 PG (ref 26.6–33)
MCHC RBC AUTO-ENTMCNC: 34.6 G/DL (ref 31.5–35.7)
MCV RBC AUTO: 90.4 FL (ref 79–97)
MONOCYTES # BLD AUTO: 0.49 10*3/MM3 (ref 0.1–0.9)
MONOCYTES NFR BLD AUTO: 6.4 % (ref 5–12)
NEUTROPHILS NFR BLD AUTO: 4.44 10*3/MM3 (ref 1.7–7)
NEUTROPHILS NFR BLD AUTO: 57.6 % (ref 42.7–76)
NRBC BLD AUTO-RTO: 0 /100 WBC (ref 0–0.2)
PLATELET # BLD AUTO: 164 10*3/MM3 (ref 140–450)
PMV BLD AUTO: 10.6 FL (ref 6–12)
POTASSIUM SERPL-SCNC: 3.4 MMOL/L (ref 3.5–5.2)
PROT SERPL-MCNC: 7.1 G/DL (ref 6–8.5)
RBC # BLD AUTO: 4.79 10*6/MM3 (ref 3.77–5.28)
SODIUM SERPL-SCNC: 139 MMOL/L (ref 136–145)
WBC NRBC COR # BLD: 7.71 10*3/MM3 (ref 3.4–10.8)

## 2023-01-03 PROCEDURE — 1159F MED LIST DOCD IN RCRD: CPT | Performed by: INTERNAL MEDICINE

## 2023-01-03 PROCEDURE — 1160F RVW MEDS BY RX/DR IN RCRD: CPT | Performed by: INTERNAL MEDICINE

## 2023-01-03 PROCEDURE — 25010000002 LEUPROLIDE ACETATE PER 7.5 MG: Performed by: INTERNAL MEDICINE

## 2023-01-03 PROCEDURE — 80053 COMPREHEN METABOLIC PANEL: CPT

## 2023-01-03 PROCEDURE — 1125F AMNT PAIN NOTED PAIN PRSNT: CPT | Performed by: INTERNAL MEDICINE

## 2023-01-03 PROCEDURE — 36415 COLL VENOUS BLD VENIPUNCTURE: CPT | Performed by: INTERNAL MEDICINE

## 2023-01-03 PROCEDURE — 99214 OFFICE O/P EST MOD 30 MIN: CPT | Performed by: INTERNAL MEDICINE

## 2023-01-03 PROCEDURE — 96402 CHEMO HORMON ANTINEOPL SQ/IM: CPT | Performed by: INTERNAL MEDICINE

## 2023-01-03 PROCEDURE — 85025 COMPLETE CBC W/AUTO DIFF WBC: CPT

## 2023-01-03 RX ORDER — ATORVASTATIN CALCIUM 20 MG/1
TABLET, FILM COATED ORAL
COMMUNITY
Start: 2022-11-07

## 2023-01-03 RX ORDER — ANASTROZOLE 1 MG/1
1 TABLET ORAL DAILY
Qty: 90 TABLET | Refills: 0 | Status: SHIPPED | OUTPATIENT
Start: 2023-01-03 | End: 2023-04-05 | Stop reason: SDUPTHER

## 2023-01-03 RX ADMIN — LEUPROLIDE ACETATE 7.5 MG: KIT at 10:36

## 2023-01-03 NOTE — PROGRESS NOTES
Subjective     Corrina Jensen was seen in follow-up for breast cancer, pancytopenia.  No new health issues since last visit.   No new medications.   No new hospitalization.   Feels well.         Past Medical History, Past Surgical History, Social History, Family History have been reviewed and are without significant changes except as mentioned.        Medications:  The current medication list was reviewed in the EMR    ALLERGIES:    Allergies   Allergen Reactions   • Latex Itching and Other (See Comments)     Redness of skin   • Strawberry Cough   • Wound Dressing Adhesive Other (See Comments)     Steri Strips cause blistering of skin   • Bactroban [Mupirocin Calcium] Rash   • Neomycin-Bacitracin-Polymyxin [Bacitracin-Neomycin-Polymyxin] Rash   • Other Rash     All antibiotic creams       Objective      Vitals:    01/03/23 1010   BP: 122/56   Pulse: 83   Resp: 18   SpO2: 97%   Weight: 112 kg (247 lb)   PainSc:   5     Current Status 11/7/2022   ECOG score 0       Physical Exam  Vitals and nursing note reviewed.   Constitutional:       Appearance: Normal appearance.   Neurological:      General: No focal deficit present.      Mental Status: She is alert and oriented to person, place, and time. Mental status is at baseline.   Psychiatric:         Mood and Affect: Mood normal.         Behavior: Behavior normal.         Thought Content: Thought content normal.           RECENT LABS: Independently reviewed and summarized  Hematology WBC   Date Value Ref Range Status   01/03/2023 7.71 3.40 - 10.80 10*3/mm3 Final     RBC   Date Value Ref Range Status   01/03/2023 4.79 3.77 - 5.28 10*6/mm3 Final     Hemoglobin   Date Value Ref Range Status   01/03/2023 15.0 12.0 - 15.9 g/dL Final     Hematocrit   Date Value Ref Range Status   01/03/2023 43.3 34.0 - 46.6 % Final     Platelets   Date Value Ref Range Status   01/03/2023 164 140 - 450 10*3/mm3 Final     Lab Results   Component Value Date    GLUCOSE 128 (H) 01/03/2023     BUN 6 01/03/2023    CREATININE 0.68 01/03/2023    EGFRIFNONA 89 01/31/2022    BCR 8.8 01/03/2023    K 3.4 (L) 01/03/2023    CO2 26.0 01/03/2023    CALCIUM 9.2 01/03/2023    ALBUMIN 3.9 01/03/2023    AST 29 01/03/2023    ALT 32 01/03/2023          Corrina Jensen reports a pain score of 5.  Given her pain assessment as noted, treatment options were discussed and the following options were decided upon as a follow-up plan to address the patient's pain: referral to Primary Care for assistance in pain treatment guidance.    Patient screened negative for depression based on a PHQ-9 score of 1 on 1/3/2023.     Diagnosis:   (1) Left breast cancer   Stage IA (pT1a, pN0(sn),cM0)   ER 95% [place] 95% HER IHC negative      (2) Pancytopenia   (3) Osteopenia     Assessment & Plan     (1) Left breast cancer     Chronic, stable.  Treated with lumpectomy and radiation.  She remains on adjuvant hormonal therapy with ovarian suppression with Lupron every month and Arimidex.  No clinical evidence of recurrence.  Lupron injection given in the clinic today.  New prescription of Arimidex sent to the pharmacy.  Continue Lupron every month.  I will plan to see her back in 3 months with CBC, CMP.    (2) Pancytopenia     Chronic, stable.  This is improved.  Likely secondary to fatty liver disease as well as splenomegaly.  Continue B12 and folic acid supplements.  CBC, CMP in 3 months.    (3) Osteopenia     Chronic, stable.  Mild osteopenia involving lumbar spine.  Continue to monitor.  Recheck DEXA scan in a year.  Continue calcium and vitamin D supplements.      1/3/2023      CC:           72 yo female with PMHx COPD on home oxygen, presents for SOB, cough for several days    # COPD Exacerbation   -d/c rocephin  -c/w solumedrol  -c/w nebs    HTN  -controlled    # DM  -monitor fingersticks  -uncontrolled  -increase insulin    CAD s/p CABG  -c/w aspirin    MARTIN on CKD   - Baseline creatinine unknown.    - NS @ 50 cc/hr    Hyperkalemia  -insulin  -continue to monitor    Elevated ALP  -could be related to CKD  -check ggt    DVT px  From [facility]  OOB to chair

## 2023-01-31 ENCOUNTER — TELEMEDICINE (OUTPATIENT)
Dept: ENDOCRINOLOGY | Facility: CLINIC | Age: 48
End: 2023-01-31
Payer: MEDICAID

## 2023-01-31 DIAGNOSIS — I10 PRIMARY HYPERTENSION: ICD-10-CM

## 2023-01-31 DIAGNOSIS — Z79.4 TYPE 2 DIABETES MELLITUS WITH HYPERGLYCEMIA, WITH LONG-TERM CURRENT USE OF INSULIN: Primary | ICD-10-CM

## 2023-01-31 DIAGNOSIS — E11.42 DIABETIC POLYNEUROPATHY ASSOCIATED WITH TYPE 2 DIABETES MELLITUS: ICD-10-CM

## 2023-01-31 DIAGNOSIS — F17.200 SMOKING: ICD-10-CM

## 2023-01-31 DIAGNOSIS — E11.65 TYPE 2 DIABETES MELLITUS WITH HYPERGLYCEMIA, WITH LONG-TERM CURRENT USE OF INSULIN: Primary | ICD-10-CM

## 2023-01-31 DIAGNOSIS — E55.9 VITAMIN D DEFICIENCY: ICD-10-CM

## 2023-01-31 PROCEDURE — 99214 OFFICE O/P EST MOD 30 MIN: CPT | Performed by: NURSE PRACTITIONER

## 2023-01-31 RX ORDER — PEN NEEDLE, DIABETIC 30 GX3/16"
1 NEEDLE, DISPOSABLE MISCELLANEOUS 4 TIMES DAILY
Qty: 120 EACH | Refills: 11 | Status: SHIPPED | OUTPATIENT
Start: 2023-01-31

## 2023-01-31 RX ORDER — PROCHLORPERAZINE 25 MG/1
1 SUPPOSITORY RECTAL AS NEEDED
Qty: 9 EACH | Refills: 3 | Status: SHIPPED | OUTPATIENT
Start: 2023-01-31

## 2023-01-31 RX ORDER — PROCHLORPERAZINE 25 MG/1
1 SUPPOSITORY RECTAL
Qty: 1 EACH | Refills: 3 | Status: SHIPPED | OUTPATIENT
Start: 2023-01-31

## 2023-01-31 NOTE — PROGRESS NOTES
Chief Complaint  Diabetes    Subjective          Corrina Eve Jensen presents to [facility] ENDOCRINOLOGY  History of Present Illness         You have chosen to receive care through a telehealth visit.  Do you consent to use a video/audio connection for your medical care today? Yes            TELEHEALTH VIDEO VISIT     This a video visit due to [facility] current guidelines for social distancing due to the COVID 19 pandemic        Mode of Visit: Video  Location of patient: home  You have chosen to receive care through a telehealth visit.  Does the patient consent to use a video/audio connection for your medical care today? Yes  The visit included audio and video interaction. No technical issues occurred during this visit.         47 year old female presents for follow up       Reason -- diabetes mellitus type 2        Duration--diagnosed 5 years ago      Context --presented with symptoms      Timing constant      Quality  Not controlled      Severity moderate      Macrovascular complications--none      Microvascular complications --neuropathy, no DR, no renal disease         Current diabetes regimen      Insulin            Current glucose monitoring      Fingerstick's     Checks 4 times daily      Has dexcom -    Cannot download    She states at goal       Review of Systems - General ROS: negative                      Objective   Vital Signs:   There were no vitals taken for this visit.    Physical Exam  Neurological:      General: No focal deficit present.      Mental Status: She is alert.   Psychiatric:         Mood and Affect: Mood normal.         Thought Content: Thought content normal.        Result Review :   The following data was reviewed by: LOGAN Cabrera on 10/18/2022:  Common labs    Common Labs 7/18/22 7/18/22 10/10/22 10/10/22 1/3/23 1/3/23    1213 1213 1325 1325 0958 0958   Glucose  197 (A)  128 (A)  128 (A)   BUN  5 (A)  7  6   Creatinine  0.62  0.77  0.68   Sodium   139  138  139   Potassium  4.3  3.6  3.4 (A)   Chloride  102  102  101   Calcium  9.4  9.2  9.2   Albumin  4.10  4.40  3.9   Total Bilirubin  0.3  0.5  0.5   Alkaline Phosphatase  158 (A)  127 (A)  122 (A)   AST (SGOT)  48 (A)  27  29   ALT (SGPT)  39 (A)  29  32   WBC 5.96  7.53  7.71    Hemoglobin 14.3  15.6  15.0    Hematocrit 42.8  46.2  43.3    Platelets 141  133 (A)  164    (A) Abnormal value                        Assessment and Plan    Diagnoses and all orders for this visit:    1. Type 2 diabetes mellitus with hyperglycemia, with long-term current use of insulin (HCC) (Primary)    2. Diabetic polyneuropathy associated with type 2 diabetes mellitus (HCC)    3. Primary hypertension    4. Vitamin D deficiency    5. Smoking    Other orders  -     Continuous Blood Gluc Transmit (Dexcom G6 Transmitter) misc; 1 each Every 3 (Three) Months.  Dispense: 1 each; Refill: 3  -     Continuous Blood Gluc Sensor (Dexcom G6 Sensor); 1 each As Needed (glucose control). Every 10 days  Dispense: 9 each; Refill: 3  -     Insulin Pen Needle (Pen Needles) 32G X 4 MM misc; 1 each 4 (Four) Times a Day. Use 4 x daily, Dx code E11.65  Dispense: 120 each; Refill: 11           Glycemic Management:        Diabetes mellitus type 2         Lab Results   Component Value Date    HGBA1C 8.0 (H) 06/13/2022        using dexcom G6---- could not download              Taking Levemir 20 units BID      Taking novolog only using if high carb meals              Taking  Ozempic 0.5 mg once weekly      Side effects include nausea,      If vomiting or abdominal pain stop medication       Call in zofran for nausea         Taking  jardiance 10 mg one daily          Goals for sugar     Fasting and before meals 80 to 130     2 hours after meals 180 or less        Aim for 45 grams of carbohydrate per meal     Aim for 15 grams of carbohydrate per snack     No changes most at goal     occasional high with high carb meals            Microvascular Complications  Monitoring         Last eye exam---- May 2022, no DR      Neuropathy --yes      Taking Lyrica 75 mg po bid            Lipid Management:         Not on statin      Blood Pressure Management:        Taking norvasc 5 mg one daily      Taking catapres 0.1 mg tid            Thyroid Health           Lab Results   Component Value Date     TSH 1.66 03/07/2022            Bone Health            Taking vitamin d def.                   Preventive Care:      Smoker        Corrina Jensen  reports that she has been smoking cigarettes. She has been smoking an average of 1 pack per day. She has never used smokeless tobacco.. I have educated her on the risk of diseases from using tobacco products such as cancer and COPD.     I advised her to quit and she is not willing to quit.    I spent 3  minutes counseling the patient.                                  Follow Up   No follow-ups on file.  Patient was given instructions and counseling regarding her condition or for health maintenance advice. Please see specific information pulled into the AVS if appropriate.         This document has been electronically signed by LOGAN Cabrera on January 31, 2023 13:15 CST.

## 2023-02-01 ENCOUNTER — TELEPHONE (OUTPATIENT)
Dept: ENDOCRINOLOGY | Facility: CLINIC | Age: 48
End: 2023-02-01
Payer: MEDICAID

## 2023-02-07 ENCOUNTER — INFUSION (OUTPATIENT)
Dept: ONCOLOGY | Facility: HOSPITAL | Age: 48
End: 2023-02-07
Payer: MEDICAID

## 2023-02-07 VITALS
TEMPERATURE: 97.1 F | SYSTOLIC BLOOD PRESSURE: 122 MMHG | HEART RATE: 77 BPM | RESPIRATION RATE: 18 BRPM | DIASTOLIC BLOOD PRESSURE: 72 MMHG

## 2023-02-07 DIAGNOSIS — Z17.0 MALIGNANT NEOPLASM OF UPPER-OUTER QUADRANT OF LEFT BREAST IN FEMALE, ESTROGEN RECEPTOR POSITIVE: Primary | ICD-10-CM

## 2023-02-07 DIAGNOSIS — C50.412 MALIGNANT NEOPLASM OF UPPER-OUTER QUADRANT OF LEFT BREAST IN FEMALE, ESTROGEN RECEPTOR POSITIVE: Primary | ICD-10-CM

## 2023-02-07 PROCEDURE — 96402 CHEMO HORMON ANTINEOPL SQ/IM: CPT | Performed by: INTERNAL MEDICINE

## 2023-02-07 PROCEDURE — 25010000002 LEUPROLIDE ACETATE PER 7.5 MG: Performed by: INTERNAL MEDICINE

## 2023-02-07 RX ADMIN — LEUPROLIDE ACETATE 7.5 MG: KIT at 10:13

## 2023-02-20 RX ORDER — FERROUS SULFATE 325(65) MG
TABLET ORAL
Qty: 30 TABLET | Refills: 3 | Status: SHIPPED | OUTPATIENT
Start: 2023-02-20

## 2023-02-20 NOTE — TELEPHONE ENCOUNTER
Rx Refill Note  Requested Prescriptions     Pending Prescriptions Disp Refills   • FeroSul 325 (65 Fe) MG tablet [Pharmacy Med Name: FERROUS SULFATE 325MG (5GR) TABS] 30 tablet 3     Sig: TAKE 1 TABLET BY MOUTH EVERY DAY WITH BREAKFAST      Last office visit with prescribing clinician: 1/3/2023   Last telemedicine visit with prescribing clinician: 4/4/2023   Next office visit with prescribing clinician: 4/4/2023                         Would you like a call back once the refill request has been completed: [] Yes [] No    If the office needs to give you a call back, can they leave a voicemail: [] Yes [] No    Dayanna Orellana, Cori Rep  02/20/23, 08:08 CST

## 2023-03-07 ENCOUNTER — INFUSION (OUTPATIENT)
Dept: ONCOLOGY | Facility: HOSPITAL | Age: 48
End: 2023-03-07
Payer: MEDICAID

## 2023-03-07 VITALS
RESPIRATION RATE: 20 BRPM | DIASTOLIC BLOOD PRESSURE: 63 MMHG | TEMPERATURE: 97.3 F | HEART RATE: 74 BPM | SYSTOLIC BLOOD PRESSURE: 130 MMHG

## 2023-03-07 DIAGNOSIS — C50.412 MALIGNANT NEOPLASM OF UPPER-OUTER QUADRANT OF LEFT BREAST IN FEMALE, ESTROGEN RECEPTOR POSITIVE: Primary | ICD-10-CM

## 2023-03-07 DIAGNOSIS — Z17.0 MALIGNANT NEOPLASM OF UPPER-OUTER QUADRANT OF LEFT BREAST IN FEMALE, ESTROGEN RECEPTOR POSITIVE: Primary | ICD-10-CM

## 2023-03-07 PROCEDURE — 96402 CHEMO HORMON ANTINEOPL SQ/IM: CPT | Performed by: INTERNAL MEDICINE

## 2023-03-07 PROCEDURE — 25010000002 LEUPROLIDE ACETATE PER 7.5 MG: Performed by: INTERNAL MEDICINE

## 2023-03-07 RX ADMIN — LEUPROLIDE ACETATE 7.5 MG: KIT at 10:04

## 2023-04-04 NOTE — PROGRESS NOTES
Subjective     Corrina Jensen was seen in follow-up for breast cancer.  She is overall stable.  No new health issues.  No new hospitalizations.  Taking Arimidex.    Past Medical History, Past Surgical History, Social History, Family History have been reviewed and are without significant changes except as mentioned.      Medications:  The current medication list was reviewed in the EMR    ALLERGIES:    Allergies   Allergen Reactions   • Latex Itching and Other (See Comments)     Redness of skin   • Strawberry Cough   • Wound Dressing Adhesive Other (See Comments)     Steri Strips cause blistering of skin   • Bactroban [Mupirocin Calcium] Rash   • Neomycin-Bacitracin-Polymyxin [Bacitracin-Neomycin-Polymyxin] Rash   • Other Rash     All antibiotic creams       Objective      Vitals:    04/05/23 1430   BP: 120/80   Pulse: 88   Resp: 20   Temp: 97.5 °F (36.4 °C)   SpO2: 92%         Current Status 3/7/2023   ECOG score 0       Physical Exam  Vitals and nursing note reviewed.   Constitutional:       Appearance: Normal appearance.   Neurological:      General: No focal deficit present.      Mental Status: She is alert and oriented to person, place, and time. Mental status is at baseline.   Psychiatric:         Mood and Affect: Mood normal.         Behavior: Behavior normal.         Thought Content: Thought content normal.               RECENT LABS:Independently reviewed and summarized  Hematology WBC   Date Value Ref Range Status   01/03/2023 7.71 3.40 - 10.80 10*3/mm3 Final     RBC   Date Value Ref Range Status   01/03/2023 4.79 3.77 - 5.28 10*6/mm3 Final     Hemoglobin   Date Value Ref Range Status   01/03/2023 15.0 12.0 - 15.9 g/dL Final     Hematocrit   Date Value Ref Range Status   01/03/2023 43.3 34.0 - 46.6 % Final     Platelets   Date Value Ref Range Status   01/03/2023 164 140 - 450 10*3/mm3 Final     WBC   Date Value Ref Range Status   04/05/2023 8.02 3.40 - 10.80 10*3/mm3 Final     RBC   Date Value Ref  Range Status   04/05/2023 5.58 (H) 3.77 - 5.28 10*6/mm3 Final     Hemoglobin   Date Value Ref Range Status   04/05/2023 16.9 (H) 12.0 - 15.9 g/dL Final     Hematocrit   Date Value Ref Range Status   04/05/2023 50.6 (H) 34.0 - 46.6 % Final     MCV   Date Value Ref Range Status   04/05/2023 90.7 79.0 - 97.0 fL Final     MCH   Date Value Ref Range Status   04/05/2023 30.3 26.6 - 33.0 pg Final     MCHC   Date Value Ref Range Status   04/05/2023 33.4 31.5 - 35.7 g/dL Final     RDW   Date Value Ref Range Status   04/05/2023 12.6 12.3 - 15.4 % Final     RDW-SD   Date Value Ref Range Status   04/05/2023 41.6 37.0 - 54.0 fl Final     MPV   Date Value Ref Range Status   04/05/2023 10.3 6.0 - 12.0 fL Final     Platelets   Date Value Ref Range Status   04/05/2023 154 140 - 450 10*3/mm3 Final     Neutrophil %   Date Value Ref Range Status   04/05/2023 59.0 42.7 - 76.0 % Final     Lymphocyte %   Date Value Ref Range Status   04/05/2023 31.8 19.6 - 45.3 % Final     Monocyte %   Date Value Ref Range Status   04/05/2023 7.0 5.0 - 12.0 % Final     Eosinophil %   Date Value Ref Range Status   04/05/2023 1.6 0.3 - 6.2 % Final     Basophil %   Date Value Ref Range Status   04/05/2023 0.5 0.0 - 1.5 % Final     Immature Grans %   Date Value Ref Range Status   04/05/2023 0.1 0.0 - 0.5 % Final     Neutrophils, Absolute   Date Value Ref Range Status   04/05/2023 4.73 1.70 - 7.00 10*3/mm3 Final     Lymphocytes, Absolute   Date Value Ref Range Status   04/05/2023 2.55 0.70 - 3.10 10*3/mm3 Final     Monocytes, Absolute   Date Value Ref Range Status   04/05/2023 0.56 0.10 - 0.90 10*3/mm3 Final     Eosinophils, Absolute   Date Value Ref Range Status   04/05/2023 0.13 0.00 - 0.40 10*3/mm3 Final     Basophils, Absolute   Date Value Ref Range Status   04/05/2023 0.04 0.00 - 0.20 10*3/mm3 Final     Immature Grans, Absolute   Date Value Ref Range Status   04/05/2023 0.01 0.00 - 0.05 10*3/mm3 Final     nRBC   Date Value Ref Range Status   04/05/2023  0.0 0.0 - 0.2 /100 WBC Final     Lab Results   Component Value Date    GLUCOSE 119 (H) 04/05/2023    BUN 6 04/05/2023    CREATININE 0.73 04/05/2023    EGFR 101.0 04/05/2023    BCR 8.2 04/05/2023    K 3.5 04/05/2023    CO2 24.0 04/05/2023    CALCIUM 9.8 04/05/2023    ALBUMIN 4.1 04/05/2023    BILITOT 0.7 04/05/2023    AST 39 (H) 04/05/2023    ALT 34 (H) 04/05/2023                Corrina Jensen reports a pain score of 8.  Given her pain assessment as noted, treatment options were discussed and the following options were decided upon as a follow-up plan to address the patient's pain: referral to Primary Care for assistance in pain treatment guidance.    Patient screened negative for depression based on a PHQ-9 score of 1 on 4/5/2023.       Diagnosis:   (1) Left breast cancer   Stage IA (pT1a, pN0(sn),cM0)   ER 95% IN 95% HER IHC negative      (2) Pancytopenia   (3) Osteopenia     Assessment & Plan     (1) Left breast cancer     Chronic, stable  Treated with lumpectomy and radiation.  She is currently on adjuvant hormonal therapy with ovarian suppression and Lupron every month and Arimidex.    Continue Lupron every month.  New prescription of Arimidex sent to the pharmacy.  I will arrange for bilateral screening mammogram.  I will plan to see her back in 3 months with CBC, CMP.    She is due for mammogram next month.  We will follow-up on the results.    (2) Pancytopenia     Chronic, stable.  This has resolved.  Likely from fatty liver disease and splenomegaly.  Continue to monitor.  CBC, CMP in 3 months.    (3) Osteopenia     Chronic, stable.  Mild osteopenia involving lumbar spine.  Continue to monitor.  Recheck DEXA scan later this year.  Continue calcium and vitamin D supplements.    4/4/2023      CC:

## 2023-04-05 ENCOUNTER — LAB (OUTPATIENT)
Dept: ONCOLOGY | Facility: HOSPITAL | Age: 48
End: 2023-04-05
Payer: MEDICAID

## 2023-04-05 ENCOUNTER — OFFICE VISIT (OUTPATIENT)
Dept: ONCOLOGY | Facility: CLINIC | Age: 48
End: 2023-04-05
Payer: MEDICAID

## 2023-04-05 ENCOUNTER — INFUSION (OUTPATIENT)
Dept: ONCOLOGY | Facility: HOSPITAL | Age: 48
End: 2023-04-05
Payer: MEDICAID

## 2023-04-05 VITALS
WEIGHT: 237.4 LBS | OXYGEN SATURATION: 92 % | RESPIRATION RATE: 20 BRPM | TEMPERATURE: 97.5 F | SYSTOLIC BLOOD PRESSURE: 120 MMHG | HEART RATE: 88 BPM | DIASTOLIC BLOOD PRESSURE: 80 MMHG | BODY MASS INDEX: 38.32 KG/M2

## 2023-04-05 DIAGNOSIS — Z17.0 MALIGNANT NEOPLASM OF UPPER-OUTER QUADRANT OF LEFT BREAST IN FEMALE, ESTROGEN RECEPTOR POSITIVE: Primary | ICD-10-CM

## 2023-04-05 DIAGNOSIS — Z17.0 MALIGNANT NEOPLASM OF UPPER-OUTER QUADRANT OF LEFT BREAST IN FEMALE, ESTROGEN RECEPTOR POSITIVE: ICD-10-CM

## 2023-04-05 DIAGNOSIS — Z17.0 MALIGNANT NEOPLASM OF UPPER-OUTER QUADRANT OF LEFT BREAST IN FEMALE, ESTROGEN RECEPTOR POSITIVE: Primary | Chronic | ICD-10-CM

## 2023-04-05 DIAGNOSIS — M85.88 OSTEOPENIA OF LUMBAR SPINE: ICD-10-CM

## 2023-04-05 DIAGNOSIS — C50.412 MALIGNANT NEOPLASM OF UPPER-OUTER QUADRANT OF LEFT BREAST IN FEMALE, ESTROGEN RECEPTOR POSITIVE: ICD-10-CM

## 2023-04-05 DIAGNOSIS — C50.412 MALIGNANT NEOPLASM OF UPPER-OUTER QUADRANT OF LEFT BREAST IN FEMALE, ESTROGEN RECEPTOR POSITIVE: Primary | Chronic | ICD-10-CM

## 2023-04-05 DIAGNOSIS — C50.412 MALIGNANT NEOPLASM OF UPPER-OUTER QUADRANT OF LEFT BREAST IN FEMALE, ESTROGEN RECEPTOR POSITIVE: Primary | ICD-10-CM

## 2023-04-05 LAB
ALBUMIN SERPL-MCNC: 4.1 G/DL (ref 3.5–5.2)
ALBUMIN/GLOB SERPL: 1.2 G/DL
ALP SERPL-CCNC: 135 U/L (ref 39–117)
ALT SERPL W P-5'-P-CCNC: 34 U/L (ref 1–33)
ANION GAP SERPL CALCULATED.3IONS-SCNC: 12 MMOL/L (ref 5–15)
AST SERPL-CCNC: 39 U/L (ref 1–32)
BASOPHILS # BLD AUTO: 0.04 10*3/MM3 (ref 0–0.2)
BASOPHILS NFR BLD AUTO: 0.5 % (ref 0–1.5)
BILIRUB SERPL-MCNC: 0.7 MG/DL (ref 0–1.2)
BUN SERPL-MCNC: 6 MG/DL (ref 6–20)
BUN/CREAT SERPL: 8.2 (ref 7–25)
CALCIUM SPEC-SCNC: 9.8 MG/DL (ref 8.6–10.5)
CHLORIDE SERPL-SCNC: 101 MMOL/L (ref 98–107)
CO2 SERPL-SCNC: 24 MMOL/L (ref 22–29)
CREAT SERPL-MCNC: 0.73 MG/DL (ref 0.57–1)
DEPRECATED RDW RBC AUTO: 41.6 FL (ref 37–54)
EGFRCR SERPLBLD CKD-EPI 2021: 101 ML/MIN/1.73
EOSINOPHIL # BLD AUTO: 0.13 10*3/MM3 (ref 0–0.4)
EOSINOPHIL NFR BLD AUTO: 1.6 % (ref 0.3–6.2)
ERYTHROCYTE [DISTWIDTH] IN BLOOD BY AUTOMATED COUNT: 12.6 % (ref 12.3–15.4)
GLOBULIN UR ELPH-MCNC: 3.3 GM/DL
GLUCOSE SERPL-MCNC: 119 MG/DL (ref 65–99)
HCT VFR BLD AUTO: 50.6 % (ref 34–46.6)
HGB BLD-MCNC: 16.9 G/DL (ref 12–15.9)
IMM GRANULOCYTES # BLD AUTO: 0.01 10*3/MM3 (ref 0–0.05)
IMM GRANULOCYTES NFR BLD AUTO: 0.1 % (ref 0–0.5)
LYMPHOCYTES # BLD AUTO: 2.55 10*3/MM3 (ref 0.7–3.1)
LYMPHOCYTES NFR BLD AUTO: 31.8 % (ref 19.6–45.3)
MCH RBC QN AUTO: 30.3 PG (ref 26.6–33)
MCHC RBC AUTO-ENTMCNC: 33.4 G/DL (ref 31.5–35.7)
MCV RBC AUTO: 90.7 FL (ref 79–97)
MONOCYTES # BLD AUTO: 0.56 10*3/MM3 (ref 0.1–0.9)
MONOCYTES NFR BLD AUTO: 7 % (ref 5–12)
NEUTROPHILS NFR BLD AUTO: 4.73 10*3/MM3 (ref 1.7–7)
NEUTROPHILS NFR BLD AUTO: 59 % (ref 42.7–76)
NRBC BLD AUTO-RTO: 0 /100 WBC (ref 0–0.2)
PLATELET # BLD AUTO: 154 10*3/MM3 (ref 140–450)
PMV BLD AUTO: 10.3 FL (ref 6–12)
POTASSIUM SERPL-SCNC: 3.5 MMOL/L (ref 3.5–5.2)
PROT SERPL-MCNC: 7.4 G/DL (ref 6–8.5)
RBC # BLD AUTO: 5.58 10*6/MM3 (ref 3.77–5.28)
SODIUM SERPL-SCNC: 137 MMOL/L (ref 136–145)
WBC NRBC COR # BLD: 8.02 10*3/MM3 (ref 3.4–10.8)

## 2023-04-05 PROCEDURE — 25010000002 LEUPROLIDE ACETATE PER 7.5 MG: Performed by: INTERNAL MEDICINE

## 2023-04-05 PROCEDURE — 3074F SYST BP LT 130 MM HG: CPT | Performed by: INTERNAL MEDICINE

## 2023-04-05 PROCEDURE — 80053 COMPREHEN METABOLIC PANEL: CPT | Performed by: INTERNAL MEDICINE

## 2023-04-05 PROCEDURE — 1159F MED LIST DOCD IN RCRD: CPT | Performed by: INTERNAL MEDICINE

## 2023-04-05 PROCEDURE — 99214 OFFICE O/P EST MOD 30 MIN: CPT | Performed by: INTERNAL MEDICINE

## 2023-04-05 PROCEDURE — 85025 COMPLETE CBC W/AUTO DIFF WBC: CPT | Performed by: INTERNAL MEDICINE

## 2023-04-05 PROCEDURE — 96402 CHEMO HORMON ANTINEOPL SQ/IM: CPT | Performed by: INTERNAL MEDICINE

## 2023-04-05 PROCEDURE — 1125F AMNT PAIN NOTED PAIN PRSNT: CPT | Performed by: INTERNAL MEDICINE

## 2023-04-05 PROCEDURE — 36415 COLL VENOUS BLD VENIPUNCTURE: CPT | Performed by: INTERNAL MEDICINE

## 2023-04-05 PROCEDURE — 3079F DIAST BP 80-89 MM HG: CPT | Performed by: INTERNAL MEDICINE

## 2023-04-05 RX ORDER — ANASTROZOLE 1 MG/1
1 TABLET ORAL DAILY
Qty: 90 TABLET | Refills: 0 | Status: SHIPPED | OUTPATIENT
Start: 2023-04-05

## 2023-04-05 RX ADMIN — LEUPROLIDE ACETATE 7.5 MG: KIT at 14:53

## 2023-05-03 ENCOUNTER — INFUSION (OUTPATIENT)
Dept: ONCOLOGY | Facility: HOSPITAL | Age: 48
End: 2023-05-03
Payer: MEDICAID

## 2023-05-03 VITALS
SYSTOLIC BLOOD PRESSURE: 129 MMHG | RESPIRATION RATE: 18 BRPM | HEART RATE: 79 BPM | TEMPERATURE: 97.9 F | DIASTOLIC BLOOD PRESSURE: 61 MMHG

## 2023-05-03 DIAGNOSIS — Z17.0 MALIGNANT NEOPLASM OF UPPER-OUTER QUADRANT OF LEFT BREAST IN FEMALE, ESTROGEN RECEPTOR POSITIVE: Primary | ICD-10-CM

## 2023-05-03 DIAGNOSIS — C50.412 MALIGNANT NEOPLASM OF UPPER-OUTER QUADRANT OF LEFT BREAST IN FEMALE, ESTROGEN RECEPTOR POSITIVE: Primary | ICD-10-CM

## 2023-05-12 ENCOUNTER — TELEMEDICINE (OUTPATIENT)
Dept: ENDOCRINOLOGY | Facility: CLINIC | Age: 48
End: 2023-05-12
Payer: MEDICAID

## 2023-05-12 DIAGNOSIS — E11.42 DIABETIC POLYNEUROPATHY ASSOCIATED WITH TYPE 2 DIABETES MELLITUS: ICD-10-CM

## 2023-05-12 DIAGNOSIS — F17.200 SMOKER: ICD-10-CM

## 2023-05-12 DIAGNOSIS — E11.65 TYPE 2 DIABETES MELLITUS WITH HYPERGLYCEMIA, WITH LONG-TERM CURRENT USE OF INSULIN: Primary | ICD-10-CM

## 2023-05-12 DIAGNOSIS — Z79.4 TYPE 2 DIABETES MELLITUS WITH HYPERGLYCEMIA, WITH LONG-TERM CURRENT USE OF INSULIN: Primary | ICD-10-CM

## 2023-05-12 DIAGNOSIS — I10 PRIMARY HYPERTENSION: ICD-10-CM

## 2023-05-12 NOTE — PROGRESS NOTES
Chief Complaint  Diabetes     Subjective          Corrina Eve Jensen presents to [facility] ENDOCRINOLOGY  History of Present Illness         You have chosen to receive care through a telehealth visit.  Do you consent to use a video/audio connection for your medical care today? Yes            TELEHEALTH VIDEO VISIT     This a video visit due to [facility] current guidelines for social distancing due to the COVID 19 pandemic        Mode of Visit: Video  Location of patient: home  You have chosen to receive care through a telehealth visit.  Does the patient consent to use a video/audio connection for your medical care today? Yes  The visit included audio and video interaction. No technical issues occurred during this visit.         47 year old female presents for follow-up    Diabetes mellitus type 2    Diagnosed in 2018    Timing constant    Quality improved    Severity is moderate    Complications neuropathy      Current diabetes regimen    GLP-1, insulin by injections           Glucose monitoring    Checks 6 times daily    Uses a Dexcom G6 but we were unable to download      Review of Systems - General ROS: negative                        Objective   Vital Signs:   There were no vitals taken for this visit.    Physical Exam  Neurological:      General: No focal deficit present.      Mental Status: She is alert.   Psychiatric:         Mood and Affect: Mood normal.         Thought Content: Thought content normal.        Result Review :   The following data was reviewed by: LOGAN Cabrera on 10/18/2022:  Common labs        10/10/2022    13:25 1/3/2023    09:58 4/5/2023    14:18   Common Labs   Glucose 128   128   119     BUN 7   6   6     Creatinine 0.77   0.68   0.73     Sodium 138   139   137     Potassium 3.6   3.4   3.5     Chloride 102   101   101     Calcium 9.2   9.2   9.8     Albumin 4.40   3.9   4.1     Total Bilirubin 0.5   0.5   0.7     Alkaline Phosphatase 127   122   135      AST (SGOT) 27   29   39     ALT (SGPT) 29   32   34     WBC 7.53   7.71   8.02     Hemoglobin 15.6   15.0   16.9     Hematocrit 46.2   43.3   50.6     Platelets 133   164   154                   Assessment and Plan    Diagnoses and all orders for this visit:    1. Type 2 diabetes mellitus with hyperglycemia, with long-term current use of insulin (HCC) (Primary)  -     Hemoglobin A1c  -     TSH    2. Diabetic polyneuropathy associated with type 2 diabetes mellitus    3. Primary hypertension    4. Smoker           Glycemic Management:        Diabetes mellitus type 2              using dexcom G6---- could not download       She states at goal so no changes today     Taking Levemir 20 units BID      Taking novolog only using if high carb meals              Taking  Ozempic 0.5 mg once weekly             Call in zofran for nausea         Taking  jardiance 10 mg one daily          Goals for sugar     Fasting and before meals 80 to 130     2 hours after meals 180 or less        Aim for 45 grams of carbohydrate per meal     Aim for 15 grams of carbohydrate per snack                  Microvascular Complications Monitoring         Last eye exam---- May 2022, no DR      Neuropathy --yes      Taking Lyrica 75 mg po bid            Lipid Management:         Not on statin      Blood Pressure Management:        Taking norvasc 5 mg one daily      Taking catapres 0.1 mg tid            Thyroid Health           Lab Results   Component Value Date     TSH 1.66 03/07/2022            Bone Health            Taking vitamin d def.                   Preventive Care:      Smoker        Corrina Jensen  reports that she has been smoking cigarettes. She has been smoking an average of 1 pack per day. She has been exposed to tobacco smoke. She has never used smokeless tobacco.. I have educated her on the risk of diseases from using tobacco products such as cancer and COPD.     I advised her to quit and she is not willing to quit.    I spent  3  minutes counseling the patient.                                  Follow Up   No follow-ups on file.  Patient was given instructions and counseling regarding her condition or for health maintenance advice. Please see specific information pulled into the AVS if appropriate.         This document has been electronically signed by LOGAN Cabrera on May 12, 2023 09:40 CDT.

## 2023-05-31 ENCOUNTER — INFUSION (OUTPATIENT)
Dept: ONCOLOGY | Facility: HOSPITAL | Age: 48
End: 2023-05-31

## 2023-05-31 VITALS
SYSTOLIC BLOOD PRESSURE: 120 MMHG | DIASTOLIC BLOOD PRESSURE: 71 MMHG | RESPIRATION RATE: 18 BRPM | HEART RATE: 77 BPM | TEMPERATURE: 97.6 F

## 2023-05-31 DIAGNOSIS — C50.412 MALIGNANT NEOPLASM OF UPPER-OUTER QUADRANT OF LEFT BREAST IN FEMALE, ESTROGEN RECEPTOR POSITIVE: Primary | ICD-10-CM

## 2023-05-31 DIAGNOSIS — Z17.0 MALIGNANT NEOPLASM OF UPPER-OUTER QUADRANT OF LEFT BREAST IN FEMALE, ESTROGEN RECEPTOR POSITIVE: Primary | ICD-10-CM

## 2023-05-31 PROCEDURE — 25010000002 LEUPROLIDE ACETATE PER 7.5 MG: Performed by: INTERNAL MEDICINE

## 2023-05-31 RX ADMIN — LEUPROLIDE ACETATE 7.5 MG: KIT at 13:17

## 2023-06-16 ENCOUNTER — TELEPHONE (OUTPATIENT)
Dept: ENDOCRINOLOGY | Facility: CLINIC | Age: 48
End: 2023-06-16
Payer: MEDICAID

## 2023-07-10 LAB
HBA1C MFR BLD: 5.8 % (ref 4.8–5.6)
HOLD SPECIMEN: NORMAL
TSH SERPL DL<=0.05 MIU/L-ACNC: 2.19 UIU/ML (ref 0.27–4.2)

## 2023-08-07 ENCOUNTER — INFUSION (OUTPATIENT)
Dept: ONCOLOGY | Facility: HOSPITAL | Age: 48
End: 2023-08-07
Payer: MEDICAID

## 2023-08-07 VITALS — TEMPERATURE: 96.2 F | DIASTOLIC BLOOD PRESSURE: 63 MMHG | SYSTOLIC BLOOD PRESSURE: 133 MMHG | HEART RATE: 66 BPM

## 2023-08-07 DIAGNOSIS — Z17.0 MALIGNANT NEOPLASM OF UPPER-OUTER QUADRANT OF LEFT BREAST IN FEMALE, ESTROGEN RECEPTOR POSITIVE: Primary | ICD-10-CM

## 2023-08-07 DIAGNOSIS — C50.412 MALIGNANT NEOPLASM OF UPPER-OUTER QUADRANT OF LEFT BREAST IN FEMALE, ESTROGEN RECEPTOR POSITIVE: Primary | ICD-10-CM

## 2023-08-07 PROCEDURE — 25010000002 LEUPROLIDE ACETATE PER 7.5 MG: Performed by: INTERNAL MEDICINE

## 2023-08-07 PROCEDURE — 96402 CHEMO HORMON ANTINEOPL SQ/IM: CPT | Performed by: INTERNAL MEDICINE

## 2023-08-07 RX ADMIN — LEUPROLIDE ACETATE 7.5 MG: KIT at 09:28

## 2023-08-23 ENCOUNTER — TELEMEDICINE (OUTPATIENT)
Dept: ENDOCRINOLOGY | Facility: CLINIC | Age: 48
End: 2023-08-23
Payer: MEDICAID

## 2023-08-23 DIAGNOSIS — I10 PRIMARY HYPERTENSION: ICD-10-CM

## 2023-08-23 DIAGNOSIS — Z79.4 TYPE 2 DIABETES MELLITUS WITH DIABETIC POLYNEUROPATHY, WITH LONG-TERM CURRENT USE OF INSULIN: Primary | ICD-10-CM

## 2023-08-23 DIAGNOSIS — E11.42 TYPE 2 DIABETES MELLITUS WITH DIABETIC POLYNEUROPATHY, WITH LONG-TERM CURRENT USE OF INSULIN: Primary | ICD-10-CM

## 2023-08-23 DIAGNOSIS — F17.200 SMOKING: ICD-10-CM

## 2023-08-23 DIAGNOSIS — E11.42 DIABETIC POLYNEUROPATHY ASSOCIATED WITH TYPE 2 DIABETES MELLITUS: ICD-10-CM

## 2023-08-23 DIAGNOSIS — Z79.4 TYPE 2 DIABETES MELLITUS WITH HYPERGLYCEMIA, WITH LONG-TERM CURRENT USE OF INSULIN: ICD-10-CM

## 2023-08-23 DIAGNOSIS — E55.9 VITAMIN D DEFICIENCY: ICD-10-CM

## 2023-08-23 DIAGNOSIS — E11.65 TYPE 2 DIABETES MELLITUS WITH HYPERGLYCEMIA, WITH LONG-TERM CURRENT USE OF INSULIN: ICD-10-CM

## 2023-08-23 RX ORDER — SEMAGLUTIDE 0.68 MG/ML
0.5 INJECTION, SOLUTION SUBCUTANEOUS WEEKLY
Qty: 3 ML | Refills: 6 | Status: SHIPPED | OUTPATIENT
Start: 2023-08-23

## 2023-08-23 NOTE — PROGRESS NOTES
Chief Complaint  Diabetes     Subjective          Corrina Eve Jensen presents to [facility] ENDOCRINOLOGY  History of Present Illness       You have chosen to receive care through a telehealth visit.  Do you consent to use a video/audio connection for your medical care today? Yes            TELEHEALTH VIDEO VISIT     This a video visit due to [facility] current guidelines for social distancing due to the COVID 19 pandemic        Mode of Visit: Video  Location of patient: home  You have chosen to receive care through a telehealth visit.  Does the patient consent to use a video/audio connection for your medical care today? Yes  The visit included audio and video interaction. No technical issues occurred during this visit.           48 year old female presents for follow up     Diabetes mellitus type 2    Diagnosed in 2018     Timing constant     Quality great control       Diagnosed in 2018    Timing constant    Quality improved    Severity is moderate    Complications neuropathy      Current diabetes regimen    GLP-1, insulin by injections           Glucose monitoring    Checks 6 times daily    Uses a Dexcom G6 but we were unable to download      Occasional high with high carb meals       Review of Systems - General ROS: negative                        Objective   Vital Signs:   There were no vitals taken for this visit.    Physical Exam  Neurological:      General: No focal deficit present.      Mental Status: She is alert.   Psychiatric:         Mood and Affect: Mood normal.         Thought Content: Thought content normal.      Result Review :   The following data was reviewed by: LOGAN Cabrera on 10/18/2022:  Common labs          1/3/2023    09:58 4/5/2023    14:18 7/10/2023    08:53 7/10/2023    08:54   Common Labs   Glucose 128  119   169    BUN 6  6   6    Creatinine 0.68  0.73   0.74    Sodium 139  137   136    Potassium 3.4  3.5   3.7    Chloride 101  101   98    Calcium  9.2  9.8   9.5    Albumin 3.9  4.1   4.0    Total Bilirubin 0.5  0.7   0.4    Alkaline Phosphatase 122  135   142    AST (SGOT) 29  39   36    ALT (SGPT) 32  34   31    WBC 7.71  8.02   4.46    Hemoglobin 15.0  16.9   16.2    Hematocrit 43.3  50.6   47.3    Platelets 164  154   127    Hemoglobin A1C   5.80                 Assessment and Plan    Diagnoses and all orders for this visit:    1. Type 2 diabetes mellitus with diabetic polyneuropathy, with long-term current use of insulin (Primary)  -     CBC & Differential; Future  -     Comprehensive Metabolic Panel; Future  -     Hemoglobin A1c; Future  -     Lipid Panel; Future  -     Microalbumin / Creatinine Urine Ratio - Urine, Clean Catch; Future  -     TSH; Future  -     Vitamin B12; Future  -     Vitamin D,25-Hydroxy; Future    2. Primary hypertension  -     CBC & Differential; Future  -     Comprehensive Metabolic Panel; Future  -     Hemoglobin A1c; Future  -     Lipid Panel; Future  -     Microalbumin / Creatinine Urine Ratio - Urine, Clean Catch; Future  -     TSH; Future  -     Vitamin B12; Future  -     Vitamin D,25-Hydroxy; Future    3. Diabetic polyneuropathy associated with type 2 diabetes mellitus  -     CBC & Differential; Future  -     Comprehensive Metabolic Panel; Future  -     Hemoglobin A1c; Future  -     Lipid Panel; Future  -     Microalbumin / Creatinine Urine Ratio - Urine, Clean Catch; Future  -     TSH; Future  -     Vitamin B12; Future  -     Vitamin D,25-Hydroxy; Future    4. Vitamin D deficiency  -     CBC & Differential; Future  -     Comprehensive Metabolic Panel; Future  -     Hemoglobin A1c; Future  -     Lipid Panel; Future  -     Microalbumin / Creatinine Urine Ratio - Urine, Clean Catch; Future  -     TSH; Future  -     Vitamin B12; Future  -     Vitamin D,25-Hydroxy; Future    5. Smoking    6. Type 2 diabetes mellitus with hyperglycemia, with long-term current use of insulin  -     insulin detemir (LEVEMIR) 100 UNIT/ML injection;  Inject 20 Units under the skin into the appropriate area as directed 2 (Two) Times a Day.  Dispense: 15 mL; Refill: 2    Other orders  -     empagliflozin (Jardiance) 10 MG tablet tablet; Take 1 tablet by mouth Daily. Before bkfast  Dispense: 30 tablet; Refill: 11  -     Semaglutide,0.25 or 0.5MG/DOS, (Ozempic, 0.25 or 0.5 MG/DOSE,) 2 MG/3ML solution pen-injector; Inject 0.5 mg under the skin into the appropriate area as directed 1 (One) Time Per Week.  Dispense: 3 mL; Refill: 6           Glycemic Management:        Diabetes mellitus type 2         Lab Results   Component Value Date    HGBA1C 5.80 (H) 07/10/2023          using dexcom G6---- could not download       No changes        Taking Levemir 20 units BID      Taking novolog only using if high carb meals              Taking  Ozempic 0.5 mg once weekly             Call in zofran for nausea         Taking  jardiance 10 mg one daily          Goals for sugar     Fasting and before meals 80 to 130     2 hours after meals 180 or less        Aim for 45 grams of carbohydrate per meal     Aim for 15 grams of carbohydrate per snack                  Microvascular Complications Monitoring         Last eye exam---- May 2022, no DR      Neuropathy --yes      Taking Lyrica 75 mg po bid            Lipid Management:         Not on statin      Blood Pressure Management:        Taking norvasc 5 mg one daily      Taking catapres 0.1 mg tid            Thyroid Health           Lab Results   Component Value Date     TSH 1.66 03/07/2022            Bone Health            Taking vitamin d def.                   Preventive Care:      Smoker        Corrina Zuniganelson Jensen  reports that she has been smoking cigarettes. She has been smoking an average of 1 pack per day. She has been exposed to tobacco smoke. She has never used smokeless tobacco.. I have educated her on the risk of diseases from using tobacco products such as cancer and COPD.     I advised her to quit and she is not willing to  quit.    I spent 3  minutes counseling the patient.                                  Follow Up   No follow-ups on file.  Patient was given instructions and counseling regarding her condition or for health maintenance advice. Please see specific information pulled into the AVS if appropriate.         This document has been electronically signed by LOGAN Cabrera on August 23, 2023 10:36 CDT.

## 2023-09-05 ENCOUNTER — INFUSION (OUTPATIENT)
Dept: ONCOLOGY | Facility: HOSPITAL | Age: 48
End: 2023-09-05
Payer: MEDICAID

## 2023-09-05 VITALS — TEMPERATURE: 96.6 F | DIASTOLIC BLOOD PRESSURE: 59 MMHG | HEART RATE: 65 BPM | SYSTOLIC BLOOD PRESSURE: 113 MMHG

## 2023-09-05 DIAGNOSIS — Z17.0 MALIGNANT NEOPLASM OF UPPER-OUTER QUADRANT OF LEFT BREAST IN FEMALE, ESTROGEN RECEPTOR POSITIVE: Primary | ICD-10-CM

## 2023-09-05 DIAGNOSIS — C50.412 MALIGNANT NEOPLASM OF UPPER-OUTER QUADRANT OF LEFT BREAST IN FEMALE, ESTROGEN RECEPTOR POSITIVE: Primary | ICD-10-CM

## 2023-09-05 PROCEDURE — 96402 CHEMO HORMON ANTINEOPL SQ/IM: CPT | Performed by: INTERNAL MEDICINE

## 2023-09-05 PROCEDURE — 25010000002 LEUPROLIDE ACETATE PER 7.5 MG: Performed by: INTERNAL MEDICINE

## 2023-09-05 RX ADMIN — LEUPROLIDE ACETATE 7.5 MG: KIT at 10:13

## 2023-11-17 NOTE — ED PROVIDER NOTES
Subjective   Patient presents to emergency department for hyperglycemia.  States her blood glucose has been elevated in the 300's the past few days and is concerned.  She was diagnosed in February 2018 and is now on Metformin and Glyburide.  States she has a mild headache but denies any other symptoms. States her last A1c was 6.6.                   History provided by:  Patient   used: No    Hyperglycemia   Blood sugar level PTA:  300  Severity:  Moderate  Onset quality:  Sudden  Duration:  3 days  Timing:  Intermittent  Progression:  Unchanged  Chronicity:  New  Diabetes status:  Controlled with oral medications  Context: not change in medication    Associated symptoms: no abdominal pain, no altered mental status, no blurred vision, no chest pain, no confusion, no dehydration, no diaphoresis, no dizziness, no dysuria, no fatigue, no fever, no increased appetite, no increased thirst, no malaise, no nausea, no polyuria, no shortness of breath, no syncope, no vomiting, no weakness and no weight change    Risk factors: obesity    Risk factors: no hx of DKA        Review of Systems   Constitutional: Negative for diaphoresis, fatigue and fever.   HENT: Negative for trouble swallowing.    Eyes: Negative for blurred vision and visual disturbance.   Respiratory: Negative for shortness of breath.    Cardiovascular: Negative for chest pain and syncope.   Gastrointestinal: Negative for abdominal pain, nausea and vomiting.   Endocrine: Negative for polydipsia and polyuria.   Genitourinary: Negative for dysuria and flank pain.   Musculoskeletal: Negative for back pain.   Skin: Negative for color change.   Allergic/Immunologic: Negative for immunocompromised state.   Neurological: Positive for headaches. Negative for dizziness and weakness.   Hematological: Does not bruise/bleed easily.   Psychiatric/Behavioral: Negative for confusion.       Past Medical History:   Diagnosis Date   • Acute atopic  "conjunctivitis    • Acute bronchitis    • Allergic rhinitis due to pollen    • Breast lump    • Chest pain    • Chronic low back pain    • Contact dermatitis due to drugs and medicine    • Cyst of ovary    • Infestation by Sarcoptes scabiei annalisa hominis    • Low back pain    • Mastodynia    • Nausea and vomiting    • On long term drug therapy    • Pain in wrist    • Rash    • Skin disorder     breast-possible superficial cellulitis   • Spinal stenosis    • Tinea pedis    • Tobacco dependence syndrome    • Upper respiratory infection    • Vitamin D deficiency    • Wheezing        Allergies   Allergen Reactions   • Bactroban [Mupirocin Calcium]    • Neomycin-Bacitracin-Polymyxin [Bacitracin-Neomycin-Polymyxin] Rash   • Other Rash     All antibiotic creams       Past Surgical History:   Procedure Laterality Date   • BREAST SURGERY      excision breast lesion   • CHOLECYSTECTOMY     • INJECTION OF MEDICATION      Kenalog (4)       Family History   Problem Relation Age of Onset   • Coronary artery disease Other        Social History     Social History   • Marital status:      Social History Main Topics   • Smoking status: Former Smoker   • Drug use: No   • Sexual activity: Defer     Other Topics Concern   • Not on file           Objective      /63   Pulse 68   Temp 98.3 °F (36.8 °C) (Oral)   Resp 18   Ht 167.6 cm (66\")   Wt 122 kg (269 lb 6.4 oz)   SpO2 98%   BMI 43.48 kg/m²     Physical Exam   Constitutional: She is oriented to person, place, and time. She appears well-developed and well-nourished.   HENT:   Head: Normocephalic and atraumatic.   Eyes: Conjunctivae and EOM are normal. Pupils are equal, round, and reactive to light.   Cardiovascular: Normal rate, regular rhythm, normal heart sounds and intact distal pulses.    Pulmonary/Chest: Effort normal and breath sounds normal. No respiratory distress. She has no wheezes.   Abdominal: Soft. Bowel sounds are normal. She exhibits no distension and " no mass. There is no tenderness. There is no guarding.   Neurological: She is alert and oriented to person, place, and time.   Skin: Skin is warm and dry.   Psychiatric: She has a normal mood and affect. Her behavior is normal. Thought content normal.   Nursing note and vitals reviewed.      Procedures           ED Course  ED Course      Results for orders placed or performed during the hospital encounter of 05/12/18   Urinalysis With / Culture If Indicated - Urine, Clean Catch   Result Value Ref Range    Color, UA Yellow Yellow, Straw, Dark Yellow, Criss    Appearance, UA Cloudy (A) Clear    pH, UA 6.0 5.0 - 9.0    Specific Gravity, UA 1.009 1.003 - 1.030    Glucose, UA >=1000 mg/dL (3+) (A) Negative    Ketones, UA Negative Negative    Bilirubin, UA Negative Negative    Blood, UA Negative Negative    Protein, UA Negative Negative    Leuk Esterase, UA Trace (A) Negative    Nitrite, UA Negative Negative    Urobilinogen, UA 0.2 E.U./dL 0.2 - 1.0 E.U./dL   Urinalysis, Microscopic Only - Urine, Clean Catch   Result Value Ref Range    RBC, UA None Seen None Seen /HPF    WBC, UA 13-20 (A) None Seen, 0-2, 3-5 /HPF    Bacteria, UA 1+ (A) None Seen /HPF    Squamous Epithelial Cells, UA 3-5 (A) None Seen, 0-2 /HPF    Hyaline Casts, UA None Seen None Seen /LPF    Methodology Manual Light Microscopy    Comprehensive Metabolic Panel   Result Value Ref Range    Glucose 210 (H) 60 - 100 mg/dL    BUN 12 7 - 21 mg/dL    Creatinine 0.54 0.50 - 1.00 mg/dL    Sodium 137 137 - 145 mmol/L    Potassium 4.1 3.5 - 5.1 mmol/L    Chloride 100 95 - 110 mmol/L    CO2 25.0 22.0 - 31.0 mmol/L    Calcium 9.3 8.4 - 10.2 mg/dL    Total Protein 7.2 6.3 - 8.6 g/dL    Albumin 4.20 3.40 - 4.80 g/dL    ALT (SGPT) 125 (H) 9 - 52 U/L    AST (SGOT) 102 (H) 14 - 36 U/L    Alkaline Phosphatase 58 38 - 126 U/L    Total Bilirubin 0.3 0.2 - 1.3 mg/dL    eGFR Non  Amer 123 58 - 135 mL/min/1.73    Globulin 3.0 2.3 - 3.5 gm/dL    A/G Ratio 1.4 1.1 - 1.8 g/dL     BUN/Creatinine Ratio 22.2 7.0 - 25.0    Anion Gap 12.0 5.0 - 15.0 mmol/L   CBC Auto Differential   Result Value Ref Range    WBC 4.16 3.20 - 9.80 10*3/mm3    RBC 4.61 3.77 - 5.16 10*6/mm3    Hemoglobin 14.0 12.0 - 15.5 g/dL    Hematocrit 39.3 35.0 - 45.0 %    MCV 85.2 80.0 - 98.0 fL    MCH 30.4 26.5 - 34.0 pg    MCHC 35.6 31.4 - 36.0 g/dL    RDW 12.4 11.5 - 14.5 %    RDW-SD 38.3 36.4 - 46.3 fl    MPV 10.1 8.0 - 12.0 fL    Platelets 161 150 - 450 10*3/mm3    Neutrophil % 36.7 (L) 37.0 - 80.0 %    Lymphocyte % 48.1 10.0 - 50.0 %    Monocyte % 11.3 0.0 - 12.0 %    Eosinophil % 2.9 0.0 - 7.0 %    Basophil % 0.5 0.0 - 2.0 %    Immature Grans % 0.5 0.0 - 0.5 %    Neutrophils, Absolute 1.53 (L) 2.00 - 8.60 10*3/mm3    Lymphocytes, Absolute 2.00 0.60 - 4.20 10*3/mm3    Monocytes, Absolute 0.47 0.00 - 0.90 10*3/mm3    Eosinophils, Absolute 0.12 0.00 - 0.70 10*3/mm3    Basophils, Absolute 0.02 0.00 - 0.20 10*3/mm3    Immature Grans, Absolute 0.02 0.00 - 0.02 10*3/mm3   POC Glucose Once   Result Value Ref Range    Glucose 260 (H) 70 - 130 mg/dL   Light Blue Top   Result Value Ref Range    Extra Tube hold for add-on    Gold Top - SST   Result Value Ref Range    Extra Tube Hold for add-ons.                  MDM      Final diagnoses:   Type 2 diabetes mellitus with hyperglycemia, without long-term current use of insulin   Elevated liver enzymes            Jw Bah PA-C  05/12/18 1600     well-groomed
